# Patient Record
Sex: MALE | Race: BLACK OR AFRICAN AMERICAN | NOT HISPANIC OR LATINO | URBAN - METROPOLITAN AREA
[De-identification: names, ages, dates, MRNs, and addresses within clinical notes are randomized per-mention and may not be internally consistent; named-entity substitution may affect disease eponyms.]

---

## 2022-09-06 ENCOUNTER — INPATIENT (INPATIENT)
Facility: HOSPITAL | Age: 57
LOS: 3 days | Discharge: ANOTHER HOSPITAL | End: 2022-09-10
Attending: INTERNAL MEDICINE | Admitting: INTERNAL MEDICINE

## 2022-09-06 VITALS
HEART RATE: 87 BPM | RESPIRATION RATE: 17 BRPM | DIASTOLIC BLOOD PRESSURE: 107 MMHG | OXYGEN SATURATION: 96 % | HEIGHT: 74 IN | WEIGHT: 151.9 LBS | TEMPERATURE: 98 F | SYSTOLIC BLOOD PRESSURE: 168 MMHG

## 2022-09-06 LAB
ALBUMIN SERPL ELPH-MCNC: 3.6 G/DL — SIGNIFICANT CHANGE UP (ref 3.3–5)
ALP SERPL-CCNC: 112 U/L — SIGNIFICANT CHANGE UP (ref 40–120)
ALT FLD-CCNC: 25 U/L — SIGNIFICANT CHANGE UP (ref 12–78)
ANION GAP SERPL CALC-SCNC: 6 MMOL/L — SIGNIFICANT CHANGE UP (ref 5–17)
APTT BLD: 32 SEC — SIGNIFICANT CHANGE UP (ref 27.5–35.5)
AST SERPL-CCNC: 25 U/L — SIGNIFICANT CHANGE UP (ref 15–37)
BASOPHILS # BLD AUTO: 0.04 K/UL — SIGNIFICANT CHANGE UP (ref 0–0.2)
BASOPHILS NFR BLD AUTO: 0.8 % — SIGNIFICANT CHANGE UP (ref 0–2)
BILIRUB SERPL-MCNC: 0.5 MG/DL — SIGNIFICANT CHANGE UP (ref 0.2–1.2)
BUN SERPL-MCNC: 16 MG/DL — SIGNIFICANT CHANGE UP (ref 7–23)
CALCIUM SERPL-MCNC: 8.8 MG/DL — SIGNIFICANT CHANGE UP (ref 8.5–10.1)
CHLORIDE SERPL-SCNC: 106 MMOL/L — SIGNIFICANT CHANGE UP (ref 96–108)
CO2 SERPL-SCNC: 27 MMOL/L — SIGNIFICANT CHANGE UP (ref 22–31)
CREAT SERPL-MCNC: 1.25 MG/DL — SIGNIFICANT CHANGE UP (ref 0.5–1.3)
EGFR: 68 ML/MIN/1.73M2 — SIGNIFICANT CHANGE UP
EOSINOPHIL # BLD AUTO: 0.09 K/UL — SIGNIFICANT CHANGE UP (ref 0–0.5)
EOSINOPHIL NFR BLD AUTO: 1.8 % — SIGNIFICANT CHANGE UP (ref 0–6)
FLUAV AG NPH QL: SIGNIFICANT CHANGE UP
FLUBV AG NPH QL: SIGNIFICANT CHANGE UP
GLUCOSE SERPL-MCNC: 82 MG/DL — SIGNIFICANT CHANGE UP (ref 70–99)
HCT VFR BLD CALC: 45.1 % — SIGNIFICANT CHANGE UP (ref 39–50)
HGB BLD-MCNC: 14.7 G/DL — SIGNIFICANT CHANGE UP (ref 13–17)
IMM GRANULOCYTES NFR BLD AUTO: 0 % — SIGNIFICANT CHANGE UP (ref 0–1.5)
INR BLD: 1.06 RATIO — SIGNIFICANT CHANGE UP (ref 0.88–1.16)
LYMPHOCYTES # BLD AUTO: 1.2 K/UL — SIGNIFICANT CHANGE UP (ref 1–3.3)
LYMPHOCYTES # BLD AUTO: 24.4 % — SIGNIFICANT CHANGE UP (ref 13–44)
MCHC RBC-ENTMCNC: 29.5 PG — SIGNIFICANT CHANGE UP (ref 27–34)
MCHC RBC-ENTMCNC: 32.6 G/DL — SIGNIFICANT CHANGE UP (ref 32–36)
MCV RBC AUTO: 90.6 FL — SIGNIFICANT CHANGE UP (ref 80–100)
MONOCYTES # BLD AUTO: 0.46 K/UL — SIGNIFICANT CHANGE UP (ref 0–0.9)
MONOCYTES NFR BLD AUTO: 9.4 % — SIGNIFICANT CHANGE UP (ref 2–14)
NEUTROPHILS # BLD AUTO: 3.12 K/UL — SIGNIFICANT CHANGE UP (ref 1.8–7.4)
NEUTROPHILS NFR BLD AUTO: 63.6 % — SIGNIFICANT CHANGE UP (ref 43–77)
NRBC # BLD: 0 /100 WBCS — SIGNIFICANT CHANGE UP (ref 0–0)
NT-PROBNP SERPL-SCNC: 86 PG/ML — SIGNIFICANT CHANGE UP (ref 0–125)
PLATELET # BLD AUTO: 271 K/UL — SIGNIFICANT CHANGE UP (ref 150–400)
POTASSIUM SERPL-MCNC: 4.9 MMOL/L — SIGNIFICANT CHANGE UP (ref 3.5–5.3)
POTASSIUM SERPL-SCNC: 4.9 MMOL/L — SIGNIFICANT CHANGE UP (ref 3.5–5.3)
PROT SERPL-MCNC: 8.5 GM/DL — HIGH (ref 6–8.3)
PROTHROM AB SERPL-ACNC: 12.6 SEC — SIGNIFICANT CHANGE UP (ref 10.5–13.4)
RBC # BLD: 4.98 M/UL — SIGNIFICANT CHANGE UP (ref 4.2–5.8)
RBC # FLD: 14.3 % — SIGNIFICANT CHANGE UP (ref 10.3–14.5)
SARS-COV-2 RNA SPEC QL NAA+PROBE: SIGNIFICANT CHANGE UP
SODIUM SERPL-SCNC: 139 MMOL/L — SIGNIFICANT CHANGE UP (ref 135–145)
TROPONIN I, HIGH SENSITIVITY RESULT: 7.2 NG/L — SIGNIFICANT CHANGE UP
WBC # BLD: 4.91 K/UL — SIGNIFICANT CHANGE UP (ref 3.8–10.5)
WBC # FLD AUTO: 4.91 K/UL — SIGNIFICANT CHANGE UP (ref 3.8–10.5)

## 2022-09-06 PROCEDURE — 93010 ELECTROCARDIOGRAM REPORT: CPT

## 2022-09-06 PROCEDURE — 99285 EMERGENCY DEPT VISIT HI MDM: CPT

## 2022-09-06 PROCEDURE — 71275 CT ANGIOGRAPHY CHEST: CPT | Mod: 26,MA

## 2022-09-06 PROCEDURE — 71045 X-RAY EXAM CHEST 1 VIEW: CPT | Mod: 26

## 2022-09-06 NOTE — ED PROVIDER NOTE - PROGRESS NOTE DETAILS
CXR showing large left sided pleural effusion with compressed left lung. Patient in no respiratory distress at rest, comfortable, speaking full sentences, not hypoxic on RA. 99% on RA. Pending CTA chest and to be admitted for thoracentesis, drain pleural effusion and possible malignancy workup (+) right supraclavicular LAD, suspicious for malignancy. spoke w/ heme/onc on call fellow CTC, recommended to be admitted at [place] for pleural effusion drain and cytology and eventual IR to biopsy LN.

## 2022-09-06 NOTE — ED ADULT NURSE NOTE - OBJECTIVE STATEMENT
as per patient " 2- 3 days shortness of breath, when I lift, bend or walk it's hard to catch my breath"

## 2022-09-06 NOTE — ED ADULT NURSE NOTE - NS TRANSFER DISPOSITION PATIENT BELONGINGS
Spoke to patient and message given. Patient reminded of follow up apt on 6/6/19 at 1:00pm     not applicable

## 2022-09-06 NOTE — ED PROVIDER NOTE - CLINICAL SUMMARY MEDICAL DECISION MAKING FREE TEXT BOX
Pt with Hx of former smoker p/w dyspnea x 2 days, most likely secondary to large pleural effusion secondary to rule out lung cancer. Presentation is not consistent with acute cardiac etiologies (including but not limited to ACS (HEART SCORE  ), CHF exacerbation, pericardial effusion/cardiac tamponade), acute respiratory etiologies (including acute pulmonary embolism (Wells Score low risk), pneumothorax, asthma exacerbation, COPD exacerbation), allergic etiologies, infectious etiologies (including sepsis, pneumonia), or non-cardiopulmonary causes (including neurological causes such as demyelinating diseases, metabolic etiologies (including acidemia/electrolyte derangements), and toxicological causes (including salicylate toxicity, massive overdose)  PLAN:   - Supplemental oxygen as needed, NIPPV as needed. Close hemodynamic monitoring.  - CXR, CBC/CMP, troponin, BNP,    - CTA CHEST.  - Serial reassessments, disposition accordingly

## 2022-09-06 NOTE — ED PROVIDER NOTE - CARE PLAN
Principal Discharge DX:	Pleural effusion   1 Principal Discharge DX:	Pleural effusion  Secondary Diagnosis:	Pulmonary embolism  Secondary Diagnosis:	Malignancy  Secondary Diagnosis:	Dyspnea

## 2022-09-06 NOTE — ED PROVIDER NOTE - OBJECTIVE STATEMENT
57M PMHx of former smoker, left heart mass s/p surgical removal, presenting with shortness of breath x 2 days. Describes dyspnea on exertion, worsening with position, orthopnea. Denies any chest pain, abdominal pain, coughs, nausea/vomiting, headaches, fevers, chills, diarrhea ,constipation, weakness, syncope, hematuria, dysuria, urinary symptoms, subjective neurological deficits, sick contacts. Denies recent travel, recent surgery, immobility, extremity swelling, hemoptysis, hormone use, known personal cancer history, or personal/family history of blood clots. Denies any unexpected weight loss, extreme fatigue, night sweats.

## 2022-09-06 NOTE — ED PROVIDER NOTE - PHYSICAL EXAMINATION
VITAL SIGNS: I have reviewed nursing notes and confirm.   GEN: Well-developed; well-nourished; in no acute distress. Speaking full sentences.  SKIN: Warm, pink, no rash, no diaphoresis, no cyanosis, well perfused.   HEAD: Normocephalic; atraumatic. No scalp lacerations, no abrasions.  NECK: Supple; non tender. (+) RIGHT SUPRACLAVICULAR nontender mobile LAD.  EYES: Pupils 3mm equal, round, reactive to light and accomodation, conjunctiva and sclera clear. Extra-ocular movements intact bilaterally.  ENT: No nasal discharge; airway clear. Trachea is midline. ORAL: No oropharyngeal exudates or erythema. Normal dentition.  CV: Regular rate and rhythm. S1, S2 normal; no murmurs, gallops, or rubs. No lower extremity pitting edema bilaterally. Capillary refill < 2 seconds throughout. Distal pulses intact 2+ throughout.  RESP: (+) LEFT sided decreased breath sounds, no rales, no wheezing, no rhonchi. RIGHT lung CTA.  ABD: Normal bowel sounds, soft, non-distended, non-tender, no rebound, no guarding, no rigidity, no hepatosplenomegaly. No CVA tenderness bilaterally.  MSK: Normal range of motion and movement of all 4 extremities. No joint or muscular pain throughout. No clubbing.   BACK: No thoracolumbar midline or paravertebral tenderness. No step-offs or obvious deformities.  NEURO: Alert & oriented x 3, Grossly unremarkable. Sensory and motor intact throughout. No focal deficits. Gait: Fluid. Normal speech and coordination.   PSYCH: Cooperative, appropriate.

## 2022-09-07 DIAGNOSIS — I26.99 OTHER PULMONARY EMBOLISM WITHOUT ACUTE COR PULMONALE: ICD-10-CM

## 2022-09-07 DIAGNOSIS — R09.89 OTHER SPECIFIED SYMPTOMS AND SIGNS INVOLVING THE CIRCULATORY AND RESPIRATORY SYSTEMS: ICD-10-CM

## 2022-09-07 DIAGNOSIS — R91.8 OTHER NONSPECIFIC ABNORMAL FINDING OF LUNG FIELD: ICD-10-CM

## 2022-09-07 DIAGNOSIS — J90 PLEURAL EFFUSION, NOT ELSEWHERE CLASSIFIED: ICD-10-CM

## 2022-09-07 LAB
ALBUMIN SERPL ELPH-MCNC: 3.3 G/DL — SIGNIFICANT CHANGE UP (ref 3.3–5)
ALP SERPL-CCNC: 95 U/L — SIGNIFICANT CHANGE UP (ref 40–120)
ALT FLD-CCNC: 18 U/L — SIGNIFICANT CHANGE UP (ref 12–78)
ANION GAP SERPL CALC-SCNC: 5 MMOL/L — SIGNIFICANT CHANGE UP (ref 5–17)
APTT BLD: 156.4 SEC — CRITICAL HIGH (ref 27.5–35.5)
APTT BLD: 29.3 SEC — SIGNIFICANT CHANGE UP (ref 27.5–35.5)
AST SERPL-CCNC: 23 U/L — SIGNIFICANT CHANGE UP (ref 15–37)
BILIRUB SERPL-MCNC: 0.6 MG/DL — SIGNIFICANT CHANGE UP (ref 0.2–1.2)
BUN SERPL-MCNC: 19 MG/DL — SIGNIFICANT CHANGE UP (ref 7–23)
CALCIUM SERPL-MCNC: 9.5 MG/DL — SIGNIFICANT CHANGE UP (ref 8.5–10.1)
CHLORIDE SERPL-SCNC: 109 MMOL/L — HIGH (ref 96–108)
CO2 SERPL-SCNC: 25 MMOL/L — SIGNIFICANT CHANGE UP (ref 22–31)
CREAT SERPL-MCNC: 1.08 MG/DL — SIGNIFICANT CHANGE UP (ref 0.5–1.3)
EGFR: 81 ML/MIN/1.73M2 — SIGNIFICANT CHANGE UP
GLUCOSE SERPL-MCNC: 80 MG/DL — SIGNIFICANT CHANGE UP (ref 70–99)
HCT VFR BLD CALC: 40.6 % — SIGNIFICANT CHANGE UP (ref 39–50)
HGB BLD-MCNC: 13.2 G/DL — SIGNIFICANT CHANGE UP (ref 13–17)
MCHC RBC-ENTMCNC: 29.6 PG — SIGNIFICANT CHANGE UP (ref 27–34)
MCHC RBC-ENTMCNC: 32.5 G/DL — SIGNIFICANT CHANGE UP (ref 32–36)
MCV RBC AUTO: 91 FL — SIGNIFICANT CHANGE UP (ref 80–100)
NRBC # BLD: 0 /100 WBCS — SIGNIFICANT CHANGE UP (ref 0–0)
PLATELET # BLD AUTO: 249 K/UL — SIGNIFICANT CHANGE UP (ref 150–400)
POTASSIUM SERPL-MCNC: 4.1 MMOL/L — SIGNIFICANT CHANGE UP (ref 3.5–5.3)
POTASSIUM SERPL-SCNC: 4.1 MMOL/L — SIGNIFICANT CHANGE UP (ref 3.5–5.3)
PROT SERPL-MCNC: 7.2 GM/DL — SIGNIFICANT CHANGE UP (ref 6–8.3)
RBC # BLD: 4.46 M/UL — SIGNIFICANT CHANGE UP (ref 4.2–5.8)
RBC # FLD: 14.1 % — SIGNIFICANT CHANGE UP (ref 10.3–14.5)
SODIUM SERPL-SCNC: 139 MMOL/L — SIGNIFICANT CHANGE UP (ref 135–145)
WBC # BLD: 4.25 K/UL — SIGNIFICANT CHANGE UP (ref 3.8–10.5)
WBC # FLD AUTO: 4.25 K/UL — SIGNIFICANT CHANGE UP (ref 3.8–10.5)

## 2022-09-07 PROCEDURE — 12345: CPT | Mod: NC

## 2022-09-07 PROCEDURE — 93306 TTE W/DOPPLER COMPLETE: CPT | Mod: 26

## 2022-09-07 PROCEDURE — 88108 CYTOPATH CONCENTRATE TECH: CPT | Mod: 26

## 2022-09-07 PROCEDURE — 71045 X-RAY EXAM CHEST 1 VIEW: CPT | Mod: 26

## 2022-09-07 PROCEDURE — 93308 TTE F-UP OR LMTD: CPT | Mod: 26

## 2022-09-07 PROCEDURE — 99223 1ST HOSP IP/OBS HIGH 75: CPT | Mod: 25

## 2022-09-07 PROCEDURE — 32555 ASPIRATE PLEURA W/ IMAGING: CPT

## 2022-09-07 PROCEDURE — 99223 1ST HOSP IP/OBS HIGH 75: CPT

## 2022-09-07 PROCEDURE — 76604 US EXAM CHEST: CPT | Mod: 26

## 2022-09-07 RX ORDER — LANOLIN ALCOHOL/MO/W.PET/CERES
3 CREAM (GRAM) TOPICAL AT BEDTIME
Refills: 0 | Status: DISCONTINUED | OUTPATIENT
Start: 2022-09-07 | End: 2022-09-10

## 2022-09-07 RX ORDER — HEPARIN SODIUM 5000 [USP'U]/ML
5500 INJECTION INTRAVENOUS; SUBCUTANEOUS ONCE
Refills: 0 | Status: COMPLETED | OUTPATIENT
Start: 2022-09-07 | End: 2022-09-07

## 2022-09-07 RX ORDER — HEPARIN SODIUM 5000 [USP'U]/ML
1000 INJECTION INTRAVENOUS; SUBCUTANEOUS
Qty: 25000 | Refills: 0 | Status: DISCONTINUED | OUTPATIENT
Start: 2022-09-07 | End: 2022-09-10

## 2022-09-07 RX ORDER — METOPROLOL TARTRATE 50 MG
12.5 TABLET ORAL ONCE
Refills: 0 | Status: COMPLETED | OUTPATIENT
Start: 2022-09-07 | End: 2022-09-07

## 2022-09-07 RX ORDER — ACETAMINOPHEN 500 MG
650 TABLET ORAL EVERY 6 HOURS
Refills: 0 | Status: DISCONTINUED | OUTPATIENT
Start: 2022-09-07 | End: 2022-09-10

## 2022-09-07 RX ORDER — HEPARIN SODIUM 5000 [USP'U]/ML
2500 INJECTION INTRAVENOUS; SUBCUTANEOUS EVERY 6 HOURS
Refills: 0 | Status: DISCONTINUED | OUTPATIENT
Start: 2022-09-07 | End: 2022-09-07

## 2022-09-07 RX ORDER — HEPARIN SODIUM 5000 [USP'U]/ML
INJECTION INTRAVENOUS; SUBCUTANEOUS
Qty: 25000 | Refills: 0 | Status: DISCONTINUED | OUTPATIENT
Start: 2022-09-07 | End: 2022-09-07

## 2022-09-07 RX ORDER — AMLODIPINE BESYLATE 2.5 MG/1
5 TABLET ORAL EVERY 24 HOURS
Refills: 0 | Status: DISCONTINUED | OUTPATIENT
Start: 2022-09-07 | End: 2022-09-10

## 2022-09-07 RX ORDER — HEPARIN SODIUM 5000 [USP'U]/ML
5500 INJECTION INTRAVENOUS; SUBCUTANEOUS EVERY 6 HOURS
Refills: 0 | Status: DISCONTINUED | OUTPATIENT
Start: 2022-09-07 | End: 2022-09-07

## 2022-09-07 RX ADMIN — HEPARIN SODIUM 1000 UNIT(S)/HR: 5000 INJECTION INTRAVENOUS; SUBCUTANEOUS at 23:16

## 2022-09-07 RX ADMIN — HEPARIN SODIUM 5500 UNIT(S): 5000 INJECTION INTRAVENOUS; SUBCUTANEOUS at 00:50

## 2022-09-07 RX ADMIN — Medication 12.5 MILLIGRAM(S): at 06:05

## 2022-09-07 RX ADMIN — AMLODIPINE BESYLATE 5 MILLIGRAM(S): 2.5 TABLET ORAL at 13:07

## 2022-09-07 RX ADMIN — HEPARIN SODIUM 1200 UNIT(S)/HR: 5000 INJECTION INTRAVENOUS; SUBCUTANEOUS at 04:51

## 2022-09-07 RX ADMIN — HEPARIN SODIUM 1200 UNIT(S)/HR: 5000 INJECTION INTRAVENOUS; SUBCUTANEOUS at 00:51

## 2022-09-07 RX ADMIN — HEPARIN SODIUM 1200 UNIT(S)/HR: 5000 INJECTION INTRAVENOUS; SUBCUTANEOUS at 07:15

## 2022-09-07 RX ADMIN — HEPARIN SODIUM 0 UNIT(S)/HR: 5000 INJECTION INTRAVENOUS; SUBCUTANEOUS at 10:34

## 2022-09-07 NOTE — H&P ADULT - HISTORY OF PRESENT ILLNESS
58 yo M w/PMHx of s/p thoracic surgery 2/2 to cardiac mass/inflammation presents due to SOB, orthopnea. He reports he suddenly noticed being more dyspneic several days ago, went to urgent care today and they recommended he come to the hospital. He reports b/l peripheral edema, R worse than left. He reports 5-6 pound unintentional weight loss over the last 2 months. He smoked 2 "black and mild" cigars daily for 8-9 years and quit 6 months ago. No family history of malignancy. He denies hemoptysis, night sweats, chest pain, abdominal pain, coughs, nausea/vomiting, headaches, fevers, chills, diarrhea ,constipation, weakness, syncope, hematuria, dysuria, urinary symptoms, subjective neurological deficits, sick contacts. Denies recent travel, recent surgery.

## 2022-09-07 NOTE — CONSULT NOTE ADULT - SUBJECTIVE AND OBJECTIVE BOX
Patient is a 57y old  Male who presents with a chief complaint of Pleural Effusion, Pulmonary Embolism (07 Sep 2022 02:18)      HPI:  56 yo M w/PMHx of s/p thoracic surgery 2/2 to cardiac mass/inflammation presents due to SOB, orthopnea. He reports he suddenly noticed being more dyspneic several days ago, went to urgent care today and they recommended he come to the hospital. He reports b/l peripheral edema, R worse than left. He reports 5-6 pound unintentional weight loss over the last 2 months. He smoked 2 "black and mild" cigars daily for 8-9 years and quit 6 months ago. No family history of malignancy. He denies hemoptysis, night sweats, chest pain, abdominal pain, coughs, nausea/vomiting, headaches, fevers, chills, diarrhea ,constipation, weakness, syncope, hematuria, dysuria, urinary symptoms, subjective neurological deficits, sick contacts. Denies recent travel, recent surgery.  (07 Sep 2022 02:18)      Allergies  No Known Allergies        MEDICATIONS  (STANDING):  amLODIPine   Tablet 5 milliGRAM(s) Oral every 24 hours  heparin  Infusion.  Unit(s)/Hr (12 mL/Hr) IV Continuous <Continuous>    MEDICATIONS  (PRN):  acetaminophen     Tablet .. 650 milliGRAM(s) Oral every 6 hours PRN Temp greater or equal to 38C (100.4F), Mild Pain (1 - 3)  heparin   Injectable 5500 Unit(s) IV Push every 6 hours PRN For aPTT less than 40  heparin   Injectable 2500 Unit(s) IV Push every 6 hours PRN For aPTT between 40 - 57  melatonin 3 milliGRAM(s) Oral at bedtime PRN Insomnia      Daily Height in cm: 187.96 (07 Sep 2022 04:58)    Daily     Drug Dosing Weight  Height (cm): 188 (07 Sep 2022 04:58)  Weight (kg): 55.9 (07 Sep 2022 04:58)  BMI (kg/m2): 15.8 (07 Sep 2022 04:58)  BSA (m2): 1.77 (07 Sep 2022 04:58)    PAST MEDICAL & SURGICAL HISTORY:  No pertinent past medical history      No significant past surgical history          FAMILY HISTORY:  No pertinent family history in first degree relatives        SOCIAL HISTORY:    ADVANCE DIRECTIVES:    REVIEW OF SYSTEMS:    CONSTITUTIONAL: No fever, weight loss, or fatigue  EYES: No eye pain, visual disturbances, or discharge  ENMT:  No difficulty hearing, tinnitus, vertigo; No sinus or throat pain  NECK: No pain or stiffness  BREASTS: No pain, masses, or nipple discharge  RESPIRATORY: No cough, wheezing, chills or hemoptysis; No shortness of breath  CARDIOVASCULAR: No chest pain, palpitations, dizziness, or leg swelling  GASTROINTESTINAL: No abdominal or epigastric pain. No nausea, vomiting, or hematemesis; No diarrhea or constipation. No melena or hematochezia.  GENITOURINARY: No dysuria, frequency, hematuria, or incontinence  NEUROLOGICAL: No headaches, memory loss, loss of strength, numbness, or tremors  SKIN: No itching, burning, rashes, or lesions   LYMPH NODES: No enlarged glands  ENDOCRINE: No heat or cold intolerance; No hair loss  MUSCULOSKELETAL: No joint pain or swelling; No muscle, back, or extremity pain  PSYCHIATRIC: No depression, anxiety, mood swings, or difficulty sleeping  HEME/LYMPH: No easy bruising, or bleeding gums  ALLERGY AND IMMUNOLOGIC: No hives or eczema          ICU Vital Signs Last 24 Hrs  T(C): 36.4 (07 Sep 2022 11:06), Max: 36.6 (06 Sep 2022 19:49)  T(F): 97.5 (07 Sep 2022 11:06), Max: 97.8 (06 Sep 2022 19:49)  HR: 78 (07 Sep 2022 11:25) (75 - 88)  BP: 171/102 (07 Sep 2022 11:25) (145/94 - 171/102)  RR: 18 (07 Sep 2022 11:06) (17 - 21)  SpO2: 95% (07 Sep 2022 11:06) (94% - 97%)    O2 Parameters below as of 07 Sep 2022 11:06  Patient On (Oxygen Delivery Method): room air                I&O's Detail    07 Sep 2022 07:01  -  07 Sep 2022 16:04  --------------------------------------------------------  IN:    Oral Fluid: 540 mL  Total IN: 540 mL    OUT:  Total OUT: 0 mL    Total NET: 540 mL          PHYSICAL EXAM:    GENERAL: NAD, well-groomed, well-developed  HEAD:  Atraumatic, Normocephalic  EYES: EOMI, PERRLA, conjunctiva and sclera clear  ENMT: No tonsillar erythema, exudates, or enlargement; Moist mucous membranes, Good dentition, No lesions  NECK: Supple, No JVD, Normal thyroid  NERVOUS SYSTEM:  Alert & Oriented X3, Good concentration; Motor Strength 5/5 B/L upper and lower extremities; DTRs 2+ intact and symmetric  CHEST/LUNG: Clear to percussion bilaterally; No rales, rhonchi, wheezing, or rubs  HEART: Regular rate and rhythm; No murmurs, rubs, or gallops  ABDOMEN: Soft, Nontender, Nondistended; Bowel sounds present  EXTREMITIES:  2+ Peripheral Pulses, No clubbing, cyanosis, or edema  LYMPH: No lymphadenopathy noted  SKIN: No rashes or lesions    LABS:                        13.2   4.25  )-----------( 249      ( 07 Sep 2022 07:40 )             40.6       09-07    139  |  109<H>  |  19  ----------------------------<  80  4.1   |  25           |  1.08    Ca    9.5      07 Sep 2022 07:40    TPro  7.2  /  Alb  3.3  /  TBili  0.6  /  DBili  x   /  AST  23  /  ALT  18  /  AlkPhos  95  09-07      CAPILLARY BLOOD GLUCOSE        PT/INR - ( 06 Sep 2022 20:44 )   PT: 12.6 sec;   INR: 1.06 ratio         PTT - ( 07 Sep 2022 07:40 )  PTT:156.4 sec            EKG:    ECHO, US:    RADIOLOGY:  CXR < from: Xray Chest 1 View- PORTABLE-Urgent (Xray Chest 1 View- PORTABLE-Urgent .) (09.06.22 @ 21:22) >  Heart size cannot be assessed.  There is a massive left effusion.    CTA chest < from: CT Angio Chest PE Protocol w/ IV Cont (09.06.22 @ 22:10) >  Small eccentric filling defects identified within segmental to   subsegmental branches of the right lower lobe pulmonary artery,   compatible with pulmonary embolism. No central pulmonary embolism.    Mild cardiomegaly with enlargement the right cardiac chambers. Consider   correlation with echocardiogram to exclude potential right heart strain.    Left upper lobe masslike opacity measuring 3.3 x 2 cm (45:2). Poorly   defined right upper lobe nodule measuring 1.4 x 1.3 cm (52:2). Correlate   with prior studies and further nonemergent workup with PET/CT advised to   exclude malignancy or metastatic disease. Additional few too small to   characterize less than 4-5 mm right sided subpleural based nodules.    Large left pleural effusion extending to the apex, components appears   loculated with adjacent interstitial reticulation in the leftapex.    Complete left lower lobe atelectasis. Partial left upper lobe atelectasis.       CC: shortness of breath      HPI:  57M with no self reported PMH. Does report hx of thoracic surgery for unclear reasons ?cardiac mass/inflammation "fluid or cyst" 20 years ago in [facility]. Denies hx of cancer. States he was not told if his thoracic sx was related to cancer or not but states post surgery he had what was described as R sided chest tubes. He denies any prior chemo or radiation therapy. States he has been in good health overall and is active and has always played sports (basketball, football). Denies any recent travel. Reports he has been in [place] for some time to help his sister who received a renal transplant and had plans of returning back to [place] once she was better. Pt presents to ED for acute onset of SOB when he awoke from sleep 2 days prior. No chest pain. No cough. No hemoptysis. Now with worsening dyspnea on exertion in last 2 days prompting ED eval. Also reports b/l LE peripheral edema  R worse than left. He has had 5-6 pounds unintentional weight loss over the last 2 months. No changes in his appetite. He smoked 2 "black and mild" cigars daily for 8 years and quit 6 months ago. No family history of clotting disorders. He denies night sweats, chest pain, abdominal pain, coughs, nausea/vomiting, headaches, fevers, chills, diarrhea , weakness, syncope, hematuria, dysuria, urinary symptoms, any neurological deficits, sick contacts. No recent travel, no recent surgery. 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 He had a "normal" colonoscopy about 3-4 years ago and around same time had his prostate checked without abnormalities per patient.     O2 sat RA 97%, in no respiratory distress. Found on CXR with large L pleural effusion. CTA chest with large L pleural effusion and RLL PE. Also with noted RUL nodule and ?SAULO mass. Admitted to medical service and started on heparin drip for PE.    Pt is not on any home medications.       Allergies  No Known Allergies        MEDICATIONS  (STANDING):  amLODIPine   Tablet 5 milliGRAM(s) Oral every 24 hours  heparin  Infusion.  Unit(s)/Hr (12 mL/Hr) IV Continuous <Continuous>    MEDICATIONS  (PRN):  acetaminophen     Tablet .. 650 milliGRAM(s) Oral every 6 hours PRN Temp greater or equal to 38C (100.4F), Mild Pain (1 - 3)  heparin   Injectable 5500 Unit(s) IV Push every 6 hours PRN For aPTT less than 40  heparin   Injectable 2500 Unit(s) IV Push every 6 hours PRN For aPTT between 40 - 57  melatonin 3 milliGRAM(s) Oral at bedtime PRN Insomnia      Daily Height in cm: 187.96 (07 Sep 2022 04:58)    Daily     Drug Dosing Weight  Height (cm): 188 (07 Sep 2022 04:58)  Weight (kg): 55.9 (07 Sep 2022 04:58)  BMI (kg/m2): 15.8 (07 Sep 2022 04:58)  BSA (m2): 1.77 (07 Sep 2022 04:58)    PAST MEDICAL & SURGICAL HISTORY:  No pertinent past medical history    R thoracotomy 20 years ago  R knee surgery (high school basketball cartilage injury)            FAMILY HISTORY:  sister (same mother/different father): renal failure s/p renal transplant    "cancer"- unclear of what type on father's side      SOCIAL HISTORY:  retired  since 2016  currently works produce and meat section at [employer]  quit cigar smoking 6 months (smoked 2 black and mild cigars daily x8 years)  no alcohol use  + occasional marijuana use       REVIEW OF SYSTEMS:  CONSTITUTIONAL: No fever, chills, sweats, fatigue; + unintentional 5 pound weight loss over 2 months  EYES: No eye pain, visual disturbances  ENMT:  No difficulty hearing, tinnitus, vertigo; No sinus or throat pain  NECK: No pain or stiffness  RESPIRATORY: No cough, wheezing, hemoptysis; + shortness of breath x2 days  CARDIOVASCULAR: No chest pain, palpitations, dizziness  GASTROINTESTINAL: No abdominal or epigastric pain. No nausea, vomiting, or hematemesis; No diarrhea + constipation. No melena or hematochezia.  GENITOURINARY: No dysuria, frequency, hematuria, or incontinence  NEUROLOGICAL: No headaches, memory loss, loss of strength, numbness, or tremors  SKIN: No itching, burning, rashes, or lesions   LYMPH NODES: No enlarged glands  MUSCULOSKELETAL: No joint pain or swelling; No muscle, back, or extremity pain  PSYCHIATRIC: No depression, anxiety  HEME/LYMPH: No easy bruising, or bleeding gums or prior clots      Vital Signs Last 24 Hrs  T(C): 36.4 (07 Sep 2022 11:06), Max: 36.6 (06 Sep 2022 19:49)  T(F): 97.5 (07 Sep 2022 11:06), Max: 97.8 (06 Sep 2022 19:49)  HR: 78 (07 Sep 2022 11:25) (75 - 88)  BP: 171/102 (07 Sep 2022 11:25) (145/94 - 171/102)  RR: 18 (07 Sep 2022 11:06) (17 - 21)  SpO2: 95% (07 Sep 2022 11:06) (94% - 97%)    O2 Parameters below as of 07 Sep 2022 11:06  Patient On (Oxygen Delivery Method): room air        I&O's Detail    07 Sep 2022 07:01  -  07 Sep 2022 16:04  --------------------------------------------------------  IN:    Oral Fluid: 540 mL  Total IN: 540 mL    OUT:  Total OUT: 0 mL    Total NET: 540 mL        PHYSICAL EXAM:  GENERAL: NAD, thin tall well-groomed male  HEAD:  Atraumatic, Normocephalic  EYES: EOMI, conjunctiva and sclera clear  ENMT: No tonsillar erythema, exudates; Moist mucous membranes  NECK: Supple, No JVD  NERVOUS SYSTEM:  Alert & Oriented X3, Good concentration; Motor Strength 5/5 B/L upper and lower extremities; DTRs 2+ intact and symmetric  CHEST/LUNG: Clear to auscultation bilaterally; No rales, rhonchi, wheezing, Left breath sounds decreased compared to right  HEART: Regular rate and rhythm  ABDOMEN: Soft, Nontender, Nondistended; Bowel sounds present  EXTREMITIES:  2+ Peripheral Pulses, No clubbing, cyanosis; bilateral LE ankle and feet pitting edema 2+  SKIN: No rashes or lesions, birthmark noted to lumbar back region, large R posterior back healed surgical scar, R knee surgical scar      LABS:                        13.2   4.25  )-----------( 249      ( 07 Sep 2022 07:40 )             40.6       09-07    139  |  109<H>  |  19  ----------------------------<  80  4.1   |  25           |  1.08    Ca    9.5      07 Sep 2022 07:40    TPro  7.2  /  Alb  3.3  /  TBili  0.6  /  AST  23  /  ALT  18  /  AlkPhos  95  09-07        PT/INR - ( 06 Sep 2022 20:44 )   PT: 12.6 sec;   INR: 1.06 ratio    PTT - ( 07 Sep 2022 07:40 )  PTT:156.4 sec      RADIOLOGY:  CXR < from: Xray Chest 1 View- PORTABLE-Urgent (Xray Chest 1 View- PORTABLE-Urgent .) (09.06.22 @ 21:22) >  Heart size cannot be assessed.  There is a massive left effusion.    CTA chest < from: CT Angio Chest PE Protocol w/ IV Cont (09.06.22 @ 22:10) >  Small eccentric filling defects identified within segmental to   subsegmental branches of the right lower lobe pulmonary artery,   compatible with pulmonary embolism. No central pulmonary embolism.    Mild cardiomegaly with enlargement the right cardiac chambers. Consider   correlation with echocardiogram to exclude potential right heart strain.    Left upper lobe masslike opacity measuring 3.3 x 2 cm (45:2). Poorly   defined right upper lobe nodule measuring 1.4 x 1.3 cm (52:2). Correlate   with prior studies and further nonemergent workup with PET/CT advised to   exclude malignancy or metastatic disease. Additional few too small to   characterize less than 4-5 mm right sided subpleural based nodules.    Large left pleural effusion extending to the apex, components appears   loculated with adjacent interstitial reticulation in the leftapex.    Complete left lower lobe atelectasis. Partial left upper lobe atelectasis.

## 2022-09-07 NOTE — CONSULT NOTE ADULT - ASSESSMENT
- large L pleural effusion  - lung US with complex fluid  - in light of PE, unilateral effusion, and complex appearing fluid on US underlying malignancy is of concern  - hold heparin drip (spoke with nurse and primary team) for bedside thoracentesis later today  - repeat PTT prior to planned thoracentesis  - check 2D echo       57M with unclear remote history of R thoracotomy, ex cigar smoker presents for SOB x2 days with bilateral LE edema R > L. Found to have RLL PE and large L pleural effusion along with RUL nodule and ? SAULO mass.      - large L pleural effusion seen apically and both anteriorly / posteriorly   - lung US with complex appearing fluid  - in light of PE, unilateral effusion, and complex appearing fluid on US with echogenic swirling underlying malignancy is of concern  - hold heparin drip (spoke with nurse and primary team) for bedside thoracentesis later today  - repeat PTT prior to planned thoracentesis  - check 2D echo  - suspect pt with possible DVTs, can get LE duplex to confirm but management will not change as pt being anticoagulated  - SOB if likely related more to the large pleural effusion rather than the RLL PE noted on CTA chest  - can resume anticoagulation after thoracentesis performed  - will send pleural fluid sample for cytology to r/o malignancy, cell count, LDH, protein, cultures  - may need to repeat CT chest post thoracentesis to better assess lung parenchyma as initial scan limited due to large effusion

## 2022-09-07 NOTE — H&P ADULT - NSHPPHYSICALEXAM_GEN_ALL_CORE
T(C): 36.6 (09-07-22 @ 02:15), Max: 36.6 (09-06-22 @ 19:49)  HR: 80 (09-07-22 @ 02:15) (80 - 87)  BP: 146/95 (09-07-22 @ 02:15) (145/94 - 168/107)  RR: 21 (09-07-22 @ 02:15) (17 - 21)  SpO2: 94% (09-07-22 @ 02:15) (94% - 96%)    CONSTITUTIONAL: Well groomed, no apparent distress  EYES: PERRLA and symmetric, EOMI, No conjunctival or scleral injection, non-icteric  ENMT: Oral mucosa with moist membranes. Normal dentition; no pharyngeal injection or exudates             NECK: Supple, symmetric and without tracheal deviation   RESP: Wheezing throughout L chest. No respiratory distress, no use of accessory muscles.  CV: RRR, +S1S2, no MRG; no JVD; no peripheral edema. Brice's test neg.  GI: Soft, NT, ND, no rebound, no guarding; no palpable masses; no hepatosplenomegaly; no hernia palpated  LYMPH: No cervical LAD or tenderness; no axillary LAD or tenderness; no inguinal LAD or tenderness  MSK: Normal ROM without pain, no spinal tenderness, normal muscle strength/tone  SKIN: Thoracic scar on R. No rashes or ulcers noted; no subcutaneous nodules or induration palpable  NEURO: CN II-XII intact; normal reflexes in upper and lower extremities, sensation intact in upper and lower extremities b/l to light touch   PSYCH: Appropriate insight/judgment; A+O x 3, mood and affect appropriate, recent/remote memory intact

## 2022-09-07 NOTE — PROCEDURE NOTE - NSUS ED ADDITIONAL DETAIL1 FT
R lung a line predominant. No effusion on right. Large left pleural effusion with dense/hyperechoic appearance with echogenic swirling sign. R lung a line predominant. No effusion on right. Large left pleural effusion with dense/hyperechoic appearance with echogenic swirling sign and atelectatic lung. L effusion also extending to apex and noted anteriorly as well

## 2022-09-07 NOTE — CHART NOTE - NSCHARTNOTEFT_GEN_A_CORE
seen and examined  plan for drain of pleural effusion today, hold heparin  pulm on board    Amlodipine added for htn                          13.2   4.25  )-----------( 249      ( 07 Sep 2022 07:40 )             40.6     09-07    139  |  109<H>  |  19  ----------------------------<  80  4.1   |  25  |  1.08    Ca    9.5      07 Sep 2022 07:40    TPro  7.2  /  Alb  3.3  /  TBili  0.6  /  DBili  x   /  AST  23  /  ALT  18  /  AlkPhos  95  09-07    PT/INR - ( 06 Sep 2022 20:44 )   PT: 12.6 sec;   INR: 1.06 ratio         PTT - ( 07 Sep 2022 07:40 )  PTT:156.4 sec          Vital Signs Last 24 Hrs  T(C): 36.4 (07 Sep 2022 11:06), Max: 36.6 (06 Sep 2022 19:49)  T(F): 97.5 (07 Sep 2022 11:06), Max: 97.8 (06 Sep 2022 19:49)  HR: 78 (07 Sep 2022 11:25) (75 - 88)  BP: 171/102 (07 Sep 2022 11:25) (145/94 - 171/102)  BP(mean): --  RR: 18 (07 Sep 2022 11:06) (17 - 21)  SpO2: 95% (07 Sep 2022 11:06) (94% - 97%)    Parameters below as of 07 Sep 2022 11:06  Patient On (Oxygen Delivery Method): room air

## 2022-09-07 NOTE — H&P ADULT - NSHPLABSRESULTS_GEN_ALL_CORE
14.7   4.91  )-----------( 271      ( 06 Sep 2022 20:44 )             45.1       09-06    139  |  106  |  16  ----------------------------<  82  4.9   |  27  |  1.25    Ca    8.8      06 Sep 2022 20:44    TPro  8.5<H>  /  Alb  3.6  /  TBili  0.5  /  DBili  x   /  AST  25  /  ALT  25  /  AlkPhos  112  09-06              PT/INR - ( 06 Sep 2022 20:44 )   PT: 12.6 sec;   INR: 1.06 ratio         PTT - ( 06 Sep 2022 20:44 )  PTT:32.0 sec

## 2022-09-07 NOTE — PROCEDURE NOTE - ADDITIONAL PROCEDURE DETAILS
sample sent for cell count, culture, cytology. still with residual pleural fluid on US. Procedure terminated with 2.4L removed as pt started with increased cough and slight substernal chest pain which resolved with cessation of fluid removal.

## 2022-09-07 NOTE — PROCEDURE NOTE - NSUS ED ADDITIONAL DETAIL1 FT
difficult cardiac views secondary to large left pleural effusion which is displacing heart towards right. LV function appears to be within normal limits.

## 2022-09-07 NOTE — PATIENT PROFILE ADULT - NSPROPTRIGHTSUPPORTPHONE_GEN_A_NUR
Lov 8/17/2021  Labs no labs   Lrf 8/18/2021 Disp 30+2  Appt No appt scheduled please advise thanks [phone]

## 2022-09-07 NOTE — CHART NOTE - NSCHARTNOTEFT_GEN_A_CORE
1500  heparin drip held at approximately 11:45AM  repeat PTT ordered as AM PTT > 100  pending collection by phlebotomy  if PTT normalized will proceed with ultrasound guided thoracentesis    1600  PTT not yet resulted, sample pending collection    1800  labs reviewed  PTT 29.3    1830  pt not in room  away at test (Echo)    1930  pt back in room  consent obtained and placed in chart

## 2022-09-07 NOTE — H&P ADULT - ASSESSMENT
56 yo M w/PMHx of s/p thoracic surgery 2/2 to cardiac mass/inflammation presents due to SOB, orthopnea.    #Pulmonary Embolus  - PE seen on CTA  - Heparin drip, f/u ptt, plt  - No hx of clotting  - Possibly due to underlying malignancy  - Not in active respiratory distress    #Pleural Effusion  - Large L-sided Pleural effusion seen on CTA  - IR consulted for thoracentesis  - Send diagnostic pleural fluid studies  - Titrate O2>92%  - Pt afebrile, no leukocytosis, low suspicion for infectious process and will not empirically use antibiotics at this time  - Pulmonary consulted    #Lung Mass #Lung Nodule  - Suspicion for malignancy, no previous malignancy per patient  - No Family Hx of malignancy  - Previous smoker of 2 cigars daily for about 9 years, quit 6 months ago, denies cigarette use  - Pt candidate for transfer if diagnostic results of pleural effusion reveal Small cell carcinoma  - Outpatient follow up with oncology for PET scan    CODE: Full  Diet: Regular  VTE: Heparin drip 58 yo M w/PMHx of s/p thoracic surgery 2/2 to cardiac mass/inflammation presents due to SOB, orthopnea.    #Pulmonary Embolus  - PE seen on CTA  - Heparin drip, f/u ptt, plt  - No hx of clotting  - Possibly due to underlying malignancy  - Not in active respiratory distress    #Pleural Effusion  - Large L-sided Pleural effusion seen on CTA  - Pulmonary consulted for thoracentesis  - Send diagnostic pleural fluid studies  - Titrate O2>92%  - Pt afebrile, no leukocytosis, low suspicion for infectious process and will not empirically use antibiotics at this time    #Lung Mass #Lung Nodule  - Suspicion for malignancy, no previous malignancy per patient  - No Family Hx of malignancy  - Previous smoker of 2 cigars daily for about 9 years, quit 6 months ago, denies cigarette use  - Pt candidate for transfer if diagnostic results of pleural effusion reveal Small cell carcinoma  - Outpatient follow up with oncology for PET scan    CODE: Full  Diet: Regular  VTE: Heparin drip

## 2022-09-07 NOTE — H&P ADULT - NSHPREVIEWOFSYSTEMS_GEN_ALL_CORE
+ SOB, Orthopnea, peripheral edema    - Hemoptysis night sweats, chest pain, abdominal pain, coughs, nausea/vomiting, headaches, fevers, chills, diarrhea ,constipation, weakness, syncope, hematuria, dysuria, urinary symptoms, subjective neurological deficits, sick contacts. Denies recent travel, recent surgery.

## 2022-09-08 ENCOUNTER — RESULT REVIEW (OUTPATIENT)
Age: 57
End: 2022-09-08

## 2022-09-08 LAB
ALBUMIN FLD-MCNC: 3.2 G/DL — SIGNIFICANT CHANGE UP
APTT BLD: 58.1 SEC — HIGH (ref 27.5–35.5)
APTT BLD: 66.2 SEC — HIGH (ref 27.5–35.5)
B PERT IGG+IGM PNL SER: CLEAR — SIGNIFICANT CHANGE UP
COLOR FLD: YELLOW — SIGNIFICANT CHANGE UP
COMMENT - FLUIDS: SIGNIFICANT CHANGE UP
EOSINOPHIL # FLD: 1 % — SIGNIFICANT CHANGE UP
FLUID INTAKE SUBSTANCE CLASS: SIGNIFICANT CHANGE UP
GLUCOSE FLD-MCNC: 64 MG/DL — SIGNIFICANT CHANGE UP
GRAM STN FLD: SIGNIFICANT CHANGE UP
HCT VFR BLD CALC: 41.3 % — SIGNIFICANT CHANGE UP (ref 39–50)
HGB BLD-MCNC: 13.4 G/DL — SIGNIFICANT CHANGE UP (ref 13–17)
LDH SERPL L TO P-CCNC: 408 U/L — SIGNIFICANT CHANGE UP
LYMPHOCYTES # FLD: 49 % — SIGNIFICANT CHANGE UP
MCHC RBC-ENTMCNC: 29.6 PG — SIGNIFICANT CHANGE UP (ref 27–34)
MCHC RBC-ENTMCNC: 32.4 G/DL — SIGNIFICANT CHANGE UP (ref 32–36)
MCV RBC AUTO: 91.2 FL — SIGNIFICANT CHANGE UP (ref 80–100)
NEUTROPHILS-BODY FLUID: 2 % — SIGNIFICANT CHANGE UP
NRBC # BLD: 0 /100 WBCS — SIGNIFICANT CHANGE UP (ref 0–0)
OTHER CELLS FLD MANUAL: 48 % — SIGNIFICANT CHANGE UP
PH FLD: 7.7 — SIGNIFICANT CHANGE UP
PLATELET # BLD AUTO: 235 K/UL — SIGNIFICANT CHANGE UP (ref 150–400)
PROT FLD-MCNC: 5.1 G/DL — SIGNIFICANT CHANGE UP
RBC # BLD: 4.53 M/UL — SIGNIFICANT CHANGE UP (ref 4.2–5.8)
RBC # FLD: 13.7 % — SIGNIFICANT CHANGE UP (ref 10.3–14.5)
RCV VOL RI: 1000 /UL — HIGH (ref 0–5)
SPECIMEN SOURCE: SIGNIFICANT CHANGE UP
TOTAL NUCLEATED CELL COUNT, BODY FLUID: 622 /UL — SIGNIFICANT CHANGE UP
TUBE TYPE: SIGNIFICANT CHANGE UP
WBC # BLD: 4.62 K/UL — SIGNIFICANT CHANGE UP (ref 3.8–10.5)
WBC # FLD AUTO: 4.62 K/UL — SIGNIFICANT CHANGE UP (ref 3.8–10.5)

## 2022-09-08 PROCEDURE — 88341 IMHCHEM/IMCYTCHM EA ADD ANTB: CPT | Mod: 26

## 2022-09-08 PROCEDURE — 88305 TISSUE EXAM BY PATHOLOGIST: CPT | Mod: 26

## 2022-09-08 PROCEDURE — 99233 SBSQ HOSP IP/OBS HIGH 50: CPT

## 2022-09-08 PROCEDURE — 76604 US EXAM CHEST: CPT | Mod: 26

## 2022-09-08 PROCEDURE — 88342 IMHCHEM/IMCYTCHM 1ST ANTB: CPT | Mod: 26

## 2022-09-08 PROCEDURE — 88112 CYTOPATH CELL ENHANCE TECH: CPT | Mod: 26

## 2022-09-08 PROCEDURE — 71045 X-RAY EXAM CHEST 1 VIEW: CPT | Mod: 26

## 2022-09-08 PROCEDURE — 99233 SBSQ HOSP IP/OBS HIGH 50: CPT | Mod: 25

## 2022-09-08 RX ADMIN — HEPARIN SODIUM 1000 UNIT(S)/HR: 5000 INJECTION INTRAVENOUS; SUBCUTANEOUS at 14:36

## 2022-09-08 RX ADMIN — AMLODIPINE BESYLATE 5 MILLIGRAM(S): 2.5 TABLET ORAL at 12:50

## 2022-09-08 RX ADMIN — HEPARIN SODIUM 1000 UNIT(S)/HR: 5000 INJECTION INTRAVENOUS; SUBCUTANEOUS at 07:20

## 2022-09-08 RX ADMIN — HEPARIN SODIUM 1000 UNIT(S)/HR: 5000 INJECTION INTRAVENOUS; SUBCUTANEOUS at 19:20

## 2022-09-08 RX ADMIN — HEPARIN SODIUM 1000 UNIT(S)/HR: 5000 INJECTION INTRAVENOUS; SUBCUTANEOUS at 07:53

## 2022-09-08 NOTE — PROCEDURE NOTE - NSPROCNAME_GEN_A_CORE
Point of Care Ultrasound Lung
Point of Care Ultrasound Lung
Point of Care Ultrasound Cardiac
Thoracentesis

## 2022-09-08 NOTE — PROCEDURE NOTE - NSUS ED ADDITIONAL DETAIL1 FT
s/p 2.4L L pleural fluid removal yesterday  9/7. A line predominance on R and L. resolution of pleural effusion noted at apex yesterday. however L pleural effusion seems to have increase in size compared to post thoracentesis effusion.

## 2022-09-09 ENCOUNTER — APPOINTMENT (OUTPATIENT)
Dept: PULMONOLOGY | Facility: HOSPITAL | Age: 57
End: 2022-09-09

## 2022-09-09 PROBLEM — Z00.00 ENCOUNTER FOR PREVENTIVE HEALTH EXAMINATION: Status: ACTIVE | Noted: 2022-09-09

## 2022-09-09 LAB
APTT BLD: 57.7 SEC — HIGH (ref 27.5–35.5)
HCT VFR BLD CALC: 40.7 % — SIGNIFICANT CHANGE UP (ref 39–50)
HGB BLD-MCNC: 13.2 G/DL — SIGNIFICANT CHANGE UP (ref 13–17)
MCHC RBC-ENTMCNC: 29.2 PG — SIGNIFICANT CHANGE UP (ref 27–34)
MCHC RBC-ENTMCNC: 32.4 G/DL — SIGNIFICANT CHANGE UP (ref 32–36)
MCV RBC AUTO: 90 FL — SIGNIFICANT CHANGE UP (ref 80–100)
NRBC # BLD: 0 /100 WBCS — SIGNIFICANT CHANGE UP (ref 0–0)
PLATELET # BLD AUTO: 234 K/UL — SIGNIFICANT CHANGE UP (ref 150–400)
RBC # BLD: 4.52 M/UL — SIGNIFICANT CHANGE UP (ref 4.2–5.8)
RBC # FLD: 13.9 % — SIGNIFICANT CHANGE UP (ref 10.3–14.5)
WBC # BLD: 4.34 K/UL — SIGNIFICANT CHANGE UP (ref 3.8–10.5)
WBC # FLD AUTO: 4.34 K/UL — SIGNIFICANT CHANGE UP (ref 3.8–10.5)

## 2022-09-09 PROCEDURE — 71045 X-RAY EXAM CHEST 1 VIEW: CPT | Mod: 26

## 2022-09-09 PROCEDURE — 99233 SBSQ HOSP IP/OBS HIGH 50: CPT

## 2022-09-09 RX ADMIN — HEPARIN SODIUM 1000 UNIT(S)/HR: 5000 INJECTION INTRAVENOUS; SUBCUTANEOUS at 18:59

## 2022-09-09 RX ADMIN — HEPARIN SODIUM 1000 UNIT(S)/HR: 5000 INJECTION INTRAVENOUS; SUBCUTANEOUS at 14:37

## 2022-09-09 RX ADMIN — AMLODIPINE BESYLATE 5 MILLIGRAM(S): 2.5 TABLET ORAL at 13:29

## 2022-09-09 RX ADMIN — HEPARIN SODIUM 1000 UNIT(S)/HR: 5000 INJECTION INTRAVENOUS; SUBCUTANEOUS at 07:22

## 2022-09-09 RX ADMIN — HEPARIN SODIUM 1000 UNIT(S)/HR: 5000 INJECTION INTRAVENOUS; SUBCUTANEOUS at 08:59

## 2022-09-09 NOTE — DIETITIAN INITIAL EVALUATION ADULT - PERTINENT MEDS FT
MEDICATIONS  (STANDING):  amLODIPine   Tablet 5 milliGRAM(s) Oral every 24 hours  heparin  Infusion. 1000 Unit(s)/Hr (10 mL/Hr) IV Continuous <Continuous>    MEDICATIONS  (PRN):  acetaminophen     Tablet .. 650 milliGRAM(s) Oral every 6 hours PRN Temp greater or equal to 38C (100.4F), Mild Pain (1 - 3)  melatonin 3 milliGRAM(s) Oral at bedtime PRN Insomnia

## 2022-09-09 NOTE — DIETITIAN NUTRITION RISK NOTIFICATION - TREATMENT: THE FOLLOWING DIET HAS BEEN RECOMMENDED
Diet, Regular:   Low Sodium  Supplement Feeding Modality:  Oral  Ensure Enlive Cans or Servings Per Day:  1       Frequency:  Two Times a day (09-09-22 @ 14:52) [Pending Verification By Attending]  Diet, Regular (09-07-22 @ 00:26) [Active]

## 2022-09-09 NOTE — DIETITIAN INITIAL EVALUATION ADULT - OTHER INFO
Pt presented with SOB; found with PE, pleural effusion (s/p thoracentesis with 2.4L pleural fluid removed 9/7), found with lung mass, possible underlying malignancy.  Pt reports good appetite and po intake PTA; does not follow any dietary restrictions at home.  Pt currently with fair appetite, po intake mostly 51-75% intake during admission; no chew/swallowing difficulty. Pt amenable to Ensure nutrition supplements for increased kcal and protein intake.  Pt reports wt loss x 2-3 months; reports -152 lbs.; admission wt 123 lbs. (? accuracy of admission wt)., as pt states he has had 5-6 lb/ wt loss in last 2-3 months.

## 2022-09-10 ENCOUNTER — TRANSCRIPTION ENCOUNTER (OUTPATIENT)
Age: 57
End: 2022-09-10

## 2022-09-10 ENCOUNTER — INPATIENT (INPATIENT)
Facility: HOSPITAL | Age: 57
LOS: 5 days | Discharge: HOME | End: 2022-09-16
Attending: INTERNAL MEDICINE | Admitting: INTERNAL MEDICINE
Payer: COMMERCIAL

## 2022-09-10 VITALS
WEIGHT: 152.34 LBS | RESPIRATION RATE: 20 BRPM | TEMPERATURE: 97 F | SYSTOLIC BLOOD PRESSURE: 149 MMHG | OXYGEN SATURATION: 96 % | DIASTOLIC BLOOD PRESSURE: 97 MMHG | HEART RATE: 76 BPM

## 2022-09-10 VITALS
DIASTOLIC BLOOD PRESSURE: 92 MMHG | RESPIRATION RATE: 18 BRPM | SYSTOLIC BLOOD PRESSURE: 130 MMHG | HEART RATE: 72 BPM | TEMPERATURE: 98 F | OXYGEN SATURATION: 98 %

## 2022-09-10 DIAGNOSIS — J93.83 OTHER PNEUMOTHORAX: ICD-10-CM

## 2022-09-10 DIAGNOSIS — Z98.890 OTHER SPECIFIED POSTPROCEDURAL STATES: Chronic | ICD-10-CM

## 2022-09-10 DIAGNOSIS — I26.99 OTHER PULMONARY EMBOLISM WITHOUT ACUTE COR PULMONALE: ICD-10-CM

## 2022-09-10 LAB
APTT BLD: 41.2 SEC — HIGH (ref 27–36.3)
APTT BLD: 55.7 SEC — HIGH (ref 27–36.3)
APTT BLD: 71.8 SEC — HIGH (ref 27.5–35.5)
HCT VFR BLD CALC: 40.4 % — SIGNIFICANT CHANGE UP (ref 39–50)
HGB BLD-MCNC: 13.1 G/DL — SIGNIFICANT CHANGE UP (ref 13–17)
MCHC RBC-ENTMCNC: 29.8 PG — SIGNIFICANT CHANGE UP (ref 27–34)
MCHC RBC-ENTMCNC: 32.4 G/DL — SIGNIFICANT CHANGE UP (ref 32–36)
MCV RBC AUTO: 91.8 FL — SIGNIFICANT CHANGE UP (ref 80–100)
NRBC # BLD: 0 /100 WBCS — SIGNIFICANT CHANGE UP (ref 0–0)
PLATELET # BLD AUTO: 247 K/UL — SIGNIFICANT CHANGE UP (ref 150–400)
RBC # BLD: 4.4 M/UL — SIGNIFICANT CHANGE UP (ref 4.2–5.8)
RBC # FLD: 13.9 % — SIGNIFICANT CHANGE UP (ref 10.3–14.5)
WBC # BLD: 3.87 K/UL — SIGNIFICANT CHANGE UP (ref 3.8–10.5)
WBC # FLD AUTO: 3.87 K/UL — SIGNIFICANT CHANGE UP (ref 3.8–10.5)

## 2022-09-10 PROCEDURE — 99233 SBSQ HOSP IP/OBS HIGH 50: CPT

## 2022-09-10 PROCEDURE — 71045 X-RAY EXAM CHEST 1 VIEW: CPT | Mod: 26

## 2022-09-10 PROCEDURE — 76604 US EXAM CHEST: CPT | Mod: 26

## 2022-09-10 RX ORDER — CHLORHEXIDINE GLUCONATE 213 G/1000ML
1 SOLUTION TOPICAL DAILY
Refills: 0 | Status: DISCONTINUED | OUTPATIENT
Start: 2022-09-10 | End: 2022-09-16

## 2022-09-10 RX ORDER — ACETAMINOPHEN 500 MG
650 TABLET ORAL EVERY 6 HOURS
Refills: 0 | Status: DISCONTINUED | OUTPATIENT
Start: 2022-09-10 | End: 2022-09-16

## 2022-09-10 RX ORDER — SENNA PLUS 8.6 MG/1
2 TABLET ORAL AT BEDTIME
Refills: 0 | Status: DISCONTINUED | OUTPATIENT
Start: 2022-09-10 | End: 2022-09-16

## 2022-09-10 RX ORDER — INFLUENZA VIRUS VACCINE 15; 15; 15; 15 UG/.5ML; UG/.5ML; UG/.5ML; UG/.5ML
0.5 SUSPENSION INTRAMUSCULAR ONCE
Refills: 0 | Status: COMPLETED | OUTPATIENT
Start: 2022-09-10 | End: 2022-09-16

## 2022-09-10 RX ORDER — POLYETHYLENE GLYCOL 3350 17 G/17G
17 POWDER, FOR SOLUTION ORAL DAILY
Refills: 0 | Status: DISCONTINUED | OUTPATIENT
Start: 2022-09-10 | End: 2022-09-16

## 2022-09-10 RX ORDER — LIDOCAINE 4 G/100G
1 CREAM TOPICAL EVERY 24 HOURS
Refills: 0 | Status: DISCONTINUED | OUTPATIENT
Start: 2022-09-10 | End: 2022-09-16

## 2022-09-10 RX ORDER — HEPARIN SODIUM 5000 [USP'U]/ML
1000 INJECTION INTRAVENOUS; SUBCUTANEOUS
Qty: 25000 | Refills: 0 | Status: DISCONTINUED | OUTPATIENT
Start: 2022-09-10 | End: 2022-09-12

## 2022-09-10 RX ADMIN — LIDOCAINE 1 PATCH: 4 CREAM TOPICAL at 19:20

## 2022-09-10 RX ADMIN — HEPARIN SODIUM 11 UNIT(S)/HR: 5000 INJECTION INTRAVENOUS; SUBCUTANEOUS at 16:01

## 2022-09-10 RX ADMIN — HEPARIN SODIUM 1000 UNIT(S)/HR: 5000 INJECTION INTRAVENOUS; SUBCUTANEOUS at 08:29

## 2022-09-10 RX ADMIN — HEPARIN SODIUM 1000 UNIT(S)/HR: 5000 INJECTION INTRAVENOUS; SUBCUTANEOUS at 07:10

## 2022-09-10 RX ADMIN — LIDOCAINE 1 PATCH: 4 CREAM TOPICAL at 18:47

## 2022-09-10 RX ADMIN — HEPARIN SODIUM 10 UNIT(S)/HR: 5000 INJECTION INTRAVENOUS; SUBCUTANEOUS at 15:01

## 2022-09-10 RX ADMIN — HEPARIN SODIUM 11.5 UNIT(S)/HR: 5000 INJECTION INTRAVENOUS; SUBCUTANEOUS at 22:38

## 2022-09-10 RX ADMIN — HEPARIN SODIUM 11 UNIT(S)/HR: 5000 INJECTION INTRAVENOUS; SUBCUTANEOUS at 19:46

## 2022-09-10 RX ADMIN — SENNA PLUS 2 TABLET(S): 8.6 TABLET ORAL at 22:36

## 2022-09-10 NOTE — DISCHARGE NOTE PROVIDER - PROVIDER TOKENS
PROVIDER:[TOKEN:[[id number]],SCHEDULEDAPPT:[10/03/2022],ESTABLISHEDPATIENT:[T]] PROVIDER:[TOKEN:[[id number]],SCHEDULEDAPPT:[10/03/2022],ESTABLISHEDPATIENT:[T]],PROVIDER:[TOKEN:[[id number]],SCHEDULEDAPPT:[09/27/2022],SCHEDULEDAPPTTIME:[02:00 PM]]

## 2022-09-10 NOTE — DISCHARGE NOTE PROVIDER - CARE PROVIDER_API CALL
Eddie Ortega)  Critical Care Medicine; Internal Medicine; Pulmonary Disease  [address] [place] [address]  Phone: [phone]  Fax: [phone]  Established Patient  Scheduled Appointment: 10/03/2022   Eddie Ortega)  Critical Care Medicine; Internal Medicine; Pulmonary Disease  [address] [place] [address]  Phone: [phone]  Fax: [phone]  Established Patient  Scheduled Appointment: 10/03/2022    Tiffani Gunn)  Internal Medicine; Medical Oncology  [address] [place] [address]  Phone: [phone]  Fax: [phone]  Scheduled Appointment: 09/27/2022 02:00 PM

## 2022-09-10 NOTE — H&P ADULT - NSICDXPASTSURGICALHX_GEN_ALL_CORE_FT
no
PAST SURGICAL HISTORY:  Status post cardiac surgery s/p open right thoracotomy for posterior cardiac mass removal > 20 years ago, reported by patient to be benign.

## 2022-09-10 NOTE — H&P ADULT - HISTORY OF PRESENT ILLNESS
57 YO M with PMHx of former smoker (2 "black and mild" cigars daily for 8-9 years and quit 6 months ago) and right thoracotomy second to cardiac mass/ inflammation who presented to [facility] on 9/7 with SOB and Orthopnea x 2 days and BL LE edema and unintentional weight loss over the last 2-3 months. He sought medical attention initially from an urgent care where they recommended for him to come to the hospital.     In the ED at [facility], CTA CHEST noted with RLL segmental and subsegmental RLL PE, however no central PEs noted, SAULO mass like opacity (3.3 x 2 cm) and RUL nodule (1.4 x 1.3 cm), LARGE left pleural effusion extending from apex with associated lung atelectasis (complete LLL and partial SAULO) and cardiomegaly with concern for right heart strain.  Pulmonary called and patient admitted to medicine for further management.     On Medicine, patient s/p left thoracentesis (9/7) with 2.4L output. Pleural cultures noted to be negative and cytology pending. Post thoracentesis course complicated by left hydropneumothorax and on subsequent imaging pleural fluid rapidly reaccumulated and PTX remained stable. Heparin GTT started for PE. ECHO (9/7) with normal LVSF, moderate LVH, but poorly visualized right side. BP remain stable and no concern for right strain. Mild AHRF noted, improved on 2L NC and patient ultimately transferred to [facility] RCU (9/10) for further management.     In RCU, temperature 96.8F orally, HR 76 BPM, /97 and SpO2 96% on RA on arrival and patient accepted to RCU for further management of left hydropneumothorax.    55 YO M with PMHx of former smoker (2 "black and mild" cigars daily for 8-9 years and quit 6 months ago) and s/p right open thoracotomy for cardiac mass removal 20 years ago presented to [facility] on 9/7 with SOB and Orthopnea x 2 days and BL LE edema and unintentional weight loss over the last 2-3 months. He sought medical attention initially from an urgent care where they recommended for him to come to the hospital.     In the ED at [facility], CTA CHEST noted with RLL segmental and subsegmental RLL PE, however no central PEs noted, SAULO mass like opacity (3.3 x 2 cm) and RUL nodule (1.4 x 1.3 cm), LARGE left pleural effusion extending from apex with associated lung atelectasis (complete LLL and partial SAULO) and cardiomegaly with concern for right heart strain.  Pulmonary called and patient admitted to medicine for further management.     On Medicine, patient s/p left thoracentesis (9/7) with 2.4L output. Pleural cultures noted to be negative and cytology pending. Post thoracentesis course complicated by left hydropneumothorax and on subsequent imaging pleural fluid rapidly reaccumulated and PTX remained stable. Heparin GTT started for PE. ECHO (9/7) with normal LVSF, moderate LVH, but poorly visualized right side. BP remain stable and no concern for right strain. Mild AHRF noted, improved on 2L NC and patient ultimately transferred to [facility] RCU (9/10) for further management.     In RCU, temperature 96.8F orally, HR 76 BPM, /97 and SpO2 96% on RA on arrival. Complains of shortness of breath only with movement or bend to brush teeth. Also complains of chest/ back pain at the tip of his scapula, described as sharp, 4/10 and only with deep breath/ exertion. Denied fever, chills, N/V/D, no recent travel outside [place] and no sick contact. Patient accepted to RCU for further management of left hydropneumothorax.

## 2022-09-10 NOTE — DISCHARGE NOTE PROVIDER - NSDCMRMEDTOKEN_GEN_ALL_CORE_FT
Eliquis Starter Pack for Treatment of DVT and PE 5 mg oral tablet: instructions as per starting pack   acetaminophen 325 mg oral tablet: 2 tab(s) orally every 6 hours, As needed, Temp greater or equal to 38C (100.4F), Mild Pain (1 - 3), Moderate Pain (4 - 6)  lidocaine 4% topical film: Apply topically to affected area once a day     ***Call for pricing [facility] [phone] before filling   Meds to bed if covered Room 640   polyethylene glycol 3350 oral powder for reconstitution: 17 gram(s) orally once a day  senna leaf extract oral tablet: 2 tab(s) orally once a day (at bedtime)  Xarelto Starter Pack 15 mg-20 mg oral kit: per starter pack instructions but give 38 doses     **** please give $10 co pay card to use for refills    MEDS TO BED   Room 640-   dc time 4pm

## 2022-09-10 NOTE — H&P ADULT - ASSESSMENT
55 YO M with PMHx of former smoker (2 "black and mild" cigars daily for 8-9 years and quit 6 months ago) and right thoracotomy second to cardiac mass/ inflammation who presented to [facility] on 9/7 with SOB, Orthopnea, BL LE edema and unintentional weight loss and found with large left pleural effusion and RLL subsegmental/ segmental PE. s/p thoracentesis (9/7) and course complicated left hydropneumothorax with rapid reaccumulation Now transferred to  [facility] RCU 9/10 for further management.  57 YO M with PMHx of former smoker (2 "black and mild" cigars daily for 8-9 years and quit 6 months ago) and right thoracotomy second to cardiac mass/ inflammation who presented to [facility] on 9/7 with SOB, Orthopnea, BL LE edema and unintentional weight loss and found with large left pleural effusion and RLL subsegmental/ segmental PE. s/p thoracentesis (9/7) and course complicated left hydropneumothorax with rapid reaccumulation Now transferred to [facility] RCU 9/10 for further management.     # NEUROLOGY  - AOx3 with no current issues.     # CARDIOVASCULAR  - Concern noted for RH strain at [facility] second to cardiomegaly on CTA CHEST (9/7).   - ECHO (9/7) with normal LVSF, moderate LVH, but poorly visualized right side.   - BP remains stable with no concerns for right strain.  - Monitor HR/ BP     # RESPIRATORY  - Presented to [facility] with SOB, orthopnea and BL LE edema and found with AHRF likely second to large L pleural effusion c/b left hydroPTX and RLL subsegmental/ segmental PE  - CTA CHEST (9/7) with segmental and subsegmental RLL PE, no central PEs noted, SAULO mass like opacity (3.3 x 2 cm) and RUL nodule (1.4 x 1.3 cm) and LARGE L pleural effusion extending from apex with associated complete LLL and partial SAULO atelectasis  - Continue on Heparin GTT   - Continue RA and NC PRN   - Plan for IP consult early this week.     # GI  - Continue on PO diet and BM regimen as needed.     # RENAL  - No acute renal issues.   - Monitor renal function and UOP.     # INFECTIOUS DISEASE  - COVID PCR (9/7) NGTD   - MRSA PCR pending   - No acute infectious concerns.     # HEME  - CTA CHEST with RLL segmental and subsegmental PEs   - Continue on FULL DOSE Heparin GTT     # ENDOCRINE  - No acute issues noted.   - Check Lipid Panel, TSH and A1C.    # ETHICS/ GOC    - FULL CODE     # DISPO - Admit to RCU.    55 YO M with PMHx of former smoker (2 "black and mild" cigars daily for 8-9 years and quit 6 months ago) and s/p right open thoracotomy for cardiac mass removal 20 years ago who presented to [facility] on 9/7 with SOB, Orthopnea, BL LE edema and unintentional weight loss and found with large left pleural effusion and RLL subsegmental/ segmental PE. s/p thoracentesis (9/7) and course complicated left hydropneumothorax with rapid reaccumulation Now transferred to [facility] RCU 9/10 for further management.     # NEUROLOGY  - AOx3 with no current issues.     # CARDIOVASCULAR  - Concern noted for RH strain at [facility] second to cardiomegaly on CTA CHEST (9/7).   - ECHO (9/7) with normal LVSF, moderate LVH, but poorly visualized right side.   - BP remains stable with no concerns for right strain.  - Monitor HR/ BP     # RESPIRATORY  - Presented to [facility] with SOB, orthopnea and BL LE edema and found with AHRF likely second to large L pleural effusion c/b left hydroPTX and RLL subsegmental/ segmental PE  - CTA CHEST (9/7) with segmental and subsegmental RLL PE, no central PEs noted, SAULO mass like opacity (3.3 x 2 cm) and RUL nodule (1.4 x 1.3 cm) and LARGE L pleural effusion extending from apex with associated complete LLL and partial SAULO atelectasis  - Continue on Heparin GTT   - Continue RA and NC PRN   - Plan for IP consult early this week.     # GI  - Continue on PO diet and BM regimen as needed.     # RENAL  - No acute renal issues.   - Monitor renal function and UOP.     # INFECTIOUS DISEASE  - COVID PCR (9/7) NGTD   - MRSA PCR pending   - No acute infectious concerns.     # HEME  - CTA CHEST with RLL segmental and subsegmental PEs   - Continue on FULL DOSE Heparin GTT     # ENDOCRINE  - No acute issues noted.   - Check Lipid Panel, TSH and A1C.    # ETHICS/ GOC    - FULL CODE     # DISPO - Admit to RCU.    55 YO M with PMHx of former smoker (2 "black and mild" cigars daily for 8-9 years and quit 6 months ago) and s/p right open thoracotomy for cardiac mass removal 20 years ago who presented to [facility] on 9/7 with SOB, Orthopnea, BL LE edema and unintentional weight loss and found with large left pleural effusion and RLL subsegmental/ segmental PE. s/p thoracentesis (9/7) and course complicated left hydropneumothorax with rapid reaccumulation Now transferred to [facility] RCU 9/10 for further management.     # NEUROLOGY  - AOx3 with no current issues.     # CARDIOVASCULAR  - Concern noted for RH strain at [facility] second to cardiomegaly on CTA CHEST (9/7).   - ECHO (9/7) with normal LVSF, moderate LVH, but poorly visualized right side.   - BP remains stable with no concerns for right strain.  - Monitor HR/ BP     # RESPIRATORY  - Presented to [facility] with SOB, orthopnea and BL LE edema and found with AHRF likely second to large L pleural effusion c/b left hydroPTX and RLL subsegmental/ segmental PE  - CTA CHEST (9/7) with segmental and subsegmental RLL PE, no central PEs noted, SAULO mass like opacity (3.3 x 2 cm) and RUL nodule (1.4 x 1.3 cm) and LARGE L pleural effusion extending from apex with associated complete LLL and partial SAULO atelectasis  - Continue on Heparin GTT   - Continue RA and NC PRN   - Plan for IP consult early this week.     # GI  - Continue on PO diet and BM regimen as needed.     # RENAL  - No acute renal issues.   - Monitor renal function and UOP.     # INFECTIOUS DISEASE  - COVID PCR (9/7) NGTD   - MRSA PCR pending   - No acute infectious concerns.     # HEME  - CTA CHEST with RLL segmental and subsegmental PEs   - Continue on FULL DOSE Heparin GTT    # ONC   - Hx of open right thoracotomy for posterior cardiac mass removal greater than 20 years ago. Patient reports noted to be benign.    # ENDOCRINE  - No acute issues noted.   - Check Lipid Panel, TSH and A1C.    # ETHICS/ GOC    - FULL CODE     # DISPO - Admit to RCU.    55 YO M with PMHx of former smoker (2 "black and mild" cigars daily for 8-9 years and quit 6 months ago) and s/p right open thoracotomy for cardiac mass removal 20 years ago who presented to [facility] on 9/7 with SOB, Orthopnea, BL LE edema and unintentional weight loss and found with large left pleural effusion and RLL subsegmental/ segmental PE. s/p thoracentesis (9/7) and course complicated left hydropneumothorax with rapid reaccumulation Now transferred to [facility] RCU 9/10 for further management.     # NEUROLOGY  - AOx3 with no current issues.   - Left lateral chest wall pain and continued on lidoderm patch.     # CARDIOVASCULAR  - Concern noted for RH strain at [facility] second to cardiomegaly on CTA CHEST (9/7).   - ECHO (9/7) with normal LVSF, moderate LVH, but poorly visualized right side.   - BP remains stable with no concerns for right strain.  - Monitor HR/ BP     # RESPIRATORY  - Presented to [facility] with SOB, orthopnea and BL LE edema and found with AHRF likely second to large L pleural effusion c/b left hydroPTX and RLL subsegmental/ segmental PE  - CTA CHEST (9/7) with segmental and subsegmental RLL PE, no central PEs noted, SAULO mass like opacity (3.3 x 2 cm) and RUL nodule (1.4 x 1.3 cm) and LARGE L pleural effusion extending from apex with associated complete LLL and partial SAULO atelectasis  - Continue on Heparin GTT   - Continue RA and NC PRN   - Plan for IP consult early this week.     # GI  - Continue on PO diet   - Constipated and Miralax/ Senna started.     # RENAL  - No acute renal issues.   - Monitor renal function and UOP.     # INFECTIOUS DISEASE  - COVID PCR (9/7) NGTD   - MRSA PCR pending   - No acute infectious concerns.     # HEME  - CTA CHEST with RLL segmental and subsegmental PEs   - Continue on FULL DOSE Heparin GTT    # ONC   - Hx of open right thoracotomy for posterior cardiac mass removal greater than 20 years ago. Patient reports noted to be benign.    # ENDOCRINE  - No acute issues noted.   - Check Lipid Panel, TSH and A1C.    # ETHICS/ GOC    - FULL CODE     # DISPO - Admit to RCU.    55 YO M with PMHx of former smoker (2 "black and mild" cigars daily for 8-9 years and quit 6 months ago) and s/p right open thoracotomy for cardiac mass removal 20 years ago who presented to [facility] on 9/7 with SOB, Orthopnea, BL LE edema and unintentional weight loss and found with large left pleural effusion and RLL subsegmental/ segmental PE. s/p thoracentesis (9/7) and course complicated left hydropneumothorax with rapid reaccumulation Now transferred to [facility] RCU 9/10 for further management.     # NEUROLOGY  - AOx3 with no current issues.   - Left lateral chest wall pain and continued on lidoderm patch.     # CARDIOVASCULAR  - Concern noted for RH strain at [facility] second to cardiomegaly on CTA CHEST (9/7).   - ECHO (9/7) with normal LVSF, moderate LVH, but poorly visualized right side.   - BP remains stable with no concerns for right strain.  - Monitor HR/ BP     # RESPIRATORY  - Presented to [facility] with SOB, orthopnea and BL LE edema and found with AHRF likely second to large L pleural effusion c/b left hydroPTX and RLL subsegmental/ segmental PE  - CTA CHEST (9/7) with segmental and subsegmental RLL PE, no central PEs noted, SAULO mass like opacity (3.3 x 2 cm) and RUL nodule (1.4 x 1.3 cm) and LARGE L pleural effusion extending from apex with associated complete LLL and partial SAULO atelectasis  - s/p Thoracentesis (9/7) at [facility] and (+) lights criteria.   - Continue on Heparin GTT   - Continue RA and NC PRN   - Plan for IP consult early this week.   - Pending cytopathology     # GI  - Continue on PO diet   - Constipated and Miralax/ Senna started.     # RENAL  - No acute renal issues.   - Monitor renal function and UOP.     # INFECTIOUS DISEASE  - COVID PCR (9/7) NGTD   - MRSA PCR pending   - No acute infectious concerns.     # HEME  - CTA CHEST with RLL segmental and subsegmental PEs   - Continue on FULL DOSE Heparin GTT     # ONC   - Hx of open right thoracotomy for posterior cardiac mass removal greater than 20 years ago. Patient reports noted to be benign.    # ENDOCRINE  - No acute issues noted.   - Check Lipid Panel, TSH and A1C.    # ETHICS/ GOC    - FULL CODE     # DISPO - Admit to RCU.

## 2022-09-10 NOTE — PROGRESS NOTE ADULT - SUBJECTIVE AND OBJECTIVE BOX
24 hr events:  reported to become dyspneic and "not feeling well" after ambulating to bathroom this morning  on tele for pulse oximeter monitoring and noted to desat to the upper 70s yesterday evening and this morning  placed on 2L NC  remains on heparin drip for PE  cough resolved  mid chest discomfort and L scapular discomfort slightly improved      ## ROS:  CONSTITUTIONAL: No fever, no chills  EYES: No eye pain, visual disturbances  ENMT:  No difficulty hearing, tinnitus, No sinus or throat pain  NECK: No pain or stiffness  RESPIRATORY: resolved cough, no , no hemoptysis; No shortness of breath at rest, + [place]  CARDIOVASCULAR: No palpitations, mid sternal chest discomfort (not pain, but a strange sensation)  GASTROINTESTINAL: No abdominal or epigastric pain. No nausea, vomiting, or hematemesis; No diarrhea. No melena or hematochezia.  GENITOURINARY: No dysuria, frequency, hematuria  NEUROLOGICAL: No headaches, memory loss, loss of strength, numbness, or tremors  SKIN: No itching, burning, rashes, or lesions   ENDOCRINE: No heat or cold intolerance  MUSCULOSKELETAL: No joint pain or swelling; LE edema improved  PSYCHIATRIC: No depression, anxiety  HEME/LYMPH: No easy bruising, or bleeding gums      ## Labs:  CBC:                        13.2   4.34  )-----------( 234      ( 09 Sep 2022 06:35 )             40.7     Chem:  no new chemsitry      Coags:  PTT - ( 09 Sep 2022 06:35 )  PTT:57.7 sec    Lactate Dehydrogenase, Fluid (09.07.22 @ 19:30)    Lactate Dehydrogenase, Fluid:  U/L    Protein Total, Fluid (09.07.22 @ 19:30)    Protein Total, Fluid: 5.1 g/dL    Glucose, Fluid (09.07.22 @ 19:30)    Glucose, Fluid: 64 mg/dL    pH, Fluid (09.07.22 @ 19:30)    pH, Fluid: 7.7:     Cell Count, Body Fluid (09.07.22 @ 19:30)    Tube Type: Lavender    Fluid Type: Pleural    Body Fluid Appearance: Clear    Color - Body Fluid: Yellow    Total Nucleated Cell Count, Body Fluid: 622 uL    Total RBC Count: 1000 /uL    Neutrophils-Body Fluid: 2 %    BF Lymphocytes: 49 %    Eosinophil Count - Body Fluid: 1 %    Other Body Cells: 48 %    Comment - Fluids: Cellular smear shows frequent reactive mesothelial cells (bi and multinucleate forms), lymphocytes and histiocytes/macrophages. Correlation with clinical h/o and other laboratory data including  cytology and imaging studies is necessary. NOLAN Adames M.D. Review of the cytospin shows: Few clusters of epitheloid cells, histiocytes/ macrophages and small lymphocytes. Correlation with cytology is necessary      ## Imaging:  CXR  9/9 (my read): persistent L hydropneumothorax. effusion ?slightly larger      ## Medications:  amLODIPine   Tablet 5 milliGRAM(s) Oral every 24 hours      heparin  Infusion. 1000 Unit(s)/Hr IV Continuous <Continuous>      acetaminophen     Tablet .. 650 milliGRAM(s) Oral every 6 hours PRN  melatonin 3 milliGRAM(s) Oral at bedtime PRN      ## Vitals:  T(C): 36.7 (09-09-22 @ 16:48), Max: 36.7 (09-09-22 @ 11:58)  HR: 83 (09-09-22 @ 16:48) (78 - 86)  BP: 130/89 (09-09-22 @ 16:48) (130/89 - 154/95)  RR: 18 (09-09-22 @ 16:48) (18 - 18)  SpO2: 96% (09-09-22 @ 16:48) (93% - 97%)      09-08 @ 07:01  -  09-09 @ 07:00  --------------------------------------------------------  IN: 0 mL / OUT: 900 mL / NET: -900 mL    09-09 @ 07:01  -  09-09 @ 18:46  --------------------------------------------------------  IN: 420 mL / OUT: 0 mL / NET: 420 mL          ## P/E:  Gen: tall, thin/slender male, lying comfortably in bed in no apparent distress  HEENT: PERRL, EOMI, sclera white, oral pharynx clear  Resp: decreased breath sounds L base, no wheeze  CVS: RRR  Abd: soft NT/ND +BS  Ext: decreased bilateral LE feet edema  Neuro: A&Ox3, no focal decifits      
Patient is a 57y old  Male who presents with a chief complaint of Pleural Effusion, Pulmonary Embolism (08 Sep 2022 17:00)    INTERVAL HPI/OVERNIGHT EVENTS: no events     MEDICATIONS  (STANDING):  amLODIPine   Tablet 5 milliGRAM(s) Oral every 24 hours  heparin  Infusion. 1000 Unit(s)/Hr (10 mL/Hr) IV Continuous <Continuous>    MEDICATIONS  (PRN):  acetaminophen     Tablet .. 650 milliGRAM(s) Oral every 6 hours PRN Temp greater or equal to 38C (100.4F), Mild Pain (1 - 3)  melatonin 3 milliGRAM(s) Oral at bedtime PRN Insomnia    Allergies    No Known Allergies    Intolerances      REVIEW OF SYSTEMS:  All other systems reviewed and are negative    Vital Signs Last 24 Hrs  T(C): 36.7 (09 Sep 2022 11:58), Max: 36.9 (08 Sep 2022 16:28)  T(F): 98.1 (09 Sep 2022 11:58), Max: 98.4 (08 Sep 2022 16:28)  HR: 86 (09 Sep 2022 11:58) (75 - 86)  BP: 134/94 (09 Sep 2022 11:58) (134/91 - 154/95)  BP(mean): --  RR: 18 (09 Sep 2022 11:58) (18 - 18)  SpO2: 97% (09 Sep 2022 11:58) (93% - 97%)    Parameters below as of 09 Sep 2022 11:58  Patient On (Oxygen Delivery Method): room air      Daily     Daily   I&O's Summary    08 Sep 2022 07:01  -  09 Sep 2022 07:00  --------------------------------------------------------  IN: 0 mL / OUT: 900 mL / NET: -900 mL    09 Sep 2022 07:01  -  09 Sep 2022 13:46  --------------------------------------------------------  IN: 420 mL / OUT: 0 mL / NET: 420 mL      CAPILLARY BLOOD GLUCOSE        PHYSICAL EXAM:  GENERAL: NAD,    HEAD:  Atraumatic, Normocephalic  EYES: EOMI, PERRLA, conjunctiva and sclera clear  ENMT: No tonsillar erythema, exudates, or enlargement; Moist mucous membranes, Good dentition, No lesions  NECK: Supple, No JVD, Normal thyroid  NERVOUS SYSTEM:  Alert & Oriented X3, Good concentration; Motor Strength 5/5 B/L upper and lower extremities; DTRs 2+ intact and symmetric  CHEST/LUNG: Clear to percussion bilaterally; No rales, rhonchi, wheezing, or rubs  HEART: Regular rate and rhythm; No murmurs, rubs, or gallops  ABDOMEN: Soft, Nontender, Nondistended; Bowel sounds present  EXTREMITIES:  2+ Peripheral Pulses, No clubbing, cyanosis, or edema  LYMPH: No lymphadenopathy noted  SKIN: No rashes or lesions    Labs                          13.2   4.34  )-----------( 234      ( 09 Sep 2022 06:35 )             40.7           PTT - ( 09 Sep 2022 06:35 )  PTT:57.7 sec          Culture - Fungal, Body Fluid (collected 07 Sep 2022 19:30)  Source: Pleural Fl Pleural Fluid  Preliminary Report (08 Sep 2022 13:26):    Testing in progress    Culture - Body Fluid with Gram Stain (collected 07 Sep 2022 19:30)  Source: Pleural Fl Pleural Fluid  Gram Stain (prelim) (08 Sep 2022 19:26):    polymorphonuclear leukocytes seen    No organisms seen    by cytocentrifuge  Preliminary Report (08 Sep 2022 19:26):    No growth                DVT prophylaxis: > Lovenox 40mg SQ daily  > Heparin   > SCD's
24 hr events:  s/p diagnostic/therapeutic thoracentesis yesterday 9/7 with 2.4L [place] colored effusion removed  post thoracentesis pt still with significant residual effusion  thoracentesis was terminated at 2.4L with pt having increased cough and some slight substernal chest discomfort  post thoracentesis CXR with L hydropneumothorax  O2 sat remains stable 97% on RA  pt without complaints of SOB  reports now with cough and expectoration of clear phlegm post thoracentesis  states there has been some improvement in SOB  states he feels a little pull/discomfort that is difficult to describe and a wooshing inside and medial to L scapula  remains on heparin drip for PE    ## ROS:  CONSTITUTIONAL: No fever, no chills, no fatigue  EYES: No eye pain, visual disturbances  ENMT:  No difficulty hearing, tinnitus, No sinus or throat pain  NECK: No pain or stiffness  RESPIRATORY: + cough, no wheezing, no hemoptysis; improved shortness of breath  CARDIOVASCULAR: No chest pain, palpitations  GASTROINTESTINAL: No abdominal or epigastric pain. No nausea, vomiting, No diarrhea   GENITOURINARY: No dysuria, frequency, hematuria, or incontinence  NEUROLOGICAL: No headaches, memory loss, loss of strength, numbness, or tremors  SKIN: No itching, burning, rashes, or lesions   ENDOCRINE: No heat or cold intolerance  MUSCULOSKELETAL: No joint pain, bilateral LE edema improved, no leg pain or cramps  PSYCHIATRIC: No depression, anxiety  HEME/LYMPH: No easy bruising, or bleeding gums      ## Labs:  CBC:                        13.4   4.62  )-----------( 235      ( 08 Sep 2022 07:13 )             41.3     Chem:  09-07    139  |  109<H>  |  19  ----------------------------<  80  4.1   |  25           |  1.08    Ca    9.5      07 Sep 2022 07:40    TPro  7.2  /  Alb  3.3  /  TBili  0.6  /  DBili  x   /  AST  23  /  ALT  18  /  AlkPhos  95  09-07    Coags:  PT/INR - ( 06 Sep 2022 20:44 )   PT: 12.6 sec;   INR: 1.06 ratio    PTT - ( 08 Sep 2022 13:45 )  PTT:58.1 sec    Lactate Dehydrogenase, Fluid (09.07.22 @ 19:30)    Lactate Dehydrogenase, Fluid:  U/L    Protein Total, Fluid (09.07.22 @ 19:30)    Protein Total, Fluid: 5.1 g/dL    Glucose, Fluid (09.07.22 @ 19:30)    Glucose, Fluid: 64 mg/dL    pH, Fluid (09.07.22 @ 19:30)    pH, Fluid: 7.7:     Cell Count, Body Fluid (09.07.22 @ 19:30)    Tube Type: Lavender    Fluid Type: Pleural    Body Fluid Appearance: Clear    Color - Body Fluid: Yellow    Total Nucleated Cell Count, Body Fluid: 622 uL    Total RBC Count: 1000 /uL    Neutrophils-Body Fluid: 2 %    BF Lymphocytes: 49 %    Eosinophil Count - Body Fluid: 1 %    Other Body Cells: 48 %    Comment - Fluids: Cellular smear shows frequent reactive mesothelial cells (bi and multinucleate forms),                       lymphocytes and histiocytes/macrophages. Correlation with clinical h/o and other laboratory                      data including cytology and imaging studies is necessary. CHRISTO Plaza Review of the cytospin                      shows: Few clusters of epitheloid cells, histiocytes/ macrophages and small lymphocytes.                      Correlation with cytology is necessary.           ## Imaging:  CXR < from: Xray Chest 1 View- PORTABLE-Urgent (Xray Chest 1 View- PORTABLE-Urgent .) (09.08.22 @ 03:19) >  Large left hydropneumothorax, with increase in fluid component with   compared with the prior study.    CT chest < from: CT Angio Chest PE Protocol w/ IV Cont (09.06.22 @ 22:10) >  LUNGS, PLEURA AND AIRWAYS: Patent central airways. Left upper and lower   lobe bronchiole appears attenuated. Diffuse bilateral emphysematous   changes. Large bleb identified within midline anterior chest. Left upper   lobe masslike opacity measuring 3.3 x 2 cm (45:2). Large left pleural   effusion extending to the apex, components appears loculated with   adjacent interstitial reticulation in the left apex. Complete left lower   lobe atelectasis. Partial left upper lobe atelectasis. Poorly defined   right upper lobe nodule measuring 1.4 x 1.3 cm (52:2). Few additional too   small to characterize less than 4-5 mm right sided subpleural based   nodules. Right basilar atelectasis and/or scarring.  MEDIASTINUM AND CHLOE: Left hilar calcified mediastinal lymph nodes.   Surgical clips identified within the mediastinum.  VESSELS: There are small eccentric filling defects identified within   segmental to subsegmental branches of the right lower lobe pulmonary   artery, compatible with pulmonary embolism. No central pulmonary embolism.  HEART: Mild cardiomegaly with enlargement the right cardiac chambers.   Consider correlation with echocardiogram to exclude potential right heart   strain. No pericardial effusion.  CHEST WALL ANDLOWER NECK: Within normal limits.  VISUALIZED UPPER ABDOMEN: Within normal limits.  BONES: Multilevel degenerative changes of the spine.    ## Medications:  amLODIPine   Tablet 5 milliGRAM(s) Oral every 24 hours    heparin  Infusion. 1000 Unit(s)/Hr IV Continuous <Continuous>    acetaminophen     Tablet .. 650 milliGRAM(s) Oral every 6 hours PRN  melatonin 3 milliGRAM(s) Oral at bedtime PRN      ## Vitals:  T(C): 36.9 (09-08-22 @ 16:28), Max: 36.9 (09-08-22 @ 16:28)  HR: 75 (09-08-22 @ 16:28) (72 - 81)  BP: 134/91 (09-08-22 @ 16:28) (123/86 - 167/99)  RR: 18 (09-08-22 @ 16:28)   SpO2: 96% (09-08-22 @ 16:28) (96% - 100%)      09-07 @ 07:01  -  09-08 @ 07:00  --------------------------------------------------------  IN: 540 mL / OUT: 250 mL / NET: 290 mL          ## P/E:  Gen: this male, lying comfortably in bed in no apparent distress  HEENT:  EOMI, sclera white  Resp: CTA B/L, no wheeze, no rhonchi, decreased breath sounds L base  CVS: RRR  Abd: soft NT/ND +BS  Ext: bilateral pitting feet edema (improved)  Neuro: A&Ox3, no focal deficits    
Patient is a 57y old  Male who presents with a chief complaint of Pleural Effusion, Pulmonary Embolism (07 Sep 2022 13:00)    INTERVAL HPI/OVERNIGHT EVENTS:    MEDICATIONS  (STANDING):  amLODIPine   Tablet 5 milliGRAM(s) Oral every 24 hours  heparin  Infusion. 1000 Unit(s)/Hr (10 mL/Hr) IV Continuous <Continuous>    MEDICATIONS  (PRN):  acetaminophen     Tablet .. 650 milliGRAM(s) Oral every 6 hours PRN Temp greater or equal to 38C (100.4F), Mild Pain (1 - 3)  melatonin 3 milliGRAM(s) Oral at bedtime PRN Insomnia    Allergies    No Known Allergies    Intolerances      REVIEW OF SYSTEMS:  All other systems reviewed and are negative    Vital Signs Last 24 Hrs  T(C): 36.4 (08 Sep 2022 10:21), Max: 36.6 (08 Sep 2022 04:20)  T(F): 97.5 (08 Sep 2022 10:21), Max: 97.9 (08 Sep 2022 04:20)  HR: 80 (08 Sep 2022 11:55) (72 - 81)  BP: 123/86 (08 Sep 2022 11:55) (123/86 - 167/99)  BP(mean): 2 (08 Sep 2022 05:09) (2 - 2)  RR: 19 (08 Sep 2022 11:55) (18 - 118)  SpO2: 97% (08 Sep 2022 11:55) (97% - 100%)    Parameters below as of 08 Sep 2022 11:55  Patient On (Oxygen Delivery Method): room air      Daily     Daily   I&O's Summary    07 Sep 2022 07:01  -  08 Sep 2022 07:00  --------------------------------------------------------  IN: 540 mL / OUT: 250 mL / NET: 290 mL      CAPILLARY BLOOD GLUCOSE        PHYSICAL EXAM:  GENERAL: NAD,    HEAD:  Atraumatic, Normocephalic  EYES: EOMI, PERRLA, conjunctiva and sclera clear  ENMT: No tonsillar erythema, exudates, or enlargement; Moist mucous membranes, Good dentition, No lesions  NECK: Supple, No JVD, Normal thyroid  NERVOUS SYSTEM:  Alert & Oriented X3, Good concentration; Motor Strength 5/5 B/L upper and lower extremities; DTRs 2+ intact and symmetric  CHEST/LUNG: Clear to percussion bilaterally; No rales, rhonchi, wheezing, or rubs  HEART: Regular rate and rhythm; No murmurs, rubs, or gallops  ABDOMEN: Soft, Nontender, Nondistended; Bowel sounds present  EXTREMITIES:  2+ Peripheral Pulses, No clubbing, cyanosis, or edema  LYMPH: No lymphadenopathy noted  SKIN: No rashes or lesions    Labs                          13.4   4.62  )-----------( 235      ( 08 Sep 2022 07:13 )             41.3     09-07    139  |  109<H>  |  19  ----------------------------<  80  4.1   |  25  |  1.08    Ca    9.5      07 Sep 2022 07:40    TPro  7.2  /  Alb  3.3  /  TBili  0.6  /  DBili  x   /  AST  23  /  ALT  18  /  AlkPhos  95  09-07    PT/INR - ( 06 Sep 2022 20:44 )   PT: 12.6 sec;   INR: 1.06 ratio         PTT - ( 08 Sep 2022 07:13 )  PTT:66.2 sec          Culture - Fungal, Body Fluid (collected 07 Sep 2022 19:30)  Source: Pleural Fl Pleural Fluid  Preliminary Report (08 Sep 2022 13:26):    Testing in progress    Culture - Body Fluid with Gram Stain (collected 07 Sep 2022 19:30)  Source: Pleural Fl Pleural Fluid  Gram Stain (08 Sep 2022 02:35):    polymorphonuclear leukocytes seen    No organisms seen    by cytocentrifuge                DVT prophylaxis: > Lovenox 40mg SQ daily  > Heparin   > SCD's
Patient is a 57y old  Male who presents with a chief complaint of Pleural Effusion, Pulmonary Embolism (10 Sep 2022 08:01)    INTERVAL HPI/OVERNIGHT EVENTS: no events     MEDICATIONS  (STANDING):  amLODIPine   Tablet 5 milliGRAM(s) Oral every 24 hours  heparin  Infusion. 1000 Unit(s)/Hr (10 mL/Hr) IV Continuous <Continuous>    MEDICATIONS  (PRN):  acetaminophen     Tablet .. 650 milliGRAM(s) Oral every 6 hours PRN Temp greater or equal to 38C (100.4F), Mild Pain (1 - 3)  melatonin 3 milliGRAM(s) Oral at bedtime PRN Insomnia    Allergies    No Known Allergies    Intolerances      REVIEW OF SYSTEMS:  All other systems reviewed and are negative    Vital Signs Last 24 Hrs  T(C): 36.8 (10 Sep 2022 08:45), Max: 36.8 (10 Sep 2022 08:45)  T(F): 98.2 (10 Sep 2022 08:45), Max: 98.2 (10 Sep 2022 08:45)  HR: 74 (10 Sep 2022 08:45) (74 - 86)  BP: 130/92 (10 Sep 2022 08:45) (130/89 - 142/98)  BP(mean): --  RR: 18 (10 Sep 2022 08:45) (18 - 18)  SpO2: 98% (10 Sep 2022 08:45) (96% - 98%)    Parameters below as of 10 Sep 2022 08:45  Patient On (Oxygen Delivery Method): nasal cannula,2l      Daily     Daily   I&O's Summary    09 Sep 2022 07:01  -  10 Sep 2022 07:00  --------------------------------------------------------  IN: 774 mL / OUT: 1295 mL / NET: -521 mL      CAPILLARY BLOOD GLUCOSE        PHYSICAL EXAM:  GENERAL: NAD,    HEAD:  Atraumatic, Normocephalic  EYES: EOMI, PERRLA, conjunctiva and sclera clear  ENMT: No tonsillar erythema, exudates, or enlargement; Moist mucous membranes, Good dentition, No lesions  NECK: Supple, No JVD, Normal thyroid  NERVOUS SYSTEM:  Alert & Oriented X3, Good concentration; Motor Strength 5/5 B/L upper and lower extremities; DTRs 2+ intact and symmetric  CHEST/LUNG: Clear to percussion bilaterally; No rales, rhonchi, wheezing, or rubs  HEART: Regular rate and rhythm; No murmurs, rubs, or gallops  ABDOMEN: Soft, Nontender, Nondistended; Bowel sounds present  EXTREMITIES:  2+ Peripheral Pulses, No clubbing, cyanosis, or edema  LYMPH: No lymphadenopathy noted  SKIN: No rashes or lesions    Labs                          13.1   3.87  )-----------( 247      ( 10 Sep 2022 06:40 )             40.4           PTT - ( 10 Sep 2022 06:40 )  PTT:71.8 sec          Culture - Fungal, Body Fluid (collected 07 Sep 2022 19:30)  Source: Pleural Fl Pleural Fluid  Preliminary Report (08 Sep 2022 13:26):    Testing in progress    Culture - Body Fluid with Gram Stain (collected 07 Sep 2022 19:30)  Source: Pleural Fl Pleural Fluid  Gram Stain (prelim) (08 Sep 2022 19:26):    polymorphonuclear leukocytes seen    No organisms seen    by cytocentrifuge  Preliminary Report (08 Sep 2022 19:26):    No growth                DVT prophylaxis: > Lovenox 40mg SQ daily  > Heparin   > SCD's

## 2022-09-10 NOTE — DISCHARGE NOTE PROVIDER - NSDCFUSCHEDAPPT_GEN_ALL_CORE_FT
Eddie Ortega  [facility]  [address]  Scheduled Appointment: 10/03/2022

## 2022-09-10 NOTE — PROGRESS NOTE ADULT - TIME BILLING
Lab test review, Radiology Review, Vitals review, Consultant review and discussion, Physical examination, IDR, Assessment and plan; Plan discussion with patient and family
review of chart, review of lab work, imaging, discussion of plan of care with primary team and interventional pulmonary
review of imaging, review of labs, review of meds, multidisciplinary discussion with transfer center/IP/RCU and hospitalist service
Lab test review, Radiology Review, Vitals review, Consultant review and discussion, Physical examination, IDR, Assessment and plan; Plan discussion with patient and family
Lab test review, Radiology Review, Vitals review, Consultant review and discussion, Physical examination, IDR, Assessment and plan; Plan discussion with patient and family

## 2022-09-10 NOTE — DISCHARGE NOTE PROVIDER - NSDCFUADDINST_GEN_ALL_CORE_FT
You were brought here for removal of the large amounts of fluid around the lung for which a Pleurx catheter placed. You should drain upto 1 L 3 times a week or until any chest pain or cough occurs. Please follow up with Dr. Ortega in approximately October 3 for suture removal. Please no heavy lifting until sutures removed.  [facility]. [address] [place] [address]. Phone: [phone]. Fax: [phone]

## 2022-09-10 NOTE — PATIENT PROFILE ADULT - VISION (WITH CORRECTIVE LENSES IF THE PATIENT USUALLY WEARS THEM):
Partially impaired: cannot see medication labels or newsprint, but can see obstacles in path, and the surrounding layout; can count fingers at arm's length Patient/Caregiver provided printed discharge information.

## 2022-09-10 NOTE — PATIENT PROFILE ADULT - FALL HARM RISK - UNIVERSAL INTERVENTIONS
Bed in lowest position, wheels locked, appropriate side rails in place/Call bell, personal items and telephone in reach/Instruct patient to call for assistance before getting out of bed or chair/Non-slip footwear when patient is out of bed/[place] to call system/Physically safe environment - no spills, clutter or unnecessary equipment/Purposeful Proactive Rounding/Room/bathroom lighting operational, light cord in reach

## 2022-09-10 NOTE — DISCHARGE NOTE PROVIDER - NSDCFUADDAPPT_GEN_ALL_CORE_FT
You were brought here for removal of the large amounts of fluid around the lung for which a Pleurx catheter placed. You should drain upto 1 L 3 times a week or until any chest pain or cough occurs. Please follow up with Dr. Ortega in approximately October 3 for suture removal. Please no heavy lifting until sutures removed.  [facility]. [address] [place] [address]. Phone: [phone]. Fax: [phone] You were brought here for removal of the large amounts of fluid around the lung for which a Pleurx catheter placed. You should drain upto 1 L 3 times a week or until any chest pain or cough occurs. Please follow up with Dr. Ortega  October 3 11 am for suture removal. Please no heavy lifting until sutures removed.  [facility]. [address] [place] [address]. Phone: [phone]. Fax: [phone] You were brought here for removal of the large amounts of fluid around the lung for which a Pleurx catheter placed. You should drain upto 1 L 3 times a week or until any chest pain or cough occurs. Please follow up with Dr. Ortega  October 3 11 am for suture removal. Please no heavy lifting until sutures removed.  [facility]. [address] [place] [address]. Phone: [phone]. Fax: [phone]    You have a follow up with Dr. Gunn on 9/27/22 for fu of lung biopsy results and treatment

## 2022-09-10 NOTE — H&P ADULT - SOCIAL HISTORY: TOBACCO USE
Former smoker (2 cigars per day x 8-9 years and quit 6 months ago) Former smoker (2 cigars per day x 8-9 years and quit 6 months ago)  Denied vaping or other illicit drug use

## 2022-09-10 NOTE — H&P ADULT - NSICDXFAMILYHX_GEN_ALL_CORE_FT
FAMILY HISTORY:  No pertinent family history in first degree relatives     FAMILY HISTORY:  Family history of cancer in father, Cancer of unknown origin on Father's side.

## 2022-09-10 NOTE — PROGRESS NOTE ADULT - NUTRITIONAL ASSESSMENT
This patient has been assessed with a concern for Malnutrition and has been determined to have a diagnosis/diagnoses of Moderate protein-calorie malnutrition and Underweight (BMI < 19).    This patient is being managed with:   Diet Regular-  Low Sodium  Supplement Feeding Modality:  Oral  Ensure Enlive Cans or Servings Per Day:  1       Frequency:  Two Times a day  Entered: Sep  9 2022  2:51PM

## 2022-09-10 NOTE — PROGRESS NOTE ADULT - REASON FOR ADMISSION
Pleural Effusion, Pulmonary Embolism

## 2022-09-10 NOTE — CHART NOTE - NSCHARTNOTEFT_GEN_A_CORE
R[facility] PA POCUS NOTE     : MARTHA Dee    INDICATION: SOB    PROCEDURE:  [ ] LIMITED ECHO  [ x] LIMITED CHEST  [ ] LIMITED RETROPERITONEAL  [ ] LIMITED ABDOMINAL  [ ] LIMITED DVT  [ ] NEEDLE GUIDANCE VASCULAR  [ ] NEEDLE GUIDANCE THORACENTESIS  [ ] NEEDLE GUIDANCE PARACENTESIS  [ ] NEEDLE GUIDANCE PERICARDIOCENTESIS  [ ] OTHER    FINDINGS:  Left sided lung sliding   Right sided no lung sliding in upper lung fields, however noted with lung point around left nipple.  No pleural effusion on right   LARGE left simple pleural effusion with associated compressive atelectasis.     INTERPRETATION:  LARGE left simple pleural effusion with associated compressive atelectasis.   Right sided hydroPTX     Haylee Dee PA-C  Department of Medicine/ RCU  In house Beeper #[id number]  Reachable via teams

## 2022-09-10 NOTE — PROGRESS NOTE ADULT - ASSESSMENT
56 yo M w/PMHx of s/p thoracic surgery 2/2 to cardiac mass/inflammation presents due to SOB, orthopnea.    #Pulmonary Embolus  - PE seen on CTA  - Heparin drip, f/u ptt, plt  - No hx of clotting  - Possibly due to underlying malignancy  - Not in active respiratory distress    #Pleural Effusion s/p tap yest w 2.4L  -xray w reaccumulation of fluid  -possible hydropneumothorax vs trapped lung  -xray repeat in am  -discussed with pulmonary they will follow for plan     #Lung Mass #Lung Nodule  - Suspicion for malignancy, no previous malignancy per patient  - No Family Hx of malignancy  - Previous smoker of 2 cigars daily for about 9 years, quit 6 months ago, denies cigarette use       HTN  -c/w meds , titrate up as needed     CODE: Full  Diet: Regular  VTE: Heparin drip
57M with unclear remote history of R thoracotomy, ex cigar smoker presents for SOB x2 days with bilateral LE edema R > L, unintentional weight loss of 5 pounds over 2-3 months. Found to have RLL PE and large L pleural effusion along with RUL nodule, SAULO mass. s/p diagnostic/therapeutic L thoracentesis 9/7 with 2.4L pleural fluid removed with still a moderate residual pleural effusion. Post thoracentesis with noted L hydropneumothorax.     - pt with large left unilateral complex effusion on lung US  - s/p thoracentesis yesterday  - fluid analysis consistent with exudative fluid  - suspect underlying malignancy and likely malignant effusion  - follow up pleural fluid cytology  - repeat lung US and repeat CXR showing some increased reaccumulation of pleural fluid  - no need for chest tube at present time for hydropneumothorax as pt likely with trapped lung. he likely had a chronic effusion that was enlarging through time (doubt sudden acute of L pleural effusion in 2 days)  - effusion was large enough to shift heart towards the right  - with trapped lung pt likely will reaccumulate fluid into the space  - his oxygen saturation has not changed nor has his respiratory status  - he reports improved SOB and ability to take deeper breaths post thoracentesis  - he will need oncology eval, however at present time would need some pathology diagnosis and is pending cyto results from thoracentesis  - if effusion reaccumulates he may need a pleurx catheter. Reviewed case with interventional pulmonary at [facility]/NS. if patient is stable can follow with interventional pulmonary next week. if thoracentesis is non diagnostic possibility for pleuroscopy with pleurx vs EUBS for tissue diagnosis  - cont heparin drip today  - repeat CXR in AM (earlier if acute change in respiratory status)  - if all remains stable, pt can get close follow up and work up completed as outpatient  - d/w patient, interventional pulmonary, primary team  
57M with unclear remote history of R thoracotomy, ex cigar smoker presents for SOB x2 days with bilateral LE edema R > L, unintentional weight loss of 5 pounds over 2-3 months. Found to have RLL PE and large L pleural effusion along with RUL nodule, SAULO mass. s/p diagnostic/therapeutic L thoracentesis 9/7 with 2.4L pleural fluid removed with still a moderate residual pleural effusion. Post thoracentesis with noted L hydropneumothorax. Subsequent follow up CXR with some reaccumulation of fluid into L pleural space, no significant worsening of PTX. Yesterday evening and today with episode of oxygen desaturation with exertion requiring 2L NC oxygen supplementation.      - pt with initial large left unilateral complex effusion on lung US, effusion was large enough to shift heart towards the right  - s/p diagnostic / therapeutic thoracentesis 9/7  - fluid analysis consistent with exudative fluid  - suspect underlying malignancy and likely malignant effusion  - follow up pleural fluid cytology: call core lab to get prelim results however slides not ready. per core lab will try to have pathologist take a read on Monday  - repeat bedside lung US shows increased reaccumulation of pleural fluid post thoracentesis  - no need for chest tube at present time for hydropneumothorax as pt likely with trapped lung. He likely had a chronic effusion that was enlarging through time (doubt sudden acute accumulation of L pleural effusion in 2 days prior to admission)  - pt overall with  improved SOB and ability to take deeper breaths post thoracentesis  - he will need oncology eval, however at present time would need some pathology diagnosis and is pending cyto results from thoracentesis  - as pt now having oxygen desaturation events with exertion and noted reaccumulation of fluid post thoracentesis will opt to transfer pt to [facility] for interventional pulmonary eval for possible pleurx, possible pleuroscopy or EUBS if tissue diagnosis is needed and cytology from thoracentesis is non diagnostic  - will also transfer pt to facility where CT surgery is readily available if necessary  - discussed with Dr. Ortega (interventional pulmonary) and Dr Lisker (RCU director) at [facility]. Accepted for transfer to [facility] RCU for further work up and intervention  - cont heparin drip for now, hold on DOAC as pt may need further procedures to be done  - repeat CXR if acute change in respiratory status  - transfer center called and pt arranged for transfer tomorrow Sept 10th at 9AM  - d/w patient, interventional pulmonary, [facility],  and primary hospitalist team  
58 yo M w/PMHx of s/p thoracic surgery 2/2 to cardiac mass/inflammation presents due to SOB, orthopnea.    #Pulmonary Embolus  - PE seen on CTA  - Heparin drip, f/u ptt, plt  - No hx of clotting  - Possibly due to underlying malignancy  - Not in active respiratory distress    #Pleural Effusion s/p tap 9/7 w 2.4L  -xray w reaccumulation of fluid  -possible hydropneumothorax vs trapped lung  -discussed with pulmonary they will follow for plan     #Lung Mass #Lung Nodule  - Suspicion for malignancy, no previous malignancy per patient  - No Family Hx of malignancy  - Previous smoker of 2 cigars daily for about 9 years, quit 6 months ago, denies cigarette use       HTN  -c/w meds , titrate up as needed     CODE: Full  Diet: Regular  VTE: Heparin drip
58 yo M w/PMHx of s/p thoracic surgery 2/2 to cardiac mass/inflammation presents due to SOB, orthopnea.    #Pulmonary Embolus  - PE seen on CTA  - Heparin drip, f/u ptt, plt  - No hx of clotting  - Possibly due to underlying malignancy  - Not in active respiratory distress    #Pleural Effusion s/p tap 9/7 w 2.4L  -xray w reaccumulation of fluid  -possible hydropneumothorax vs trapped lung  -Plan for transfer to [facility] respiratory unit for further management     #Lung Mass #Lung Nodule  - Suspicion for malignancy, no previous malignancy per patient  - No Family Hx of malignancy  - Previous smoker of 2 cigars daily for about 9 years, quit 6 months ago, denies cigarette use       HTN  -c/w meds , titrate up as needed     CODE: Full  Diet: Regular  VTE: Heparin drip

## 2022-09-10 NOTE — H&P ADULT - NSHPSOCIALHISTORY_GEN_ALL_CORE
Unmarried, has two children, lives in [place] but flew to [place] to help Sister who is sick.   Unvaccinated for COVID

## 2022-09-10 NOTE — DISCHARGE NOTE PROVIDER - HOSPITAL COURSE
55 YO M with PMHx of former smoker (2 "black and mild" cigars daily for 8-9 years and quit 6 months ago) and s/p right open thoracotomy for cardiac mass removal 20 years ago presented to [facility] on 9/7 with SOB and Orthopnea x 2 days and BL LE edema and unintentional weight loss over the last 2-3 months. He sought medical attention initially from an urgent care where they recommended for him to come to the hospital.     In the ED at [facility], CTA CHEST noted with RLL segmental and subsegmental RLL PE, however no central PEs noted, SAULO mass like opacity (3.3 x 2 cm) and RUL nodule (1.4 x 1.3 cm), LARGE left pleural effusion extending from apex with associated lung atelectasis (complete LLL and partial SAULO) and cardiomegaly with concern for right heart strain.  Pulmonary called and patient admitted to medicine for further management.     On Medicine, patient s/p left thoracentesis (9/7) with 2.4L output. Pleural cultures noted to be negative and cytology pending. Post thoracentesis course complicated by left hydropneumothorax and on subsequent imaging pleural fluid rapidly reaccumulated and PTX remained stable. Heparin GTT started for PE. ECHO (9/7) with normal LVSF, moderate LVH, but poorly visualized right side. BP remain stable and no concern for right strain. Mild AHRF noted, improved on 2L NC and patient ultimately transferred to [facility] RCU (9/10) for further management.     In RCU, temperature 96.8F orally, HR 76 BPM, /97 and SpO2 96% on RA on arrival. Complains of shortness of breath only with movement or bend to brush teeth. Also complains of chest/ back pain at the tip of his scapula, described as sharp, 4/10 and only with deep breath/ exertion. Denied fever, chills, N/V/D, no recent travel outside [place] and no sick contact. Patient accepted to RCU for further management of left hydropneumothorax.    55 YO M with PMHx of former smoker (2 "black and mild" cigars daily for 8-9 years and quit 6 months ago) and s/p right open thoracotomy for cardiac mass removal 20 years ago presented to [facility] on 9/7 with SOB and Orthopnea x 2 days and BL LE edema and unintentional weight loss over the last 2-3 months. He sought medical attention initially from an urgent care where they recommended for him to come to the hospital.     In the ED at [facility], CTA CHEST noted with RLL segmental and subsegmental RLL PE, however no central PEs noted, SAULO mass like opacity (3.3 x 2 cm) and RUL nodule (1.4 x 1.3 cm), LARGE left pleural effusion extending from apex with associated lung atelectasis (complete LLL and partial SAULO) and cardiomegaly with concern for right heart strain.  Pulmonary called and patient admitted to medicine for further management.     On Medicine, patient s/p left thoracentesis (9/7) with 2.4L output. Pleural cultures noted to be negative and cytology pending. Post thoracentesis course complicated by left hydropneumothorax and on subsequent imaging pleural fluid rapidly reaccumulated and PTX remained stable. Heparin GTT started for PE. ECHO (9/7) with normal LVSF, moderate LVH, but poorly visualized right side. BP remain stable and no concern for right strain. Mild AHRF noted, improved on 2L NC and patient ultimately transferred to [facility] RCU (9/10) for further management.     In RCU, temperature 96.8F orally, HR 76 BPM, /97 and SpO2 96% on RA on arrival. Complains of shortness of breath only with movement or bend to brush teeth. Also complains of chest/ back pain at the tip of his scapula, described as sharp, 4/10 and only with deep breath/ exertion. Denied fever, chills, N/V/D, no recent travel outside [place] and no sick contact. Patient accepted to RCU for further management of left hydropneumothorax.        55 YO M with PMHx of former smoker (2 "black and mild" cigars daily for 8-9 years and quit 6 months ago) and s/p right open thoracotomy for cardiac mass removal 20 years ago presented to [facility] on 9/7 with SOB and Orthopnea x 2 days and BL LE edema and unintentional weight loss over the last 2-3 months. He sought medical attention initially from an urgent care where they recommended for him to come to the hospital.     In the ED at [facility], CTA CHEST noted with RLL segmental and subsegmental RLL PE, however no central PEs noted, SAULO mass like opacity (3.3 x 2 cm) and RUL nodule (1.4 x 1.3 cm), LARGE left pleural effusion extending from apex with associated lung atelectasis (complete LLL and partial SAULO) and cardiomegaly with concern for right heart strain.  Pulmonary called and patient admitted to medicine for further management.     On Medicine, patient s/p left thoracentesis (9/7) with 2.4L output. Pleural cultures noted to be negative and cytology pending. Post thoracentesis course complicated by left hydropneumothorax and on subsequent imaging pleural fluid rapidly reaccumulated and PTX remained stable. Heparin GTT started for PE. ECHO (9/7) with normal LVSF, moderate LVH, but poorly visualized right side. BP remain stable and no concern for right strain. Mild AHRF noted, improved on 2L NC and patient ultimately transferred to [facility] RCU (9/10) for further management.     In RCU, temperature 96.8F orally, HR 76 BPM, /97 and SpO2 96% on RA on arrival. Complains of shortness of breath only with movement or bend to brush teeth. Also complains of chest/ back pain at the tip of his scapula, described as sharp, 4/10 and only with deep breath/ exertion. Denied fever, chills, N/V/D, no recent travel outside [place] and no sick contact. Patient accepted to RCU for further management of left hydropneumothorax.     Pt will be dc home on Xaralto starter pedro - Heparin gtt dc and pt will receive $10 co pay card for future refills   Home care will fu with pleur-X drainage system     Pt seen and evaluated by Dr Henry on 9/16/22 and cleared for discharge Pt aware and in agreement Scripts sent to [facility] MED to Bed so pt will have medication on discharge   Dispo: Home with [facility]

## 2022-09-10 NOTE — DISCHARGE NOTE PROVIDER - NSDCCPCAREPLAN_GEN_ALL_CORE_FT
PRINCIPAL DISCHARGE DIAGNOSIS  Diagnosis: Pleural effusion  Assessment and Plan of Treatment: You were brought here for removal of the large amounts of fluid around the lung for which a Pleurx catheter placed. You should drain upto 1 L 3 times a week or until any chest pain or cough occurs. Please follow up with Dr. Ortega in approximately October 3 for suture removal. Please no heavy lifting until sutures removed.       PRINCIPAL DISCHARGE DIAGNOSIS  Diagnosis: Pleural effusion  Assessment and Plan of Treatment: You were brought here for removal of the large amounts of fluid around the lung for which a Pleurx catheter placed. You should drain upto 1 L 3 times a week or until any chest pain or cough occurs. Please follow up with Dr. Ortega in approximately October 3 for suture removal. Please no heavy lifting until sutures removed.      SECONDARY DISCHARGE DIAGNOSES  Diagnosis: Pulmonary embolism  Assessment and Plan of Treatment: YOur were found to have a pulmonary embolism or "clot" in your lung   - You will need to take anticoagulation medication in pill form   Take medication as directed on package and nurse instructions   Use the $10 co pay card for future refills at any pharmacy   Monitor for any signs of bleeding , new bruising, dark stools     PRINCIPAL DISCHARGE DIAGNOSIS  Diagnosis: Pleural effusion  Assessment and Plan of Treatment: You were brought here for removal of the large amounts of fluid around the lung for which a Pleurx catheter placed. You should drain upto 1 L 3 times a week or until any chest pain or cough occurs. Please follow up with Dr. Ortega in  October 3 at 11am for suture removal. Please no heavy lifting until sutures removed.      SECONDARY DISCHARGE DIAGNOSES  Diagnosis: Pulmonary embolism  Assessment and Plan of Treatment: YOur were found to have a pulmonary embolism or "clot" in your lung   - You will need to take anticoagulation medication in pill form   Take medication as directed on package and nurse instructions   Use the $10 co pay card for future refills at any pharmacy   Monitor for any signs of bleeding , new bruising, dark stools     PRINCIPAL DISCHARGE DIAGNOSIS  Diagnosis: Pleural effusion  Assessment and Plan of Treatment: You were brought here for removal of the large amounts of fluid around the lung for which a Pleurx catheter placed. You should drain upto 1 L 3 times a week or until any chest pain or cough occurs. Please follow up with Dr. Ortega in  October 3 at 11am for suture removal. Please no heavy lifting until sutures removed.  You have a follow up with Dr Gunn for lung biopsy results and treatment on September 27th at 2pm   [address]      SECONDARY DISCHARGE DIAGNOSES  Diagnosis: Pulmonary embolism  Assessment and Plan of Treatment: YOur were found to have a pulmonary embolism or "clot" in your lung   - You will need to take anticoagulation medication in pill form   Take medication as directed on package and nurse instructions   Use the $10 co pay card for future refills at any pharmacy   Monitor for any signs of bleeding , new bruising, dark stools

## 2022-09-10 NOTE — H&P ADULT - NSHPPHYSICALEXAM_GEN_ALL_CORE
General: NAD   Cards: S1/S2, no murmurs   Pulm: CTA bilaterally, however no LLL lung sounds noted. No wheezes.   Abdomen: Soft, NTND. BS (+)   Extremities: No pedal edema. ALEJANDRA of BL upper and lower extremities.  Neurology: AOx3 with no focal neurological deficits

## 2022-09-11 LAB
A1C WITH ESTIMATED AVERAGE GLUCOSE RESULT: 5.8 % — HIGH (ref 4–5.6)
ANION GAP SERPL CALC-SCNC: 10 MMOL/L — SIGNIFICANT CHANGE UP (ref 7–14)
APTT BLD: 65.7 SEC — HIGH (ref 27–36.3)
APTT BLD: 73.7 SEC — HIGH (ref 27–36.3)
BUN SERPL-MCNC: 18 MG/DL — SIGNIFICANT CHANGE UP (ref 7–23)
CALCIUM SERPL-MCNC: 9.1 MG/DL — SIGNIFICANT CHANGE UP (ref 8.4–10.5)
CHLORIDE SERPL-SCNC: 100 MMOL/L — SIGNIFICANT CHANGE UP (ref 98–107)
CHOLEST SERPL-MCNC: 244 MG/DL — HIGH
CK MB BLD-MCNC: 2.1 % — SIGNIFICANT CHANGE UP (ref 0–2.5)
CK MB CFR SERPL CALC: 1.6 NG/ML — SIGNIFICANT CHANGE UP
CK SERPL-CCNC: 75 U/L — SIGNIFICANT CHANGE UP (ref 30–200)
CO2 SERPL-SCNC: 26 MMOL/L — SIGNIFICANT CHANGE UP (ref 22–31)
CREAT SERPL-MCNC: 1.12 MG/DL — SIGNIFICANT CHANGE UP (ref 0.5–1.3)
EGFR: 77 ML/MIN/1.73M2 — SIGNIFICANT CHANGE UP
ESTIMATED AVERAGE GLUCOSE: 120 — SIGNIFICANT CHANGE UP
GLUCOSE SERPL-MCNC: 90 MG/DL — SIGNIFICANT CHANGE UP (ref 70–99)
HCT VFR BLD CALC: 40.2 % — SIGNIFICANT CHANGE UP (ref 39–50)
HDLC SERPL-MCNC: 66 MG/DL — SIGNIFICANT CHANGE UP
HGB BLD-MCNC: 12.9 G/DL — LOW (ref 13–17)
INR BLD: 1.03 RATIO — SIGNIFICANT CHANGE UP (ref 0.88–1.16)
LDH SERPL L TO P-CCNC: 176 U/L — SIGNIFICANT CHANGE UP (ref 135–225)
LIPID PNL WITH DIRECT LDL SERPL: 158 MG/DL — HIGH
MAGNESIUM SERPL-MCNC: 1.9 MG/DL — SIGNIFICANT CHANGE UP (ref 1.6–2.6)
MCHC RBC-ENTMCNC: 29.3 PG — SIGNIFICANT CHANGE UP (ref 27–34)
MCHC RBC-ENTMCNC: 32.1 GM/DL — SIGNIFICANT CHANGE UP (ref 32–36)
MCV RBC AUTO: 91.4 FL — SIGNIFICANT CHANGE UP (ref 80–100)
NON HDL CHOLESTEROL: 178 MG/DL — HIGH
NRBC # BLD: 0 /100 WBCS — SIGNIFICANT CHANGE UP (ref 0–0)
NRBC # FLD: 0 K/UL — SIGNIFICANT CHANGE UP (ref 0–0)
PHOSPHATE SERPL-MCNC: 3.8 MG/DL — SIGNIFICANT CHANGE UP (ref 2.5–4.5)
PLATELET # BLD AUTO: 270 K/UL — SIGNIFICANT CHANGE UP (ref 150–400)
POTASSIUM SERPL-MCNC: 4 MMOL/L — SIGNIFICANT CHANGE UP (ref 3.5–5.3)
POTASSIUM SERPL-SCNC: 4 MMOL/L — SIGNIFICANT CHANGE UP (ref 3.5–5.3)
PROT SERPL-MCNC: 7 G/DL — SIGNIFICANT CHANGE UP (ref 6–8.3)
PROTHROM AB SERPL-ACNC: 11.9 SEC — SIGNIFICANT CHANGE UP (ref 10.5–13.4)
RBC # BLD: 4.4 M/UL — SIGNIFICANT CHANGE UP (ref 4.2–5.8)
RBC # FLD: 13.8 % — SIGNIFICANT CHANGE UP (ref 10.3–14.5)
SODIUM SERPL-SCNC: 136 MMOL/L — SIGNIFICANT CHANGE UP (ref 135–145)
TRIGL SERPL-MCNC: 99 MG/DL — SIGNIFICANT CHANGE UP
TROPONIN T, HIGH SENSITIVITY RESULT: 6 NG/L — SIGNIFICANT CHANGE UP
TSH SERPL-MCNC: 3.97 UIU/ML — SIGNIFICANT CHANGE UP (ref 0.27–4.2)
WBC # BLD: 3.61 K/UL — LOW (ref 3.8–10.5)
WBC # FLD AUTO: 3.61 K/UL — LOW (ref 3.8–10.5)

## 2022-09-11 PROCEDURE — 99233 SBSQ HOSP IP/OBS HIGH 50: CPT

## 2022-09-11 PROCEDURE — 93010 ELECTROCARDIOGRAM REPORT: CPT

## 2022-09-11 RX ORDER — TIOTROPIUM BROMIDE 18 UG/1
1 CAPSULE ORAL; RESPIRATORY (INHALATION) DAILY
Refills: 0 | Status: DISCONTINUED | OUTPATIENT
Start: 2022-09-11 | End: 2022-09-11

## 2022-09-11 RX ORDER — ALBUTEROL 90 UG/1
2.5 AEROSOL, METERED ORAL EVERY 6 HOURS
Refills: 0 | Status: DISCONTINUED | OUTPATIENT
Start: 2022-09-11 | End: 2022-09-16

## 2022-09-11 RX ADMIN — CHLORHEXIDINE GLUCONATE 1 APPLICATION(S): 213 SOLUTION TOPICAL at 11:33

## 2022-09-11 RX ADMIN — HEPARIN SODIUM 11.5 UNIT(S)/HR: 5000 INJECTION INTRAVENOUS; SUBCUTANEOUS at 06:48

## 2022-09-11 RX ADMIN — LIDOCAINE 1 PATCH: 4 CREAM TOPICAL at 06:47

## 2022-09-11 RX ADMIN — HEPARIN SODIUM 11.5 UNIT(S)/HR: 5000 INJECTION INTRAVENOUS; SUBCUTANEOUS at 11:32

## 2022-09-11 RX ADMIN — LIDOCAINE 1 PATCH: 4 CREAM TOPICAL at 22:09

## 2022-09-11 RX ADMIN — SENNA PLUS 2 TABLET(S): 8.6 TABLET ORAL at 22:10

## 2022-09-11 NOTE — PROGRESS NOTE ADULT - ASSESSMENT
55 YO M with PMHx of former smoker (2 "black and mild" cigars daily for 8-9 years and quit 6 months ago) and s/p right open thoracotomy for cardiac mass removal 20 years ago who presented to [facility] on 9/7 with SOB, Orthopnea, BL LE edema and unintentional weight loss and found with large left pleural effusion and RLL subsegmental/ segmental PE. s/p thoracentesis (9/7) and course complicated left hydropneumothorax with rapid reaccumulation Now transferred to [facility] RCU 9/10 for further management.     INCOMPLETE NOTE  57 YO M with PMHx of former smoker (2 "black and mild" cigars daily for 8-9 years and quit 6 months ago) and s/p right open thoracotomy for cardiac mass removal 20 years ago who presented to [facility] on 9/7 with SOB, Orthopnea, BL LE edema and unintentional weight loss and found with large left pleural effusion and RLL subsegmental/ segmental PE. s/p thoracentesis (9/7) and course complicated left hydropneumothorax with rapid reaccumulation Now transferred to [facility] RCU 9/10 for further management.     # NEUROLOGY  - AOx3 with no current issues.   - Left lateral chest wall pain and continued on lidoderm patch.     # CARDIOVASCULAR  - Concern noted for RH strain at [facility] second to cardiomegaly on CTA CHEST (9/7).   - ECHO (9/7) with normal LVSF, moderate LVH, but poorly visualized right side.   - BP remains stable with no concerns for right strain.  - Monitor HR/ BP     # RESPIRATORY  - Presented to [facility] with SOB, orthopnea and BL LE edema and found with AHRF likely second to large L pleural effusion c/b left hydroPTX and RLL subsegmental/ segmental PE  - CTA CHEST (9/7) with segmental and subsegmental RLL PE, no central PEs noted, SAULO mass like opacity (3.3 x 2 cm) and RUL nodule (1.4 x 1.3 cm) and LARGE L pleural effusion extending from apex with associated complete LLL and partial SAULO atelectasis  - s/p Thoracentessis (9/7) at [facility] and (+) lights criteria.   - Continue on Heparin GTT   - Continue RA and NC PRN   - Plan for IP consult early this week.   - Pending cytopathology     # GI  - Continue on PO diet   - Constipated and Miralax/ Senna started.     # RENAL  - No acute renal issues.   - Monitor renal function and UOP.     # INFECTIOUS DISEASE  - COVID PCR (9/7) NGTD   - MRSA PCR pending   - No acute infectious concerns.     # HEME  - CTA CHEST with RLL segmental and subsegmental PEs   - Continue on FULL DOSE Heparin GTT     # ONC   - Hx of open right thoracotomy for posterior cardiac mass removal greater than 20 years ago. Patient reports noted to be benign.    # ENDOCRINE  - No acute issues noted.     # ETHICS/ GOC    - FULL CODE     # DISPO - Admit to RCU.    57 YO M with PMHx of former smoker (2 "black and mild" cigars daily for 8-9 years and quit 6 months ago) and s/p right open thoracotomy for cardiac mass removal 20 years ago who presented to [facility] on 9/7 with SOB, Orthopnea, BL LE edema and unintentional weight loss and found with large left pleural effusion and RLL subsegmental/ segmental PE. s/p thoracentesis (9/7) and course complicated left hydropneumothorax with rapid reaccumulation Now transferred to [facility] RCU 9/10 for further management.     # NEUROLOGY  - AOx3 with no current issues.   - Left lateral chest wall pain and continued on lidoderm patch.     # CARDIOVASCULAR  - Concern noted for RH strain at [facility] second to cardiomegaly on CTA CHEST (9/7).   - ECHO (9/7) with normal LVSF, moderate LVH, but poorly visualized right side.   - BP remains stable with no concerns for right strain.  - Complaining of chest pain on left, likely second to PTX, however pending CE/ECG.   - Monitor HR/ BP     # RESPIRATORY  - Presented to [facility] with SOB, orthopnea and BL LE edema and found with AHRF likely second to large L pleural effusion c/b left hydroPTX and RLL subsegmental/ segmental PE  - CTA CHEST (9/7) with segmental and subsegmental RLL PE, no central PEs noted, SAULO mass like opacity (3.3 x 2 cm) and RUL nodule (1.4 x 1.3 cm) and LARGE L pleural effusion extending from apex with associated complete LLL and partial SAULO atelectasis  - s/p Thoracentesis (9/7) at [facility] and (+) lights criteria.   - Continue on Heparin GTT   - Continue RA and NC PRN   - Continue on Albuterol PRN   - Plan for IP consult early this week.   - Pending cytopathology     # GI  - Continue on PO diet   - Constipated and Miralax/ Senna started.     # RENAL  - No acute renal issues.   - Monitor renal function and UOP.     # INFECTIOUS DISEASE  - COVID PCR (9/7) NGTD   - MRSA PCR pending   - No acute infectious concerns.     # HEME  - CTA CHEST with RLL segmental and subsegmental PEs   - Continue on FULL DOSE Heparin GTT     # ONC   - Hx of open right thoracotomy for posterior cardiac mass removal greater than 20 years ago. Patient reports noted to be benign.    # ENDOCRINE  - No acute issues noted.     # ETHICS/ GOC    - FULL CODE     # DISPO - Admit to RCU.

## 2022-09-12 LAB
ANION GAP SERPL CALC-SCNC: 11 MMOL/L — SIGNIFICANT CHANGE UP (ref 7–14)
APTT BLD: 69.1 SEC — HIGH (ref 27–36.3)
BUN SERPL-MCNC: 20 MG/DL — SIGNIFICANT CHANGE UP (ref 7–23)
CALCIUM SERPL-MCNC: 9.1 MG/DL — SIGNIFICANT CHANGE UP (ref 8.4–10.5)
CHLORIDE SERPL-SCNC: 101 MMOL/L — SIGNIFICANT CHANGE UP (ref 98–107)
CO2 SERPL-SCNC: 25 MMOL/L — SIGNIFICANT CHANGE UP (ref 22–31)
CREAT SERPL-MCNC: 1.22 MG/DL — SIGNIFICANT CHANGE UP (ref 0.5–1.3)
CULTURE RESULTS: SIGNIFICANT CHANGE UP
EGFR: 70 ML/MIN/1.73M2 — SIGNIFICANT CHANGE UP
GLUCOSE SERPL-MCNC: 101 MG/DL — HIGH (ref 70–99)
GRAM STN FLD: SIGNIFICANT CHANGE UP
HCT VFR BLD CALC: 39.7 % — SIGNIFICANT CHANGE UP (ref 39–50)
HGB BLD-MCNC: 12.6 G/DL — LOW (ref 13–17)
INR BLD: 1.03 RATIO — SIGNIFICANT CHANGE UP (ref 0.88–1.16)
MAGNESIUM SERPL-MCNC: 2 MG/DL — SIGNIFICANT CHANGE UP (ref 1.6–2.6)
MCHC RBC-ENTMCNC: 29.2 PG — SIGNIFICANT CHANGE UP (ref 27–34)
MCHC RBC-ENTMCNC: 31.7 GM/DL — LOW (ref 32–36)
MCV RBC AUTO: 91.9 FL — SIGNIFICANT CHANGE UP (ref 80–100)
NON-GYNECOLOGICAL CYTOLOGY STUDY: SIGNIFICANT CHANGE UP
NRBC # BLD: 0 /100 WBCS — SIGNIFICANT CHANGE UP (ref 0–0)
NRBC # FLD: 0 K/UL — SIGNIFICANT CHANGE UP (ref 0–0)
PHOSPHATE SERPL-MCNC: 3.8 MG/DL — SIGNIFICANT CHANGE UP (ref 2.5–4.5)
PLATELET # BLD AUTO: 276 K/UL — SIGNIFICANT CHANGE UP (ref 150–400)
POTASSIUM SERPL-MCNC: 4.1 MMOL/L — SIGNIFICANT CHANGE UP (ref 3.5–5.3)
POTASSIUM SERPL-SCNC: 4.1 MMOL/L — SIGNIFICANT CHANGE UP (ref 3.5–5.3)
PROTHROM AB SERPL-ACNC: 11.9 SEC — SIGNIFICANT CHANGE UP (ref 10.5–13.4)
RBC # BLD: 4.32 M/UL — SIGNIFICANT CHANGE UP (ref 4.2–5.8)
RBC # FLD: 14.1 % — SIGNIFICANT CHANGE UP (ref 10.3–14.5)
SARS-COV-2 RNA SPEC QL NAA+PROBE: SIGNIFICANT CHANGE UP
SODIUM SERPL-SCNC: 137 MMOL/L — SIGNIFICANT CHANGE UP (ref 135–145)
SPECIMEN SOURCE: SIGNIFICANT CHANGE UP
WBC # BLD: 3.69 K/UL — LOW (ref 3.8–10.5)
WBC # FLD AUTO: 3.69 K/UL — LOW (ref 3.8–10.5)

## 2022-09-12 PROCEDURE — 71045 X-RAY EXAM CHEST 1 VIEW: CPT | Mod: 26

## 2022-09-12 PROCEDURE — 99233 SBSQ HOSP IP/OBS HIGH 50: CPT

## 2022-09-12 RX ORDER — HEPARIN SODIUM 5000 [USP'U]/ML
1150 INJECTION INTRAVENOUS; SUBCUTANEOUS
Qty: 25000 | Refills: 0 | Status: DISCONTINUED | OUTPATIENT
Start: 2022-09-12 | End: 2022-09-13

## 2022-09-12 RX ADMIN — LIDOCAINE 1 PATCH: 4 CREAM TOPICAL at 09:21

## 2022-09-12 RX ADMIN — HEPARIN SODIUM 11.5 UNIT(S)/HR: 5000 INJECTION INTRAVENOUS; SUBCUTANEOUS at 11:19

## 2022-09-12 RX ADMIN — LIDOCAINE 1 PATCH: 4 CREAM TOPICAL at 17:52

## 2022-09-12 RX ADMIN — CHLORHEXIDINE GLUCONATE 1 APPLICATION(S): 213 SOLUTION TOPICAL at 11:22

## 2022-09-12 RX ADMIN — LIDOCAINE 1 PATCH: 4 CREAM TOPICAL at 10:50

## 2022-09-12 RX ADMIN — LIDOCAINE 1 PATCH: 4 CREAM TOPICAL at 19:46

## 2022-09-12 NOTE — PROGRESS NOTE ADULT - TIME BILLING
Review of patient records, including history, laboratory data, and imaging. Patient assessment and evaluation. Coordination of care.

## 2022-09-12 NOTE — PROGRESS NOTE ADULT - SUBJECTIVE AND OBJECTIVE BOX
CHIEF COMPLAINT: Patient is a 57y old  Male who presents with a chief complaint of L HydroPTX (11 Sep 2022 07:04)      INTERVAL EVENTS:     ROS: Seen by bedside during AM rounds     OBJECTIVE:  ICU Vital Signs Last 24 Hrs  T(C): 36.1 (12 Sep 2022 05:15), Max: 36.6 (11 Sep 2022 14:02)  T(F): 97 (12 Sep 2022 05:15), Max: 97.8 (11 Sep 2022 14:02)  HR: 86 (12 Sep 2022 05:15) (79 - 86)  BP: 126/80 (12 Sep 2022 05:15) (123/86 - 133/95)  BP(mean): --  ABP: --  ABP(mean): --  RR: 18 (12 Sep 2022 05:15) (18 - 18)  SpO2: 100% (12 Sep 2022 05:15) (97% - 100%)    O2 Parameters below as of 12 Sep 2022 05:15  Patient On (Oxygen Delivery Method): room air              09-11 @ 07:01  -  09-12 @ 06:57  --------------------------------------------------------  IN: 138 mL / OUT: 0 mL / NET: 138 mL      CAPILLARY BLOOD GLUCOSE           PHYSICAL EXAM:  General:   HEENT:   Lymph Nodes:  Neck:   Respiratory:   Cardiovascular:   Abdomen:   Extremities:   Skin:   Neurological:  Psychiatry:        HOSPITAL MEDICATIONS:  MEDICATIONS  (STANDING):  chlorhexidine 2% Cloths 1 Application(s) Topical daily  heparin  Infusion 1000 Unit(s)/Hr (11.5 mL/Hr) IV Continuous <Continuous>  influenza   Vaccine 0.5 milliLiter(s) IntraMuscular once  lidocaine   4% Patch 1 Patch Transdermal every 24 hours  polyethylene glycol 3350 17 Gram(s) Oral daily  senna 2 Tablet(s) Oral at bedtime    MEDICATIONS  (PRN):  acetaminophen     Tablet .. 650 milliGRAM(s) Oral every 6 hours PRN Temp greater or equal to 38C (100.4F), Mild Pain (1 - 3), Moderate Pain (4 - 6)  ALBUTerol    0.083% 2.5 milliGRAM(s) Nebulizer every 6 hours PRN Shortness of Breath and/or Wheezing      LABS:                        12.6   3.69  )-----------( 276      ( 12 Sep 2022 05:13 )             39.7     09-12    137  |  101  |  20  ----------------------------<  101<H>  4.1   |  25  |  1.22    Ca    9.1      12 Sep 2022 05:13  Phos  3.8     09-12  Mg     2.00     09-12    TPro  7.0  /  Alb  x   /  TBili  x   /  DBili  x   /  AST  x   /  ALT  x   /  AlkPhos  x   09-11    PT/INR - ( 12 Sep 2022 05:13 )   PT: 11.9 sec;   INR: 1.03 ratio         PTT - ( 12 Sep 2022 05:13 )  PTT:69.1 sec       CHIEF COMPLAINT: Patient is a 57y old  Male who presents with a chief complaint of L HydroPTX (11 Sep 2022 07:04)      INTERVAL EVENTS: no overnight events noted. CXR showing similar appearing PTX to prior study. Planned for Pleuroscopy tomorrow with IP. Remains stable on RA. Cytoapath results from thoracentesis at [facility]- called today and asked to expedite.     ROS: See above, remaining ROS neg. Reports syptoms (SOB) are exertional.     OBJECTIVE:  ICU Vital Signs Last 24 Hrs  T(C): 36.1 (12 Sep 2022 05:15), Max: 36.6 (11 Sep 2022 14:02)  T(F): 97 (12 Sep 2022 05:15), Max: 97.8 (11 Sep 2022 14:02)  HR: 86 (12 Sep 2022 05:15) (79 - 86)  BP: 126/80 (12 Sep 2022 05:15) (123/86 - 133/95)  BP(mean): --  ABP: --  ABP(mean): --  RR: 18 (12 Sep 2022 05:15) (18 - 18)  SpO2: 100% (12 Sep 2022 05:15) (97% - 100%)    O2 Parameters below as of 12 Sep 2022 05:15  Patient On (Oxygen Delivery Method): room air              09-11 @ 07:01  -  09-12 @ 06:57  --------------------------------------------------------  IN: 138 mL / OUT: 0 mL / NET: 138 mL      CAPILLARY BLOOD GLUCOSE           PHYSICAL EXAM:  General: NAD   Neck: Trachea midline.   Cards: S1/S2, no murmurs   Pulm: Diminished BS L-side.   Abdomen: Soft, NTND.  Extremities: No pedal edema. Extremities warm to touch. intact distal pulses.   Neurology: AOx3 with no focal neurological deficits  Skin: Refer to RN assessment.       HOSPITAL MEDICATIONS:  MEDICATIONS  (STANDING):  chlorhexidine 2% Cloths 1 Application(s) Topical daily  heparin  Infusion 1000 Unit(s)/Hr (11.5 mL/Hr) IV Continuous <Continuous>  influenza   Vaccine 0.5 milliLiter(s) IntraMuscular once  lidocaine   4% Patch 1 Patch Transdermal every 24 hours  polyethylene glycol 3350 17 Gram(s) Oral daily  senna 2 Tablet(s) Oral at bedtime    MEDICATIONS  (PRN):  acetaminophen     Tablet .. 650 milliGRAM(s) Oral every 6 hours PRN Temp greater or equal to 38C (100.4F), Mild Pain (1 - 3), Moderate Pain (4 - 6)  ALBUTerol    0.083% 2.5 milliGRAM(s) Nebulizer every 6 hours PRN Shortness of Breath and/or Wheezing      LABS:                        12.6   3.69  )-----------( 276      ( 12 Sep 2022 05:13 )             39.7     09-12    137  |  101  |  20  ----------------------------<  101<H>  4.1   |  25  |  1.22    Ca    9.1      12 Sep 2022 05:13  Phos  3.8     09-12  Mg     2.00     09-12    TPro  7.0  /  Alb  x   /  TBili  x   /  DBili  x   /  AST  x   /  ALT  x   /  AlkPhos  x   09-11    PT/INR - ( 12 Sep 2022 05:13 )   PT: 11.9 sec;   INR: 1.03 ratio         PTT - ( 12 Sep 2022 05:13 )  PTT:69.1 sec

## 2022-09-12 NOTE — PROCEDURE NOTE - ADDITIONAL PROCEDURE DETAILS
Patient requiring PIV access for medical management. Site prepped using aseptic technique. Under ultrasound guidance a vein in the left AC region was identified and cannulated using a 20G Arrow Extended Dwell Angiocath. Flashback seen on needle insertion. Blood return present upon catheter deployment and upon aspiration using saline syringe plunger. Line flushes easily. Patient tolerated procedure well. No immediate complications. Nursing staff notified. Dressing to be changed Qweekly.

## 2022-09-12 NOTE — PROGRESS NOTE ADULT - ASSESSMENT
57 YO M with PMHx of former smoker (2 "black and mild" cigars daily for 8-9 years and quit 6 months ago) and s/p right open thoracotomy for cardiac mass removal 20 years ago who presented to [facility] on 9/7 with SOB, Orthopnea, BL LE edema and unintentional weight loss and found with large left pleural effusion and RLL subsegmental/ segmental PE. s/p thoracentesis (9/7) and course complicated left hydropneumothorax with rapid reaccumulation Now transferred to [facility] 9/10 for further management.     # NEUROLOGY  - AOx3 with no current issues.   - Left lateral chest wall pain and continued on lidoderm patch.     # CARDIOVASCULAR  - Concern noted for RH strain at [facility] second to cardiomegaly on CTA CHEST (9/7).   - ECHO (9/7) with normal LVSF, moderate LVH, but poorly visualized right side.   - BP remains stable with no concerns for right strain.  - Complaining of chest pain on left, likely second to PTX, however pending CE/ECG.   - Monitor HR/ BP     # RESPIRATORY  - Presented to [facility] with SOB, orthopnea and BL LE edema and found with AHRF likely second to large L pleural effusion c/b left hydroPTX and RLL subsegmental/ segmental PE  - CTA CHEST (9/7) with segmental and subsegmental RLL PE, no central PEs noted, SAULO mass like opacity (3.3 x 2 cm) and RUL nodule (1.4 x 1.3 cm) and LARGE L pleural effusion extending from apex with associated complete LLL and partial SAULO atelectasis  - s/p Thoracentesis (9/7) at [facility] and (+) lights criteria.   - Continue on Heparin GTT   - Continue RA and NC PRN   - Continue on Albuterol PRN   - Plan for IP consult early this week.   - Pending cytopathology     # GI  - Continue on PO diet   - Constipated and Miralax/ Senna started.     # RENAL  - No acute renal issues.   - Monitor renal function and UOP.     # INFECTIOUS DISEASE  - COVID PCR (9/7) NGTD   - MRSA PCR pending   - No acute infectious concerns.     # HEME  - CTA CHEST with RLL segmental and subsegmental PEs   - Continue on FULL DOSE Heparin GTT     # ONC   - Hx of open right thoracotomy for posterior cardiac mass removal greater than 20 years ago. Patient reports noted to be benign.    # ENDOCRINE  - No acute issues noted.     # ETHICS/ GOC    - FULL CODE     # DISPO - Admit to RCU.    57 YO M with PMHx of former smoker (2 "black and mild" cigars daily for 8-9 years and quit 6 months ago) and s/p right open thoracotomy for cardiac mass removal 20 years ago who presented to [facility] on 9/7 with SOB, Orthopnea, BL LE edema and unintentional weight loss and found with large left pleural effusion and RLL subsegmental/ segmental PE. s/p thoracentesis (9/7) and course complicated left hydropneumothorax with rapid reaccumulation Now transferred to [facility] 9/10 for further management.     # NEUROLOGY  - AOx3 with no current issues.   - Left lateral chest wall pain and continued on lidoderm patch.     # CARDIOVASCULAR  - Concern noted for RH strain at [facility] second to cardiomegaly on CTA CHEST (9/7).   - ECHO (9/7) with normal LVSF, moderate LVH, but poorly visualized right side.   - BP remains stable with no concerns for right strain.  - Complaining of chest pain on left, likely second to PTX, however pending CE/ECG.   - Monitor HR/ BP     # RESPIRATORY  - Presented to [facility] with SOB, orthopnea and BL LE edema and found with AHRF likely second to large L pleural effusion c/b left hydroPTX and RLL subsegmental/ segmental PE  - CTA CHEST (9/7) with segmental and subsegmental RLL PE, no central PEs noted, SAULO mass like opacity (3.3 x 2 cm) and RUL nodule (1.4 x 1.3 cm) and LARGE L pleural effusion extending from apex with associated complete LLL and partial SAULO atelectasis  - s/p Thoracentesis (9/7) at [facility] and (+) lights criteria.   - Continue on Heparin GTT   - Continue RA and NC PRN   - Continue on Albuterol PRN   - Plan for PLeuruscopy tomorrow 9/13   - Pending cytopathology - called [facility] to expedite results 9/12    # GI  - Continue on PO diet   - Constipated and Miralax/ Senna started.     # RENAL  - No acute renal issues.   - Monitor renal function and UOP.     # INFECTIOUS DISEASE  - COVID PCR (9/7) NGTD   - MRSA PCR pending   - No acute infectious concerns.     # HEME  - CTA CHEST with RLL segmental and subsegmental PEs   - Continue on FULL DOSE Heparin GTT     # ONC   - Hx of open right thoracotomy for posterior cardiac mass removal greater than 20 years ago. Patient reports noted to be benign.  - May need CT C/A/P (+/-) repeat thora pending cytopath results    # ENDOCRINE  - No acute issues noted.     # ETHICS/ GOC    - FULL CODE     # DISPO - TBD

## 2022-09-13 LAB
ANION GAP SERPL CALC-SCNC: 9 MMOL/L — SIGNIFICANT CHANGE UP (ref 7–14)
APTT BLD: 26.8 SEC — LOW (ref 27–36.3)
BASOPHILS # BLD AUTO: 0.08 K/UL — SIGNIFICANT CHANGE UP (ref 0–0.2)
BASOPHILS NFR BLD AUTO: 1.7 % — SIGNIFICANT CHANGE UP (ref 0–2)
BLD GP AB SCN SERPL QL: NEGATIVE — SIGNIFICANT CHANGE UP
BUN SERPL-MCNC: 17 MG/DL — SIGNIFICANT CHANGE UP (ref 7–23)
CALCIUM SERPL-MCNC: 9.4 MG/DL — SIGNIFICANT CHANGE UP (ref 8.4–10.5)
CHLORIDE SERPL-SCNC: 102 MMOL/L — SIGNIFICANT CHANGE UP (ref 98–107)
CO2 SERPL-SCNC: 25 MMOL/L — SIGNIFICANT CHANGE UP (ref 22–31)
CREAT SERPL-MCNC: 1.11 MG/DL — SIGNIFICANT CHANGE UP (ref 0.5–1.3)
EGFR: 78 ML/MIN/1.73M2 — SIGNIFICANT CHANGE UP
EOSINOPHIL # BLD AUTO: 0.2 K/UL — SIGNIFICANT CHANGE UP (ref 0–0.5)
EOSINOPHIL NFR BLD AUTO: 4.4 % — SIGNIFICANT CHANGE UP (ref 0–6)
GLUCOSE SERPL-MCNC: 93 MG/DL — SIGNIFICANT CHANGE UP (ref 70–99)
HCT VFR BLD CALC: 42.1 % — SIGNIFICANT CHANGE UP (ref 39–50)
HGB BLD-MCNC: 13.9 G/DL — SIGNIFICANT CHANGE UP (ref 13–17)
IANC: 2.7 K/UL — SIGNIFICANT CHANGE UP (ref 1.8–7.4)
INR BLD: 1 RATIO — SIGNIFICANT CHANGE UP (ref 0.88–1.16)
LYMPHOCYTES # BLD AUTO: 0.59 K/UL — LOW (ref 1–3.3)
LYMPHOCYTES # BLD AUTO: 13.2 % — SIGNIFICANT CHANGE UP (ref 13–44)
MAGNESIUM SERPL-MCNC: 2 MG/DL — SIGNIFICANT CHANGE UP (ref 1.6–2.6)
MANUAL SMEAR VERIFICATION: SIGNIFICANT CHANGE UP
MCHC RBC-ENTMCNC: 29.8 PG — SIGNIFICANT CHANGE UP (ref 27–34)
MCHC RBC-ENTMCNC: 33 GM/DL — SIGNIFICANT CHANGE UP (ref 32–36)
MCV RBC AUTO: 90.3 FL — SIGNIFICANT CHANGE UP (ref 80–100)
MONOCYTES # BLD AUTO: 0.2 K/UL — SIGNIFICANT CHANGE UP (ref 0–0.9)
MONOCYTES NFR BLD AUTO: 4.4 % — SIGNIFICANT CHANGE UP (ref 2–14)
NEUTROPHILS # BLD AUTO: 3.14 K/UL — SIGNIFICANT CHANGE UP (ref 1.8–7.4)
NEUTROPHILS NFR BLD AUTO: 70.2 % — SIGNIFICANT CHANGE UP (ref 43–77)
PHOSPHATE SERPL-MCNC: 3.7 MG/DL — SIGNIFICANT CHANGE UP (ref 2.5–4.5)
PLAT MORPH BLD: NORMAL — SIGNIFICANT CHANGE UP
PLATELET # BLD AUTO: 272 K/UL — SIGNIFICANT CHANGE UP (ref 150–400)
PLATELET COUNT - ESTIMATE: NORMAL — SIGNIFICANT CHANGE UP
POLYCHROMASIA BLD QL SMEAR: SIGNIFICANT CHANGE UP
POTASSIUM SERPL-MCNC: 4.8 MMOL/L — SIGNIFICANT CHANGE UP (ref 3.5–5.3)
POTASSIUM SERPL-SCNC: 4.8 MMOL/L — SIGNIFICANT CHANGE UP (ref 3.5–5.3)
PROTHROM AB SERPL-ACNC: 11.6 SEC — SIGNIFICANT CHANGE UP (ref 10.5–13.4)
RBC # BLD: 4.66 M/UL — SIGNIFICANT CHANGE UP (ref 4.2–5.8)
RBC # FLD: 13.8 % — SIGNIFICANT CHANGE UP (ref 10.3–14.5)
RBC BLD AUTO: NORMAL — SIGNIFICANT CHANGE UP
RH IG SCN BLD-IMP: POSITIVE — SIGNIFICANT CHANGE UP
SODIUM SERPL-SCNC: 136 MMOL/L — SIGNIFICANT CHANGE UP (ref 135–145)
VARIANT LYMPHS # BLD: 6.1 % — HIGH (ref 0–6)
WBC # BLD: 4.47 K/UL — SIGNIFICANT CHANGE UP (ref 3.8–10.5)
WBC # FLD AUTO: 4.47 K/UL — SIGNIFICANT CHANGE UP (ref 3.8–10.5)

## 2022-09-13 PROCEDURE — 71045 X-RAY EXAM CHEST 1 VIEW: CPT | Mod: 26

## 2022-09-13 PROCEDURE — 99233 SBSQ HOSP IP/OBS HIGH 50: CPT | Mod: 25,57

## 2022-09-13 RX ADMIN — LIDOCAINE 1 PATCH: 4 CREAM TOPICAL at 19:07

## 2022-09-13 RX ADMIN — LIDOCAINE 1 PATCH: 4 CREAM TOPICAL at 05:15

## 2022-09-13 RX ADMIN — HEPARIN SODIUM 11.5 UNIT(S)/HR: 5000 INJECTION INTRAVENOUS; SUBCUTANEOUS at 14:40

## 2022-09-13 RX ADMIN — CHLORHEXIDINE GLUCONATE 1 APPLICATION(S): 213 SOLUTION TOPICAL at 14:37

## 2022-09-13 RX ADMIN — LIDOCAINE 1 PATCH: 4 CREAM TOPICAL at 17:02

## 2022-09-13 NOTE — PROGRESS NOTE ADULT - SUBJECTIVE AND OBJECTIVE BOX
CHIEF COMPLAINT: Patient is a 57y old  Male who presents with a chief complaint of L HydroPTX (12 Sep 2022 06:56)      INTERVAL EVENTS:     ROS: Seen by bedside during AM rounds     OBJECTIVE:  ICU Vital Signs Last 24 Hrs  T(C): 36.2 (13 Sep 2022 05:08), Max: 36.6 (12 Sep 2022 12:25)  T(F): 97.2 (13 Sep 2022 05:08), Max: 97.8 (12 Sep 2022 12:25)  HR: 76 (13 Sep 2022 05:08) (76 - 79)  BP: 118/93 (13 Sep 2022 05:08) (117/86 - 124/82)  BP(mean): --  ABP: --  ABP(mean): --  RR: 18 (13 Sep 2022 05:08) (18 - 18)  SpO2: 100% (13 Sep 2022 05:08) (95% - 100%)    O2 Parameters below as of 13 Sep 2022 05:08  Patient On (Oxygen Delivery Method): room air              09-11 @ 07:01  -  09-12 @ 07:00  --------------------------------------------------------  IN: 138 mL / OUT: 0 mL / NET: 138 mL      CAPILLARY BLOOD GLUCOSE          PHYSICAL EXAM:  General:   HEENT:   Lymph Nodes:  Neck:   Respiratory:   Cardiovascular:   Abdomen:   Extremities:   Skin:   Neurological:  Psychiatry:        HOSPITAL MEDICATIONS:  MEDICATIONS  (STANDING):  chlorhexidine 2% Cloths 1 Application(s) Topical daily  heparin  Infusion 1150 Unit(s)/Hr (11.5 mL/Hr) IV Continuous <Continuous>  influenza   Vaccine 0.5 milliLiter(s) IntraMuscular once  lidocaine   4% Patch 1 Patch Transdermal every 24 hours  polyethylene glycol 3350 17 Gram(s) Oral daily  senna 2 Tablet(s) Oral at bedtime    MEDICATIONS  (PRN):  acetaminophen     Tablet .. 650 milliGRAM(s) Oral every 6 hours PRN Temp greater or equal to 38C (100.4F), Mild Pain (1 - 3), Moderate Pain (4 - 6)  ALBUTerol    0.083% 2.5 milliGRAM(s) Nebulizer every 6 hours PRN Shortness of Breath and/or Wheezing      LABS:                        12.6   3.69  )-----------( 276      ( 12 Sep 2022 05:13 )             39.7     09-12    137  |  101  |  20  ----------------------------<  101<H>  4.1   |  25  |  1.22    Ca    9.1      12 Sep 2022 05:13  Phos  3.8     09-12  Mg     2.00     09-12      PT/INR - ( 12 Sep 2022 05:13 )   PT: 11.9 sec;   INR: 1.03 ratio         PTT - ( 12 Sep 2022 05:13 )  PTT:69.1 sec       CHIEF COMPLAINT: Patient is a 57y old  Male who presents with a chief complaint of L HydroPTX (12 Sep 2022 06:56)      INTERVAL EVENTS: no overnight events noted. Patient ate breakfast and scheduled Pleuroscopy postponed until tomorrow.     ROS: See above, remaining ROS Neg.     OBJECTIVE:  ICU Vital Signs Last 24 Hrs  T(C): 36.2 (13 Sep 2022 05:08), Max: 36.6 (12 Sep 2022 12:25)  T(F): 97.2 (13 Sep 2022 05:08), Max: 97.8 (12 Sep 2022 12:25)  HR: 76 (13 Sep 2022 05:08) (76 - 79)  BP: 118/93 (13 Sep 2022 05:08) (117/86 - 124/82)  BP(mean): --  ABP: --  ABP(mean): --  RR: 18 (13 Sep 2022 05:08) (18 - 18)  SpO2: 100% (13 Sep 2022 05:08) (95% - 100%)    O2 Parameters below as of 13 Sep 2022 05:08  Patient On (Oxygen Delivery Method): room air              09-11 @ 07:01  -  09-12 @ 07:00  --------------------------------------------------------  IN: 138 mL / OUT: 0 mL / NET: 138 mL      CAPILLARY BLOOD GLUCOSE        PHYSICAL EXAM:  General: NAD   Neck: Trachea midline.   Cards: S1/S2, no murmurs   Pulm: Diminished BS L-side.   Abdomen: Soft, NTND.  Extremities: No pedal edema. Extremities warm to touch. intact distal pulses.   Neurology: AOx3 with no focal neurological deficits  Skin: Refer to RN assessment.       HOSPITAL MEDICATIONS:  MEDICATIONS  (STANDING):  chlorhexidine 2% Cloths 1 Application(s) Topical daily  heparin  Infusion 1150 Unit(s)/Hr (11.5 mL/Hr) IV Continuous <Continuous>  influenza   Vaccine 0.5 milliLiter(s) IntraMuscular once  lidocaine   4% Patch 1 Patch Transdermal every 24 hours  polyethylene glycol 3350 17 Gram(s) Oral daily  senna 2 Tablet(s) Oral at bedtime    MEDICATIONS  (PRN):  acetaminophen     Tablet .. 650 milliGRAM(s) Oral every 6 hours PRN Temp greater or equal to 38C (100.4F), Mild Pain (1 - 3), Moderate Pain (4 - 6)  ALBUTerol    0.083% 2.5 milliGRAM(s) Nebulizer every 6 hours PRN Shortness of Breath and/or Wheezing      LABS:                        12.6   3.69  )-----------( 276      ( 12 Sep 2022 05:13 )             39.7     09-12    137  |  101  |  20  ----------------------------<  101<H>  4.1   |  25  |  1.22    Ca    9.1      12 Sep 2022 05:13  Phos  3.8     09-12  Mg     2.00     09-12      PT/INR - ( 12 Sep 2022 05:13 )   PT: 11.9 sec;   INR: 1.03 ratio         PTT - ( 12 Sep 2022 05:13 )  PTT:69.1 sec

## 2022-09-13 NOTE — PROGRESS NOTE ADULT - ASSESSMENT
55 YO M with PMHx of former smoker (2 "black and mild" cigars daily for 8-9 years and quit 6 months ago) and s/p right open thoracotomy for cardiac mass removal 20 years ago who presented to [facility] on 9/7 with SOB, Orthopnea, BL LE edema and unintentional weight loss and found with large left pleural effusion and RLL subsegmental/ segmental PE. s/p thoracentesis (9/7) and course complicated left hydropneumothorax with rapid reaccumulation Now transferred to [facility] 9/10 for further management.     # NEUROLOGY  - AOx3 with no current issues.   - Left lateral chest wall pain and continued on lidoderm patch.     # CARDIOVASCULAR  - Concern noted for RH strain at [facility] second to cardiomegaly on CTA CHEST (9/7).   - ECHO (9/7) with normal LVSF, moderate LVH, but poorly visualized right side.   - BP remains stable with no concerns for right strain.  - Complaining of chest pain on left, likely second to PTX, however pending CE/ECG.   - Monitor HR/ BP     # RESPIRATORY  - Presented to [facility] with SOB, orthopnea and BL LE edema and found with AHRF likely second to large L pleural effusion c/b left hydroPTX and RLL subsegmental/ segmental PE  - CTA CHEST (9/7) with segmental and subsegmental RLL PE, no central PEs noted, SAULO mass like opacity (3.3 x 2 cm) and RUL nodule (1.4 x 1.3 cm) and LARGE L pleural effusion extending from apex with associated complete LLL and partial SAULO atelectasis  - s/p Thoracentesis (9/7) at [facility] and (+) lights criteria.   - Continue on Heparin GTT   - Continue RA and NC PRN   - Continue on Albuterol PRN   - Plan for PLeuruscopy tomorrow 9/13   - Pending cytopathology - called [facility] to expedite results 9/12    # GI  - Continue on PO diet   - Constipated and Miralax/ Senna started.     # RENAL  - No acute renal issues.   - Monitor renal function and UOP.     # INFECTIOUS DISEASE  - COVID PCR (9/7) NGTD   - MRSA PCR pending   - No acute infectious concerns.     # HEME  - CTA CHEST with RLL segmental and subsegmental PEs   - Continue on FULL DOSE Heparin GTT     # ONC   - Hx of open right thoracotomy for posterior cardiac mass removal greater than 20 years ago. Patient reports noted to be benign.  - May need CT C/A/P (+/-) repeat thora pending cytopath results    # ENDOCRINE  - No acute issues noted.     # ETHICS/ GOC    - FULL CODE     # DISPO - TBD   57 YO M with PMHx of former smoker (2 "black and mild" cigars daily for 8-9 years and quit 6 months ago) and s/p right open thoracotomy for cardiac mass removal 20 years ago who presented to [facility] on 9/7 with SOB, Orthopnea, BL LE edema and unintentional weight loss and found with large left pleural effusion and RLL subsegmental/ segmental PE. s/p thoracentesis (9/7) and course complicated left hydropneumothorax with rapid reaccumulation Now transferred to [facility] R[facility] 9/10 for further management.     # NEUROLOGY  - AOx3 with no current issues.   - Left lateral chest wall pain and continued on lidoderm patch.     # CARDIOVASCULAR  - Concern noted for RH strain at [facility] second to cardiomegaly on CTA CHEST (9/7).   - ECHO (9/7) with normal LVSF, moderate LVH, but poorly visualized right side.   - BP remains stable with no concerns for right strain.  - Complaining of chest pain on left, likely second to PTX, EKG/Trop negative  - Monitor HR/ BP     # RESPIRATORY  - Presented to [facility] with SOB, orthopnea and BL LE edema and found with AHRF likely second to large L pleural effusion c/b left hydroPTX and RLL subsegmental/ segmental PE  - CTA CHEST (9/7) with segmental and subsegmental RLL PE, no central PEs noted, SAULO mass like opacity (3.3 x 2 cm) and RUL nodule (1.4 x 1.3 cm) and LARGE L pleural effusion extending from apex with associated complete LLL and partial SAULO atelectasis  - s/p Thoracentesis (9/7) at [facility] and (+) lights criteria.   - Continue on Heparin GTT   - Continue RA and NC PRN   - Continue on Albuterol PRN   - Cytopathology of pleural fluid showing atypical cells (9/7)  - Pleuroscopy postponed as he ate breakfast (9/13)  - Plan for Pleuroscopy tomorrow 9/14    # GI  - Continue on PO diet   - Constipated and Miralax/ Senna started.     # RENAL  - No acute renal issues.   - Monitor renal function and UOP.     # INFECTIOUS DISEASE  - COVID PCR (9/7) NGTD   - MRSA PCR pending   - No acute infectious concerns.     # HEME  - CTA CHEST with RLL segmental and subsegmental PEs   - Continue on FULL DOSE Heparin GTT     # ONC   - Hx of open right thoracotomy for posterior cardiac mass removal greater than 20 years ago. Patient reports noted to be benign.  - May need CT C/A/P (+/-) repeat thora pending cytopath results    # ENDOCRINE  - No acute issues noted.     # ETHICS/ GOC    - FULL CODE     # DISPO - TBD

## 2022-09-14 ENCOUNTER — TRANSCRIPTION ENCOUNTER (OUTPATIENT)
Age: 57
End: 2022-09-14

## 2022-09-14 ENCOUNTER — RESULT REVIEW (OUTPATIENT)
Age: 57
End: 2022-09-14

## 2022-09-14 DIAGNOSIS — I10 ESSENTIAL (PRIMARY) HYPERTENSION: ICD-10-CM

## 2022-09-14 LAB
ALBUMIN SERPL ELPH-MCNC: 4 G/DL — SIGNIFICANT CHANGE UP (ref 3.3–5)
ALP SERPL-CCNC: 101 U/L — SIGNIFICANT CHANGE UP (ref 40–120)
ALT FLD-CCNC: 18 U/L — SIGNIFICANT CHANGE UP (ref 4–41)
ANION GAP SERPL CALC-SCNC: 10 MMOL/L — SIGNIFICANT CHANGE UP (ref 7–14)
APTT BLD: 26.8 SEC — LOW (ref 27–36.3)
AST SERPL-CCNC: 23 U/L — SIGNIFICANT CHANGE UP (ref 4–40)
BASOPHILS # BLD AUTO: 0.03 K/UL — SIGNIFICANT CHANGE UP (ref 0–0.2)
BASOPHILS NFR BLD AUTO: 0.9 % — SIGNIFICANT CHANGE UP (ref 0–2)
BILIRUB SERPL-MCNC: 0.3 MG/DL — SIGNIFICANT CHANGE UP (ref 0.2–1.2)
BUN SERPL-MCNC: 17 MG/DL — SIGNIFICANT CHANGE UP (ref 7–23)
CALCIUM SERPL-MCNC: 9.6 MG/DL — SIGNIFICANT CHANGE UP (ref 8.4–10.5)
CHLORIDE SERPL-SCNC: 103 MMOL/L — SIGNIFICANT CHANGE UP (ref 98–107)
CO2 SERPL-SCNC: 26 MMOL/L — SIGNIFICANT CHANGE UP (ref 22–31)
CREAT SERPL-MCNC: 1.19 MG/DL — SIGNIFICANT CHANGE UP (ref 0.5–1.3)
EGFR: 72 ML/MIN/1.73M2 — SIGNIFICANT CHANGE UP
EOSINOPHIL # BLD AUTO: 0.15 K/UL — SIGNIFICANT CHANGE UP (ref 0–0.5)
EOSINOPHIL NFR BLD AUTO: 4.4 % — SIGNIFICANT CHANGE UP (ref 0–6)
GLUCOSE SERPL-MCNC: 84 MG/DL — SIGNIFICANT CHANGE UP (ref 70–99)
HCT VFR BLD CALC: 42.4 % — SIGNIFICANT CHANGE UP (ref 39–50)
HGB BLD-MCNC: 13.5 G/DL — SIGNIFICANT CHANGE UP (ref 13–17)
IANC: 1.84 K/UL — SIGNIFICANT CHANGE UP (ref 1.8–7.4)
IMM GRANULOCYTES NFR BLD AUTO: 0.3 % — SIGNIFICANT CHANGE UP (ref 0–1.5)
INR BLD: 1.05 RATIO — SIGNIFICANT CHANGE UP (ref 0.88–1.16)
LYMPHOCYTES # BLD AUTO: 0.91 K/UL — LOW (ref 1–3.3)
LYMPHOCYTES # BLD AUTO: 26.8 % — SIGNIFICANT CHANGE UP (ref 13–44)
MAGNESIUM SERPL-MCNC: 2 MG/DL — SIGNIFICANT CHANGE UP (ref 1.6–2.6)
MCHC RBC-ENTMCNC: 29.4 PG — SIGNIFICANT CHANGE UP (ref 27–34)
MCHC RBC-ENTMCNC: 31.8 GM/DL — LOW (ref 32–36)
MCV RBC AUTO: 92.4 FL — SIGNIFICANT CHANGE UP (ref 80–100)
MONOCYTES # BLD AUTO: 0.46 K/UL — SIGNIFICANT CHANGE UP (ref 0–0.9)
MONOCYTES NFR BLD AUTO: 13.5 % — SIGNIFICANT CHANGE UP (ref 2–14)
NEUTROPHILS # BLD AUTO: 1.84 K/UL — SIGNIFICANT CHANGE UP (ref 1.8–7.4)
NEUTROPHILS NFR BLD AUTO: 54.1 % — SIGNIFICANT CHANGE UP (ref 43–77)
NRBC # BLD: 0 /100 WBCS — SIGNIFICANT CHANGE UP (ref 0–0)
NRBC # FLD: 0 K/UL — SIGNIFICANT CHANGE UP (ref 0–0)
PHOSPHATE SERPL-MCNC: 3.4 MG/DL — SIGNIFICANT CHANGE UP (ref 2.5–4.5)
PLATELET # BLD AUTO: 268 K/UL — SIGNIFICANT CHANGE UP (ref 150–400)
POTASSIUM SERPL-MCNC: 5.2 MMOL/L — SIGNIFICANT CHANGE UP (ref 3.5–5.3)
POTASSIUM SERPL-SCNC: 5.2 MMOL/L — SIGNIFICANT CHANGE UP (ref 3.5–5.3)
PROT SERPL-MCNC: 7.7 G/DL — SIGNIFICANT CHANGE UP (ref 6–8.3)
PROTHROM AB SERPL-ACNC: 12.2 SEC — SIGNIFICANT CHANGE UP (ref 10.5–13.4)
RBC # BLD: 4.59 M/UL — SIGNIFICANT CHANGE UP (ref 4.2–5.8)
RBC # FLD: 13.9 % — SIGNIFICANT CHANGE UP (ref 10.3–14.5)
SODIUM SERPL-SCNC: 139 MMOL/L — SIGNIFICANT CHANGE UP (ref 135–145)
WBC # BLD: 3.4 K/UL — LOW (ref 3.8–10.5)
WBC # FLD AUTO: 3.4 K/UL — LOW (ref 3.8–10.5)

## 2022-09-14 PROCEDURE — 88305 TISSUE EXAM BY PATHOLOGIST: CPT | Mod: 26,59

## 2022-09-14 PROCEDURE — 76604 US EXAM CHEST: CPT | Mod: 26,GC

## 2022-09-14 PROCEDURE — 32653 THORACOSCOPY REMOV FB/FIBRIN: CPT | Mod: GC

## 2022-09-14 PROCEDURE — 88342 IMHCHEM/IMCYTCHM 1ST ANTB: CPT | Mod: 26,59

## 2022-09-14 PROCEDURE — 88112 CYTOPATH CELL ENHANCE TECH: CPT | Mod: 26

## 2022-09-14 PROCEDURE — 99233 SBSQ HOSP IP/OBS HIGH 50: CPT | Mod: 25

## 2022-09-14 PROCEDURE — 32550 INSERT PLEURAL CATH: CPT | Mod: GC

## 2022-09-14 PROCEDURE — 75989 ABSCESS DRAINAGE UNDER X-RAY: CPT | Mod: 26,GC

## 2022-09-14 PROCEDURE — 71045 X-RAY EXAM CHEST 1 VIEW: CPT | Mod: 26

## 2022-09-14 PROCEDURE — 32609 THORACOSCOPY W/BX PLEURA: CPT | Mod: 1L,GC

## 2022-09-14 PROCEDURE — 88341 IMHCHEM/IMCYTCHM EA ADD ANTB: CPT | Mod: 26,59

## 2022-09-14 PROCEDURE — 88360 TUMOR IMMUNOHISTOCHEM/MANUAL: CPT | Mod: 26

## 2022-09-14 PROCEDURE — 99223 1ST HOSP IP/OBS HIGH 75: CPT | Mod: GC,25

## 2022-09-14 DEVICE — PLEURX CATHETER KIT: Type: IMPLANTABLE DEVICE | Status: FUNCTIONAL

## 2022-09-14 RX ORDER — ONDANSETRON 8 MG/1
4 TABLET, FILM COATED ORAL ONCE
Refills: 0 | Status: DISCONTINUED | OUTPATIENT
Start: 2022-09-14 | End: 2022-09-14

## 2022-09-14 RX ORDER — OXYCODONE HYDROCHLORIDE 5 MG/1
5 TABLET ORAL ONCE
Refills: 0 | Status: DISCONTINUED | OUTPATIENT
Start: 2022-09-14 | End: 2022-09-14

## 2022-09-14 RX ORDER — FENTANYL CITRATE 50 UG/ML
50 INJECTION INTRAVENOUS
Refills: 0 | Status: DISCONTINUED | OUTPATIENT
Start: 2022-09-14 | End: 2022-09-14

## 2022-09-14 RX ORDER — SODIUM CHLORIDE 9 MG/ML
1000 INJECTION, SOLUTION INTRAVENOUS
Refills: 0 | Status: DISCONTINUED | OUTPATIENT
Start: 2022-09-14 | End: 2022-09-14

## 2022-09-14 RX ORDER — HEPARIN SODIUM 5000 [USP'U]/ML
5500 INJECTION INTRAVENOUS; SUBCUTANEOUS EVERY 6 HOURS
Refills: 0 | Status: DISCONTINUED | OUTPATIENT
Start: 2022-09-14 | End: 2022-09-16

## 2022-09-14 RX ORDER — HEPARIN SODIUM 5000 [USP'U]/ML
2500 INJECTION INTRAVENOUS; SUBCUTANEOUS EVERY 6 HOURS
Refills: 0 | Status: DISCONTINUED | OUTPATIENT
Start: 2022-09-14 | End: 2022-09-16

## 2022-09-14 RX ORDER — FENTANYL CITRATE 50 UG/ML
25 INJECTION INTRAVENOUS
Refills: 0 | Status: DISCONTINUED | OUTPATIENT
Start: 2022-09-14 | End: 2022-09-14

## 2022-09-14 RX ORDER — OXYCODONE AND ACETAMINOPHEN 5; 325 MG/1; MG/1
1 TABLET ORAL EVERY 6 HOURS
Refills: 0 | Status: DISCONTINUED | OUTPATIENT
Start: 2022-09-14 | End: 2022-09-16

## 2022-09-14 RX ORDER — APIXABAN 2.5 MG/1
1 TABLET, FILM COATED ORAL
Qty: 30 | Refills: 0
Start: 2022-09-14 | End: 2022-10-13

## 2022-09-14 RX ORDER — HEPARIN SODIUM 5000 [USP'U]/ML
1100 INJECTION INTRAVENOUS; SUBCUTANEOUS
Qty: 25000 | Refills: 0 | Status: DISCONTINUED | OUTPATIENT
Start: 2022-09-14 | End: 2022-09-16

## 2022-09-14 RX ADMIN — OXYCODONE HYDROCHLORIDE 5 MILLIGRAM(S): 5 TABLET ORAL at 18:00

## 2022-09-14 RX ADMIN — LIDOCAINE 1 PATCH: 4 CREAM TOPICAL at 05:17

## 2022-09-14 RX ADMIN — OXYCODONE AND ACETAMINOPHEN 1 TABLET(S): 5; 325 TABLET ORAL at 20:41

## 2022-09-14 RX ADMIN — OXYCODONE AND ACETAMINOPHEN 1 TABLET(S): 5; 325 TABLET ORAL at 19:41

## 2022-09-14 RX ADMIN — HEPARIN SODIUM 1100 UNIT(S)/HR: 5000 INJECTION INTRAVENOUS; SUBCUTANEOUS at 23:10

## 2022-09-14 RX ADMIN — SENNA PLUS 2 TABLET(S): 8.6 TABLET ORAL at 23:12

## 2022-09-14 NOTE — BRIEF OPERATIVE NOTE - OPERATION/FINDINGS
Pleuroscope inserted in left hemithorax. Inspection notable for diffuse small discrete pleural lesions with serosanguinous pleural effusion. Visceral pleura with severe disease and subsequent lung entrapment. Drainage of 1200cc of serosanguinous fluid performed. Insertion of left pleurx catheter performed. Pleuroscope inserted in left hemithorax. Inspection notable for diffuse small discrete pleural lesions with serosanguinous pleural effusion. Lysis of adhesions performed using flexible forceps for adequate visualization. Visceral pleura with severe disease and lung entrapment. Drainage of 1200cc of serosanguinous fluid performed. Insertion of left pleurx catheter performed.    #[id number]

## 2022-09-14 NOTE — BRIEF OPERATIVE NOTE - NSICDXBRIEFPROCEDURE_GEN_ALL_CORE_FT
PROCEDURES:  Pleuroscopy 14-Sep-2022 17:07:17  eFrmin Nelson  Insertion, PleurX catheter system, pleural 14-Sep-2022 17:07:25  Fermin Nelson

## 2022-09-14 NOTE — CHART NOTE - NSCHARTNOTEFT_GEN_A_CORE
: Fermin Nelson    INDICATION: Pleural effusion    PROCEDURE:  [ ] LIMITED ECHO  [x] LIMITED CHEST  [ ] LIMITED RETROPERITONEAL  [ ] LIMITED ABDOMINAL  [ ] LIMITED DVT  [ ] NEEDLE GUIDANCE VASCULAR  [ ] NEEDLE GUIDANCE THORACENTESIS  [ ] NEEDLE GUIDANCE PARACENTESIS  [ ] NEEDLE GUIDANCE PERICARDIOCENTESIS  [ ] OTHER    FINDINGS:  Moderate left sided pleural effusion with a-line predomiannt    INTERPRETATION:      Fermin Nelosn MD  PGY-6  PCCM Fellow  Pager [phone] : Fermin Nelson    INDICATION: Pleural effusion    PROCEDURE:  [ ] LIMITED ECHO  [x] LIMITED CHEST  [ ] LIMITED RETROPERITONEAL  [ ] LIMITED ABDOMINAL  [ ] LIMITED DVT  [ ] NEEDLE GUIDANCE VASCULAR  [ ] NEEDLE GUIDANCE THORACENTESIS  [ ] NEEDLE GUIDANCE PARACENTESIS  [ ] NEEDLE GUIDANCE PERICARDIOCENTESIS  [ ] OTHER    FINDINGS:  Moderate left sided pleural effusion with a-line predominant pattern inferiorly    INTERPRETATION:  Left sided hydropneumothorax     Safe pocket for pleuroscope trochar placement    [facility] MD  PGY-6  PCCM Fellow  Pager [phone] : Fermin Nelson Agrawal    INDICATION: Pleural effusion    PROCEDURE:  [ ] LIMITED ECHO  [x] LIMITED CHEST  [ ] LIMITED RETROPERITONEAL  [ ] LIMITED ABDOMINAL  [ ] LIMITED DVT  [ ] NEEDLE GUIDANCE VASCULAR  [ ] NEEDLE GUIDANCE THORACENTESIS  [ ] NEEDLE GUIDANCE PARACENTESIS  [ ] NEEDLE GUIDANCE PERICARDIOCENTESIS  [ ] OTHER    FINDINGS:  Moderate left sided pleural effusion with a-line predominant pattern inferiorly    INTERPRETATION:  Left sided hydropneumothorax     Safe pocket for pleuroscope trochar placement and pleurx placement    Images uploaded to [facility]    Fermin Nelson MD  PGY-6  PCCM Fellow  Pager [phone]      Attending Addendum:     I was present during the entire procedure and agree with the above findings and interpretation. TIme spent on the procedure was separate from the critical care time spent caring for the patient.     Eddie Ortega MD : Fermin Nelson Agrawal    INDICATION: Pleural effusion    PROCEDURE:  [ ] LIMITED ECHO  [x] LIMITED CHEST  [ ] LIMITED RETROPERITONEAL  [ ] LIMITED ABDOMINAL  [ ] LIMITED DVT  [ ] NEEDLE GUIDANCE VASCULAR  [ ] NEEDLE GUIDANCE THORACENTESIS  [ ] NEEDLE GUIDANCE PARACENTESIS  [ ] NEEDLE GUIDANCE PERICARDIOCENTESIS  [ ] OTHER    FINDINGS:  Moderate left sided pleural effusion with a-line predominant pattern inferiorly with no lung sliding laterally.     INTERPRETATION:  Left sided hydropneumothorax consistent with patients previously known trapped lung.    Safe pocket for pleuroscope trochar placement and pleurx placement    Images uploaded to shiela Nelson MD  PGY-6  PCCM Fellow  Pager [phone]      Attending Addendum:     I was present during the entire procedure and agree with the above findings and interpretation. TIme spent on the procedure was separate from the critical care time spent caring for the patient.     Eddie Ortega MD

## 2022-09-14 NOTE — BRIEF OPERATIVE NOTE - NSICDXBRIEFOPLAUNCH_GEN_ALL_CORE
Patient presents for 2 week well child.  Makayla Diallo LPN.........................2018  12:45 PM     <--- Click to Launch ICDx for PreOp, PostOp and Procedure

## 2022-09-14 NOTE — CONSULT NOTE ADULT - SUBJECTIVE AND OBJECTIVE BOX
CHIEF COMPLAINT: SOB    HPI:  58 yo M PMH prior tobacco use (2 cigars x8-9 years, quit 6 months ago) and cardiac mass s/p thoracotomy 20 years ago presented to [facility] for shortness of breath. Originally presented 9/7 w/ acute symptoms of orthopnea, SOB, and bilateral LE edema w/ associated weight loss over past 2-3 months. Upon arrival underwent CTA chest with evidenc eof RLL segments and subsegmental PE as well as SAULO mass-like opacity with RUL nodule and large left pleural effusion. Underwent thoracentesis by pulmonary with drainage of 2.4L. Cyto without definitive evidence of malignancy. Post drainage w/ rapid reaccumulation of fluid with hydropneumothorax. Transferred to [facility] for IP evaluation.    Per patient reports improvement in shortness of breath after drainage of fluid. Denies chest pain, fevers, chills, abdominal pain, dysuria.       PAST MEDICAL & SURGICAL HISTORY:  Former smoker      Status post cardiac surgery  s/p open right thoracotomy for posterior cardiac mass removal &gt; 20 years ago, reported by patient to be benign.          FAMILY HISTORY:  Family history of cancer in father  Cancer of unknown origin on Father&#x27;s side.        SOCIAL HISTORY:  Smoking: [ ] Never Smoked [x] Former Smoker (__ packs x ___ years) [ ] Current Smoker  (__ packs x ___ years)  Substance Use: [ ] Never Used [ ] Used ____  EtOH Use:  Marital Status: [ ] Single [ ]  [ ]  [ ]   Sexual History:   Occupation:  Recent Travel:  Country of Birth:  Advance Directives:    Allergies    No Known Allergies    Intolerances        HOME MEDICATIONS:  Home Medications:      REVIEW OF SYSTEMS:  Constitutional: [x] negative [ ] fevers [ ] chills [ ] weight loss [ ] weight gain  HEENT: [x] negative [ ] dry eyes [ ] eye irritation [ ] postnasal drip [ ] nasal congestion  CV: [ ] negative  [ ] chest pain [ ] orthopnea [ ] palpitations [ ] murmur  Resp: [ ] negative [ ] cough [ ] shortness of breath [ ] dyspnea [ ] wheezing [ ] sputum [ ] hemoptysis  GI: [ ] negative [ ] nausea [ ] vomiting [ ] diarrhea [ ] constipation [ ] abd pain [ ] dysphagia   : [ ] negative [ ] dysuria [ ] nocturia [ ] hematuria [ ] increased urinary frequency  Musculoskeletal: [ ] negative [ ] back pain [ ] myalgias [ ] arthralgias [ ] fracture  Skin: [ ] negative [ ] rash [ ] itch  Neurological: [ ] negative [ ] headache [ ] dizziness [ ] syncope [ ] weakness [ ] numbness  Psychiatric: [ ] negative [ ] anxiety [ ] depression  Endocrine: [ ] negative [ ] diabetes [ ] thyroid problem  Hematologic/Lymphatic: [ ] negative [ ] anemia [ ] bleeding problem  Allergic/Immunologic: [ ] negative [ ] itchy eyes [ ] nasal discharge [ ] hives [ ] angioedema  [ ] All other systems negative  [ ] Unable to assess ROS because ________    OBJECTIVE:  ICU Vital Signs Last 24 Hrs  T(C): 36.6 (14 Sep 2022 16:20), Max: 36.6 (13 Sep 2022 22:24)  T(F): 97.8 (14 Sep 2022 16:20), Max: 97.8 (13 Sep 2022 22:24)  HR: 84 (14 Sep 2022 17:45) (72 - 107)  BP: 145/96 (14 Sep 2022 17:45) (122/86 - 145/96)  BP(mean): 109 (14 Sep 2022 17:45) (94 - 110)  ABP: --  ABP(mean): --  RR: 19 (14 Sep 2022 18:00) (14 - 19)  SpO2: 100% (14 Sep 2022 18:00) (96% - 100%)    O2 Parameters below as of 14 Sep 2022 17:45  Patient On (Oxygen Delivery Method): room air              09-14 @ 07:01  -  09-14 @ 18:13  --------------------------------------------------------  IN: 75 mL / OUT: 420 mL / NET: -345 mL      CAPILLARY BLOOD GLUCOSE          PHYSICAL EXAM:  General:   HEENT:   Lymph Nodes:  Neck:   Respiratory:   Cardiovascular:   Abdomen:   Extremities:   Skin:   Neurological:  Psychiatry:    HOSPITAL MEDICATIONS:  Standing Meds:  chlorhexidine 2% Cloths 1 Application(s) Topical daily  influenza   Vaccine 0.5 milliLiter(s) IntraMuscular once  lactated ringers. 1000 milliLiter(s) IV Continuous <Continuous>  lidocaine   4% Patch 1 Patch Transdermal every 24 hours  polyethylene glycol 3350 17 Gram(s) Oral daily  senna 2 Tablet(s) Oral at bedtime      PRN Meds:  acetaminophen     Tablet .. 650 milliGRAM(s) Oral every 6 hours PRN  ALBUTerol    0.083% 2.5 milliGRAM(s) Nebulizer every 6 hours PRN  fentaNYL    Injectable 25 MICROGram(s) IV Push every 5 minutes PRN  fentaNYL    Injectable 50 MICROGram(s) IV Push every 5 minutes PRN  ondansetron Injectable 4 milliGRAM(s) IV Push once PRN  oxycodone    5 mG/acetaminophen 325 mG 1 Tablet(s) Oral every 6 hours PRN  oxyCODONE    IR 5 milliGRAM(s) Oral once PRN      LABS:                        13.5   3.40  )-----------( 268      ( 14 Sep 2022 05:15 )             42.4     Hgb Trend: 13.5<--, 13.9<--, 12.6<--, 12.9<--, 13.1<--  09-14    139  |  103  |  17  ----------------------------<  84  5.2   |  26  |  1.19    Ca    9.6      14 Sep 2022 05:15  Phos  3.4     09-14  Mg     2.00     09-14    TPro  7.7  /  Alb  4.0  /  TBili  0.3  /  DBili  x   /  AST  23  /  ALT  18  /  AlkPhos  101  09-14    Creatinine Trend: 1.19<--, 1.11<--, 1.22<--, 1.12<--, 1.08<--, 1.25<--  PT/INR - ( 14 Sep 2022 05:15 )   PT: 12.2 sec;   INR: 1.05 ratio         PTT - ( 14 Sep 2022 05:15 )  PTT:26.8 sec          MICROBIOLOGY:       RADIOLOGY:  [ ] Reviewed and interpreted by me    PULMONARY FUNCTION TESTS:    EKG:   CHIEF COMPLAINT: SOB    HPI:  56 yo M PMH prior tobacco use (2 cigars x8-9 years, quit 6 months ago) and cardiac mass s/p thoracotomy 20 years ago presented to [facility] for shortness of breath. Originally presented 9/7 w/ acute symptoms of orthopnea, SOB, and bilateral LE edema w/ associated weight loss over past 2-3 months. Upon arrival underwent CTA chest with evidenc eof RLL segments and subsegmental PE as well as SAULO mass-like opacity with RUL nodule and large left pleural effusion. Underwent thoracentesis by pulmonary with drainage of 2.4L. Cyto without definitive evidence of malignancy. Post drainage w/ rapid reaccumulation of fluid with hydropneumothorax. Transferred to [facility] for IP evaluation.    Per patient reports improvement in shortness of breath after drainage of fluid. Denies chest pain, fevers, chills, abdominal pain, dysuria.       PAST MEDICAL & SURGICAL HISTORY:  Former smoker      Status post cardiac surgery  s/p open right thoracotomy for posterior cardiac mass removal &gt; 20 years ago, reported by patient to be benign.          FAMILY HISTORY:  Family history of cancer in father  Cancer of unknown origin on Father&#x27;s side.        SOCIAL HISTORY:  Smoking: [ ] Never Smoked [x] Former Smoker (__ packs x ___ years) [ ] Current Smoker  (__ packs x ___ years)  Substance Use: [ ] Never Used [ ] Used ____  EtOH Use:  Marital Status: [ ] Single [ ]  [ ]  [ ]   Sexual History:   Occupation:  Recent Travel:  Country of Birth:  Advance Directives:    Allergies    No Known Allergies    Intolerances        HOME MEDICATIONS:  Home Medications:      REVIEW OF SYSTEMS:  Constitutional: [x] negative [ ] fevers [ ] chills [ ] weight loss [ ] weight gain  HEENT: [x] negative [ ] dry eyes [ ] eye irritation [ ] postnasal drip [ ] nasal congestion  CV: [ ] negative  [ ] chest pain [x] orthopnea [ ] palpitations [ ] murmur  Resp: [ ] negative [ ] cough [x] shortness of breath [ ] dyspnea [ ] wheezing [ ] sputum [ ] hemoptysis  GI: [x] negative [ ] nausea [ ] vomiting [ ] diarrhea [ ] constipation [ ] abd pain [ ] dysphagia   : [x] negative [ ] dysuria [ ] nocturia [ ] hematuria [ ] increased urinary frequency  Musculoskeletal: [x] negative [ ] back pain [ ] myalgias [ ] arthralgias [ ] fracture  Skin: [x] negative [ ] rash [ ] itch  Neurological: [x] negative [ ] headache [ ] dizziness [ ] syncope [ ] weakness [ ] numbness  Psychiatric: [ ] negative [ ] anxiety [ ] depression  Endocrine: [ ] negative [ ] diabetes [ ] thyroid problem  Hematologic/Lymphatic: [ ] negative [ ] anemia [ ] bleeding problem  Allergic/Immunologic: [ ] negative [ ] itchy eyes [ ] nasal discharge [ ] hives [ ] angioedema  [x] All other systems negative  [ ] Unable to assess ROS because ________    OBJECTIVE:  ICU Vital Signs Last 24 Hrs  T(C): 36.6 (14 Sep 2022 16:20), Max: 36.6 (13 Sep 2022 22:24)  T(F): 97.8 (14 Sep 2022 16:20), Max: 97.8 (13 Sep 2022 22:24)  HR: 84 (14 Sep 2022 17:45) (72 - 107)  BP: 145/96 (14 Sep 2022 17:45) (122/86 - 145/96)  BP(mean): 109 (14 Sep 2022 17:45) (94 - 110)  ABP: --  ABP(mean): --  RR: 19 (14 Sep 2022 18:00) (14 - 19)  SpO2: 100% (14 Sep 2022 18:00) (96% - 100%)    O2 Parameters below as of 14 Sep 2022 17:45  Patient On (Oxygen Delivery Method): room air              09-14 @ 07:01  -  09-14 @ 18:13  --------------------------------------------------------  IN: 75 mL / OUT: 420 mL / NET: -345 mL      CAPILLARY BLOOD GLUCOSE          PHYSICAL EXAM:  General:   HEENT:   Lymph Nodes:  Neck:   Respiratory:   Cardiovascular:   Abdomen:   Extremities:   Skin:   Neurological:  Psychiatry:    HOSPITAL MEDICATIONS:  Standing Meds:  chlorhexidine 2% Cloths 1 Application(s) Topical daily  influenza   Vaccine 0.5 milliLiter(s) IntraMuscular once  lactated ringers. 1000 milliLiter(s) IV Continuous <Continuous>  lidocaine   4% Patch 1 Patch Transdermal every 24 hours  polyethylene glycol 3350 17 Gram(s) Oral daily  senna 2 Tablet(s) Oral at bedtime      PRN Meds:  acetaminophen     Tablet .. 650 milliGRAM(s) Oral every 6 hours PRN  ALBUTerol    0.083% 2.5 milliGRAM(s) Nebulizer every 6 hours PRN  fentaNYL    Injectable 25 MICROGram(s) IV Push every 5 minutes PRN  fentaNYL    Injectable 50 MICROGram(s) IV Push every 5 minutes PRN  ondansetron Injectable 4 milliGRAM(s) IV Push once PRN  oxycodone    5 mG/acetaminophen 325 mG 1 Tablet(s) Oral every 6 hours PRN  oxyCODONE    IR 5 milliGRAM(s) Oral once PRN      LABS:                        13.5   3.40  )-----------( 268      ( 14 Sep 2022 05:15 )             42.4     Hgb Trend: 13.5<--, 13.9<--, 12.6<--, 12.9<--, 13.1<--  09-14    139  |  103  |  17  ----------------------------<  84  5.2   |  26  |  1.19    Ca    9.6      14 Sep 2022 05:15  Phos  3.4     09-14  Mg     2.00     09-14    TPro  7.7  /  Alb  4.0  /  TBili  0.3  /  DBili  x   /  AST  23  /  ALT  18  /  AlkPhos  101  09-14    Creatinine Trend: 1.19<--, 1.11<--, 1.22<--, 1.12<--, 1.08<--, 1.25<--  PT/INR - ( 14 Sep 2022 05:15 )   PT: 12.2 sec;   INR: 1.05 ratio         PTT - ( 14 Sep 2022 05:15 )  PTT:26.8 sec          MICROBIOLOGY:       RADIOLOGY:  [ ] Reviewed and interpreted by me    PULMONARY FUNCTION TESTS:    EKG:   CHIEF COMPLAINT: SOB    HPI:  56 yo M PMH prior tobacco use (2 cigars x8-9 years, quit 6 months ago) and cardiac mass s/p thoracotomy 20 years ago presented to [facility] for shortness of breath. Originally presented 9/7 w/ acute symptoms of orthopnea, SOB, and bilateral LE edema w/ associated weight loss over past 2-3 months. Upon arrival underwent CTA chest with evidenc eof RLL segments and subsegmental PE as well as SAULO mass-like opacity with RUL nodule and large left pleural effusion. Underwent thoracentesis by pulmonary with drainage of 2.4L. Cyto without definitive evidence of malignancy. Post drainage w/ rapid reaccumulation of fluid with hydropneumothorax. Transferred to [facility] for IP evaluation.    Per patient reports improvement in shortness of breath after drainage of fluid. Denies chest pain, fevers, chills, abdominal pain, dysuria.       PAST MEDICAL & SURGICAL HISTORY:  Former smoker      Status post cardiac surgery  s/p open right thoracotomy for posterior cardiac mass removal &gt; 20 years ago, reported by patient to be benign.          FAMILY HISTORY:  Family history of cancer in father  Cancer of unknown origin on Father&#x27;s side.        SOCIAL HISTORY:  Smoking: [ ] Never Smoked [x] Former Smoker (__ packs x ___ years) [ ] Current Smoker  (__ packs x ___ years)  Substance Use: [ ] Never Used [ ] Used ____  EtOH Use:  Marital Status: [ ] Single [ ]  [ ]  [ ]   Sexual History:   Occupation:  Recent Travel:  Country of Birth:  Advance Directives:    Allergies    No Known Allergies    Intolerances        HOME MEDICATIONS:  Home Medications:      REVIEW OF SYSTEMS:  Constitutional: [x] negative [ ] fevers [ ] chills [ ] weight loss [ ] weight gain  HEENT: [x] negative [ ] dry eyes [ ] eye irritation [ ] postnasal drip [ ] nasal congestion  CV: [ ] negative  [ ] chest pain [x] orthopnea [ ] palpitations [ ] murmur  Resp: [ ] negative [ ] cough [x] shortness of breath [ ] dyspnea [ ] wheezing [ ] sputum [ ] hemoptysis  GI: [x] negative [ ] nausea [ ] vomiting [ ] diarrhea [ ] constipation [ ] abd pain [ ] dysphagia   : [x] negative [ ] dysuria [ ] nocturia [ ] hematuria [ ] increased urinary frequency  Musculoskeletal: [x] negative [ ] back pain [ ] myalgias [ ] arthralgias [ ] fracture  Skin: [x] negative [ ] rash [ ] itch  Neurological: [x] negative [ ] headache [ ] dizziness [ ] syncope [ ] weakness [ ] numbness  Psychiatric: [ ] negative [ ] anxiety [ ] depression  Endocrine: [ ] negative [ ] diabetes [ ] thyroid problem  Hematologic/Lymphatic: [ ] negative [ ] anemia [ ] bleeding problem  Allergic/Immunologic: [ ] negative [ ] itchy eyes [ ] nasal discharge [ ] hives [ ] angioedema  [x] All other systems negative  [ ] Unable to assess ROS because ________    OBJECTIVE:  ICU Vital Signs Last 24 Hrs  T(C): 36.6 (14 Sep 2022 16:20), Max: 36.6 (13 Sep 2022 22:24)  T(F): 97.8 (14 Sep 2022 16:20), Max: 97.8 (13 Sep 2022 22:24)  HR: 84 (14 Sep 2022 17:45) (72 - 107)  BP: 145/96 (14 Sep 2022 17:45) (122/86 - 145/96)  BP(mean): 109 (14 Sep 2022 17:45) (94 - 110)  ABP: --  ABP(mean): --  RR: 19 (14 Sep 2022 18:00) (14 - 19)  SpO2: 100% (14 Sep 2022 18:00) (96% - 100%)    O2 Parameters below as of 14 Sep 2022 17:45  Patient On (Oxygen Delivery Method): room air              09-14 @ 07:01  -  09-14 @ 18:13  --------------------------------------------------------  IN: 75 mL / OUT: 420 mL / NET: -345 mL      CAPILLARY BLOOD GLUCOSE          PHYSICAL EXAM:  General: Male, NAD  HEENT: EOMI  Neck: Supple  Respiratory: No increased work of breathing, decreased left chest wall rise  Cardiovascular: RRR no mrg  Abdomen: Soft  Extremities: WWP  Skin: Intact, no rashes   Neurological: AOx3  Psychiatry: Normal affect     HOSPITAL MEDICATIONS:  Standing Meds:  chlorhexidine 2% Cloths 1 Application(s) Topical daily  influenza   Vaccine 0.5 milliLiter(s) IntraMuscular once  lactated ringers. 1000 milliLiter(s) IV Continuous <Continuous>  lidocaine   4% Patch 1 Patch Transdermal every 24 hours  polyethylene glycol 3350 17 Gram(s) Oral daily  senna 2 Tablet(s) Oral at bedtime      PRN Meds:  acetaminophen     Tablet .. 650 milliGRAM(s) Oral every 6 hours PRN  ALBUTerol    0.083% 2.5 milliGRAM(s) Nebulizer every 6 hours PRN  fentaNYL    Injectable 25 MICROGram(s) IV Push every 5 minutes PRN  fentaNYL    Injectable 50 MICROGram(s) IV Push every 5 minutes PRN  ondansetron Injectable 4 milliGRAM(s) IV Push once PRN  oxycodone    5 mG/acetaminophen 325 mG 1 Tablet(s) Oral every 6 hours PRN  oxyCODONE    IR 5 milliGRAM(s) Oral once PRN      LABS:                        13.5   3.40  )-----------( 268      ( 14 Sep 2022 05:15 )             42.4     Hgb Trend: 13.5<--, 13.9<--, 12.6<--, 12.9<--, 13.1<--  09-14    139  |  103  |  17  ----------------------------<  84  5.2   |  26  |  1.19    Ca    9.6      14 Sep 2022 05:15  Phos  3.4     09-14  Mg     2.00     09-14    TPro  7.7  /  Alb  4.0  /  TBili  0.3  /  DBili  x   /  AST  23  /  ALT  18  /  AlkPhos  101  09-14    Creatinine Trend: 1.19<--, 1.11<--, 1.22<--, 1.12<--, 1.08<--, 1.25<--  PT/INR - ( 14 Sep 2022 05:15 )   PT: 12.2 sec;   INR: 1.05 ratio         PTT - ( 14 Sep 2022 05:15 )  PTT:26.8 sec          MICROBIOLOGY:       RADIOLOGY:  [x] Reviewed and interpreted by me    PULMONARY FUNCTION TESTS:    EKG:

## 2022-09-14 NOTE — CONSULT NOTE ADULT - ASSESSMENT
56 yo M PMH prior tobacco use (2 cigars x8-9 years, quit 6 months ago) and cardiac mass s/p thoracotomy 20 years ago presented to [facility] for shortness of breath found to have RLL PE with large left pleural effusion and SAULO mass. S/p thoracentesis with non-expandable lung and hydropneumothorax. Interventional pulmonary consulted for diagnostic pleural biopsy and pleural effusion management.     - s/p pleuroscopy with pleural biopsy 9/14  - left pleural catheter inserted 9/14; daily drainage up to 1L while inpatient  - maintain chest tube to -10 suction for now, can transition back to water seal if significant pain  - subcutaneous crepitus noted on exam post-procedure, okay to monitor clinically  - follow up cyto and path  - okay to resume therapeutic anticoagulation tonight at 10pm  - prn pain control  - interventional pulmonary to follow    Fermin Nelson MD  PGY-6  PCCM Fellow  Pager [phone] 58 yo M PMH prior tobacco use (2 cigars x8-9 years, quit 6 months ago) and cardiac mass s/p thoracotomy 20 years ago presented to [facility] for shortness of breath found to have RLL PE with large left pleural effusion and SAULO mass. S/p thoracentesis with non-expandable lung and hydropneumothorax. Interventional pulmonary consulted for diagnostic pleural biopsy and pleural effusion management. Suspect 2/2 malignancy and subsequent lung entrapment.     - s/p pleuroscopy with pleural biopsy 9/14  - left pleural catheter inserted 9/14; daily drainage up to 1L while inpatient  - maintain chest tube to -10 suction for now, can transition back to water seal if significant pain  - subcutaneous crepitus noted on exam post-procedure, okay to monitor clinically  - follow up cyto and path  - okay to resume therapeutic anticoagulation tonight at 10pm  - prn pain control  - interventional pulmonary to follow    Fermin Nelson MD  PGY-6  PCCM Fellow  Pager [phone]

## 2022-09-14 NOTE — PROGRESS NOTE ADULT - ASSESSMENT
57 YO M with PMHx of former smoker (2 "black and mild" cigars daily for 8-9 years and quit 6 months ago) and s/p right open thoracotomy for cardiac mass removal 20 years ago who presented to [facility] on 9/7 with SOB, Orthopnea, BL LE edema and unintentional weight loss and found with large left pleural effusion and RLL subsegmental/ segmental PE. s/p thoracentesis (9/7) and course complicated left hydropneumothorax with rapid reaccumulation Now transferred to [facility] 9/10 for further management.     # NEUROLOGY  - AOx3 with no current issues.   - Left lateral chest wall pain and continued on lidoderm patch.     # CARDIOVASCULAR  - Concern noted for RH strain at [facility] second to cardiomegaly on CTA CHEST (9/7).   - ECHO (9/7) with normal LVSF, moderate LVH, but poorly visualized right side.   - BP remains stable with no concerns for right strain.  - Complaining of chest pain on left, likely second to PTX, EKG/Trop negative  - Monitor HR/ BP     # RESPIRATORY  - Presented to [facility] with SOB, orthopnea and BL LE edema and found with AHRF likely second to large L pleural effusion c/b left hydroPTX and RLL subsegmental/ segmental PE  - CTA CHEST (9/7) with segmental and subsegmental RLL PE, no central PEs noted, SAULO mass like opacity (3.3 x 2 cm) and RUL nodule (1.4 x 1.3 cm) and LARGE L pleural effusion extending from apex with associated complete LLL and partial SAULO atelectasis  - s/p Thoracentesis (9/7) at [facility] and (+) lights criteria.   - Continue on Heparin GTT   - Continue RA and NC PRN   - Continue on Albuterol PRN   - Cytopathology of pleural fluid showing atypical cells (9/7)  - Pleuroscopy postponed as he ate breakfast (9/13)  - Plan for Pleuroscopy tomorrow 9/14    # GI  - Continue on PO diet   - Constipated and Miralax/ Senna started.     # RENAL  - No acute renal issues.   - Monitor renal function and UOP.     # INFECTIOUS DISEASE  - COVID PCR (9/7) NGTD   - MRSA PCR pending   - No acute infectious concerns.     # HEME  - CTA CHEST with RLL segmental and subsegmental PEs   - Continue on FULL DOSE Heparin GTT     # ONC   - Hx of open right thoracotomy for posterior cardiac mass removal greater than 20 years ago. Patient reports noted to be benign.  - May need CT C/A/P (+/-) repeat thora pending cytopath results    # ENDOCRINE  - No acute issues noted.     # ETHICS/ GOC    - FULL CODE     # DISPO - TBD   55 YO M with PMHx of former smoker (2 "black and mild" cigars daily for 8-9 years and quit 6 months ago) and s/p right open thoracotomy for cardiac mass removal 20 years ago who presented to [facility] on 9/7 with SOB, Orthopnea, BL LE edema and unintentional weight loss and found with large left pleural effusion and RLL subsegmental/ segmental PE. s/p thoracentesis (9/7) and course complicated left hydropneumothorax with rapid reaccumulation Now transferred to [facility] 9/10 for further management.     # NEUROLOGY  - AOx3 with no current issues.   - Left lateral chest wall pain and continued on lidoderm patch.     # CARDIOVASCULAR  - Concern noted for RH strain at [facility] second to cardiomegaly on CTA CHEST (9/7).   - ECHO (9/7) with normal LVSF, moderate LVH, but poorly visualized right side.   - BP remains stable with no concerns for right strain.  - Complaining of chest pain on left, likely second to PTX, EKG/Trop negative  - Monitor HR/ BP     # RESPIRATORY  - Presented to [facility] with SOB, orthopnea and BL LE edema and found with AHRF likely second to large L pleural effusion c/b left hydroPTX and RLL subsegmental/ segmental PE  - CTA CHEST (9/7) with segmental and subsegmental RLL PE, no central PEs noted, SAULO mass like opacity (3.3 x 2 cm) and RUL nodule (1.4 x 1.3 cm) and LARGE L pleural effusion extending from apex with associated complete LLL and partial SAULO atelectasis  - s/p Thoracentesis (9/7) at [facility] and (+) lights criteria.   - Continue on Heparin GTT   - Continue RA and NC PRN   - Continue on Albuterol PRN   - Cytopathology of pleural fluid showing atypical cells (9/7)  - S/P Pleuroscopy tomorrow 9/14-> f.u results    # GI  - Continue on PO diet   - Constipated and Miralax/ Senna started.     # RENAL  - No acute renal issues.   - Monitor renal function and UOP.     # INFECTIOUS DISEASE  - COVID PCR (9/7) NGTD   - MRSA PCR pending   - No acute infectious concerns.     # HEME  - CTA CHEST with RLL segmental and subsegmental PEs   - Continue on FULL DOSE Heparin GTT     # ONC   - Hx of open right thoracotomy for posterior cardiac mass removal greater than 20 years ago. Patient reports noted to be benign.  - May need CT C/A/P (+/-) repeat thora pending cytopath results    # ENDOCRINE  - No acute issues noted.     # ETHICS/ GOC    - FULL CODE     # DISPO - TBD

## 2022-09-14 NOTE — CONSULT NOTE ADULT - ATTENDING COMMENTS
56 yo M PMH prior tobacco use (2 cigars x8-9 years, quit 6 months ago) and cardiac mass s/p thoracotomy 20 years ago presented to [facility] for shortness of breath found to have RLL PE with large left pleural effusion and SAULO mass. S/p thoracentesis with non-expandable lung and hydropneumothorax. Interventional pulmonary consulted for diagnostic pleural biopsy and pleural effusion management in the setting of a non-expandable lung with malignancy. Underwent pleuroscopy with left parietal pleural biopsy. TIPC placed consistent with MPE guidelines for non-expandable lung, tissue sent for path. Continue symptom based drainage. Can start with 3 times a week drainage. Continue work up for malignancy including outpatient PET CT and MRI Brain. Mild SQ emphysema, resolving. Will need close outpatient follow up with suture removal in 3 weeks.

## 2022-09-14 NOTE — PROGRESS NOTE ADULT - SUBJECTIVE AND OBJECTIVE BOX
CHIEF COMPLAINT: Patient is a 57y old  Male who presents with a chief complaint of L HydroPTX.    Interval Events:    REVIEW OF SYSTEMS:  [ ] All other systems negative  [ ] Unable to assess ROS because ________      OBJECTIVE:  ICU Vital Signs Last 24 Hrs  T(C): 36.2 (14 Sep 2022 05:19), Max: 36.6 (13 Sep 2022 22:24)  T(F): 97.2 (14 Sep 2022 05:19), Max: 97.8 (13 Sep 2022 22:24)  HR: 104 (14 Sep 2022 05:19) (81 - 104)  BP: 122/86 (14 Sep 2022 05:19) (122/86 - 122/87)  BP(mean): 98 (13 Sep 2022 22:24) (98 - 98)  RR: 18 (14 Sep 2022 05:19) (18 - 18)  SpO2: 100% (14 Sep 2022 05:19) (100% - 100%)    O2 Parameters below as of 14 Sep 2022 05:19  Patient On (Oxygen Delivery Method): room air        CAPILLARY BLOOD GLUCOSE        HOSPITAL MEDICATIONS:  MEDICATIONS  (STANDING):  chlorhexidine 2% Cloths 1 Application(s) Topical daily  influenza   Vaccine 0.5 milliLiter(s) IntraMuscular once  lidocaine   4% Patch 1 Patch Transdermal every 24 hours  polyethylene glycol 3350 17 Gram(s) Oral daily  senna 2 Tablet(s) Oral at bedtime    MEDICATIONS  (PRN):  acetaminophen     Tablet .. 650 milliGRAM(s) Oral every 6 hours PRN Temp greater or equal to 38C (100.4F), Mild Pain (1 - 3), Moderate Pain (4 - 6)  ALBUTerol    0.083% 2.5 milliGRAM(s) Nebulizer every 6 hours PRN Shortness of Breath and/or Wheezing      LABS:                        13.5   3.40  )-----------( 268      ( 14 Sep 2022 05:15 )             42.4       PT/INR - ( 14 Sep 2022 05:15 )   PT: 12.2 sec;   INR: 1.05 ratio         PTT - ( 14 Sep 2022 05:15 )  PTT:26.8 sec      PAST MEDICAL & SURGICAL HISTORY:  Former smoker    Status post cardiac surgery  s/p open right thoracotomy for posterior cardiac mass removal &gt; 20 years ago, reported by patient to be benign.      FAMILY HISTORY:  Family history of cancer in father  Cancer of unknown origin on Father&#x27;s side.      Social History:  Unmarried, has two children, lives in [place] but flew to [place] to help Sister who is sick.   Unvaccinated for COVID (10 Sep 2022 14:18)    RADIOLOGY:  [ ] Reviewed and interpreted by me    PULMONARY FUNCTION TESTS:    EKG: CHIEF COMPLAINT: Patient is a 57y old  Male who presents with a chief complaint of L HydroPTX.    Interval Events: Pt NPO after MN for pleuroscopy and heparin gtt held.    REVIEW OF SYSTEMS:  States he still has some SOB with exertion but breathing much improved. Denies CP, abd pain, N/V.  [x] All other systems negative      OBJECTIVE:  ICU Vital Signs Last 24 Hrs  T(C): 36.2 (14 Sep 2022 05:19), Max: 36.6 (13 Sep 2022 22:24)  T(F): 97.2 (14 Sep 2022 05:19), Max: 97.8 (13 Sep 2022 22:24)  HR: 104 (14 Sep 2022 05:19) (81 - 104)  BP: 122/86 (14 Sep 2022 05:19) (122/86 - 122/87)  BP(mean): 98 (13 Sep 2022 22:24) (98 - 98)  RR: 18 (14 Sep 2022 05:19) (18 - 18)  SpO2: 100% (14 Sep 2022 05:19) (100% - 100%)    O2 Parameters below as of 14 Sep 2022 05:19  Patient On (Oxygen Delivery Method): room air        CAPILLARY BLOOD GLUCOSE        HOSPITAL MEDICATIONS:  MEDICATIONS  (STANDING):  chlorhexidine 2% Cloths 1 Application(s) Topical daily  influenza   Vaccine 0.5 milliLiter(s) IntraMuscular once  lidocaine   4% Patch 1 Patch Transdermal every 24 hours  polyethylene glycol 3350 17 Gram(s) Oral daily  senna 2 Tablet(s) Oral at bedtime    MEDICATIONS  (PRN):  acetaminophen     Tablet .. 650 milliGRAM(s) Oral every 6 hours PRN Temp greater or equal to 38C (100.4F), Mild Pain (1 - 3), Moderate Pain (4 - 6)  ALBUTerol    0.083% 2.5 milliGRAM(s) Nebulizer every 6 hours PRN Shortness of Breath and/or Wheezing      LABS:                        13.5   3.40  )-----------( 268      ( 14 Sep 2022 05:15 )             42.4       PT/INR - ( 14 Sep 2022 05:15 )   PT: 12.2 sec;   INR: 1.05 ratio         PTT - ( 14 Sep 2022 05:15 )  PTT:26.8 sec      PAST MEDICAL & SURGICAL HISTORY:  Former smoker    Status post cardiac surgery  s/p open right thoracotomy for posterior cardiac mass removal &gt; 20 years ago, reported by patient to be benign.      FAMILY HISTORY:  Family history of cancer in father  Cancer of unknown origin on Father&#x27;s side.      Social History:  Unmarried, has two children, lives in [place] but flew to [place] to help Sister who is sick.   Unvaccinated for COVID (10 Sep 2022 14:18)    RADIOLOGY:  [ ] Reviewed and interpreted by me    PULMONARY FUNCTION TESTS:    EKG:

## 2022-09-15 ENCOUNTER — TRANSCRIPTION ENCOUNTER (OUTPATIENT)
Age: 57
End: 2022-09-15

## 2022-09-15 PROBLEM — Z87.891 PERSONAL HISTORY OF NICOTINE DEPENDENCE: Chronic | Status: ACTIVE | Noted: 2022-09-10

## 2022-09-15 LAB
ANION GAP SERPL CALC-SCNC: 9 MMOL/L — SIGNIFICANT CHANGE UP (ref 7–14)
APTT BLD: 54.9 SEC — HIGH (ref 27–36.3)
APTT BLD: 85.5 SEC — HIGH (ref 27–36.3)
BUN SERPL-MCNC: 24 MG/DL — HIGH (ref 7–23)
CALCIUM SERPL-MCNC: 9.4 MG/DL — SIGNIFICANT CHANGE UP (ref 8.4–10.5)
CHLORIDE SERPL-SCNC: 97 MMOL/L — LOW (ref 98–107)
CO2 SERPL-SCNC: 26 MMOL/L — SIGNIFICANT CHANGE UP (ref 22–31)
CREAT SERPL-MCNC: 1.31 MG/DL — HIGH (ref 0.5–1.3)
EGFR: 64 ML/MIN/1.73M2 — SIGNIFICANT CHANGE UP
GLUCOSE SERPL-MCNC: 116 MG/DL — HIGH (ref 70–99)
HCT VFR BLD CALC: 41.2 % — SIGNIFICANT CHANGE UP (ref 39–50)
HGB BLD-MCNC: 13.3 G/DL — SIGNIFICANT CHANGE UP (ref 13–17)
INR BLD: 1.06 RATIO — SIGNIFICANT CHANGE UP (ref 0.88–1.16)
MAGNESIUM SERPL-MCNC: 1.9 MG/DL — SIGNIFICANT CHANGE UP (ref 1.6–2.6)
MCHC RBC-ENTMCNC: 29.3 PG — SIGNIFICANT CHANGE UP (ref 27–34)
MCHC RBC-ENTMCNC: 32.3 GM/DL — SIGNIFICANT CHANGE UP (ref 32–36)
MCV RBC AUTO: 90.7 FL — SIGNIFICANT CHANGE UP (ref 80–100)
NRBC # BLD: 0 /100 WBCS — SIGNIFICANT CHANGE UP (ref 0–0)
NRBC # FLD: 0 K/UL — SIGNIFICANT CHANGE UP (ref 0–0)
PHOSPHATE SERPL-MCNC: 3.6 MG/DL — SIGNIFICANT CHANGE UP (ref 2.5–4.5)
PLATELET # BLD AUTO: 272 K/UL — SIGNIFICANT CHANGE UP (ref 150–400)
POTASSIUM SERPL-MCNC: 5.1 MMOL/L — SIGNIFICANT CHANGE UP (ref 3.5–5.3)
POTASSIUM SERPL-SCNC: 5.1 MMOL/L — SIGNIFICANT CHANGE UP (ref 3.5–5.3)
PROTHROM AB SERPL-ACNC: 12.3 SEC — SIGNIFICANT CHANGE UP (ref 10.5–13.4)
RBC # BLD: 4.54 M/UL — SIGNIFICANT CHANGE UP (ref 4.2–5.8)
RBC # FLD: 13.4 % — SIGNIFICANT CHANGE UP (ref 10.3–14.5)
SODIUM SERPL-SCNC: 132 MMOL/L — LOW (ref 135–145)
WBC # BLD: 5.16 K/UL — SIGNIFICANT CHANGE UP (ref 3.8–10.5)
WBC # FLD AUTO: 5.16 K/UL — SIGNIFICANT CHANGE UP (ref 3.8–10.5)

## 2022-09-15 PROCEDURE — 71045 X-RAY EXAM CHEST 1 VIEW: CPT | Mod: 26

## 2022-09-15 PROCEDURE — 99233 SBSQ HOSP IP/OBS HIGH 50: CPT | Mod: 25,24

## 2022-09-15 RX ORDER — SODIUM CHLORIDE 9 MG/ML
500 INJECTION, SOLUTION INTRAVENOUS ONCE
Refills: 0 | Status: COMPLETED | OUTPATIENT
Start: 2022-09-15 | End: 2022-09-15

## 2022-09-15 RX ADMIN — CHLORHEXIDINE GLUCONATE 1 APPLICATION(S): 213 SOLUTION TOPICAL at 11:11

## 2022-09-15 RX ADMIN — HEPARIN SODIUM 1200 UNIT(S)/HR: 5000 INJECTION INTRAVENOUS; SUBCUTANEOUS at 06:39

## 2022-09-15 RX ADMIN — OXYCODONE AND ACETAMINOPHEN 1 TABLET(S): 5; 325 TABLET ORAL at 15:16

## 2022-09-15 RX ADMIN — OXYCODONE AND ACETAMINOPHEN 1 TABLET(S): 5; 325 TABLET ORAL at 14:16

## 2022-09-15 RX ADMIN — SENNA PLUS 2 TABLET(S): 8.6 TABLET ORAL at 22:53

## 2022-09-15 RX ADMIN — OXYCODONE AND ACETAMINOPHEN 1 TABLET(S): 5; 325 TABLET ORAL at 06:11

## 2022-09-15 RX ADMIN — HEPARIN SODIUM 1200 UNIT(S)/HR: 5000 INJECTION INTRAVENOUS; SUBCUTANEOUS at 20:47

## 2022-09-15 RX ADMIN — OXYCODONE AND ACETAMINOPHEN 1 TABLET(S): 5; 325 TABLET ORAL at 05:11

## 2022-09-15 RX ADMIN — SODIUM CHLORIDE 1000 MILLILITER(S): 9 INJECTION, SOLUTION INTRAVENOUS at 14:16

## 2022-09-15 RX ADMIN — HEPARIN SODIUM 2500 UNIT(S): 5000 INJECTION INTRAVENOUS; SUBCUTANEOUS at 06:44

## 2022-09-15 RX ADMIN — OXYCODONE AND ACETAMINOPHEN 1 TABLET(S): 5; 325 TABLET ORAL at 22:53

## 2022-09-15 NOTE — PROGRESS NOTE ADULT - ASSESSMENT
56 yo M PMH prior tobacco use (2 cigars x8-9 years, quit 6 months ago) and cardiac mass s/p thoracotomy 20 years ago presented to [facility] for shortness of breath found to have RLL PE with large left pleural effusion and SAULO mass. S/p thoracentesis with non-expandable lung and hydropneumothorax. Interventional pulmonary consulted for diagnostic pleural biopsy and pleural effusion management. Suspect 2/2 malignancy and subsequent lung entrapment.     - s/p pleuroscopy with pleural biopsy 9/14  - left tunneled indwelling pleural catheter inserted 9/14; daily drainage up to 1L while inpatient  - CXR with trapped lung  - subcutaneous emphysema stable  - follow up cyto and path  - no contraindication to AC from interventional pulmonary perspective  - prn pain control  - will need outpatient follow up with Dr. Shannon Nelson MD  PGY-6  PCCM Fellow  Pager [phone]

## 2022-09-15 NOTE — PROGRESS NOTE ADULT - SUBJECTIVE AND OBJECTIVE BOX
CHIEF COMPLAINT: Patient is a 57y old  Male who presents with a chief complaint of L HydroPTX.    Interval Events:    REVIEW OF SYSTEMS:  [ ] All other systems negative  [ ] Unable to assess ROS because ________      OBJECTIVE:  ICU Vital Signs Last 24 Hrs  T(C): 36.2 (15 Sep 2022 06:08), Max: 36.6 (14 Sep 2022 16:20)  T(F): 97.1 (15 Sep 2022 06:08), Max: 97.8 (14 Sep 2022 16:20)  HR: 70 (15 Sep 2022 06:08) (70 - 107)  BP: 120/88 (15 Sep 2022 06:08) (120/88 - 158/90)  BP(mean): 109 (14 Sep 2022 17:45) (94 - 110)  RR: 18 (15 Sep 2022 06:08) (14 - 19)  SpO2: 96% (15 Sep 2022 06:08) (96% - 100%)    O2 Parameters below as of 15 Sep 2022 06:08  Patient On (Oxygen Delivery Method): room air        09-14 @ 07:01  -  09-15 @ 07:00  --------------------------------------------------------  IN: 75 mL / OUT: 420 mL / NET: -345 mL      CAPILLARY BLOOD GLUCOSE        HOSPITAL MEDICATIONS:  MEDICATIONS  (STANDING):  chlorhexidine 2% Cloths 1 Application(s) Topical daily  heparin  Infusion. 1100 Unit(s)/Hr (11 mL/Hr) IV Continuous <Continuous>  influenza   Vaccine 0.5 milliLiter(s) IntraMuscular once  lidocaine   4% Patch 1 Patch Transdermal every 24 hours  polyethylene glycol 3350 17 Gram(s) Oral daily  senna 2 Tablet(s) Oral at bedtime    MEDICATIONS  (PRN):  acetaminophen     Tablet .. 650 milliGRAM(s) Oral every 6 hours PRN Temp greater or equal to 38C (100.4F), Mild Pain (1 - 3), Moderate Pain (4 - 6)  ALBUTerol    0.083% 2.5 milliGRAM(s) Nebulizer every 6 hours PRN Shortness of Breath and/or Wheezing  heparin   Injectable 5500 Unit(s) IV Push every 6 hours PRN For aPTT less than 40  heparin   Injectable 2500 Unit(s) IV Push every 6 hours PRN For aPTT between 40 - 57  oxycodone    5 mG/acetaminophen 325 mG 1 Tablet(s) Oral every 6 hours PRN Severe Pain (7 - 10)      LABS:                        13.3   5.16  )-----------( 272      ( 15 Sep 2022 05:35 )             41.2     09-15    132<L>  |  97<L>  |  24<H>  ----------------------------<  116<H>  5.1   |  26  |  1.31<H>    Ca    9.4      15 Sep 2022 05:35  Phos  3.6     09-15  Mg     1.90     09-15    TPro  7.7  /  Alb  4.0  /  TBili  0.3  /  DBili  x   /  AST  23  /  ALT  18  /  AlkPhos  101  09-14    PT/INR - ( 15 Sep 2022 05:35 )   PT: 12.3 sec;   INR: 1.06 ratio         PTT - ( 15 Sep 2022 05:35 )  PTT:54.9 sec      PAST MEDICAL & SURGICAL HISTORY:  Former smoker      Status post cardiac surgery  s/p open right thoracotomy for posterior cardiac mass removal &gt; 20 years ago, reported by patient to be benign.        FAMILY HISTORY:  Family history of cancer in father  Cancer of unknown origin on Father&#x27;s side.        Social History:  Unmarried, has two children, lives in [place] but flew to [place] to help Sister who is sick.   Unvaccinated for COVID (10 Sep 2022 14:18)      RADIOLOGY:  [ ] Reviewed and interpreted by me    PULMONARY FUNCTION TESTS:    EKG: CHIEF COMPLAINT: Patient is a 57y old Male who presents with a chief complaint of L HydroPTX.    Interval Events: S/P pleurovac yesterday. Restarted on heparin gtt at 10pm.     REVIEW OF SYSTEMS:  Pt's SOB improved after pleurovac. Pain at site. Denies CP, abd pain, N/V  [x] All other systems negative      OBJECTIVE:  ICU Vital Signs Last 24 Hrs  T(C): 36.2 (15 Sep 2022 06:08), Max: 36.6 (14 Sep 2022 16:20)  T(F): 97.1 (15 Sep 2022 06:08), Max: 97.8 (14 Sep 2022 16:20)  HR: 70 (15 Sep 2022 06:08) (70 - 107)  BP: 120/88 (15 Sep 2022 06:08) (120/88 - 158/90)  BP(mean): 109 (14 Sep 2022 17:45) (94 - 110)  RR: 18 (15 Sep 2022 06:08) (14 - 19)  SpO2: 96% (15 Sep 2022 06:08) (96% - 100%)    O2 Parameters below as of 15 Sep 2022 06:08  Patient On (Oxygen Delivery Method): room air        09-14 @ 07:01  -  09-15 @ 07:00  --------------------------------------------------------  IN: 75 mL / OUT: 420 mL / NET: -345 mL      CAPILLARY BLOOD GLUCOSE        HOSPITAL MEDICATIONS:  MEDICATIONS  (STANDING):  chlorhexidine 2% Cloths 1 Application(s) Topical daily  heparin  Infusion. 1100 Unit(s)/Hr (11 mL/Hr) IV Continuous <Continuous>  influenza   Vaccine 0.5 milliLiter(s) IntraMuscular once  lidocaine   4% Patch 1 Patch Transdermal every 24 hours  polyethylene glycol 3350 17 Gram(s) Oral daily  senna 2 Tablet(s) Oral at bedtime    MEDICATIONS  (PRN):  acetaminophen     Tablet .. 650 milliGRAM(s) Oral every 6 hours PRN Temp greater or equal to 38C (100.4F), Mild Pain (1 - 3), Moderate Pain (4 - 6)  ALBUTerol    0.083% 2.5 milliGRAM(s) Nebulizer every 6 hours PRN Shortness of Breath and/or Wheezing  heparin   Injectable 5500 Unit(s) IV Push every 6 hours PRN For aPTT less than 40  heparin   Injectable 2500 Unit(s) IV Push every 6 hours PRN For aPTT between 40 - 57  oxycodone    5 mG/acetaminophen 325 mG 1 Tablet(s) Oral every 6 hours PRN Severe Pain (7 - 10)      LABS:                        13.3   5.16  )-----------( 272      ( 15 Sep 2022 05:35 )             41.2     09-15    132<L>  |  97<L>  |  24<H>  ----------------------------<  116<H>  5.1   |  26  |  1.31<H>    Ca    9.4      15 Sep 2022 05:35  Phos  3.6     09-15  Mg     1.90     09-15    TPro  7.7  /  Alb  4.0  /  TBili  0.3  /  DBili  x   /  AST  23  /  ALT  18  /  AlkPhos  101  09-14    PT/INR - ( 15 Sep 2022 05:35 )   PT: 12.3 sec;   INR: 1.06 ratio         PTT - ( 15 Sep 2022 05:35 )  PTT:54.9 sec      PAST MEDICAL & SURGICAL HISTORY:  Former smoker      Status post cardiac surgery  s/p open right thoracotomy for posterior cardiac mass removal &gt; 20 years ago, reported by patient to be benign.        FAMILY HISTORY:  Family history of cancer in father  Cancer of unknown origin on Father&#x27;s side.        Social History:  Unmarried, has two children, lives in [place] but flew to [place] to help Sister who is sick.   Unvaccinated for COVID (10 Sep 2022 14:18)      RADIOLOGY:  [ ] Reviewed and interpreted by me    PULMONARY FUNCTION TESTS:    EKG:

## 2022-09-15 NOTE — PROGRESS NOTE ADULT - SUBJECTIVE AND OBJECTIVE BOX
CHIEF COMPLAINT: SOB    Interval Events:  Feels well. Reports some mild insertional site pain from pleurx. Removed from suction this AM. Breathing improved.     REVIEW OF SYSTEMS:  Constitutional: [x] negative [ ] fevers [ ] chills [ ] weight loss [ ] weight gain  HEENT: [x] negative [ ] dry eyes [ ] eye irritation [ ] postnasal drip [ ] nasal congestion  CV: [x] negative  [ ] chest pain [ ] orthopnea [ ] palpitations [ ] murmur  Resp: [ ] negative [ ] cough [x] shortness of breath [ ] dyspnea [ ] wheezing [ ] sputum [ ] hemoptysis  GI: [x] negative [ ] nausea [ ] vomiting [ ] diarrhea [ ] constipation [ ] abd pain [ ] dysphagia   : [x] negative [ ] dysuria [ ] nocturia [ ] hematuria [ ] increased urinary frequency  Musculoskeletal: [x] negative [ ] back pain [ ] myalgias [ ] arthralgias [ ] fracture  Skin: [x] negative [ ] rash [ ] itch  Neurological: [x] negative [ ] headache [ ] dizziness [ ] syncope [ ] weakness [ ] numbness  Psychiatric: [ ] negative [ ] anxiety [ ] depression  Endocrine: [ ] negative [ ] diabetes [ ] thyroid problem  Hematologic/Lymphatic: [ ] negative [ ] anemia [ ] bleeding problem  Allergic/Immunologic: [ ] negative [ ] itchy eyes [ ] nasal discharge [ ] hives [ ] angioedema  [x] All other systems negative  [ ] Unable to assess ROS because ________    OBJECTIVE:  ICU Vital Signs Last 24 Hrs  T(C): 36.1 (15 Sep 2022 14:08), Max: 36.6 (14 Sep 2022 16:20)  T(F): 97 (15 Sep 2022 14:08), Max: 97.8 (14 Sep 2022 16:20)  HR: 76 (15 Sep 2022 14:08) (70 - 88)  BP: 132/91 (15 Sep 2022 14:08) (120/88 - 158/90)  BP(mean): 109 (14 Sep 2022 17:45) (97 - 110)  ABP: --  ABP(mean): --  RR: 20 (15 Sep 2022 14:08) (14 - 20)  SpO2: 97% (15 Sep 2022 14:08) (96% - 100%)    O2 Parameters below as of 15 Sep 2022 14:08  Patient On (Oxygen Delivery Method): room air              09-14 @ 07:01  -  09-15 @ 07:00  --------------------------------------------------------  IN: 75 mL / OUT: 460 mL / NET: -385 mL    09-15 @ 07:01  -  09-15 @ 14:25  --------------------------------------------------------  IN: 0 mL / OUT: 150 mL / NET: -150 mL      CAPILLARY BLOOD GLUCOSE          PHYSICAL EXAM:  General: Male, NAD  HEENT: EOMI=  Neck: Supple  Respiratory: No wheezes or increased WOB, chest tube off suction, CDI  Cardiovascular: RRR no mrg  Abdomen: Soft, NT, ND  Extremities: WWP  Skin: Intact, left chest wall subq crepitus  Neurological: AOx3  Psychiatry: Normal affect    HOSPITAL MEDICATIONS:  MEDICATIONS  (STANDING):  chlorhexidine 2% Cloths 1 Application(s) Topical daily  heparin  Infusion. 1100 Unit(s)/Hr (11 mL/Hr) IV Continuous <Continuous>  influenza   Vaccine 0.5 milliLiter(s) IntraMuscular once  lidocaine   4% Patch 1 Patch Transdermal every 24 hours  polyethylene glycol 3350 17 Gram(s) Oral daily  senna 2 Tablet(s) Oral at bedtime    MEDICATIONS  (PRN):  acetaminophen     Tablet .. 650 milliGRAM(s) Oral every 6 hours PRN Temp greater or equal to 38C (100.4F), Mild Pain (1 - 3), Moderate Pain (4 - 6)  ALBUTerol    0.083% 2.5 milliGRAM(s) Nebulizer every 6 hours PRN Shortness of Breath and/or Wheezing  heparin   Injectable 5500 Unit(s) IV Push every 6 hours PRN For aPTT less than 40  heparin   Injectable 2500 Unit(s) IV Push every 6 hours PRN For aPTT between 40 - 57  oxycodone    5 mG/acetaminophen 325 mG 1 Tablet(s) Oral every 6 hours PRN Severe Pain (7 - 10)      LABS:                        13.3   5.16  )-----------( 272      ( 15 Sep 2022 05:35 )             41.2     Hgb Trend: 13.3<--, 13.5<--, 13.9<--, 12.6<--, 12.9<--  09-15    132<L>  |  97<L>  |  24<H>  ----------------------------<  116<H>  5.1   |  26  |  1.31<H>    Ca    9.4      15 Sep 2022 05:35  Phos  3.6     09-15  Mg     1.90     09-15    TPro  7.7  /  Alb  4.0  /  TBili  0.3  /  DBili  x   /  AST  23  /  ALT  18  /  AlkPhos  101  09-14    Creatinine Trend: 1.31<--, 1.19<--, 1.11<--, 1.22<--, 1.12<--, 1.08<--  PT/INR - ( 15 Sep 2022 05:35 )   PT: 12.3 sec;   INR: 1.06 ratio         PTT - ( 15 Sep 2022 05:35 )  PTT:54.9 sec          MICROBIOLOGY:       RADIOLOGY:  [ ] Reviewed and interpreted by me    PULMONARY FUNCTION TESTS:    EKG:

## 2022-09-15 NOTE — DISCHARGE NOTE NURSING/CASE MANAGEMENT/SOCIAL WORK - NSSCTYPOFSERV_GEN_ALL_CORE
Visiting Nurse Services 3x/wk for PleurX Catheter Drainage: The 1st Visiting RN  Home Visit will be:  Friday 9/16/22. The Visiting Nurse will call to arrange the Visit time.   Visiting Nurse Services 3x/wk for PleurX Catheter Drainage: The 1st Visiting RN  Home Visit will be:  Saturday 9/17/22. The Visiting Nurse will call to arrange the Visit time.

## 2022-09-15 NOTE — CHART NOTE - NSCHARTNOTEFT_GEN_A_CORE
Notified by RN that patients left chest tube tubing became disconnected. Patient seen at bedside by provider. Patient in NAD, seated in chair. Patient states he moved while sleeping and saw the upping tubing disconnected from the lower tubing. Patient denies pain and SOB. Aprox 100-200cc of blood tinged out put noted on bedsheet per RN. Patient believes it was only detached for 10-15 minutes. Tubing reconnected w/ good seal. Chest tube remains clamped. Vital signs stable, no trauma to chest tube insertion site, B/L course lung sounds osculated. Will continue to monitor.

## 2022-09-15 NOTE — DISCHARGE NOTE NURSING/CASE MANAGEMENT/SOCIAL WORK - NSDCPEFALRISK_GEN_ALL_CORE
For information on Fall & Injury Prevention, visit: [email or web address] OR  [email or web address] OR  https://www.cdc.gov/steadi/patient.html

## 2022-09-15 NOTE — PROGRESS NOTE ADULT - ASSESSMENT
CHIEF COMPLAINT: Patient is a 57y old  Male who presents with a chief complaint of L HydroPTX.    Interval Events:    REVIEW OF SYSTEMS:  [ ] All other systems negative  [ ] Unable to assess ROS because ________      OBJECTIVE:  ICU Vital Signs Last 24 Hrs  T(C): 36.2 (15 Sep 2022 06:08), Max: 36.6 (14 Sep 2022 16:20)  T(F): 97.1 (15 Sep 2022 06:08), Max: 97.8 (14 Sep 2022 16:20)  HR: 70 (15 Sep 2022 06:08) (70 - 107)  BP: 120/88 (15 Sep 2022 06:08) (120/88 - 158/90)  BP(mean): 109 (14 Sep 2022 17:45) (94 - 110)  RR: 18 (15 Sep 2022 06:08) (14 - 19)  SpO2: 96% (15 Sep 2022 06:08) (96% - 100%)    O2 Parameters below as of 15 Sep 2022 06:08  Patient On (Oxygen Delivery Method): room air      09-14 @ 07:01  -  09-15 @ 07:00  --------------------------------------------------------  IN: 75 mL / OUT: 460 mL / NET: -385 mL      CAPILLARY BLOOD GLUCOSE      HOSPITAL MEDICATIONS:  MEDICATIONS  (STANDING):  chlorhexidine 2% Cloths 1 Application(s) Topical daily  heparin  Infusion. 1100 Unit(s)/Hr (11 mL/Hr) IV Continuous <Continuous>  influenza   Vaccine 0.5 milliLiter(s) IntraMuscular once  lidocaine   4% Patch 1 Patch Transdermal every 24 hours  polyethylene glycol 3350 17 Gram(s) Oral daily  senna 2 Tablet(s) Oral at bedtime    MEDICATIONS  (PRN):  acetaminophen     Tablet .. 650 milliGRAM(s) Oral every 6 hours PRN Temp greater or equal to 38C (100.4F), Mild Pain (1 - 3), Moderate Pain (4 - 6)  ALBUTerol    0.083% 2.5 milliGRAM(s) Nebulizer every 6 hours PRN Shortness of Breath and/or Wheezing  heparin   Injectable 5500 Unit(s) IV Push every 6 hours PRN For aPTT less than 40  heparin   Injectable 2500 Unit(s) IV Push every 6 hours PRN For aPTT between 40 - 57  oxycodone    5 mG/acetaminophen 325 mG 1 Tablet(s) Oral every 6 hours PRN Severe Pain (7 - 10)      LABS:                        13.3   5.16  )-----------( 272      ( 15 Sep 2022 05:35 )             41.2     09-15    132<L>  |  97<L>  |  24<H>  ----------------------------<  116<H>  5.1   |  26  |  1.31<H>    Ca    9.4      15 Sep 2022 05:35  Phos  3.6     09-15  Mg     1.90     09-15    TPro  7.7  /  Alb  4.0  /  TBili  0.3  /  DBili  x   /  AST  23  /  ALT  18  /  AlkPhos  101  09-14    PT/INR - ( 15 Sep 2022 05:35 )   PT: 12.3 sec;   INR: 1.06 ratio         PTT - ( 15 Sep 2022 05:35 )  PTT:54.9 sec          PAST MEDICAL & SURGICAL HISTORY:  Former smoker      Status post cardiac surgery  s/p open right thoracotomy for posterior cardiac mass removal &gt; 20 years ago, reported by patient to be benign.          FAMILY HISTORY:  Family history of cancer in father  Cancer of unknown origin on Father&#x27;s side.        Social History:  Unmarried, has two children, lives in [place] but flew to [place] to help Sister who is sick.   Unvaccinated for COVID (10 Sep 2022 14:18)      RADIOLOGY:  [ ] Reviewed and interpreted by me    PULMONARY FUNCTION TESTS:    EKG: 55 YO M with PMHx of former smoker (2 "black and mild" cigars daily for 8-9 years and quit 6 months ago) and s/p right open thoracotomy for cardiac mass removal 20 years ago who presented to [facility] on 9/7 with SOB, Orthopnea, BL LE edema and unintentional weight loss and found with large left pleural effusion and RLL subsegmental/ segmental PE. s/p thoracentesis (9/7) and course complicated left hydropneumothorax with rapid reaccumulation Now transferred to [facility] 9/10 for further management.     # NEUROLOGY  - AOx3 with no current issues.   - Left lateral chest wall pain and continued on lidoderm patch.     # CARDIOVASCULAR  - Concern noted for RH strain at [facility] second to cardiomegaly on CTA CHEST (9/7).   - ECHO (9/7) with normal LVSF, moderate LVH, but poorly visualized right side.   - BP remains stable with no concerns for right strain.  - Complaining of chest pain on left, likely second to PTX, EKG/Trop negative  - Monitor HR/ BP     # RESPIRATORY  - Presented to [facility] with SOB, orthopnea and BL LE edema and found with AHRF likely second to large L pleural effusion c/b left hydroPTX and RLL subsegmental/ segmental PE  - CTA CHEST (9/7) with segmental and subsegmental RLL PE, no central PEs noted, SAULO mass like opacity (3.3 x 2 cm) and RUL nodule (1.4 x 1.3 cm) and LARGE L pleural effusion extending from apex with associated complete LLL and partial SAULO atelectasis  - s/p Thoracentesis (9/7) at [facility] and (+) lights criteria.   - Continue on Heparin GTT   - Continue RA and NC PRN   - Continue on Albuterol PRN   - Cytopathology of pleural fluid showing atypical cells (9/7)  - S/P Pleuroscopy w/ pleurovac and biopsy-> f.u results    # GI  - Continue on PO diet   - Constipated and Miralax/ Senna started.     # RENAL  - No acute renal issues.   - Monitor renal function and UOP.     # INFECTIOUS DISEASE  - COVID PCR (9/7) NGTD   - MRSA PCR pending   - No acute infectious concerns.     # HEME  - CTA CHEST with RLL segmental and subsegmental PEs   - Continue on FULL DOSE Heparin GTT  - Pt still deciding on oral AC due to insurance and high deductible. Per patient him and sister are working on obtaining medicaid to help pay for medication.    # ONC   - Hx of open right thoracotomy for posterior cardiac mass removal greater than 20 years ago. Patient reports noted to be benign.  - May need CT C/A/P (+/-) repeat thora pending cytopath results  - Holding off on CTAP for now due to uptrending creat.    # ENDOCRINE  - No acute issues noted.     # ETHICS/ GOC    - FULL CODE     # DISPO - TBD

## 2022-09-15 NOTE — DISCHARGE NOTE NURSING/CASE MANAGEMENT/SOCIAL WORK - PATIENT PORTAL LINK FT
You can access the FollowMyHealth Patient Portal offered by [facility] by registering at the following website: [email or web address]. By joining [facility]’s FollowMyHealth portal, you will also be able to view your health information using other applications (apps) compatible with our system.

## 2022-09-15 NOTE — PROGRESS NOTE ADULT - ATTENDING COMMENTS
Agree with above. Doing well. Xray with trapped lung. Ok to transition to capping of pleurx. Monitor clinically. Ok to be discharged with outpatient follow up from IP perspective. Suture removal in 3 weeks in office. Outpatient PET/CT and MRI Brain. Oncology eval once results obtained.

## 2022-09-15 NOTE — PROGRESS NOTE ADULT - ADDITIONAL PE
A&Ox4  Pleurovac in place on L side  Able to move all extremities
A&Ox4  Able to move all extremities

## 2022-09-16 VITALS — WEIGHT: 154.98 LBS

## 2022-09-16 LAB
ANION GAP SERPL CALC-SCNC: 7 MMOL/L — SIGNIFICANT CHANGE UP (ref 7–14)
APTT BLD: 80.1 SEC — HIGH (ref 27–36.3)
APTT BLD: 88.7 SEC — HIGH (ref 27–36.3)
BUN SERPL-MCNC: 20 MG/DL — SIGNIFICANT CHANGE UP (ref 7–23)
CALCIUM SERPL-MCNC: 9.5 MG/DL — SIGNIFICANT CHANGE UP (ref 8.4–10.5)
CHLORIDE SERPL-SCNC: 98 MMOL/L — SIGNIFICANT CHANGE UP (ref 98–107)
CO2 SERPL-SCNC: 29 MMOL/L — SIGNIFICANT CHANGE UP (ref 22–31)
CREAT SERPL-MCNC: 1.15 MG/DL — SIGNIFICANT CHANGE UP (ref 0.5–1.3)
EGFR: 75 ML/MIN/1.73M2 — SIGNIFICANT CHANGE UP
GLUCOSE SERPL-MCNC: 90 MG/DL — SIGNIFICANT CHANGE UP (ref 70–99)
HCT VFR BLD CALC: 40.1 % — SIGNIFICANT CHANGE UP (ref 39–50)
HGB BLD-MCNC: 12.9 G/DL — LOW (ref 13–17)
MAGNESIUM SERPL-MCNC: 1.9 MG/DL — SIGNIFICANT CHANGE UP (ref 1.6–2.6)
MCHC RBC-ENTMCNC: 29.7 PG — SIGNIFICANT CHANGE UP (ref 27–34)
MCHC RBC-ENTMCNC: 32.2 GM/DL — SIGNIFICANT CHANGE UP (ref 32–36)
MCV RBC AUTO: 92.2 FL — SIGNIFICANT CHANGE UP (ref 80–100)
NRBC # BLD: 0 /100 WBCS — SIGNIFICANT CHANGE UP (ref 0–0)
NRBC # FLD: 0 K/UL — SIGNIFICANT CHANGE UP (ref 0–0)
PHOSPHATE SERPL-MCNC: 3.1 MG/DL — SIGNIFICANT CHANGE UP (ref 2.5–4.5)
PLATELET # BLD AUTO: 257 K/UL — SIGNIFICANT CHANGE UP (ref 150–400)
POTASSIUM SERPL-MCNC: 4.9 MMOL/L — SIGNIFICANT CHANGE UP (ref 3.5–5.3)
POTASSIUM SERPL-SCNC: 4.9 MMOL/L — SIGNIFICANT CHANGE UP (ref 3.5–5.3)
RBC # BLD: 4.35 M/UL — SIGNIFICANT CHANGE UP (ref 4.2–5.8)
RBC # FLD: 13.7 % — SIGNIFICANT CHANGE UP (ref 10.3–14.5)
SODIUM SERPL-SCNC: 134 MMOL/L — LOW (ref 135–145)
WBC # BLD: 5.32 K/UL — SIGNIFICANT CHANGE UP (ref 3.8–10.5)
WBC # FLD AUTO: 5.32 K/UL — SIGNIFICANT CHANGE UP (ref 3.8–10.5)

## 2022-09-16 PROCEDURE — 99233 SBSQ HOSP IP/OBS HIGH 50: CPT | Mod: 24

## 2022-09-16 PROCEDURE — 71045 X-RAY EXAM CHEST 1 VIEW: CPT | Mod: 26

## 2022-09-16 RX ORDER — LIDOCAINE 4 G/100G
1 CREAM TOPICAL
Qty: 30 | Refills: 0
Start: 2022-09-16 | End: 2022-10-15

## 2022-09-16 RX ORDER — RIVAROXABAN 15 MG-20MG
15 KIT ORAL
Refills: 0 | Status: DISCONTINUED | OUTPATIENT
Start: 2022-09-16 | End: 2022-09-16

## 2022-09-16 RX ORDER — RIVAROXABAN 15 MG-20MG
1 KIT ORAL
Qty: 1 | Refills: 0
Start: 2022-09-16

## 2022-09-16 RX ORDER — SENNA PLUS 8.6 MG/1
2 TABLET ORAL
Qty: 0 | Refills: 0 | DISCHARGE
Start: 2022-09-16

## 2022-09-16 RX ORDER — ACETAMINOPHEN 500 MG
2 TABLET ORAL
Qty: 0 | Refills: 0 | DISCHARGE
Start: 2022-09-16

## 2022-09-16 RX ORDER — POLYETHYLENE GLYCOL 3350 17 G/17G
17 POWDER, FOR SOLUTION ORAL
Qty: 0 | Refills: 0 | DISCHARGE
Start: 2022-09-16

## 2022-09-16 RX ADMIN — OXYCODONE AND ACETAMINOPHEN 1 TABLET(S): 5; 325 TABLET ORAL at 07:00

## 2022-09-16 RX ADMIN — OXYCODONE AND ACETAMINOPHEN 1 TABLET(S): 5; 325 TABLET ORAL at 06:47

## 2022-09-16 RX ADMIN — CHLORHEXIDINE GLUCONATE 1 APPLICATION(S): 213 SOLUTION TOPICAL at 12:03

## 2022-09-16 RX ADMIN — RIVAROXABAN 15 MILLIGRAM(S): KIT at 12:06

## 2022-09-16 RX ADMIN — INFLUENZA VIRUS VACCINE 0.5 MILLILITER(S): 15; 15; 15; 15 SUSPENSION INTRAMUSCULAR at 14:07

## 2022-09-16 RX ADMIN — HEPARIN SODIUM 1200 UNIT(S)/HR: 5000 INJECTION INTRAVENOUS; SUBCUTANEOUS at 00:52

## 2022-09-16 NOTE — PROGRESS NOTE ADULT - CARDIOVASCULAR
normal/regular rate and rhythm/S1 S2 present/no gallops/no rub/no murmur
regular rate and rhythm
normal/regular rate and rhythm/S1 S2 present/no gallops/no rub/no murmur

## 2022-09-16 NOTE — PROGRESS NOTE ADULT - NS ATTEND AMEND GEN_ALL_CORE FT
Pt is a 57M with PMHx former smoker (cigars quit ~7 months ago)and hx right open thoracotomy for cardiac mass (>20 years ago, [place]) initially presenting as a direct transfer from [facility] with rapidly recurrent large unilateral pleural effusion in the setting of multiple PEs and concern for SAULO mass.     Pt with TTE performed 9/7 showing limited views but with concern for possibly thickened pericardium. CTA Chest (9/7) concerning for multiple RLL PEs as well as a SAULO masslike opacity (3.3 x2 cm), possibly a RUL nodule (1.4x1.3cm), and multiple right subpleural nodules in the background of diffuse bilateral emphysematous changes with a large bleb in the anterior chest. Unknown etiology of resected cardiac mass >20 years ago. No lymphadenopathy appreciated on clinical evaluation. Repeat bedside POCUS today shows re-accumulation of pleural fluid, pt now with large heterogenous appearing pleural effusion with associated atelectasis and diaphragm flattening. Will await cytopathology from thoracentesis, path emailed to expedite. Pt may benefit from bronchoscopy with staging EBUS and/or pleuroscopy on this admission. Will discuss with IP team.    Dispo pending medical optimization. Pt full code. Full A/C with heparin drip. Discussed with pt at bedside today.
Pt is a 57M with PMHx former smoker (cigars quit ~7 months ago)and hx right open thoracotomy for cardiac mass (>20 years ago, [place]) initially presenting as a direct transfer from [facility] with rapidly recurrent large unilateral pleural effusion in the setting of multiple PEs and concern for SAULO mass now s/p pleuroscopy with pleural biopsy 9/14 followed by left pleural catheter insertion with preliminary cells (+) malignancy.     Pt initially presented to [facility] with dyspnea, found to have CTA Chest (9/7) concerning for multiple RLL PEs as well as a SAULO masslike opacity (3.3 x2 cm), possibly a RUL nodule (1.4x1.3cm), and multiple right subpleural nodules in the background of diffuse bilateral emphysematous changes with a large bleb in the anterior chest. Unknown etiology of resected cardiac mass >20 years ago. No lymphadenopathy appreciated on clinical evaluation. Pt underwent large volume thoracentesis at [facility] with repeat bedside POCUS 9/12 showing rapid re-accumulation of pleural fluid with associated atelectasis and diaphragm flattening. TTE performed 9/7 showing limited views but with concern for possibly thickened pericardium. Cytopathology from initial thoracentesis showed atypical cells but unable to determine definitive diagnosis. Pt now s/p pleuroscopy with pleural biopsy 9/14 followed by left pleural catheter insertion with preliminary cells (+) malignancy. Large amount of pleural studding visualized during procedure, preliminary pathology (+) malignancy. Pt with pain at site overnight, chest tube left to suction with improvement of post-operative SQ emphysema. Serial CXRs show non-expanding lung despite drainage of fluid which may be contributing to pain. Discussed with IP, will clamp chest tube with daily drainage <1L/24 hrs. CT A/P, will ultimately need Brain MRI but can schedule for O/P. Will set pt up for oncology visit at [facility].     Pt currently on heparin drip for provoked PEs in the setting of new malignancy diagnosis. Given pt's insurance, he has a large deductible for NOAC. Pt will discuss with insurance and family if okay to transition. Also offered cheaper alternatives (Lovenox or Coumadin) but pt deferred.    Dispo pending medical optimization. Pt full code. Full A/C with heparin drip. Will continue to update pt at bedside.
Pt is a 57M with PMHx former smoker (cigars quit ~7 months ago)and hx right open thoracotomy for cardiac mass (>20 years ago, [place]) initially presenting as a direct transfer from [facility] with rapidly recurrent large unilateral pleural effusion in the setting of multiple PEs and concern for SAULO mass now s/p pleuroscopy with pleural biopsy 9/14 followed by left pleural catheter insertion with preliminary cells (+) malignancy.     Pt initially presented to [facility] with dyspnea, found to have CTA Chest (9/7) concerning for multiple RLL PEs as well as a SAULO masslike opacity (3.3 x2 cm), possibly a RUL nodule (1.4x1.3cm), and multiple right subpleural nodules in the background of diffuse bilateral emphysematous changes with a large bleb in the anterior chest. Unknown etiology of resected cardiac mass >20 years ago. No lymphadenopathy appreciated on clinical evaluation. Pt underwent large volume thoracentesis at [facility] with repeat bedside POCUS 9/12 showing rapid re-accumulation of pleural fluid with associated atelectasis and diaphragm flattening. TTE performed 9/7 showing limited views but with concern for possibly thickened pericardium. Cytopathology from initial thoracentesis showed atypical cells but unable to determine definitive diagnosis. Pt now s/p pleuroscopy with pleural biopsy 9/14 followed by left pleural catheter insertion with preliminary cells (+) malignancy. Large amount of pleural studding visualized during procedure, preliminary pathology (+) malignancySerial CXRs show non-expanding lung despite drainage of fluid which may be contributing to pain. Post-operative SQ emphysema improving. PleurX now clamped and dressed. Will c/w 3x/day dressings. CT A/P, will ultimately need Brain MRI but can schedule for O/P. Will set pt up for oncology visit at [facility].     Pt currently on heparin drip for provoked PEs in the setting of new malignancy diagnosis. Given pt's insurance, he has a large deductible for NOAC which pt cannot afford. Placed pt on Xarelto Assistance Program ($10 co-pay) with vivo, will transition today.     Pt medically optimized for hospital discharge. Pt full code. Will continue to update pt at bedside.
Pt is a 57M with PMHx former smoker (cigars quit ~7 months ago)and hx right open thoracotomy for cardiac mass (>20 years ago, [place]) initially presenting as a direct transfer from [facility] with rapidly recurrent large unilateral pleural effusion in the setting of multiple PEs and concern for SAULO mass.     Pt with TTE performed 9/7 showing limited views but with concern for possibly thickened pericardium. CTA Chest (9/7) concerning for multiple RLL PEs as well as a SAULO masslike opacity (3.3 x2 cm), possibly a RUL nodule (1.4x1.3cm), and multiple right subpleural nodules in the background of diffuse bilateral emphysematous changes with a large bleb in the anterior chest. Unknown etiology of resected cardiac mass >20 years ago. No lymphadenopathy appreciated on clinical evaluation. Repeat bedside POCUS 9/12 shows re-accumulation of pleural fluid, pt now has a large heterogenous appearing pleural effusion with associated atelectasis and diaphragm flattening. Cytopathology from initial thoracentesis shows atypical cells but unable to determine definitive diagnosis. Plan for diagnostic pleuroscopy with pleural biopsies today 9/14 with IP team.     Dispo pending medical optimization. Pt full code. Full A/C with heparin drip, held since MN for procedure this morning. Will continue to update pt at bedside.
Pt is a 57M with PMHx former smoker (cigars quit ~7 months ago)and hx right open thoracotomy for cardiac mass (>20 years ago, [place]) initially presenting as a direct transfer from [facility] with rapidly recurrent large unilateral pleural effusion in the setting of multiple PEs and concern for SAULO mass.     Pt with TTE performed 9/7 showing limited views but with concern for possibly thickened pericardium. CTA Chest (9/7) concerning for multiple RLL PEs as well as a SAULO masslike opacity (3.3 x2 cm), possibly a RUL nodule (1.4x1.3cm), and multiple right subpleural nodules in the background of diffuse bilateral emphysematous changes with a large bleb in the anterior chest. Unknown etiology of resected cardiac mass >20 years ago. No lymphadenopathy appreciated on clinical evaluation. Repeat bedside POCUS 9/12 shows re-accumulation of pleural fluid, pt now has a large heterogenous appearing pleural effusion with associated atelectasis and diaphragm flattening. Cytopathology from initial thoracentesis shows atypical cells but unable to determine definitive diagnosis. Plan for diagnostic pleuroscopy with pleural biopsies tomorrow 9/14 with IP team.     Dispo pending medical optimization. Pt full code. Full A/C with heparin drip, hold A/C at MN and NMP after MN today. Discussed with pt at bedside today.
57 YO M with PMHx of former smoker,  s/p right open thoracotomy for cardiac mass removal 20 years ago who presented to [facility] on 9/7 with SOB, Orthopnea, BL LE edema and unintentional weight loss and found with large left pleural effusion and RLL subsegmental/ segmental PE. s/p thoracentesis (9/7) and course complicated left hydropneumothorax with rapid reaccumulation Now transferred to [facility] 9/10 for further management.   He appears comfortable today.  Decreased BS noted in left base. On IV heparin for PE.  CTimages personally reviewed and agree with findings. In addition has emphysematous change throughout lungs. Denies dyspnea or wheezing.  Await cytology. Agree with IP evaluation. Will need PFTs as outpatient. Agree with plan as outlined above.

## 2022-09-16 NOTE — DIETITIAN INITIAL EVALUATION ADULT - NS FNS DIET ORDER
Diet, Regular (09-14-22 @ 17:20) [Active]  Diet, Clear Liquid (09-14-22 @ 16:41) [Available for Activation]  Diet, NPO after Midnight:      NPO Start Date: 13-Sep-2022,   NPO Start Time: 23:59 (09-13-22 @ 11:26) [Active]

## 2022-09-16 NOTE — DIETITIAN INITIAL EVALUATION ADULT - OTHER INFO
Per chart, 57 y/o male with PMHx of former smoker (2 "black and mild" cigars daily for 8-9 years and quit 6 months ago) and s/p right open thoracotomy for cardiac mass removal 20 years ago who presented to [facility] on 9/7 with SOB, Orthopnea, BL LE edema and unintentional weight loss and found with large left pleural effusion and RLL subsegmental/ segmental PE. s/p thoracentesis (9/7) and course complicated left hydropneumothorax with rapid reaccumulation Now transferred to [facility] 9/10 for further management.       Nutrition interview: No recent episodes of nausea, vomiting, diarrhea or constipation, BM noted today per Pt. Denies any chewing/swallowing difficulties. No food allergies. Stated UBW: ~155#, in line with current wt. Food preferences explored and noted. Intake is % per RN flowsheets and per pt. Feeding skills: independent.     RD provided pt with verbal and written education regarding heart healthy/ low sodium nutrition therapy, (limiting fat, limiting sodium, foods recommended and foods to avoid). Pt confirmed understanding and compliance to this information.

## 2022-09-16 NOTE — DIETITIAN INITIAL EVALUATION ADULT - NSFNSGIIOFT_GEN_A_CORE
09-15-22 @ 07:01  -  09-16-22 @ 07:00  --------------------------------------------------------  OUT:    Chest Tube (mL): 160 mL  Total OUT: 160 mL    Total NET: -160 mL

## 2022-09-16 NOTE — DIETITIAN INITIAL EVALUATION ADULT - NSICDXPASTSURGICALHX_GEN_ALL_CORE_FT
PAST SURGICAL HISTORY:  Status post cardiac surgery s/p open right thoracotomy for posterior cardiac mass removal > 20 years ago, reported by patient to be benign.

## 2022-09-16 NOTE — PROGRESS NOTE ADULT - SUBJECTIVE AND OBJECTIVE BOX
CHIEF COMPLAINT: Patient is a 57y old  Male who presents with a chief complaint of L HydroPTX (15 Sep 2022 11:25)      Interval Events: Pt seen at bedside s/p pleurex cath insertion     REVIEW OF SYSTEMS:  [ ] All other systems negative            OBJECTIVE:  ICU Vital Signs Last 24 Hrs  T(C): 37.1 (16 Sep 2022 06:45), Max: 37.1 (16 Sep 2022 06:45)  T(F): 98.7 (16 Sep 2022 06:45), Max: 98.7 (16 Sep 2022 06:45)  HR: 82 (16 Sep 2022 06:45) (76 - 82)  BP: 134/87 (16 Sep 2022 06:45) (110/83 - 134/87)  BP(mean): --  ABP: --  ABP(mean): --  RR: 19 (16 Sep 2022 06:45) (18 - 20)  SpO2: 97% (16 Sep 2022 06:45) (94% - 97%)    O2 Parameters below as of 16 Sep 2022 06:45  Patient On (Oxygen Delivery Method): room air              09-15 @ 07:01  -  09-16 @ 07:00  --------------------------------------------------------  IN: 0 mL / OUT: 600 mL / NET: -600 mL      CAPILLARY BLOOD GLUCOSE          HOSPITAL MEDICATIONS:  MEDICATIONS  (STANDING):  chlorhexidine 2% Cloths 1 Application(s) Topical daily  heparin  Infusion. 1100 Unit(s)/Hr (11 mL/Hr) IV Continuous <Continuous>  influenza   Vaccine 0.5 milliLiter(s) IntraMuscular once  lidocaine   4% Patch 1 Patch Transdermal every 24 hours  polyethylene glycol 3350 17 Gram(s) Oral daily  senna 2 Tablet(s) Oral at bedtime    MEDICATIONS  (PRN):  acetaminophen     Tablet .. 650 milliGRAM(s) Oral every 6 hours PRN Temp greater or equal to 38C (100.4F), Mild Pain (1 - 3), Moderate Pain (4 - 6)  ALBUTerol    0.083% 2.5 milliGRAM(s) Nebulizer every 6 hours PRN Shortness of Breath and/or Wheezing  heparin   Injectable 5500 Unit(s) IV Push every 6 hours PRN For aPTT less than 40  heparin   Injectable 2500 Unit(s) IV Push every 6 hours PRN For aPTT between 40 - 57  oxycodone    5 mG/acetaminophen 325 mG 1 Tablet(s) Oral every 6 hours PRN Severe Pain (7 - 10)      LABS:                        12.9   5.32  )-----------( 257      ( 16 Sep 2022 06:54 )             40.1     09-15    132<L>  |  97<L>  |  24<H>  ----------------------------<  116<H>  5.1   |  26  |  1.31<H>    Ca    9.4      15 Sep 2022 05:35  Phos  3.6     09-15  Mg     1.90     09-15      PT/INR - ( 15 Sep 2022 05:35 )   PT: 12.3 sec;   INR: 1.06 ratio         PTT - ( 16 Sep 2022 06:54 )  PTT:88.7 sec          PAST MEDICAL & SURGICAL HISTORY:  Former smoker      Status post cardiac surgery  s/p open right thoracotomy for posterior cardiac mass removal &gt; 20 years ago, reported by patient to be benign.          FAMILY HISTORY:  Family history of cancer in father  Cancer of unknown origin on Father&#x27;s side.        Social History:  Unmarried, has two children, lives in [place] but flew to [place] to help Sister who is sick.   Unvaccinated for COVID (10 Sep 2022 14:18)      RADIOLOGY:  [ ] Reviewed and interpreted by me    PULMONARY FUNCTION TESTS:    EKG: CHIEF COMPLAINT: Patient is a 57y old  Male who presents with a chief complaint of L HydroPTX (15 Sep 2022 11:25)      Interval Events: Pt seen at bedside s/p pleurex cath insertion     REVIEW OF SYSTEMS:  Denies fever /chills   + HORTON   No GI sx   [x ] All other systems negative            OBJECTIVE:  ICU Vital Signs Last 24 Hrs  T(C): 37.1 (16 Sep 2022 06:45), Max: 37.1 (16 Sep 2022 06:45)  T(F): 98.7 (16 Sep 2022 06:45), Max: 98.7 (16 Sep 2022 06:45)  HR: 82 (16 Sep 2022 06:45) (76 - 82)  BP: 134/87 (16 Sep 2022 06:45) (110/83 - 134/87)  BP(mean): --  ABP: --  ABP(mean): --  RR: 19 (16 Sep 2022 06:45) (18 - 20)  SpO2: 97% (16 Sep 2022 06:45) (94% - 97%)    O2 Parameters below as of 16 Sep 2022 06:45  Patient On (Oxygen Delivery Method): room air              09-15 @ 07:01  -  09-16 @ 07:00  --------------------------------------------------------  IN: 0 mL / OUT: 600 mL / NET: -600 mL      CAPILLARY BLOOD GLUCOSE          HOSPITAL MEDICATIONS:  MEDICATIONS  (STANDING):  chlorhexidine 2% Cloths 1 Application(s) Topical daily  heparin  Infusion. 1100 Unit(s)/Hr (11 mL/Hr) IV Continuous <Continuous>  influenza   Vaccine 0.5 milliLiter(s) IntraMuscular once  lidocaine   4% Patch 1 Patch Transdermal every 24 hours  polyethylene glycol 3350 17 Gram(s) Oral daily  senna 2 Tablet(s) Oral at bedtime    MEDICATIONS  (PRN):  acetaminophen     Tablet .. 650 milliGRAM(s) Oral every 6 hours PRN Temp greater or equal to 38C (100.4F), Mild Pain (1 - 3), Moderate Pain (4 - 6)  ALBUTerol    0.083% 2.5 milliGRAM(s) Nebulizer every 6 hours PRN Shortness of Breath and/or Wheezing  heparin   Injectable 5500 Unit(s) IV Push every 6 hours PRN For aPTT less than 40  heparin   Injectable 2500 Unit(s) IV Push every 6 hours PRN For aPTT between 40 - 57  oxycodone    5 mG/acetaminophen 325 mG 1 Tablet(s) Oral every 6 hours PRN Severe Pain (7 - 10)      LABS:                        12.9   5.32  )-----------( 257      ( 16 Sep 2022 06:54 )             40.1     09-15    132<L>  |  97<L>  |  24<H>  ----------------------------<  116<H>  5.1   |  26  |  1.31<H>    Ca    9.4      15 Sep 2022 05:35  Phos  3.6     09-15  Mg     1.90     09-15      PT/INR - ( 15 Sep 2022 05:35 )   PT: 12.3 sec;   INR: 1.06 ratio         PTT - ( 16 Sep 2022 06:54 )  PTT:88.7 sec          PAST MEDICAL & SURGICAL HISTORY:  Former smoker      Status post cardiac surgery  s/p open right thoracotomy for posterior cardiac mass removal &gt; 20 years ago, reported by patient to be benign.          FAMILY HISTORY:  Family history of cancer in father  Cancer of unknown origin on Father&#x27;s side.        Social History:  Unmarried, has two children, lives in [place] but flew to [place] to help Sister who is sick.   Unvaccinated for COVID (10 Sep 2022 14:18)      RADIOLOGY:  [ ] Reviewed and interpreted by me    PULMONARY FUNCTION TESTS:    EKG:

## 2022-09-16 NOTE — PROGRESS NOTE ADULT - RESPIRATORY
normal/clear to auscultation bilaterally/no wheezes/no rales/no rhonchi
normal/clear to auscultation bilaterally/no wheezes/no rales/no rhonchi
L pleurX catheter/clear to auscultation bilaterally

## 2022-09-16 NOTE — DIETITIAN INITIAL EVALUATION ADULT - PERTINENT LABORATORY DATA
09-16 Na 134 mmol/L<L> Glu 90 mg/dL K+ 4.9 mmol/L Cr 1.15 mg/dL BUN 20 mg/dL Phos 3.1 mg/dL      A1C with Estimated Average Glucose Result: 5.8 % (09-11-22 @ 04:28)

## 2022-09-16 NOTE — DIETITIAN INITIAL EVALUATION ADULT - ORAL INTAKE PTA/DIET HISTORY
Pt lives at home with his sister. They cook together at home. No difficulty obtaining groceries. No specific diet followed PTA. Was taking a multivitamin once daily PTA.

## 2022-09-16 NOTE — DIETITIAN INITIAL EVALUATION ADULT - PERTINENT MEDS FT
MEDICATIONS  (STANDING):  chlorhexidine 2% Cloths 1 Application(s) Topical daily  heparin  Infusion. 1100 Unit(s)/Hr (11 mL/Hr) IV Continuous <Continuous>  lidocaine   4% Patch 1 Patch Transdermal every 24 hours  polyethylene glycol 3350 17 Gram(s) Oral daily  rivaroxaban 15 milliGRAM(s) Oral two times a day with meals  senna 2 Tablet(s) Oral at bedtime    MEDICATIONS  (PRN):  acetaminophen     Tablet .. 650 milliGRAM(s) Oral every 6 hours PRN Temp greater or equal to 38C (100.4F), Mild Pain (1 - 3), Moderate Pain (4 - 6)  ALBUTerol    0.083% 2.5 milliGRAM(s) Nebulizer every 6 hours PRN Shortness of Breath and/or Wheezing  heparin   Injectable 5500 Unit(s) IV Push every 6 hours PRN For aPTT less than 40  heparin   Injectable 2500 Unit(s) IV Push every 6 hours PRN For aPTT between 40 - 57  oxycodone    5 mG/acetaminophen 325 mG 1 Tablet(s) Oral every 6 hours PRN Severe Pain (7 - 10)

## 2022-09-20 LAB
NON-GYNECOLOGICAL CYTOLOGY STUDY: SIGNIFICANT CHANGE UP
SURGICAL PATHOLOGY STUDY: SIGNIFICANT CHANGE UP

## 2022-09-22 ENCOUNTER — OUTPATIENT (OUTPATIENT)
Dept: OUTPATIENT SERVICES | Facility: HOSPITAL | Age: 57
LOS: 1 days | Discharge: HOME | End: 2022-09-22

## 2022-09-22 DIAGNOSIS — C34.90 MALIGNANT NEOPLASM OF UNSPECIFIED PART OF UNSPECIFIED BRONCHUS OR LUNG: ICD-10-CM

## 2022-09-22 DIAGNOSIS — Z98.890 OTHER SPECIFIED POSTPROCEDURAL STATES: Chronic | ICD-10-CM

## 2022-09-27 ENCOUNTER — RESULT REVIEW (OUTPATIENT)
Age: 57
End: 2022-09-27

## 2022-09-27 ENCOUNTER — APPOINTMENT (OUTPATIENT)
Dept: HEMATOLOGY ONCOLOGY | Facility: CLINIC | Age: 57
End: 2022-09-27

## 2022-09-27 ENCOUNTER — NON-APPOINTMENT (OUTPATIENT)
Age: 57
End: 2022-09-27

## 2022-09-27 VITALS
RESPIRATION RATE: 16 BRPM | TEMPERATURE: 97 F | BODY MASS INDEX: 20.97 KG/M2 | HEART RATE: 93 BPM | WEIGHT: 156.53 LBS | DIASTOLIC BLOOD PRESSURE: 84 MMHG | SYSTOLIC BLOOD PRESSURE: 124 MMHG | HEIGHT: 72.56 IN | OXYGEN SATURATION: 99 %

## 2022-09-27 LAB
BASOPHILS # BLD AUTO: 0.03 K/UL — SIGNIFICANT CHANGE UP (ref 0–0.2)
BASOPHILS NFR BLD AUTO: 0.6 % — SIGNIFICANT CHANGE UP (ref 0–2)
EOSINOPHIL # BLD AUTO: 0.13 K/UL — SIGNIFICANT CHANGE UP (ref 0–0.5)
EOSINOPHIL NFR BLD AUTO: 2.8 % — SIGNIFICANT CHANGE UP (ref 0–6)
HCT VFR BLD CALC: 36.9 % — LOW (ref 39–50)
HGB BLD-MCNC: 11.8 G/DL — LOW (ref 13–17)
IMM GRANULOCYTES NFR BLD AUTO: 0.2 % — SIGNIFICANT CHANGE UP (ref 0–0.9)
LYMPHOCYTES # BLD AUTO: 0.92 K/UL — LOW (ref 1–3.3)
LYMPHOCYTES # BLD AUTO: 19.7 % — SIGNIFICANT CHANGE UP (ref 13–44)
MCHC RBC-ENTMCNC: 30.1 PG — SIGNIFICANT CHANGE UP (ref 27–34)
MCHC RBC-ENTMCNC: 32 G/DL — SIGNIFICANT CHANGE UP (ref 32–36)
MCV RBC AUTO: 94.1 FL — SIGNIFICANT CHANGE UP (ref 80–100)
MONOCYTES # BLD AUTO: 0.45 K/UL — SIGNIFICANT CHANGE UP (ref 0–0.9)
MONOCYTES NFR BLD AUTO: 9.6 % — SIGNIFICANT CHANGE UP (ref 2–14)
NEUTROPHILS # BLD AUTO: 3.14 K/UL — SIGNIFICANT CHANGE UP (ref 1.8–7.4)
NEUTROPHILS NFR BLD AUTO: 67.1 % — SIGNIFICANT CHANGE UP (ref 43–77)
NRBC # BLD: 0 /100 WBCS — SIGNIFICANT CHANGE UP (ref 0–0)
PLATELET # BLD AUTO: 355 K/UL — SIGNIFICANT CHANGE UP (ref 150–400)
RBC # BLD: 3.92 M/UL — LOW (ref 4.2–5.8)
RBC # FLD: 13.3 % — SIGNIFICANT CHANGE UP (ref 10.3–14.5)
WBC # BLD: 4.68 K/UL — SIGNIFICANT CHANGE UP (ref 3.8–10.5)
WBC # FLD AUTO: 4.68 K/UL — SIGNIFICANT CHANGE UP (ref 3.8–10.5)

## 2022-09-27 PROCEDURE — 99205 OFFICE O/P NEW HI 60 MIN: CPT

## 2022-10-03 ENCOUNTER — APPOINTMENT (OUTPATIENT)
Dept: PULMONOLOGY | Facility: CLINIC | Age: 57
End: 2022-10-03
Payer: COMMERCIAL

## 2022-10-03 VITALS
BODY MASS INDEX: 20.72 KG/M2 | WEIGHT: 153 LBS | TEMPERATURE: 97.2 F | SYSTOLIC BLOOD PRESSURE: 142 MMHG | HEIGHT: 72 IN | OXYGEN SATURATION: 98 % | DIASTOLIC BLOOD PRESSURE: 87 MMHG | HEART RATE: 89 BPM

## 2022-10-03 PROCEDURE — 99214 OFFICE O/P EST MOD 30 MIN: CPT | Mod: 25

## 2022-10-03 PROCEDURE — 76604 US EXAM CHEST: CPT

## 2022-10-03 NOTE — REASON FOR VISIT
[Follow-Up] : a follow-up visit [TextBox_44] : post-op follow up after pleuroscopy and pleural biopsy and tipc placement

## 2022-10-03 NOTE — ASSESSMENT
[FreeTextEntry1] : \par 57-year-old male with newly diagnosed metastatic malignancy lung cancer with malignant pleural effusion and trapped lung after tunneled pleural catheter placement presents for follow-up clinically the patient is doing well.  Has been having issues with current Pleurx bottles and therefore we will try to connect him with the Pleurx patient assistance program.  An additional bottle was provided to him in clinic today.  Sutures removed in clinic.  The site is healed well.\par \par Continue drainage twice a week. Catheter protocols reviewed. All information provided. Adequate equipment available. Advised to call if experiencing any pain with drainage. Advised to call if any change in color of fluid or erythema or swelling at catheter site or experiencing systemic signs and symptoms of infection such as fever. Advised to call if any sudden worsening of shortness of breath. Catheter care instructions provided.\par \par Follow-up in 2 to 4 weeks.\par

## 2022-10-03 NOTE — HISTORY OF PRESENT ILLNESS
[TextBox_4] : \par Interventional Pulmonology Consultation/Visit Note\par \par This 77-year-old male with newly diagnosed non-small cell lung cancer who presented to the hospital with a recurrent left-sided hydropneumothorax with trapped lung and underwent a pleuroscopy with pleural biopsy as well as tunneled pleural catheter placement.  The patient's presents today for postoperative follow-up.\par \par Has been draining about 300 to 400 cc 3 times a week.  Clinically feeling well dyspnea is well controlled.  Has been seen by medical oncology and is being worked up for starting systemic therapy.\par \par Pleurx site looks well.  Pleuroscopy site looks well.  Sutures removed in office today.

## 2022-10-03 NOTE — PHYSICAL EXAM
[Restricted in physically strenuous activity but ambulatory and able to carry out work of a light or sedentary nature] : Status 1- Restricted in physically strenuous activity but ambulatory and able to carry out work of a light or sedentary nature, e.g., light house work, office work [Normal] : affect appropriate [de-identified] : Crackles on the right base

## 2022-10-03 NOTE — PROCEDURE
[Thoracic Ultrasound] : Thoracic Ultrasound [Pleural Effusion] : Pleural Effusion: Yes [Simple] : simple [de-identified] : Dsypnea [FreeTextEntry2] : Small left effusion, with air likely hydropneumothorax.

## 2022-10-03 NOTE — HISTORY OF PRESENT ILLNESS
[de-identified] : 57m with newly diagnosed adenocarcinoma of the lung presenting for initial oncology evaluation. \par Mr Cordova presented to the hospital with SOB and was found to have a left-sided hydropneumothorax with trapped lung and underwent a pleuroscopy with pleural biopsy as well as tunneled pleural catheter placement. \par \par SOB has now improved. \par He reports some weight loss in the past 2-3 months, probably about 10lb, no pain, no cough.\par \par Path c/w adenocarcinoma of the lung, PDL1 10%, NGS sent, results are expected back on 10/5/22.\par \par Today he presents alone. he is a retired , worked in [place]. \par He lives with his sister, has children in [place]. \par Works as a  in a store.\par Used to smoke cigars until last year, occasional marijuana, no drugs, occasional EtOH\par

## 2022-10-03 NOTE — PHYSICAL EXAM
[No Acute Distress] : no acute distress [Normal Oropharynx] : normal oropharynx [Normal Appearance] : normal appearance [No Resp Distress] : no resp distress [No Acc Muscle Use] : no acc muscle use [Benign] : benign [Normal Gait] : normal gait [No Clubbing] : no clubbing [Normal Color/ Pigmentation] : normal color/ pigmentation [No Focal Deficits] : no focal deficits [Oriented x3] : oriented x3 [Normal Mood] : normal mood [Normal Affect] : normal affect [TextBox_68] : Reduced breath sounds left side [TextBox_80] : Pleuroscopy site is healed well.  Sutures removed in the office.  Sutures on Pleurx removed in office.

## 2022-10-03 NOTE — ASSESSMENT
[FreeTextEntry1] : 57m with recently diagnosed adenocarcinoma of the lung, with positive pleural effusion and involvement of the pleura, s/p pleurX placement, presenting to establish care. \par \par Staging work up to be done asap. He is claustrophobic and would like to avoid mri.\par \par Clinical course of the disease, work up to date and lab results were discussed with patient in detail. \par All questions answered.\par \par -CT head with contrast\par -PET/CT\par -Next generation sequencing\par -Follow Pulm\par -Blood work today\par -RTC after the above to discuss treatment options\par \par Tiffani Gunn MD\par Medical Oncology and Hematology\par Total time of this visit, including time spent face to face with patient and/or via video/audio, and also in preparing for today's visit for MDM and documentation (Medical Decision Making, including consideration of possible diagnoses, management options, complex medical record review, review of diagnostic tests and information, consideration and discussion of significant complications based on comorbidities, and discussion with providers involved with the care of the patient) 75 minutes.\par \par

## 2022-10-08 LAB
CULTURE RESULTS: SIGNIFICANT CHANGE UP
SPECIMEN SOURCE: SIGNIFICANT CHANGE UP

## 2022-10-10 ENCOUNTER — APPOINTMENT (OUTPATIENT)
Dept: PULMONOLOGY | Facility: CLINIC | Age: 57
End: 2022-10-10
Payer: COMMERCIAL

## 2022-10-10 VITALS
TEMPERATURE: 97.5 F | OXYGEN SATURATION: 96 % | HEART RATE: 107 BPM | SYSTOLIC BLOOD PRESSURE: 119 MMHG | DIASTOLIC BLOOD PRESSURE: 75 MMHG | RESPIRATION RATE: 17 BRPM

## 2022-10-10 PROCEDURE — 99214 OFFICE O/P EST MOD 30 MIN: CPT

## 2022-10-10 PROCEDURE — 99024 POSTOP FOLLOW-UP VISIT: CPT

## 2022-10-10 PROCEDURE — 99204 OFFICE O/P NEW MOD 45 MIN: CPT

## 2022-10-18 ENCOUNTER — APPOINTMENT (OUTPATIENT)
Dept: PULMONOLOGY | Facility: CLINIC | Age: 57
End: 2022-10-18
Payer: COMMERCIAL

## 2022-10-18 ENCOUNTER — NON-APPOINTMENT (OUTPATIENT)
Age: 57
End: 2022-10-18

## 2022-10-18 VITALS
SYSTOLIC BLOOD PRESSURE: 144 MMHG | RESPIRATION RATE: 16 BRPM | DIASTOLIC BLOOD PRESSURE: 91 MMHG | OXYGEN SATURATION: 94 % | BODY MASS INDEX: 21.97 KG/M2 | TEMPERATURE: 97.3 F | HEART RATE: 90 BPM | WEIGHT: 162 LBS

## 2022-10-18 PROCEDURE — 99214 OFFICE O/P EST MOD 30 MIN: CPT | Mod: 25

## 2022-10-18 PROCEDURE — 32554Z: CUSTOM | Mod: 58,RT

## 2022-10-18 RX ORDER — RIVAROXABAN 15 MG/1
15 TABLET, FILM COATED ORAL TWICE DAILY
Qty: 60 | Refills: 0 | Status: DISCONTINUED | COMMUNITY
Start: 2022-10-18 | End: 2022-10-18

## 2022-10-18 RX ORDER — RIVAROXABAN 15 MG/1
15 TABLET, FILM COATED ORAL TWICE DAILY
Qty: 60 | Refills: 0 | Status: COMPLETED | COMMUNITY

## 2022-10-19 ENCOUNTER — NON-APPOINTMENT (OUTPATIENT)
Age: 57
End: 2022-10-19

## 2022-10-19 VITALS — WEIGHT: 160.94 LBS

## 2022-10-25 ENCOUNTER — RESULT REVIEW (OUTPATIENT)
Age: 57
End: 2022-10-25

## 2022-10-25 ENCOUNTER — APPOINTMENT (OUTPATIENT)
Dept: PULMONOLOGY | Facility: CLINIC | Age: 57
End: 2022-10-25

## 2022-10-25 ENCOUNTER — APPOINTMENT (OUTPATIENT)
Dept: HEMATOLOGY ONCOLOGY | Facility: CLINIC | Age: 57
End: 2022-10-25

## 2022-10-25 ENCOUNTER — APPOINTMENT (OUTPATIENT)
Dept: PULMONOLOGY | Facility: HOSPITAL | Age: 57
End: 2022-10-25

## 2022-10-25 VITALS
BODY MASS INDEX: 21.4 KG/M2 | DIASTOLIC BLOOD PRESSURE: 94 MMHG | HEIGHT: 72 IN | OXYGEN SATURATION: 91 % | TEMPERATURE: 97.5 F | HEART RATE: 101 BPM | SYSTOLIC BLOOD PRESSURE: 154 MMHG | WEIGHT: 158 LBS

## 2022-10-25 VITALS
DIASTOLIC BLOOD PRESSURE: 90 MMHG | HEART RATE: 95 BPM | OXYGEN SATURATION: 94 % | SYSTOLIC BLOOD PRESSURE: 149 MMHG | HEIGHT: 71.97 IN | RESPIRATION RATE: 16 BRPM | TEMPERATURE: 97.4 F | WEIGHT: 160.94 LBS | BODY MASS INDEX: 21.8 KG/M2

## 2022-10-25 LAB
BASOPHILS # BLD AUTO: 0.03 K/UL — SIGNIFICANT CHANGE UP (ref 0–0.2)
BASOPHILS NFR BLD AUTO: 0.6 % — SIGNIFICANT CHANGE UP (ref 0–2)
EOSINOPHIL # BLD AUTO: 0.1 K/UL — SIGNIFICANT CHANGE UP (ref 0–0.5)
EOSINOPHIL NFR BLD AUTO: 1.9 % — SIGNIFICANT CHANGE UP (ref 0–6)
HCT VFR BLD CALC: 35.4 % — LOW (ref 39–50)
HGB BLD-MCNC: 11.4 G/DL — LOW (ref 13–17)
IMM GRANULOCYTES NFR BLD AUTO: 0.2 % — SIGNIFICANT CHANGE UP (ref 0–0.9)
LYMPHOCYTES # BLD AUTO: 0.82 K/UL — LOW (ref 1–3.3)
LYMPHOCYTES # BLD AUTO: 15.2 % — SIGNIFICANT CHANGE UP (ref 13–44)
MCHC RBC-ENTMCNC: 29.3 PG — SIGNIFICANT CHANGE UP (ref 27–34)
MCHC RBC-ENTMCNC: 32.2 G/DL — SIGNIFICANT CHANGE UP (ref 32–36)
MCV RBC AUTO: 91 FL — SIGNIFICANT CHANGE UP (ref 80–100)
MONOCYTES # BLD AUTO: 0.65 K/UL — SIGNIFICANT CHANGE UP (ref 0–0.9)
MONOCYTES NFR BLD AUTO: 12.1 % — SIGNIFICANT CHANGE UP (ref 2–14)
NEUTROPHILS # BLD AUTO: 3.77 K/UL — SIGNIFICANT CHANGE UP (ref 1.8–7.4)
NEUTROPHILS NFR BLD AUTO: 70 % — SIGNIFICANT CHANGE UP (ref 43–77)
NRBC # BLD: 0 /100 WBCS — SIGNIFICANT CHANGE UP (ref 0–0)
PLATELET # BLD AUTO: 319 K/UL — SIGNIFICANT CHANGE UP (ref 150–400)
RBC # BLD: 3.89 M/UL — LOW (ref 4.2–5.8)
RBC # FLD: 13 % — SIGNIFICANT CHANGE UP (ref 10.3–14.5)
WBC # BLD: 5.38 K/UL — SIGNIFICANT CHANGE UP (ref 3.8–10.5)
WBC # FLD AUTO: 5.38 K/UL — SIGNIFICANT CHANGE UP (ref 3.8–10.5)

## 2022-10-25 PROCEDURE — 99215 OFFICE O/P EST HI 40 MIN: CPT

## 2022-10-25 PROCEDURE — 99213 OFFICE O/P EST LOW 20 MIN: CPT | Mod: 24

## 2022-10-25 PROCEDURE — 93010 ELECTROCARDIOGRAM REPORT: CPT

## 2022-10-25 RX ORDER — METOCLOPRAMIDE 10 MG/1
10 TABLET ORAL
Qty: 60 | Refills: 2 | Status: ACTIVE | COMMUNITY
Start: 2022-10-25 | End: 1900-01-01

## 2022-10-25 NOTE — HISTORY OF PRESENT ILLNESS
[TextBox_4] : \par This 77-year-old male with pmhx of newly diagnosed non-small cell lung cancer and hydropneumothorax with trapped lung s/p pleurx cath placement 9/15, who presents today for acute situation. Pt not able to pay $900 deductible so not able to get pleurx cath supplies delivered to his home. Pt reports having SOB which improves after drainage. Fullness and pressure on bladder. \par \par Pt called a few non-profit organizations to help him pay for the pleurx supplies and still waiting to hear back from one.  Has an appointment with Dr. Gunn today to discuss cancer treatment.

## 2022-10-25 NOTE — ASSESSMENT
[FreeTextEntry1] : This is a 56 y/o AA male with hemothorax s/p pleurx cath placement 9/15 at [facility], who presents today for SOB and dyspnea. He was unable to get pleurx drained for 1 week. \par \par Pt unable to pay out-of-pocket for pleurx supplies. Will reach out to Dr. Gunn's office for assistance with this matter. Pt will need a  to make arrangements for at home pleurx supplies.\par \par I have spent 25 mins of time on this encounter. Greater than 50% of the face-to-face encounter time was spent discussing risks and benefits of the intervention. Time was also spent in reviewing labs and medications. Will coordinate discussion with Dr. Gunn involved in the care of the patient.

## 2022-10-25 NOTE — REASON FOR VISIT
[Acute] : an acute visit [Lung Cancer] : lung cancer [Shortness of Breath] : shortness of breath [TextBox_44] : pleurx drainage

## 2022-10-25 NOTE — PHYSICAL EXAM
[Well Nourished] : well nourished [Well Groomed] : well groomed [Normal Appearance] : normal appearance [Normal Rate/Rhythm] : normal rate/rhythm [No Acc Muscle Use] : no acc muscle use [Normal Rhythm and Effort] : normal rhythm and effort [No Edema] : no edema [Normal Color/ Pigmentation] : normal color/ pigmentation [Oriented x3] : oriented x3 [TextBox_80] : left sided pleurx cath intact

## 2022-10-25 NOTE — PROCEDURE
[FreeTextEntry1] : Performed pleurx drainage in office using sterile technique. An amount of 550cc was drained from pleurx.

## 2022-10-25 NOTE — REVIEW OF SYSTEMS
[Fatigue] : fatigue [Chest Tightness] : chest tightness [Dyspnea] : dyspnea [Pleuritic Pain] : pleuritic pain [SOB on Exertion] : sob on exertion [Fever] : no fever [Edema] : no edema

## 2022-10-26 ENCOUNTER — NON-APPOINTMENT (OUTPATIENT)
Age: 57
End: 2022-10-26

## 2022-10-27 ENCOUNTER — NON-APPOINTMENT (OUTPATIENT)
Age: 57
End: 2022-10-27

## 2022-10-27 ENCOUNTER — APPOINTMENT (OUTPATIENT)
Dept: INFUSION THERAPY | Facility: HOSPITAL | Age: 57
End: 2022-10-27

## 2022-10-27 DIAGNOSIS — R11.2 NAUSEA WITH VOMITING, UNSPECIFIED: ICD-10-CM

## 2022-10-27 DIAGNOSIS — Z51.11 ENCOUNTER FOR ANTINEOPLASTIC CHEMOTHERAPY: ICD-10-CM

## 2022-10-28 ENCOUNTER — NON-APPOINTMENT (OUTPATIENT)
Age: 57
End: 2022-10-28

## 2022-10-31 ENCOUNTER — LABORATORY RESULT (OUTPATIENT)
Age: 57
End: 2022-10-31

## 2022-10-31 ENCOUNTER — APPOINTMENT (OUTPATIENT)
Dept: PULMONOLOGY | Facility: CLINIC | Age: 57
End: 2022-10-31

## 2022-10-31 VITALS
TEMPERATURE: 98.4 F | BODY MASS INDEX: 22.12 KG/M2 | HEIGHT: 71 IN | OXYGEN SATURATION: 96 % | WEIGHT: 158 LBS | SYSTOLIC BLOOD PRESSURE: 125 MMHG | HEART RATE: 94 BPM | DIASTOLIC BLOOD PRESSURE: 55 MMHG

## 2022-10-31 PROCEDURE — 99214 OFFICE O/P EST MOD 30 MIN: CPT | Mod: 25

## 2022-10-31 PROCEDURE — 76604 US EXAM CHEST: CPT

## 2022-10-31 NOTE — ASSESSMENT
[FreeTextEntry1] : 57-year-old male with pmhx of metastatic malignancy lung cancer c/b malignant pleural effusion and trapped lung s/p pleurx placement 9/16/2022, former smoker (2 cigars x 8-9 years), s/p thoracotomy for cardiac mass 20 years ago. Presents today for pleurx drainage in office. \par \par Pt's right pleurx cath successfully drained in office. Drainage was done using clean technique. Pleurx cath was connected to vacuum bottle. An amount of 550cc of deedee fluid was drained and new pleurx dressing applied. Will reach out to pleurx cath supplies (#[phone]) so that patient can supplies sent to home.\par \par Plan is for patient to continue drainage twice a week. Advised to call if experiencing any pain with drainage. Advised to call if any change in color of fluid or erythema or swelling at catheter site or experiencing systemic signs and symptoms of infection such as fever. Advised to call if any sudden worsening of shortness of breath. Catheter care instructions provided.\par \par I have spent 30 mins of time on this encounter. Greater than 50% of the face-to-face encounter time was spent discussing differential diagnosis, diagnostic yield, alternative modalities, repeat imaging, risks and benefits of the intervention. Time was also spent in reviewing imaging and additional records. Will coordinate discussion with referring physician and other physicians involved in the care of the patient and complex decision making related to the procedure.\par \par

## 2022-10-31 NOTE — PHYSICAL EXAM
[Well Nourished] : well nourished [Well Groomed] : well groomed [Normal Appearance] : normal appearance [No Neck Mass] : no neck mass [Normal Rate/Rhythm] : normal rate/rhythm [Normal S1, S2] : normal s1, s2 [Clear to Auscultation Bilaterally] : clear to auscultation bilaterally [No Acc Muscle Use] : no acc muscle use [No Abnormalities] : no abnormalities [No Edema] : no edema [Normal Color/ Pigmentation] : normal color/ pigmentation [Oriented x3] : oriented x3 [No Acute Distress] : no acute distress [Normal Oropharynx] : normal oropharynx [No Resp Distress] : no resp distress [Normal Rhythm and Effort] : normal rhythm and effort [Benign] : benign [Normal Gait] : normal gait [No Focal Deficits] : no focal deficits [Normal Affect] : normal affect

## 2022-10-31 NOTE — ADDENDUM
[FreeTextEntry1] : Addendum- patient  is  on Xarelto for PE risk. Advised him to have Dr. Gunn to fill it long-term.

## 2022-10-31 NOTE — PROCEDURE
[Thoracic Ultrasound] : Thoracic Ultrasound [Simple] : simple [de-identified] : Dyspnea [FreeTextEntry2] : Small simple left effusion s/p drainage.

## 2022-10-31 NOTE — PROCEDURE
[Thoracic Ultrasound] : Thoracic Ultrasound [Pleural Effusion] : Pleural Effusion: Yes [Simple] : simple [de-identified] : Dsypnea [FreeTextEntry2] : Small left effusion, with air likely hydropneumothorax.

## 2022-10-31 NOTE — ASSESSMENT
[FreeTextEntry1] : This is a 58 y/o AA male with right sided pleurx cath for malignant pleural effusion, who presents to office for pleurx drainage and follow up ;\par \par Pt's right pleurx cath successfully drained in office. Drainage was done using clean technique. Pleurx cath was connected to vacuum bottle. An amount of 550cc of bloody fluid was drained and new pleurx dressing applied. Site looks well. No signs of erythema or purulent drainage. Ultrasound did not show any significant complexity of the effusion.\par \par Pt advised to come back to office as needed for pleurx cath drainage until he is able to get financial assistance for pleurx supplies. \par \par Advised contact if change in drainage of frequency, redness noted at site, change in color of drainage, intractable pain at site or systemic symptoms such as fever/chills noted. Advised to continue close follow up in Interventional Pulmonology clinic. Rate of auto-pleurodesis/disease response discussed. All questions answered. \par \par \par

## 2022-10-31 NOTE — REVIEW OF SYSTEMS
[Fatigue] : fatigue [Chest Tightness] : chest tightness [Pleuritic Pain] : pleuritic pain [Dyspnea] : dyspnea [SOB on Exertion] : sob on exertion [Negative] : Endocrine [Fever] : no fever [Chills] : no chills [Cough] : no cough [Hemoptysis] : no hemoptysis [Sputum] : no sputum [Edema] : no edema [Palpitations] : no palpitations [Rash] : no rash

## 2022-10-31 NOTE — ASSESSMENT
[FreeTextEntry1] : \par 57-year-old male with newly diagnosed metastatic malignancy lung cancer with malignant pleural effusion and trapped lung after tunneled pleural catheter placement presents for follow-up clinically the patient is doing well.  Has been having issues with current Pleurx bottles and therefore we will try to connect him with the Pleurx patient assistance program.  An additional bottle was provided to him in clinic today.  Sutures removed in clinic.  The site is healed well.\par \par Continue drainage twice a week. Catheter protocols reviewed. All information provided. Adequate equipment available. Advised to call if experiencing any pain with drainage. Advised to call if any change in color of fluid or erythema or swelling at catheter site or experiencing systemic signs and symptoms of infection such as fever. Advised to call if any sudden worsening of shortness of breath. Catheter care instructions provided.\par \par Working with [facility] services to arrange patient supplies.  Also working with a social work to help obtain financial assistance for patient supplies.\par \par Follow-up in 2 to 4 weeks or sooner if needed.\par

## 2022-10-31 NOTE — REASON FOR VISIT
[Follow-Up] : a follow-up visit [Shortness of Breath] : shortness of breath [TextBox_44] : Pleurx cath drainage

## 2022-10-31 NOTE — HISTORY OF PRESENT ILLNESS
[TextBox_4] : \par This 77-year-old male with newly diagnosed non-small cell lung cancer who presented to the hospital with a recurrent left-sided hydropneumothorax with trapped lung and underwent a pleuroscopy with pleural biopsy as well as tunneled pleural catheter placement 9/16/22 at IP team at [facility].  The patient's presents today for pleurx drainage in office per Dr. Ortega's recommendation.\par \par Pt states he is still unable to get pleurx supplies sent to him. He thinks its related to an insurance issue due to a high deductible. Last time pleurx was drained was Monday, 10/10 in the office. Prior to that he stopped receiving pleurx supplies at home. Due to the inability to get pleurx supplies, patient has been experiencing more SOB with the slighted exertion. He states he got his CT chest done this week and is getting his PET done tomorrow. Afterwards, he will be following up with oncologist, Dr. Gunn. \par \par Initial visit 10/3/22:\par Has been draining about 300 to 400 cc 3 times a week.  Clinically feeling well dyspnea is well controlled.  Has been seen by medical oncology and is being worked up for starting systemic therapy.\par \par Pleurx site looks well.  Pleuroscopy site looks well.  Sutures removed in office today.

## 2022-10-31 NOTE — HISTORY OF PRESENT ILLNESS
[TextBox_4] : Interventional Pulmonology Consultation/Visit Note\par \par This is a 58 y/o AA male with non-small cell lung cancer with left malignant pleural effusion and now s/p pleurx cath on 9/16, who presents today for follow up on pleurx drainage. Last drainage was on Wednesday, 10/26 in the office. Pt met with Dr. Gunn last week and has started chemotherapy. He was introduced to  there who is helping him get financial assistance in getting pleurx supplies so that [facility] can drain pleurx cath at his home. Pt experiencing significant SOB, since his pleurx is unable to be drained twice a week. Currently we have neen draining him in clinic once a week. \par \par \par

## 2022-11-01 LAB
BASOPHILS # BLD AUTO: 0.01 K/UL
BASOPHILS NFR BLD AUTO: 0.2 %
EOSINOPHIL # BLD AUTO: 0.11 K/UL
EOSINOPHIL NFR BLD AUTO: 2.4 %
HCT VFR BLD CALC: 34.1 %
HGB BLD-MCNC: 10.9 G/DL
IMM GRANULOCYTES NFR BLD AUTO: 0.6 %
LYMPHOCYTES # BLD AUTO: 0.66 K/UL
LYMPHOCYTES NFR BLD AUTO: 14.2 %
MAN DIFF?: NORMAL
MCHC RBC-ENTMCNC: 29.1 PG
MCHC RBC-ENTMCNC: 32 GM/DL
MCV RBC AUTO: 90.9 FL
MONOCYTES # BLD AUTO: 0.23 K/UL
MONOCYTES NFR BLD AUTO: 4.9 %
NEUTROPHILS # BLD AUTO: 3.62 K/UL
NEUTROPHILS NFR BLD AUTO: 77.7 %
PLATELET # BLD AUTO: 311 K/UL
RBC # BLD: 3.75 M/UL
RBC # FLD: 13.1 %
WBC # FLD AUTO: 4.66 K/UL

## 2022-11-02 NOTE — HISTORY OF PRESENT ILLNESS
[de-identified] : 57m with newly diagnosed adenocarcinoma of the lung presenting for initial oncology evaluation. \par Mr Cordova presented to the hospital with SOB and was found to have a left-sided hydropneumothorax with trapped lung and underwent a pleuroscopy with pleural biopsy as well as tunneled pleural catheter placement. \par \par SOB has now improved. \par He reports some weight loss in the past 2-3 months, probably about 10lb, no pain, no cough.\par \par Path c/w adenocarcinoma of the lung, PDL1 10%, \par NGS\par ARID1A Q450*\par CASP8 E311*\par TERT promoter -124C>T\par \par PET/CT 10/19/2022 reviewed, done at [facility], hypermetabolix SAULO mass, small hypermetabolic RUL nodule, Left pleural nodularity, left pleural effusion with drain inside, b/l supraclavicular inrenal mammary, mediastinal, left hilar, left axillary, upper abdominal, retrocrural, retroperitoneal, pelvic and left inguinal LN. hypermetabolic peritoneal nodularity. Skeletal involvement. \par \par CT head w/wo contrast 10/14/2022 unremarkable.\par \par He is a retired , worked in [place]. \par He lives with his sister, has children in [place]. \par Works as a  in a store.\par Used to smoke cigars until last year, occasional marijuana, no drugs, occasional EtOH\par  [de-identified] : Patient has had trouble with getting pleurx supplies 2/2 cost. \par He feels stable. Not eating much. feels SOB if pleurx is not drained QOD.\par No new pain.

## 2022-11-02 NOTE — PHYSICAL EXAM
[Restricted in physically strenuous activity but ambulatory and able to carry out work of a light or sedentary nature] : Status 1- Restricted in physically strenuous activity but ambulatory and able to carry out work of a light or sedentary nature, e.g., light house work, office work [Normal] : affect appropriate [de-identified] : Crackles on the right base

## 2022-11-04 PROBLEM — C34.90 MALIGNANT NEOPLASM OF UNSPECIFIED PART OF UNSPECIFIED BRONCHUS OR LUNG: Chronic | Status: ACTIVE | Noted: 2022-10-25

## 2022-11-07 ENCOUNTER — APPOINTMENT (OUTPATIENT)
Dept: PULMONOLOGY | Facility: CLINIC | Age: 57
End: 2022-11-07

## 2022-11-07 VITALS
HEIGHT: 71 IN | TEMPERATURE: 97.1 F | SYSTOLIC BLOOD PRESSURE: 120 MMHG | HEART RATE: 105 BPM | BODY MASS INDEX: 22.12 KG/M2 | WEIGHT: 158 LBS | OXYGEN SATURATION: 96 % | DIASTOLIC BLOOD PRESSURE: 79 MMHG

## 2022-11-07 PROCEDURE — 99214 OFFICE O/P EST MOD 30 MIN: CPT

## 2022-11-07 RX ORDER — RIVAROXABAN 15 MG/1
15 TABLET, FILM COATED ORAL
Qty: 60 | Refills: 0 | Status: DISCONTINUED | COMMUNITY
Start: 2022-10-18 | End: 2022-11-07

## 2022-11-08 NOTE — PROCEDURE
[FreeTextEntry1] : Left sided pleurx cath was drained in the office today with vacuum bottle. Drainage done was using clean technique. An amount of 250cc of deedee pleural fluid was drained. Sterile gauze and tegaderm dressing was applied over pleurx cath. Pt tolerated procedure.

## 2022-11-08 NOTE — ASSESSMENT
[FreeTextEntry1] : This is a 58 y/o AA male with right sided pleurx cath for malignant pleural effusion, who presents to office for pleurx drainage and follow up ;\par \par Pt advised to go to [facility] ED next Monday for pleurx drainage, as the office is low on pleurx supplies. The hospital should be able to have a social work help him get the financial assistance he needs.  \par \par Prescription for Xarelto now changed to Xarelto 20mg daily, since it is past 21 days patient started the Xarelto. Change made per Dr. Ortega's recommendation. \par \par Reviewed CBC result with patient. CBC drawn during 10/31 visit to assess for anemia. Pt has normocytic anemia most likely in the setting of newly diagnosed lung cancer vs iron deficiency. Dr. Gunn will continue to monitor.\par \par Advised patient to contact me if change in drainage of frequency, redness noted at site, change in color of drainage, intractable pain at site or systemic symptoms such as fever/chills noted. Rate of auto-pleurodesis/disease response discussed. All questions answered. \par \par \par

## 2022-11-08 NOTE — PHYSICAL EXAM
[Normal Appearance] : normal appearance [No Neck Mass] : no neck mass [Normal Rate/Rhythm] : normal rate/rhythm [Normal S1, S2] : normal s1, s2 [No Acc Muscle Use] : no acc muscle use [Normal Rhythm and Effort] : normal rhythm and effort [No Abnormalities] : no abnormalities [No Edema] : no edema [TextBox_125] : skin surrounding left sided pleurx cath shows no signs of redness/swelling/pus-like drainage; pleurx cath placement is intact

## 2022-11-08 NOTE — HISTORY OF PRESENT ILLNESS
[TextBox_4] : Interventional Pulmonology Consultation/Visit Note\par \par This is a 56 y/o AA male with non-small cell lung cancer with left malignant pleural effusion and now s/p pleurx cath on 9/16, who presents today for follow up on pleurx drainage. Last drainage was in the office on 10/31. Pt still unable to get financial assistance to pay for pleurx supplies. A  at Dr. Gunn's office is trying to help patient find a non-profit organization that can assist him in paying for pleurx supplies, but no success as of yet. \par \par Pt also has trouble getting the Xarelto 15mg bid as prescribed for PE prophylaxes. He just received 15 days worth of pills and the medication requires prior auth in order for him to continue to get it. Pt started on blood thinner during hospitalization for hydropneumothorax.\par During exam, he denies chest pains, fevers, and cough. Reports continuous SOB as pleurx cath is not able to be drained twice a week by [facility]. He states he is unable to work as he feels unwell. Recieved first round of chemo treatment. \par \par Interval visit 10/31:\par Last drainage was on Wednesday, 10/26 in the office. Pt met with Dr. Gunn last week and has started chemotherapy. He was introduced to  there who is helping him get financial assistance in getting pleurx supplies so that [facility] can drain pleurx cath at his home. Pt experiencing significant SOB, since his pleurx is unable to be drained twice a week. Currently we have neen draining him in clinic once a week. \par \par \par

## 2022-11-08 NOTE — REVIEW OF SYSTEMS
[Fever] : no fever [Fatigue] : fatigue [Nasal Congestion] : no nasal congestion [Postnasal Drip] : no postnasal drip [Sinus Problems] : no sinus problems [Cough] : no cough [Hemoptysis] : no hemoptysis [Sputum] : no sputum [Dyspnea] : dyspnea [Pleuritic Pain] : pleuritic pain [SOB on Exertion] : sob on exertion [Edema] : no edema [Palpitations] : no palpitations [Seasonal Allergies] : no seasonal allergies [Nasal Discharge] : no nasal discharge [Nausea] : no nausea [Diarrhea] : no diarrhea [Rash] : no rash

## 2022-11-10 ENCOUNTER — NON-APPOINTMENT (OUTPATIENT)
Age: 57
End: 2022-11-10

## 2022-11-15 ENCOUNTER — APPOINTMENT (OUTPATIENT)
Dept: HEMATOLOGY ONCOLOGY | Facility: CLINIC | Age: 57
End: 2022-11-15

## 2022-11-15 ENCOUNTER — RESULT REVIEW (OUTPATIENT)
Age: 57
End: 2022-11-15

## 2022-11-15 ENCOUNTER — EMERGENCY (EMERGENCY)
Facility: HOSPITAL | Age: 57
LOS: 1 days | Discharge: HOME | End: 2022-11-15
Attending: STUDENT IN AN ORGANIZED HEALTH CARE EDUCATION/TRAINING PROGRAM | Admitting: STUDENT IN AN ORGANIZED HEALTH CARE EDUCATION/TRAINING PROGRAM

## 2022-11-15 VITALS
HEIGHT: 70.98 IN | HEART RATE: 123 BPM | RESPIRATION RATE: 16 BRPM | TEMPERATURE: 98.2 F | DIASTOLIC BLOOD PRESSURE: 72 MMHG | WEIGHT: 162.48 LBS | BODY MASS INDEX: 22.75 KG/M2 | SYSTOLIC BLOOD PRESSURE: 137 MMHG | OXYGEN SATURATION: 99 %

## 2022-11-15 VITALS
HEART RATE: 124 BPM | RESPIRATION RATE: 20 BRPM | HEIGHT: 70.98 IN | SYSTOLIC BLOOD PRESSURE: 156 MMHG | TEMPERATURE: 98 F | DIASTOLIC BLOOD PRESSURE: 92 MMHG | OXYGEN SATURATION: 99 %

## 2022-11-15 DIAGNOSIS — Z98.890 OTHER SPECIFIED POSTPROCEDURAL STATES: Chronic | ICD-10-CM

## 2022-11-15 LAB
BASOPHILS # BLD AUTO: 0.04 K/UL — SIGNIFICANT CHANGE UP (ref 0–0.2)
BASOPHILS NFR BLD AUTO: 0.8 % — SIGNIFICANT CHANGE UP (ref 0–2)
EOSINOPHIL # BLD AUTO: 0.08 K/UL — SIGNIFICANT CHANGE UP (ref 0–0.5)
EOSINOPHIL NFR BLD AUTO: 1.6 % — SIGNIFICANT CHANGE UP (ref 0–6)
HCT VFR BLD CALC: 29.3 % — LOW (ref 39–50)
HGB BLD-MCNC: 9.3 G/DL — LOW (ref 13–17)
IMM GRANULOCYTES NFR BLD AUTO: 0.4 % — SIGNIFICANT CHANGE UP (ref 0–0.9)
LYMPHOCYTES # BLD AUTO: 0.89 K/UL — LOW (ref 1–3.3)
LYMPHOCYTES # BLD AUTO: 17.3 % — SIGNIFICANT CHANGE UP (ref 13–44)
MCHC RBC-ENTMCNC: 29.2 PG — SIGNIFICANT CHANGE UP (ref 27–34)
MCHC RBC-ENTMCNC: 31.7 G/DL — LOW (ref 32–36)
MCV RBC AUTO: 91.8 FL — SIGNIFICANT CHANGE UP (ref 80–100)
MONOCYTES # BLD AUTO: 0.67 K/UL — SIGNIFICANT CHANGE UP (ref 0–0.9)
MONOCYTES NFR BLD AUTO: 13.1 % — SIGNIFICANT CHANGE UP (ref 2–14)
NEUTROPHILS # BLD AUTO: 3.43 K/UL — SIGNIFICANT CHANGE UP (ref 1.8–7.4)
NEUTROPHILS NFR BLD AUTO: 66.8 % — SIGNIFICANT CHANGE UP (ref 43–77)
NRBC # BLD: 0 /100 WBCS — SIGNIFICANT CHANGE UP (ref 0–0)
PLATELET # BLD AUTO: 446 K/UL — HIGH (ref 150–400)
RBC # BLD: 3.19 M/UL — LOW (ref 4.2–5.8)
RBC # FLD: 13.5 % — SIGNIFICANT CHANGE UP (ref 10.3–14.5)
WBC # BLD: 5.13 K/UL — SIGNIFICANT CHANGE UP (ref 3.8–10.5)
WBC # FLD AUTO: 5.13 K/UL — SIGNIFICANT CHANGE UP (ref 3.8–10.5)

## 2022-11-15 PROCEDURE — 71045 X-RAY EXAM CHEST 1 VIEW: CPT | Mod: 26

## 2022-11-15 PROCEDURE — 99215 OFFICE O/P EST HI 40 MIN: CPT

## 2022-11-15 PROCEDURE — 99284 EMERGENCY DEPT VISIT MOD MDM: CPT

## 2022-11-15 NOTE — ED PROVIDER NOTE - PHYSICAL EXAMINATION
Gen: Alert Ox3  HEENT: Atraumatic. Mucous membranes moist.  CV: RRR. No significant LE edema.   Resp: Unlabored-respirations. Mildly diminished on left, otherwise CTAB. No signs of wound infection.  GI: Abdomen non tender to palpation, soft.  Skin/MSK: No ecchymosis  Neuro: EOMI. Pupils ERRL. Following commands.   Psych: Appropriate mood, cooperative

## 2022-11-15 NOTE — PROVIDER CONTACT NOTE (OTHER) - ASSESSMENT
Met with pt, he said he has a about $900 deductible for surgical supplies from his Insurance. He can not afford. Pt needs a drain container and other supplies. Pt is currently disabled, worked for [employer]. Applied for SS Disability. His current Insurance will be terminated on 11/22/22, pt applied for Medicaid 3 weeks ago, advised to f/u with Medicaid Office.

## 2022-11-15 NOTE — ED PROVIDER NOTE - CLINICAL SUMMARY MEDICAL DECISION MAKING FREE TEXT BOX
58 yo M with PMHx of former smoker, right open thoracotomy for cardiac mass removal 20 years ago, now w/ non-small cell lung CA, on chemo, presenting for drainage of Pleurx catheter in L chest wall. Drained biweekly, last on Monday, now unable to have drained due to cost. Sent to PCP by [facility] for SW? and Pleurx drainage. Exam as above. No new complaints beyond request for drainage. Will obtain CXR and discuss case w/ Dr. Ortega.

## 2022-11-15 NOTE — ED ADULT NURSE NOTE - OBJECTIVE STATEMENT
58y/o M presents to ED requesting drainage of Pleurx catheter to left chest wall. Pt reports that he gets it done biweekly, last time on Monday. Pt otherwise without complaints. A&Ox4, speaking in clear sentences. No labored breathing noted. No acute distress noted.

## 2022-11-15 NOTE — ED ADULT TRIAGE NOTE - CHIEF COMPLAINT QUOTE
Pt sent by PCP to drain pleural catheter. Pt states catheter (left chest wall) was placed on 9/10 Patient c/o SOB. Denies fevers/chills, cp, headache and dizziness. PMHx: lung CA. Last chemo 10/19. Pt sent by PCP to drain pleural catheter. Pt states catheter (left chest wall) was placed on 9/10 Patient c/o SOB. Denies fevers/chills, cp, headache and dizziness. Breathing even and unlabored. No accessory muscle use, or adventitious lung sounds noted on assessment. PMHx: lung CA. Last chemo 10/19.

## 2022-11-15 NOTE — ED PROVIDER NOTE - NSFOLLOWUPINSTRUCTIONS_ED_ALL_ED_FT
1. You presented to the emergency department for:  pleural drainage    2. Your evaluation in the emergency department included a physician evaluation and testing consisting of: xray. Your work-up did not reveal any findings indicating the need for admission to the hospital or any emergent interventions at this time.     3. It is recommended that you follow-up with pulmonology as discussed for a repeat evaluation, and potentially further testing and treatment.     If needed, to arrange an appointment with a primary care provider please call: [phone]    4. Please continue taking any regular medications as prescribed.     5. PLEASE RETURN TO THE EMERGENCY DEPARTMENT IMMEDIATELY IF you develop any fevers not responding to over the counter medications, uncontrollable nausea and vomiting, an inability to tolerate eating and drinking, difficulty breathing, chest pain, a severe increase in your symptoms or pain, or any other new symptoms that concern you.

## 2022-11-15 NOTE — ED PROVIDER NOTE - NS ED ATTENDING STATEMENT MOD
I have seen and examined this patient and fully participated in the care of this patient as the teaching attending.  The service was shared with the VERONICA.  I reviewed and verified the documentation and independently performed the documented: Attending with

## 2022-11-15 NOTE — ED PROVIDER NOTE - PATIENT PORTAL LINK FT
You can access the FollowMyHealth Patient Portal offered by [facility] by registering at the following website: [email or web address]. By joining [email or web address]’s FollowMyHealth portal, you will also be able to view your health information using other applications (apps) compatible with our system.

## 2022-11-15 NOTE — ED ADULT NURSE NOTE - CHIEF COMPLAINT QUOTE
Pt sent by PCP to drain pleural catheter. Pt states catheter (left chest wall) was placed on 9/10 Patient c/o SOB. Denies fevers/chills, cp, headache and dizziness. Breathing even and unlabored. No accessory muscle use, or adventitious lung sounds noted on assessment. PMHx: lung CA. Last chemo 10/19.

## 2022-11-15 NOTE — ED PROVIDER NOTE - OBJECTIVE STATEMENT
58 yo M PMHx of former smoker, right open thoracotomy for cardiac mass removal 20 years ago, now w/ non-small cell lung CA, on chemo, presenting for drainage of Pleurx catheter in L chest wall. Drained biweekly, last on Monday, now unable to have drained due to cost. Sent to Ed by [facility] for SW. Pt otherwise w/o complaints.

## 2022-11-15 NOTE — ED PROVIDER NOTE - PROGRESS NOTE DETAILS
Peter Slaughter PGY3: Spoke w/ pulm, they recommended draining Pleurx and f/u in office tomorrow.  Pleurx drained in ED. Pt feeling well. Will DC w/ strict return precautions. Pt agreeable to plan.

## 2022-11-15 NOTE — ED PROVIDER NOTE - ATTENDING CONTRIBUTION TO CARE
I have personally performed a face to face medical and diagnostic evaluation of the patient. I have discussed with and reviewed the Resident's note and agree with the History, ROS, Physical Exam and MDM unless otherwise indicated. I have personally performed a face to face medical and diagnostic evaluation of the patient. I have discussed with and reviewed the Resident's note and agree with the History, ROS, Physical Exam and MDM unless otherwise indicated.    56 yo M PMHx of former smoker, right open thoracotomy for cardiac mass removal 20 years ago, now w/ non-small cell lung CA, on chemo, presenting for drainage of Pleurx catheter in L chest wall. Drained biweekly, last on Monday, now unable to have drained due to cost. Sent to Ed by [facility] for SW. Pt otherwise w/o complaints. Mild decreased breath sounds at the left base. Plan to drain the effusion and dc.

## 2022-11-16 VITALS
SYSTOLIC BLOOD PRESSURE: 118 MMHG | RESPIRATION RATE: 17 BRPM | OXYGEN SATURATION: 99 % | HEART RATE: 99 BPM | DIASTOLIC BLOOD PRESSURE: 72 MMHG

## 2022-11-17 ENCOUNTER — RESULT REVIEW (OUTPATIENT)
Age: 57
End: 2022-11-17

## 2022-11-17 ENCOUNTER — APPOINTMENT (OUTPATIENT)
Dept: INFUSION THERAPY | Facility: HOSPITAL | Age: 57
End: 2022-11-17

## 2022-11-17 LAB
BASOPHILS # BLD AUTO: 0.02 K/UL — SIGNIFICANT CHANGE UP (ref 0–0.2)
BASOPHILS NFR BLD AUTO: 0.3 % — SIGNIFICANT CHANGE UP (ref 0–2)
EOSINOPHIL # BLD AUTO: 0 K/UL — SIGNIFICANT CHANGE UP (ref 0–0.5)
EOSINOPHIL NFR BLD AUTO: 0 % — SIGNIFICANT CHANGE UP (ref 0–6)
HCT VFR BLD CALC: 29.2 % — LOW (ref 39–50)
HGB BLD-MCNC: 9.6 G/DL — LOW (ref 13–17)
IMM GRANULOCYTES NFR BLD AUTO: 0.6 % — SIGNIFICANT CHANGE UP (ref 0–0.9)
LYMPHOCYTES # BLD AUTO: 0.81 K/UL — LOW (ref 1–3.3)
LYMPHOCYTES # BLD AUTO: 12.3 % — LOW (ref 13–44)
MCHC RBC-ENTMCNC: 29.4 PG — SIGNIFICANT CHANGE UP (ref 27–34)
MCHC RBC-ENTMCNC: 32.9 G/DL — SIGNIFICANT CHANGE UP (ref 32–36)
MCV RBC AUTO: 89.6 FL — SIGNIFICANT CHANGE UP (ref 80–100)
MONOCYTES # BLD AUTO: 0.4 K/UL — SIGNIFICANT CHANGE UP (ref 0–0.9)
MONOCYTES NFR BLD AUTO: 6.1 % — SIGNIFICANT CHANGE UP (ref 2–14)
NEUTROPHILS # BLD AUTO: 5.31 K/UL — SIGNIFICANT CHANGE UP (ref 1.8–7.4)
NEUTROPHILS NFR BLD AUTO: 80.7 % — HIGH (ref 43–77)
NRBC # BLD: 0 /100 WBCS — SIGNIFICANT CHANGE UP (ref 0–0)
PLATELET # BLD AUTO: 489 K/UL — HIGH (ref 150–400)
RBC # BLD: 3.26 M/UL — LOW (ref 4.2–5.8)
RBC # FLD: 13.5 % — SIGNIFICANT CHANGE UP (ref 10.3–14.5)
WBC # BLD: 6.58 K/UL — SIGNIFICANT CHANGE UP (ref 3.8–10.5)
WBC # FLD AUTO: 6.58 K/UL — SIGNIFICANT CHANGE UP (ref 3.8–10.5)

## 2022-11-18 NOTE — ASSESSMENT
[FreeTextEntry1] : 57m with recently diagnosed adenocarcinoma of the lung, with positive pleural effusion and involvement of the pleura, s/p pleurX placement, presenting to establish care. \par \par Patient started first line treatment with carboplatin/pemetrexed/pembrolizumab. \par S/p C1, well tolerated.\par \par -Labs today\par -Dur for C2 chemo/io with carboplatin AUC 5, pemetrexed 500mg/m2, pembrolizuman 200mg \par -dexamethasone, folic acid and reglan as ordered\par -B12 shots q9 weeks\par -Follow Pulm\par -Blood work today\par -RTC after the above to discuss treatment options\par \par Tiffani Gunn MD\par Medical Oncology and Hematology\par Total time of this visit, including time spent face to face with patient and/or via video/audio, and also in preparing for today's visit for MDM and documentation (Medical Decision Making, including consideration of possible diagnoses, management options, complex medical record review, review of diagnostic tests and information, consideration and discussion of significant complications based on comorbidities, and discussion with providers involved with the care of the patient) 42 minutes.\par \par

## 2022-11-18 NOTE — HISTORY OF PRESENT ILLNESS
[de-identified] : 57m with newly diagnosed adenocarcinoma of the lung presenting for initial oncology evaluation. \par Mr Cordova presented to the hospital with SOB and was found to have a left-sided hydropneumothorax with trapped lung and underwent a pleuroscopy with pleural biopsy as well as tunneled pleural catheter placement. \par \par SOB has now improved. \par He reports some weight loss in the past 2-3 months, probably about 10lb, no pain, no cough.\par \par Path c/w adenocarcinoma of the lung, PDL1 10%, \par NGS\par ARID1A Q450*\par CASP8 E311*\par TERT promoter -124C>T\par \par PET/CT 10/19/2022 reviewed, done at [facility], hypermetabolix SAULO mass, small hypermetabolic RUL nodule, Left pleural nodularity, left pleural effusion with drain inside, b/l supraclavicular inrenal mammary, mediastinal, left hilar, left axillary, upper abdominal, retrocrural, retroperitoneal, pelvic and left inguinal LN. hypermetabolic peritoneal nodularity. Skeletal involvement. \par \par CT head w/wo contrast 10/14/2022 unremarkable.\par \par He is a retired , worked in [place]. \par He lives with his sister, has children in [place]. \par Works as a  in a store.\par Used to smoke cigars until last year, occasional marijuana, no drugs, occasional EtOH\par  [de-identified] : Feels well and has no complaints. \par Has not been able to obtain pleurX supplies, is going to the ED after visit to have drainage in ED\par Weight stable, stable energy and appetite. \par

## 2022-11-18 NOTE — PHYSICAL EXAM
[Restricted in physically strenuous activity but ambulatory and able to carry out work of a light or sedentary nature] : Status 1- Restricted in physically strenuous activity but ambulatory and able to carry out work of a light or sedentary nature, e.g., light house work, office work [Normal] : affect appropriate [de-identified] : Crackles on the right base

## 2022-11-22 LAB
ALBUMIN SERPL ELPH-MCNC: 3.8 G/DL
ALBUMIN SERPL ELPH-MCNC: 3.9 G/DL
ALBUMIN SERPL ELPH-MCNC: 4 G/DL
ALP BLD-CCNC: 115 U/L
ALP BLD-CCNC: 118 U/L
ALP BLD-CCNC: 147 U/L
ALT SERPL-CCNC: 10 U/L
ALT SERPL-CCNC: 18 U/L
ALT SERPL-CCNC: 7 U/L
ANION GAP SERPL CALC-SCNC: 10 MMOL/L
ANION GAP SERPL CALC-SCNC: 14 MMOL/L
ANION GAP SERPL CALC-SCNC: 9 MMOL/L
AST SERPL-CCNC: 17 U/L
AST SERPL-CCNC: 20 U/L
AST SERPL-CCNC: 22 U/L
BILIRUB SERPL-MCNC: 0.2 MG/DL
BILIRUB SERPL-MCNC: <0.2 MG/DL
BILIRUB SERPL-MCNC: <0.2 MG/DL
BUN SERPL-MCNC: 15 MG/DL
BUN SERPL-MCNC: 18 MG/DL
BUN SERPL-MCNC: 20 MG/DL
CALCIUM SERPL-MCNC: 9.3 MG/DL
CALCIUM SERPL-MCNC: 9.8 MG/DL
CALCIUM SERPL-MCNC: 9.8 MG/DL
CHLORIDE SERPL-SCNC: 102 MMOL/L
CHLORIDE SERPL-SCNC: 103 MMOL/L
CHLORIDE SERPL-SCNC: 105 MMOL/L
CO2 SERPL-SCNC: 25 MMOL/L
CO2 SERPL-SCNC: 27 MMOL/L
CO2 SERPL-SCNC: 28 MMOL/L
CREAT SERPL-MCNC: 1.19 MG/DL
CREAT SERPL-MCNC: 1.38 MG/DL
CREAT SERPL-MCNC: 1.51 MG/DL
EGFR: 54 ML/MIN/1.73M2
EGFR: 60 ML/MIN/1.73M2
EGFR: 72 ML/MIN/1.73M2
GLUCOSE SERPL-MCNC: 85 MG/DL
GLUCOSE SERPL-MCNC: 93 MG/DL
GLUCOSE SERPL-MCNC: 96 MG/DL
HBV CORE IGG+IGM SER QL: NONREACTIVE
HBV SURFACE AB SER QL: NONREACTIVE
HBV SURFACE AG SER QL: NONREACTIVE
HCV AB SER QL: NONREACTIVE
HCV S/CO RATIO: 0.11 S/CO
HIV1+2 AB SPEC QL IA.RAPID: NONREACTIVE
POTASSIUM SERPL-SCNC: 4.5 MMOL/L
POTASSIUM SERPL-SCNC: 4.7 MMOL/L
POTASSIUM SERPL-SCNC: 4.8 MMOL/L
PROT SERPL-MCNC: 7.1 G/DL
PROT SERPL-MCNC: 7.4 G/DL
PROT SERPL-MCNC: 7.4 G/DL
SARS-COV-2 N GENE NPH QL NAA+PROBE: NOT DETECTED
SODIUM SERPL-SCNC: 139 MMOL/L
SODIUM SERPL-SCNC: 141 MMOL/L
SODIUM SERPL-SCNC: 142 MMOL/L
TSH SERPL-ACNC: 1.45 UIU/ML
TSH SERPL-ACNC: 1.53 UIU/ML
TSH SERPL-ACNC: 1.54 UIU/ML

## 2022-11-23 ENCOUNTER — INPATIENT (INPATIENT)
Facility: HOSPITAL | Age: 57
LOS: 2 days | Discharge: HOME HEALTH | End: 2022-11-26
Attending: HOSPITALIST | Admitting: HOSPITALIST

## 2022-11-23 VITALS
TEMPERATURE: 98 F | RESPIRATION RATE: 20 BRPM | DIASTOLIC BLOOD PRESSURE: 75 MMHG | SYSTOLIC BLOOD PRESSURE: 137 MMHG | HEIGHT: 71.26 IN | HEART RATE: 105 BPM | OXYGEN SATURATION: 100 %

## 2022-11-23 DIAGNOSIS — I26.99 OTHER PULMONARY EMBOLISM WITHOUT ACUTE COR PULMONALE: ICD-10-CM

## 2022-11-23 DIAGNOSIS — D70.9 NEUTROPENIA, UNSPECIFIED: ICD-10-CM

## 2022-11-23 DIAGNOSIS — Z29.9 ENCOUNTER FOR PROPHYLACTIC MEASURES, UNSPECIFIED: ICD-10-CM

## 2022-11-23 DIAGNOSIS — D64.9 ANEMIA, UNSPECIFIED: ICD-10-CM

## 2022-11-23 DIAGNOSIS — R06.02 SHORTNESS OF BREATH: ICD-10-CM

## 2022-11-23 DIAGNOSIS — Z98.890 OTHER SPECIFIED POSTPROCEDURAL STATES: Chronic | ICD-10-CM

## 2022-11-23 DIAGNOSIS — J90 PLEURAL EFFUSION, NOT ELSEWHERE CLASSIFIED: ICD-10-CM

## 2022-11-23 LAB
ALBUMIN SERPL ELPH-MCNC: 3.9 G/DL — SIGNIFICANT CHANGE UP (ref 3.3–5)
ALP SERPL-CCNC: 96 U/L — SIGNIFICANT CHANGE UP (ref 40–120)
ALT FLD-CCNC: 28 U/L — SIGNIFICANT CHANGE UP (ref 4–41)
ANION GAP SERPL CALC-SCNC: 9 MMOL/L — SIGNIFICANT CHANGE UP (ref 7–14)
APTT BLD: 28.5 SEC — SIGNIFICANT CHANGE UP (ref 27–36.3)
AST SERPL-CCNC: 36 U/L — SIGNIFICANT CHANGE UP (ref 4–40)
BASOPHILS # BLD AUTO: 0.05 K/UL — SIGNIFICANT CHANGE UP (ref 0–0.2)
BASOPHILS NFR BLD AUTO: 2.6 % — HIGH (ref 0–2)
BILIRUB SERPL-MCNC: <0.2 MG/DL — SIGNIFICANT CHANGE UP (ref 0.2–1.2)
BUN SERPL-MCNC: 25 MG/DL — HIGH (ref 7–23)
CALCIUM SERPL-MCNC: 9.2 MG/DL — SIGNIFICANT CHANGE UP (ref 8.4–10.5)
CHLORIDE SERPL-SCNC: 98 MMOL/L — SIGNIFICANT CHANGE UP (ref 98–107)
CO2 SERPL-SCNC: 27 MMOL/L — SIGNIFICANT CHANGE UP (ref 22–31)
CREAT SERPL-MCNC: 1.23 MG/DL — SIGNIFICANT CHANGE UP (ref 0.5–1.3)
EGFR: 69 ML/MIN/1.73M2 — SIGNIFICANT CHANGE UP
EOSINOPHIL # BLD AUTO: 0.05 K/UL — SIGNIFICANT CHANGE UP (ref 0–0.5)
EOSINOPHIL NFR BLD AUTO: 2.6 % — SIGNIFICANT CHANGE UP (ref 0–6)
GLUCOSE SERPL-MCNC: 92 MG/DL — SIGNIFICANT CHANGE UP (ref 70–99)
HCT VFR BLD CALC: 25.4 % — LOW (ref 39–50)
HGB BLD-MCNC: 8.2 G/DL — LOW (ref 13–17)
IANC: 1.14 K/UL — LOW (ref 1.8–7.4)
INR BLD: 1.14 RATIO — SIGNIFICANT CHANGE UP (ref 0.88–1.16)
LYMPHOCYTES # BLD AUTO: 0.63 K/UL — LOW (ref 1–3.3)
LYMPHOCYTES # BLD AUTO: 33.3 % — SIGNIFICANT CHANGE UP (ref 13–44)
MCHC RBC-ENTMCNC: 28.9 PG — SIGNIFICANT CHANGE UP (ref 27–34)
MCHC RBC-ENTMCNC: 32.3 GM/DL — SIGNIFICANT CHANGE UP (ref 32–36)
MCV RBC AUTO: 89.4 FL — SIGNIFICANT CHANGE UP (ref 80–100)
MONOCYTES # BLD AUTO: 0.08 K/UL — SIGNIFICANT CHANGE UP (ref 0–0.9)
MONOCYTES NFR BLD AUTO: 4.4 % — SIGNIFICANT CHANGE UP (ref 2–14)
NEUTROPHILS # BLD AUTO: 1.05 K/UL — LOW (ref 1.8–7.4)
NEUTROPHILS NFR BLD AUTO: 55.3 % — SIGNIFICANT CHANGE UP (ref 43–77)
NT-PROBNP SERPL-SCNC: 75 PG/ML — SIGNIFICANT CHANGE UP
PLATELET # BLD AUTO: 270 K/UL — SIGNIFICANT CHANGE UP (ref 150–400)
POTASSIUM SERPL-MCNC: 4.5 MMOL/L — SIGNIFICANT CHANGE UP (ref 3.5–5.3)
POTASSIUM SERPL-SCNC: 4.5 MMOL/L — SIGNIFICANT CHANGE UP (ref 3.5–5.3)
PROT SERPL-MCNC: 7.8 G/DL — SIGNIFICANT CHANGE UP (ref 6–8.3)
PROTHROM AB SERPL-ACNC: 13.3 SEC — SIGNIFICANT CHANGE UP (ref 10.5–13.4)
RBC # BLD: 2.84 M/UL — LOW (ref 4.2–5.8)
RBC # FLD: 13.2 % — SIGNIFICANT CHANGE UP (ref 10.3–14.5)
SARS-COV-2 RNA SPEC QL NAA+PROBE: SIGNIFICANT CHANGE UP
SODIUM SERPL-SCNC: 134 MMOL/L — LOW (ref 135–145)
TROPONIN T, HIGH SENSITIVITY RESULT: 19 NG/L — SIGNIFICANT CHANGE UP
TROPONIN T, HIGH SENSITIVITY RESULT: 19 NG/L — SIGNIFICANT CHANGE UP
WBC # BLD: 1.9 K/UL — LOW (ref 3.8–10.5)
WBC # FLD AUTO: 1.9 K/UL — LOW (ref 3.8–10.5)

## 2022-11-23 PROCEDURE — 71046 X-RAY EXAM CHEST 2 VIEWS: CPT | Mod: 26

## 2022-11-23 PROCEDURE — 99223 1ST HOSP IP/OBS HIGH 75: CPT

## 2022-11-23 PROCEDURE — 99222 1ST HOSP IP/OBS MODERATE 55: CPT | Mod: GC,25

## 2022-11-23 PROCEDURE — 99285 EMERGENCY DEPT VISIT HI MDM: CPT

## 2022-11-23 RX ORDER — FOLIC ACID 0.8 MG
1 TABLET ORAL DAILY
Refills: 0 | Status: DISCONTINUED | OUTPATIENT
Start: 2022-11-23 | End: 2022-11-26

## 2022-11-23 RX ORDER — ALBUTEROL 90 UG/1
2 AEROSOL, METERED ORAL EVERY 6 HOURS
Refills: 0 | Status: DISCONTINUED | OUTPATIENT
Start: 2022-11-23 | End: 2022-11-26

## 2022-11-23 RX ORDER — POLYETHYLENE GLYCOL 3350 17 G/17G
17 POWDER, FOR SOLUTION ORAL DAILY
Refills: 0 | Status: DISCONTINUED | OUTPATIENT
Start: 2022-11-23 | End: 2022-11-26

## 2022-11-23 RX ORDER — LANOLIN ALCOHOL/MO/W.PET/CERES
3 CREAM (GRAM) TOPICAL AT BEDTIME
Refills: 0 | Status: DISCONTINUED | OUTPATIENT
Start: 2022-11-23 | End: 2022-11-26

## 2022-11-23 RX ORDER — RIVAROXABAN 15 MG-20MG
20 KIT ORAL
Refills: 0 | Status: DISCONTINUED | OUTPATIENT
Start: 2022-11-23 | End: 2022-11-26

## 2022-11-23 RX ORDER — ACETAMINOPHEN 500 MG
650 TABLET ORAL EVERY 6 HOURS
Refills: 0 | Status: DISCONTINUED | OUTPATIENT
Start: 2022-11-23 | End: 2022-11-26

## 2022-11-23 RX ORDER — ONDANSETRON 8 MG/1
4 TABLET, FILM COATED ORAL EVERY 8 HOURS
Refills: 0 | Status: DISCONTINUED | OUTPATIENT
Start: 2022-11-23 | End: 2022-11-26

## 2022-11-23 RX ADMIN — Medication 3 MILLIGRAM(S): at 22:25

## 2022-11-23 RX ADMIN — Medication 1 MILLIGRAM(S): at 22:25

## 2022-11-23 RX ADMIN — RIVAROXABAN 20 MILLIGRAM(S): KIT at 22:25

## 2022-11-23 NOTE — ED PROVIDER NOTE - NS ED ATTENDING STATEMENT MOD
This was a shared visit with the VERONICA. I reviewed and verified the documentation and independently performed the documented:

## 2022-11-23 NOTE — H&P ADULT - NSHPPHYSICALEXAM_GEN_ALL_CORE
Vital Signs Last 24 Hrs  T(C): 37.1 (23 Nov 2022 15:02), Max: 37.1 (23 Nov 2022 15:02)  T(F): 98.7 (23 Nov 2022 15:02), Max: 98.7 (23 Nov 2022 15:02)  HR: 92 (23 Nov 2022 15:02) (92 - 105)  BP: 121/80 (23 Nov 2022 15:02) (121/80 - 137/75)  BP(mean): --  RR: 16 (23 Nov 2022 15:02) (16 - 20)  SpO2: 100% (23 Nov 2022 15:02) (100% - 100%)    Parameters below as of 23 Nov 2022 15:02  Patient On (Oxygen Delivery Method): room air    GENERAL: NAD, well-developed  HEENT:  Atraumatic, Normocephalic, Conjunctiva and sclera clear, oral mucosa moist, clear w/o any exudate   NECK: Supple, No JVD  CHEST/LUNG: Nonlabored breathing. Clear to auscultation bilaterally; No wheeze  HEART: Regular rate and rhythm; No murmurs, rubs, or gallops  ABDOMEN: Soft, Nontender, Nondistended; Bowel sounds present  EXTREMITIES:  2+ Peripheral Pulses, No clubbing, cyanosis, or edema  PSYCH: AAOx3, normal affect  NEUROLOGY: non-focal, moving all extremities.   SKIN: No rashes or lesions

## 2022-11-23 NOTE — H&P ADULT - ASSESSMENT
56 yo M with Pmhx of NSCLC s/p s/p cycle 1 carboplatin/pemetrexed/pembrolizumab (last chemo on 11/17/2022), subsegmental PE, and cardiac mass s/p thoracotomy presents to the ED with SOB, most likely due to chronic L pleural effusion

## 2022-11-23 NOTE — ED ADULT NURSE NOTE - OBJECTIVE STATEMENT
pt received in rm13. A&Ox4, respirations even and unlabored, completing full sentences, 100% RA. pt c/o increasing SOB. pt has hx of lung Ca. pt states "I have a tube for lung drainage on my left side." pt has the tube drained every week, pt states " when my shortness of breath gets bad I know its time to be drained early." pt endorses pain right above the tube site. pt denies headache, cp, dizziness, lightheadedness, n/v/d, vision changes. 20g in R AC placed, labs drawn and sent. Covid swab sent. bed in lowest position, siderails up, call bell in reach. pt awaiting XRay.

## 2022-11-23 NOTE — PATIENT PROFILE ADULT - FALL HARM RISK - UNIVERSAL INTERVENTIONS
Bed in lowest position, wheels locked, appropriate side rails in place/Call bell, personal items and telephone in reach/Instruct patient to call for assistance before getting out of bed or chair/Non-slip footwear when patient is out of bed/[place] to call system/Physically safe environment - no spills, clutter or unnecessary equipment/Purposeful Proactive Rounding/Room/bathroom lighting operational, light cord in reach

## 2022-11-23 NOTE — CONSULT NOTE ADULT - ATTENDING COMMENTS
Agree with above Agree with above. POCUS shows small posterior effusion. Last drained 8 days ago. Patient has baseline dyspnea although respiratory status appears stable. Can drain Pleurx every other day while inpatient. Will need social work and case management to assist in obtaining necessary supplies for eventual discharge.

## 2022-11-23 NOTE — ED PROVIDER NOTE - PROGRESS NOTE DETAILS
MARTHA Frey: Spoke w/ case management, state likely will not be able to figure out a solution for home supplies given the Thanksgiving holiday. Will admit. Pt agrees with plan. Seen by [facility] fellow, no beds in [facility].

## 2022-11-23 NOTE — ED ADULT TRIAGE NOTE - CHIEF COMPLAINT QUOTE
pt sent in by oncologist/ pulmonologist to have fluid drained and admission, pt actively being treated for lung CA with chemo. last drainage done 1.5 weeks ago. + SOB, chest pain 7/10 above drain.

## 2022-11-23 NOTE — CONSULT NOTE ADULT - SUBJECTIVE AND OBJECTIVE BOX
CHIEF COMPLAINT: SOB, inability to afford supplies    HPI:  56YO Male former smoker PMH Right open thoracotomy for cardiac mass removal 20 years ago recently admitted 9/10-9/16 with SOB, weight loss and  large left hydropneumothorax with rapid reaccumulation s/p Left pleuroscopy with pleural biopsy and pleurX tunneled intrapleural catheter placement 9/14 found to have non-small cell lung ca (s/p cycle 1 carboplatin/pemetrexed/pembrolizumab 11/17/22) who presents from home for SOB and inability to afford drainage supplies.    Pt reports that he has been having difficulty paying for his PleurX drainage supplies as he is currently disabled and is waiting for  disability approval to help him pay. It currently costs him $900 for the supplies which he can not afford. He reports that his insurance was terminated 11/22/22.   Pt was SOB last week and presented to ED 11/16 as he could not afford his drainage. In ED he was hemodynamically stable and had his pleurX drained (reportedly it was 1 bottle).        PAST MEDICAL & SURGICAL HISTORY:  Former smoker  Non-small cell lung cancer with metastasis  Status post cardiac surgery s/p open right thoracotomy for posterior cardiac mass removal &gt; 20 years ago, reported by patient to be benign.    FAMILY HISTORY:  Family history of cancer in father  Cancer of unknown origin on Father&#x27;s side.        SOCIAL HISTORY:  Smoking: [ ] Never Smoked [ ] Former Smoker (__ packs x ___ years) [ ] Current Smoker  (__ packs x ___ years)  Substance Use: [ ] Never Used [ ] Used ____  EtOH Use:  Marital Status: [ ] Single [ ]  [ ]  [ ]   Sexual History:   Occupation:  Recent Travel:  Country of Birth:  Advance Directives:    Allergies    No Known Allergies    Intolerances        HOME MEDICATIONS:  Home Medications:  acetaminophen 325 mg oral tablet: 2 tab(s) orally every 6 hours, As needed, Temp greater or equal to 38C (100.4F), Mild Pain (1 - 3), Moderate Pain (4 - 6) (25 Oct 2022 17:27)  dexamethasone 4 mg oral tablet: 1 tab(s) orally 2 times a day day before and after chemo (Alimta) only (25 Oct 2022 17:27)  folic acid 1 mg oral tablet: 1 tab(s) orally once a day (25 Oct 2022 17:27)  metoclopramide 10 mg oral tablet: 1 tab(s) orally 4 times a day (before meals and at bedtime), As Needed - for nausea (25 Oct 2022 17:27)  polyethylene glycol 3350 oral powder for reconstitution: 17 gram(s) orally once a day (25 Oct 2022 17:27)  senna leaf extract oral tablet: 2 tab(s) orally once a day (at bedtime) (25 Oct 2022 17:27)      REVIEW OF SYSTEMS:  Constitutional: [ ] negative [ ] fevers [ ] chills [ ] weight loss [ ] weight gain  HEENT: [ ] negative [ ] dry eyes [ ] eye irritation [ ] postnasal drip [ ] nasal congestion  CV: [ ] negative  [ ] chest pain [ ] orthopnea [ ] palpitations [ ] murmur  Resp: [ ] negative [ ] cough [ ] shortness of breath [ ] dyspnea [ ] wheezing [ ] sputum [ ] hemoptysis  GI: [ ] negative [ ] nausea [ ] vomiting [ ] diarrhea [ ] constipation [ ] abd pain [ ] dysphagia   : [ ] negative [ ] dysuria [ ] nocturia [ ] hematuria [ ] increased urinary frequency  Musculoskeletal: [ ] negative [ ] back pain [ ] myalgias [ ] arthralgias [ ] fracture  Skin: [ ] negative [ ] rash [ ] itch  Neurological: [ ] negative [ ] headache [ ] dizziness [ ] syncope [ ] weakness [ ] numbness  Psychiatric: [ ] negative [ ] anxiety [ ] depression  Endocrine: [ ] negative [ ] diabetes [ ] thyroid problem  Hematologic/Lymphatic: [ ] negative [ ] anemia [ ] bleeding problem  Allergic/Immunologic: [ ] negative [ ] itchy eyes [ ] nasal discharge [ ] hives [ ] angioedema  [ ] All other systems negative  [ ] Unable to assess ROS because ________    OBJECTIVE:  ICU Vital Signs Last 24 Hrs  T(C): 37.1 (23 Nov 2022 15:02), Max: 37.1 (23 Nov 2022 15:02)  T(F): 98.7 (23 Nov 2022 15:02), Max: 98.7 (23 Nov 2022 15:02)  HR: 92 (23 Nov 2022 15:02) (92 - 105)  BP: 121/80 (23 Nov 2022 15:02) (121/80 - 137/75)  BP(mean): --  ABP: --  ABP(mean): --  RR: 16 (23 Nov 2022 15:02) (16 - 20)  SpO2: 100% (23 Nov 2022 15:02) (100% - 100%)    O2 Parameters below as of 23 Nov 2022 15:02  Patient On (Oxygen Delivery Method): room air              CAPILLARY BLOOD GLUCOSE          PHYSICAL EXAM:  General:   HEENT:   Lymph Nodes:  Neck:   Respiratory:   Cardiovascular:   Abdomen:   Extremities:   Skin:   Neurological:  Psychiatry:    LINES:     HOSPITAL MEDICATIONS:  Standing Meds:      PRN Meds:      LABS:                        8.2    1.90  )-----------( 270      ( 23 Nov 2022 15:00 )             25.4     Hgb Trend: 8.2<--, 9.6<--  11-23    134<L>  |  98  |  25<H>  ----------------------------<  92  4.5   |  27  |  1.23    Ca    9.2      23 Nov 2022 15:00    TPro  7.8  /  Alb  3.9  /  TBili  <0.2  /  DBili  x   /  AST  36  /  ALT  28  /  AlkPhos  96  11-23    Creatinine Trend: 1.23<--  PT/INR - ( 23 Nov 2022 15:00 )   PT: 13.3 sec;   INR: 1.14 ratio         PTT - ( 23 Nov 2022 15:00 )  PTT:28.5 sec          MICROBIOLOGY:       RADIOLOGY:  [ ] Reviewed and interpreted by me    PULMONARY FUNCTION TESTS:    EKG: CHIEF COMPLAINT: SOB, inability to afford supplies    HPI:  56YO Male former smoker PMH Right open thoracotomy for cardiac mass removal 20 years ago recently admitted 9/10-9/16 with SOB, weight loss found to have subsegmental PE (now on Xarelto) and  large left hydropneumothorax with rapid reaccumulation s/p Left pleuroscopy with pleural biopsy and pleurX tunneled intrapleural catheter placement 9/14 found to have non-small cell lung ca (s/p cycle 1 carboplatin/pemetrexed/pembrolizumab 11/17/22) who presents from home for SOB and inability to afford drainage supplies.    Pt reports that he has been having difficulty paying for his PleurX drainage supplies as he is currently disabled and is waiting for  disability approval to help him pay. It currently costs him $900 for the supplies which he can not afford. He reports that his insurance was terminated 11/22/22.   Pt was SOB last week and presented to ED 11/16 as he could not afford his drainage. In ED he was hemodynamically stable and had his pleurX drained (reportedly it was 1 bottle). He was supposed to follow up in pulmonary clinic but was not able to.    He presented today reporting SOB that started yesterday. He denies fevers or chills but does report cough productive of clear sputum with specks of bright red blood. He also reported sneezing with specks of blood as well. As he did not have drainage supplies he returned to the ED for evaluation.     In ED he was hemodynamically stable in no distress. /75, on room air 100% RR 20. Labs notable for leukopenia 1.90 with mild neutropenia ANC 1.14, Cr 1.23. CXR with redemonstrated Left apical small pneumothorax/bleb with pleurX in place and small to trace pleural effusion.        PAST MEDICAL & SURGICAL HISTORY:  Former smoker  Non-small cell lung cancer with metastasis  Status post cardiac surgery s/p open right thoracotomy for posterior cardiac mass removal &gt; 20 years ago, reported by patient to be benign.    FAMILY HISTORY:  Family history of cancer in father  Cancer of unknown origin on Father&#x27;s side.        SOCIAL HISTORY:  Smoking: [ ] Never Smoked [ ] Former Smoker (__ packs x ___ years) [ ] Current Smoker  (__ packs x ___ years)  Substance Use: [ ] Never Used [ ] Used ____  EtOH Use:  Marital Status: [ ] Single [ ]  [ ]  [ ]   Sexual History:   Occupation:  Recent Travel:  Country of Birth:  Advance Directives:    Allergies    No Known Allergies    Intolerances        HOME MEDICATIONS:  Home Medications:  acetaminophen 325 mg oral tablet: 2 tab(s) orally every 6 hours, As needed, Temp greater or equal to 38C (100.4F), Mild Pain (1 - 3), Moderate Pain (4 - 6) (25 Oct 2022 17:27)  dexamethasone 4 mg oral tablet: 1 tab(s) orally 2 times a day day before and after chemo (Alimta) only (25 Oct 2022 17:27)  folic acid 1 mg oral tablet: 1 tab(s) orally once a day (25 Oct 2022 17:27)  metoclopramide 10 mg oral tablet: 1 tab(s) orally 4 times a day (before meals and at bedtime), As Needed - for nausea (25 Oct 2022 17:27)  polyethylene glycol 3350 oral powder for reconstitution: 17 gram(s) orally once a day (25 Oct 2022 17:27)  senna leaf extract oral tablet: 2 tab(s) orally once a day (at bedtime) (25 Oct 2022 17:27)      REVIEW OF SYSTEMS:  Constitutional: [ ] negative [ ] fevers [ ] chills [ ] weight loss [ ] weight gain  HEENT: [ ] negative [ ] dry eyes [ ] eye irritation [ ] postnasal drip [ ] nasal congestion  CV: [ ] negative  [ ] chest pain [ ] orthopnea [ ] palpitations [ ] murmur  Resp: [ ] negative [x ] cough [ x] shortness of breath [ ] dyspnea [ ] wheezing [ ] sputum [ ] hemoptysis  GI: [ ] negative [ ] nausea [ ] vomiting [ ] diarrhea [ ] constipation [ ] abd pain [ ] dysphagia   : [ ] negative [ ] dysuria [ ] nocturia [ ] hematuria [ ] increased urinary frequency  Musculoskeletal: [ ] negative [ ] back pain [ ] myalgias [ ] arthralgias [ ] fracture  Skin: [ ] negative [ ] rash [ ] itch  Neurological: [ ] negative [ ] headache [ ] dizziness [ ] syncope [ ] weakness [ ] numbness  Psychiatric: [ ] negative [ ] anxiety [ ] depression  Endocrine: [ ] negative [ ] diabetes [ ] thyroid problem  Hematologic/Lymphatic: [ ] negative [ ] anemia [ ] bleeding problem  Allergic/Immunologic: [ ] negative [ ] itchy eyes [ ] nasal discharge [ ] hives [ ] angioedema  [x ] All other systems negative  [ ] Unable to assess ROS because ________    OBJECTIVE:  ICU Vital Signs Last 24 Hrs  T(C): 37.1 (23 Nov 2022 15:02), Max: 37.1 (23 Nov 2022 15:02)  T(F): 98.7 (23 Nov 2022 15:02), Max: 98.7 (23 Nov 2022 15:02)  HR: 92 (23 Nov 2022 15:02) (92 - 105)  BP: 121/80 (23 Nov 2022 15:02) (121/80 - 137/75)  BP(mean): --  ABP: --  ABP(mean): --  RR: 16 (23 Nov 2022 15:02) (16 - 20)  SpO2: 100% (23 Nov 2022 15:02) (100% - 100%)    O2 Parameters below as of 23 Nov 2022 15:02  Patient On (Oxygen Delivery Method): room air    CAPILLARY BLOOD GLUCOSE    PHYSICAL EXAM:  General: Thin male siting in bed in no acute distress  Respiratory: Diminished breath sounds toward the left base otherwise CTA, no wheezing  Cardiovascular: Regular rate and rhythm no m/r/g  Abdomen: Nontender nondistended  Extremities: No peripheral edema  Skin: No rashes  Neurological: No appreciable neurologic deficits  Psychiatry: Appropriate mood and affect      LINES:     HOSPITAL MEDICATIONS:  Standing Meds:      PRN Meds:      LABS:                        8.2    1.90  )-----------( 270      ( 23 Nov 2022 15:00 )             25.4     Hgb Trend: 8.2<--, 9.6<--  11-23    134<L>  |  98  |  25<H>  ----------------------------<  92  4.5   |  27  |  1.23    Ca    9.2      23 Nov 2022 15:00    TPro  7.8  /  Alb  3.9  /  TBili  <0.2  /  DBili  x   /  AST  36  /  ALT  28  /  AlkPhos  96  11-23    Creatinine Trend: 1.23<--  PT/INR - ( 23 Nov 2022 15:00 )   PT: 13.3 sec;   INR: 1.14 ratio         PTT - ( 23 Nov 2022 15:00 )  PTT:28.5 sec          MICROBIOLOGY:       RADIOLOGY:  [ ] Reviewed and interpreted by me    PULMONARY FUNCTION TESTS:    EKG: CHIEF COMPLAINT: SOB, inability to afford supplies    HPI:  56YO Male former smoker PMH Right open thoracotomy for cardiac mass removal 20 years ago recently admitted 9/10-9/16 with SOB, weight loss found to have subsegmental PE (now on Xarelto) and  large left hydropneumothorax with rapid reaccumulation s/p Left pleuroscopy with pleural biopsy and pleurX tunneled intrapleural catheter placement 9/14 found to have non-small cell lung ca (s/p cycle 1 carboplatin/pemetrexed/pembrolizumab 11/17/22) who presents from home for SOB and inability to afford drainage supplies.    Pt reports that he has been having difficulty paying for his PleurX drainage supplies as he is currently disabled and is waiting for  disability approval to help him pay. It currently costs him $900 for the supplies which he can not afford. He reports that his insurance was terminated 11/22/22.   Pt was SOB last week and presented to ED 11/16 as he could not afford his drainage. In ED he was hemodynamically stable and had his pleurX drained (reportedly it was 1 bottle). He was supposed to follow up in pulmonary clinic but was not able to.    He presented today reporting SOB that started yesterday. He denies fevers or chills but does report cough productive of clear sputum with specks of bright red blood. He also reported sneezing with specks of blood as well. As he did not have drainage supplies he returned to the ED for evaluation.     In ED he was hemodynamically stable in no distress. /75, on room air 100% RR 20. Labs notable for leukopenia 1.90 with mild neutropenia ANC 1.14, Cr 1.23. CXR with redemonstrated Left apical small pneumothorax/bleb with pleurX in place and small to trace pleural effusion.        PAST MEDICAL & SURGICAL HISTORY:  Former smoker  Non-small cell lung cancer with metastasis  Status post cardiac surgery s/p open right thoracotomy for posterior cardiac mass removal &gt; 20 years ago, reported by patient to be benign.    FAMILY HISTORY:  Family history of cancer in father  Cancer of unknown origin on Father&#x27;s side.        SOCIAL HISTORY:  Smoking: [ ] Never Smoked [ ] Former Smoker (__ packs x ___ years) [ ] Current Smoker  (__ packs x ___ years)  Substance Use: [ ] Never Used [ ] Used ____  EtOH Use:  Marital Status: [ ] Single [ ]  [ ]  [ ]   Sexual History:   Occupation:  Recent Travel:  Country of Birth:  Advance Directives:    Allergies    No Known Allergies    Intolerances        HOME MEDICATIONS:  Home Medications:  acetaminophen 325 mg oral tablet: 2 tab(s) orally every 6 hours, As needed, Temp greater or equal to 38C (100.4F), Mild Pain (1 - 3), Moderate Pain (4 - 6) (25 Oct 2022 17:27)  dexamethasone 4 mg oral tablet: 1 tab(s) orally 2 times a day day before and after chemo (Alimta) only (25 Oct 2022 17:27)  folic acid 1 mg oral tablet: 1 tab(s) orally once a day (25 Oct 2022 17:27)  metoclopramide 10 mg oral tablet: 1 tab(s) orally 4 times a day (before meals and at bedtime), As Needed - for nausea (25 Oct 2022 17:27)  polyethylene glycol 3350 oral powder for reconstitution: 17 gram(s) orally once a day (25 Oct 2022 17:27)  senna leaf extract oral tablet: 2 tab(s) orally once a day (at bedtime) (25 Oct 2022 17:27)      REVIEW OF SYSTEMS:  Constitutional: [ ] negative [ ] fevers [ ] chills [ ] weight loss [ ] weight gain  HEENT: [ ] negative [ ] dry eyes [ ] eye irritation [ ] postnasal drip [ ] nasal congestion  CV: [ ] negative  [ ] chest pain [ ] orthopnea [ ] palpitations [ ] murmur  Resp: [ ] negative [x ] cough [ x] shortness of breath [ ] dyspnea [ ] wheezing [ ] sputum [ ] hemoptysis  GI: [ ] negative [ ] nausea [ ] vomiting [ ] diarrhea [ ] constipation [ ] abd pain [ ] dysphagia   : [ ] negative [ ] dysuria [ ] nocturia [ ] hematuria [ ] increased urinary frequency  Musculoskeletal: [ ] negative [ ] back pain [ ] myalgias [ ] arthralgias [ ] fracture  Skin: [ ] negative [ ] rash [ ] itch  Neurological: [ ] negative [ ] headache [ ] dizziness [ ] syncope [ ] weakness [ ] numbness  Psychiatric: [ ] negative [ ] anxiety [ ] depression  Endocrine: [ ] negative [ ] diabetes [ ] thyroid problem  Hematologic/Lymphatic: [ ] negative [ ] anemia [ ] bleeding problem  Allergic/Immunologic: [ ] negative [ ] itchy eyes [ ] nasal discharge [ ] hives [ ] angioedema  [x ] All other systems negative  [ ] Unable to assess ROS because ________    OBJECTIVE:  ICU Vital Signs Last 24 Hrs  T(C): 37.1 (23 Nov 2022 15:02), Max: 37.1 (23 Nov 2022 15:02)  T(F): 98.7 (23 Nov 2022 15:02), Max: 98.7 (23 Nov 2022 15:02)  HR: 92 (23 Nov 2022 15:02) (92 - 105)  BP: 121/80 (23 Nov 2022 15:02) (121/80 - 137/75)  BP(mean): --  ABP: --  ABP(mean): --  RR: 16 (23 Nov 2022 15:02) (16 - 20)  SpO2: 100% (23 Nov 2022 15:02) (100% - 100%)    O2 Parameters below as of 23 Nov 2022 15:02  Patient On (Oxygen Delivery Method): room air    CAPILLARY BLOOD GLUCOSE    PHYSICAL EXAM:  General: Thin male siting in bed in no acute distress  Respiratory: Diminished breath sounds toward the left base otherwise CTA, no wheezing  Cardiovascular: Regular rate and rhythm no m/r/g  Abdomen: Nontender nondistended  Extremities: No peripheral edema  Skin: No rashes  Neurological: No appreciable neurologic deficits  Psychiatry: Appropriate mood and affect      LINES:     HOSPITAL MEDICATIONS:  Standing Meds:      PRN Meds:      LABS:                        8.2    1.90  )-----------( 270      ( 23 Nov 2022 15:00 )             25.4     Hgb Trend: 8.2<--, 9.6<--  11-23    134<L>  |  98  |  25<H>  ----------------------------<  92  4.5   |  27  |  1.23    Ca    9.2      23 Nov 2022 15:00    TPro  7.8  /  Alb  3.9  /  TBili  <0.2  /  DBili  x   /  AST  36  /  ALT  28  /  AlkPhos  96  11-23    Creatinine Trend: 1.23<--  PT/INR - ( 23 Nov 2022 15:00 )   PT: 13.3 sec;   INR: 1.14 ratio         PTT - ( 23 Nov 2022 15:00 )  PTT:28.5 sec          MICROBIOLOGY:       RADIOLOGY:  [x] Reviewed and interpreted by me      PULMONARY FUNCTION TESTS:    EKG:

## 2022-11-23 NOTE — CONSULT NOTE ADULT - ASSESSMENT
58YO Male former smoker PMH Right open thoracotomy for cardiac mass removal 20 years ago recently admitted 9/10-9/16 with SOB, weight loss found to have subsegmental PE (now on Xarelto) and  large left hydropneumothorax with rapid reaccumulation s/p Left pleuroscopy with pleural biopsy and pleurX tunneled intrapleural catheter placement 9/14 found to have non-small cell lung ca (s/p cycle 1 carboplatin/pemetrexed/pembrolizumab 11/17/22) who presents from home for SOB and inability to afford drainage supplies.     #Malignant effusion  - Pt has inability to afford needed PleurX supplies for drainage  - Please consult Social Work and Case Management to help coordinate pt getting insurance and needed supplies  - Can drain PleurX today up to 1 L stop for pain or SOB  - Can drain PleurX every other day while in hospital and once discharged once weekly up to 1L stopping for pain or SOB  - Pt should have supplies at home should he develop SOB and need to drain himself in between    #SOB  - Pt is not wheezing currently but has emphasematous changes on CT   - Please start Albuterol Q6H PRN    Case discussed with Dr. Irene Miller DO   Pulmonary/Critical Care Fellow   Pager: [phone] (CARLIE), [phone] (NS)  Pulmonary Spectra #[id number] (NS) / [phone] (CARLIE)   56YO Male former smoker PMH Right open thoracotomy for cardiac mass removal 20 years ago recently admitted 9/10-9/16 with SOB, weight loss found to have subsegmental PE (now on Xarelto) and  large left hydropneumothorax with rapid reaccumulation s/p Left pleuroscopy with pleural biopsy and pleurX tunneled intrapleural catheter placement 9/14 found to have non-small cell lung ca (s/p cycle 1 carboplatin/pemetrexed/pembrolizumab 11/17/22) who presents from home for SOB and inability to afford drainage supplies.     #Malignant effusion  - Pt appears to be comfortable in no respiratory distress  - Specks of blood likely due to irritation from coughing while on anticoagulation  - POCUS of Left hemithorax performed revealing small simple appearing effusion  - Can drain PleurX today up to 1 L stop for pain or SOB  - Can drain PleurX every other day while in hospital and once discharged once weekly up to 1L stopping for pain or SOB  - Pt should have supplies at home should he develop SOB and need to drain himself in between  - Pt has inability to afford needed PleurX supplies for drainage  - Please consult Social Work and Case Management to help coordinate pt getting insurance and needed supplies    #SOB  - Pt is not wheezing currently but has emphasematous changes on CT   - Please start Albuterol Q6H PRN    #Mild neutropenia  - ANC 1.14  - Neutropenic precautions  - Would reach out to [facility] while inpatient to see if pt requires GM CSF on this admission or if ANC is expected to recover on its own    Case discussed with Dr. Irene Miller DO   Pulmonary/Critical Care Fellow   Pager: [phone] (CARLIE), [phone] (NS)  Pulmonary Spectra #[id number] (NS) / [phone] (CARLIE)   56YO Male former smoker PMH Right open thoracotomy for cardiac mass removal 20 years ago recently admitted 9/10-9/16 with SOB, weight loss found to have subsegmental PE (now on Xarelto) and  large left hydropneumothorax with rapid reaccumulation s/p Left pleuroscopy with pleural biopsy and pleurX tunneled intrapleural catheter placement 9/14 found to have non-small cell lung ca (s/p cycle 1 carboplatin/pemetrexed/pembrolizumab 11/17/22) who presents from home for SOB and inability to afford drainage supplies.     #Malignant effusion  #SOB  - Pt appears to be comfortable in no respiratory distress  - Pt is not wheezing currently but has emphasematous changes on CT   - Please start Albuterol Q6H PRN  - Specks of blood likely due to irritation from coughing while on anticoagulation  - POCUS of Left hemithorax performed revealing small simple appearing effusion  - Can drain PleurX today up to 1 L stop for pain or SOB  - Can drain PleurX every other day while in hospital and once discharged once weekly up to 1L stopping for pain or SOB  - Pt should have supplies at home should he develop SOB and need to drain himself in between  - Pt has inability to afford needed PleurX supplies for drainage  - Please consult Social Work and Case Management to help coordinate pt getting insurance and needed supplies    #Mild neutropenia  - ANC 1.14  - Neutropenic precautions  - Would reach out to [facility] while inpatient to see if pt requires GM CSF on this admission or if ANC is expected to recover on its own    Case discussed with Dr. Irene Miller DO   Pulmonary/Critical Care Fellow   Pager: [phone] (CARLIE), [phone] (NS)  Pulmonary Spectra #[id number] (NS) / [phone] (CARLIE)   58YO Male former smoker PMH Right open thoracotomy for cardiac mass removal 20 years ago recently admitted 9/10-9/16 with SOB, weight loss found to have subsegmental PE (now on Xarelto) and  large left hydropneumothorax with rapid reaccumulation s/p Left pleuroscopy with pleural biopsy and pleurX tunneled intrapleural catheter placement 9/14 found to have non-small cell lung ca (s/p cycle 1 carboplatin/pemetrexed/pembrolizumab 11/17/22) who presents from home for SOB and inability to afford drainage supplies.     #Malignant effusion  #SOB  - Pt appears to be comfortable in no respiratory distress  - Pt is not wheezing currently but has emphasematous changes on CT   - Please start Albuterol Q6H PRN  - Specks of blood likely due to irritation from coughing while on anticoagulation  - POCUS of Left hemithorax performed revealing small simple appearing effusion  - Can drain PleurX today up to 1 L stop for pain or SOB  - Can drain PleurX every other day while in hospital and once discharged once weekly up to 1L stopping for pain or SOB  - Pt should have supplies at home should he develop SOB and need to drain himself in between  - Pt has inability to afford needed PleurX supplies for drainage  - Please consult Social Work and Case Management to help coordinate pt getting insurance and needed supplies    #Mild neutropenia  - ANC 1.14  - Neutropenic precautions  - Would reach out to [facility] while inpatient to see if pt requires G-CSF on this admission or if ANC is expected to recover on its own    Case discussed with Dr. Irene Miller,    Pulmonary/Critical Care Fellow   Pager: [phone] (CARLIE), [phone] (NS)  Pulmonary Spectra #[id number] (NS) / [phone] (CARLIE)   56YO Male former smoker PMH Right open thoracotomy for cardiac mass removal 20 years ago recently admitted 9/10-9/16 with SOB, weight loss found to have subsegmental PE (now on Xarelto) and  large left hydropneumothorax with rapid reaccumulation s/p Left pleuroscopy with pleural biopsy and pleurX tunneled intrapleural catheter placement 9/14 found to have non-small cell lung ca (s/p cycle 1 carboplatin/pemetrexed/pembrolizumab 11/17/22) who presents from home for SOB and inability to afford drainage supplies.     #Non small cell lung cancer  #Left Malignant effusion  #H/O PE  #SOB  - Pt appears to be comfortable in no respiratory distress  - Pt is not wheezing currently but has emphasematous changes on CT   - Please start Albuterol Q6H PRN  - Specks of blood likely due to irritation from coughing while on anticoagulation  - POCUS of Left hemithorax performed revealing small simple appearing effusion  - Can drain PleurX today up to 1 L stop for pain or SOB  - Can drain PleurX every other day while in hospital and once discharged once weekly up to 1L stopping for pain or SOB  - Pt should have supplies at home should he develop SOB and need to drain himself in between  - Pt has inability to afford needed PleurX supplies for drainage  - Will need social Work and Case Management to help coordinate pt getting insurance and needed supplies    #Mild neutropenia  - ANC 1.14  - Neutropenic precautions  - Would reach out to heme while inpatient to see if pt requires G-CSF on this admission or if ANC is expected to recover on its own    Case discussed with Dr. Irene Miller DO   Pulmonary/Critical Care Fellow   Pager: [phone] (CARLIE), [phone] (NS)  Pulmonary Spectra #[id number] (NS) / [phone] (CARLIE)

## 2022-11-23 NOTE — H&P ADULT - NSHPLABSRESULTS_GEN_ALL_CORE
8.2    1.90  )-----------( 270      ( 23 Nov 2022 15:00 )             25.4     Hgb Trend: 8.2<--, 9.6<--  11-23    134<L>  |  98  |  25<H>  ----------------------------<  92  4.5   |  27  |  1.23    Ca    9.2      23 Nov 2022 15:00    TPro  7.8  /  Alb  3.9  /  TBili  <0.2  /  DBili  x   /  AST  36  /  ALT  28  /  AlkPhos  96  11-23    Creatinine Trend: 1.23<--  PT/INR - ( 23 Nov 2022 15:00 )   PT: 13.3 sec;   INR: 1.14 ratio         PTT - ( 23 Nov 2022 15:00 )  PTT:28.5 sec          CXR as reviewed by the radiologist: Unchanged small loculated left pneumothorax/bleb with chest tube in place.      EKG is ordered.

## 2022-11-23 NOTE — H&P ADULT - PROBLEM SELECTOR PLAN 3
ANC is 1.14   -No fever   -Monitor off abx   -Most likely due to chemo  -Daily CMP, if not improving, consult Heme/onc for GCSF

## 2022-11-23 NOTE — ED PROVIDER NOTE - OBJECTIVE STATEMENT
58 yo M PMHx of former smoker, right open thoracotomy for cardiac mass removal 20 years ago, now w/ non-small cell lung CA, on chemo, L pneumohydrothorax sp pleurex catheter, getting catheter drained weekly, here for worsening shortness of breath, sent in by pulmonology for possible admission/ pleurex catheter replacement. Pt denies fevers, chills. Admits to mild LE edema. Otherwise feels at baseline. 56 yo M PMHx of former smoker, right open thoracotomy for cardiac mass removal 20 years ago, now w/ non-small cell lung CA, on chemo, L pneumohydrothorax sp pleurex catheter, getting catheter drained weekly, here for worsening shortness of breath, sent in by pulmonology for possible admission/ pleurex catheter replacement. Pt denies fevers, chills. Admits to mild LE edema. Otherwise feels at baseline.  Attending - Agree with above.  I evaluated patient myself. 57-year-old male with history of non-small cell lung cancer currently on chemotherapy.  History of recurrent pleural effusion with Pleurx catheter placed.  Patient complains of increasing shortness of breath today, and does not have supplies to drain.  Referred to ED by his physician for admission and drainage of pleural effusion.  Patient denies any fever or recent COVID.  Previous [facility] records reviewed.

## 2022-11-23 NOTE — ED PROVIDER NOTE - CLINICAL SUMMARY MEDICAL DECISION MAKING FREE TEXT BOX
58 yo M PMHx of former smoker, right open thoracotomy for cardiac mass removal 20 years ago, now w/ non-small cell lung CA, on chemo, L pneumohydrothorax sp pleurex catheter, getting catheter drained weekly, here for worsening shortness of breath, sent in by pulmonology for possible admission/ pleurex catheter replacement. Will call pulm attg to find out what plan of care, labs, cxr

## 2022-11-23 NOTE — H&P ADULT - HISTORY OF PRESENT ILLNESS
56 yo M with Pmhx of NSCLC s/p s/p cycle 1 carboplatin/pemetrexed/pembrolizumab (last chemo on 11/17/2022), subsegmental PE, and cardiac mass s/p thoracotomy presents to the ED with SOB. Patient reports that he has been feeling SOB for the last couple of days. He has hx of R pleural effusion and he gets drained through a plerx catheter. However, he ran out of supplies and does not have the financial resource to buy them. As a result, he has not been draining his catheter and he thinks the fluid is building up around his lungs. Otherwise, he denies any fever, chills, nausea, vomiting, abdominal pain, diarrhea or LE edema.   In the ED, his vitals were notable for tachycardia. Labs were notable for anemia, leukopenia, and mild hyponatremia. CXR showed unchanged small loculated left pneumothorax/bleb with chest tube in place.

## 2022-11-23 NOTE — H&P ADULT - PROBLEM SELECTOR PLAN 4
Hgb downtrended to 8.2   -Denies any melena, hematochezia or hematuria   -Endorses some blood tinges sputum but no gross hemoptysis   -Monitor CBC daily   -F/u with iron studies

## 2022-11-23 NOTE — H&P ADULT - NSHPREVIEWOFSYSTEMS_GEN_ALL_CORE
REVIEW OF SYSTEMS:    CONSTITUTIONAL: No weakness, fevers or chills  EYES/ENT: No visual changes;  No vertigo or throat pain   NECK: No pain or stiffness  RESPIRATORY: No cough, wheezing, hemoptysis; +shortness of breath  CARDIOVASCULAR: No chest pain or palpitations  GASTROINTESTINAL: No abdominal or epigastric pain. No nausea, vomiting, or hematemesis; No diarrhea or constipation. No melena or hematochezia.  GENITOURINARY: No dysuria, frequency or hematuria  NEUROLOGICAL: No numbness or weakness  MUSCULOSKELETAL: No joint pain, no muscle ache   SKIN: No itching, burning, rashes, or lesions   All other review of systems is negative unless indicated above.

## 2022-11-23 NOTE — ED PROVIDER NOTE - PHYSICAL EXAMINATION
ATTENDING PHYSICAL EXAM  GEN - NAD; A+O x3, Speaking full sentences.  RR 14.  O2 99%RA.  HEAD - NC/AT, bitemporal wasting; EYES/NOSE - PERRL, EOMI, mucous membranes moist, no discharge; THROAT: Oral cavity and pharynx normal. No inflammation, swelling, exudate, or lesions  NECK: Neck supple, non-tender without lymphadenopathy, no masses, no JVD  PULMONARY - PleurX catheter in place, CTA b/l, symmetric breath sounds, no w/r/r  CARDIAC -s1s2, RRR, no M,R,G  ABDOMEN - +NABS, ND, NT, soft, no guarding, no rebound, no masses   BACK - no CVA tenderness, No vertebral or paravertebral tenderness  EXTREMITIES - symmetric pulses, 2+ dp, capillary refill < 2 seconds, no clubbing, no cyanosis, no edema  NEUROLOGIC - alert, CN 2-12 intact

## 2022-11-23 NOTE — H&P ADULT - PROBLEM SELECTOR PLAN 1
Patient with chronic L pleural effusion, unchanged from prior CXR   -Pulm recs appreciated   -Can drain PleurX today up to 1 L stop for pain or SOB  -Can drain PleurX every other day while in hospital and once discharged once weekly up to 1L stopping for pain or SOB  -C/w albuterol q6hrs   -No concern for infection

## 2022-11-24 DIAGNOSIS — C34.90 MALIGNANT NEOPLASM OF UNSPECIFIED PART OF UNSPECIFIED BRONCHUS OR LUNG: ICD-10-CM

## 2022-11-24 LAB
A1C WITH ESTIMATED AVERAGE GLUCOSE RESULT: 5.6 % — SIGNIFICANT CHANGE UP (ref 4–5.6)
ALBUMIN SERPL ELPH-MCNC: 3.3 G/DL — SIGNIFICANT CHANGE UP (ref 3.3–5)
ALP SERPL-CCNC: 83 U/L — SIGNIFICANT CHANGE UP (ref 40–120)
ALT FLD-CCNC: 25 U/L — SIGNIFICANT CHANGE UP (ref 4–41)
ANION GAP SERPL CALC-SCNC: 11 MMOL/L — SIGNIFICANT CHANGE UP (ref 7–14)
AST SERPL-CCNC: 32 U/L — SIGNIFICANT CHANGE UP (ref 4–40)
BASOPHILS # BLD AUTO: 0 K/UL — SIGNIFICANT CHANGE UP (ref 0–0.2)
BASOPHILS NFR BLD AUTO: 0 % — SIGNIFICANT CHANGE UP (ref 0–2)
BILIRUB SERPL-MCNC: <0.2 MG/DL — SIGNIFICANT CHANGE UP (ref 0.2–1.2)
BLD GP AB SCN SERPL QL: NEGATIVE — SIGNIFICANT CHANGE UP
BUN SERPL-MCNC: 22 MG/DL — SIGNIFICANT CHANGE UP (ref 7–23)
CALCIUM SERPL-MCNC: 8.9 MG/DL — SIGNIFICANT CHANGE UP (ref 8.4–10.5)
CHLORIDE SERPL-SCNC: 98 MMOL/L — SIGNIFICANT CHANGE UP (ref 98–107)
CHOLEST SERPL-MCNC: 219 MG/DL — HIGH
CO2 SERPL-SCNC: 25 MMOL/L — SIGNIFICANT CHANGE UP (ref 22–31)
CREAT SERPL-MCNC: 1.24 MG/DL — SIGNIFICANT CHANGE UP (ref 0.5–1.3)
EGFR: 68 ML/MIN/1.73M2 — SIGNIFICANT CHANGE UP
EOSINOPHIL # BLD AUTO: 0.03 K/UL — SIGNIFICANT CHANGE UP (ref 0–0.5)
EOSINOPHIL NFR BLD AUTO: 2 % — SIGNIFICANT CHANGE UP (ref 0–6)
ESTIMATED AVERAGE GLUCOSE: 114 — SIGNIFICANT CHANGE UP
FERRITIN SERPL-MCNC: 128 NG/ML — SIGNIFICANT CHANGE UP (ref 30–400)
GLUCOSE SERPL-MCNC: 105 MG/DL — HIGH (ref 70–99)
HCT VFR BLD CALC: 22.7 % — LOW (ref 39–50)
HDLC SERPL-MCNC: 58 MG/DL — SIGNIFICANT CHANGE UP
HGB BLD-MCNC: 7.3 G/DL — LOW (ref 13–17)
IANC: 0.67 K/UL — LOW (ref 1.8–7.4)
IMM GRANULOCYTES NFR BLD AUTO: 1.3 % — HIGH (ref 0–0.9)
IRON SATN MFR SERPL: 25 % — SIGNIFICANT CHANGE UP (ref 14–50)
IRON SATN MFR SERPL: 67 UG/DL — SIGNIFICANT CHANGE UP (ref 45–165)
LIPID PNL WITH DIRECT LDL SERPL: 149 MG/DL — HIGH
LYMPHOCYTES # BLD AUTO: 0.61 K/UL — LOW (ref 1–3.3)
LYMPHOCYTES # BLD AUTO: 40.7 % — SIGNIFICANT CHANGE UP (ref 13–44)
MCHC RBC-ENTMCNC: 29 PG — SIGNIFICANT CHANGE UP (ref 27–34)
MCHC RBC-ENTMCNC: 32.2 GM/DL — SIGNIFICANT CHANGE UP (ref 32–36)
MCV RBC AUTO: 90.1 FL — SIGNIFICANT CHANGE UP (ref 80–100)
MONOCYTES # BLD AUTO: 0.17 K/UL — SIGNIFICANT CHANGE UP (ref 0–0.9)
MONOCYTES NFR BLD AUTO: 11.3 % — SIGNIFICANT CHANGE UP (ref 2–14)
NEUTROPHILS # BLD AUTO: 0.67 K/UL — LOW (ref 1.8–7.4)
NEUTROPHILS NFR BLD AUTO: 44.7 % — SIGNIFICANT CHANGE UP (ref 43–77)
NON HDL CHOLESTEROL: 161 MG/DL — HIGH
NRBC # BLD: 0 /100 WBCS — SIGNIFICANT CHANGE UP (ref 0–0)
NRBC # FLD: 0 K/UL — SIGNIFICANT CHANGE UP (ref 0–0)
PLATELET # BLD AUTO: 268 K/UL — SIGNIFICANT CHANGE UP (ref 150–400)
POTASSIUM SERPL-MCNC: 4.5 MMOL/L — SIGNIFICANT CHANGE UP (ref 3.5–5.3)
POTASSIUM SERPL-SCNC: 4.5 MMOL/L — SIGNIFICANT CHANGE UP (ref 3.5–5.3)
PROT SERPL-MCNC: 7 G/DL — SIGNIFICANT CHANGE UP (ref 6–8.3)
RBC # BLD: 2.52 M/UL — LOW (ref 4.2–5.8)
RBC # FLD: 13.1 % — SIGNIFICANT CHANGE UP (ref 10.3–14.5)
RH IG SCN BLD-IMP: POSITIVE — SIGNIFICANT CHANGE UP
SODIUM SERPL-SCNC: 134 MMOL/L — LOW (ref 135–145)
TIBC SERPL-MCNC: 263 UG/DL — SIGNIFICANT CHANGE UP (ref 220–430)
TRIGL SERPL-MCNC: 61 MG/DL — SIGNIFICANT CHANGE UP
UIBC SERPL-MCNC: 196 UG/DL — SIGNIFICANT CHANGE UP (ref 110–370)
WBC # BLD: 1.5 K/UL — LOW (ref 3.8–10.5)
WBC # FLD AUTO: 1.5 K/UL — LOW (ref 3.8–10.5)

## 2022-11-24 PROCEDURE — 99233 SBSQ HOSP IP/OBS HIGH 50: CPT

## 2022-11-24 PROCEDURE — 99223 1ST HOSP IP/OBS HIGH 75: CPT

## 2022-11-24 RX ADMIN — POLYETHYLENE GLYCOL 3350 17 GRAM(S): 17 POWDER, FOR SOLUTION ORAL at 12:08

## 2022-11-24 RX ADMIN — Medication 1 MILLIGRAM(S): at 12:08

## 2022-11-24 RX ADMIN — RIVAROXABAN 20 MILLIGRAM(S): KIT at 18:30

## 2022-11-24 NOTE — PROGRESS NOTE ADULT - PROBLEM SELECTOR PLAN 4
Hgb downtrended to 8.2   -Denies any melena, hematochezia or hematuria   -Endorses some blood tinges sputum but no gross hemoptysis   -Monitor CBC daily   -Iron studies wnl C/w xarelto 20 mg daily  WADE

## 2022-11-24 NOTE — PROGRESS NOTE ADULT - PROBLEM SELECTOR PLAN 2
C/w xarelto 20 mg daily  WADE ANC is 1.14 --> 0.67, likely 2/2 recent chemo.   -No fever   -Monitor off abx   -onc consulted, appreciate recs. discuss role of GCSF

## 2022-11-24 NOTE — PROGRESS NOTE ADULT - PROBLEM SELECTOR PLAN 1
Patient with chronic L pleural effusion i. s.o malignancy, unchanged from prior CXR. pulmonology performed bedside [facility] with findings of small simple left pleural effusion.  -Pulm consulted, appreciate recs  -per pulmonology, Can drain PleurX today up to 1 L stop for pain or SOB  -Can drain PleurX every other day while in hospital and once discharged once weekly up to 1L stopping for pain or SOB  -C/w albuterol q6hrs   -No concern for infection  -touch base with SW regarding plurx supplies Patient with chronic L pleural effusion in s.o malignancy, unchanged from prior CXR. pulmonology performed bedside [facility] with findings of small simple left pleural effusion.  -Pulm consulted, appreciate recs  -per pulmonology, Can drain PleurX today up to 1 L stop for pain or SOB  -Can drain PleurX every other day while in hospital and once discharged once weekly up to 1L stopping for pain or SOB  -C/w albuterol q6hrs   -No concern for infection  -touch base with SW/CM regarding pluerx supplies

## 2022-11-24 NOTE — PROGRESS NOTE ADULT - PROBLEM SELECTOR PLAN 3
ANC is 1.14 --> 0.67, likely 2/2 recent chemo.   -No fever   -Monitor off abx   -onc consulted, appreciate recs. discuss role of GCSF s/p cycle 1 carboplatin/pemetrexed/pembrolizumab (last chemo on 11/17/2022).  - ONC consult, appreciate recs  - Follows with Dr.Attarian mccollum

## 2022-11-24 NOTE — CONSULT NOTE ADULT - ASSESSMENT
57m with newly diagnosed adenocarcinoma of the lung presenting for initial oncology evaluation.      Mr Tasha presented to the hospital with SOB and was found to have a left-sided hydropneumothorax with trapped lung and underwent a pleuroscopy with pleural biopsy as well as tunneled pleural catheter placement.            SOB has now improved.      He reports some weight loss in the past 2-3 months, probably about 10lb, no pain, no cough.           Path c/w adenocarcinoma of the lung, PDL1 10%,      NGS     ARID1A Q450*     CASP8 E311*     TERT promoter -124C>T           PET/CT 10/19/2022 reviewed, done at [facility], hypermetabolix SAULO mass, small hypermetabolic RUL nodule, Left pleural nodularity, left pleural effusion with drain inside, b/l supraclavicular inrenal mammary, mediastinal, left hilar, left axillary, upper abdominal, retrocrural, retroperitoneal, pelvic and left inguinal LN. hypermetabolic peritoneal nodularity. Skeletal involvement.            CT head w/wo contrast 10/14/2022 unremarkable.          On antineoplastic chemotherapy (V58.69) (Z79.899)     Adenocarcinoma of lung (162.9) (C34.90)           57m with recently diagnosed adenocarcinoma of the lung, with positive pleural effusion and involvement of the pleura, s/p pleurX placement, presenting to establish care.            Patient started first line treatment with carboplatin/pemetrexed/pembrolizumab.      S/p C1, well tolerated  56 yo M with Pmhx of NSCLC (follows with Dr. Gunn, dx in 09/2022 with malignant pleural effusion. S/p cycle 1 carboplatin/pemetrexed/pembrolizumab last given on 11/17/2022), subsegmental PE, and cardiac mass s/p thoracotomy presents to the ED with SOB in the setting of not draining his pleural effusion d/t shortage in supplies.   Labs with leukopenia 1.5 (ANC 0.67), Hb 7.3,   CMP WNL  CXR :Unchanged small loculated left pneumothorax/bleb with chest tube in place.    #NSCLC  -Follows with Dr. Gunn   -Diagnosed in sep 2022 when he presented with SOB and noted  to have a left-sided hydropneumothorax with trapped lung and underwent a pleuroscopy with pleural biopsy as well as tunneled pleural catheter placement.     -Pathology: Adenocarcinoma of the lung, PDL1 10%, NGS: ARID1A Q450*  CASP8 E311*  TERT promoter -124C>T    -10/19/2022  PET/CT reviewed, done at [facility], hypermetabolix SAULO mass, small hypermetabolic RUL nodule, Left pleural nodularity, left pleural effusion with drain inside, b/l supraclavicular inrenal mammary, mediastinal, left hilar, left axillary, upper abdominal, retrocrural, retroperitoneal, pelvic and left inguinal LN. hypermetabolic peritoneal nodularity. Skeletal involvement.     -10/14/2022 CT head w/wo contrast  unremarkable.    -On carboplatin/pemetrexed/pembrolizumab. s/p cycle 1 11/17/22     -Agree with pulm consult   -Please ensure SW is on board to help with the supply       #Bicytopenia in the context of recent CTX  -Please monitor for fevers. If fever and leukopenic with ANC<500 can start on broad spectrum ABx and give zarxio  -Please give 1 U PRBC  -Trend CBC to ensure Hb>7   -Can get iron studies and treat appropriately     #Hx of subsegmental PE-Provoked   -C/w AC     Marleny Crawford MD.   PGY5 HEMONC fellow

## 2022-11-24 NOTE — CONSULT NOTE ADULT - SUBJECTIVE AND OBJECTIVE BOX
Hematology Oncology Consult Note    HPI:  58 yo M with Pmhx of NSCLC s/p s/p cycle 1 carboplatin/pemetrexed/pembrolizumab (last chemo on 11/17/2022), subsegmental PE, and cardiac mass s/p thoracotomy presents to the ED with SOB. Patient reports that he has been feeling SOB for the last couple of days. He has hx of R pleural effusion and he gets drained through a plerx catheter. However, he ran out of supplies and does not have the financial resource to buy them. As a result, he has not been draining his catheter and he thinks the fluid is building up around his lungs. Otherwise, he denies any fever, chills, nausea, vomiting, abdominal pain, diarrhea or LE edema.   In the ED, his vitals were notable for tachycardia. Labs were notable for anemia, leukopenia, and mild hyponatremia. CXR showed unchanged small loculated left pneumothorax/bleb with chest tube in place.    (23 Nov 2022 19:19)      PAST MEDICAL & SURGICAL HISTORY:  Former smoker      Non-small cell lung cancer with metastasis      Status post cardiac surgery  s/p open right thoracotomy for posterior cardiac mass removal &gt; 20 years ago, reported by patient to be benign.          FAMILY HISTORY:  Family history of cancer in father  Cancer of unknown origin on Father&#x27;s side.        MEDICATIONS  (STANDING):  folic acid 1 milliGRAM(s) Oral daily  polyethylene glycol 3350 17 Gram(s) Oral daily  rivaroxaban 20 milliGRAM(s) Oral with dinner    MEDICATIONS  (PRN):  acetaminophen     Tablet .. 650 milliGRAM(s) Oral every 6 hours PRN Temp greater or equal to 38C (100.4F), Mild Pain (1 - 3)  albuterol    90 MICROgram(s) HFA Inhaler 2 Puff(s) Inhalation every 6 hours PRN Shortness of Breath and/or Wheezing  aluminum hydroxide/magnesium hydroxide/simethicone Suspension 30 milliLiter(s) Oral every 4 hours PRN Dyspepsia  melatonin 3 milliGRAM(s) Oral at bedtime PRN Insomnia  ondansetron Injectable 4 milliGRAM(s) IV Push every 8 hours PRN Nausea and/or Vomiting      Allergies    No Known Allergies    Intolerances        SOCIAL HISTORY: No EtOH, no tobacco    Review of Systems:  General: denies fevers/chills  Respiratory: denies cough, shortness of breath  Cardiovascular: denies chest pain, palpitations  Gastrointestinal: denies nausea, vomiting, abdominal pain, constipation, diarrhea, melena, hematochezia  MSK: denies joint pain or muscle pain  Neuro: denies headache, weakness, or parasthesias  Skin: denies rash, petichiae, echymoses  Psych: denies anxiety or sleep disturbances    Height (cm): 188 (11-23 @ 21:10)  Weight (kg): 72.6 (11-23 @ 21:10)  BMI (kg/m2): 20.5 (11-23 @ 21:10)  BSA (m2): 1.98 (11-23 @ 21:10)    T(F): 98.2 (11-24-22 @ 04:55), Max: 98.7 (11-23-22 @ 15:02)  HR: 87 (11-24-22 @ 04:55)  BP: 110/70 (11-24-22 @ 04:55)  RR: 18 (11-24-22 @ 04:55)  SpO2: 100% (11-24-22 @ 04:55)  Wt(kg): --    PHYSICAL EXAM:    Constitutional: NAD  Respiratory: CTA b/l, symmetric chest rise, with normal respiratory effort  Cardiovascular: RRR  Gastrointestinal: soft, NTND  Extremities:  no edema  MSK: no obvious abnormalities  Neurological: Grossly intact  Skin: no rash, no echymoses, no petichiae  Psych: normal affect                          7.3    1.50  )-----------( 268      ( 24 Nov 2022 05:20 )             22.7       11-24    134<L>  |  98  |  22  ----------------------------<  105<H>  4.5   |  25  |  1.24    Ca    8.9      24 Nov 2022 05:20    TPro  7.0  /  Alb  3.3  /  TBili  <0.2  /  DBili  x   /  AST  32  /  ALT  25  /  AlkPhos  83  11-24           Hematology Oncology Consult Note    HPI:  58 yo M with Pmhx of NSCLC (follows with Dr. Gunn, jessica in 09/2022 with malignant pleural effusion. S/p cycle 1 carboplatin/pemetrexed/pembrolizumab given on 11/17/2022), subsegmental PE, and cardiac mass s/p thoracotomy presents to the ED with SOB. Patient reports that he has been feeling SOB for the last couple of days. He has hx of R pleural effusion and he gets drained through a plerx catheter. However, he ran out of supplies and does not have the financial resource to buy them. As a result, he has not been draining his catheter and he thinks the fluid is building up around his lungs. Otherwise, he denies any fever, chills, nausea, vomiting, abdominal pain, diarrhea or LE edema.   In the ED, his vitals were notable for tachycardia. Labs were notable for anemia, leukopenia, and mild hyponatremia. CXR showed unchanged small loculated left pneumothorax/bleb with chest tube in place.    (23 Nov 2022 19:19)      PAST MEDICAL & SURGICAL HISTORY:  Former smoker      Non-small cell lung cancer with metastasis      Status post cardiac surgery  s/p open right thoracotomy for posterior cardiac mass removal &gt; 20 years ago, reported by patient to be benign.          FAMILY HISTORY:  Family history of cancer in father  Cancer of unknown origin on Father&#x27;s side.        MEDICATIONS  (STANDING):  folic acid 1 milliGRAM(s) Oral daily  polyethylene glycol 3350 17 Gram(s) Oral daily  rivaroxaban 20 milliGRAM(s) Oral with dinner    MEDICATIONS  (PRN):  acetaminophen     Tablet .. 650 milliGRAM(s) Oral every 6 hours PRN Temp greater or equal to 38C (100.4F), Mild Pain (1 - 3)  albuterol    90 MICROgram(s) HFA Inhaler 2 Puff(s) Inhalation every 6 hours PRN Shortness of Breath and/or Wheezing  aluminum hydroxide/magnesium hydroxide/simethicone Suspension 30 milliLiter(s) Oral every 4 hours PRN Dyspepsia  melatonin 3 milliGRAM(s) Oral at bedtime PRN Insomnia  ondansetron Injectable 4 milliGRAM(s) IV Push every 8 hours PRN Nausea and/or Vomiting      Allergies    No Known Allergies    Intolerances        SOCIAL HISTORY: No EtOH, no tobacco    Review of Systems:  General: denies fevers/chills  Respiratory: denies cough, shortness of breath  Cardiovascular: denies chest pain, palpitations  Gastrointestinal: denies nausea, vomiting, abdominal pain, constipation, diarrhea, melena, hematochezia  MSK: denies joint pain or muscle pain  Neuro: denies headache, weakness, or parasthesias  Skin: denies rash, petichiae, echymoses  Psych: denies anxiety or sleep disturbances    Height (cm): 188 (11-23 @ 21:10)  Weight (kg): 72.6 (11-23 @ 21:10)  BMI (kg/m2): 20.5 (11-23 @ 21:10)  BSA (m2): 1.98 (11-23 @ 21:10)    T(F): 98.2 (11-24-22 @ 04:55), Max: 98.7 (11-23-22 @ 15:02)  HR: 87 (11-24-22 @ 04:55)  BP: 110/70 (11-24-22 @ 04:55)  RR: 18 (11-24-22 @ 04:55)  SpO2: 100% (11-24-22 @ 04:55)  Wt(kg): --    PHYSICAL EXAM:    Constitutional: NAD  Respiratory: CTA b/l, symmetric chest rise, with normal respiratory effort  Cardiovascular: RRR  Gastrointestinal: soft, NTND  Extremities:  no edema  MSK: no obvious abnormalities  Neurological: Grossly intact  Skin: no rash, no echymoses, no petichiae  Psych: normal affect                          7.3    1.50  )-----------( 268      ( 24 Nov 2022 05:20 )             22.7       11-24    134<L>  |  98  |  22  ----------------------------<  105<H>  4.5   |  25  |  1.24    Ca    8.9      24 Nov 2022 05:20    TPro  7.0  /  Alb  3.3  /  TBili  <0.2  /  DBili  x   /  AST  32  /  ALT  25  /  AlkPhos  83  11-24           Hematology Oncology Consult Note    HPI:  56 yo M with Pmhx of NSCLC (follows with Dr. Gunn, dx in 09/2022 with malignant pleural effusion. S/p cycle 1 carboplatin/pemetrexed/pembrolizumab last given on 11/17/2022), subsegmental PE, and cardiac mass s/p thoracotomy presents to the ED with SOB. Patient reports that he has been feeling SOB for the last couple of days. He has hx of R pleural effusion and he gets drained through a plerx catheter. However, he ran out of supplies and does not have the financial resource to buy them. As a result, he has not been draining his catheter and he thinks the fluid is building up around his lungs. Otherwise, he denies any fever, chills, nausea, vomiting, abdominal pain, diarrhea or LE edema.      (23 Nov 2022 19:19)    Oncology is consulted for NSCLC.   Patient was seen at bedside reports feeling well. Reports he ran out the supplies, hence couldn't drain the effusion. Reports he came to the ED last week as well for the same reason. He has visiting nurse coming to help him, but needs the supply and he can't afford it.   He denies CP/palpitation. No fever/chills. No abdominal pain N/V.   In the ED, his vitals were notable for tachycardia. Labs were notable for anemia, leukopenia, and mild hyponatremia. CXR showed unchanged small loculated left pneumothorax/bleb with chest tube in place.     NSCLC:  -Follows with Dr. Gunn   -Diagnosed in sep 2022 when he presented with SOB and noted  to have a left-sided hydropneumothorax with trapped lung and underwent a pleuroscopy with pleural biopsy as well as tunneled pleural catheter placement.     -Pathology: Adenocarcinoma of the lung, PDL1 10%,   NGS: ARID1A Q450*  CASP8 E311*  TERT promoter -124C>T    -10/19/2022  PET/CT reviewed, done at [facility], hypermetabolix SAULO mass, small hypermetabolic RUL nodule, Left pleural nodularity, left pleural effusion with drain inside, b/l supraclavicular inrenal mammary, mediastinal, left hilar, left axillary, upper abdominal, retrocrural, retroperitoneal, pelvic and left inguinal LN. hypermetabolic peritoneal nodularity. Skeletal involvement.     -10/14/2022 CT head w/wo contrast  unremarkable.    -On carboplatin/pemetrexed/pembrolizumab. s/p cycle 1 11/17/22               PAST MEDICAL & SURGICAL HISTORY:  Former smoker      Non-small cell lung cancer with metastasis      Status post cardiac surgery  s/p open right thoracotomy for posterior cardiac mass removal &gt; 20 years ago, reported by patient to be benign.          FAMILY HISTORY:  Family history of cancer in father  Cancer of unknown origin on Father&#x27;s side.        MEDICATIONS  (STANDING):  folic acid 1 milliGRAM(s) Oral daily  polyethylene glycol 3350 17 Gram(s) Oral daily  rivaroxaban 20 milliGRAM(s) Oral with dinner    MEDICATIONS  (PRN):  acetaminophen     Tablet .. 650 milliGRAM(s) Oral every 6 hours PRN Temp greater or equal to 38C (100.4F), Mild Pain (1 - 3)  albuterol    90 MICROgram(s) HFA Inhaler 2 Puff(s) Inhalation every 6 hours PRN Shortness of Breath and/or Wheezing  aluminum hydroxide/magnesium hydroxide/simethicone Suspension 30 milliLiter(s) Oral every 4 hours PRN Dyspepsia  melatonin 3 milliGRAM(s) Oral at bedtime PRN Insomnia  ondansetron Injectable 4 milliGRAM(s) IV Push every 8 hours PRN Nausea and/or Vomiting      Allergies    No Known Allergies    Intolerances        SOCIAL HISTORY: No EtOH, no tobacco    Review of Systems:  General: denies fevers/chills  Respiratory: denies cough, mild shortness of breath  Cardiovascular: denies chest pain, palpitations  Gastrointestinal: denies nausea, vomiting, abdominal pain, constipation, diarrhea, melena, hematochezia  MSK: denies joint pain or muscle pain  Neuro: denies headache, weakness, or parasthesias  Skin: denies rash, petichiae, echymoses  Psych: denies anxiety or sleep disturbances    Height (cm): 188 (11-23 @ 21:10)  Weight (kg): 72.6 (11-23 @ 21:10)  BMI (kg/m2): 20.5 (11-23 @ 21:10)  BSA (m2): 1.98 (11-23 @ 21:10)    T(F): 98.2 (11-24-22 @ 04:55), Max: 98.7 (11-23-22 @ 15:02)  HR: 87 (11-24-22 @ 04:55)  BP: 110/70 (11-24-22 @ 04:55)  RR: 18 (11-24-22 @ 04:55)  SpO2: 100% (11-24-22 @ 04:55)  Wt(kg): --    PHYSICAL EXAM:    Constitutional: NAD  Respiratory: CTA b/l, decreased air entry to the left    Cardiovascular: RRR  Gastrointestinal: soft, NTND  Extremities:  no edema  MSK: no obvious abnormalities  Neurological: Grossly intact  Skin: +clubbing   Psych: normal affect                          7.3    1.50  )-----------( 268      ( 24 Nov 2022 05:20 )             22.7       11-24    134<L>  |  98  |  22  ----------------------------<  105<H>  4.5   |  25  |  1.24    Ca    8.9      24 Nov 2022 05:20    TPro  7.0  /  Alb  3.3  /  TBili  <0.2  /  DBili  x   /  AST  32  /  ALT  25  /  AlkPhos  83  11-24

## 2022-11-24 NOTE — PROGRESS NOTE ADULT - PROBLEM SELECTOR PLAN 5
DVT-xarelto 20 mg daily Hgb downtrended to 8.2   -Denies any melena, hematochezia or hematuria   -Endorses some blood tinges sputum but no gross hemoptysis   -Monitor CBC daily   -Iron studies wnl

## 2022-11-24 NOTE — CONSULT NOTE ADULT - ATTENDING COMMENTS
This is a 57 year old woman wit ha history of NSCLC seen by Dr. Gunn. Recent diagnosis in 9/2022 with malignant pleural effusion, received cycle 1 carbo/pem/pembrolizumab on 11/17/22, subsegmental PE noted.  Patient with left sided hydropneumothorax with trapped lung, tunneled pleural catheter placed.  Hg 7.3g/dl recommend PRBC transfusion given recent chemotherapy 1 week ago, this will continue to fall.  Patient can be discahrged after supply for the pleural catheter drainage can be secured.

## 2022-11-25 ENCOUNTER — TRANSCRIPTION ENCOUNTER (OUTPATIENT)
Age: 57
End: 2022-11-25

## 2022-11-25 DIAGNOSIS — R79.89 OTHER SPECIFIED ABNORMAL FINDINGS OF BLOOD CHEMISTRY: ICD-10-CM

## 2022-11-25 LAB
ANION GAP SERPL CALC-SCNC: 12 MMOL/L — SIGNIFICANT CHANGE UP (ref 7–14)
ANION GAP SERPL CALC-SCNC: 9 MMOL/L — SIGNIFICANT CHANGE UP (ref 7–14)
BASOPHILS # BLD AUTO: 0.01 K/UL — SIGNIFICANT CHANGE UP (ref 0–0.2)
BASOPHILS NFR BLD AUTO: 0.5 % — SIGNIFICANT CHANGE UP (ref 0–2)
BUN SERPL-MCNC: 26 MG/DL — HIGH (ref 7–23)
BUN SERPL-MCNC: 28 MG/DL — HIGH (ref 7–23)
CALCIUM SERPL-MCNC: 9.2 MG/DL — SIGNIFICANT CHANGE UP (ref 8.4–10.5)
CALCIUM SERPL-MCNC: 9.4 MG/DL — SIGNIFICANT CHANGE UP (ref 8.4–10.5)
CHLORIDE SERPL-SCNC: 101 MMOL/L — SIGNIFICANT CHANGE UP (ref 98–107)
CHLORIDE SERPL-SCNC: 98 MMOL/L — SIGNIFICANT CHANGE UP (ref 98–107)
CO2 SERPL-SCNC: 24 MMOL/L — SIGNIFICANT CHANGE UP (ref 22–31)
CO2 SERPL-SCNC: 27 MMOL/L — SIGNIFICANT CHANGE UP (ref 22–31)
CREAT ?TM UR-MCNC: 129 MG/DL — SIGNIFICANT CHANGE UP
CREAT SERPL-MCNC: 1.42 MG/DL — HIGH (ref 0.5–1.3)
CREAT SERPL-MCNC: 1.55 MG/DL — HIGH (ref 0.5–1.3)
EGFR: 52 ML/MIN/1.73M2 — LOW
EGFR: 58 ML/MIN/1.73M2 — LOW
EOSINOPHIL # BLD AUTO: 0.03 K/UL — SIGNIFICANT CHANGE UP (ref 0–0.5)
EOSINOPHIL NFR BLD AUTO: 1.4 % — SIGNIFICANT CHANGE UP (ref 0–6)
FERRITIN SERPL-MCNC: 130 NG/ML — SIGNIFICANT CHANGE UP (ref 30–400)
GLUCOSE SERPL-MCNC: 104 MG/DL — HIGH (ref 70–99)
GLUCOSE SERPL-MCNC: 96 MG/DL — SIGNIFICANT CHANGE UP (ref 70–99)
HCT VFR BLD CALC: 28.2 % — LOW (ref 39–50)
HGB BLD-MCNC: 9.2 G/DL — LOW (ref 13–17)
IANC: 1.05 K/UL — LOW (ref 1.8–7.4)
IMM GRANULOCYTES NFR BLD AUTO: 0.5 % — SIGNIFICANT CHANGE UP (ref 0–0.9)
IRON SATN MFR SERPL: 113 UG/DL — SIGNIFICANT CHANGE UP (ref 45–165)
IRON SATN MFR SERPL: 43 % — SIGNIFICANT CHANGE UP (ref 14–50)
LYMPHOCYTES # BLD AUTO: 0.82 K/UL — LOW (ref 1–3.3)
LYMPHOCYTES # BLD AUTO: 37.8 % — SIGNIFICANT CHANGE UP (ref 13–44)
MAGNESIUM SERPL-MCNC: 1.8 MG/DL — SIGNIFICANT CHANGE UP (ref 1.6–2.6)
MAGNESIUM SERPL-MCNC: 1.9 MG/DL — SIGNIFICANT CHANGE UP (ref 1.6–2.6)
MCHC RBC-ENTMCNC: 29 PG — SIGNIFICANT CHANGE UP (ref 27–34)
MCHC RBC-ENTMCNC: 32.6 GM/DL — SIGNIFICANT CHANGE UP (ref 32–36)
MCV RBC AUTO: 89 FL — SIGNIFICANT CHANGE UP (ref 80–100)
MONOCYTES # BLD AUTO: 0.25 K/UL — SIGNIFICANT CHANGE UP (ref 0–0.9)
MONOCYTES NFR BLD AUTO: 11.5 % — SIGNIFICANT CHANGE UP (ref 2–14)
NEUTROPHILS # BLD AUTO: 1.05 K/UL — LOW (ref 1.8–7.4)
NEUTROPHILS NFR BLD AUTO: 48.3 % — SIGNIFICANT CHANGE UP (ref 43–77)
NRBC # BLD: 0 /100 WBCS — SIGNIFICANT CHANGE UP (ref 0–0)
NRBC # FLD: 0 K/UL — SIGNIFICANT CHANGE UP (ref 0–0)
PHOSPHATE SERPL-MCNC: 3.4 MG/DL — SIGNIFICANT CHANGE UP (ref 2.5–4.5)
PHOSPHATE SERPL-MCNC: 3.8 MG/DL — SIGNIFICANT CHANGE UP (ref 2.5–4.5)
PLATELET # BLD AUTO: 207 K/UL — SIGNIFICANT CHANGE UP (ref 150–400)
POTASSIUM SERPL-MCNC: 4.5 MMOL/L — SIGNIFICANT CHANGE UP (ref 3.5–5.3)
POTASSIUM SERPL-MCNC: 4.6 MMOL/L — SIGNIFICANT CHANGE UP (ref 3.5–5.3)
POTASSIUM SERPL-SCNC: 4.5 MMOL/L — SIGNIFICANT CHANGE UP (ref 3.5–5.3)
POTASSIUM SERPL-SCNC: 4.6 MMOL/L — SIGNIFICANT CHANGE UP (ref 3.5–5.3)
RBC # BLD: 3.17 M/UL — LOW (ref 4.2–5.8)
RBC # FLD: 13.2 % — SIGNIFICANT CHANGE UP (ref 10.3–14.5)
SODIUM SERPL-SCNC: 134 MMOL/L — LOW (ref 135–145)
SODIUM SERPL-SCNC: 137 MMOL/L — SIGNIFICANT CHANGE UP (ref 135–145)
SODIUM UR-SCNC: 160 MMOL/L — SIGNIFICANT CHANGE UP
TIBC SERPL-MCNC: 262 UG/DL — SIGNIFICANT CHANGE UP (ref 220–430)
UIBC SERPL-MCNC: 149 UG/DL — SIGNIFICANT CHANGE UP (ref 110–370)
WBC # BLD: 2.17 K/UL — LOW (ref 3.8–10.5)
WBC # FLD AUTO: 2.17 K/UL — LOW (ref 3.8–10.5)

## 2022-11-25 PROCEDURE — 99232 SBSQ HOSP IP/OBS MODERATE 35: CPT | Mod: GC,24

## 2022-11-25 PROCEDURE — 99232 SBSQ HOSP IP/OBS MODERATE 35: CPT

## 2022-11-25 PROCEDURE — 99233 SBSQ HOSP IP/OBS HIGH 50: CPT

## 2022-11-25 RX ORDER — SODIUM CHLORIDE 9 MG/ML
1000 INJECTION INTRAMUSCULAR; INTRAVENOUS; SUBCUTANEOUS
Refills: 0 | Status: DISCONTINUED | OUTPATIENT
Start: 2022-11-25 | End: 2022-11-26

## 2022-11-25 RX ORDER — LANOLIN ALCOHOL/MO/W.PET/CERES
1 CREAM (GRAM) TOPICAL
Qty: 0 | Refills: 0 | DISCHARGE
Start: 2022-11-25

## 2022-11-25 RX ADMIN — SODIUM CHLORIDE 60 MILLILITER(S): 9 INJECTION INTRAMUSCULAR; INTRAVENOUS; SUBCUTANEOUS at 16:23

## 2022-11-25 RX ADMIN — RIVAROXABAN 20 MILLIGRAM(S): KIT at 17:02

## 2022-11-25 RX ADMIN — POLYETHYLENE GLYCOL 3350 17 GRAM(S): 17 POWDER, FOR SOLUTION ORAL at 12:54

## 2022-11-25 RX ADMIN — Medication 1 MILLIGRAM(S): at 12:54

## 2022-11-25 NOTE — PROGRESS NOTE ADULT - PROBLEM SELECTOR PLAN 3
s/p cycle 1 carboplatin/pemetrexed/pembrolizumab (last chemo on 11/17/2022).  - ONC consult, appreciate recs  - Follows with Dr.Attarian mccollum

## 2022-11-25 NOTE — DISCHARGE NOTE PROVIDER - NSDCFUADDAPPT_GEN_ALL_CORE_FT
Follow up with your primary care physician for further monitoring in 1-2 weeks. Please call to arrange appointment.  Follow up with pulmonology within 1-2 weeks of discharge.  Follow up with oncology within 1-2 weeks of discharge.

## 2022-11-25 NOTE — DISCHARGE NOTE PROVIDER - HOSPITAL COURSE
58 yo M with Pmhx of NSCLC s/p s/p cycle 1 carboplatin/pemetrexed/pembrolizumab (last chemo on 11/17/2022), subsegmental PE, and cardiac mass s/p thoracotomy presents to the ED with SOB, most likely due to chronic L pleural effusion    : Pleural effusion.    Patient with chronic L pleural effusion in s.o malignancy, unchanged from prior CXR. pulmonology performed bedside [facility] with findings of small simple left pleural effusion.  -Pulm consulted,  -per pulmonology, Can drain PleurX up to 1 L , stop for pain or SOB  -Can drain PleurX every other day while in hospital and once discharged once weekly up to 1L stopping for pain or SOB  -C/w albuterol q6hrs   -No concern for infection  -tSW/CM following  regarding pluerx supplies.     Neutropenia.    ANC is 1.14 --> 0.67-->1.05 , likely 2/2 recent chemo.   -improved   -No fever ,Monitored  off abx   -onc consulted,     Non-small cell lung cancer.   s/p cycle 1 carboplatin/pemetrexed/pembrolizumab (last chemo on 11/17/2022).  - ONC consult,  - Follows with  outpatient.     Pulmonary embolism.   : C/w xarelto 20 mg daily  WADE.    : Elevated serum creatinine.   cr increased from 1.23-->1.42  pt says he had not been drinking enough fluids, will avoid IVF as may worsen Pleural effusion  pt encouraged to drink about 8 glasses of fluids   pt advised to f/u with PMD on Monday for repeat blood work to monitor serum creatinine.     Anemia.   ·  Plan: Hgb downtrended to 7.3, s/p one unit PRBC on 11/24, h/h appropriately improved to 9.2    -Denies any melena, hematochezia or hematuria   -Endorses some blood tinges sputum but no gross hemoptysis    -Iron studies wnl.     58 yo M with Pmhx of NSCLC s/p s/p cycle 1 carboplatin/pemetrexed/pembrolizumab (last chemo on 11/17/2022), subsegmental PE, and cardiac mass s/p thoracotomy presents to the ED with SOB, most likely due to chronic L pleural effusion    : Pleural effusion.    Patient with chronic L pleural effusion in s.o malignancy, unchanged from prior CXR. pulmonology performed bedside [facility] with findings of small simple left pleural effusion.  -Pulm consulted,  -per pulmonology, Can drain PleurX up to 1 L , stop for pain or SOB  -Can drain PleurX every other day while in hospital and once discharged twice weekly up to 1L stopping for pain or SOB  -C/w albuterol q6hrs   -No concern for infection  -tSW/CM following  regarding pluerx supplies.     Neutropenia.    ANC is 1.14 --> 0.67-->1.05 , likely 2/2 recent chemo.   -improved   -No fever ,Monitored  off abx   -onc consulted,     Non-small cell lung cancer.   s/p cycle 1 carboplatin/pemetrexed/pembrolizumab (last chemo on 11/17/2022).  - ONC consult,  - Follows with  outpatient.     Pulmonary embolism.   : C/w xarelto 20 mg daily  WADE.    : Elevated serum creatinine.   cr increased from 1.23-->1.42  pt says he had not been drinking enough fluids, will avoid IVF as may worsen Pleural effusion  pt encouraged to drink about 8 glasses of fluids   pt advised to f/u with PMD on Monday for repeat blood work to monitor serum creatinine.     Anemia.   ·  Plan: Hgb downtrended to 7.3, s/p one unit PRBC on 11/24, h/h appropriately improved to 9.2    -Denies any melena, hematochezia or hematuria   -Endorses some blood tinges sputum but no gross hemoptysis    -Iron studies wnl.     56 yo M with Pmhx of NSCLC s/p s/p cycle 1 carboplatin/pemetrexed/pembrolizumab (last chemo on 11/17/2022), subsegmental PE, and cardiac mass s/p thoracotomy presents to the ED with SOB, most likely due to chronic L pleural effusion    : Pleural effusion.    Patient with chronic L pleural effusion in s.o malignancy, unchanged from prior CXR. pulmonology performed bedside [facility] with findings of small simple left pleural effusion.  -Pulm consulted,  -per pulmonology, Can drain PleurX up to 1 L , stop for pain or SOB  -Can drain PleurX every other day while in hospital and once discharged twice weekly up to 1L stopping for pain or SOB  -C/w albuterol q6hrs   -No concern for infection  -tSW/CM following  regarding pluerx supplies.     Neutropenia.    ANC is 1.14 --> 0.67-->1.05 , likely 2/2 recent chemo.   -improved   -No fever ,Monitored  off abx   -onc consulted, follow up as outpatient     Non-small cell lung cancer.   s/p cycle 1 carboplatin/pemetrexed/pembrolizumab (last chemo on 11/17/2022).  - ONC consult,  - Follows with  outpatient.     Pulmonary embolism.   : C/w xarelto 20 mg daily  WADE.    : Elevated serum creatinine.   cr increased from 1.23-->1.42  pt says he had not been drinking enough fluids, will avoid IVF as may worsen Pleural effusion  pt encouraged to drink about 8 glasses of fluids   pt advised to f/u with PMD on Monday for repeat blood work to monitor serum creatinine.     Anemia.   ·  Plan: Hgb downtrended to 7.3, s/p one unit PRBC on 11/24, h/h appropriately improved to 9.2    -Denies any melena, hematochezia or hematuria   -Endorses some blood tinges sputum but no gross hemoptysis    -Iron studies wnl.

## 2022-11-25 NOTE — DISCHARGE NOTE PROVIDER - NSDCCPCAREPLAN_GEN_ALL_CORE_FT
PRINCIPAL DISCHARGE DIAGNOSIS  Diagnosis: Pleural effusion  Assessment and Plan of Treatment:       SECONDARY DISCHARGE DIAGNOSES  Diagnosis: Non-small cell lung cancer  Assessment and Plan of Treatment:     Diagnosis: Pulmonary embolism  Assessment and Plan of Treatment:     Diagnosis: Elevated serum creatinine  Assessment and Plan of Treatment:     Diagnosis: Anemia  Assessment and Plan of Treatment:     Diagnosis: Neutropenia  Assessment and Plan of Treatment:      PRINCIPAL DISCHARGE DIAGNOSIS  Diagnosis: Pleural effusion  Assessment and Plan of Treatment: You were admitted for shortness of breath, since you did not have pleurX drained. Please continue to drain pleurX twice a week and follow up with pulmonary as outpatient.      SECONDARY DISCHARGE DIAGNOSES  Diagnosis: Neutropenia  Assessment and Plan of Treatment: Your neutrophil count was found to be low. PLease follow up with hematology/ oncology for further monitoring of these levels. If you develop a fever, please seek medical attention, because you may need to be started on antibiotics.    Diagnosis: Non-small cell lung cancer  Assessment and Plan of Treatment: Follow up with oncology and pulmonary for further treatment and management.    Diagnosis: Pulmonary embolism  Assessment and Plan of Treatment: You are to continue with medications as outpatient and follow up with pulmonary and PCP.    Diagnosis: Elevated serum creatinine  Assessment and Plan of Treatment: Your kidney function was found to be elevated. You are to follow up with PCP in 1 week for further monitoring of blood counts as outpatient.    Diagnosis: Anemia  Assessment and Plan of Treatment: Your blood counts were found to be low. You had a blood transfusion on 11/24, with improvement in your blood counts. You are to follow up with hematology/ oncology for further monitoring of blood counts as outpatient.

## 2022-11-25 NOTE — DISCHARGE NOTE NURSING/CASE MANAGEMENT/SOCIAL WORK - NSDCPEFALRISK_GEN_ALL_CORE
For information on Fall & Injury Prevention, visit: [email or web address] OR  [email or web address] OR  https://www.cdc.gov/steadi/patient.html

## 2022-11-25 NOTE — CHART NOTE - NSCHARTNOTEFT_GEN_A_CORE
Creatinine uptrending 1.55 <1.42< 1.24.   Will give gentle hydration as per Dr. Saji WILSON at 60cc/hr x 12 hrs.     Martha Steward PA-C  Department of Medicine  Pager [phone]

## 2022-11-25 NOTE — DISCHARGE NOTE PROVIDER - CARE PROVIDER_API CALL
Eddie Ortega)  Critical Care Medicine; Internal Medicine; Pulmonary Disease  [address] [place] [address]  Phone: [phone]  Fax: [phone]  Follow Up Time:

## 2022-11-25 NOTE — PROGRESS NOTE ADULT - PROBLEM SELECTOR PLAN 1
Patient with chronic L pleural effusion in s.o malignancy, unchanged from prior CXR. pulmonology performed bedside [facility] with findings of small simple left pleural effusion.  -Pulm consulted, appreciate recs  -per pulmonology, Can drain PleurX up to 1 L , stop for pain or SOB  -Can drain PleurX every other day while in hospital and once discharged once weekly up to 1L stopping for pain or SOB  -C/w albuterol q6hrs   -No concern for infection  -tSW/CM following  regarding pluerx supplies

## 2022-11-25 NOTE — PROGRESS NOTE ADULT - PROBLEM SELECTOR PLAN 2
ANC is 1.14 --> 0.67--?1.05 , likely 2/2 recent chemo.   -No fever   -Monitor off abx   -onc consulted, appreciate recs. discuss role of GCSF

## 2022-11-25 NOTE — DISCHARGE NOTE NURSING/CASE MANAGEMENT/SOCIAL WORK - NSDCVIVACCINE_GEN_ALL_CORE_FT
influenza, injectable, quadrivalent, preservative free; 16-Sep-2022 14:07; Leigha Gonzales (RN); [employer]; [id number] (Exp. Date: 30-Jun-2023); IntraMuscular; Deltoid Left.; 0.5 milliLiter(s); VIS (VIS Published: 06-Aug-2021, VIS Presented: 16-Sep-2022);

## 2022-11-25 NOTE — DISCHARGE NOTE PROVIDER - NSDCFUSCHEDAPPT_GEN_ALL_CORE_FT
Tiffani Gunn Physician [facility]  [facility]  Scheduled Appointment: 12/06/2022    [facility]  [facility]  Scheduled Appointment: 12/08/2022    Tiffani Gunn Physician [facility]  [facility]  Scheduled Appointment: 12/27/2022    [facility]  [facility]  Scheduled Appointment: 12/29/2022

## 2022-11-25 NOTE — DISCHARGE NOTE PROVIDER - NSDCMRMEDTOKEN_GEN_ALL_CORE_FT
acetaminophen 325 mg oral tablet: 2 tab(s) orally every 6 hours, As needed, Temp greater or equal to 38C (100.4F), Mild Pain (1 - 3), Moderate Pain (4 - 6)  dexamethasone 4 mg oral tablet: 1 tab(s) orally 2 times a day day before and after chemo ([place]) only  folic acid 1 mg oral tablet: 1 tab(s) orally once a day  lidocaine 4% topical film: Apply topically to affected area once a day     ***Call for pricing [facility] [phone] before filling   Meds to bed if covered Room 640   melatonin 3 mg oral tablet: 1 tab(s) orally once a day (at bedtime), As needed, Insomnia  metoclopramide 10 mg oral tablet: 1 tab(s) orally 4 times a day (before meals and at bedtime), As Needed - for nausea  polyethylene glycol 3350 oral powder for reconstitution: 17 gram(s) orally once a day  senna leaf extract oral tablet: 2 tab(s) orally once a day (at bedtime)  Xarelto Starter Pack 15 mg-20 mg oral kit: per starter pack instructions but give 38 doses     **** please give $10 co pay card to use for refills    MEDS TO BED   Room 640-   dc time 4pm     acetaminophen 325 mg oral tablet: 2 tab(s) orally every 6 hours, As needed, Temp greater or equal to 38C (100.4F), Mild Pain (1 - 3), Moderate Pain (4 - 6)  dexamethasone 4 mg oral tablet: 1 tab(s) orally 2 times a day day before and after chemo ([place]) only  folic acid 1 mg oral tablet: 1 tab(s) orally once a day  lidocaine 4% topical film: Apply topically to affected area once a day     ***Call for pricing [facility] [phone] before filling   Meds to bed if covered Room 640   melatonin 3 mg oral tablet: 1 tab(s) orally once a day (at bedtime), As needed, Insomnia  metoclopramide 10 mg oral tablet: 1 tab(s) orally 4 times a day (before meals and at bedtime), As Needed - for nausea  please drain pleurX catheter twice a week : 1 unit(s) intrapleural every 3 to 4 days   polyethylene glycol 3350 oral powder for reconstitution: 17 gram(s) orally once a day  senna leaf extract oral tablet: 2 tab(s) orally once a day (at bedtime)  Xarelto Starter Pack 15 mg-20 mg oral kit: per starter pack instructions but give 38 doses     **** please give $10 co pay card to use for refills    MEDS TO BED   Room 640-   dc time 4pm     acetaminophen 325 mg oral tablet: 2 tab(s) orally every 6 hours, As needed, Temp greater or equal to 38C (100.4F), Mild Pain (1 - 3), Moderate Pain (4 - 6)  folic acid 1 mg oral tablet: 1 tab(s) orally once a day  lidocaine 4% topical film: Apply topically to affected area once a day     ***Call for pricing [facility] [phone] before filling   Meds to bed if covered Room 640   melatonin 3 mg oral tablet: 1 tab(s) orally once a day (at bedtime), As needed, Insomnia  metoclopramide 10 mg oral tablet: 1 tab(s) orally 4 times a day (before meals and at bedtime), As Needed - for nausea  please drain pleurX catheter twice a week : 1 unit(s) intrapleural every 3 to 4 days   polyethylene glycol 3350 oral powder for reconstitution: 17 gram(s) orally once a day  senna leaf extract oral tablet: 2 tab(s) orally once a day (at bedtime)  Xarelto Starter Pack 15 mg-20 mg oral kit: per starter pack instructions but give 38 doses     **** please give $10 co pay card to use for refills    MEDS TO BED   Room 640-   dc time 4pm

## 2022-11-25 NOTE — DISCHARGE NOTE NURSING/CASE MANAGEMENT/SOCIAL WORK - PATIENT PORTAL LINK FT
You can access the FollowMyHealth Patient Portal offered by [facility] by registering at the following website: [email or web address]. By joining [facility]’s FollowMyHealth portal, you will also be able to view your health information using other applications (apps) compatible with our system.

## 2022-11-26 VITALS
TEMPERATURE: 98 F | SYSTOLIC BLOOD PRESSURE: 106 MMHG | RESPIRATION RATE: 18 BRPM | OXYGEN SATURATION: 100 % | DIASTOLIC BLOOD PRESSURE: 71 MMHG | HEART RATE: 76 BPM

## 2022-11-26 LAB
ANION GAP SERPL CALC-SCNC: 8 MMOL/L — SIGNIFICANT CHANGE UP (ref 7–14)
BASOPHILS # BLD AUTO: 0 K/UL — SIGNIFICANT CHANGE UP (ref 0–0.2)
BASOPHILS NFR BLD AUTO: 0 % — SIGNIFICANT CHANGE UP (ref 0–2)
BUN SERPL-MCNC: 25 MG/DL — HIGH (ref 7–23)
CALCIUM SERPL-MCNC: 9.4 MG/DL — SIGNIFICANT CHANGE UP (ref 8.4–10.5)
CHLORIDE SERPL-SCNC: 101 MMOL/L — SIGNIFICANT CHANGE UP (ref 98–107)
CO2 SERPL-SCNC: 28 MMOL/L — SIGNIFICANT CHANGE UP (ref 22–31)
CREAT SERPL-MCNC: 1.33 MG/DL — HIGH (ref 0.5–1.3)
EGFR: 63 ML/MIN/1.73M2 — SIGNIFICANT CHANGE UP
EOSINOPHIL # BLD AUTO: 0.03 K/UL — SIGNIFICANT CHANGE UP (ref 0–0.5)
EOSINOPHIL NFR BLD AUTO: 1.7 % — SIGNIFICANT CHANGE UP (ref 0–6)
GLUCOSE SERPL-MCNC: 141 MG/DL — HIGH (ref 70–99)
HCT VFR BLD CALC: 29.3 % — LOW (ref 39–50)
HGB BLD-MCNC: 9.5 G/DL — LOW (ref 13–17)
IANC: 0.9 K/UL — LOW (ref 1.8–7.4)
IMM GRANULOCYTES NFR BLD AUTO: 1.1 % — HIGH (ref 0–0.9)
LYMPHOCYTES # BLD AUTO: 0.61 K/UL — LOW (ref 1–3.3)
LYMPHOCYTES # BLD AUTO: 35.1 % — SIGNIFICANT CHANGE UP (ref 13–44)
MAGNESIUM SERPL-MCNC: 1.9 MG/DL — SIGNIFICANT CHANGE UP (ref 1.6–2.6)
MCHC RBC-ENTMCNC: 28.6 PG — SIGNIFICANT CHANGE UP (ref 27–34)
MCHC RBC-ENTMCNC: 32.4 GM/DL — SIGNIFICANT CHANGE UP (ref 32–36)
MCV RBC AUTO: 88.3 FL — SIGNIFICANT CHANGE UP (ref 80–100)
MONOCYTES # BLD AUTO: 0.18 K/UL — SIGNIFICANT CHANGE UP (ref 0–0.9)
MONOCYTES NFR BLD AUTO: 10.3 % — SIGNIFICANT CHANGE UP (ref 2–14)
NEUTROPHILS # BLD AUTO: 0.9 K/UL — LOW (ref 1.8–7.4)
NEUTROPHILS NFR BLD AUTO: 51.8 % — SIGNIFICANT CHANGE UP (ref 43–77)
NRBC # BLD: 0 /100 WBCS — SIGNIFICANT CHANGE UP (ref 0–0)
NRBC # FLD: 0 K/UL — SIGNIFICANT CHANGE UP (ref 0–0)
PHOSPHATE SERPL-MCNC: 3.5 MG/DL — SIGNIFICANT CHANGE UP (ref 2.5–4.5)
PLATELET # BLD AUTO: 191 K/UL — SIGNIFICANT CHANGE UP (ref 150–400)
POTASSIUM SERPL-MCNC: 4.6 MMOL/L — SIGNIFICANT CHANGE UP (ref 3.5–5.3)
POTASSIUM SERPL-SCNC: 4.6 MMOL/L — SIGNIFICANT CHANGE UP (ref 3.5–5.3)
RBC # BLD: 3.32 M/UL — LOW (ref 4.2–5.8)
RBC # FLD: 13.1 % — SIGNIFICANT CHANGE UP (ref 10.3–14.5)
SODIUM SERPL-SCNC: 137 MMOL/L — SIGNIFICANT CHANGE UP (ref 135–145)
WBC # BLD: 1.79 K/UL — LOW (ref 3.8–10.5)
WBC # FLD AUTO: 1.79 K/UL — LOW (ref 3.8–10.5)

## 2022-11-26 PROCEDURE — 99239 HOSP IP/OBS DSCHRG MGMT >30: CPT

## 2022-11-26 RX ADMIN — Medication 1 MILLIGRAM(S): at 11:38

## 2022-11-26 NOTE — PROGRESS NOTE ADULT - SUBJECTIVE AND OBJECTIVE BOX
Hematology Oncology Consult Note    HPI:  56 yo M with a Pmhx of NSCLC (follows with jessica Roberts in 09/2022 with malignant pleural effusion. S/p cycle 1 carboplatin/pemetrexed/pembrolizumab last given on 11/17/2022), subsegmental PE, and cardiac mass s/p thoracotomy presents to the ED with SOB. Patient reports that he has been feeling SOB for the last couple of days. He has hx of R pleural effusion and he gets drained through a plerx catheter. However, he ran out of supplies and does not have the financial resource to buy them. As a result, he has not been draining his catheter and he thinks the fluid is building up around his lungs. Otherwise, he denies any fever, chills, nausea, vomiting, abdominal pain, diarrhea or LE edema.      (23 Nov 2022 19:19)    11/25/2022: Patient was seen at bedside reports feeling well. Reports he ran out the supplies, couldn't drain the effusion. He notes that he came to the ED last week for the same reason, has visiting nurse coming to help him, but cannot afford the supplies that he needs.     In the ED, his vitals were notable for tachycardia. Labs were notable for anemia, leukopenia, and mild hyponatremia. CXR showed unchanged small loculated left pneumothorax/bleb with chest tube in place.           PAST MEDICAL & SURGICAL HISTORY:    Former smoker    Non-small cell lung cancer with metastasis    Status post cardiac surgery  s/p open right thoracotomy for posterior cardiac mass removal &gt; 20 years ago, reported by patient to be benign.        FAMILY HISTORY:    Family history of cancer in father  Cancer of unknown origin on Father&#x27;s side.        MEDICATIONS  (STANDING):    folic acid 1 milliGRAM(s) Oral daily  polyethylene glycol 3350 17 Gram(s) Oral daily  rivaroxaban 20 milliGRAM(s) Oral with dinner    MEDICATIONS  (PRN):    acetaminophen     Tablet .. 650 milliGRAM(s) Oral every 6 hours PRN Temp greater or equal to 38C (100.4F), Mild Pain (1 - 3)  albuterol    90 MICROgram(s) HFA Inhaler 2 Puff(s) Inhalation every 6 hours PRN Shortness of Breath and/or Wheezing  aluminum hydroxide/magnesium hydroxide/simethicone Suspension 30 milliLiter(s) Oral every 4 hours PRN Dyspepsia  melatonin 3 milliGRAM(s) Oral at bedtime PRN Insomnia  ondansetron Injectable 4 milliGRAM(s) IV Push every 8 hours PRN Nausea and/or Vomiting      Allergies:    No Known Allergies    Intolerances:        SOCIAL HISTORY: No EtOH, no tobacco      Review of Systems:    General: denies fevers/chills  Respiratory: denies cough, mild shortness of breath  Cardiovascular: denies chest pain, palpitations  Gastrointestinal: denies nausea, vomiting, abdominal pain, constipation, diarrhea, melena, hematochezia  MSK: denies joint pain or muscle pain  Neuro: denies headache, weakness, or paresthesias  Skin: denies rash, petechiae, ecchymoses  Psych: denies anxiety or sleep disturbances      Vitals:    ICU Vital Signs Last 24 Hrs  T(C): 36.9 (25 Nov 2022 05:28), Max: 37.1 (24 Nov 2022 22:04)  T(F): 98.4 (25 Nov 2022 05:28), Max: 98.8 (24 Nov 2022 22:04)  HR: 81 (25 Nov 2022 05:28) (81 - 88)  BP: 121/72 (25 Nov 2022 05:28) (113/77 - 122/75)  BP(mean): --  ABP: --  ABP(mean): --  RR: 17 (25 Nov 2022 05:28) (17 - 17)  SpO2: 99% (25 Nov 2022 05:28) (98% - 100%)    O2 Parameters below as of 25 Nov 2022 05:28  Patient On (Oxygen Delivery Method): room air        PHYSICAL EXAM:    Constitutional: NAD  Respiratory: no respiratory distress, decreased L > R    Cardiovascular: RRR  Gastrointestinal: soft, NTND, no evidence of hepatosplenomegaly  MSK/Extremities:  no edema, clubbing noted bilaterally, no obvious abnormalities  Neurological: grossly intact, no focal deficits  Skin: no rashes, ulcers, lesions   Psych: affect/mood appropriate, A&O x3 (person, place, time), insight/judgment appropriate               LABS:    CBC Full  -  ( 25 Nov 2022 04:28 )  WBC Count : 2.17 K/uL  RBC Count : 3.17 M/uL  Hemoglobin : 9.2 g/dL  Hematocrit : 28.2 %  Platelet Count - Automated : 207 K/uL  Mean Cell Volume : 89.0 fL  Mean Cell Hemoglobin : 29.0 pg  Mean Cell Hemoglobin Concentration : 32.6 gm/dL  Auto Neutrophil # : 1.05 K/uL  Auto Lymphocyte # : 0.82 K/uL  Auto Monocyte # : 0.25 K/uL  Auto Eosinophil # : 0.03 K/uL  Auto Basophil # : 0.01 K/uL  Auto Neutrophil % : 48.3 %  Auto Lymphocyte % : 37.8 %  Auto Monocyte % : 11.5 %  Auto Eosinophil % : 1.4 %  Auto Basophil % : 0.5 %                          9.2    2.17  )-----------( 207      ( 25 Nov 2022 04:28 )             28.2     11-25    137  |  101  |  26<H>  ----------------------------<  104<H>  4.5   |  24  |  1.42<H>    Ca    9.2      25 Nov 2022 04:28  Phos  3.8     11-25  Mg     1.90     11-25    TPro  7.0  /  Alb  3.3  /  TBili  <0.2  /  DBili  x   /  AST  32  /  ALT  25  /  AlkPhos  83  11-24    PT/INR - ( 23 Nov 2022 15:00 )   PT: 13.3 sec;   INR: 1.14 ratio         PTT - ( 23 Nov 2022 15:00 )  PTT:28.5 sec        RADIOLOGY & ADDITIONAL STUDIES:      Xray Chest 2 Views PA/Lat (11.23.22 @ 15:19)    ACC: [id number] EXAM:  XR CHEST PA LAT 2V                          PROCEDURE DATE:  11/23/2022          INTERPRETATION:  EXAMINATION: XR CHEST PA AND LATERAL    CLINICAL INDICATION: shortness of breath, hx pneumothorax    TECHNIQUE: 2 views; Frontal and lateral views of the chest were obtained.    COMPARISON: Chest radiograph 11/15/2022.    FINDINGS:  Left-sided chest tube in place.  The heart is normal in size.  The lungs are clear. The trachea is midline.  Unchanged loculated chronic left pneumothorax or pulmonary bleb, seen on   prior radiograph 11/15/2022.  Similar small left effusion. There is no right pleural effusion.  No acute bony abnormality.    IMPRESSION:    Unchanged small loculated left pneumothorax/bleb with chest tube in place.            
Pulmonary Consult Follow up     Interval Events:    Doing well.    Got thoracentesis kits from case management.       REVIEW OF SYSTEMS:  [x] All other systems negative except per HPI   [ ] Unable to assess ROS because ________    OBJECTIVE:  ICU Vital Signs Last 24 Hrs  T(C): 37 (25 Nov 2022 14:22), Max: 37.1 (24 Nov 2022 22:04)  T(F): 98.6 (25 Nov 2022 14:22), Max: 98.8 (24 Nov 2022 22:04)  HR: 85 (25 Nov 2022 14:22) (81 - 87)  BP: 125/70 (25 Nov 2022 14:22) (113/77 - 125/70)  BP(mean): --  ABP: --  ABP(mean): --  RR: 18 (25 Nov 2022 14:22) (17 - 18)  SpO2: 100% (25 Nov 2022 14:22) (98% - 100%)    O2 Parameters below as of 25 Nov 2022 14:22  Patient On (Oxygen Delivery Method): room air              11-24 @ 07:01  -  11-25 @ 07:00  --------------------------------------------------------  IN: 0 mL / OUT: 250 mL / NET: -250 mL        PHYSICAL EXAM:  GENERAL: NAD, well-groomed, well-developed  HEAD:  Atraumatic, Normocephalic  EYES: EOMI, conjunctiva and sclera clear  ENMT: Moist mucous membranes  CHEST/LUNG: Clear to auscultation bilaterally, mildly reduced breath sounds right base.  HEART: Regular rate and rhythm; No murmurs, rubs, or gallops  ABDOMEN: Nondistended  VASCULAR: No  cyanosis, or edema  SKIN: No rashes or lesions. Pleurx site clean.  NERVOUS SYSTEM:  Alert & Oriented X3, Good concentration    HOSPITAL MEDICATIONS:  MEDICATIONS  (STANDING):  folic acid 1 milliGRAM(s) Oral daily  polyethylene glycol 3350 17 Gram(s) Oral daily  rivaroxaban 20 milliGRAM(s) Oral with dinner  sodium chloride 0.9%. 1000 milliLiter(s) (60 mL/Hr) IV Continuous <Continuous>    MEDICATIONS  (PRN):  acetaminophen     Tablet .. 650 milliGRAM(s) Oral every 6 hours PRN Temp greater or equal to 38C (100.4F), Mild Pain (1 - 3)  albuterol    90 MICROgram(s) HFA Inhaler 2 Puff(s) Inhalation every 6 hours PRN Shortness of Breath and/or Wheezing  aluminum hydroxide/magnesium hydroxide/simethicone Suspension 30 milliLiter(s) Oral every 4 hours PRN Dyspepsia  melatonin 3 milliGRAM(s) Oral at bedtime PRN Insomnia  ondansetron Injectable 4 milliGRAM(s) IV Push every 8 hours PRN Nausea and/or Vomiting      LABS:  11-25    134<L>  |  98  |  28<H>  ----------------------------<  96  4.6   |  27  |  1.55<H>  11-25    137  |  101  |  26<H>  ----------------------------<  104<H>  4.5   |  24  |  1.42<H>  11-24    134<L>  |  98  |  22  ----------------------------<  105<H>  4.5   |  25  |  1.24    Ca    9.4      25 Nov 2022 14:11  Ca    9.2      25 Nov 2022 04:28  Ca    8.9      24 Nov 2022 05:20  Phos  3.4     11-25  Mg     1.80     11-25    TPro  7.0  /  Alb  3.3  /  TBili  <0.2  /  DBili  x   /  AST  32  /  ALT  25  /  AlkPhos  83  11-24  TPro  7.8  /  Alb  3.9  /  TBili  <0.2  /  DBili  x   /  AST  36  /  ALT  28  /  AlkPhos  96  11-23    Magnesium, Serum: 1.80 mg/dL (11-25-22 @ 14:11)  Magnesium, Serum: 1.90 mg/dL (11-25-22 @ 04:28)    Phosphorus Level, Serum: 3.4 mg/dL (11-25-22 @ 14:11)  Phosphorus Level, Serum: 3.8 mg/dL (11-25-22 @ 04:28)                                              9.2    2.17  )-----------( 207      ( 25 Nov 2022 04:28 )             28.2                         7.3    1.50  )-----------( 268      ( 24 Nov 2022 05:20 )             22.7                         8.2    1.90  )-----------( 270      ( 23 Nov 2022 15:00 )             25.4     CAPILLARY BLOOD GLUCOSE        
56 yo M with presents with chief complaint of SOB and also running out of supplies for pleurx.     Subjective:  CLAUDY. VSS  feels well. no complaints. SOB is about the same, denies worsening and feels comfortable. Denies any fever/chills. no abd pain. no nausea/vomiting.   Didn't have pleurx drained yesterday.     MEDICATIONS  (STANDING):  folic acid 1 milliGRAM(s) Oral daily  polyethylene glycol 3350 17 Gram(s) Oral daily  rivaroxaban 20 milliGRAM(s) Oral with dinner    MEDICATIONS  (PRN):  acetaminophen     Tablet .. 650 milliGRAM(s) Oral every 6 hours PRN Temp greater or equal to 38C (100.4F), Mild Pain (1 - 3)  albuterol    90 MICROgram(s) HFA Inhaler 2 Puff(s) Inhalation every 6 hours PRN Shortness of Breath and/or Wheezing  aluminum hydroxide/magnesium hydroxide/simethicone Suspension 30 milliLiter(s) Oral every 4 hours PRN Dyspepsia  melatonin 3 milliGRAM(s) Oral at bedtime PRN Insomnia  ondansetron Injectable 4 milliGRAM(s) IV Push every 8 hours PRN Nausea and/or Vomiting    VITAL SIGNS:  T(C): 36.8 (11-24-22 @ 04:55), Max: 37.1 (11-23-22 @ 15:02)  T(F): 98.2 (11-24-22 @ 04:55), Max: 98.7 (11-23-22 @ 15:02)  HR: 87 (11-24-22 @ 04:55) (79 - 105)  BP: 110/70 (11-24-22 @ 04:55) (110/70 - 137/75)  BP(mean): --  RR: 18 (11-24-22 @ 04:55) (16 - 20)  SpO2: 100% (11-24-22 @ 04:55) (96% - 100%)  Wt(kg): --    PHYSICAL EXAM:  Constitutional: NAD, comfortable  Head: NC/AT  Eyes: PERRL, EOMI, anicteric sclera  Neck: supple; no JVD  Respiratory: reduced breath sounds on the left lower lung, no crackles, no use of accessory muscles  Cardiac: +S1/S2; RRR; no M/R/G  Gastrointestinal: soft, NT/ND; no rebound or guarding; +BSx4  Extremities: WWP, no clubbing or cyanosis; no peripheral edema  Vascular: 2+ radial, DP/PT pulses B/L  Neurologic: AAOx3; CNII-XII grossly intact; no focal deficits    LABS:                        7.3    1.50  )-----------( 268      ( 24 Nov 2022 05:20 )             22.7     11-24    134<L>  |  98  |  22  ----------------------------<  105<H>  4.5   |  25  |  1.24    Ca    8.9      24 Nov 2022 05:20    TPro  7.0  /  Alb  3.3  /  TBili  <0.2  /  DBili  x   /  AST  32  /  ALT  25  /  AlkPhos  83  11-24    PT/INR - ( 23 Nov 2022 15:00 )   PT: 13.3 sec;   INR: 1.14 ratio         PTT - ( 23 Nov 2022 15:00 )  PTT:28.5 sec    CAPILLARY BLOOD GLUCOSE          RADIOLOGY & ADDITIONAL TESTS: Reviewed.      
Patient is a 57y old  Male who presents with a chief complaint of SOB (25 Nov 2022 15:23)      SUBJECTIVE / OVERNIGHT EVENTS: patient seen and examined by bedside, pt afebrile, denies headache, dizziness, SOB, CP, Palpitations , N/V/D, abdominal pain      MEDICATIONS  (STANDING):  folic acid 1 milliGRAM(s) Oral daily  polyethylene glycol 3350 17 Gram(s) Oral daily  rivaroxaban 20 milliGRAM(s) Oral with dinner  sodium chloride 0.9%. 1000 milliLiter(s) (60 mL/Hr) IV Continuous <Continuous>    MEDICATIONS  (PRN):  acetaminophen     Tablet .. 650 milliGRAM(s) Oral every 6 hours PRN Temp greater or equal to 38C (100.4F), Mild Pain (1 - 3)  albuterol    90 MICROgram(s) HFA Inhaler 2 Puff(s) Inhalation every 6 hours PRN Shortness of Breath and/or Wheezing  aluminum hydroxide/magnesium hydroxide/simethicone Suspension 30 milliLiter(s) Oral every 4 hours PRN Dyspepsia  melatonin 3 milliGRAM(s) Oral at bedtime PRN Insomnia  ondansetron Injectable 4 milliGRAM(s) IV Push every 8 hours PRN Nausea and/or Vomiting      Vital Signs Last 24 Hrs  T(C): 36.5 (26 Nov 2022 06:44), Max: 36.7 (25 Nov 2022 21:25)  T(F): 97.7 (26 Nov 2022 06:44), Max: 98.1 (25 Nov 2022 21:25)  HR: 76 (26 Nov 2022 06:44) (76 - 86)  BP: 106/71 (26 Nov 2022 06:44) (106/71 - 131/73)  BP(mean): --  RR: 18 (26 Nov 2022 06:44) (18 - 20)  SpO2: 100% (26 Nov 2022 06:44) (100% - 100%)    Parameters below as of 26 Nov 2022 06:44  Patient On (Oxygen Delivery Method): room air        PHYSICAL EXAM:  Constitutional: NAD, comfortable  Head: NC/AT  Eyes: PERRL, EOMI, anicteric sclera  Neck: supple; no JVD  Respiratory: reduced breath sounds on the left lower lung, no crackles, no use of accessory muscles  Cardiac: +S1/S2; RRR; no M/R/G  Gastrointestinal: soft, NT/ND; no rebound or guarding; +BSx4  Extremities:  no clubbing or cyanosis; no peripheral edema  Vascular: 2+ radial, DP/PT pulses B/L  Neurologic: AAOx3; CNII-XII grossly intact; no focal deficits  SKIN: left PleurX cath with dressing C/D/I       LABS:                        9.5    1.79  )-----------( 191      ( 26 Nov 2022 09:20 )             29.3     11-26    137  |  101  |  25<H>  ----------------------------<  141<H>  4.6   |  28  |  1.33<H>    Ca    9.4      26 Nov 2022 09:20  Phos  3.5     11-26  Mg     1.90     11-26                RADIOLOGY & ADDITIONAL TESTS:    Imaging Personally Reviewed:    Consultant(s) Notes Reviewed:  Pulmonary, heme/onc     Care Discussed with Consultants/Other Providers:    
Patient is a 57y old  Male who presents with a chief complaint of SOB (25 Nov 2022 12:01)      SUBJECTIVE / OVERNIGHT EVENTS: patient seen and examined by bedside, pt feeling better, denies headache, dizziness, SOB, CP, Palpitations , N/V/D, abdominal pain      MEDICATIONS  (STANDING):  folic acid 1 milliGRAM(s) Oral daily  polyethylene glycol 3350 17 Gram(s) Oral daily  rivaroxaban 20 milliGRAM(s) Oral with dinner    MEDICATIONS  (PRN):  acetaminophen     Tablet .. 650 milliGRAM(s) Oral every 6 hours PRN Temp greater or equal to 38C (100.4F), Mild Pain (1 - 3)  albuterol    90 MICROgram(s) HFA Inhaler 2 Puff(s) Inhalation every 6 hours PRN Shortness of Breath and/or Wheezing  aluminum hydroxide/magnesium hydroxide/simethicone Suspension 30 milliLiter(s) Oral every 4 hours PRN Dyspepsia  melatonin 3 milliGRAM(s) Oral at bedtime PRN Insomnia  ondansetron Injectable 4 milliGRAM(s) IV Push every 8 hours PRN Nausea and/or Vomiting      Vital Signs Last 24 Hrs  T(C): 36.9 (25 Nov 2022 05:28), Max: 37.1 (24 Nov 2022 22:04)  T(F): 98.4 (25 Nov 2022 05:28), Max: 98.8 (24 Nov 2022 22:04)  HR: 81 (25 Nov 2022 05:28) (81 - 88)  BP: 121/72 (25 Nov 2022 05:28) (113/77 - 122/75)  BP(mean): --  RR: 17 (25 Nov 2022 05:28) (17 - 17)  SpO2: 99% (25 Nov 2022 05:28) (98% - 100%)    Parameters below as of 25 Nov 2022 05:28  Patient On (Oxygen Delivery Method): room air      CAPILLARY BLOOD GLUCOSE        I&O's Summary    24 Nov 2022 07:01  -  25 Nov 2022 07:00  --------------------------------------------------------  IN: 0 mL / OUT: 250 mL / NET: -250 mL        PHYSICAL EXAM:  Constitutional: NAD, comfortable  Head: NC/AT  Eyes: PERRL, EOMI, anicteric sclera  Neck: supple; no JVD  Respiratory: reduced breath sounds on the left lower lung, no crackles, no use of accessory muscles  Cardiac: +S1/S2; RRR; no M/R/G  Gastrointestinal: soft, NT/ND; no rebound or guarding; +BSx4  Extremities:  no clubbing or cyanosis; no peripheral edema  Vascular: 2+ radial, DP/PT pulses B/L  Neurologic: AAOx3; CNII-XII grossly intact; no focal deficits  SKIN: left PleurX cath with dressing C/D/I       LABS:                        9.2    2.17  )-----------( 207      ( 25 Nov 2022 04:28 )             28.2     11-25    137  |  101  |  26<H>  ----------------------------<  104<H>  4.5   |  24  |  1.42<H>    Ca    9.2      25 Nov 2022 04:28  Phos  3.8     11-25  Mg     1.90     11-25    TPro  7.0  /  Alb  3.3  /  TBili  <0.2  /  DBili  x   /  AST  32  /  ALT  25  /  AlkPhos  83  11-24    PT/INR - ( 23 Nov 2022 15:00 )   PT: 13.3 sec;   INR: 1.14 ratio         PTT - ( 23 Nov 2022 15:00 )  PTT:28.5 sec          RADIOLOGY & ADDITIONAL TESTS:    Imaging Personally Reviewed:    Consultant(s) Notes Reviewed:      Care Discussed with Consultants/Other Providers:

## 2022-11-26 NOTE — PROGRESS NOTE ADULT - PROBLEM SELECTOR PLAN 2
ANC is 1.14 --> 0.67-->1.05-->0.90  , likely 2/2 recent chemo.   -No fever   -Monitor off abx   -onc consulted, appreciate recs. discuss role of GCSF, do not recommend GCSF at this time, out pt f/u with Oncology

## 2022-11-26 NOTE — PROGRESS NOTE ADULT - PROBLEM SELECTOR PLAN 5
cr increased from 1.23-->1.42  pt says he had not been drinking enough fluids, will avoid IVF as may worsen Pleural effusion  pt encouraged to drink about 8 glasses of fluids    Cr downtrending with gentle hydration   pt advised to f/u with PMD on Monday for repeat blood work to monitor serum creatinine

## 2022-11-26 NOTE — PROGRESS NOTE ADULT - PROBLEM SELECTOR PLAN 1
Patient with chronic L pleural effusion in s.o malignancy, unchanged from prior CXR. pulmonology performed bedside [facility] with findings of small simple left pleural effusion.  -Pulm consulted, appreciate recs  -per pulmonology, Can drain PleurX up to 1 L , stop for pain or SOB  -Can drain PleurX every other day while in hospital and once discharged once weekly up to 1L stopping for pain or SOB, pt advised to drain in between one week if SOB   -C/w albuterol q6hrs   -No concern for infection  -pt given  pluerx supplies

## 2022-11-26 NOTE — PROGRESS NOTE ADULT - ASSESSMENT
56 y/o M with a PMHx of NSCLC (follows with Dr. Gunn, dx in 09/2022 with malignant pleural effusion; s/p cycle 1 carboplatin/pemetrexed/pembrolizumab last given on 11/17/2022), subsegmental PE, and cardiac mass s/p thoracotomy presented to the ED with SOB in the setting of not draining his pleural effusion because of shortage in supplies.       #NSCLC    - Follows with Dr Tiffani Gunn   - Diagnosed 09/2022 after he presented with SOB, noted to have a L sided hydropneumothorax with trapped lung and underwent a pleuroscopy with pleural biopsy as well as tunneled pleural catheter placement.     - Pathology: Adenocarcinoma of the lung, PDL1 10%, NGS: ARID1A Q450*  CASP8 E311*  TERT promoter -124C>T    - 10/19/2022; PET/CT reviewed, done at [facility], hypermetabolix SAULO mass, small hypermetabolic RUL nodule, L pleural nodularity, L pleural effusion with drain inside, bilateral supraclavicular inrenal mammary, mediastinal, L hilar, L axillary, upper abdominal, retrocrural, retroperitoneal, pelvic and L inguinal LN. Hypermetabolic peritoneal nodularity. Skeletal involvement.     - 10/14/2022; CT head w/wo contrast unremarkable.    - On carboplatin/pemetrexed/pembrolizumab, s/p cycle 1 (11/17/2022)   - Pulmonary consulted  - Social Work/Care Management to help coordinate supply shortage resolution       # Bicytopenia    - S/p 1U PRBC  - H/H, WBC, IANC improved as of 11/25/2022  - Continue to monitor serial CBCs and IANC  - Continue to monitor for fevers, recommend broad spectrum antibiotics such as Zarxio if leukopenic, febrile, ANC <500       # PMHx of subsegmental PE     - Continue with anticoagulation therapy    
56 yo M with Pmhx of NSCLC s/p s/p cycle 1 carboplatin/pemetrexed/pembrolizumab (last chemo on 11/17/2022), subsegmental PE, and cardiac mass s/p thoracotomy presents to the ED with SOB, most likely due to chronic L pleural effusion    
58YO Male former smoker PMH Right open thoracotomy for cardiac mass removal 20 years ago recently admitted 9/10- with SOB, weight loss found to have subsegmental PE (now on Xarelto) and  large left hydropneumothorax with rapid reaccumulation s/p Left pleuroscopy with pleural biopsy and pleurX tunneled intrapleural catheter placement  found to have non-small cell lung ca (s/p cycle 1 carboplatin/pemetrexed/pembrolizumab 22) who presents from home for SOB and inability to afford drainage supplies.     #Non small cell lung cancer  #Left Malignant effusion  #H/O PE  #SOB  - Can go home and drain PleurX once weekly    - Pt should have supplies at home should he develop SOB and need to drain himself in between        Patient should follow with pulmonology (Dr. Ortega) within 2 weeks of discharge. Please email [email or web address] and include patient's name, , MRN, telephone number, and discharge diagnosis, and allow 24 hours for scheduling. The office is located at [address] [place] [address], [address] [facility]. Number [phone].
58 yo M with Pmhx of NSCLC s/p s/p cycle 1 carboplatin/pemetrexed/pembrolizumab (last chemo on 11/17/2022), subsegmental PE, and cardiac mass s/p thoracotomy presents to the ED with SOB, most likely due to chronic L pleural effusion    
56 yo M with Pmhx of NSCLC s/p s/p cycle 1 carboplatin/pemetrexed/pembrolizumab (last chemo on 11/17/2022), subsegmental PE, and cardiac mass s/p thoracotomy presents to the ED with SOB, most likely due to chronic L pleural effusion

## 2022-11-26 NOTE — PROGRESS NOTE ADULT - PROBLEM SELECTOR PLAN 6
Hgb downtrended to 7.3, s/p one unit PRBC on 11/24, h/h appropriately improved,   -Denies any melena, hematochezia or hematuria   -Endorses some blood tinges sputum but no gross hemoptysis   -Monitor CBC daily   -Iron studies wnl
Hgb downtrended to 7.3, s/p one unit PRBC on 11/24, h/h appropriately improved,   -Denies any melena, hematochezia or hematuria   -Endorses some blood tinges sputum but no gross hemoptysis   -Monitor CBC daily   -Iron studies wnl
DVT-xarelto 20 mg daily

## 2022-11-26 NOTE — PROGRESS NOTE ADULT - NSPROGADDITIONALINFOA_GEN_ALL_CORE
Note/plan pending attending review/approval
plan of care d/w pt and ACP
will DC home with home care today  Patient hemodynamically stable for discharge home  Time spent in discharge process is 45 min  plan of care d/w pt and ACP

## 2022-12-01 ENCOUNTER — OUTPATIENT (OUTPATIENT)
Dept: OUTPATIENT SERVICES | Facility: HOSPITAL | Age: 57
LOS: 1 days | Discharge: HOME | End: 2022-12-01

## 2022-12-01 DIAGNOSIS — C34.90 MALIGNANT NEOPLASM OF UNSPECIFIED PART OF UNSPECIFIED BRONCHUS OR LUNG: ICD-10-CM

## 2022-12-01 DIAGNOSIS — Z98.890 OTHER SPECIFIED POSTPROCEDURAL STATES: Chronic | ICD-10-CM

## 2022-12-06 ENCOUNTER — RESULT REVIEW (OUTPATIENT)
Age: 57
End: 2022-12-06

## 2022-12-06 ENCOUNTER — APPOINTMENT (OUTPATIENT)
Dept: HEMATOLOGY ONCOLOGY | Facility: CLINIC | Age: 57
End: 2022-12-06

## 2022-12-06 VITALS
WEIGHT: 166.89 LBS | HEIGHT: 70.98 IN | OXYGEN SATURATION: 98 % | BODY MASS INDEX: 23.36 KG/M2 | RESPIRATION RATE: 16 BRPM | HEART RATE: 80 BPM | TEMPERATURE: 97.9 F | DIASTOLIC BLOOD PRESSURE: 84 MMHG | SYSTOLIC BLOOD PRESSURE: 145 MMHG

## 2022-12-06 LAB
BASOPHILS # BLD AUTO: 0.04 K/UL — SIGNIFICANT CHANGE UP (ref 0–0.2)
BASOPHILS NFR BLD AUTO: 0.8 % — SIGNIFICANT CHANGE UP (ref 0–2)
EOSINOPHIL # BLD AUTO: 0.16 K/UL — SIGNIFICANT CHANGE UP (ref 0–0.5)
EOSINOPHIL NFR BLD AUTO: 3.2 % — SIGNIFICANT CHANGE UP (ref 0–6)
HCT VFR BLD CALC: 28.4 % — LOW (ref 39–50)
HGB BLD-MCNC: 9.2 G/DL — LOW (ref 13–17)
IMM GRANULOCYTES NFR BLD AUTO: 0.8 % — SIGNIFICANT CHANGE UP (ref 0–0.9)
LYMPHOCYTES # BLD AUTO: 1.19 K/UL — SIGNIFICANT CHANGE UP (ref 1–3.3)
LYMPHOCYTES # BLD AUTO: 23.7 % — SIGNIFICANT CHANGE UP (ref 13–44)
MCHC RBC-ENTMCNC: 29.5 PG — SIGNIFICANT CHANGE UP (ref 27–34)
MCHC RBC-ENTMCNC: 32.4 G/DL — SIGNIFICANT CHANGE UP (ref 32–36)
MCV RBC AUTO: 91 FL — SIGNIFICANT CHANGE UP (ref 80–100)
MONOCYTES # BLD AUTO: 0.82 K/UL — SIGNIFICANT CHANGE UP (ref 0–0.9)
MONOCYTES NFR BLD AUTO: 16.3 % — HIGH (ref 2–14)
NEUTROPHILS # BLD AUTO: 2.78 K/UL — SIGNIFICANT CHANGE UP (ref 1.8–7.4)
NEUTROPHILS NFR BLD AUTO: 55.2 % — SIGNIFICANT CHANGE UP (ref 43–77)
NRBC # BLD: 0 /100 WBCS — SIGNIFICANT CHANGE UP (ref 0–0)
PLATELET # BLD AUTO: 592 K/UL — HIGH (ref 150–400)
RBC # BLD: 3.12 M/UL — LOW (ref 4.2–5.8)
RBC # FLD: 13.8 % — SIGNIFICANT CHANGE UP (ref 10.3–14.5)
WBC # BLD: 5.03 K/UL — SIGNIFICANT CHANGE UP (ref 3.8–10.5)
WBC # FLD AUTO: 5.03 K/UL — SIGNIFICANT CHANGE UP (ref 3.8–10.5)

## 2022-12-06 PROCEDURE — 99215 OFFICE O/P EST HI 40 MIN: CPT

## 2022-12-08 ENCOUNTER — APPOINTMENT (OUTPATIENT)
Dept: INFUSION THERAPY | Facility: HOSPITAL | Age: 57
End: 2022-12-08

## 2022-12-08 ENCOUNTER — RESULT REVIEW (OUTPATIENT)
Age: 57
End: 2022-12-08

## 2022-12-08 DIAGNOSIS — R11.2 NAUSEA WITH VOMITING, UNSPECIFIED: ICD-10-CM

## 2022-12-08 DIAGNOSIS — Z51.11 ENCOUNTER FOR ANTINEOPLASTIC CHEMOTHERAPY: ICD-10-CM

## 2022-12-08 LAB
ALBUMIN SERPL ELPH-MCNC: 3.7 G/DL — SIGNIFICANT CHANGE UP (ref 3.3–5)
ALP SERPL-CCNC: 102 U/L — SIGNIFICANT CHANGE UP (ref 40–120)
ALT FLD-CCNC: 8 U/L — LOW (ref 10–45)
ANION GAP SERPL CALC-SCNC: 13 MMOL/L — SIGNIFICANT CHANGE UP (ref 5–17)
AST SERPL-CCNC: 19 U/L — SIGNIFICANT CHANGE UP (ref 10–40)
BILIRUB SERPL-MCNC: <0.2 MG/DL — SIGNIFICANT CHANGE UP (ref 0.2–1.2)
BUN SERPL-MCNC: 20 MG/DL — SIGNIFICANT CHANGE UP (ref 7–23)
CALCIUM SERPL-MCNC: 9.9 MG/DL — SIGNIFICANT CHANGE UP (ref 8.4–10.5)
CHLORIDE SERPL-SCNC: 105 MMOL/L — SIGNIFICANT CHANGE UP (ref 96–108)
CO2 SERPL-SCNC: 22 MMOL/L — SIGNIFICANT CHANGE UP (ref 22–31)
CREAT SERPL-MCNC: 1.44 MG/DL — HIGH (ref 0.5–1.3)
EGFR: 57 ML/MIN/1.73M2 — LOW
GLUCOSE SERPL-MCNC: 159 MG/DL — HIGH (ref 70–99)
POTASSIUM SERPL-MCNC: 4.5 MMOL/L — SIGNIFICANT CHANGE UP (ref 3.5–5.3)
POTASSIUM SERPL-SCNC: 4.5 MMOL/L — SIGNIFICANT CHANGE UP (ref 3.5–5.3)
PROT SERPL-MCNC: 7.4 G/DL — SIGNIFICANT CHANGE UP (ref 6–8.3)
SODIUM SERPL-SCNC: 140 MMOL/L — SIGNIFICANT CHANGE UP (ref 135–145)
T4 FREE+ TSH PNL SERPL: 0.5 UIU/ML — SIGNIFICANT CHANGE UP (ref 0.27–4.2)

## 2022-12-12 ENCOUNTER — APPOINTMENT (OUTPATIENT)
Dept: PULMONOLOGY | Facility: CLINIC | Age: 57
End: 2022-12-12

## 2022-12-12 VITALS
OXYGEN SATURATION: 96 % | WEIGHT: 167 LBS | TEMPERATURE: 98.2 F | HEIGHT: 70 IN | BODY MASS INDEX: 23.91 KG/M2 | DIASTOLIC BLOOD PRESSURE: 85 MMHG | SYSTOLIC BLOOD PRESSURE: 131 MMHG | HEART RATE: 81 BPM

## 2022-12-12 PROCEDURE — 99213 OFFICE O/P EST LOW 20 MIN: CPT | Mod: 25

## 2022-12-12 PROCEDURE — 76604 US EXAM CHEST: CPT

## 2022-12-14 NOTE — HISTORY OF PRESENT ILLNESS
[Former] : former [TextBox_4] : Interventional Pulmonology Consultation/Visit Note\par \par 57 M with PMHx lung adenoCA right sided pleurx cath for malignant pleural effusion s/p 3/4 cycles of chemotherapy with carboplatin/pemetrexed/pembrolizumab who presents as follow up for management of pleurx. Pt reports that he has had less drainage from his catheter; progressively less has been draining over the past 2 months, currently draining 200-250cc twice a week. Pt reports he was due to drain his pleurx today. The pleural fluid is bloody in appearance per his report. He has no acute concerns today, specifically denies CP, SOB, f/c, bleeding from pleurx site, pain from pleurx site, hemoptysis.

## 2022-12-14 NOTE — ASSESSMENT
[FreeTextEntry1] : 57m with recently diagnosed adenocarcinoma of the lung, with positive pleural effusion and involvement of the pleura, s/p pleurX placement, presenting to [facility] care. \par \par Patient started first line treatment with carboplatin/pemetrexed/pembrolizumab. \par S/p C2, well tolerated, however has anemia and received 1 u prbc at [facility]. \par He has not been taking folic acid consistently. \par \par -Labs today\par -Due for C3 chemo/io with carboplatin AUC 5, pemetrexed 500mg/m2, pembrolizuman 200mg \par -Scans after C4\par -dexamethasone, folic acid and reglan as ordered\par -B12 shots q9 weeks\par -Follow Pulm\par -Blood work today\par -RTC after the above to discuss treatment options\par \par Tiffani Gunn MD\par Medical Oncology and Hematology\par Total time of this visit, including time spent face to face with patient and/or via video/audio, and also in preparing for today's visit for MDM and documentation (Medical Decision Making, including consideration of possible diagnoses, management options, complex medical record review, review of diagnostic tests and information, consideration and discussion of significant complications based on comorbidities, and discussion with providers involved with the care of the patient) 41 minutes.\par \par

## 2022-12-14 NOTE — REVIEW OF SYSTEMS
[Fever] : no fever [Chills] : no chills [Hemoptysis] : no hemoptysis [Dyspnea] : no dyspnea [Chest Discomfort] : no chest discomfort [Syncope] : no syncope [Abdominal Pain] : no abdominal pain [Rash] : no rash [Headache] : no headache [Dizziness] : no dizziness

## 2022-12-14 NOTE — PHYSICAL EXAM
[No Acute Distress] : no acute distress [Normal Oropharynx] : normal oropharynx [Normal Appearance] : normal appearance [No Neck Mass] : no neck mass [Normal Rate/Rhythm] : normal rate/rhythm [Normal S1, S2] : normal s1, s2 [No Resp Distress] : no resp distress [Clear to Auscultation Bilaterally] : clear to auscultation bilaterally [Benign] : benign [Normal Gait] : normal gait [No Clubbing] : no clubbing [No Edema] : no edema [No Focal Deficits] : no focal deficits [Oriented x3] : oriented x3 [Normal Affect] : normal affect [TextBox_80] : Lt pleurx catheter in place

## 2022-12-14 NOTE — ASSESSMENT
[FreeTextEntry1] : 57 M with PMHx lung adenoCA right sided pleurx cath for malignant pleural effusion s/p 3/4 cycles of chemotherapy with carboplatin/pemetrexed/pembrolizumab who presents as follow up for management of pleurx with noted decrease in pleural fluid drainage to 200-250cc twice weekly over the past few weeks.\par \par #Lung adenocarcinoma\par #Malignant pleural effusion s/p pleurx placement\par Decreased drainage noted with 3/4 cycles of chemotherapy completed as of 4 days ago. POCUS in office today reveals small, mostly simple appearing fluid, was due to be drained today however with decreasing output can decrease frequency to weekly. Most recent Hb 12/6 of 9.2 from 9.6 and 9.3 before that, both in november 2022.\par - Drain pleurx once weekly\par - RTC in 4-6 weeks for ongoing management of pleurx catheter\par - f/u onc\par - c/w Xarelto for h/o PE\par \par Catheter protocols reviewed. All information provided. Adequate equipment available. Advised to call if experiencing any pain with drainage. Advised to call if any change in color of fluid or erythema or swelling at catheter site or experiencing systemic signs and symptoms of infection such as fever. Advised to call if any sudden worsening of shortness of breath. Catheter care instructions provided.\par

## 2022-12-14 NOTE — PHYSICAL EXAM
[Restricted in physically strenuous activity but ambulatory and able to carry out work of a light or sedentary nature] : Status 1- Restricted in physically strenuous activity but ambulatory and able to carry out work of a light or sedentary nature, e.g., light house work, office work [Normal] : affect appropriate [de-identified] : Crackles on the right base

## 2022-12-14 NOTE — PROCEDURE
[Thoracic Ultrasound] : Thoracic Ultrasound [A line] : A line: Yes [Simple] : simple [Pleural Effusion] : Pleural Effusion: No [de-identified] : ANISA loco [FreeTextEntry2] : Small pleural effusion seen laterally and posteriorly on Lt, indwelling catheter in pleural space, predominantly simple-appearing with small amount of sediment

## 2022-12-14 NOTE — HISTORY OF PRESENT ILLNESS
[de-identified] : 57m with newly diagnosed adenocarcinoma of the lung presenting for initial oncology evaluation. \par Mr Cordova presented to the hospital with SOB and was found to have a left-sided hydropneumothorax with trapped lung and underwent a pleuroscopy with pleural biopsy as well as tunneled pleural catheter placement. \par \par SOB has now improved. \par He reports some weight loss in the past 2-3 months, probably about 10lb, no pain, no cough.\par \par Path c/w adenocarcinoma of the lung, PDL1 10%, \par NGS\par ARID1A Q450*\par CASP8 E311*\par TERT promoter -124C>T\par \par PET/CT 10/19/2022 reviewed, done at [facility], hypermetabolix SAULO mass, small hypermetabolic RUL nodule, Left pleural nodularity, left pleural effusion with drain inside, b/l supraclavicular inrenal mammary, mediastinal, left hilar, left axillary, upper abdominal, retrocrural, retroperitoneal, pelvic and left inguinal LN. hypermetabolic peritoneal nodularity. Skeletal involvement. \par \par CT head w/wo contrast 10/14/2022 unremarkable.\par \par He is a retired , worked in [place]. \par He lives with his sister, has children in [place]. \par Works as a  in a store.\par Used to smoke cigars until last year, occasional marijuana, no drugs, occasional EtOH\par  [de-identified] : Feels better\par Had to go to [facility] for pleurX drainage and was given a unit of blood\par Has not been taking folic acid consistently\par Now has his supplies for pleurex drainage at home\par Seems that the fluid is decreasing

## 2022-12-27 ENCOUNTER — RESULT REVIEW (OUTPATIENT)
Age: 57
End: 2022-12-27

## 2022-12-27 ENCOUNTER — APPOINTMENT (OUTPATIENT)
Dept: HEMATOLOGY ONCOLOGY | Facility: CLINIC | Age: 57
End: 2022-12-27

## 2022-12-27 VITALS
BODY MASS INDEX: 24.27 KG/M2 | HEART RATE: 96 BPM | TEMPERATURE: 97.4 F | RESPIRATION RATE: 16 BRPM | HEIGHT: 70 IN | OXYGEN SATURATION: 98 % | SYSTOLIC BLOOD PRESSURE: 136 MMHG | WEIGHT: 169.51 LBS | DIASTOLIC BLOOD PRESSURE: 85 MMHG

## 2022-12-27 LAB
BASOPHILS # BLD AUTO: 0.04 K/UL — SIGNIFICANT CHANGE UP (ref 0–0.2)
BASOPHILS NFR BLD AUTO: 1 % — SIGNIFICANT CHANGE UP (ref 0–2)
EOSINOPHIL # BLD AUTO: 0.15 K/UL — SIGNIFICANT CHANGE UP (ref 0–0.5)
EOSINOPHIL NFR BLD AUTO: 4 % — SIGNIFICANT CHANGE UP (ref 0–6)
HCT VFR BLD CALC: 29.2 % — LOW (ref 39–50)
HGB BLD-MCNC: 9.2 G/DL — LOW (ref 13–17)
LYMPHOCYTES # BLD AUTO: 0.8 K/UL — LOW (ref 1–3.3)
LYMPHOCYTES # BLD AUTO: 21 % — SIGNIFICANT CHANGE UP (ref 13–44)
MCHC RBC-ENTMCNC: 28.8 PG — SIGNIFICANT CHANGE UP (ref 27–34)
MCHC RBC-ENTMCNC: 31.5 G/DL — LOW (ref 32–36)
MCV RBC AUTO: 91.3 FL — SIGNIFICANT CHANGE UP (ref 80–100)
MONOCYTES # BLD AUTO: 0.49 K/UL — SIGNIFICANT CHANGE UP (ref 0–0.9)
MONOCYTES NFR BLD AUTO: 13 % — SIGNIFICANT CHANGE UP (ref 2–14)
NEUTROPHILS # BLD AUTO: 2.31 K/UL — SIGNIFICANT CHANGE UP (ref 1.8–7.4)
NEUTROPHILS NFR BLD AUTO: 61 % — SIGNIFICANT CHANGE UP (ref 43–77)
NRBC # BLD: 0 /100 — SIGNIFICANT CHANGE UP (ref 0–0)
NRBC # BLD: SIGNIFICANT CHANGE UP /100 WBCS (ref 0–0)
PLAT MORPH BLD: NORMAL — SIGNIFICANT CHANGE UP
PLATELET # BLD AUTO: 305 K/UL — SIGNIFICANT CHANGE UP (ref 150–400)
RBC # BLD: 3.2 M/UL — LOW (ref 4.2–5.8)
RBC # FLD: 15.3 % — HIGH (ref 10.3–14.5)
RBC BLD AUTO: SIGNIFICANT CHANGE UP
WBC # BLD: 3.79 K/UL — LOW (ref 3.8–10.5)
WBC # FLD AUTO: 3.79 K/UL — LOW (ref 3.8–10.5)

## 2022-12-27 PROCEDURE — 99215 OFFICE O/P EST HI 40 MIN: CPT

## 2022-12-28 NOTE — HISTORY OF PRESENT ILLNESS
[de-identified] : 57m with newly diagnosed adenocarcinoma of the lung presenting for initial oncology evaluation. \par Mr Cordova presented to the hospital with SOB and was found to have a left-sided hydropneumothorax with trapped lung and underwent a pleuroscopy with pleural biopsy as well as tunneled pleural catheter placement. \par \par SOB has now improved. \par He reports some weight loss in the past 2-3 months, probably about 10lb, no pain, no cough.\par \par Path c/w adenocarcinoma of the lung, PDL1 10%, \par NGS\par ARID1A Q450*\par CASP8 E311*\par TERT promoter -124C>T\par \par PET/CT 10/19/2022 reviewed, done at [facility], hypermetabolix SAULO mass, small hypermetabolic RUL nodule, Left pleural nodularity, left pleural effusion with drain inside, b/l supraclavicular inrenal mammary, mediastinal, left hilar, left axillary, upper abdominal, retrocrural, retroperitoneal, pelvic and left inguinal LN. hypermetabolic peritoneal nodularity. Skeletal involvement. \par \par CT head w/wo contrast 10/14/2022 unremarkable.\par \par He is a retired , worked in [place]. \par He lives with his sister, has children in [place]. \par Works as a  in a store.\par Used to smoke cigars until last year, occasional marijuana, no drugs, occasional EtOH\par  [de-identified] : Feels better,\par Seems that the fluid is decreasing considerably\par Has adequate PO intake \par no N/V\par No pain

## 2022-12-28 NOTE — PHYSICAL EXAM
[Restricted in physically strenuous activity but ambulatory and able to carry out work of a light or sedentary nature] : Status 1- Restricted in physically strenuous activity but ambulatory and able to carry out work of a light or sedentary nature, e.g., light house work, office work [Normal] : affect appropriate [de-identified] : Crackles on the right base

## 2022-12-28 NOTE — ASSESSMENT
[FreeTextEntry1] : 57m with recently diagnosed adenocarcinoma of the lung, with positive pleural effusion and involvement of the pleura, s/p pleurX placement, presenting to establish care. \par \par Patient started first line treatment with carboplatin/pemetrexed/pembrolizumab. \par S/p C3, well tolerated, however has anemia and received 1 u prbc at [facility]. \par Has taken Folic acid consistently\par \par -Labs today\par -Due for C4 chemo/io with carboplatin AUC 5, pemetrexed 500mg/m2, pembrolizuman 200mg \par -Scans after C4, orders placed for CT c/a/p with contrast\par -dexamethasone, folic acid and reglan as ordered\par -B12 shots q9 weeks,\par -Follow Pulm\par -Blood work today\par -RTC after the above to discuss treatment options\par \par Tiffani Gunn MD\par Medical Oncology and Hematology\par Total time of this visit, including time spent face to face with patient and/or via video/audio, and also in preparing for today's visit for MDM and documentation (Medical Decision Making, including consideration of possible diagnoses, management options, complex medical record review, review of diagnostic tests and information, consideration and discussion of significant complications based on comorbidities, and discussion with providers involved with the care of the patient) 41 minutes.\par \par

## 2022-12-29 ENCOUNTER — RESULT REVIEW (OUTPATIENT)
Age: 57
End: 2022-12-29

## 2022-12-29 ENCOUNTER — APPOINTMENT (OUTPATIENT)
Dept: INFUSION THERAPY | Facility: HOSPITAL | Age: 57
End: 2022-12-29

## 2022-12-29 ENCOUNTER — NON-APPOINTMENT (OUTPATIENT)
Age: 57
End: 2022-12-29

## 2022-12-29 LAB
ALBUMIN SERPL ELPH-MCNC: 3.7 G/DL — SIGNIFICANT CHANGE UP (ref 3.3–5)
ALP SERPL-CCNC: 87 U/L — SIGNIFICANT CHANGE UP (ref 40–120)
ALT FLD-CCNC: 13 U/L — SIGNIFICANT CHANGE UP (ref 10–45)
ANION GAP SERPL CALC-SCNC: 13 MMOL/L — SIGNIFICANT CHANGE UP (ref 5–17)
AST SERPL-CCNC: 22 U/L — SIGNIFICANT CHANGE UP (ref 10–40)
BASOPHILS # BLD AUTO: 0.01 K/UL — SIGNIFICANT CHANGE UP (ref 0–0.2)
BASOPHILS NFR BLD AUTO: 0.2 % — SIGNIFICANT CHANGE UP (ref 0–2)
BILIRUB SERPL-MCNC: <0.2 MG/DL — SIGNIFICANT CHANGE UP (ref 0.2–1.2)
BUN SERPL-MCNC: 20 MG/DL — SIGNIFICANT CHANGE UP (ref 7–23)
CALCIUM SERPL-MCNC: 9.3 MG/DL — SIGNIFICANT CHANGE UP (ref 8.4–10.5)
CHLORIDE SERPL-SCNC: 105 MMOL/L — SIGNIFICANT CHANGE UP (ref 96–108)
CO2 SERPL-SCNC: 22 MMOL/L — SIGNIFICANT CHANGE UP (ref 22–31)
CREAT SERPL-MCNC: 1.44 MG/DL — HIGH (ref 0.5–1.3)
EGFR: 57 ML/MIN/1.73M2 — LOW
EOSINOPHIL # BLD AUTO: 0 K/UL — SIGNIFICANT CHANGE UP (ref 0–0.5)
EOSINOPHIL NFR BLD AUTO: 0 % — SIGNIFICANT CHANGE UP (ref 0–6)
GLUCOSE SERPL-MCNC: 131 MG/DL — HIGH (ref 70–99)
HCT VFR BLD CALC: 28.5 % — LOW (ref 39–50)
HGB BLD-MCNC: 9.4 G/DL — LOW (ref 13–17)
IMM GRANULOCYTES NFR BLD AUTO: 1.4 % — HIGH (ref 0–0.9)
LYMPHOCYTES # BLD AUTO: 0.63 K/UL — LOW (ref 1–3.3)
LYMPHOCYTES # BLD AUTO: 14.5 % — SIGNIFICANT CHANGE UP (ref 13–44)
MCHC RBC-ENTMCNC: 28.8 PG — SIGNIFICANT CHANGE UP (ref 27–34)
MCHC RBC-ENTMCNC: 33 G/DL — SIGNIFICANT CHANGE UP (ref 32–36)
MCV RBC AUTO: 87.4 FL — SIGNIFICANT CHANGE UP (ref 80–100)
MONOCYTES # BLD AUTO: 0.22 K/UL — SIGNIFICANT CHANGE UP (ref 0–0.9)
MONOCYTES NFR BLD AUTO: 5.1 % — SIGNIFICANT CHANGE UP (ref 2–14)
NEUTROPHILS # BLD AUTO: 3.43 K/UL — SIGNIFICANT CHANGE UP (ref 1.8–7.4)
NEUTROPHILS NFR BLD AUTO: 78.8 % — HIGH (ref 43–77)
NRBC # BLD: 0 /100 WBCS — SIGNIFICANT CHANGE UP (ref 0–0)
PLATELET # BLD AUTO: 417 K/UL — HIGH (ref 150–400)
POTASSIUM SERPL-MCNC: 4.7 MMOL/L — SIGNIFICANT CHANGE UP (ref 3.5–5.3)
POTASSIUM SERPL-SCNC: 4.7 MMOL/L — SIGNIFICANT CHANGE UP (ref 3.5–5.3)
PROT SERPL-MCNC: 7.3 G/DL — SIGNIFICANT CHANGE UP (ref 6–8.3)
RBC # BLD: 3.26 M/UL — LOW (ref 4.2–5.8)
RBC # FLD: 15.4 % — HIGH (ref 10.3–14.5)
SODIUM SERPL-SCNC: 139 MMOL/L — SIGNIFICANT CHANGE UP (ref 135–145)
T4 FREE+ TSH PNL SERPL: 0.48 UIU/ML — SIGNIFICANT CHANGE UP (ref 0.27–4.2)
WBC # BLD: 4.35 K/UL — SIGNIFICANT CHANGE UP (ref 3.8–10.5)
WBC # FLD AUTO: 4.35 K/UL — SIGNIFICANT CHANGE UP (ref 3.8–10.5)

## 2022-12-30 DIAGNOSIS — E86.0 DEHYDRATION: ICD-10-CM

## 2023-01-03 ENCOUNTER — RESULT REVIEW (OUTPATIENT)
Age: 58
End: 2023-01-03

## 2023-01-03 LAB
ALBUMIN SERPL ELPH-MCNC: 3.6 G/DL
ALBUMIN SERPL ELPH-MCNC: 3.8 G/DL
ALP BLD-CCNC: 91 U/L
ALP BLD-CCNC: 95 U/L
ALT SERPL-CCNC: 12 U/L
ALT SERPL-CCNC: 13 U/L
ANION GAP SERPL CALC-SCNC: 9 MMOL/L
ANION GAP SERPL CALC-SCNC: 9 MMOL/L
AST SERPL-CCNC: 18 U/L
AST SERPL-CCNC: 19 U/L
BILIRUB SERPL-MCNC: <0.2 MG/DL
BILIRUB SERPL-MCNC: <0.2 MG/DL
BUN SERPL-MCNC: 18 MG/DL
BUN SERPL-MCNC: 18 MG/DL
CALCIUM SERPL-MCNC: 9.4 MG/DL
CALCIUM SERPL-MCNC: 9.6 MG/DL
CHLORIDE SERPL-SCNC: 104 MMOL/L
CHLORIDE SERPL-SCNC: 104 MMOL/L
CO2 SERPL-SCNC: 26 MMOL/L
CO2 SERPL-SCNC: 27 MMOL/L
CREAT SERPL-MCNC: 1.59 MG/DL
CREAT SERPL-MCNC: 1.83 MG/DL
EGFR: 43 ML/MIN/1.73M2
EGFR: 51 ML/MIN/1.73M2
GLUCOSE SERPL-MCNC: 85 MG/DL
GLUCOSE SERPL-MCNC: 88 MG/DL
POTASSIUM SERPL-SCNC: 4.1 MMOL/L
POTASSIUM SERPL-SCNC: 4.5 MMOL/L
PROT SERPL-MCNC: 7.1 G/DL
PROT SERPL-MCNC: 7.1 G/DL
SODIUM SERPL-SCNC: 140 MMOL/L
SODIUM SERPL-SCNC: 140 MMOL/L
TSH SERPL-ACNC: 1.93 UIU/ML
TSH SERPL-ACNC: 2.16 UIU/ML

## 2023-01-04 NOTE — ED ADULT NURSE NOTE - NSFALLRSKASSESSDT_ED_ALL_ED
Is This A New Presentation, Or A Follow-Up?: Skin Lesion What Type Of Note Output Would You Prefer (Optional)?: Standard Output How Severe Is Your Skin Lesion?: mild Has Your Skin Lesion Been Treated?: not been treated 06-Sep-2022 21:04

## 2023-01-06 ENCOUNTER — APPOINTMENT (OUTPATIENT)
Dept: CT IMAGING | Facility: IMAGING CENTER | Age: 58
End: 2023-01-06
Payer: COMMERCIAL

## 2023-01-06 ENCOUNTER — OUTPATIENT (OUTPATIENT)
Dept: OUTPATIENT SERVICES | Facility: HOSPITAL | Age: 58
LOS: 1 days | End: 2023-01-06
Payer: COMMERCIAL

## 2023-01-06 DIAGNOSIS — J91.0 MALIGNANT PLEURAL EFFUSION: ICD-10-CM

## 2023-01-06 DIAGNOSIS — Z79.899 OTHER LONG TERM (CURRENT) DRUG THERAPY: ICD-10-CM

## 2023-01-06 DIAGNOSIS — Z98.890 OTHER SPECIFIED POSTPROCEDURAL STATES: Chronic | ICD-10-CM

## 2023-01-06 DIAGNOSIS — C34.90 MALIGNANT NEOPLASM OF UNSPECIFIED PART OF UNSPECIFIED BRONCHUS OR LUNG: ICD-10-CM

## 2023-01-06 PROCEDURE — 71260 CT THORAX DX C+: CPT | Mod: 26

## 2023-01-06 PROCEDURE — 74177 CT ABD & PELVIS W/CONTRAST: CPT

## 2023-01-06 PROCEDURE — 74177 CT ABD & PELVIS W/CONTRAST: CPT | Mod: 26

## 2023-01-06 PROCEDURE — 71260 CT THORAX DX C+: CPT

## 2023-01-19 ENCOUNTER — APPOINTMENT (OUTPATIENT)
Dept: HEMATOLOGY ONCOLOGY | Facility: CLINIC | Age: 58
End: 2023-01-19
Payer: COMMERCIAL

## 2023-01-19 ENCOUNTER — APPOINTMENT (OUTPATIENT)
Dept: HEMATOLOGY ONCOLOGY | Facility: CLINIC | Age: 58
End: 2023-01-19

## 2023-01-19 VITALS
BODY MASS INDEX: 24.82 KG/M2 | SYSTOLIC BLOOD PRESSURE: 138 MMHG | TEMPERATURE: 98.1 F | HEART RATE: 102 BPM | RESPIRATION RATE: 16 BRPM | OXYGEN SATURATION: 93 % | WEIGHT: 177.25 LBS | DIASTOLIC BLOOD PRESSURE: 86 MMHG

## 2023-01-19 PROCEDURE — 99215 OFFICE O/P EST HI 40 MIN: CPT

## 2023-01-23 ENCOUNTER — APPOINTMENT (OUTPATIENT)
Dept: PULMONOLOGY | Facility: CLINIC | Age: 58
End: 2023-01-23
Payer: COMMERCIAL

## 2023-01-23 ENCOUNTER — RESULT REVIEW (OUTPATIENT)
Age: 58
End: 2023-01-23

## 2023-01-23 ENCOUNTER — APPOINTMENT (OUTPATIENT)
Dept: HEMATOLOGY ONCOLOGY | Facility: CLINIC | Age: 58
End: 2023-01-23

## 2023-01-23 VITALS
SYSTOLIC BLOOD PRESSURE: 174 MMHG | HEART RATE: 73 BPM | BODY MASS INDEX: 24.91 KG/M2 | RESPIRATION RATE: 17 BRPM | HEIGHT: 70 IN | DIASTOLIC BLOOD PRESSURE: 82 MMHG | WEIGHT: 174 LBS | TEMPERATURE: 98.3 F | OXYGEN SATURATION: 96 %

## 2023-01-23 LAB
BASOPHILS # BLD AUTO: 0.03 K/UL — SIGNIFICANT CHANGE UP (ref 0–0.2)
BASOPHILS NFR BLD AUTO: 0.6 % — SIGNIFICANT CHANGE UP (ref 0–2)
EOSINOPHIL # BLD AUTO: 0.1 K/UL — SIGNIFICANT CHANGE UP (ref 0–0.5)
EOSINOPHIL NFR BLD AUTO: 2 % — SIGNIFICANT CHANGE UP (ref 0–6)
HCT VFR BLD CALC: 30 % — LOW (ref 39–50)
HGB BLD-MCNC: 9.4 G/DL — LOW (ref 13–17)
IMM GRANULOCYTES NFR BLD AUTO: 0.4 % — SIGNIFICANT CHANGE UP (ref 0–0.9)
LYMPHOCYTES # BLD AUTO: 1 K/UL — SIGNIFICANT CHANGE UP (ref 1–3.3)
LYMPHOCYTES # BLD AUTO: 20.2 % — SIGNIFICANT CHANGE UP (ref 13–44)
MCHC RBC-ENTMCNC: 27.9 PG — SIGNIFICANT CHANGE UP (ref 27–34)
MCHC RBC-ENTMCNC: 31.3 G/DL — LOW (ref 32–36)
MCV RBC AUTO: 89 FL — SIGNIFICANT CHANGE UP (ref 80–100)
MONOCYTES # BLD AUTO: 0.82 K/UL — SIGNIFICANT CHANGE UP (ref 0–0.9)
MONOCYTES NFR BLD AUTO: 16.5 % — HIGH (ref 2–14)
NEUTROPHILS # BLD AUTO: 2.99 K/UL — SIGNIFICANT CHANGE UP (ref 1.8–7.4)
NEUTROPHILS NFR BLD AUTO: 60.3 % — SIGNIFICANT CHANGE UP (ref 43–77)
NRBC # BLD: 0 /100 WBCS — SIGNIFICANT CHANGE UP (ref 0–0)
PLATELET # BLD AUTO: 391 K/UL — SIGNIFICANT CHANGE UP (ref 150–400)
RBC # BLD: 3.37 M/UL — LOW (ref 4.2–5.8)
RBC # FLD: 16.4 % — HIGH (ref 10.3–14.5)
WBC # BLD: 4.96 K/UL — SIGNIFICANT CHANGE UP (ref 3.8–10.5)
WBC # FLD AUTO: 4.96 K/UL — SIGNIFICANT CHANGE UP (ref 3.8–10.5)

## 2023-01-23 PROCEDURE — 99214 OFFICE O/P EST MOD 30 MIN: CPT | Mod: 25

## 2023-01-23 PROCEDURE — 76604 US EXAM CHEST: CPT

## 2023-01-24 ENCOUNTER — RESULT REVIEW (OUTPATIENT)
Age: 58
End: 2023-01-24

## 2023-01-24 ENCOUNTER — APPOINTMENT (OUTPATIENT)
Dept: INFUSION THERAPY | Facility: HOSPITAL | Age: 58
End: 2023-01-24

## 2023-01-24 LAB
ALBUMIN SERPL ELPH-MCNC: 3.9 G/DL — SIGNIFICANT CHANGE UP (ref 3.3–5)
ALP SERPL-CCNC: 82 U/L — SIGNIFICANT CHANGE UP (ref 40–120)
ALT FLD-CCNC: 16 U/L — SIGNIFICANT CHANGE UP (ref 10–45)
ANION GAP SERPL CALC-SCNC: 11 MMOL/L — SIGNIFICANT CHANGE UP (ref 5–17)
AST SERPL-CCNC: 28 U/L — SIGNIFICANT CHANGE UP (ref 10–40)
BASOPHILS # BLD AUTO: 0.02 K/UL — SIGNIFICANT CHANGE UP (ref 0–0.2)
BASOPHILS NFR BLD AUTO: 0.4 % — SIGNIFICANT CHANGE UP (ref 0–2)
BILIRUB SERPL-MCNC: <0.2 MG/DL — SIGNIFICANT CHANGE UP (ref 0.2–1.2)
BUN SERPL-MCNC: 18 MG/DL — SIGNIFICANT CHANGE UP (ref 7–23)
CALCIUM SERPL-MCNC: 9.5 MG/DL — SIGNIFICANT CHANGE UP (ref 8.4–10.5)
CHLORIDE SERPL-SCNC: 102 MMOL/L — SIGNIFICANT CHANGE UP (ref 96–108)
CO2 SERPL-SCNC: 25 MMOL/L — SIGNIFICANT CHANGE UP (ref 22–31)
CREAT SERPL-MCNC: 1.61 MG/DL — HIGH (ref 0.5–1.3)
EGFR: 50 ML/MIN/1.73M2 — LOW
EOSINOPHIL # BLD AUTO: 0.1 K/UL — SIGNIFICANT CHANGE UP (ref 0–0.5)
EOSINOPHIL NFR BLD AUTO: 2.2 % — SIGNIFICANT CHANGE UP (ref 0–6)
GLUCOSE SERPL-MCNC: 88 MG/DL — SIGNIFICANT CHANGE UP (ref 70–99)
HCT VFR BLD CALC: 28.6 % — LOW (ref 39–50)
HGB BLD-MCNC: 9 G/DL — LOW (ref 13–17)
IMM GRANULOCYTES NFR BLD AUTO: 0.2 % — SIGNIFICANT CHANGE UP (ref 0–0.9)
LYMPHOCYTES # BLD AUTO: 1.11 K/UL — SIGNIFICANT CHANGE UP (ref 1–3.3)
LYMPHOCYTES # BLD AUTO: 24.1 % — SIGNIFICANT CHANGE UP (ref 13–44)
MCHC RBC-ENTMCNC: 28.1 PG — SIGNIFICANT CHANGE UP (ref 27–34)
MCHC RBC-ENTMCNC: 31.5 G/DL — LOW (ref 32–36)
MCV RBC AUTO: 89.4 FL — SIGNIFICANT CHANGE UP (ref 80–100)
MONOCYTES # BLD AUTO: 0.61 K/UL — SIGNIFICANT CHANGE UP (ref 0–0.9)
MONOCYTES NFR BLD AUTO: 13.3 % — SIGNIFICANT CHANGE UP (ref 2–14)
NEUTROPHILS # BLD AUTO: 2.75 K/UL — SIGNIFICANT CHANGE UP (ref 1.8–7.4)
NEUTROPHILS NFR BLD AUTO: 59.8 % — SIGNIFICANT CHANGE UP (ref 43–77)
NRBC # BLD: 0 /100 WBCS — SIGNIFICANT CHANGE UP (ref 0–0)
PLATELET # BLD AUTO: 396 K/UL — SIGNIFICANT CHANGE UP (ref 150–400)
POTASSIUM SERPL-MCNC: 4.8 MMOL/L — SIGNIFICANT CHANGE UP (ref 3.5–5.3)
POTASSIUM SERPL-SCNC: 4.8 MMOL/L — SIGNIFICANT CHANGE UP (ref 3.5–5.3)
PROT SERPL-MCNC: 7.4 G/DL — SIGNIFICANT CHANGE UP (ref 6–8.3)
RBC # BLD: 3.2 M/UL — LOW (ref 4.2–5.8)
RBC # FLD: 16.2 % — HIGH (ref 10.3–14.5)
SODIUM SERPL-SCNC: 138 MMOL/L — SIGNIFICANT CHANGE UP (ref 135–145)
WBC # BLD: 4.6 K/UL — SIGNIFICANT CHANGE UP (ref 3.8–10.5)
WBC # FLD AUTO: 4.6 K/UL — SIGNIFICANT CHANGE UP (ref 3.8–10.5)

## 2023-01-25 LAB — T4 FREE+ TSH PNL SERPL: 1.48 UIU/ML — SIGNIFICANT CHANGE UP (ref 0.27–4.2)

## 2023-01-27 ENCOUNTER — NON-APPOINTMENT (OUTPATIENT)
Age: 58
End: 2023-01-27

## 2023-01-27 NOTE — PROCEDURE
[A line] : A line: Yes [Pleural Effusion] : Pleural Effusion: Yes [Simple] : simple [Lung sliding] : Lung sliding: Yes [Thoracic Ultrasound] : Thoracic Ultrasound [Consolidation] : Consolidation: No [de-identified] : Dyspnea, malignant effusion [FreeTextEntry2] : Small left effusion

## 2023-01-27 NOTE — PHYSICAL EXAM
[No Acute Distress] : no acute distress [Normal Oropharynx] : normal oropharynx [Normal Appearance] : normal appearance [No Neck Mass] : no neck mass [Normal Rate/Rhythm] : normal rate/rhythm [Normal S1, S2] : normal s1, s2 [No Murmurs] : no murmurs [No Resp Distress] : no resp distress [Clear to Auscultation Bilaterally] : clear to auscultation bilaterally [Benign] : benign [Normal Gait] : normal gait [No Clubbing] : no clubbing [No Cyanosis] : no cyanosis [No Edema] : no edema [FROM] : FROM [Normal Color/ Pigmentation] : normal color/ pigmentation [No Focal Deficits] : no focal deficits [Oriented x3] : oriented x3 [Normal Affect] : normal affect [TextBox_80] : left side pleurx catheter

## 2023-01-27 NOTE — HISTORY OF PRESENT ILLNESS
[TextBox_4] : Interventional Pulmonology Consultation/Visit Note\par \par \par 57 M with lung adenoCA left sided pleurx cath for malignant pleural effusion who presents as follow up for management of pleurx. Pt reports  he is only draining once a week as per instructions and has drained about 50ml of fluid. He has no acute concerns today, specifically denies CP, SOB, f/c, bleeding from pleurx site, pain from pleurx site, hemoptysis\par \par Path c/w adenocarcinoma of the lung, PDL1 10%, \par NGS\par ARID1A Q450*\par CASP8 E311*\par TERT promoter -124C>T\par \par PET/CT 10/19/2022 reviewed, done at [facility], hypermetabolix SAULO mass, small hypermetabolic RUL nodule, Left pleural nodularity, left pleural effusion with drain inside, b/l supraclavicular inrenal mammary, mediastinal, left hilar, left axillary, upper abdominal, retrocrural, retroperitoneal, pelvic and left inguinal LN. hypermetabolic peritoneal nodularity. Skeletal involvement. \par \par

## 2023-01-29 NOTE — ASSESSMENT
[FreeTextEntry1] : 57m with recently diagnosed adenocarcinoma of the lung, with positive pleural effusion and involvement of the pleura, s/p pleurX placement, presenting to [facility] care. \par \par Patient started first line treatment with carboplatin/pemetrexed/pembrolizumab. \par S/p C4, well tolerated, however has anemia and received 1 u prbc at [facility]\par Scans after C4 with response to treatment\par Will start on Keytruda Alimta and hold carbo. \par Pt to continue folic acid and will continue b12 infusions\par Will need close monitoring of CBC because he required a transfusion while he was receiving chemo.\par \par -Continue with alimta keytruda\par -Scans in 12 weeks, sooner if needed\par -C/w folic acid \par -B12 shots q9 weeks,\par -Follow Pulm\par -Blood work prior to starting alimta keytruda\par -Is claustrophobic and does not want to have MRI, will obtain CT head with contrast for follow up \par -RTC for C6\par \par Tiffani Gunn MD\par Medical Oncology and Hematology\par Total time of this visit, including time spent face to face with patient and/or via video/audio, and also in preparing for today's visit for MDM and documentation (Medical Decision Making, including consideration of possible diagnoses, management options, complex medical record review, review of diagnostic tests and information, consideration and discussion of significant complications based on comorbidities, and discussion with providers involved with the care of the patient) 41 minutes.\par \par

## 2023-01-29 NOTE — HISTORY OF PRESENT ILLNESS
[de-identified] : 57m with newly diagnosed adenocarcinoma of the lung presenting for initial oncology evaluation. \par Mr Cordova presented to the hospital with SOB and was found to have a left-sided hydropneumothorax with trapped lung and underwent a pleuroscopy with pleural biopsy as well as tunneled pleural catheter placement. \par \par SOB has now improved. \par He reports some weight loss in the past 2-3 months, probably about 10lb, no pain, no cough.\par \par Path c/w adenocarcinoma of the lung, PDL1 10%, \par NGS\par ARID1A Q450*\par CASP8 E311*\par TERT promoter -124C>T\par \par PET/CT 10/19/2022 reviewed, done at [facility], hypermetabolix SAULO mass, small hypermetabolic RUL nodule, Left pleural nodularity, left pleural effusion with drain inside, b/l supraclavicular inrenal mammary, mediastinal, left hilar, left axillary, upper abdominal, retrocrural, retroperitoneal, pelvic and left inguinal LN. hypermetabolic peritoneal nodularity. Skeletal involvement. \par \par CT head w/wo contrast 10/14/2022 unremarkable.\par \par Pt completed 4 cycles of chemo/IO with good response to treatment\par Follow up scans 1/6/2023\par CHEST:\par 1.  Since 9/6/2022, the 2.2 x 2.0 cm area of consolidation in the left upper lobe has decreased in size.\par 2.  Since 9/6/2022, the chest lymph nodes have decreased in size.\par 3.  Since 9/6/2022, left pleural effusion has decreased in size with tiny foci of air in the effusion.\par 4.  Left pleural catheter in place.\par 5.  Sclerotic lesions of the thoracic spine are concerning for metastasis.\par ABDOMEN AND PELVIS:\par 1.  Sclerotic lesions the left iliac bone and left inferior pubic ramus are concerning for metastasis.\par \par He is a retired , worked in [place]. \par He lives with his sister, has children in [place]. \par Works as a  in a store.\par Used to smoke cigars until last year, occasional marijuana, no drugs, occasional EtOH\par  [de-identified] : Feels well and has no complaints. \par Denies back pain\par Effusion decreased, may be able to have pleurex removed, has follow up appointment.

## 2023-01-29 NOTE — PHYSICAL EXAM
[Restricted in physically strenuous activity but ambulatory and able to carry out work of a light or sedentary nature] : Status 1- Restricted in physically strenuous activity but ambulatory and able to carry out work of a light or sedentary nature, e.g., light house work, office work [Normal] : affect appropriate [de-identified] : Crackles on the right base

## 2023-02-03 LAB
ALBUMIN SERPL ELPH-MCNC: 4.1 G/DL
ALP BLD-CCNC: 81 U/L
ALT SERPL-CCNC: 15 U/L
ANION GAP SERPL CALC-SCNC: 11 MMOL/L
AST SERPL-CCNC: 27 U/L
BILIRUB SERPL-MCNC: 0.2 MG/DL
BUN SERPL-MCNC: 17 MG/DL
CALCIUM SERPL-MCNC: 9.9 MG/DL
CHLORIDE SERPL-SCNC: 104 MMOL/L
CO2 SERPL-SCNC: 26 MMOL/L
CREAT SERPL-MCNC: 1.48 MG/DL
EGFR: 55 ML/MIN/1.73M2
GLUCOSE SERPL-MCNC: 93 MG/DL
POTASSIUM SERPL-SCNC: 4.8 MMOL/L
PROT SERPL-MCNC: 7.6 G/DL
SODIUM SERPL-SCNC: 140 MMOL/L
TSH SERPL-ACNC: 2.11 UIU/ML

## 2023-02-06 ENCOUNTER — OUTPATIENT (OUTPATIENT)
Dept: OUTPATIENT SERVICES | Facility: HOSPITAL | Age: 58
LOS: 1 days | Discharge: HOME | End: 2023-02-06

## 2023-02-06 DIAGNOSIS — C34.90 MALIGNANT NEOPLASM OF UNSPECIFIED PART OF UNSPECIFIED BRONCHUS OR LUNG: ICD-10-CM

## 2023-02-06 DIAGNOSIS — Z98.890 OTHER SPECIFIED POSTPROCEDURAL STATES: Chronic | ICD-10-CM

## 2023-02-09 ENCOUNTER — APPOINTMENT (OUTPATIENT)
Dept: NEPHROLOGY | Facility: CLINIC | Age: 58
End: 2023-02-09
Payer: COMMERCIAL

## 2023-02-09 VITALS
TEMPERATURE: 97.6 F | HEART RATE: 100 BPM | SYSTOLIC BLOOD PRESSURE: 151 MMHG | WEIGHT: 180.78 LBS | DIASTOLIC BLOOD PRESSURE: 87 MMHG | OXYGEN SATURATION: 97 % | HEIGHT: 70 IN | BODY MASS INDEX: 25.88 KG/M2

## 2023-02-09 DIAGNOSIS — R03.0 ELEVATED BLOOD-PRESSURE READING, W/OUT DIAGNOSIS OF HYPERTENSION: ICD-10-CM

## 2023-02-09 DIAGNOSIS — R79.89 OTHER SPECIFIED ABNORMAL FINDINGS OF BLOOD CHEMISTRY: ICD-10-CM

## 2023-02-09 DIAGNOSIS — Z87.891 PERSONAL HISTORY OF NICOTINE DEPENDENCE: ICD-10-CM

## 2023-02-09 PROCEDURE — 99205 OFFICE O/P NEW HI 60 MIN: CPT

## 2023-02-09 NOTE — PHYSICAL EXAM
[General Appearance - Alert] : alert [General Appearance - In No Acute Distress] : in no acute distress [General Appearance - Well Nourished] : well nourished [Sclera] : the sclera and conjunctiva were normal [Outer Ear] : the ears and nose were normal in appearance [Hearing Threshold Finger Rub Not [place]] : hearing was normal [Jugular Venous Distention Increased] : there was no jugular-venous distention [Exaggerated Use Of Accessory Muscles For Inspiration] : no accessory muscle use [Auscultation Breath Sounds / Voice Sounds] : lungs were clear to auscultation bilaterally [Heart Sounds] : normal S1 and S2 [Heart Sounds Pericardial Friction Rub] : no pericardial rub [Abdomen Soft] : soft [No CVA Tenderness] : no ~M costovertebral angle tenderness [Abnormal Walk] : normal gait [] : no rash [No Focal Deficits] : no focal deficits [Oriented To Time, Place, And Person] : oriented to person, place, and time [Affect] : the affect was normal [Mood] : the mood was normal [FreeTextEntry1] : trace pitting edema over RLE

## 2023-02-09 NOTE — ASSESSMENT
[FreeTextEntry1] : Elevated Scr: On review of available medical records, noted to have elevated Scr of 1.38 even in Sep 2022. Scr has remained between 1.4-1.8 since. Exact duration of elevated Scr however unknown. \par Pt. with multifactorial etiology of BETO In the setting of current use of pemetrexed and pembrolizumab. Pt. also reports lower urinary tract sxs and did get CT scans with IV contrast done in the recent past.\par Repeat renal panel. \par Check UA and spot urine TP/Cr. \par Check LDH, haptoglobin levels.\par Check coags in anticipation of kidney biopsy.\par Check renal and bladder US. \par Possibility of kidney biopsy discussed with the patient, pt. is agreeable to undergo kidney biopsy if needed.\par Avoid NSAIDs. \par \par Elevated BP: Noted to have persistent elevation in BP, and pt. also states that his SBP is usually in 140-150 range. \par Will benefit with the use of antihypertensives if BP remains elevated. \par Advised to monitor BP.

## 2023-02-09 NOTE — HISTORY OF PRESENT ILLNESS
[FreeTextEntry1] : Referral from Oncology for elevated Scr. \par \par Mr. Cordova is a 57 year old male, retired , former cigar smoker, with non small cell lung CA/ adenocarcinoma (s/p completion of 4 cycles od carboplatin/ pemetrexed/ pembrolizumab, currently on pemetrexed and pembrolizumab every 3 weeks), complicated by malignant pleural effusion, PE on xarelto, here for evaluation of elevated Scr. \par \par Pt. was first made aware of kidney impairment ~a month by Dr. Gunn. He states that he did have annual check ups with his PCP previously however was never told about kidney impairment in the past.\par \par Overall, has been tolerating chemotherapy well. Denies any nausea/ vomiting or diarrhea and states that his appetite has been okay. Denies any hematuria but does report to notice frothy urine and RLE swelling occasionally. He reports that he also notices weak urinary stream, with nocturia and intermittent urgency to urinate. Denies dysuria. Denies any rash over the body. Denies use of NSAIDs, PPIs or OTC herbal supplements. Denies recent use of abx. His sister is a kidney transplant recipient however pt. is not aware of the cause of her kidney failure. \par \par

## 2023-02-09 NOTE — CONSULT LETTER
[Dear  ___] : Dear  [unfilled], [Consult Letter:] : I had the pleasure of evaluating your patient, [unfilled]. [( Thank you for referring [unfilled] for consultation for _____ )] : Thank you for referring [unfilled] for consultation for [unfilled] [Please see my note below.] : Please see my note below. [Consult Closing:] : Thank you very much for allowing me to participate in the care of this patient.  If you have any questions, please do not hesitate to contact me. [Sincerely,] : Sincerely, [FreeTextEntry3] : Mica Tomlinson MD

## 2023-02-13 LAB
ALBUMIN SERPL ELPH-MCNC: 3.9 G/DL
ANION GAP SERPL CALC-SCNC: 14 MMOL/L
APPEARANCE: CLEAR
APTT 2H P 1:4 NP PPP: NORMAL
APTT 2H P INC PPP: NORMAL
APTT BLD: 32.5 SEC
APTT IMM NP/PRE NP PPP: NORMAL
APTT INV RATIO PPP: 32.5 SEC
BACTERIA: NEGATIVE
BASOPHILS # BLD AUTO: 0.01 K/UL
BASOPHILS NFR BLD AUTO: 0.2 %
BILIRUBIN URINE: NEGATIVE
BLOOD URINE: NEGATIVE
BUN SERPL-MCNC: 17 MG/DL
CALCIUM SERPL-MCNC: 9.5 MG/DL
CHLORIDE SERPL-SCNC: 104 MMOL/L
CO2 SERPL-SCNC: 22 MMOL/L
COLOR: NORMAL
CREAT SERPL-MCNC: 1.67 MG/DL
CREAT SPEC-SCNC: 156 MG/DL
CREAT/PROT UR: 0.1 RATIO
DEPRECATED KAPPA LC FREE/LAMBDA SER: 1.76 RATIO
EGFR: 48 ML/MIN/1.73M2
EOSINOPHIL # BLD AUTO: 0.08 K/UL
EOSINOPHIL NFR BLD AUTO: 1.4 %
GLUCOSE QUALITATIVE U: NEGATIVE
GLUCOSE SERPL-MCNC: 99 MG/DL
HAPTOGLOB SERPL-MCNC: 177 MG/DL
HBV SURFACE AG SER QL: NONREACTIVE
HCT VFR BLD CALC: 28.1 %
HCV AB SER QL: NONREACTIVE
HCV S/CO RATIO: 0.11 S/CO
HGB BLD-MCNC: 8.9 G/DL
HYALINE CASTS: 0 /LPF
IMM GRANULOCYTES NFR BLD AUTO: 0.2 %
INR PPP: 1.12 RATIO
KAPPA LC CSF-MCNC: 2.91 MG/DL
KAPPA LC SERPL-MCNC: 5.12 MG/DL
KETONES URINE: NEGATIVE
LDH SERPL-CCNC: 259 U/L
LEUKOCYTE ESTERASE URINE: NEGATIVE
LYMPHOCYTES # BLD AUTO: 0.91 K/UL
LYMPHOCYTES NFR BLD AUTO: 15.6 %
M PROTEIN SPEC IFE-MCNC: NORMAL
MAN DIFF?: NORMAL
MCHC RBC-ENTMCNC: 27.8 PG
MCHC RBC-ENTMCNC: 31.7 GM/DL
MCV RBC AUTO: 87.8 FL
MICROSCOPIC-UA: NORMAL
MONOCYTES # BLD AUTO: 0.76 K/UL
MONOCYTES NFR BLD AUTO: 13 %
NEUTROPHILS # BLD AUTO: 4.06 K/UL
NEUTROPHILS NFR BLD AUTO: 69.6 %
NITRITE URINE: NEGATIVE
NPP NORMAL POOLED PLASMA: NORMAL SECS
PH URINE: 6
PHOSPHATE SERPL-MCNC: 3.4 MG/DL
PLATELET # BLD AUTO: 320 K/UL
POTASSIUM SERPL-SCNC: 4.6 MMOL/L
PROT UR-MCNC: 12 MG/DL
PROTEIN URINE: NORMAL
PT BLD: 13.2 SEC
RBC # BLD: 3.2 M/UL
RBC # FLD: 16.7 %
RED BLOOD CELLS URINE: 1 /HPF
SODIUM SERPL-SCNC: 139 MMOL/L
SPECIFIC GRAVITY URINE: 1.02
SQUAMOUS EPITHELIAL CELLS: 1 /HPF
UROBILINOGEN URINE: NORMAL
WBC # FLD AUTO: 5.83 K/UL
WHITE BLOOD CELLS URINE: 2 /HPF

## 2023-02-14 ENCOUNTER — RESULT REVIEW (OUTPATIENT)
Age: 58
End: 2023-02-14

## 2023-02-14 ENCOUNTER — APPOINTMENT (OUTPATIENT)
Dept: HEMATOLOGY ONCOLOGY | Facility: CLINIC | Age: 58
End: 2023-02-14
Payer: MEDICAID

## 2023-02-14 ENCOUNTER — APPOINTMENT (OUTPATIENT)
Dept: INFUSION THERAPY | Facility: HOSPITAL | Age: 58
End: 2023-02-14

## 2023-02-14 DIAGNOSIS — Z51.11 ENCOUNTER FOR ANTINEOPLASTIC CHEMOTHERAPY: ICD-10-CM

## 2023-02-14 DIAGNOSIS — R11.2 NAUSEA WITH VOMITING, UNSPECIFIED: ICD-10-CM

## 2023-02-14 LAB
BASOPHILS # BLD AUTO: 0.03 K/UL — SIGNIFICANT CHANGE UP (ref 0–0.2)
BASOPHILS NFR BLD AUTO: 0.6 % — SIGNIFICANT CHANGE UP (ref 0–2)
EOSINOPHIL # BLD AUTO: 0.13 K/UL — SIGNIFICANT CHANGE UP (ref 0–0.5)
EOSINOPHIL NFR BLD AUTO: 2.7 % — SIGNIFICANT CHANGE UP (ref 0–6)
HCT VFR BLD CALC: 27.3 % — LOW (ref 39–50)
HGB BLD-MCNC: 8.8 G/DL — LOW (ref 13–17)
IMM GRANULOCYTES NFR BLD AUTO: 0.2 % — SIGNIFICANT CHANGE UP (ref 0–0.9)
LYMPHOCYTES # BLD AUTO: 1.08 K/UL — SIGNIFICANT CHANGE UP (ref 1–3.3)
LYMPHOCYTES # BLD AUTO: 22.3 % — SIGNIFICANT CHANGE UP (ref 13–44)
MCHC RBC-ENTMCNC: 27.9 PG — SIGNIFICANT CHANGE UP (ref 27–34)
MCHC RBC-ENTMCNC: 32.2 G/DL — SIGNIFICANT CHANGE UP (ref 32–36)
MCV RBC AUTO: 86.7 FL — SIGNIFICANT CHANGE UP (ref 80–100)
MONOCYTES # BLD AUTO: 0.63 K/UL — SIGNIFICANT CHANGE UP (ref 0–0.9)
MONOCYTES NFR BLD AUTO: 13 % — SIGNIFICANT CHANGE UP (ref 2–14)
NEUTROPHILS # BLD AUTO: 2.96 K/UL — SIGNIFICANT CHANGE UP (ref 1.8–7.4)
NEUTROPHILS NFR BLD AUTO: 61.2 % — SIGNIFICANT CHANGE UP (ref 43–77)
NRBC # BLD: 0 /100 WBCS — SIGNIFICANT CHANGE UP (ref 0–0)
PLATELET # BLD AUTO: 432 K/UL — HIGH (ref 150–400)
RBC # BLD: 3.15 M/UL — LOW (ref 4.2–5.8)
RBC # FLD: 16.3 % — HIGH (ref 10.3–14.5)
T4 FREE+ TSH PNL SERPL: 1.46 UIU/ML — SIGNIFICANT CHANGE UP (ref 0.27–4.2)
WBC # BLD: 4.84 K/UL — SIGNIFICANT CHANGE UP (ref 3.8–10.5)
WBC # FLD AUTO: 4.84 K/UL — SIGNIFICANT CHANGE UP (ref 3.8–10.5)

## 2023-02-14 PROCEDURE — 99215 OFFICE O/P EST HI 40 MIN: CPT

## 2023-02-14 RX ORDER — FOLIC ACID 1 MG/1
1 TABLET ORAL DAILY
Qty: 90 | Refills: 3 | Status: ACTIVE | COMMUNITY
Start: 2022-10-25 | End: 1900-01-01

## 2023-02-15 LAB
ALBUMIN SERPL ELPH-MCNC: 3.7 G/DL — SIGNIFICANT CHANGE UP (ref 3.3–5)
ALP SERPL-CCNC: 76 U/L — SIGNIFICANT CHANGE UP (ref 40–120)
ALT FLD-CCNC: 10 U/L — SIGNIFICANT CHANGE UP (ref 10–45)
ANION GAP SERPL CALC-SCNC: 11 MMOL/L — SIGNIFICANT CHANGE UP (ref 5–17)
AST SERPL-CCNC: 27 U/L — SIGNIFICANT CHANGE UP (ref 10–40)
BILIRUB SERPL-MCNC: <0.2 MG/DL — SIGNIFICANT CHANGE UP (ref 0.2–1.2)
BUN SERPL-MCNC: 18 MG/DL — SIGNIFICANT CHANGE UP (ref 7–23)
CALCIUM SERPL-MCNC: 9.6 MG/DL — SIGNIFICANT CHANGE UP (ref 8.4–10.5)
CHLORIDE SERPL-SCNC: 104 MMOL/L — SIGNIFICANT CHANGE UP (ref 96–108)
CO2 SERPL-SCNC: 24 MMOL/L — SIGNIFICANT CHANGE UP (ref 22–31)
CREAT SERPL-MCNC: 1.72 MG/DL — HIGH (ref 0.5–1.3)
EGFR: 46 ML/MIN/1.73M2 — LOW
GLUCOSE SERPL-MCNC: 80 MG/DL — SIGNIFICANT CHANGE UP (ref 70–99)
POTASSIUM SERPL-MCNC: 4.6 MMOL/L — SIGNIFICANT CHANGE UP (ref 3.5–5.3)
POTASSIUM SERPL-SCNC: 4.6 MMOL/L — SIGNIFICANT CHANGE UP (ref 3.5–5.3)
PROT SERPL-MCNC: 7.2 G/DL — SIGNIFICANT CHANGE UP (ref 6–8.3)
SODIUM SERPL-SCNC: 140 MMOL/L — SIGNIFICANT CHANGE UP (ref 135–145)

## 2023-02-22 ENCOUNTER — APPOINTMENT (OUTPATIENT)
Dept: RADIOLOGY | Facility: IMAGING CENTER | Age: 58
End: 2023-02-22
Payer: COMMERCIAL

## 2023-02-22 ENCOUNTER — OUTPATIENT (OUTPATIENT)
Dept: OUTPATIENT SERVICES | Facility: HOSPITAL | Age: 58
LOS: 1 days | End: 2023-02-22
Payer: COMMERCIAL

## 2023-02-22 DIAGNOSIS — Z98.890 OTHER SPECIFIED POSTPROCEDURAL STATES: Chronic | ICD-10-CM

## 2023-02-22 DIAGNOSIS — J91.0 MALIGNANT PLEURAL EFFUSION: ICD-10-CM

## 2023-02-22 PROCEDURE — 71046 X-RAY EXAM CHEST 2 VIEWS: CPT

## 2023-02-22 PROCEDURE — 71046 X-RAY EXAM CHEST 2 VIEWS: CPT | Mod: 26

## 2023-02-27 ENCOUNTER — APPOINTMENT (OUTPATIENT)
Dept: PULMONOLOGY | Facility: CLINIC | Age: 58
End: 2023-02-27
Payer: COMMERCIAL

## 2023-02-27 DIAGNOSIS — R06.00 DYSPNEA, UNSPECIFIED: ICD-10-CM

## 2023-02-27 DIAGNOSIS — R06.09 OTHER FORMS OF DYSPNEA: ICD-10-CM

## 2023-02-27 PROCEDURE — 76604 US EXAM CHEST: CPT

## 2023-02-27 PROCEDURE — 99214 OFFICE O/P EST MOD 30 MIN: CPT | Mod: 25

## 2023-02-27 NOTE — ASSESSMENT
[FreeTextEntry1] : 57 M with lung adenoCA left sided pleurx cath for malignant pleural effusion here for IPC management \par \par Plan \par - f/u with oncology for continued care and systemic disease directed therapy. \par - Plan for removal in 2-3 weeks\par \par Continue drainage once every 2 weeks prior to removal. Catheter protocols reviewed. All information provided. Adequate equipment available. Advised to call if experiencing any pain with drainage. Advised to call if any change in color of fluid or erythema or swelling at catheter site or experiencing systemic signs and symptoms of infection such as fever. Advised to call if any sudden worsening of shortness of breath. Catheter care instructions provided.\par \par

## 2023-02-27 NOTE — HISTORY OF PRESENT ILLNESS
[TextBox_4] : Interventional Pulmonology Consultation/Visit Note\par \par \par 57 M with lung adenoCA left sided pleurx cath for malignant pleural effusion who presents as follow up for management of pleurx. Pt reports  he is only draining once a week as per instructions and has drained about 30ml of fluid. He has no acute concerns today, specifically denies CP, SOB, f/c, bleeding from pleurx site, pain from pleurx site, hemoptysis\par \par Path c/w adenocarcinoma of the lung, PDL1 10%, \par NGS\par ARID1A Q450*\par CASP8 E311*\par TERT promoter -124C>T\par \par PET/CT 10/19/2022 reviewed, done at [facility], hypermetabolix SAULO mass, small hypermetabolic RUL nodule, Left pleural nodularity, left pleural effusion with drain inside, b/l supraclavicular inrenal mammary, mediastinal, left hilar, left axillary, upper abdominal, retrocrural, retroperitoneal, pelvic and left inguinal LN. hypermetabolic peritoneal nodularity. Skeletal involvement. \par \par

## 2023-02-27 NOTE — PROCEDURE
[Thoracic Ultrasound] : Thoracic Ultrasound [A line] : A line: Yes [Pleural Effusion] : Pleural Effusion: Yes [Simple] : simple [Lung sliding] : Lung sliding: Yes [Consolidation] : Consolidation: No [de-identified] : Dyspnea, malignant effusion [FreeTextEntry2] : trace left effusion

## 2023-03-07 ENCOUNTER — RESULT REVIEW (OUTPATIENT)
Age: 58
End: 2023-03-07

## 2023-03-07 ENCOUNTER — APPOINTMENT (OUTPATIENT)
Dept: HEMATOLOGY ONCOLOGY | Facility: CLINIC | Age: 58
End: 2023-03-07
Payer: MEDICAID

## 2023-03-07 ENCOUNTER — APPOINTMENT (OUTPATIENT)
Dept: HEMATOLOGY ONCOLOGY | Facility: CLINIC | Age: 58
End: 2023-03-07

## 2023-03-07 ENCOUNTER — APPOINTMENT (OUTPATIENT)
Dept: INFUSION THERAPY | Facility: HOSPITAL | Age: 58
End: 2023-03-07

## 2023-03-07 LAB
ALBUMIN SERPL ELPH-MCNC: 3.6 G/DL — SIGNIFICANT CHANGE UP (ref 3.3–5)
ALP SERPL-CCNC: 71 U/L — SIGNIFICANT CHANGE UP (ref 40–120)
ALT FLD-CCNC: 10 U/L — SIGNIFICANT CHANGE UP (ref 10–45)
ANION GAP SERPL CALC-SCNC: 12 MMOL/L — SIGNIFICANT CHANGE UP (ref 5–17)
AST SERPL-CCNC: 25 U/L — SIGNIFICANT CHANGE UP (ref 10–40)
BASOPHILS # BLD AUTO: 0.04 K/UL — SIGNIFICANT CHANGE UP (ref 0–0.2)
BASOPHILS NFR BLD AUTO: 0.8 % — SIGNIFICANT CHANGE UP (ref 0–2)
BILIRUB SERPL-MCNC: <0.2 MG/DL — SIGNIFICANT CHANGE UP (ref 0.2–1.2)
BUN SERPL-MCNC: 15 MG/DL — SIGNIFICANT CHANGE UP (ref 7–23)
CALCIUM SERPL-MCNC: 9.4 MG/DL — SIGNIFICANT CHANGE UP (ref 8.4–10.5)
CHLORIDE SERPL-SCNC: 103 MMOL/L — SIGNIFICANT CHANGE UP (ref 96–108)
CO2 SERPL-SCNC: 24 MMOL/L — SIGNIFICANT CHANGE UP (ref 22–31)
CREAT SERPL-MCNC: 1.61 MG/DL — HIGH (ref 0.5–1.3)
EGFR: 50 ML/MIN/1.73M2 — LOW
EOSINOPHIL # BLD AUTO: 0.08 K/UL — SIGNIFICANT CHANGE UP (ref 0–0.5)
EOSINOPHIL NFR BLD AUTO: 1.7 % — SIGNIFICANT CHANGE UP (ref 0–6)
GLUCOSE SERPL-MCNC: 72 MG/DL — SIGNIFICANT CHANGE UP (ref 70–99)
HCT VFR BLD CALC: 26.7 % — LOW (ref 39–50)
HGB BLD-MCNC: 8.4 G/DL — LOW (ref 13–17)
IMM GRANULOCYTES NFR BLD AUTO: 0.2 % — SIGNIFICANT CHANGE UP (ref 0–0.9)
LYMPHOCYTES # BLD AUTO: 0.99 K/UL — LOW (ref 1–3.3)
LYMPHOCYTES # BLD AUTO: 20.6 % — SIGNIFICANT CHANGE UP (ref 13–44)
MCHC RBC-ENTMCNC: 27.5 PG — SIGNIFICANT CHANGE UP (ref 27–34)
MCHC RBC-ENTMCNC: 31.5 G/DL — LOW (ref 32–36)
MCV RBC AUTO: 87.5 FL — SIGNIFICANT CHANGE UP (ref 80–100)
MONOCYTES # BLD AUTO: 0.74 K/UL — SIGNIFICANT CHANGE UP (ref 0–0.9)
MONOCYTES NFR BLD AUTO: 15.4 % — HIGH (ref 2–14)
NEUTROPHILS # BLD AUTO: 2.94 K/UL — SIGNIFICANT CHANGE UP (ref 1.8–7.4)
NEUTROPHILS NFR BLD AUTO: 61.3 % — SIGNIFICANT CHANGE UP (ref 43–77)
NRBC # BLD: 0 /100 WBCS — SIGNIFICANT CHANGE UP (ref 0–0)
PLATELET # BLD AUTO: 476 K/UL — HIGH (ref 150–400)
POTASSIUM SERPL-MCNC: 4.4 MMOL/L — SIGNIFICANT CHANGE UP (ref 3.5–5.3)
POTASSIUM SERPL-SCNC: 4.4 MMOL/L — SIGNIFICANT CHANGE UP (ref 3.5–5.3)
PROT SERPL-MCNC: 7.4 G/DL — SIGNIFICANT CHANGE UP (ref 6–8.3)
RBC # BLD: 3.05 M/UL — LOW (ref 4.2–5.8)
RBC # FLD: 17 % — HIGH (ref 10.3–14.5)
SODIUM SERPL-SCNC: 139 MMOL/L — SIGNIFICANT CHANGE UP (ref 135–145)
T4 FREE+ TSH PNL SERPL: 1.51 UIU/ML — SIGNIFICANT CHANGE UP (ref 0.27–4.2)
WBC # BLD: 4.8 K/UL — SIGNIFICANT CHANGE UP (ref 3.8–10.5)
WBC # FLD AUTO: 4.8 K/UL — SIGNIFICANT CHANGE UP (ref 3.8–10.5)

## 2023-03-07 PROCEDURE — 99215 OFFICE O/P EST HI 40 MIN: CPT

## 2023-03-08 ENCOUNTER — TRANSCRIPTION ENCOUNTER (OUTPATIENT)
Age: 58
End: 2023-03-08

## 2023-03-08 NOTE — ASU PATIENT PROFILE, ADULT - MUTUALITY COMMENT, PROFILE
Discussed withhis their ability to have questions answered by dr mir & anesthesia before going into the OR

## 2023-03-09 ENCOUNTER — TRANSCRIPTION ENCOUNTER (OUTPATIENT)
Age: 58
End: 2023-03-09

## 2023-03-09 ENCOUNTER — APPOINTMENT (OUTPATIENT)
Dept: PULMONOLOGY | Facility: HOSPITAL | Age: 58
End: 2023-03-09

## 2023-03-09 ENCOUNTER — OUTPATIENT (OUTPATIENT)
Dept: OUTPATIENT SERVICES | Facility: HOSPITAL | Age: 58
LOS: 1 days | Discharge: HOME | End: 2023-03-09
Payer: MEDICAID

## 2023-03-09 VITALS
OXYGEN SATURATION: 99 % | TEMPERATURE: 98 F | SYSTOLIC BLOOD PRESSURE: 121 MMHG | DIASTOLIC BLOOD PRESSURE: 92 MMHG | HEART RATE: 74 BPM | RESPIRATION RATE: 14 BRPM

## 2023-03-09 VITALS
SYSTOLIC BLOOD PRESSURE: 127 MMHG | HEART RATE: 67 BPM | RESPIRATION RATE: 14 BRPM | WEIGHT: 169.98 LBS | HEIGHT: 74.5 IN | OXYGEN SATURATION: 100 % | TEMPERATURE: 98 F | DIASTOLIC BLOOD PRESSURE: 88 MMHG

## 2023-03-09 DIAGNOSIS — Z98.890 OTHER SPECIFIED POSTPROCEDURAL STATES: Chronic | ICD-10-CM

## 2023-03-09 DIAGNOSIS — J90 PLEURAL EFFUSION, NOT ELSEWHERE CLASSIFIED: ICD-10-CM

## 2023-03-09 DIAGNOSIS — C34.90 MALIGNANT NEOPLASM OF UNSPECIFIED PART OF UNSPECIFIED BRONCHUS OR LUNG: ICD-10-CM

## 2023-03-09 PROCEDURE — 32552 REMOVE LUNG CATHETER: CPT | Mod: GC

## 2023-03-09 NOTE — H&P PST ADULT - ASSESSMENT
57 year-old M with PMH lung adenocarcinoma complicated by malignant effusion s/p PleurX catheter placement who presents for removal of the catheter with Dr. Ortega on 3/9/23.

## 2023-03-09 NOTE — H&P PST ADULT - PROBLEM SELECTOR PLAN 1
- Patient with L PleurX catheter, now draining 30mL weekly  - Plan for removal today, 3/9/23, with Dr. Ortega  - Consent to be obtained and risks/benefits to be reviewed  - Follow up with Dr. Ortega after the procedure

## 2023-03-09 NOTE — ASU DISCHARGE PLAN (ADULT/PEDIATRIC) - CARE PROVIDER_API CALL
Eddie Ortega)  Critical Care Medicine; Internal Medicine; Pulmonary Disease  [address] [place] [address]  Phone: [phone]  Fax: [phone]  Follow Up Time:

## 2023-03-09 NOTE — ASU PREOP CHECKLIST - BOWEL PREP
Radha Ruiz was admitted to [facility] from ED via cart accompanied by significant other and Other transporter.   Reason for hospitalization is abdominal pain.   Upon arrival, patient is stable. Patient has history significant for History reviewed. No pertinent past medical history.  .  Patient oriented to bed, call light, , room and unit.  Patient provided with the following educational materials upon admission:safety, advanced directives, infection control and pain.   Level of understanding patient verbalized understanding.   Admission orders received at this time.   MD notified of patient arrival.   See Epic documentation for patient individualized nursing care plan.   n/a

## 2023-03-09 NOTE — ASU DISCHARGE PLAN (ADULT/PEDIATRIC) - ASU DC SPECIAL INSTRUCTIONSFT
Please call Dr. Ortega's office for bleeding that does not stop. You can continue your Xarelto for now.

## 2023-03-09 NOTE — ASU DISCHARGE PLAN (ADULT/PEDIATRIC) - NS MD DC FALL RISK RISK
For information on Fall & Injury Prevention, visit: [email or web address] OR  [email or web address] OR  https://www.cdc.gov/steadi/patient.html

## 2023-03-09 NOTE — ASU DISCHARGE PLAN (ADULT/PEDIATRIC) - FOLLOW UP APPOINTMENTS
562 may also call Recovery Room (PACU) 24/7 @ [phone]/[facility]

## 2023-03-09 NOTE — H&P PST ADULT - HISTORY OF PRESENT ILLNESS
57 year-old M with PMH lung adenocarcinoma s/p PleurX placement for malignant pleural effusion who presents for removal of the catheter. Patient has only been draining once per week and drains about 30mL of fluid. He presents for removal of the catheter with Dr. Ortega on 3/9/23.

## 2023-03-13 NOTE — ED ADULT NURSE NOTE - NSIMPLEMENTINTERV_GEN_ALL_ED
Detail Level: Detailed Benzoyl Peroxide Pregnancy And Lactation Text: This medication is Pregnancy Category C. It is unknown if benzoyl peroxide is excreted in breast milk. Topical Sulfur Applications Counseling: Topical Sulfur Counseling: Patient counseled that this medication may cause skin irritation or allergic reactions.  In the event of skin irritation, the patient was advised to reduce the amount of the drug applied or use it less frequently.   The patient verbalized understanding of the proper use and possible adverse effects of topical sulfur application.  All of the patient's questions and concerns were addressed. Tetracycline Counseling: Patient counseled regarding possible photosensitivity and increased risk for sunburn.  Patient instructed to avoid sunlight, if possible.  When exposed to sunlight, patients should wear protective clothing, sunglasses, and sunscreen.  The patient was instructed to call the office immediately if the following severe adverse effects occur:  hearing changes, easy bruising/bleeding, severe headache, or vision changes.  The patient verbalized understanding of the proper use and possible adverse effects of tetracycline.  All of the patient's questions and concerns were addressed. Patient understands to avoid pregnancy while on therapy due to potential birth defects. Dapsone Counseling: I discussed with the patient the risks of dapsone including but not limited to hemolytic anemia, agranulocytosis, rashes, methemoglobinemia, kidney failure, peripheral neuropathy, headaches, GI upset, and liver toxicity.  Patients who start dapsone require monitoring including baseline LFTs and weekly CBCs for the first month, then every month thereafter.  The patient verbalized understanding of the proper use and possible adverse effects of dapsone.  All of the patient's questions and concerns were addressed. Doxycycline Counseling:  Patient counseled regarding possible photosensitivity and increased risk for sunburn.  Patient instructed to avoid sunlight, if possible.  When exposed to sunlight, patients should wear protective clothing, sunglasses, and sunscreen.  The patient was instructed to call the office immediately if the following severe adverse effects occur:  hearing changes, easy bruising/bleeding, severe headache, or vision changes.  The patient verbalized understanding of the proper use and possible adverse effects of doxycycline.  All of the patient's questions and concerns were addressed. High Dose Vitamin A Pregnancy And Lactation Text: High dose vitamin A therapy is contraindicated during pregnancy and breast feeding. Birth Control Pills Counseling: Birth Control Pill Counseling: I discussed with the patient the potential side effects of OCPs including but not limited to increased risk of stroke, heart attack, thrombophlebitis, deep venous thrombosis, hepatic adenomas, breast changes, GI upset, headaches, and depression.  The patient verbalized understanding of the proper use and possible adverse effects of OCPs. All of the patient's questions and concerns were addressed. Winlevi Counseling:  I discussed with the patient the risks of topical clascoterone including but not limited to erythema, scaling, itching, and stinging. Patient voiced their understanding. Minocycline Pregnancy And Lactation Text: This medication is Pregnancy Category D and not consider safe during pregnancy. It is also excreted in breast milk. Aklief counseling:  Patient advised to apply a pea-sized amount only at bedtime and wait 30 minutes after washing their face before applying.  If too drying, patient may add a non-comedogenic moisturizer.  The most commonly reported side effects including irritation, redness, scaling, dryness, stinging, burning, itching, and increased risk of sunburn.  The patient verbalized understanding of the proper use and possible adverse effects of retinoids.  All of the patient's questions and concerns were addressed. Detail Level: Zone Topical Retinoid Pregnancy And Lactation Text: This medication is Pregnancy Category C. It is unknown if this medication is excreted in breast milk. Aklief Pregnancy And Lactation Text: It is unknown if this medication is safe to use during pregnancy.  It is unknown if this medication is excreted in breast milk.  Breastfeeding women should use the topical cream on the smallest area of the skin for the shortest time needed while breastfeeding.  Do not apply to nipple and areola. Sarecycline Counseling: Patient advised regarding possible photosensitivity and discoloration of the teeth, skin, lips, tongue and gums.  Patient instructed to avoid sunlight, if possible.  When exposed to sunlight, patients should wear protective clothing, sunglasses, and sunscreen.  The patient was instructed to call the office immediately if the following severe adverse effects occur:  hearing changes, easy bruising/bleeding, severe headache, or vision changes.  The patient verbalized understanding of the proper use and possible adverse effects of sarecycline.  All of the patient's questions and concerns were addressed. Erythromycin Pregnancy And Lactation Text: This medication is Pregnancy Category B and is considered safe during pregnancy. It is also excreted in breast milk. Azithromycin Counseling:  I discussed with the patient the risks of azithromycin including but not limited to GI upset, allergic reaction, drug rash, diarrhea, and yeast infections. Topical Clindamycin Counseling: Patient counseled that this medication may cause skin irritation or allergic reactions.  In the event of skin irritation, the patient was advised to reduce the amount of the drug applied or use it less frequently.   The patient verbalized understanding of the proper use and possible adverse effects of clindamycin.  All of the patient's questions and concerns were addressed. Azelaic Acid Pregnancy And Lactation Text: This medication is considered safe during pregnancy and breast feeding. Isotretinoin Pregnancy And Lactation Text: This medication is Pregnancy Category X and is considered extremely dangerous during pregnancy. It is unknown if it is excreted in breast milk. Bactrim Counseling:  I discussed with the patient the risks of sulfa antibiotics including but not limited to GI upset, allergic reaction, drug rash, diarrhea, dizziness, photosensitivity, and yeast infections.  Rarely, more serious reactions can occur including but not limited to aplastic anemia, agranulocytosis, methemoglobinemia, blood dyscrasias, liver or kidney failure, lung infiltrates or desquamative/blistering drug rashes. Spironolactone Counseling: Patient advised regarding risks of diarrhea, abdominal pain, hyperkalemia, birth defects (for female patients), liver toxicity and renal toxicity. The patient may need blood work to monitor liver and kidney function and potassium levels while on therapy. The patient verbalized understanding of the proper use and possible adverse effects of spironolactone.  All of the patient's questions and concerns were addressed. Dapsone Pregnancy And Lactation Text: This medication is Pregnancy Category C and is not considered safe during pregnancy or breast feeding. High Dose Vitamin A Counseling: Side effects reviewed, pt to contact office should one occur. Use Enhanced Medication Counseling?: No Spironolactone Pregnancy And Lactation Text: This medication can cause feminization of the male fetus and should be avoided during pregnancy. The active metabolite is also found in breast milk. Topical Retinoid counseling:  Patient advised to apply a pea-sized amount only at bedtime and wait 30 minutes after washing their face before applying.  If too drying, patient may add a non-comedogenic moisturizer. The patient verbalized understanding of the proper use and possible adverse effects of retinoids.  All of the patient's questions and concerns were addressed. Topical Sulfur Applications Pregnancy And Lactation Text: This medication is Pregnancy Category C and has an unknown safety profile during pregnancy. It is unknown if this topical medication is excreted in breast milk. Winlevi Pregnancy And Lactation Text: This medication is considered safe during pregnancy and breastfeeding. Tazorac Counseling:  Patient advised that medication is irritating and drying.  Patient may need to apply sparingly and wash off after an hour before eventually leaving it on overnight.  The patient verbalized understanding of the proper use and possible adverse effects of tazorac.  All of the patient's questions and concerns were addressed. Doxycycline Pregnancy And Lactation Text: This medication is Pregnancy Category D and not consider safe during pregnancy. It is also excreted in breast milk but is considered safe for shorter treatment courses. Implemented All Universal Safety Interventions:  [place] to call system. Call bell, personal items and telephone within reach. Instruct patient to call for assistance. Room bathroom lighting operational. Non-slip footwear when patient is off stretcher. Physically safe environment: no spills, clutter or unnecessary equipment. Stretcher in lowest position, wheels locked, appropriate side rails in place. Minocycline Counseling: Patient advised regarding possible photosensitivity and discoloration of the teeth, skin, lips, tongue and gums.  Patient instructed to avoid sunlight, if possible.  When exposed to sunlight, patients should wear protective clothing, sunglasses, and sunscreen.  The patient was instructed to call the office immediately if the following severe adverse effects occur:  hearing changes, easy bruising/bleeding, severe headache, or vision changes.  The patient verbalized understanding of the proper use and possible adverse effects of minocycline.  All of the patient's questions and concerns were addressed. Azithromycin Pregnancy And Lactation Text: This medication is considered safe during pregnancy and is also secreted in breast milk. Tazorac Pregnancy And Lactation Text: This medication is not safe during pregnancy. It is unknown if this medication is excreted in breast milk. Erythromycin Counseling:  I discussed with the patient the risks of erythromycin including but not limited to GI upset, allergic reaction, drug rash, diarrhea, increase in liver enzymes, and yeast infections. Azelaic Acid Counseling: Patient counseled that medicine may cause skin irritation and to avoid applying near the eyes.  In the event of skin irritation, the patient was advised to reduce the amount of the drug applied or use it less frequently.   The patient verbalized understanding of the proper use and possible adverse effects of azelaic acid.  All of the patient's questions and concerns were addressed. Benzoyl Peroxide Counseling: Patient counseled that medicine may cause skin irritation and bleach clothing.  In the event of skin irritation, the patient was advised to reduce the amount of the drug applied or use it less frequently.   The patient verbalized understanding of the proper use and possible adverse effects of benzoyl peroxide.  All of the patient's questions and concerns were addressed. Topical Clindamycin Pregnancy And Lactation Text: This medication is Pregnancy Category B and is considered safe during pregnancy. It is unknown if it is excreted in breast milk. Bactrim Pregnancy And Lactation Text: This medication is Pregnancy Category D and is known to cause fetal risk.  It is also excreted in breast milk. Birth Control Pills Pregnancy And Lactation Text: This medication should be avoided if pregnant and for the first 30 days post-partum. Isotretinoin Counseling: Patient should get monthly blood tests, not donate blood, not drive at night if vision affected, not share medication, and not undergo elective surgery for 6 months after tx completed. Side effects reviewed, pt to contact office should one occur.

## 2023-03-14 ENCOUNTER — NON-APPOINTMENT (OUTPATIENT)
Age: 58
End: 2023-03-14

## 2023-03-15 NOTE — PHYSICAL EXAM
[Restricted in physically strenuous activity but ambulatory and able to carry out work of a light or sedentary nature] : Status 1- Restricted in physically strenuous activity but ambulatory and able to carry out work of a light or sedentary nature, e.g., light house work, office work [Normal] : affect appropriate [de-identified] : Crackles on the right base

## 2023-03-15 NOTE — HISTORY OF PRESENT ILLNESS
[de-identified] : 57m with newly diagnosed adenocarcinoma of the lung presenting for initial oncology evaluation. \par Mr Cordova presented to the hospital with SOB and was found to have a left-sided hydropneumothorax with trapped lung and underwent a pleuroscopy with pleural biopsy as well as tunneled pleural catheter placement. \par \par SOB has now improved. \par He reports some weight loss in the past 2-3 months, probably about 10lb, no pain, no cough.\par \par Path c/w adenocarcinoma of the lung, PDL1 10%, \par NGS\par ARID1A Q450*\par CASP8 E311*\par TERT promoter -124C>T\par \par PET/CT 10/19/2022 reviewed, done at [facility], hypermetabolix SAULO mass, small hypermetabolic RUL nodule, Left pleural nodularity, left pleural effusion with drain inside, b/l supraclavicular inrenal mammary, mediastinal, left hilar, left axillary, upper abdominal, retrocrural, retroperitoneal, pelvic and left inguinal LN. hypermetabolic peritoneal nodularity. Skeletal involvement. \par \par CT head w/wo contrast 10/14/2022 unremarkable.\par \par Pt completed 4 cycles of chemo/IO with good response to treatment\par Follow up scans 1/6/2023\par CHEST:\par 1.  Since 9/6/2022, the 2.2 x 2.0 cm area of consolidation in the left upper lobe has decreased in size.\par 2.  Since 9/6/2022, the chest lymph nodes have decreased in size.\par 3.  Since 9/6/2022, left pleural effusion has decreased in size with tiny foci of air in the effusion.\par 4.  Left pleural catheter in place.\par 5.  Sclerotic lesions of the thoracic spine are concerning for metastasis.\par ABDOMEN AND PELVIS:\par 1.  Sclerotic lesions the left iliac bone and left inferior pubic ramus are concerning for metastasis.\par \par He is a retired , worked in [place]. \par He lives with his sister, has children in [place]. \par Works as a  in a store.\par Used to smoke cigars until last year, occasional marijuana, no drugs, occasional EtOH\par  [de-identified] : Feels well and has no complaints. \par Planned for pleurex removal.\par Anemic, slightly worse, will need to hold off on pemetrexed.

## 2023-03-15 NOTE — ASSESSMENT
[FreeTextEntry1] : 57m with recently diagnosed adenocarcinoma of the lung, with positive pleural effusion and involvement of the pleura, s/p pleurX placement, presenting to [facility] care. \par \par Patient started first line treatment with carboplatin/pemetrexed/pembrolizumab. \par S/p C4, well tolerated, however has anemia and received 1 u prbc at [facility]\par Scans after C4 with response to treatment\par Started on maintenance Keytruda Alimta \par \par continue folic acid and will continue b12 shots\par \par -Continue with alimta keytruda\par -Scans after C8\par -C/w folic acid \par -B12 shots q9 weeks\par -Follow Pulm\par -Is claustrophobic and declines MRIs\par -RTC for next cycle\par \par Tiffani Gunn MD\par Medical Oncology and Hematology\par Total time of this visit, including time spent face to face with patient and/or via video/audio, and also in preparing for today's visit for MDM and documentation (Medical Decision Making, including consideration of possible diagnoses, management options, complex medical record review, review of diagnostic tests and information, consideration and discussion of significant complications based on comorbidities, and discussion with providers involved with the care of the patient) 41 minutes.\par \par

## 2023-03-15 NOTE — ASSESSMENT
[FreeTextEntry1] : 57m with recently diagnosed adenocarcinoma of the lung, with positive pleural effusion and involvement of the pleura, s/p pleurX placement, presenting to [facility] care. \par \par Patient started first line treatment with carboplatin/pemetrexed/pembrolizumab. \par S/p C4, well tolerated, however has anemia and received 1 u prbc at [facility]\par Scans after C4 with response to treatment\par Started on maintenance Keytruda Alimta \par However with worsening anemia, and Alimta was stopped given hematologic toxicity. \par \par Pt to continue folic acid and will continue b12 infusions for 2 months\par \par \par -Continue with single agent keytruda\par -Scans after C8\par -C/w folic acid \par -B12 shots q9 weeks, will continue for 2-3 months after dc alimta\par -Follow Pulm\par -Is claustrophobic and declines MRIs\par -RTC for C7\par \par Tiffani Gunn MD\par Medical Oncology and Hematology\par Total time of this visit, including time spent face to face with patient and/or via video/audio, and also in preparing for today's visit for MDM and documentation (Medical Decision Making, including consideration of possible diagnoses, management options, complex medical record review, review of diagnostic tests and information, consideration and discussion of significant complications based on comorbidities, and discussion with providers involved with the care of the patient) 41 minutes.\par \par

## 2023-03-15 NOTE — HISTORY OF PRESENT ILLNESS
[de-identified] : 57m with newly diagnosed adenocarcinoma of the lung presenting for initial oncology evaluation. \par Mr Cordova presented to the hospital with SOB and was found to have a left-sided hydropneumothorax with trapped lung and underwent a pleuroscopy with pleural biopsy as well as tunneled pleural catheter placement. \par \par SOB has now improved. \par He reports some weight loss in the past 2-3 months, probably about 10lb, no pain, no cough.\par \par Path c/w adenocarcinoma of the lung, PDL1 10%, \par NGS\par ARID1A Q450*\par CASP8 E311*\par TERT promoter -124C>T\par \par PET/CT 10/19/2022 reviewed, done at [facility], hypermetabolix SAULO mass, small hypermetabolic RUL nodule, Left pleural nodularity, left pleural effusion with drain inside, b/l supraclavicular inrenal mammary, mediastinal, left hilar, left axillary, upper abdominal, retrocrural, retroperitoneal, pelvic and left inguinal LN. hypermetabolic peritoneal nodularity. Skeletal involvement. \par \par CT head w/wo contrast 10/14/2022 unremarkable.\par \par Pt completed 4 cycles of chemo/IO with good response to treatment\par Follow up scans 1/6/2023\par CHEST:\par 1.  Since 9/6/2022, the 2.2 x 2.0 cm area of consolidation in the left upper lobe has decreased in size.\par 2.  Since 9/6/2022, the chest lymph nodes have decreased in size.\par 3.  Since 9/6/2022, left pleural effusion has decreased in size with tiny foci of air in the effusion.\par 4.  Left pleural catheter in place.\par 5.  Sclerotic lesions of the thoracic spine are concerning for metastasis.\par ABDOMEN AND PELVIS:\par 1.  Sclerotic lesions the left iliac bone and left inferior pubic ramus are concerning for metastasis.\par \par He is a retired , worked in [place]. \par He lives with his sister, has children in [place]. \par Works as a  in a store.\par Used to smoke cigars until last year, occasional marijuana, no drugs, occasional EtOH\par  [de-identified] : Feels well and has no complaints. \par Planned for pleurex removal.\par

## 2023-03-15 NOTE — PHYSICAL EXAM
[Restricted in physically strenuous activity but ambulatory and able to carry out work of a light or sedentary nature] : Status 1- Restricted in physically strenuous activity but ambulatory and able to carry out work of a light or sedentary nature, e.g., light house work, office work [Normal] : affect appropriate [de-identified] : Crackles on the right base

## 2023-03-27 ENCOUNTER — APPOINTMENT (OUTPATIENT)
Dept: PULMONOLOGY | Facility: CLINIC | Age: 58
End: 2023-03-27
Payer: MEDICAID

## 2023-03-27 VITALS
BODY MASS INDEX: 25.05 KG/M2 | RESPIRATION RATE: 18 BRPM | HEART RATE: 66 BPM | HEIGHT: 70 IN | WEIGHT: 175 LBS | SYSTOLIC BLOOD PRESSURE: 128 MMHG | DIASTOLIC BLOOD PRESSURE: 81 MMHG | TEMPERATURE: 98.1 F | OXYGEN SATURATION: 99 %

## 2023-03-27 DIAGNOSIS — Z79.899 OTHER LONG TERM (CURRENT) DRUG THERAPY: ICD-10-CM

## 2023-03-27 DIAGNOSIS — J94.8 OTHER SPECIFIED PLEURAL CONDITIONS: ICD-10-CM

## 2023-03-27 PROCEDURE — 76604 US EXAM CHEST: CPT

## 2023-03-27 PROCEDURE — 99214 OFFICE O/P EST MOD 30 MIN: CPT | Mod: 25

## 2023-03-28 ENCOUNTER — APPOINTMENT (OUTPATIENT)
Dept: HEMATOLOGY ONCOLOGY | Facility: CLINIC | Age: 58
End: 2023-03-28

## 2023-03-28 ENCOUNTER — RESULT REVIEW (OUTPATIENT)
Age: 58
End: 2023-03-28

## 2023-03-28 ENCOUNTER — NON-APPOINTMENT (OUTPATIENT)
Age: 58
End: 2023-03-28

## 2023-03-28 ENCOUNTER — APPOINTMENT (OUTPATIENT)
Dept: HEMATOLOGY ONCOLOGY | Facility: CLINIC | Age: 58
End: 2023-03-28
Payer: MEDICAID

## 2023-03-28 ENCOUNTER — APPOINTMENT (OUTPATIENT)
Dept: INFUSION THERAPY | Facility: HOSPITAL | Age: 58
End: 2023-03-28

## 2023-03-28 LAB
ALBUMIN SERPL ELPH-MCNC: 4 G/DL — SIGNIFICANT CHANGE UP (ref 3.3–5)
ALP SERPL-CCNC: 73 U/L — SIGNIFICANT CHANGE UP (ref 40–120)
ALT FLD-CCNC: 8 U/L — LOW (ref 10–45)
ANION GAP SERPL CALC-SCNC: 10 MMOL/L — SIGNIFICANT CHANGE UP (ref 5–17)
AST SERPL-CCNC: 26 U/L — SIGNIFICANT CHANGE UP (ref 10–40)
BASOPHILS # BLD AUTO: 0.03 K/UL — SIGNIFICANT CHANGE UP (ref 0–0.2)
BASOPHILS # BLD AUTO: 0.03 K/UL — SIGNIFICANT CHANGE UP (ref 0–0.2)
BASOPHILS NFR BLD AUTO: 0.8 % — SIGNIFICANT CHANGE UP (ref 0–2)
BASOPHILS NFR BLD AUTO: 0.8 % — SIGNIFICANT CHANGE UP (ref 0–2)
BILIRUB SERPL-MCNC: 0.2 MG/DL — SIGNIFICANT CHANGE UP (ref 0.2–1.2)
BUN SERPL-MCNC: 14 MG/DL — SIGNIFICANT CHANGE UP (ref 7–23)
CALCIUM SERPL-MCNC: 9.7 MG/DL — SIGNIFICANT CHANGE UP (ref 8.4–10.5)
CHLORIDE SERPL-SCNC: 104 MMOL/L — SIGNIFICANT CHANGE UP (ref 96–108)
CO2 SERPL-SCNC: 25 MMOL/L — SIGNIFICANT CHANGE UP (ref 22–31)
CREAT SERPL-MCNC: 1.59 MG/DL — HIGH (ref 0.5–1.3)
EGFR: 51 ML/MIN/1.73M2 — LOW
EOSINOPHIL # BLD AUTO: 0.09 K/UL — SIGNIFICANT CHANGE UP (ref 0–0.5)
EOSINOPHIL # BLD AUTO: 0.12 K/UL — SIGNIFICANT CHANGE UP (ref 0–0.5)
EOSINOPHIL NFR BLD AUTO: 2.3 % — SIGNIFICANT CHANGE UP (ref 0–6)
EOSINOPHIL NFR BLD AUTO: 3.1 % — SIGNIFICANT CHANGE UP (ref 0–6)
GLUCOSE SERPL-MCNC: 83 MG/DL — SIGNIFICANT CHANGE UP (ref 70–99)
HCT VFR BLD CALC: 28.1 % — LOW (ref 39–50)
HCT VFR BLD CALC: 28.8 % — LOW (ref 39–50)
HGB BLD-MCNC: 8.7 G/DL — LOW (ref 13–17)
HGB BLD-MCNC: 9.1 G/DL — LOW (ref 13–17)
IMM GRANULOCYTES NFR BLD AUTO: 0 % — SIGNIFICANT CHANGE UP (ref 0–0.9)
IMM GRANULOCYTES NFR BLD AUTO: 0.3 % — SIGNIFICANT CHANGE UP (ref 0–0.9)
LYMPHOCYTES # BLD AUTO: 0.82 K/UL — LOW (ref 1–3.3)
LYMPHOCYTES # BLD AUTO: 0.84 K/UL — LOW (ref 1–3.3)
LYMPHOCYTES # BLD AUTO: 21.2 % — SIGNIFICANT CHANGE UP (ref 13–44)
LYMPHOCYTES # BLD AUTO: 21.4 % — SIGNIFICANT CHANGE UP (ref 13–44)
MCHC RBC-ENTMCNC: 26.6 PG — LOW (ref 27–34)
MCHC RBC-ENTMCNC: 26.7 PG — LOW (ref 27–34)
MCHC RBC-ENTMCNC: 31 G/DL — LOW (ref 32–36)
MCHC RBC-ENTMCNC: 31.6 G/DL — LOW (ref 32–36)
MCV RBC AUTO: 84.2 FL — SIGNIFICANT CHANGE UP (ref 80–100)
MCV RBC AUTO: 86.2 FL — SIGNIFICANT CHANGE UP (ref 80–100)
MONOCYTES # BLD AUTO: 0.6 K/UL — SIGNIFICANT CHANGE UP (ref 0–0.9)
MONOCYTES # BLD AUTO: 0.68 K/UL — SIGNIFICANT CHANGE UP (ref 0–0.9)
MONOCYTES NFR BLD AUTO: 15.3 % — HIGH (ref 2–14)
MONOCYTES NFR BLD AUTO: 17.6 % — HIGH (ref 2–14)
NEUTROPHILS # BLD AUTO: 2.21 K/UL — SIGNIFICANT CHANGE UP (ref 1.8–7.4)
NEUTROPHILS # BLD AUTO: 2.36 K/UL — SIGNIFICANT CHANGE UP (ref 1.8–7.4)
NEUTROPHILS NFR BLD AUTO: 57 % — SIGNIFICANT CHANGE UP (ref 43–77)
NEUTROPHILS NFR BLD AUTO: 60.2 % — SIGNIFICANT CHANGE UP (ref 43–77)
NRBC # BLD: 0 /100 WBCS — SIGNIFICANT CHANGE UP (ref 0–0)
NRBC # BLD: 0 /100 WBCS — SIGNIFICANT CHANGE UP (ref 0–0)
PLATELET # BLD AUTO: 225 K/UL — SIGNIFICANT CHANGE UP (ref 150–400)
PLATELET # BLD AUTO: 235 K/UL — SIGNIFICANT CHANGE UP (ref 150–400)
POTASSIUM SERPL-MCNC: 4.3 MMOL/L — SIGNIFICANT CHANGE UP (ref 3.5–5.3)
POTASSIUM SERPL-SCNC: 4.3 MMOL/L — SIGNIFICANT CHANGE UP (ref 3.5–5.3)
PROT SERPL-MCNC: 7.5 G/DL — SIGNIFICANT CHANGE UP (ref 6–8.3)
RBC # BLD: 3.26 M/UL — LOW (ref 4.2–5.8)
RBC # BLD: 3.42 M/UL — LOW (ref 4.2–5.8)
RBC # FLD: 16.3 % — HIGH (ref 10.3–14.5)
RBC # FLD: 16.4 % — HIGH (ref 10.3–14.5)
SODIUM SERPL-SCNC: 139 MMOL/L — SIGNIFICANT CHANGE UP (ref 135–145)
WBC # BLD: 3.87 K/UL — SIGNIFICANT CHANGE UP (ref 3.8–10.5)
WBC # BLD: 3.92 K/UL — SIGNIFICANT CHANGE UP (ref 3.8–10.5)
WBC # FLD AUTO: 3.87 K/UL — SIGNIFICANT CHANGE UP (ref 3.8–10.5)
WBC # FLD AUTO: 3.92 K/UL — SIGNIFICANT CHANGE UP (ref 3.8–10.5)

## 2023-03-28 PROCEDURE — 99215 OFFICE O/P EST HI 40 MIN: CPT

## 2023-03-29 LAB — T4 FREE+ TSH PNL SERPL: 0.9 UIU/ML — SIGNIFICANT CHANGE UP (ref 0.27–4.2)

## 2023-03-29 NOTE — HISTORY OF PRESENT ILLNESS
[de-identified] : 57m with newly diagnosed adenocarcinoma of the lung presenting for initial oncology evaluation. \par Mr Cordova presented to the hospital with SOB and was found to have a left-sided hydropneumothorax with trapped lung and underwent a pleuroscopy with pleural biopsy as well as tunneled pleural catheter placement. \par \par SOB has now improved. \par He reports some weight loss in the past 2-3 months, probably about 10lb, no pain, no cough.\par \par Path c/w adenocarcinoma of the lung, PDL1 10%, \par NGS\par ARID1A Q450*\par CASP8 E311*\par TERT promoter -124C>T\par \par PET/CT 10/19/2022 reviewed, done at [facility], hypermetabolix SAULO mass, small hypermetabolic RUL nodule, Left pleural nodularity, left pleural effusion with drain inside, b/l supraclavicular inrenal mammary, mediastinal, left hilar, left axillary, upper abdominal, retrocrural, retroperitoneal, pelvic and left inguinal LN. hypermetabolic peritoneal nodularity. Skeletal involvement. \par \par CT head w/wo contrast 10/14/2022 unremarkable.\par \par Pt completed 4 cycles of chemo/IO with good response to treatment\par Follow up scans 1/6/2023\par CHEST:\par 1.  Since 9/6/2022, the 2.2 x 2.0 cm area of consolidation in the left upper lobe has decreased in size.\par 2.  Since 9/6/2022, the chest lymph nodes have decreased in size.\par 3.  Since 9/6/2022, left pleural effusion has decreased in size with tiny foci of air in the effusion.\par 4.  Left pleural catheter in place.\par 5.  Sclerotic lesions of the thoracic spine are concerning for metastasis.\par ABDOMEN AND PELVIS:\par 1.  Sclerotic lesions the left iliac bone and left inferior pubic ramus are concerning for metastasis.\par \par He is a retired , worked in [place]. \par He lives with his sister, has children in [place]. \par Works as a  in a store.\par Used to smoke cigars until last year, occasional marijuana, no drugs, occasional EtOH\par  [de-identified] : Feels well and has no complaints. \par Pleurex and stitches were removed and he has no complaints or SOB. \par

## 2023-03-29 NOTE — ASSESSMENT
[FreeTextEntry1] : 57m with stage IV adenocarcinoma of the lung, with positive pleural effusion and involvement of the pleura, s/p pleurX placement, presenting to [facility] care. \par \par Patient started first line treatment with carboplatin/pemetrexed/pembrolizumab. \par S/p C4, well tolerated, however has anemia and received 1 u prbc at [facility]\par Scans after C4 with response to treatment\par Started on maintenance Keytruda Alimta \par Given hematologic toxicity with worsening anemia, alimta stopped after 6 cycles and patient continued on single agent Keytruda. \par \par -Continue with keytruda\par -Scans ordered, CT c/a/p\par -C/w folic acid \par -B12 today\par -Follow Pulm\par -Is claustrophobic and declines MRIs\par -Patient was made aware that I am leaving [facility] and another oncologist will follow with him.\par -RTC for next cycle\par \par Tiffani Gunn MD\par Medical Oncology and Hematology\par Total time of this visit, including time spent face to face with patient and/or via video/audio, and also in preparing for today's visit for MDM and documentation (Medical Decision Making, including consideration of possible diagnoses, management options, complex medical record review, review of diagnostic tests and information, consideration and discussion of significant complications based on comorbidities, and discussion with providers involved with the care of the patient) 41 minutes.\par \par

## 2023-03-29 NOTE — PHYSICAL EXAM
[Restricted in physically strenuous activity but ambulatory and able to carry out work of a light or sedentary nature] : Status 1- Restricted in physically strenuous activity but ambulatory and able to carry out work of a light or sedentary nature, e.g., light house work, office work [Normal] : affect appropriate [de-identified] : Crackles on the right base

## 2023-04-07 ENCOUNTER — RESULT REVIEW (OUTPATIENT)
Age: 58
End: 2023-04-07

## 2023-04-12 ENCOUNTER — OUTPATIENT (OUTPATIENT)
Dept: OUTPATIENT SERVICES | Facility: HOSPITAL | Age: 58
LOS: 1 days | Discharge: HOME | End: 2023-04-12

## 2023-04-12 ENCOUNTER — OUTPATIENT (OUTPATIENT)
Dept: OUTPATIENT SERVICES | Facility: HOSPITAL | Age: 58
LOS: 1 days | End: 2023-04-12
Payer: COMMERCIAL

## 2023-04-12 ENCOUNTER — APPOINTMENT (OUTPATIENT)
Dept: CT IMAGING | Facility: IMAGING CENTER | Age: 58
End: 2023-04-12
Payer: MEDICAID

## 2023-04-12 DIAGNOSIS — Z98.890 OTHER SPECIFIED POSTPROCEDURAL STATES: Chronic | ICD-10-CM

## 2023-04-12 DIAGNOSIS — C34.90 MALIGNANT NEOPLASM OF UNSPECIFIED PART OF UNSPECIFIED BRONCHUS OR LUNG: ICD-10-CM

## 2023-04-12 PROCEDURE — 74177 CT ABD & PELVIS W/CONTRAST: CPT

## 2023-04-12 PROCEDURE — 71260 CT THORAX DX C+: CPT

## 2023-04-12 PROCEDURE — 74177 CT ABD & PELVIS W/CONTRAST: CPT | Mod: 26

## 2023-04-12 PROCEDURE — 71260 CT THORAX DX C+: CPT | Mod: 26

## 2023-04-18 ENCOUNTER — RESULT REVIEW (OUTPATIENT)
Age: 58
End: 2023-04-18

## 2023-04-18 ENCOUNTER — APPOINTMENT (OUTPATIENT)
Dept: HEMATOLOGY ONCOLOGY | Facility: CLINIC | Age: 58
End: 2023-04-18
Payer: MEDICAID

## 2023-04-18 ENCOUNTER — APPOINTMENT (OUTPATIENT)
Dept: INFUSION THERAPY | Facility: HOSPITAL | Age: 58
End: 2023-04-18

## 2023-04-18 ENCOUNTER — APPOINTMENT (OUTPATIENT)
Dept: HEMATOLOGY ONCOLOGY | Facility: CLINIC | Age: 58
End: 2023-04-18

## 2023-04-18 DIAGNOSIS — Z51.11 ENCOUNTER FOR ANTINEOPLASTIC CHEMOTHERAPY: ICD-10-CM

## 2023-04-18 LAB
ALBUMIN SERPL ELPH-MCNC: 3.9 G/DL — SIGNIFICANT CHANGE UP (ref 3.3–5)
ALP SERPL-CCNC: 70 U/L — SIGNIFICANT CHANGE UP (ref 40–120)
ALT FLD-CCNC: 14 U/L — SIGNIFICANT CHANGE UP (ref 10–45)
ANION GAP SERPL CALC-SCNC: 12 MMOL/L — SIGNIFICANT CHANGE UP (ref 5–17)
AST SERPL-CCNC: 38 U/L — SIGNIFICANT CHANGE UP (ref 10–40)
BASOPHILS # BLD AUTO: 0.03 K/UL — SIGNIFICANT CHANGE UP (ref 0–0.2)
BASOPHILS NFR BLD AUTO: 0.9 % — SIGNIFICANT CHANGE UP (ref 0–2)
BILIRUB SERPL-MCNC: 0.2 MG/DL — SIGNIFICANT CHANGE UP (ref 0.2–1.2)
BUN SERPL-MCNC: 14 MG/DL — SIGNIFICANT CHANGE UP (ref 7–23)
CALCIUM SERPL-MCNC: 9.2 MG/DL — SIGNIFICANT CHANGE UP (ref 8.4–10.5)
CHLORIDE SERPL-SCNC: 103 MMOL/L — SIGNIFICANT CHANGE UP (ref 96–108)
CO2 SERPL-SCNC: 23 MMOL/L — SIGNIFICANT CHANGE UP (ref 22–31)
CREAT SERPL-MCNC: 1.6 MG/DL — HIGH (ref 0.5–1.3)
EGFR: 50 ML/MIN/1.73M2 — LOW
EOSINOPHIL # BLD AUTO: 0.1 K/UL — SIGNIFICANT CHANGE UP (ref 0–0.5)
EOSINOPHIL NFR BLD AUTO: 2.9 % — SIGNIFICANT CHANGE UP (ref 0–6)
GLUCOSE SERPL-MCNC: 82 MG/DL — SIGNIFICANT CHANGE UP (ref 70–99)
HCT VFR BLD CALC: 27.7 % — LOW (ref 39–50)
HGB BLD-MCNC: 8.4 G/DL — LOW (ref 13–17)
IMM GRANULOCYTES NFR BLD AUTO: 0.6 % — SIGNIFICANT CHANGE UP (ref 0–0.9)
LYMPHOCYTES # BLD AUTO: 0.73 K/UL — LOW (ref 1–3.3)
LYMPHOCYTES # BLD AUTO: 21.2 % — SIGNIFICANT CHANGE UP (ref 13–44)
MCHC RBC-ENTMCNC: 25.8 PG — LOW (ref 27–34)
MCHC RBC-ENTMCNC: 30.3 G/DL — LOW (ref 32–36)
MCV RBC AUTO: 85 FL — SIGNIFICANT CHANGE UP (ref 80–100)
MONOCYTES # BLD AUTO: 0.41 K/UL — SIGNIFICANT CHANGE UP (ref 0–0.9)
MONOCYTES NFR BLD AUTO: 11.9 % — SIGNIFICANT CHANGE UP (ref 2–14)
NEUTROPHILS # BLD AUTO: 2.16 K/UL — SIGNIFICANT CHANGE UP (ref 1.8–7.4)
NEUTROPHILS NFR BLD AUTO: 62.5 % — SIGNIFICANT CHANGE UP (ref 43–77)
NRBC # BLD: 0 /100 WBCS — SIGNIFICANT CHANGE UP (ref 0–0)
PLATELET # BLD AUTO: 298 K/UL — SIGNIFICANT CHANGE UP (ref 150–400)
POTASSIUM SERPL-MCNC: 5.5 MMOL/L — HIGH (ref 3.5–5.3)
POTASSIUM SERPL-SCNC: 5.5 MMOL/L — HIGH (ref 3.5–5.3)
PROT SERPL-MCNC: 7.3 G/DL — SIGNIFICANT CHANGE UP (ref 6–8.3)
RBC # BLD: 3.26 M/UL — LOW (ref 4.2–5.8)
RBC # FLD: 16 % — HIGH (ref 10.3–14.5)
SODIUM SERPL-SCNC: 138 MMOL/L — SIGNIFICANT CHANGE UP (ref 135–145)
T4 FREE+ TSH PNL SERPL: 1.99 UIU/ML — SIGNIFICANT CHANGE UP (ref 0.27–4.2)
WBC # BLD: 3.45 K/UL — LOW (ref 3.8–10.5)
WBC # FLD AUTO: 3.45 K/UL — LOW (ref 3.8–10.5)

## 2023-04-18 PROCEDURE — 99215 OFFICE O/P EST HI 40 MIN: CPT

## 2023-04-20 PROBLEM — Z79.899 ON ANTINEOPLASTIC CHEMOTHERAPY: Status: ACTIVE | Noted: 2022-11-02

## 2023-04-20 PROBLEM — J94.8 HYDROPNEUMOTHORAX: Status: ACTIVE | Noted: 2022-10-03

## 2023-04-20 NOTE — PROCEDURE
[Thoracic Ultrasound] : Thoracic Ultrasound [Pleural Effusion] : Pleural Effusion: No [A line] : A line: Yes [Simple] : simple [de-identified] : h/o malignant effusion and pleurx [FreeTextEntry2] : Trace left pleural effusion

## 2023-04-20 NOTE — ASSESSMENT
[FreeTextEntry1] : 57 M with lung adenoCA left sided pleurx cath for malignant pleural effusion who presents as follow up after pleurx removal. He underwent Pleurx catheter removal (left side) on 3/9/23. \par \par \par Plan:\par - sutures removed in office today from pleurx removal site\par - site clean and dry, no signs of infection\par - trace residual pleural effusion \par - f/u in 3 months\par

## 2023-04-20 NOTE — HISTORY OF PRESENT ILLNESS
[TextBox_4] : Interventional Pulmonology Consultation/Visit Note\par \par 57 M with lung adenoCA left sided pleurx cath for malignant pleural effusion who presents as follow up after pleurx removal. He underwent Pleurx catheter removal (left side) on 3/9/23. He denies pain at prior pleurx site. No shortness of breath, cough, chest pain, fever, chills. Follows with Dr. Gunn.\par \par \par Path c/w adenocarcinoma of the lung, PDL1 10%, \par NGS\par ARID1A Q450*\par CASP8 E311*\par TERT promoter -124C>T\par \par PET/CT 10/19/2022 reviewed, done at [facility], hypermetabolix SAULO mass, small hypermetabolic RUL nodule, Left pleural nodularity, left pleural effusion with drain inside, b/l supraclavicular inrenal mammary, mediastinal, left hilar, left axillary, upper abdominal, retrocrural, retroperitoneal, pelvic and left inguinal LN. hypermetabolic peritoneal nodularity. Skeletal involvement.

## 2023-04-20 NOTE — PHYSICAL EXAM
[No Acute Distress] : no acute distress [Normal Oropharynx] : normal oropharynx [Normal Appearance] : normal appearance [No Neck Mass] : no neck mass [Normal Rate/Rhythm] : normal rate/rhythm [Normal S1, S2] : normal s1, s2 [No Murmurs] : no murmurs [No Resp Distress] : no resp distress [Clear to Auscultation Bilaterally] : clear to auscultation bilaterally [No Abnormalities] : no abnormalities [Benign] : benign [Normal Gait] : normal gait [No Clubbing] : no clubbing [No Cyanosis] : no cyanosis [No Edema] : no edema [FROM] : FROM [Normal Color/ Pigmentation] : normal color/ pigmentation [No Focal Deficits] : no focal deficits [Oriented x3] : oriented x3 [Normal Affect] : normal affect [TextBox_54] : prior pleurx site clean and dry, no erythema or purulence

## 2023-04-21 NOTE — HISTORY OF PRESENT ILLNESS
[de-identified] : 57m with newly diagnosed adenocarcinoma of the lung presenting for initial oncology evaluation. \par Mr Cordova presented to the hospital with SOB and was found to have a left-sided hydropneumothorax with trapped lung and underwent a pleuroscopy with pleural biopsy as well as tunneled pleural catheter placement. \par \par SOB has now improved. \par He reports some weight loss in the past 2-3 months, probably about 10lb, no pain, no cough.\par \par Path c/w adenocarcinoma of the lung, PDL1 10%, \par NGS\par ARID1A Q450*\par CASP8 E311*\par TERT promoter -124C>T\par \par PET/CT 10/19/2022 reviewed, done at [facility], hypermetabolix SAULO mass, small hypermetabolic RUL nodule, Left pleural nodularity, left pleural effusion with drain inside, b/l supraclavicular inrenal mammary, mediastinal, left hilar, left axillary, upper abdominal, retrocrural, retroperitoneal, pelvic and left inguinal LN. hypermetabolic peritoneal nodularity. Skeletal involvement. \par \par CT head w/wo contrast 10/14/2022 unremarkable.\par \par Pt completed 4 cycles of chemo/IO with good response to treatment\par Follow up scans 1/6/2023\par CHEST:\par 1.  Since 9/6/2022, the 2.2 x 2.0 cm area of consolidation in the left upper lobe has decreased in size.\par 2.  Since 9/6/2022, the chest lymph nodes have decreased in size.\par 3.  Since 9/6/2022, left pleural effusion has decreased in size with tiny foci of air in the effusion.\par 4.  Left pleural catheter in place.\par 5.  Sclerotic lesions of the thoracic spine are concerning for metastasis.\par ABDOMEN AND PELVIS:\par 1.  Sclerotic lesions the left iliac bone and left inferior pubic ramus are concerning for metastasis.\par \par CT c/a/p 4/12/2023\par CHEST:\par 1.  Since 1/6/2023, the millimeter right upper lobe nodules are unchanged.\par 2.  The left pleural catheter has been removed.\par 3.  The small left pleural effusion has decreased in size.\par 4.  No change in the sclerotic lesions of the thoracic spine.\par ABDOMEN AND PELVIS:\par 1.  Perihepatic ascites is new.\par 2.  No change in the left adrenal gland thickening.\par 3.  No change in the sclerotic lesions of the pelvis.\par \par \par He is a retired , worked in [place]. \par He lives with his sister, has children in [place]. \par Works as a  in a store.\par Used to smoke cigars until last year, occasional marijuana, no drugs, occasional EtOH\par  [de-identified] : Reports feeling abdominal fullness and early satiety. \par No Cough, SOB, CP.\par No diarrhea, constipation.\par No fever.\par CT reviewed, with new perihepatic ascites. \par Pt has had his pleurx removed 2 weeks go.

## 2023-04-21 NOTE — PHYSICAL EXAM
[Restricted in physically strenuous activity but ambulatory and able to carry out work of a light or sedentary nature] : Status 1- Restricted in physically strenuous activity but ambulatory and able to carry out work of a light or sedentary nature, e.g., light house work, office work [Normal] : affect appropriate [de-identified] : Crackles on the right base [de-identified] : There is a slight abdominal distension, May have small ascites but not impressive, no tenderness on percussion.

## 2023-04-21 NOTE — ASSESSMENT
[FreeTextEntry1] : 57m with stage IV adenocarcinoma of the lung, with positive pleural effusion and involvement of the pleura, s/p pleurX placement, presenting for follow up.\par \par Patient started first line treatment with carboplatin/pemetrexed/pembrolizumab. \par S/p C4, well tolerated, however has anemia and received 1 u prbc at [facility]\par Scans after C4 with response to treatment\par Started on maintenance Keytruda/Alimta \par Given hematologic toxicity with worsening anemia, alimta stopped after 6 cycles and patient continued on single agent Keytruda. \par Follow up scans 4/12/2023 with stable RUL nodules and decrease in pleural effusion. PleurX has been removed as of 2 weeks ago. NEW perihepatic ascites. no changes in bone lesions of adrenal thickening. \par \par Given new perihepatic ascites and feeling of abdominal fullness and slight abdominal distension on physical exam, will order US abdomen to assess ascites and ?paracentesis if possible. \par \par -Continue with keytruda\par -Labs today\par -US abdomen\par -C/w folic acid \par -Follow Pulm\par -Is claustrophobic and declines MRIs\par -Patient was made aware that I am leaving [facility] and another oncologist will follow with him.\par -RTC for next cycle\par \par Tiffani Gunn MD\par Medical Oncology and Hematology\par Total time of this visit, including time spent face to face with patient and/or via video/audio, and also in preparing for today's visit for MDM and documentation (Medical Decision Making, including consideration of possible diagnoses, management options, complex medical record review, review of diagnostic tests and information, consideration and discussion of significant complications based on comorbidities, and discussion with providers involved with the care of the patient) 41 minutes.\par \par

## 2023-04-24 ENCOUNTER — NON-APPOINTMENT (OUTPATIENT)
Age: 58
End: 2023-04-24

## 2023-05-04 ENCOUNTER — RESULT REVIEW (OUTPATIENT)
Age: 58
End: 2023-05-04

## 2023-05-04 ENCOUNTER — OUTPATIENT (OUTPATIENT)
Dept: OUTPATIENT SERVICES | Facility: HOSPITAL | Age: 58
LOS: 1 days | End: 2023-05-04
Payer: COMMERCIAL

## 2023-05-04 ENCOUNTER — APPOINTMENT (OUTPATIENT)
Dept: ULTRASOUND IMAGING | Facility: IMAGING CENTER | Age: 58
End: 2023-05-04
Payer: MEDICAID

## 2023-05-04 ENCOUNTER — APPOINTMENT (OUTPATIENT)
Dept: HEMATOLOGY ONCOLOGY | Facility: CLINIC | Age: 58
End: 2023-05-04

## 2023-05-04 DIAGNOSIS — C34.90 MALIGNANT NEOPLASM OF UNSPECIFIED PART OF UNSPECIFIED BRONCHUS OR LUNG: ICD-10-CM

## 2023-05-04 LAB
BASOPHILS # BLD AUTO: 0.03 K/UL — SIGNIFICANT CHANGE UP (ref 0–0.2)
BASOPHILS NFR BLD AUTO: 0.6 % — SIGNIFICANT CHANGE UP (ref 0–2)
EOSINOPHIL # BLD AUTO: 0.2 K/UL — SIGNIFICANT CHANGE UP (ref 0–0.5)
EOSINOPHIL NFR BLD AUTO: 4 % — SIGNIFICANT CHANGE UP (ref 0–6)
HCT VFR BLD CALC: 28.5 % — LOW (ref 39–50)
HGB BLD-MCNC: 8.7 G/DL — LOW (ref 13–17)
IMM GRANULOCYTES NFR BLD AUTO: 0.2 % — SIGNIFICANT CHANGE UP (ref 0–0.9)
INR PPP: 1.2 RATIO
LYMPHOCYTES # BLD AUTO: 0.95 K/UL — LOW (ref 1–3.3)
LYMPHOCYTES # BLD AUTO: 19 % — SIGNIFICANT CHANGE UP (ref 13–44)
MCHC RBC-ENTMCNC: 25.1 PG — LOW (ref 27–34)
MCHC RBC-ENTMCNC: 30.5 G/DL — LOW (ref 32–36)
MCV RBC AUTO: 82.1 FL — SIGNIFICANT CHANGE UP (ref 80–100)
MONOCYTES # BLD AUTO: 0.83 K/UL — SIGNIFICANT CHANGE UP (ref 0–0.9)
MONOCYTES NFR BLD AUTO: 16.6 % — HIGH (ref 2–14)
NEUTROPHILS # BLD AUTO: 2.98 K/UL — SIGNIFICANT CHANGE UP (ref 1.8–7.4)
NEUTROPHILS NFR BLD AUTO: 59.6 % — SIGNIFICANT CHANGE UP (ref 43–77)
NRBC # BLD: 0 /100 WBCS — SIGNIFICANT CHANGE UP (ref 0–0)
PLATELET # BLD AUTO: 254 K/UL — SIGNIFICANT CHANGE UP (ref 150–400)
PT BLD: 14.1 SEC
RBC # BLD: 3.47 M/UL — LOW (ref 4.2–5.8)
RBC # FLD: 16.1 % — HIGH (ref 10.3–14.5)
WBC # BLD: 5 K/UL — SIGNIFICANT CHANGE UP (ref 3.8–10.5)
WBC # FLD AUTO: 5 K/UL — SIGNIFICANT CHANGE UP (ref 3.8–10.5)

## 2023-05-04 PROCEDURE — 76700 US EXAM ABDOM COMPLETE: CPT

## 2023-05-04 PROCEDURE — 76700 US EXAM ABDOM COMPLETE: CPT | Mod: 26

## 2023-05-05 ENCOUNTER — APPOINTMENT (OUTPATIENT)
Dept: ULTRASOUND IMAGING | Facility: IMAGING CENTER | Age: 58
End: 2023-05-05
Payer: MEDICAID

## 2023-05-05 ENCOUNTER — RESULT REVIEW (OUTPATIENT)
Age: 58
End: 2023-05-05

## 2023-05-05 ENCOUNTER — OUTPATIENT (OUTPATIENT)
Dept: OUTPATIENT SERVICES | Facility: HOSPITAL | Age: 58
LOS: 1 days | End: 2023-05-05
Payer: COMMERCIAL

## 2023-05-05 DIAGNOSIS — C34.90 MALIGNANT NEOPLASM OF UNSPECIFIED PART OF UNSPECIFIED BRONCHUS OR LUNG: ICD-10-CM

## 2023-05-05 DIAGNOSIS — Z98.890 OTHER SPECIFIED POSTPROCEDURAL STATES: Chronic | ICD-10-CM

## 2023-05-05 PROCEDURE — 88112 CYTOPATH CELL ENHANCE TECH: CPT

## 2023-05-05 PROCEDURE — 88305 TISSUE EXAM BY PATHOLOGIST: CPT | Mod: 26

## 2023-05-05 PROCEDURE — 88112 CYTOPATH CELL ENHANCE TECH: CPT | Mod: 26

## 2023-05-05 PROCEDURE — 88341 IMHCHEM/IMCYTCHM EA ADD ANTB: CPT | Mod: 26

## 2023-05-05 PROCEDURE — 88341 IMHCHEM/IMCYTCHM EA ADD ANTB: CPT

## 2023-05-05 PROCEDURE — 49083 ABD PARACENTESIS W/IMAGING: CPT

## 2023-05-05 PROCEDURE — 88342 IMHCHEM/IMCYTCHM 1ST ANTB: CPT | Mod: 26

## 2023-05-05 PROCEDURE — 88342 IMHCHEM/IMCYTCHM 1ST ANTB: CPT

## 2023-05-05 PROCEDURE — 88305 TISSUE EXAM BY PATHOLOGIST: CPT

## 2023-05-09 ENCOUNTER — RESULT REVIEW (OUTPATIENT)
Age: 58
End: 2023-05-09

## 2023-05-09 ENCOUNTER — APPOINTMENT (OUTPATIENT)
Dept: HEMATOLOGY ONCOLOGY | Facility: CLINIC | Age: 58
End: 2023-05-09
Payer: MEDICAID

## 2023-05-09 ENCOUNTER — APPOINTMENT (OUTPATIENT)
Dept: INFUSION THERAPY | Facility: HOSPITAL | Age: 58
End: 2023-05-09

## 2023-05-09 VITALS
SYSTOLIC BLOOD PRESSURE: 127 MMHG | WEIGHT: 173.06 LBS | RESPIRATION RATE: 16 BRPM | HEART RATE: 90 BPM | OXYGEN SATURATION: 97 % | DIASTOLIC BLOOD PRESSURE: 87 MMHG | TEMPERATURE: 97.2 F | BODY MASS INDEX: 24.83 KG/M2

## 2023-05-09 DIAGNOSIS — Z78.9 OTHER SPECIFIED HEALTH STATUS: ICD-10-CM

## 2023-05-09 LAB
ALBUMIN SERPL ELPH-MCNC: 3.7 G/DL — SIGNIFICANT CHANGE UP (ref 3.3–5)
ALP SERPL-CCNC: 79 U/L — SIGNIFICANT CHANGE UP (ref 40–120)
ALT FLD-CCNC: 9 U/L — LOW (ref 10–45)
ANION GAP SERPL CALC-SCNC: 12 MMOL/L — SIGNIFICANT CHANGE UP (ref 5–17)
AST SERPL-CCNC: 28 U/L — SIGNIFICANT CHANGE UP (ref 10–40)
BASOPHILS # BLD AUTO: 0.03 K/UL — SIGNIFICANT CHANGE UP (ref 0–0.2)
BASOPHILS NFR BLD AUTO: 0.6 % — SIGNIFICANT CHANGE UP (ref 0–2)
BILIRUB SERPL-MCNC: 0.2 MG/DL — SIGNIFICANT CHANGE UP (ref 0.2–1.2)
BUN SERPL-MCNC: 19 MG/DL — SIGNIFICANT CHANGE UP (ref 7–23)
CALCIUM SERPL-MCNC: 9.5 MG/DL — SIGNIFICANT CHANGE UP (ref 8.4–10.5)
CHLORIDE SERPL-SCNC: 102 MMOL/L — SIGNIFICANT CHANGE UP (ref 96–108)
CO2 SERPL-SCNC: 22 MMOL/L — SIGNIFICANT CHANGE UP (ref 22–31)
CREAT SERPL-MCNC: 1.62 MG/DL — HIGH (ref 0.5–1.3)
EGFR: 50 ML/MIN/1.73M2 — LOW
EOSINOPHIL # BLD AUTO: 0.21 K/UL — SIGNIFICANT CHANGE UP (ref 0–0.5)
EOSINOPHIL NFR BLD AUTO: 4.5 % — SIGNIFICANT CHANGE UP (ref 0–6)
FERRITIN SERPL-MCNC: 21 NG/ML — LOW (ref 30–400)
FOLATE SERPL-MCNC: >20 NG/ML — SIGNIFICANT CHANGE UP
GLUCOSE SERPL-MCNC: 88 MG/DL — SIGNIFICANT CHANGE UP (ref 70–99)
HCT VFR BLD CALC: 27.7 % — LOW (ref 39–50)
HGB BLD-MCNC: 8.4 G/DL — LOW (ref 13–17)
IMM GRANULOCYTES NFR BLD AUTO: 0.2 % — SIGNIFICANT CHANGE UP (ref 0–0.9)
IRON SATN MFR SERPL: 21 UG/DL — LOW (ref 45–165)
IRON SATN MFR SERPL: 6 % — LOW (ref 16–55)
LYMPHOCYTES # BLD AUTO: 0.99 K/UL — LOW (ref 1–3.3)
LYMPHOCYTES # BLD AUTO: 21.1 % — SIGNIFICANT CHANGE UP (ref 13–44)
MCHC RBC-ENTMCNC: 24.9 PG — LOW (ref 27–34)
MCHC RBC-ENTMCNC: 30.3 G/DL — LOW (ref 32–36)
MCV RBC AUTO: 82 FL — SIGNIFICANT CHANGE UP (ref 80–100)
MONOCYTES # BLD AUTO: 0.55 K/UL — SIGNIFICANT CHANGE UP (ref 0–0.9)
MONOCYTES NFR BLD AUTO: 11.7 % — SIGNIFICANT CHANGE UP (ref 2–14)
NEUTROPHILS # BLD AUTO: 2.9 K/UL — SIGNIFICANT CHANGE UP (ref 1.8–7.4)
NEUTROPHILS NFR BLD AUTO: 61.9 % — SIGNIFICANT CHANGE UP (ref 43–77)
NRBC # BLD: 0 /100 WBCS — SIGNIFICANT CHANGE UP (ref 0–0)
PLATELET # BLD AUTO: 302 K/UL — SIGNIFICANT CHANGE UP (ref 150–400)
POTASSIUM SERPL-MCNC: 5.5 MMOL/L — HIGH (ref 3.5–5.3)
POTASSIUM SERPL-SCNC: 5.5 MMOL/L — HIGH (ref 3.5–5.3)
PROT SERPL-MCNC: 7.7 G/DL — SIGNIFICANT CHANGE UP (ref 6–8.3)
RBC # BLD: 3.38 M/UL — LOW (ref 4.2–5.8)
RBC # FLD: 15.9 % — HIGH (ref 10.3–14.5)
SODIUM SERPL-SCNC: 137 MMOL/L — SIGNIFICANT CHANGE UP (ref 135–145)
TIBC SERPL-MCNC: 379 UG/DL — SIGNIFICANT CHANGE UP (ref 220–430)
UIBC SERPL-MCNC: 358 UG/DL — SIGNIFICANT CHANGE UP (ref 110–370)
VIT B12 SERPL-MCNC: 938 PG/ML — SIGNIFICANT CHANGE UP (ref 232–1245)
WBC # BLD: 4.69 K/UL — SIGNIFICANT CHANGE UP (ref 3.8–10.5)
WBC # FLD AUTO: 4.69 K/UL — SIGNIFICANT CHANGE UP (ref 3.8–10.5)

## 2023-05-09 PROCEDURE — 99215 OFFICE O/P EST HI 40 MIN: CPT

## 2023-05-09 NOTE — ASSESSMENT
[FreeTextEntry1] : Metastatic NSCLC with adenocarcinoma histology; tumor tested PD-L1 low–positive and molecular testing was negative for actionable mutations (ARID1A, CASP8, among others).  Patient started first-line systemic therapy with combination chemotherapy and immunotherapy with carboplatin/pemetrexed and pembrolizumab in December 2022 and achieved a nice response.  He was treated with 4 cycles of triplet therapy followed by pemetrexed/pembrolizumab maintenance.  Pleurx catheter was subsequently removed in March 2023.  Pemetrexed was subsequently held due to hematologic toxicity with anemia and he has been on single agent pembrolizumab since April 2023.  Restaging CT April 2023 with new perihepatic ascites with a sustained response otherwise.  He underwent US–paracentesis with 1200 mL ascites removed.\par Recommend:\par – Continue single agent pembrolizumab\par – Follow-up cytology from ascites.  If positive for malignant cells, may need to change systemic therapy.  If negative for malignancy, could consider attempt at management with diuresis and possible hepatobiliary consultation.  Of note, given the findings from his initial PET CT scan, could consider repeating the PET CT scan if the status of his disease was unclear, given that the initial peritoneal findings may possibly be progressing\par – Subsequent line systemic therapy might include an attempt at retreatment with pemetrexed as he never failed this agent but it was only stopped due to hematologic toxicity/anemia\par – Repeat labs today along with anemia evaluation\par All questions answered to his apparent satisfaction

## 2023-05-09 NOTE — HISTORY OF PRESENT ILLNESS
[Disease: _____________________] : Disease: [unfilled] [de-identified] : Metastatic NSCLC with adenocarcinoma histology diagnosed September 2022.  Patient presented to the hospital with SOB and was found to have a left-sided hydropneumothorax with trapped lung.  He underwent a pleuroscopy with pleural biopsy as well as Pleurx catheter placement.  Pleural biopsy revealed adenocarcinoma consistent with lung primary.  Tumor tested PD-L1 low–positive and molecular testing was negative for actionable mutations (ARID1A, CASP8, among others).  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  He was treated with 4 cycles of triplet therapy followed by pemetrexed/pembrolizumab maintenance.  Pleurx catheter was subsequently removed in March 2023.  Pemetrexed was subsequently held due to hematologic toxicity with anemia and he has been on single agent pembrolizumab since April 2023. [de-identified] : – Left pleural biopsy 9/14/2022: Adenocarcinoma consistent with lung primary.\par PD-L1 low–positive at 10%.\par [facility]:  Negative for actionable mutations (ARID1A, CASP8, among others).   [de-identified] : He presents today to establish care as Dr. Gunn has left the practice.\par His most recent restaging scan in April 2023 revealed new perihepatic ascites with an otherwise sustained response.  He was sent for an ultrasound earlier this month revealing mild ascites.  He underwent ultrasound paracentesis on 5/5/2023 with 1200 cc fluid removed; cytology is pending.  He reports feeling improved following the paracentesis but reports the abdominal bloating sensation quickly returned.  He reports that his breathing is satisfactory.  He has had some mild weight loss.

## 2023-05-09 NOTE — PHYSICAL EXAM
[Restricted in physically strenuous activity but ambulatory and able to carry out work of a light or sedentary nature] : Status 1- Restricted in physically strenuous activity but ambulatory and able to carry out work of a light or sedentary nature, e.g., light house work, office work [Thin] : thin [Normal] : affect appropriate [de-identified] : No icterus  [de-identified] : MMM O/P Clear [de-identified] : Supple No LAD  [de-identified] : Clear [de-identified] : S1 S2 [de-identified] : No edema [de-identified] : Soft, Mildly distended, NT, No masses  [de-identified] : No spine/CVA tenderness [de-identified] : Ambulatory

## 2023-05-09 NOTE — RESULTS/DATA
[FreeTextEntry1] : Images Reviewed/Interpreted:\par \par -CT CAP 4/12/23:  \par 1. Since 1/6/2023, the millimeter right upper lobe nodules are unchanged.\par 2. The left pleural catheter has been removed.\par 3. The small left pleural effusion has decreased in size.\par 4. No change in the sclerotic lesions of the thoracic spine.\par 5. Perihepatic ascites is new.\par 6. No change in the left adrenal gland thickening.\par 7. No change in the sclerotic lesions of the pelvis.\par \par -Abdominal U/S 5/4/23:  No cholelithiasis or biliary ductal dilatation.\par Mild ascites.\par \par -U/S Paracentesis 5/5/23:  Therapeutic paracentesis with 1200mL removed.  Specimen sent for cytology.\par \par

## 2023-05-11 ENCOUNTER — NON-APPOINTMENT (OUTPATIENT)
Age: 58
End: 2023-05-11

## 2023-05-12 ENCOUNTER — NON-APPOINTMENT (OUTPATIENT)
Age: 58
End: 2023-05-12

## 2023-05-14 LAB — NON-GYNECOLOGICAL CYTOLOGY STUDY: SIGNIFICANT CHANGE UP

## 2023-05-16 ENCOUNTER — APPOINTMENT (OUTPATIENT)
Dept: INFUSION THERAPY | Facility: HOSPITAL | Age: 58
End: 2023-05-16

## 2023-05-17 ENCOUNTER — NON-APPOINTMENT (OUTPATIENT)
Age: 58
End: 2023-05-17

## 2023-05-18 ENCOUNTER — APPOINTMENT (OUTPATIENT)
Dept: INFUSION THERAPY | Facility: HOSPITAL | Age: 58
End: 2023-05-18

## 2023-05-18 DIAGNOSIS — D50.9 IRON DEFICIENCY ANEMIA, UNSPECIFIED: ICD-10-CM

## 2023-05-20 ENCOUNTER — APPOINTMENT (OUTPATIENT)
Dept: INFUSION THERAPY | Facility: HOSPITAL | Age: 58
End: 2023-05-20

## 2023-05-21 DIAGNOSIS — R11.2 NAUSEA WITH VOMITING, UNSPECIFIED: ICD-10-CM

## 2023-05-23 ENCOUNTER — APPOINTMENT (OUTPATIENT)
Dept: ULTRASOUND IMAGING | Facility: IMAGING CENTER | Age: 58
End: 2023-05-23
Payer: MEDICAID

## 2023-05-23 ENCOUNTER — APPOINTMENT (OUTPATIENT)
Dept: INFUSION THERAPY | Facility: HOSPITAL | Age: 58
End: 2023-05-23

## 2023-05-23 ENCOUNTER — OUTPATIENT (OUTPATIENT)
Dept: OUTPATIENT SERVICES | Facility: HOSPITAL | Age: 58
LOS: 1 days | End: 2023-05-23
Payer: COMMERCIAL

## 2023-05-23 DIAGNOSIS — C34.12 MALIGNANT NEOPLASM OF UPPER LOBE, LEFT BRONCHUS OR LUNG: ICD-10-CM

## 2023-05-23 DIAGNOSIS — Z98.890 OTHER SPECIFIED POSTPROCEDURAL STATES: Chronic | ICD-10-CM

## 2023-05-23 PROCEDURE — 49083 ABD PARACENTESIS W/IMAGING: CPT

## 2023-05-25 ENCOUNTER — APPOINTMENT (OUTPATIENT)
Dept: INFUSION THERAPY | Facility: HOSPITAL | Age: 58
End: 2023-05-25

## 2023-05-26 ENCOUNTER — NON-APPOINTMENT (OUTPATIENT)
Age: 58
End: 2023-05-26

## 2023-05-26 RX ORDER — FOLIC ACID 1 MG/1
1 TABLET ORAL
Qty: 30 | Refills: 3 | Status: ACTIVE | COMMUNITY
Start: 2023-05-26 | End: 1900-01-01

## 2023-05-30 ENCOUNTER — RESULT REVIEW (OUTPATIENT)
Age: 58
End: 2023-05-30

## 2023-05-30 ENCOUNTER — APPOINTMENT (OUTPATIENT)
Dept: HEMATOLOGY ONCOLOGY | Facility: CLINIC | Age: 58
End: 2023-05-30
Payer: MEDICAID

## 2023-05-30 ENCOUNTER — APPOINTMENT (OUTPATIENT)
Dept: INFUSION THERAPY | Facility: HOSPITAL | Age: 58
End: 2023-05-30

## 2023-05-30 VITALS
RESPIRATION RATE: 16 BRPM | DIASTOLIC BLOOD PRESSURE: 79 MMHG | OXYGEN SATURATION: 95 % | BODY MASS INDEX: 23.99 KG/M2 | WEIGHT: 167.55 LBS | HEIGHT: 70 IN | TEMPERATURE: 97.7 F | HEART RATE: 87 BPM | SYSTOLIC BLOOD PRESSURE: 148 MMHG

## 2023-05-30 LAB
BASOPHILS # BLD AUTO: 0.01 K/UL — SIGNIFICANT CHANGE UP (ref 0–0.2)
BASOPHILS NFR BLD AUTO: 0.3 % — SIGNIFICANT CHANGE UP (ref 0–2)
EOSINOPHIL # BLD AUTO: 0 K/UL — SIGNIFICANT CHANGE UP (ref 0–0.5)
EOSINOPHIL NFR BLD AUTO: 0 % — SIGNIFICANT CHANGE UP (ref 0–6)
HCT VFR BLD CALC: 33 % — LOW (ref 39–50)
HGB BLD-MCNC: 10.3 G/DL — LOW (ref 13–17)
IMM GRANULOCYTES NFR BLD AUTO: 0.3 % — SIGNIFICANT CHANGE UP (ref 0–0.9)
LYMPHOCYTES # BLD AUTO: 0.4 K/UL — LOW (ref 1–3.3)
LYMPHOCYTES # BLD AUTO: 10 % — LOW (ref 13–44)
MCHC RBC-ENTMCNC: 25.8 PG — LOW (ref 27–34)
MCHC RBC-ENTMCNC: 31.2 G/DL — LOW (ref 32–36)
MCV RBC AUTO: 82.5 FL — SIGNIFICANT CHANGE UP (ref 80–100)
MONOCYTES # BLD AUTO: 0.1 K/UL — SIGNIFICANT CHANGE UP (ref 0–0.9)
MONOCYTES NFR BLD AUTO: 2.5 % — SIGNIFICANT CHANGE UP (ref 2–14)
NEUTROPHILS # BLD AUTO: 3.47 K/UL — SIGNIFICANT CHANGE UP (ref 1.8–7.4)
NEUTROPHILS NFR BLD AUTO: 86.9 % — HIGH (ref 43–77)
NRBC # BLD: 0 /100 WBCS — SIGNIFICANT CHANGE UP (ref 0–0)
PLATELET # BLD AUTO: 221 K/UL — SIGNIFICANT CHANGE UP (ref 150–400)
RBC # BLD: 4 M/UL — LOW (ref 4.2–5.8)
RBC # FLD: 20.3 % — HIGH (ref 10.3–14.5)
WBC # BLD: 3.99 K/UL — SIGNIFICANT CHANGE UP (ref 3.8–10.5)
WBC # FLD AUTO: 3.99 K/UL — SIGNIFICANT CHANGE UP (ref 3.8–10.5)

## 2023-05-30 PROCEDURE — 99214 OFFICE O/P EST MOD 30 MIN: CPT

## 2023-05-31 LAB
ALBUMIN SERPL ELPH-MCNC: 4.2 G/DL — SIGNIFICANT CHANGE UP (ref 3.3–5)
ALP SERPL-CCNC: 113 U/L — SIGNIFICANT CHANGE UP (ref 40–120)
ALT FLD-CCNC: 16 U/L — SIGNIFICANT CHANGE UP (ref 10–45)
ANION GAP SERPL CALC-SCNC: 13 MMOL/L — SIGNIFICANT CHANGE UP (ref 5–17)
AST SERPL-CCNC: 27 U/L — SIGNIFICANT CHANGE UP (ref 10–40)
BILIRUB SERPL-MCNC: <0.2 MG/DL — SIGNIFICANT CHANGE UP (ref 0.2–1.2)
BUN SERPL-MCNC: 22 MG/DL — SIGNIFICANT CHANGE UP (ref 7–23)
CALCIUM SERPL-MCNC: 9.6 MG/DL — SIGNIFICANT CHANGE UP (ref 8.4–10.5)
CHLORIDE SERPL-SCNC: 100 MMOL/L — SIGNIFICANT CHANGE UP (ref 96–108)
CO2 SERPL-SCNC: 23 MMOL/L — SIGNIFICANT CHANGE UP (ref 22–31)
CREAT SERPL-MCNC: 1.48 MG/DL — HIGH (ref 0.5–1.3)
EGFR: 55 ML/MIN/1.73M2 — LOW
GLUCOSE SERPL-MCNC: 110 MG/DL — HIGH (ref 70–99)
POTASSIUM SERPL-MCNC: 4.8 MMOL/L — SIGNIFICANT CHANGE UP (ref 3.5–5.3)
POTASSIUM SERPL-SCNC: 4.8 MMOL/L — SIGNIFICANT CHANGE UP (ref 3.5–5.3)
PROT SERPL-MCNC: 8 G/DL — SIGNIFICANT CHANGE UP (ref 6–8.3)
SODIUM SERPL-SCNC: 136 MMOL/L — SIGNIFICANT CHANGE UP (ref 135–145)

## 2023-06-02 NOTE — ASSESSMENT
[FreeTextEntry1] : Metastatic NSCLC with adenocarcinoma histology; tumor tested PD-L1 low–positive and molecular testing was negative for actionable mutations (ARID1A, CASP8, among others).  Patient started first-line systemic therapy with combination chemotherapy and immunotherapy with carboplatin/pemetrexed and pembrolizumab in December 2022 and achieved a nice response.  He was treated with 4 cycles of triplet therapy followed by pemetrexed/pembrolizumab maintenance.  Pleurx catheter was subsequently removed in March 2023.  Pemetrexed was subsequently held due to hematologic toxicity with anemia and he has been on single agent pembrolizumab since April 2023.  Restaging CT April 2023 with new perihepatic ascites with a sustained response otherwise.  He underwent US–paracentesis x 2 in May 2023 with positive ascitic fluid cytology for his known lung adenocarcinoma.  \par Recommend:\par – It appears his disease is progressing and a known metastatic site with reaccumulation of malignant ascites.  Therefore recommend change in systemic therapy.  Discontinue pembrolizumab.\par – Recommend change in systemic therapy to single agent pemetrexed 500 mg administered IV every 3 weeks.  Arranged for patient to begin therapy today.  He has experienced hematologic toxicity to this agent in the past and therefore we will monitor for side effects and symptoms.\par – Continue daily folic acid and vitamin B12 every 3 cycles while on pemetrexed.  Administer dexamethasone in the perichemo setting.\par – Iron deficiency anemia: Completed IV iron repletion with Venofer in May 2023; monitor hemoglobin and iron stores\par – Continue US paracentesis as needed to alleviate symptoms while awaiting response to systemic therapy\par – Follow-up prior to C2 of pemetrexed or sooner should problems arise.  Plan to obtain a restaging scan following 3 full cycles to assess response.\par All questions answered to his apparent satisfaction

## 2023-06-02 NOTE — PHYSICAL EXAM
[Restricted in physically strenuous activity but ambulatory and able to carry out work of a light or sedentary nature] : Status 1- Restricted in physically strenuous activity but ambulatory and able to carry out work of a light or sedentary nature, e.g., light house work, office work [Thin] : thin [Normal] : affect appropriate [de-identified] : No icterus  [de-identified] : MMM O/P Clear [de-identified] : Supple No LAD  [de-identified] : Clear [de-identified] : S1 S2 [de-identified] : No edema [de-identified] : Mildly distended, NT, firm [de-identified] : No spine/CVA tenderness [de-identified] : Ambulatory

## 2023-06-02 NOTE — HISTORY OF PRESENT ILLNESS
[Disease: _____________________] : Disease: [unfilled] [de-identified] : Metastatic NSCLC with adenocarcinoma histology diagnosed September 2022.  Patient presented to the hospital with SOB and was found to have a left-sided hydropneumothorax with trapped lung.  He underwent a pleuroscopy with pleural biopsy as well as Pleurx catheter placement.  Pleural biopsy revealed adenocarcinoma consistent with lung primary.  Tumor tested PD-L1 low–positive and molecular testing was negative for actionable mutations (ARID1A, CASP8, among others).  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  He was treated with 4 cycles of triplet therapy followed by pemetrexed/pembrolizumab maintenance.  Pleurx catheter was subsequently removed in March 2023.  Pemetrexed was subsequently held due to hematologic toxicity with anemia and he has been on single agent pembrolizumab since April 2023.  \par Restaging scan in April 2023 revealed new perihepatic ascites with an otherwise sustained response.  He was sent for an ultrasound earlier this month revealing mild ascites.  He underwent ultrasound paracentesis on 5/5/2023 with 1200 cc fluid removed with subsequent US-paracentesis in late May with 900cc fluid removed; cytology was positive for his known lung adenocarcinoma.  Received IV iron repletion with Venofer completed late May 2023.   [de-identified] : – Left pleural biopsy 9/14/2022: Adenocarcinoma consistent with lung primary.\par PD-L1 low–positive at 10%.\par [facility]:  Negative for actionable mutations (ARID1A, CASP8, among others).   [de-identified] : Restaging scan in April 2023 revealed new perihepatic ascites with an otherwise sustained response.  He was sent for an ultrasound earlier this month revealing mild ascites.  He underwent ultrasound paracentesis on 5/5/2023 with 1200 cc fluid removed with subsequent US-paracentesis in late May with 900cc fluid removed; cytology was positive for his known lung adenocarcinoma.  \par Patient has undergone 2 abdominal paracenteses during the month of May in which 1200 and 900 cc of fluid have been removed.  Ascitic fluid cytology from 5/5/2023 was positive for malignant cells revealing metastatic adenocarcinoma compatible with lung origin.\par \par Patient presents today prior to continued systemic therapy.  Arrangements were made for change of systemic therapy to single agent pemetrexed; he was previously on this agent and never failed it as it was stopped secondary to hematologic toxicity/anemia.\par \par He reports that his abdominal distention is slowly increasing.

## 2023-06-08 ENCOUNTER — OUTPATIENT (OUTPATIENT)
Dept: OUTPATIENT SERVICES | Facility: HOSPITAL | Age: 58
LOS: 1 days | Discharge: HOME | End: 2023-06-08

## 2023-06-08 DIAGNOSIS — C34.90 MALIGNANT NEOPLASM OF UNSPECIFIED PART OF UNSPECIFIED BRONCHUS OR LUNG: ICD-10-CM

## 2023-06-08 DIAGNOSIS — Z98.890 OTHER SPECIFIED POSTPROCEDURAL STATES: Chronic | ICD-10-CM

## 2023-06-20 ENCOUNTER — APPOINTMENT (OUTPATIENT)
Dept: INFUSION THERAPY | Facility: HOSPITAL | Age: 58
End: 2023-06-20

## 2023-06-20 ENCOUNTER — RESULT REVIEW (OUTPATIENT)
Age: 58
End: 2023-06-20

## 2023-06-20 ENCOUNTER — APPOINTMENT (OUTPATIENT)
Dept: HEMATOLOGY ONCOLOGY | Facility: CLINIC | Age: 58
End: 2023-06-20
Payer: MEDICAID

## 2023-06-20 VITALS
WEIGHT: 165.55 LBS | RESPIRATION RATE: 16 BRPM | OXYGEN SATURATION: 94 % | HEART RATE: 79 BPM | DIASTOLIC BLOOD PRESSURE: 87 MMHG | BODY MASS INDEX: 23.76 KG/M2 | TEMPERATURE: 97.2 F | SYSTOLIC BLOOD PRESSURE: 141 MMHG

## 2023-06-20 DIAGNOSIS — Z51.11 ENCOUNTER FOR ANTINEOPLASTIC CHEMOTHERAPY: ICD-10-CM

## 2023-06-20 DIAGNOSIS — R11.2 NAUSEA WITH VOMITING, UNSPECIFIED: ICD-10-CM

## 2023-06-20 LAB
BASOPHILS # BLD AUTO: 0.02 K/UL — SIGNIFICANT CHANGE UP (ref 0–0.2)
BASOPHILS NFR BLD AUTO: 0.4 % — SIGNIFICANT CHANGE UP (ref 0–2)
EOSINOPHIL # BLD AUTO: 0.01 K/UL — SIGNIFICANT CHANGE UP (ref 0–0.5)
EOSINOPHIL NFR BLD AUTO: 0.2 % — SIGNIFICANT CHANGE UP (ref 0–6)
HCT VFR BLD CALC: 31.8 % — LOW (ref 39–50)
HGB BLD-MCNC: 10.2 G/DL — LOW (ref 13–17)
IMM GRANULOCYTES NFR BLD AUTO: 1.9 % — HIGH (ref 0–0.9)
LYMPHOCYTES # BLD AUTO: 0.77 K/UL — LOW (ref 1–3.3)
LYMPHOCYTES # BLD AUTO: 14.6 % — SIGNIFICANT CHANGE UP (ref 13–44)
MCHC RBC-ENTMCNC: 26.4 PG — LOW (ref 27–34)
MCHC RBC-ENTMCNC: 32.1 G/DL — SIGNIFICANT CHANGE UP (ref 32–36)
MCV RBC AUTO: 82.2 FL — SIGNIFICANT CHANGE UP (ref 80–100)
MONOCYTES # BLD AUTO: 0.61 K/UL — SIGNIFICANT CHANGE UP (ref 0–0.9)
MONOCYTES NFR BLD AUTO: 11.5 % — SIGNIFICANT CHANGE UP (ref 2–14)
NEUTROPHILS # BLD AUTO: 3.78 K/UL — SIGNIFICANT CHANGE UP (ref 1.8–7.4)
NEUTROPHILS NFR BLD AUTO: 71.4 % — SIGNIFICANT CHANGE UP (ref 43–77)
NRBC # BLD: 0 /100 WBCS — SIGNIFICANT CHANGE UP (ref 0–0)
PLATELET # BLD AUTO: 400 K/UL — SIGNIFICANT CHANGE UP (ref 150–400)
RBC # BLD: 3.87 M/UL — LOW (ref 4.2–5.8)
RBC # FLD: 22.8 % — HIGH (ref 10.3–14.5)
WBC # BLD: 5.29 K/UL — SIGNIFICANT CHANGE UP (ref 3.8–10.5)
WBC # FLD AUTO: 5.29 K/UL — SIGNIFICANT CHANGE UP (ref 3.8–10.5)

## 2023-06-20 PROCEDURE — 99214 OFFICE O/P EST MOD 30 MIN: CPT

## 2023-06-21 LAB
ALBUMIN SERPL ELPH-MCNC: 3.5 G/DL — SIGNIFICANT CHANGE UP (ref 3.3–5)
ALP SERPL-CCNC: 94 U/L — SIGNIFICANT CHANGE UP (ref 40–120)
ALT FLD-CCNC: 8 U/L — LOW (ref 10–45)
ANION GAP SERPL CALC-SCNC: 11 MMOL/L — SIGNIFICANT CHANGE UP (ref 5–17)
AST SERPL-CCNC: 32 U/L — SIGNIFICANT CHANGE UP (ref 10–40)
BILIRUB SERPL-MCNC: <0.2 MG/DL — SIGNIFICANT CHANGE UP (ref 0.2–1.2)
BUN SERPL-MCNC: 21 MG/DL — SIGNIFICANT CHANGE UP (ref 7–23)
CALCIUM SERPL-MCNC: 9.3 MG/DL — SIGNIFICANT CHANGE UP (ref 8.4–10.5)
CHLORIDE SERPL-SCNC: 104 MMOL/L — SIGNIFICANT CHANGE UP (ref 96–108)
CO2 SERPL-SCNC: 21 MMOL/L — LOW (ref 22–31)
CREAT SERPL-MCNC: 1.37 MG/DL — HIGH (ref 0.5–1.3)
EGFR: 60 ML/MIN/1.73M2 — SIGNIFICANT CHANGE UP
GLUCOSE SERPL-MCNC: 114 MG/DL — HIGH (ref 70–99)
POTASSIUM SERPL-MCNC: 5.5 MMOL/L — HIGH (ref 3.5–5.3)
POTASSIUM SERPL-SCNC: 5.5 MMOL/L — HIGH (ref 3.5–5.3)
PROT SERPL-MCNC: 7.3 G/DL — SIGNIFICANT CHANGE UP (ref 6–8.3)
SODIUM SERPL-SCNC: 136 MMOL/L — SIGNIFICANT CHANGE UP (ref 135–145)

## 2023-06-21 NOTE — HISTORY OF PRESENT ILLNESS
[Disease: _____________________] : Disease: [unfilled] [de-identified] : Metastatic NSCLC with adenocarcinoma histology diagnosed September 2022.  Patient presented to the hospital with SOB and was found to have a left-sided hydropneumothorax with trapped lung.  He underwent a pleuroscopy with pleural biopsy as well as Pleurx catheter placement.  Pleural biopsy revealed adenocarcinoma consistent with lung primary.  Tumor tested PD-L1 low–positive and molecular testing was negative for actionable mutations (ARID1A, CASP8, among others).  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  He was treated with 4 cycles of triplet therapy followed by pemetrexed/pembrolizumab maintenance.  Pleurx catheter was subsequently removed in March 2023.  Pemetrexed was subsequently held due to hematologic toxicity with anemia and he has been on single agent pembrolizumab since April 2023.  \par Restaging scan in April 2023 revealed new perihepatic ascites with an otherwise sustained response.  He was sent for an ultrasound earlier this month revealing mild ascites.  He underwent ultrasound paracentesis on 5/5/2023 with 1200 cc fluid removed with subsequent US-paracentesis in late May with 900cc fluid removed; cytology was positive for his known lung adenocarcinoma.  Received IV iron repletion with Venofer completed late May 2023.   [de-identified] : – Left pleural biopsy 9/14/2022: Adenocarcinoma consistent with lung primary.\par PD-L1 low–positive at 10%.\par [facility] One:  Negative for actionable mutations (ARID1A, CASP8, among others).   [de-identified] : Patient presents today prior to C2 with single agent Pemetrexed.\par He reports two episodes of tingling of the entire right side of his body mild sensation of gagging during one of the episodes that resolved spontaneously; denies falls or residual symptoms. \par He is otherwise tolerating treatment well overall with mild constipation that is managed with a bowel regimen of colace/senna. He reports lower appetite and has lost ~2 lbs; his abdomen feels tighter but not as bloated as he was prior to his last paracentesis. He has stable HORTON. \par \par

## 2023-06-21 NOTE — ASSESSMENT
[FreeTextEntry1] : Metastatic NSCLC with adenocarcinoma histology; tumor tested PD-L1 low–positive and molecular testing was negative for actionable mutations (ARID1A, CASP8, among others).  Patient started first-line systemic therapy with combination chemotherapy and immunotherapy with carboplatin/pemetrexed and pembrolizumab in December 2022 and achieved a nice response.  He was treated with 4 cycles of triplet therapy followed by pemetrexed/pembrolizumab maintenance.  Pleurx catheter was subsequently removed in March 2023.  Pemetrexed was subsequently held due to hematologic toxicity with anemia and he has been on single agent pembrolizumab since April 2023.  Restaging CT April 2023 with new perihepatic ascites with a sustained response otherwise.  He underwent US–paracentesis x 2 in May 2023 with positive ascitic fluid cytology for his known lung adenocarcinoma. Began single agent Pemetrexed on 5/30/23. \par Recommend:\par –Continue single agent pemetrexed 500 mg administered IV every 3 weeks. \par – Continue daily folic acid and vitamin B12 every 3 cycles while on pemetrexed.  Administer dexamethasone in the perichemo setting.\par -Episode of right-sided tingling: consider carotid study to r/o stenosis. \par – Iron deficiency anemia: Completed IV iron repletion with Venofer in May 2023; monitor hemoglobin and iron stores\par – Continue US paracentesis as needed to alleviate symptoms while awaiting response to systemic therapy; arranged for repeat paracentesis to be scheduled. \par – Follow-up prior to each cycle going forward or sooner if needed.  Plan to obtain a restaging scan following 3 full cycles (rec given) to assess response.\par -Information sheet given with directions for making appointments.

## 2023-06-21 NOTE — PHYSICAL EXAM
[Restricted in physically strenuous activity but ambulatory and able to carry out work of a light or sedentary nature] : Status 1- Restricted in physically strenuous activity but ambulatory and able to carry out work of a light or sedentary nature, e.g., light house work, office work [Thin] : thin [Normal] : affect appropriate [de-identified] : No icterus  [de-identified] : MMM O/P Clear [de-identified] : Supple No LAD  [de-identified] : Clear [de-identified] : S1 S2. No carotid bruit appreciated [de-identified] : No edema [de-identified] : Mildly distended, mild tenderness, firm [de-identified] : No spine/CVA tenderness [de-identified] : Ambulatory

## 2023-06-21 NOTE — RESULTS/DATA
[FreeTextEntry1] : -CT CAP 4/12/23:  \par 1. Since 1/6/2023, the millimeter right upper lobe nodules are unchanged.\par 2. The left pleural catheter has been removed.\par 3. The small left pleural effusion has decreased in size.\par 4. No change in the sclerotic lesions of the thoracic spine.\par 5. Perihepatic ascites is new.\par 6. No change in the left adrenal gland thickening.\par 7. No change in the sclerotic lesions of the pelvis.\par \par -Abdominal U/S 5/4/23:  No cholelithiasis or biliary ductal dilatation.  Mild ascites.\par \par -U/S Paracentesis 5/5/23:  Therapeutic paracentesis with 1200mL removed.  Specimen sent for cytology.\par \par – US paracentesis 5/23/2023: Therapeutic paracentesis with 900 cc of fluid removed.\par \par

## 2023-06-22 ENCOUNTER — LABORATORY RESULT (OUTPATIENT)
Age: 58
End: 2023-06-22

## 2023-07-03 ENCOUNTER — OUTPATIENT (OUTPATIENT)
Dept: OUTPATIENT SERVICES | Facility: HOSPITAL | Age: 58
LOS: 1 days | End: 2023-07-03
Payer: COMMERCIAL

## 2023-07-03 ENCOUNTER — APPOINTMENT (OUTPATIENT)
Dept: ULTRASOUND IMAGING | Facility: IMAGING CENTER | Age: 58
End: 2023-07-03
Payer: MEDICAID

## 2023-07-03 DIAGNOSIS — C34.90 MALIGNANT NEOPLASM OF UNSPECIFIED PART OF UNSPECIFIED BRONCHUS OR LUNG: ICD-10-CM

## 2023-07-03 DIAGNOSIS — Z98.890 OTHER SPECIFIED POSTPROCEDURAL STATES: Chronic | ICD-10-CM

## 2023-07-03 DIAGNOSIS — R18.0 MALIGNANT ASCITES: ICD-10-CM

## 2023-07-03 PROCEDURE — 49083 ABD PARACENTESIS W/IMAGING: CPT

## 2023-07-11 ENCOUNTER — RESULT REVIEW (OUTPATIENT)
Age: 58
End: 2023-07-11

## 2023-07-11 ENCOUNTER — APPOINTMENT (OUTPATIENT)
Dept: HEMATOLOGY ONCOLOGY | Facility: CLINIC | Age: 58
End: 2023-07-11
Payer: MEDICAID

## 2023-07-11 ENCOUNTER — APPOINTMENT (OUTPATIENT)
Dept: INFUSION THERAPY | Facility: HOSPITAL | Age: 58
End: 2023-07-11

## 2023-07-11 VITALS
OXYGEN SATURATION: 98 % | TEMPERATURE: 97.5 F | BODY MASS INDEX: 23.57 KG/M2 | RESPIRATION RATE: 16 BRPM | SYSTOLIC BLOOD PRESSURE: 119 MMHG | DIASTOLIC BLOOD PRESSURE: 80 MMHG | HEART RATE: 93 BPM | WEIGHT: 164.24 LBS

## 2023-07-11 LAB
BASOPHILS # BLD AUTO: 0.04 K/UL — SIGNIFICANT CHANGE UP (ref 0–0.2)
BASOPHILS NFR BLD AUTO: 0.6 % — SIGNIFICANT CHANGE UP (ref 0–2)
EOSINOPHIL # BLD AUTO: 0.13 K/UL — SIGNIFICANT CHANGE UP (ref 0–0.5)
EOSINOPHIL NFR BLD AUTO: 1.9 % — SIGNIFICANT CHANGE UP (ref 0–6)
HCT VFR BLD CALC: 27.4 % — LOW (ref 39–50)
HGB BLD-MCNC: 8.8 G/DL — LOW (ref 13–17)
IMM GRANULOCYTES NFR BLD AUTO: 0.3 % — SIGNIFICANT CHANGE UP (ref 0–0.9)
LYMPHOCYTES # BLD AUTO: 1.13 K/UL — SIGNIFICANT CHANGE UP (ref 1–3.3)
LYMPHOCYTES # BLD AUTO: 16.7 % — SIGNIFICANT CHANGE UP (ref 13–44)
MCHC RBC-ENTMCNC: 27.1 PG — SIGNIFICANT CHANGE UP (ref 27–34)
MCHC RBC-ENTMCNC: 32.1 G/DL — SIGNIFICANT CHANGE UP (ref 32–36)
MCV RBC AUTO: 84.3 FL — SIGNIFICANT CHANGE UP (ref 80–100)
MONOCYTES # BLD AUTO: 0.97 K/UL — HIGH (ref 0–0.9)
MONOCYTES NFR BLD AUTO: 14.3 % — HIGH (ref 2–14)
NEUTROPHILS # BLD AUTO: 4.47 K/UL — SIGNIFICANT CHANGE UP (ref 1.8–7.4)
NEUTROPHILS NFR BLD AUTO: 66.2 % — SIGNIFICANT CHANGE UP (ref 43–77)
NRBC # BLD: 0 /100 WBCS — SIGNIFICANT CHANGE UP (ref 0–0)
PLATELET # BLD AUTO: 314 K/UL — SIGNIFICANT CHANGE UP (ref 150–400)
RBC # BLD: 3.25 M/UL — LOW (ref 4.2–5.8)
RBC # FLD: 24.8 % — HIGH (ref 10.3–14.5)
WBC # BLD: 6.76 K/UL — SIGNIFICANT CHANGE UP (ref 3.8–10.5)
WBC # FLD AUTO: 6.76 K/UL — SIGNIFICANT CHANGE UP (ref 3.8–10.5)

## 2023-07-11 PROCEDURE — 99214 OFFICE O/P EST MOD 30 MIN: CPT

## 2023-07-11 NOTE — PHYSICAL EXAM
[Restricted in physically strenuous activity but ambulatory and able to carry out work of a light or sedentary nature] : Status 1- Restricted in physically strenuous activity but ambulatory and able to carry out work of a light or sedentary nature, e.g., light house work, office work [Thin] : thin [Normal] : affect appropriate [de-identified] : No icterus  [de-identified] : MMM O/P Clear [de-identified] : Supple No LAD  [de-identified] : Clear [de-identified] : S1 S2. No carotid bruit appreciated [de-identified] : No edema [de-identified] : Mildly distended, Firm  [de-identified] : No spine/CVA tenderness [de-identified] : Ambulatory

## 2023-07-11 NOTE — HISTORY OF PRESENT ILLNESS
[Disease: _____________________] : Disease: [unfilled] [de-identified] : Metastatic NSCLC with adenocarcinoma histology diagnosed September 2022.  Patient presented to the hospital with SOB and was found to have a left-sided hydropneumothorax with trapped lung.  He underwent a pleuroscopy with pleural biopsy as well as Pleurx catheter placement.  Pleural biopsy revealed adenocarcinoma consistent with lung primary.  Tumor tested PD-L1 low–positive and molecular testing was negative for actionable mutations (ARID1A, CASP8, among others).  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  He was treated with 4 cycles of triplet therapy followed by pemetrexed/pembrolizumab maintenance.  Pleurx catheter was subsequently removed in March 2023.  Pemetrexed was subsequently held due to hematologic toxicity with anemia and he has been on single agent pembrolizumab since April 2023.  \par Restaging scan in April 2023 revealed new perihepatic ascites with an otherwise sustained response.  He was sent for an ultrasound earlier this month revealing mild ascites.  He underwent ultrasound paracentesis on 5/5/2023 with 1200 cc fluid removed with subsequent US-paracentesis in late May with 900cc fluid removed; cytology was positive for his known lung adenocarcinoma.  Received IV iron repletion with Venofer completed late May 2023.   [de-identified] : – Left pleural biopsy 9/14/2022: Adenocarcinoma consistent with lung primary.\par PD-L1 low–positive at 10%.\par [facility] One:  Negative for actionable mutations (ARID1A, CASP8, among others).   [de-identified] : Patient presents today prior to C3 with single agent Pemetrexed.\par No further tingling or gagging episodes.  \par Tolerating Pemetrexed.  Constipation managed with bowel regimen.  Had paracentesis on 7/3 with about 750mL fluid removed.  He notes the abdominal symptoms but does not feel that things have reaccumulated much thus far.  Has HORTON.

## 2023-07-11 NOTE — RESULTS/DATA
[FreeTextEntry1] : -CT CAP 4/12/23:  \par 1. Since 1/6/2023, the millimeter right upper lobe nodules are unchanged.\par 2. The left pleural catheter has been removed.\par 3. The small left pleural effusion has decreased in size.\par 4. No change in the sclerotic lesions of the thoracic spine.\par 5. Perihepatic ascites is new.\par 6. No change in the left adrenal gland thickening.\par 7. No change in the sclerotic lesions of the pelvis.\par \par -Abdominal U/S 5/4/23:  No cholelithiasis or biliary ductal dilatation.  Mild ascites.\par \par -U/S Paracentesis 5/5/23:  Therapeutic paracentesis with 1200mL removed.  Specimen sent for cytology.\par \par – US paracentesis 5/23/2023: Therapeutic paracentesis with 900 cc of fluid removed.\par \par -US Paracentesis 7/3/23:  750mL fluid removed. \par \par

## 2023-07-11 NOTE — ASSESSMENT
[FreeTextEntry1] : Metastatic NSCLC with adenocarcinoma histology; tumor tested PD-L1 low–positive and molecular testing was negative for actionable mutations (ARID1A, CASP8, among others).  Patient started first-line systemic therapy with combination chemotherapy and immunotherapy with carboplatin/pemetrexed and pembrolizumab in December 2022 and achieved a nice response.  He was treated with 4 cycles of triplet therapy followed by pemetrexed/pembrolizumab maintenance.  Pleurx catheter was subsequently removed in March 2023.  Pemetrexed was subsequently held due to hematologic toxicity with anemia and he has been on single agent pembrolizumab since April 2023.  Restaging CT April 2023 with new perihepatic ascites with a sustained response otherwise.  He underwent US–paracentesis x 2 in May 2023 with positive ascitic fluid cytology for his known lung adenocarcinoma. Began single agent Pemetrexed on 5/30/23. \par Recommend:\par –Continue single agent pemetrexed 500 mg administered IV every 3 weeks. \par – Continue daily folic acid and vitamin B12 every 3 cycles and Dex in the willy-chemo while on pemetrexed.  \par – Iron deficiency anemia: Completed IV iron repletion with Venofer in May 2023; monitor hemoglobin and iron stores\par – Continue US paracentesis as needed to alleviate symptoms while awaiting response to systemic therapy; most recent procedure 7/3/23.  \par – Follow-up prior to next cycle with restaging scan obtained beforehand or sooner should problems arise

## 2023-07-12 LAB
ALBUMIN SERPL ELPH-MCNC: 3.7 G/DL — SIGNIFICANT CHANGE UP (ref 3.3–5)
ALP SERPL-CCNC: 96 U/L — SIGNIFICANT CHANGE UP (ref 40–120)
ALT FLD-CCNC: 9 U/L — LOW (ref 10–45)
ANION GAP SERPL CALC-SCNC: 11 MMOL/L — SIGNIFICANT CHANGE UP (ref 5–17)
AST SERPL-CCNC: 28 U/L — SIGNIFICANT CHANGE UP (ref 10–40)
BILIRUB SERPL-MCNC: <0.2 MG/DL — SIGNIFICANT CHANGE UP (ref 0.2–1.2)
BUN SERPL-MCNC: 18 MG/DL — SIGNIFICANT CHANGE UP (ref 7–23)
CALCIUM SERPL-MCNC: 9.4 MG/DL — SIGNIFICANT CHANGE UP (ref 8.4–10.5)
CHLORIDE SERPL-SCNC: 102 MMOL/L — SIGNIFICANT CHANGE UP (ref 96–108)
CO2 SERPL-SCNC: 24 MMOL/L — SIGNIFICANT CHANGE UP (ref 22–31)
CREAT SERPL-MCNC: 1.75 MG/DL — HIGH (ref 0.5–1.3)
EGFR: 45 ML/MIN/1.73M2 — LOW
GLUCOSE SERPL-MCNC: 90 MG/DL — SIGNIFICANT CHANGE UP (ref 70–99)
POTASSIUM SERPL-MCNC: 4.7 MMOL/L — SIGNIFICANT CHANGE UP (ref 3.5–5.3)
POTASSIUM SERPL-SCNC: 4.7 MMOL/L — SIGNIFICANT CHANGE UP (ref 3.5–5.3)
PROT SERPL-MCNC: 7.6 G/DL — SIGNIFICANT CHANGE UP (ref 6–8.3)
SODIUM SERPL-SCNC: 136 MMOL/L — SIGNIFICANT CHANGE UP (ref 135–145)

## 2023-07-21 ENCOUNTER — APPOINTMENT (OUTPATIENT)
Dept: CT IMAGING | Facility: IMAGING CENTER | Age: 58
End: 2023-07-21
Payer: MEDICAID

## 2023-07-21 ENCOUNTER — OUTPATIENT (OUTPATIENT)
Dept: OUTPATIENT SERVICES | Facility: HOSPITAL | Age: 58
LOS: 1 days | End: 2023-07-21
Payer: COMMERCIAL

## 2023-07-21 ENCOUNTER — RESULT REVIEW (OUTPATIENT)
Age: 58
End: 2023-07-21

## 2023-07-21 DIAGNOSIS — C34.12 MALIGNANT NEOPLASM OF UPPER LOBE, LEFT BRONCHUS OR LUNG: ICD-10-CM

## 2023-07-21 DIAGNOSIS — Z98.890 OTHER SPECIFIED POSTPROCEDURAL STATES: Chronic | ICD-10-CM

## 2023-07-21 PROCEDURE — 71260 CT THORAX DX C+: CPT

## 2023-07-21 PROCEDURE — 74177 CT ABD & PELVIS W/CONTRAST: CPT

## 2023-07-21 PROCEDURE — 71260 CT THORAX DX C+: CPT | Mod: 26

## 2023-07-21 PROCEDURE — 74177 CT ABD & PELVIS W/CONTRAST: CPT | Mod: 26

## 2023-07-27 ENCOUNTER — INPATIENT (INPATIENT)
Facility: HOSPITAL | Age: 58
LOS: 8 days | Discharge: HOME | End: 2023-08-05
Attending: STUDENT IN AN ORGANIZED HEALTH CARE EDUCATION/TRAINING PROGRAM | Admitting: STUDENT IN AN ORGANIZED HEALTH CARE EDUCATION/TRAINING PROGRAM
Payer: MEDICAID

## 2023-07-27 VITALS
RESPIRATION RATE: 22 BRPM | HEIGHT: 70 IN | TEMPERATURE: 98 F | SYSTOLIC BLOOD PRESSURE: 140 MMHG | DIASTOLIC BLOOD PRESSURE: 92 MMHG | OXYGEN SATURATION: 95 % | HEART RATE: 120 BPM

## 2023-07-27 DIAGNOSIS — D64.9 ANEMIA, UNSPECIFIED: ICD-10-CM

## 2023-07-27 DIAGNOSIS — R18.8 OTHER ASCITES: ICD-10-CM

## 2023-07-27 DIAGNOSIS — Z98.890 OTHER SPECIFIED POSTPROCEDURAL STATES: Chronic | ICD-10-CM

## 2023-07-27 DIAGNOSIS — I26.99 OTHER PULMONARY EMBOLISM WITHOUT ACUTE COR PULMONALE: ICD-10-CM

## 2023-07-27 DIAGNOSIS — N17.9 ACUTE KIDNEY FAILURE, UNSPECIFIED: ICD-10-CM

## 2023-07-27 DIAGNOSIS — Z29.9 ENCOUNTER FOR PROPHYLACTIC MEASURES, UNSPECIFIED: ICD-10-CM

## 2023-07-27 DIAGNOSIS — C34.90 MALIGNANT NEOPLASM OF UNSPECIFIED PART OF UNSPECIFIED BRONCHUS OR LUNG: ICD-10-CM

## 2023-07-27 DIAGNOSIS — K56.609 UNSPECIFIED INTESTINAL OBSTRUCTION, UNSPECIFIED AS TO PARTIAL VERSUS COMPLETE OBSTRUCTION: ICD-10-CM

## 2023-07-27 LAB
A1C WITH ESTIMATED AVERAGE GLUCOSE RESULT: 5.9 % — HIGH (ref 4–5.6)
ALBUMIN FLD-MCNC: 3.1 G/DL — SIGNIFICANT CHANGE UP
ALBUMIN SERPL ELPH-MCNC: 3.7 G/DL — SIGNIFICANT CHANGE UP (ref 3.3–5)
ALP SERPL-CCNC: 95 U/L — SIGNIFICANT CHANGE UP (ref 40–120)
ALT FLD-CCNC: 10 U/L — SIGNIFICANT CHANGE UP (ref 4–41)
ANION GAP SERPL CALC-SCNC: 16 MMOL/L — HIGH (ref 7–14)
ANION GAP SERPL CALC-SCNC: 16 MMOL/L — HIGH (ref 7–14)
ANISOCYTOSIS BLD QL: SIGNIFICANT CHANGE UP
APTT BLD: 100.3 SEC — HIGH (ref 24.5–35.6)
APTT BLD: 40.4 SEC — HIGH (ref 24.5–35.6)
APTT BLD: 48.1 SEC — HIGH (ref 24.5–35.6)
AST SERPL-CCNC: 24 U/L — SIGNIFICANT CHANGE UP (ref 4–40)
B PERT IGG+IGM PNL SER: ABNORMAL
BASE EXCESS BLDV CALC-SCNC: 5 MMOL/L — HIGH (ref -2–3)
BASOPHILS # BLD AUTO: 0 K/UL — SIGNIFICANT CHANGE UP (ref 0–0.2)
BASOPHILS NFR BLD AUTO: 0 % — SIGNIFICANT CHANGE UP (ref 0–2)
BILIRUB SERPL-MCNC: 0.2 MG/DL — SIGNIFICANT CHANGE UP (ref 0.2–1.2)
BLD GP AB SCN SERPL QL: NEGATIVE — SIGNIFICANT CHANGE UP
BLOOD GAS VENOUS COMPREHENSIVE RESULT: SIGNIFICANT CHANGE UP
BUN SERPL-MCNC: 32 MG/DL — HIGH (ref 7–23)
BUN SERPL-MCNC: 33 MG/DL — HIGH (ref 7–23)
CALCIUM SERPL-MCNC: 9 MG/DL — SIGNIFICANT CHANGE UP (ref 8.4–10.5)
CALCIUM SERPL-MCNC: 9.1 MG/DL — SIGNIFICANT CHANGE UP (ref 8.4–10.5)
CHLORIDE BLDV-SCNC: 100 MMOL/L — SIGNIFICANT CHANGE UP (ref 96–108)
CHLORIDE SERPL-SCNC: 96 MMOL/L — LOW (ref 98–107)
CHLORIDE SERPL-SCNC: 97 MMOL/L — LOW (ref 98–107)
CO2 BLDV-SCNC: 31.9 MMOL/L — HIGH (ref 22–26)
CO2 SERPL-SCNC: 24 MMOL/L — SIGNIFICANT CHANGE UP (ref 22–31)
CO2 SERPL-SCNC: 25 MMOL/L — SIGNIFICANT CHANGE UP (ref 22–31)
COLOR FLD: ABNORMAL
CREAT ?TM UR-MCNC: 268 MG/DL — SIGNIFICANT CHANGE UP
CREAT SERPL-MCNC: 2.1 MG/DL — HIGH (ref 0.5–1.3)
CREAT SERPL-MCNC: 2.22 MG/DL — HIGH (ref 0.5–1.3)
EGFR: 34 ML/MIN/1.73M2 — LOW
EGFR: 36 ML/MIN/1.73M2 — LOW
EOSINOPHIL # BLD AUTO: 0.08 K/UL — SIGNIFICANT CHANGE UP (ref 0–0.5)
EOSINOPHIL # FLD: 1 % — SIGNIFICANT CHANGE UP
EOSINOPHIL NFR BLD AUTO: 1.7 % — SIGNIFICANT CHANGE UP (ref 0–6)
ESTIMATED AVERAGE GLUCOSE: 123 — SIGNIFICANT CHANGE UP
FERRITIN SERPL-MCNC: 732 NG/ML — HIGH (ref 30–400)
FOLATE SERPL-MCNC: 12.6 NG/ML — SIGNIFICANT CHANGE UP (ref 3.1–17.5)
FOLATE+VIT B12 SERBLD-IMP: 0 % — SIGNIFICANT CHANGE UP
GAS PNL BLDV: 136 MMOL/L — SIGNIFICANT CHANGE UP (ref 136–145)
GIANT PLATELETS BLD QL SMEAR: PRESENT — SIGNIFICANT CHANGE UP
GLUCOSE BLDV-MCNC: 101 MG/DL — HIGH (ref 70–99)
GLUCOSE FLD-MCNC: 87 MG/DL — SIGNIFICANT CHANGE UP
GLUCOSE SERPL-MCNC: 104 MG/DL — HIGH (ref 70–99)
GLUCOSE SERPL-MCNC: 89 MG/DL — SIGNIFICANT CHANGE UP (ref 70–99)
GRAM STN FLD: SIGNIFICANT CHANGE UP
HCO3 BLDV-SCNC: 30 MMOL/L — HIGH (ref 22–29)
HCT VFR BLD CALC: 27.9 % — LOW (ref 39–50)
HCT VFR BLD CALC: 29.6 % — LOW (ref 39–50)
HCT VFR BLD CALC: 30.3 % — LOW (ref 39–50)
HCT VFR BLDA CALC: 30 % — LOW (ref 39–51)
HGB BLD CALC-MCNC: 10.1 G/DL — LOW (ref 12.6–17.4)
HGB BLD-MCNC: 8.8 G/DL — LOW (ref 13–17)
HGB BLD-MCNC: 9.1 G/DL — LOW (ref 13–17)
HGB BLD-MCNC: 9.7 G/DL — LOW (ref 13–17)
IANC: 3.25 K/UL — SIGNIFICANT CHANGE UP (ref 1.8–7.4)
INR BLD: 1.35 RATIO — HIGH (ref 0.85–1.18)
IRON SATN MFR SERPL: 18 % — SIGNIFICANT CHANGE UP (ref 14–50)
IRON SATN MFR SERPL: 36 UG/DL — LOW (ref 45–165)
LACTATE BLDV-MCNC: 1.4 MMOL/L — SIGNIFICANT CHANGE UP (ref 0.5–2)
LDH SERPL L TO P-CCNC: 449 U/L — SIGNIFICANT CHANGE UP
LIDOCAIN IGE QN: 33 U/L — SIGNIFICANT CHANGE UP (ref 7–60)
LYMPHOCYTES # BLD AUTO: 0.74 K/UL — LOW (ref 1–3.3)
LYMPHOCYTES # BLD AUTO: 14.8 % — SIGNIFICANT CHANGE UP (ref 13–44)
LYMPHOCYTES # FLD: 68 % — SIGNIFICANT CHANGE UP
MAGNESIUM SERPL-MCNC: 3.1 MG/DL — HIGH (ref 1.6–2.6)
MANUAL SMEAR VERIFICATION: SIGNIFICANT CHANGE UP
MCHC RBC-ENTMCNC: 27.4 PG — SIGNIFICANT CHANGE UP (ref 27–34)
MCHC RBC-ENTMCNC: 27.8 PG — SIGNIFICANT CHANGE UP (ref 27–34)
MCHC RBC-ENTMCNC: 28.1 PG — SIGNIFICANT CHANGE UP (ref 27–34)
MCHC RBC-ENTMCNC: 30.7 GM/DL — LOW (ref 32–36)
MCHC RBC-ENTMCNC: 31.5 GM/DL — LOW (ref 32–36)
MCHC RBC-ENTMCNC: 32 GM/DL — SIGNIFICANT CHANGE UP (ref 32–36)
MCV RBC AUTO: 87.8 FL — SIGNIFICANT CHANGE UP (ref 80–100)
MCV RBC AUTO: 88 FL — SIGNIFICANT CHANGE UP (ref 80–100)
MCV RBC AUTO: 89.2 FL — SIGNIFICANT CHANGE UP (ref 80–100)
MESOTHL CELL # FLD: 5 % — SIGNIFICANT CHANGE UP
MONOCYTES # BLD AUTO: 0.69 K/UL — SIGNIFICANT CHANGE UP (ref 0–0.9)
MONOCYTES NFR BLD AUTO: 13.9 % — SIGNIFICANT CHANGE UP (ref 2–14)
MONOS+MACROS # FLD: 12 % — SIGNIFICANT CHANGE UP
NEUTROPHILS # BLD AUTO: 3.47 K/UL — SIGNIFICANT CHANGE UP (ref 1.8–7.4)
NEUTROPHILS NFR BLD AUTO: 69.6 % — SIGNIFICANT CHANGE UP (ref 43–77)
NEUTROPHILS-BODY FLUID: 1 % — SIGNIFICANT CHANGE UP
NRBC # BLD: 0 /100 WBCS — SIGNIFICANT CHANGE UP (ref 0–0)
NRBC # BLD: 0 /100 WBCS — SIGNIFICANT CHANGE UP (ref 0–0)
NRBC # FLD: 0 K/UL — SIGNIFICANT CHANGE UP (ref 0–0)
NRBC # FLD: 0 K/UL — SIGNIFICANT CHANGE UP (ref 0–0)
OTHER CELLS FLD MANUAL: 13 % — SIGNIFICANT CHANGE UP
PCO2 BLDV: 48 MMHG — SIGNIFICANT CHANGE UP (ref 42–55)
PH BLDV: 7.41 — SIGNIFICANT CHANGE UP (ref 7.32–7.43)
PHOSPHATE SERPL-MCNC: 3.1 MG/DL — SIGNIFICANT CHANGE UP (ref 2.5–4.5)
PLAT MORPH BLD: NORMAL — SIGNIFICANT CHANGE UP
PLATELET # BLD AUTO: 374 K/UL — SIGNIFICANT CHANGE UP (ref 150–400)
PLATELET # BLD AUTO: 378 K/UL — SIGNIFICANT CHANGE UP (ref 150–400)
PLATELET # BLD AUTO: 398 K/UL — SIGNIFICANT CHANGE UP (ref 150–400)
PLATELET COUNT - ESTIMATE: NORMAL — SIGNIFICANT CHANGE UP
PO2 BLDV: 27 MMHG — SIGNIFICANT CHANGE UP (ref 25–45)
POIKILOCYTOSIS BLD QL AUTO: SLIGHT — SIGNIFICANT CHANGE UP
POLYCHROMASIA BLD QL SMEAR: SLIGHT — SIGNIFICANT CHANGE UP
POTASSIUM BLDV-SCNC: 5 MMOL/L — SIGNIFICANT CHANGE UP (ref 3.5–5.1)
POTASSIUM SERPL-MCNC: 4.5 MMOL/L — SIGNIFICANT CHANGE UP (ref 3.5–5.3)
POTASSIUM SERPL-MCNC: 5 MMOL/L — SIGNIFICANT CHANGE UP (ref 3.5–5.3)
POTASSIUM SERPL-SCNC: 4.5 MMOL/L — SIGNIFICANT CHANGE UP (ref 3.5–5.3)
POTASSIUM SERPL-SCNC: 5 MMOL/L — SIGNIFICANT CHANGE UP (ref 3.5–5.3)
PROT FLD-MCNC: 6.9 G/DL — SIGNIFICANT CHANGE UP
PROT SERPL-MCNC: 8.8 G/DL — HIGH (ref 6–8.3)
PROTHROM AB SERPL-ACNC: 15 SEC — HIGH (ref 9.5–13)
RBC # BLD: 3.17 M/UL — LOW (ref 4.2–5.8)
RBC # BLD: 3.32 M/UL — LOW (ref 4.2–5.8)
RBC # BLD: 3.45 M/UL — LOW (ref 4.2–5.8)
RBC # FLD: 25.2 % — HIGH (ref 10.3–14.5)
RBC # FLD: 25.4 % — HIGH (ref 10.3–14.5)
RBC # FLD: 25.6 % — HIGH (ref 10.3–14.5)
RBC BLD AUTO: ABNORMAL
RCV VOL RI: HIGH CELLS/UL (ref 0–5)
RH IG SCN BLD-IMP: POSITIVE — SIGNIFICANT CHANGE UP
SAO2 % BLDV: 31.2 % — LOW (ref 67–88)
SODIUM SERPL-SCNC: 137 MMOL/L — SIGNIFICANT CHANGE UP (ref 135–145)
SODIUM SERPL-SCNC: 137 MMOL/L — SIGNIFICANT CHANGE UP (ref 135–145)
SODIUM UR-SCNC: 36 MMOL/L — SIGNIFICANT CHANGE UP
SPECIMEN SOURCE: SIGNIFICANT CHANGE UP
TIBC SERPL-MCNC: 196 UG/DL — LOW (ref 220–430)
TOTAL CELLS COUNTED, BODY FLUID: 100 CELLS — SIGNIFICANT CHANGE UP
TOTAL NUCLEATED CELL COUNT, BODY FLUID: 1119 CELLS/UL — HIGH (ref 0–5)
TROPONIN T, HIGH SENSITIVITY RESULT: 14 NG/L — SIGNIFICANT CHANGE UP
TUBE TYPE: SIGNIFICANT CHANGE UP
UIBC SERPL-MCNC: 160 UG/DL — SIGNIFICANT CHANGE UP (ref 110–370)
UUN UR-MCNC: 1207 MG/DL — SIGNIFICANT CHANGE UP
VIT B12 SERPL-MCNC: 1223 PG/ML — HIGH (ref 200–900)
WBC # BLD: 4.63 K/UL — SIGNIFICANT CHANGE UP (ref 3.8–10.5)
WBC # BLD: 4.66 K/UL — SIGNIFICANT CHANGE UP (ref 3.8–10.5)
WBC # BLD: 4.98 K/UL — SIGNIFICANT CHANGE UP (ref 3.8–10.5)
WBC # FLD AUTO: 4.63 K/UL — SIGNIFICANT CHANGE UP (ref 3.8–10.5)
WBC # FLD AUTO: 4.66 K/UL — SIGNIFICANT CHANGE UP (ref 3.8–10.5)
WBC # FLD AUTO: 4.98 K/UL — SIGNIFICANT CHANGE UP (ref 3.8–10.5)

## 2023-07-27 PROCEDURE — 71046 X-RAY EXAM CHEST 2 VIEWS: CPT | Mod: 26

## 2023-07-27 PROCEDURE — 49082 ABD PARACENTESIS: CPT

## 2023-07-27 PROCEDURE — 74177 CT ABD & PELVIS W/CONTRAST: CPT | Mod: 26,MA

## 2023-07-27 PROCEDURE — 99223 1ST HOSP IP/OBS HIGH 75: CPT

## 2023-07-27 PROCEDURE — 99291 CRITICAL CARE FIRST HOUR: CPT | Mod: 25

## 2023-07-27 RX ORDER — SODIUM CHLORIDE 9 MG/ML
1000 INJECTION INTRAMUSCULAR; INTRAVENOUS; SUBCUTANEOUS
Refills: 0 | Status: DISCONTINUED | OUTPATIENT
Start: 2023-07-27 | End: 2023-08-05

## 2023-07-27 RX ORDER — HEPARIN SODIUM 5000 [USP'U]/ML
5500 INJECTION INTRAVENOUS; SUBCUTANEOUS EVERY 6 HOURS
Refills: 0 | Status: DISCONTINUED | OUTPATIENT
Start: 2023-07-27 | End: 2023-08-03

## 2023-07-27 RX ORDER — DEXTROSE 50 % IN WATER 50 %
12.5 SYRINGE (ML) INTRAVENOUS ONCE
Refills: 0 | Status: DISCONTINUED | OUTPATIENT
Start: 2023-07-27 | End: 2023-08-02

## 2023-07-27 RX ORDER — PANTOPRAZOLE SODIUM 20 MG/1
40 TABLET, DELAYED RELEASE ORAL DAILY
Refills: 0 | Status: DISCONTINUED | OUTPATIENT
Start: 2023-07-27 | End: 2023-08-04

## 2023-07-27 RX ORDER — HEPARIN SODIUM 5000 [USP'U]/ML
5500 INJECTION INTRAVENOUS; SUBCUTANEOUS ONCE
Refills: 0 | Status: COMPLETED | OUTPATIENT
Start: 2023-07-27 | End: 2023-07-27

## 2023-07-27 RX ORDER — HEPARIN SODIUM 5000 [USP'U]/ML
INJECTION INTRAVENOUS; SUBCUTANEOUS
Qty: 25000 | Refills: 0 | Status: DISCONTINUED | OUTPATIENT
Start: 2023-07-27 | End: 2023-08-01

## 2023-07-27 RX ORDER — INSULIN LISPRO 100/ML
VIAL (ML) SUBCUTANEOUS EVERY 6 HOURS
Refills: 0 | Status: DISCONTINUED | OUTPATIENT
Start: 2023-07-27 | End: 2023-07-30

## 2023-07-27 RX ORDER — SODIUM CHLORIDE 9 MG/ML
1000 INJECTION, SOLUTION INTRAVENOUS
Refills: 0 | Status: DISCONTINUED | OUTPATIENT
Start: 2023-07-27 | End: 2023-08-02

## 2023-07-27 RX ORDER — OCTREOTIDE ACETATE 200 UG/ML
100 INJECTION, SOLUTION INTRAVENOUS; SUBCUTANEOUS EVERY 8 HOURS
Refills: 0 | Status: DISCONTINUED | OUTPATIENT
Start: 2023-07-27 | End: 2023-08-01

## 2023-07-27 RX ORDER — DEXTROSE 50 % IN WATER 50 %
25 SYRINGE (ML) INTRAVENOUS ONCE
Refills: 0 | Status: DISCONTINUED | OUTPATIENT
Start: 2023-07-27 | End: 2023-08-02

## 2023-07-27 RX ORDER — METOCLOPRAMIDE HCL 10 MG
10 TABLET ORAL EVERY 8 HOURS
Refills: 0 | Status: DISCONTINUED | OUTPATIENT
Start: 2023-07-27 | End: 2023-08-01

## 2023-07-27 RX ORDER — FOLIC ACID 0.8 MG
1 TABLET ORAL DAILY
Refills: 0 | Status: DISCONTINUED | OUTPATIENT
Start: 2023-07-27 | End: 2023-08-04

## 2023-07-27 RX ORDER — DEXTROSE 50 % IN WATER 50 %
15 SYRINGE (ML) INTRAVENOUS ONCE
Refills: 0 | Status: DISCONTINUED | OUTPATIENT
Start: 2023-07-27 | End: 2023-08-02

## 2023-07-27 RX ORDER — HEPARIN SODIUM 5000 [USP'U]/ML
2500 INJECTION INTRAVENOUS; SUBCUTANEOUS EVERY 6 HOURS
Refills: 0 | Status: DISCONTINUED | OUTPATIENT
Start: 2023-07-27 | End: 2023-08-03

## 2023-07-27 RX ORDER — DEXAMETHASONE 0.5 MG/5ML
4 ELIXIR ORAL EVERY 12 HOURS
Refills: 0 | Status: DISCONTINUED | OUTPATIENT
Start: 2023-07-27 | End: 2023-08-01

## 2023-07-27 RX ORDER — GLUCAGON INJECTION, SOLUTION 0.5 MG/.1ML
1 INJECTION, SOLUTION SUBCUTANEOUS ONCE
Refills: 0 | Status: DISCONTINUED | OUTPATIENT
Start: 2023-07-27 | End: 2023-08-05

## 2023-07-27 RX ORDER — MORPHINE SULFATE 50 MG/1
4 CAPSULE, EXTENDED RELEASE ORAL ONCE
Refills: 0 | Status: DISCONTINUED | OUTPATIENT
Start: 2023-07-27 | End: 2023-07-27

## 2023-07-27 RX ADMIN — SODIUM CHLORIDE 75 MILLILITER(S): 9 INJECTION INTRAMUSCULAR; INTRAVENOUS; SUBCUTANEOUS at 19:50

## 2023-07-27 RX ADMIN — Medication 4 MILLIGRAM(S): at 18:34

## 2023-07-27 RX ADMIN — OCTREOTIDE ACETATE 100 MICROGRAM(S): 200 INJECTION, SOLUTION INTRAVENOUS; SUBCUTANEOUS at 22:59

## 2023-07-27 RX ADMIN — HEPARIN SODIUM 1200 UNIT(S)/HR: 5000 INJECTION INTRAVENOUS; SUBCUTANEOUS at 09:15

## 2023-07-27 RX ADMIN — Medication 10 MILLIGRAM(S): at 11:36

## 2023-07-27 RX ADMIN — HEPARIN SODIUM 1200 UNIT(S)/HR: 5000 INJECTION INTRAVENOUS; SUBCUTANEOUS at 06:49

## 2023-07-27 RX ADMIN — HEPARIN SODIUM 1300 UNIT(S)/HR: 5000 INJECTION INTRAVENOUS; SUBCUTANEOUS at 13:43

## 2023-07-27 RX ADMIN — HEPARIN SODIUM 5500 UNIT(S): 5000 INJECTION INTRAVENOUS; SUBCUTANEOUS at 06:50

## 2023-07-27 RX ADMIN — HEPARIN SODIUM 1300 UNIT(S)/HR: 5000 INJECTION INTRAVENOUS; SUBCUTANEOUS at 19:51

## 2023-07-27 RX ADMIN — PANTOPRAZOLE SODIUM 40 MILLIGRAM(S): 20 TABLET, DELAYED RELEASE ORAL at 11:36

## 2023-07-27 RX ADMIN — Medication 10 MILLIGRAM(S): at 23:11

## 2023-07-27 RX ADMIN — Medication 4 MILLIGRAM(S): at 11:36

## 2023-07-27 RX ADMIN — HEPARIN SODIUM 2500 UNIT(S): 5000 INJECTION INTRAVENOUS; SUBCUTANEOUS at 13:50

## 2023-07-27 RX ADMIN — SODIUM CHLORIDE 75 MILLILITER(S): 9 INJECTION INTRAMUSCULAR; INTRAVENOUS; SUBCUTANEOUS at 11:36

## 2023-07-27 RX ADMIN — HEPARIN SODIUM 1400 UNIT(S)/HR: 5000 INJECTION INTRAVENOUS; SUBCUTANEOUS at 21:37

## 2023-07-27 RX ADMIN — Medication 1 MILLIGRAM(S): at 18:34

## 2023-07-27 RX ADMIN — HEPARIN SODIUM 2500 UNIT(S): 5000 INJECTION INTRAVENOUS; SUBCUTANEOUS at 21:42

## 2023-07-27 RX ADMIN — Medication 10 MILLIGRAM(S): at 18:34

## 2023-07-27 RX ADMIN — OCTREOTIDE ACETATE 100 MICROGRAM(S): 200 INJECTION, SOLUTION INTRAVENOUS; SUBCUTANEOUS at 13:12

## 2023-07-27 NOTE — ED ADULT NURSE NOTE - OBJECTIVE STATEMENT
58 year old male, received to room 16. Pt A&Ox4, ambulatory. Respirations equal and unlabored. Past medical history of lung cancer, last chemo 2 weeks ago. Pt c/o SOB x a few months, worsening over the last few days. Pt denies chest pain, palpitations, dizziness, blurry vision, headache, numbness, tingling, any other complaints. Abdomen soft, nontender, nondistended. Skin dry and intact. 22G IV placed to L antecubital space. Labs drawn and sent. Pending XR. No acute distress noted. Safety maintained, bed in lowest position, side rails raised, call bell in reach.

## 2023-07-27 NOTE — ED PROVIDER NOTE - PROGRESS NOTE DETAILS
Robyn Adkins MD (PGY3): Diagnostic paracentesis done, labs sent. awaiting CT. TBA. Robyn Adkins MD (PGY3): Call from radiology- new PE and possible newly developing sbo vs enteritis. Robyn Adkins MD (PGY3): Patient endorsed to medicine team.  However given CT read of possible early SBO will consult surgery.   Heparin ordered.   Patient remains stable.

## 2023-07-27 NOTE — CONSULT NOTE ADULT - SUBJECTIVE AND OBJECTIVE BOX
General Surgery Consult Note  Attending: Jeferson Pollard  Service: D team  [id number]      HPI: Patient is a 58-year male with history of lung cancer currently receiving chemotherapy (alimta) last session 3 weeks ago, PE on xeralto presenting to the emergency department with shortness of breath.  Patient states for the last week he has been having shortness of breath, nonexertional.  He states  he had similar symptoms when he has increased ascites, he gets   Recurrent paracenteses as an outpatient.  no fever, chills, nausea, vomiting, chest pain.  He has mild diffuse abdominal pain.   Last had a paracentesis done earlier this month.   No urinary or bowel changes.    In the ED vitals stable, patient received CT CAP which revealed new PE and possible early onset SBO with dilated and thickened SB loops and TP in RLQ concerning for early onset SBO and possible enteritis. Patient admitted to medicine service for concern for SBP and PE, general surgery consulted for SBO. Patient has not passed gas in 5 days and last had a BM yesterday. Has never had surgery on his abdomen. Only previous surgery was left side pleuroscopy for trapped lung and pleurx cath placement.        PAST MEDICAL HISTORY:  PAST MEDICAL & SURGICAL HISTORY:  Former smoker      Non-small cell lung cancer with metastasis      Status post cardiac surgery  s/p open right thoracotomy for posterior cardiac mass removal > 20 years ago, reported by patient to be benign.          ALLERGIES:  Allergies    No Known Allergies    Intolerances        SOCIAL HISTORY:    FAMILY HISTORY:  FAMILY HISTORY:  Family history of cancer in father  Cancer of unknown origin on Father's side.        PHYSICAL EXAM:  General: NAD, resting comfortably  HEENT: NC/AT, EOMI, normal hearing, no oral lesions, no LAD, neck supple  Pulmonary: normal resp effort, CTA-B  Cardiovascular: NSR, no murmurs  Abdominal: soft, moderately distended and mildly tender diffusely, no organomegaly, no guarding or rebound tenderness  Extremities: WWP, normal strength, no clubbing/cyanosis/edema  Neuro: A/O x 3, CNs II-XII grossly intact, normal sensation, no focal deficits      VITAL SIGNS:  Vital Signs Last 24 Hrs  T(C): 36.8 (27 Jul 2023 09:18), Max: 37 (27 Jul 2023 06:38)  T(F): 98.2 (27 Jul 2023 09:18), Max: 98.6 (27 Jul 2023 06:38)  HR: 87 (27 Jul 2023 09:18) (87 - 120)  BP: 134/84 (27 Jul 2023 09:18) (125/87 - 145/89)  BP(mean): 95 (27 Jul 2023 06:38) (95 - 95)  RR: 18 (27 Jul 2023 09:18) (14 - 22)  SpO2: 100% (27 Jul 2023 09:18) (95% - 100%)    Parameters below as of 27 Jul 2023 08:44  Patient On (Oxygen Delivery Method): room air        I&O's Summary      LABS:                        9.7    4.98  )-----------( 374      ( 27 Jul 2023 02:46 )             30.3     07-27    137  |  96<L>  |  33<H>  ----------------------------<  104<H>  5.0   |  25  |  2.22<H>    Ca    9.1      27 Jul 2023 02:46    TPro  8.8<H>  /  Alb  3.7  /  TBili  0.2  /  DBili  x   /  AST  24  /  ALT  10  /  AlkPhos  95  07-27    PT/INR - ( 27 Jul 2023 06:47 )   PT: 15.0 sec;   INR: 1.35 ratio         PTT - ( 27 Jul 2023 06:47 )  PTT:100.3 sec  Urinalysis Basic - ( 27 Jul 2023 02:46 )    Color: x / Appearance: x / SG: x / pH: x  Gluc: 104 mg/dL / Ketone: x  / Bili: x / Urobili: x   Blood: x / Protein: x / Nitrite: x   Leuk Esterase: x / RBC: x / WBC x   Sq Epi: x / Non Sq Epi: x / Bacteria: x      CAPILLARY BLOOD GLUCOSE        LIVER FUNCTIONS - ( 27 Jul 2023 02:46 )  Alb: 3.7 g/dL / Pro: 8.8 g/dL / ALK PHOS: 95 U/L / ALT: 10 U/L / AST: 24 U/L / GGT: x             CULTURES:      RADIOLOGY & ADDITIONAL STUDIES:    < from: CT Abdomen and Pelvis w/ IV Cont (07.27.23 @ 05:39) >    ACC: [id number] EXAM:  CT ABDOMEN AND PELVIS IC   ORDERED BY: BECKY TYM     PROCEDURE DATE:  07/27/2023          INTERPRETATION:  CLINICAL INFORMATION: Abdominal bloating.    COMPARISON: 7/21/2023.    CONTRAST/COMPLICATIONS:  IV Contrast: Omnipaque 350  90 cc administered   10 cc discarded  Oral Contrast: NONE  Complications: None reported at time of study completion    PROCEDURE:  CT of the Abdomen and Pelvis was performed.  Sagittal and coronal reformats were performed.    FINDINGS:  LOWER CHEST: Emphysema. Small left pleural effusion and mild left basilar   atelectasis. Calcified granulomas in the left lower lobe. Calcified left   hilar lymph nodes.  New filling defect in the left lower lobar segmental   pulmonary arteries.    LIVER: Unchanged peripheral cystic lesions in the right hepatic lobe   which may be along the serosal surface, larger which measures up to 1.8   cm.  BILE DUCTS: Normal caliber.  GALLBLADDER: Within normal limits.  SPLEEN: Peripheral wedgelike lucency in the spleen, unchanged.  PANCREAS: Within normal limits.  ADRENALS: Within normal limits.  KIDNEYS/URETERS: Kidneys enhance symmetrically without hydronephrosis.   Small left renal cyst.    BLADDER: Within normal limits.  REPRODUCTIVE ORGANS: Prostate gland is mildly enlarged.    BOWEL: New dilated fluid-filled thickened small bowel loops throughout   the central abdomen with transition in the right lower quadrant where the   loops of small bowel appear matted and thickened.  PERITONEUM: Worsening abdominal and pelvic ascites with mild thickening   of the peritoneal reflections.. Presacral edema. No pneumoperitoneum.  VESSELS: Mild atherosclerotic calcifications. Normal caliber abdominal   aorta.  RETROPERITONEUM/LYMPH NODES: No lymphadenopathy.  ABDOMINAL WALL: Within normal limits.  BONES: Sclerotic focus in the S1 segment, T12 left-sided pedicle, left   iliac bone, left sacrum. Degenerative changes of the spine, sacroiliac   joints, and hips.    IMPRESSION:  New dilated fluid-filled thickened small bowel loops throughout the   central abdomen with transition in the right lower quadrant where the   loops of small bowel appear matted and thickened compared with prior exam   from 7/21/2023 concerning for small bowel enteritis with developing small   bowel obstruction.    Worsening abdominal and pelvic ascites with mild thickening of the   peritoneal reflections likely reflective of malignant ascites.    New filling defect in the left lower lobar segmental pulmonary arteries   concerningfor pulmonary embolism. CTA of the chest should be considered.    Osseous metastatic disease, unchanged.    Unchanged peripheral cystic lesions in the right hepatic lobe which may   be along the serosal surface (serosal implants).    Small left pleural effusion.    Findings were discussed with Dr. Wilkerson at 5:56 AM on 7/27/2023.          --- End of Report ---    < end of copied text >

## 2023-07-27 NOTE — H&P ADULT - TIME BILLING
Face to face encounter. Reviewed labs, vitals, imaging. Spoke with pharmacy. Reviewed outpt record. Emailing onc consult. Discussed plan of care with patient.

## 2023-07-27 NOTE — PATIENT PROFILE ADULT - NSPROSPHOSPCHAPLAINYN_GEN_A_NUR
BG goal 140 -180     BG is within goal on current SQ insulin regimen. However, occasional prandial excursions noted.     - Continue levemir 8 units daily.   - Increase novolog to 4 units TIDWM.   - BG Monitoring AC/HS     ** Please call Endocrine for any BG related issues **  ** Please notify Endocrine for any change and/or advance in diet**    Discharge Planning:   TBD. Please notify endocrinology prior to discharge.      no

## 2023-07-27 NOTE — ED PROVIDER NOTE - NS ED ROS FT
CONSTITUTIONAL: No fevers, no chills, no lightheadedness, no dizziness  EYES: no visual changes, no eye pain  EARS: no ear drainage, no ear pain, no change in hearing  NOSE: no nasal congestion  MOUTH/THROAT: no sore throat  CV: No chest pain, no palpitations  RESP: + SOB, no cough  GI: see HPI   : no dysuria, no hematuria, no flank pain  MSK: no back pain, no extremity pain  SKIN: no rashes  NEURO: no headache, no focal weakness, no decreased sensation/parasthesias   PSYCHIATRIC: no known mental health issues

## 2023-07-27 NOTE — ED PROVIDER NOTE - ATTENDING CONTRIBUTION TO CARE
58-year-old male with a history of lung CA on chemotherapy every few weeks history of p.o. on Xarelto presenting with shortness of breath.  The patient has had a number of paracenteses for recurrent ascites over the last few months as a outpatient.  He states that the shortness of breath he is having now is similar to when he has had problems with ascites in the past.  There is no chest pain.  The shortness of breath is nonexertional in nature.    Vitals: I have reviewed the patients vital signs  General: nontoxic appearing  HEENT: Atraumatic, normocephalic, airway patent  Eyes: EOMI, tracking appropriately  Neck: no tracheal deviation  Chest/Lungs: no trauma, symmetric chest rise, speaking in complete sentences,  no resp distress  Heart: skin and extremities well perfused, regular rate and rhythm  Neuro: A+Ox3, appears non focal  MSK: no deformities  Skin: no cyanosis, no jaundice   Psych:  Normal mood and affect and mood  Abdomen: Soft tender to palpation throughout no rebound no guarding    58-year-old male past medical history of lung cancer PE on chemo presenting for shortness of breath and abdominal pain.  With a history of known ascites and abdominal pain need to consider SBP in the differential.  There is also the shortness of breath need to be concerned with a history of cancer for complication from his lung cancer whether that be a pulmonary embolism or pneumonia or pneumothorax.  Patient will need CAT scan to further assess abdomen and chest.  We will do a diagnostic paracentesis as well to evaluate for SBP.  No indication for antibiotics at this time.    Received call from radiology with regards to CT scan of the abdomen pelvis demonstrating a new left-sided PE.  The patient is on anticoagulation however was hold for period of time when he had his last paracentesis.  Because he is currently on anticoagulation will start heparin in the emergency department.    Upon my evaluation, this patient had a high probability of imminent or life-threatening deterioration due to PE, which required my direct attention, intervention, and personal management.  The patient has a  medical condition that impairs one or more vital organ systems.  Frequent personal assessment and adjustment of medical interventions was performed.      I have personally provided 35 minutes of critical care time exclusive of time spent on separately billable procedures. Time includes review of laboratory data, radiology results, discussion with consultants, patient and family; monitoring for potential decompensation, as well as time spent retrieving data and reviewing the chart and documenting the visit. Interventions were performed as documented above.

## 2023-07-27 NOTE — ED PROVIDER NOTE - OBJECTIVE STATEMENT
Patient is a 58-year male with history of lung cancer currently receiving chemotherapy last session 2 weeks ago presenting to the emergency department with shortness of breath.  Patient states for the last week he has been having shortness of breath, nonexertional.  He states  he had similar symptoms when he has increased ascites, he gets   Recurrent paracenteses as an outpatient.  no fever, chills, nausea, vomiting, chest pain.  He has mild diffuse abdominal pain.   Last had a paracentesis done earlier this month.   No urinary or bowel changes. Patient is a 58-year male with history of lung cancer currently receiving chemotherapy last session 3 weeks ago, PE on xeralto presenting to the emergency department with shortness of breath.  Patient states for the last week he has been having shortness of breath, nonexertional.  He states  he had similar symptoms when he has increased ascites, he gets   Recurrent paracenteses as an outpatient.  no fever, chills, nausea, vomiting, chest pain.  He has mild diffuse abdominal pain.   Last had a paracentesis done earlier this month.   No urinary or bowel changes.

## 2023-07-27 NOTE — H&P ADULT - HISTORY OF PRESENT ILLNESS
58-year male with history of lung cancer currently receiving chemotherapy last session 3 weeks ago, PE on xeralto presenting to the emergency department with shortness of breath.  Patient states for the last week he has been having shortness of breath, nonexertional.  He states  he had similar symptoms when he has increased ascites, he gets   Recurrent paracenteses as an outpatient.  no fever, chills, nausea, vomiting, chest pain.  He has mild diffuse abdominal pain.   Last had a paracentesis done earlier this month.   No urinary or bowel changes 58M PMH lung cancer currently receiving chemotherapy last session (7/11), PE on xarelto presenting to the emergency department with shortness of breath.  Patient states for the past 1 week he has been having shortness of breath, nonexertional. He states he had similar symptoms when he has increased ascites. He gets multiple  paracenteses as an outpatient, last one was from 7/3, drained 750cc fluid. Reports that for the past 2-3 days, gets nauseous and vomiting after eating. Denies fever, chills. Also noticed constant, nonradiating chest pain for the past 2-3 days as well. Has mild diffuse abdominal pain. Has not passed gas or BM for a few days.    Of note, patient reports that he stopped xarelto for a few days prior to and after the paracentesis as instructed earlier this month.

## 2023-07-27 NOTE — H&P ADULT - CONVERSATION DETAILS
Discussed findings of PE, SBO, BETO, ascites. Discussed code status. Patient would like intubation and resuscitation if needed. Discussed if in the event of him not being able to make medical decision for himself, patient would like his sister Amanda Cordova ([phone]) to make decision on his behalf. Discussed findings of PE, SBO, BETO, ascites. Discussed code status. Patient would like intubation and resuscitation if needed. Discussed if in the event of him not being able to make medical decision for himself, patient would like his sister Amanda Cordova ([phone]) to make decision on his behalf.    FULL CODE

## 2023-07-27 NOTE — H&P ADULT - NSHPPHYSICALEXAM_GEN_ALL_CORE
VITALS: T(C): 36.8 (07-27-23 @ 09:18), Max: 37 (07-27-23 @ 06:38)  HR: 85 (07-27-23 @ 15:39) (85 - 120)  BP: 107/66 (07-27-23 @ 15:39) (107/66 - 145/89)  RR: 18 (07-27-23 @ 15:39) (14 - 22)  SpO2: 96% (07-27-23 @ 15:39) (95% - 100%)  GENERAL: NAD, alert, resting comfortably  HEAD:  Atraumatic, Normocephalic  EYES: EOMI, PERRLA, conjunctiva and sclera clear  NECK: Supple, No JVD  CHEST/LUNG: Clear to auscultation bilaterally; No wheeze, ronchi or rales  HEART: Regular rate and rhythm; No murmurs, rubs, or gallops  ABDOMEN: Soft, tender, distended; hypoactive BS  EXTREMITIES:  2+ Peripheral Pulses, No clubbing, cyanosis, or edema  PSYCH: AAOx3, normal behavior, normal affect  NEUROLOGY: moving all extremities  SKIN: No rashes or lesions
Transferred

## 2023-07-27 NOTE — H&P ADULT - PROBLEM SELECTOR PLAN 1
Hx of PE on xarelto, Off for a few days for para at the beginning of this month  -CT A&P: New filling defect in the left lower lobar segmental pulmonary arteries concerning for pulmonary embolism  -c/w heparin gtt for now  -trop flat  -EKG: sinus tachy, no acute ischemic changes  -check echo for right heart strain

## 2023-07-27 NOTE — H&P ADULT - PROBLEM SELECTOR PLAN 2
CT A&P: New dilated fluid-filled thickened small bowel loops throughout the central abdomen with transition in the right lower quadrant where the loops of small bowel appear matted and thickened compared with prior exam from 7/21/2023 concerning for small bowel enteritis with developing small bowel obstruction.  -surgery consulted: malignant SBO protocol with reglan, decadron and octreotide  -serial abd exam, if patient becomes nauseous and begins vomiting should place NGT  -NPO  -IVF

## 2023-07-27 NOTE — CONSULT NOTE ADULT - ASSESSMENT
58-year male with history of lung cancer currently receiving chemotherapy (alimta) last session 3 weeks ago, PE on xeralto presenting to the emergency department with shortness of breath and mild abdominal pain. No BM since yesterday and no gas for multiple days. Labs significant for BETO Cr 2.2 previously around 1.5. CTCAP found new left filling defect in lung and dilated/thickened SB loops with TP in RLQ concerning for SBO and possible enteritis. Patient with no previous abd surgery history concerning for primary/malignant SBO.    Plan:  - no acute surgical intervention  - NPO/IVF  - Malignant SBO Protocol: 10mg Reglan IV q8, octreotide 100mg SC q8 hours, Decadron 4mg q12 hours   - serial abd exams  - Surgical oncology will follow    Discussed with Dr. Pollard attending on call    D Team Surgery  [id number] 58-year male with history of lung cancer currently receiving chemotherapy (alimta) last session 3 weeks ago, PE on xeralto presenting to the emergency department with shortness of breath and mild abdominal pain. No BM since yesterday and no gas for multiple days. Labs significant for BETO Cr 2.2 previously around 1.5. CTCAP found new left filling defect in lung and dilated/thickened SB loops with TP in RLQ concerning for SBO and possible enteritis. Patient with no previous abd surgery history concerning for primary/malignant SBO.    Plan:  - no acute surgical intervention  - NPO/IVF  - Malignant SBO Protocol: 10mg Reglan IV q8, octreotide 100mg SC q8 hours, Decadron 4mg q12 hours   - serial abd exams  - if patient becomes nauseous and begins vomiting should place NGT  - Surgical oncology will follow    Discussed with Dr. Pollard attending on call    D Team Surgery  [id number] 58-year male with history of lung cancer currently receiving chemotherapy (alimta) last session 3 weeks ago, PE on xarelto presenting to the emergency department with shortness of breath and mild abdominal pain. No BM since yesterday and no gas for multiple days. Labs significant for BETO Cr 2.2 previously around 1.5. CTCAP found new left filling defect in lung and dilated/thickened SB loops with TP in RLQ concerning for SBO and possible enteritis. Patient with no previous abd surgery history concerning for primary/malignant SBO.    Plan:  - no acute surgical intervention  - NPO/IVF  - Malignant SBO Protocol: 10mg Reglan IV q8, octreotide 100mg SC q8 hours, Decadron 4mg q12 hours   - serial abd exams  - if patient becomes nauseous and begins vomiting should place NGT  - Surgical oncology will follow    Discussed with Dr. Pollard attending on call    D Team Surgery  [id number]

## 2023-07-27 NOTE — ED PROVIDER NOTE - PHYSICAL EXAMINATION
General: Alert and Orientated x 3. No apparent distress.  Head: Normocephalic and atraumatic.  Eyes: PERRLA with EOMI.  Neck: Supple. Trachea midline.   Cardiac: Normal S1 and S2 w/ RRR. No murmurs appreciated. No JVD appreciated.  Pulmonary: CTA bilaterally. No increased WOB. No wheezes or crackles.  Abdominal: Soft, mildly diffuse abd ttp, moderate, distention. (+) bowel sounds appreciated in all 4 quadrants. No hepatosplenomegaly.   Neurologic: No focal sensory or motor deficits.  Musculoskeletal: Strength appropriate in all 4 extremities for age with no limited ROM.  Skin: Color appropriate for race. Intact, warm, and well-perfused.  Psychiatric: Appropriate mood and affect. No apparent risk to self or others.

## 2023-07-27 NOTE — H&P ADULT - ASSESSMENT
58M PMH lung cancer currently receiving chemotherapy last session (7/11), PE on xarelto presenting to the emergency department with shortness of breath is found to have new PE, worsening ascites, developing SBO and BETO.      New dilated fluid-filled thickened small bowel loops throughout the   central abdomen with transition in the right lower quadrant where the   loops of small bowel appear matted and thickened compared with prior exam   from 7/21/2023 concerning for small bowel enteritis with developing small   bowel obstruction.    Worsening abdominal and pelvic ascites with mild thickening of the   peritoneal reflections likely reflective of malignant ascites.  CTA of the chest should be considered.    Osseous metastatic disease, unchanged.    Unchanged peripheral cystic lesions in the right hepatic lobe which may   be along the serosal surface (serosal implants).    Small left pleural effusion.   58M PMH lung cancer currently receiving chemotherapy last session (7/11), PE on xarelto presenting to the emergency department with shortness of breath is found to have new PE, worsening ascites, developing SBO and BETO.

## 2023-07-27 NOTE — H&P ADULT - PROBLEM SELECTOR PLAN 3
CT A&P: Worsening abdominal and pelvic ascites with mild thickening of the peritoneal reflections likely reflective of malignant ascites.  -diagnostic para done in ED. Likely 2/2 malignancy instead of infection  -procedure team consult AM for therapeutic tap if there is pocket

## 2023-07-27 NOTE — H&P ADULT - PROBLEM SELECTOR PLAN 6
last chemo on 7/11 and next dose supposely due next week (q3wk schedule per chart review)  -will email oncology  -on home folic acid po, given npo status, spoke with pharmacy, can do 1mg IV daily

## 2023-07-27 NOTE — ED PROVIDER NOTE - WR ORDER ID 1
HPI:    Patient ID: Nelda Estes is a 40year old male. Patient presents to the       Review of Systems         No current outpatient medications on file.      Allergies:No Known Allergies   PHYSICAL EXAM:   Physical Exam           ASSESSMENT/PLAN [id number]

## 2023-07-27 NOTE — H&P ADULT - NSHPREVIEWOFSYSTEMS_GEN_ALL_CORE
REVIEW OF SYSTEMS:    CONSTITUTIONAL: No weakness, fevers or chills  EYES: No visual change  ENT: No vertigo or throat pain   NECK: No pain or stiffness  RESPIRATORY: No cough, wheezing, hemoptysis; + shortness of breath  CARDIOVASCULAR: + chest pain, no palpitations  GASTROINTESTINAL: + abdominal pain. +nausea, vomiting with eating; Last BM was a few days ago.  GENITOURINARY: No dysuria, frequency or hematuria  NEUROLOGICAL: No numbness or weakness  SKIN: No itching, rashes  MSK: no joint pain, full ROM  PSYCH: no anxiety no depression

## 2023-07-27 NOTE — PATIENT PROFILE ADULT - FUNCTIONAL ASSESSMENT - BASIC MOBILITY 6.
4-calculated by average/Not able to assess (calculate score using [facility] averaging method)

## 2023-07-27 NOTE — ED PROVIDER NOTE - CLINICAL SUMMARY MEDICAL DECISION MAKING FREE TEXT BOX
58-year-old male undergoing treatment for lung carcinoma, last chemo 3 weeks ago   Presenting to emergency department with abdominal distention and shortness of breath.  Patient has recurrent paracenteses done as an outpatient.  States when he has states he is usually is preceded by shortness of breath.  No fever, chills, nausea, vomiting, urinary or bowel changes.  Vital stable.  Exam with no acute distress, clear lungs auscultation, moderate abdominal distention with diffuse mild abdominal tenderness to palpation, no pedal edema.  Given abdominal pain  obtain diagnostic paracentesis to rule out SBP.  Will get CT abdomen pelvis.  We will get labs and admit.

## 2023-07-27 NOTE — ED ADULT NURSE NOTE - NSFALLUNIVINTERV_ED_ALL_ED
Bed/Stretcher in lowest position, wheels locked, appropriate side rails in place/Call bell, personal items and telephone in reach/Instruct patient to call for assistance before getting out of bed/chair/stretcher/Non-slip footwear applied when patient is off stretcher/[place] to call system/Physically safe environment - no spills, clutter or unnecessary equipment/Purposeful proactive rounding/Room/bathroom lighting operational, light cord in reach

## 2023-07-27 NOTE — H&P ADULT - PROBLEM SELECTOR PLAN 5
baseline around 9s, per chart review, completed IV iron in May, total iron improved from 21 to 36  -continue to monitor Hb

## 2023-07-28 LAB
ANION GAP SERPL CALC-SCNC: 14 MMOL/L — SIGNIFICANT CHANGE UP (ref 7–14)
ANION GAP SERPL CALC-SCNC: 16 MMOL/L — HIGH (ref 7–14)
APTT BLD: 47 SEC — HIGH (ref 24.5–35.6)
APTT BLD: 53.9 SEC — HIGH (ref 24.5–35.6)
APTT BLD: 67.3 SEC — HIGH (ref 24.5–35.6)
BUN SERPL-MCNC: 30 MG/DL — HIGH (ref 7–23)
BUN SERPL-MCNC: 32 MG/DL — HIGH (ref 7–23)
CALCIUM SERPL-MCNC: 8.2 MG/DL — LOW (ref 8.4–10.5)
CALCIUM SERPL-MCNC: 8.5 MG/DL — SIGNIFICANT CHANGE UP (ref 8.4–10.5)
CHLORIDE SERPL-SCNC: 100 MMOL/L — SIGNIFICANT CHANGE UP (ref 98–107)
CHLORIDE SERPL-SCNC: 99 MMOL/L — SIGNIFICANT CHANGE UP (ref 98–107)
CO2 SERPL-SCNC: 23 MMOL/L — SIGNIFICANT CHANGE UP (ref 22–31)
CO2 SERPL-SCNC: 24 MMOL/L — SIGNIFICANT CHANGE UP (ref 22–31)
CREAT SERPL-MCNC: 1.75 MG/DL — HIGH (ref 0.5–1.3)
CREAT SERPL-MCNC: 1.93 MG/DL — HIGH (ref 0.5–1.3)
EGFR: 40 ML/MIN/1.73M2 — LOW
EGFR: 45 ML/MIN/1.73M2 — LOW
GLUCOSE BLDC GLUCOMTR-MCNC: 114 MG/DL — HIGH (ref 70–99)
GLUCOSE BLDC GLUCOMTR-MCNC: 116 MG/DL — HIGH (ref 70–99)
GLUCOSE BLDC GLUCOMTR-MCNC: 118 MG/DL — HIGH (ref 70–99)
GLUCOSE SERPL-MCNC: 114 MG/DL — HIGH (ref 70–99)
GLUCOSE SERPL-MCNC: 115 MG/DL — HIGH (ref 70–99)
HCT VFR BLD CALC: 25.8 % — LOW (ref 39–50)
HGB BLD-MCNC: 8.1 G/DL — LOW (ref 13–17)
INR BLD: 1.14 RATIO — SIGNIFICANT CHANGE UP (ref 0.85–1.18)
MAGNESIUM SERPL-MCNC: 2.8 MG/DL — HIGH (ref 1.6–2.6)
MAGNESIUM SERPL-MCNC: 2.9 MG/DL — HIGH (ref 1.6–2.6)
MCHC RBC-ENTMCNC: 27.7 PG — SIGNIFICANT CHANGE UP (ref 27–34)
MCHC RBC-ENTMCNC: 31.4 GM/DL — LOW (ref 32–36)
MCV RBC AUTO: 88.4 FL — SIGNIFICANT CHANGE UP (ref 80–100)
NRBC # BLD: 0 /100 WBCS — SIGNIFICANT CHANGE UP (ref 0–0)
NRBC # FLD: 0 K/UL — SIGNIFICANT CHANGE UP (ref 0–0)
PHOSPHATE SERPL-MCNC: 3.5 MG/DL — SIGNIFICANT CHANGE UP (ref 2.5–4.5)
PHOSPHATE SERPL-MCNC: 4.7 MG/DL — HIGH (ref 2.5–4.5)
PLATELET # BLD AUTO: 430 K/UL — HIGH (ref 150–400)
POTASSIUM SERPL-MCNC: 4.6 MMOL/L — SIGNIFICANT CHANGE UP (ref 3.5–5.3)
POTASSIUM SERPL-MCNC: 5.5 MMOL/L — HIGH (ref 3.5–5.3)
POTASSIUM SERPL-SCNC: 4.6 MMOL/L — SIGNIFICANT CHANGE UP (ref 3.5–5.3)
POTASSIUM SERPL-SCNC: 5.5 MMOL/L — HIGH (ref 3.5–5.3)
PROTHROM AB SERPL-ACNC: 12.8 SEC — SIGNIFICANT CHANGE UP (ref 9.5–13)
RBC # BLD: 2.92 M/UL — LOW (ref 4.2–5.8)
RBC # FLD: 25.5 % — HIGH (ref 10.3–14.5)
SODIUM SERPL-SCNC: 137 MMOL/L — SIGNIFICANT CHANGE UP (ref 135–145)
SODIUM SERPL-SCNC: 139 MMOL/L — SIGNIFICANT CHANGE UP (ref 135–145)
WBC # BLD: 4.63 K/UL — SIGNIFICANT CHANGE UP (ref 3.8–10.5)
WBC # FLD AUTO: 4.63 K/UL — SIGNIFICANT CHANGE UP (ref 3.8–10.5)

## 2023-07-28 PROCEDURE — 99223 1ST HOSP IP/OBS HIGH 75: CPT

## 2023-07-28 PROCEDURE — 99222 1ST HOSP IP/OBS MODERATE 55: CPT

## 2023-07-28 PROCEDURE — 76770 US EXAM ABDO BACK WALL COMP: CPT | Mod: 26

## 2023-07-28 PROCEDURE — 93306 TTE W/DOPPLER COMPLETE: CPT | Mod: 26

## 2023-07-28 PROCEDURE — 99232 SBSQ HOSP IP/OBS MODERATE 35: CPT

## 2023-07-28 RX ORDER — SODIUM ZIRCONIUM CYCLOSILICATE 10 G/10G
10 POWDER, FOR SUSPENSION ORAL ONCE
Refills: 0 | Status: DISCONTINUED | OUTPATIENT
Start: 2023-07-28 | End: 2023-07-28

## 2023-07-28 RX ORDER — INSULIN HUMAN 100 [IU]/ML
5 INJECTION, SOLUTION SUBCUTANEOUS ONCE
Refills: 0 | Status: COMPLETED | OUTPATIENT
Start: 2023-07-28 | End: 2023-07-28

## 2023-07-28 RX ORDER — DEXTROSE 50 % IN WATER 50 %
25 SYRINGE (ML) INTRAVENOUS ONCE
Refills: 0 | Status: COMPLETED | OUTPATIENT
Start: 2023-07-28 | End: 2023-07-28

## 2023-07-28 RX ADMIN — OCTREOTIDE ACETATE 100 MICROGRAM(S): 200 INJECTION, SOLUTION INTRAVENOUS; SUBCUTANEOUS at 05:44

## 2023-07-28 RX ADMIN — OCTREOTIDE ACETATE 100 MICROGRAM(S): 200 INJECTION, SOLUTION INTRAVENOUS; SUBCUTANEOUS at 21:43

## 2023-07-28 RX ADMIN — HEPARIN SODIUM 1600 UNIT(S)/HR: 5000 INJECTION INTRAVENOUS; SUBCUTANEOUS at 22:07

## 2023-07-28 RX ADMIN — HEPARIN SODIUM 1500 UNIT(S)/HR: 5000 INJECTION INTRAVENOUS; SUBCUTANEOUS at 19:36

## 2023-07-28 RX ADMIN — Medication 10 MILLIGRAM(S): at 05:44

## 2023-07-28 RX ADMIN — Medication 1 MILLIGRAM(S): at 17:16

## 2023-07-28 RX ADMIN — Medication 25 MILLILITER(S): at 06:04

## 2023-07-28 RX ADMIN — HEPARIN SODIUM 1500 UNIT(S)/HR: 5000 INJECTION INTRAVENOUS; SUBCUTANEOUS at 13:32

## 2023-07-28 RX ADMIN — Medication 4 MILLIGRAM(S): at 05:44

## 2023-07-28 RX ADMIN — PANTOPRAZOLE SODIUM 40 MILLIGRAM(S): 20 TABLET, DELAYED RELEASE ORAL at 13:16

## 2023-07-28 RX ADMIN — HEPARIN SODIUM 2500 UNIT(S): 5000 INJECTION INTRAVENOUS; SUBCUTANEOUS at 13:34

## 2023-07-28 RX ADMIN — HEPARIN SODIUM 1400 UNIT(S)/HR: 5000 INJECTION INTRAVENOUS; SUBCUTANEOUS at 04:07

## 2023-07-28 RX ADMIN — Medication 10 MILLIGRAM(S): at 13:16

## 2023-07-28 RX ADMIN — HEPARIN SODIUM 1400 UNIT(S)/HR: 5000 INJECTION INTRAVENOUS; SUBCUTANEOUS at 04:09

## 2023-07-28 RX ADMIN — HEPARIN SODIUM 1400 UNIT(S)/HR: 5000 INJECTION INTRAVENOUS; SUBCUTANEOUS at 07:25

## 2023-07-28 RX ADMIN — SODIUM CHLORIDE 75 MILLILITER(S): 9 INJECTION INTRAMUSCULAR; INTRAVENOUS; SUBCUTANEOUS at 07:24

## 2023-07-28 RX ADMIN — Medication 4 MILLIGRAM(S): at 17:16

## 2023-07-28 RX ADMIN — HEPARIN SODIUM 1500 UNIT(S)/HR: 5000 INJECTION INTRAVENOUS; SUBCUTANEOUS at 19:41

## 2023-07-28 RX ADMIN — INSULIN HUMAN 5 UNIT(S): 100 INJECTION, SOLUTION SUBCUTANEOUS at 06:09

## 2023-07-28 RX ADMIN — OCTREOTIDE ACETATE 100 MICROGRAM(S): 200 INJECTION, SOLUTION INTRAVENOUS; SUBCUTANEOUS at 13:16

## 2023-07-28 RX ADMIN — Medication 10 MILLIGRAM(S): at 21:44

## 2023-07-28 NOTE — CONSULT NOTE ADULT - ASSESSMENT
58M PMH lung cancer met to  LAD, peritoneum, bone and liver, currently receiving chemotherapy (Alimta) last session (7/11), PE on xarelto presenting to the emergency department with shortness of breath is found to have new PE, worsening ascites, developing SBO and BETO. Oncology consulted for further recommendations      CTAP 7/27  New dilated fluid-filled thickened small bowel loops throughout the   central abdomen with transition in the right lower quadrant where the   loops of small bowel appear matted and thickened compared with prior exam   from 7/21/2023 concerning for small bowel enteritis with developing small   bowel obstruction.  Worsening abdominal and pelvic ascites with mild thickening of the   peritoneal reflections likely reflective of malignant ascites.  New filling defect in the left lower lobar segmental pulmonary arteries   concerning for pulmonary embolism. CTA of the chest should be considered.  Osseous metastatic disease, unchanged.  Unchanged peripheral cystic lesions in the right hepatic lobe which may   be along the serosal surface (serosal implants).  Small left pleural effusion.      -Surgery consult for SBO management  -Renal consult for BETO  -CTA chest when kidneys optimized  -May need therapeutic para if symptomatic   -Palliative Care for symptom management  -No plans for inpatient chemotherapy  -C/w Supportive care, pain control, Nutrition, PT, DVT ppx  -Rest of care as per primary team  -Patient to followup with  ([facility]) upon discharge  -Oncology will continue to follow with you    Case d/w oncology attending      Frederick THOMAS  Oncology Physician Assistant  [facility]    Pager [phone] also available on TEAMS as Frederick THOMAS    If before 8am/after 5pm or on weekends please page On-call Oncology Fellow     58M PMH lung cancer met to  LAD, peritoneum, bone and liver, currently receiving chemotherapy (Alimta) last session (7/11), PE on xarelto presenting to the emergency department with shortness of breath is found to have new PE, worsening ascites, developing SBO and BETO. Oncology consulted for further recommendations      CTAP 7/27  New dilated fluid-filled thickened small bowel loops throughout the   central abdomen with transition in the right lower quadrant where the   loops of small bowel appear matted and thickened compared with prior exam   from 7/21/2023 concerning for small bowel enteritis with developing small   bowel obstruction.  Worsening abdominal and pelvic ascites with mild thickening of the   peritoneal reflections likely reflective of malignant ascites.  New filling defect in the left lower lobar segmental pulmonary arteries   concerning for pulmonary embolism. CTA of the chest should be considered.  Osseous metastatic disease, unchanged.  Unchanged peripheral cystic lesions in the right hepatic lobe which may   be along the serosal surface (serosal implants).  Small left pleural effusion.      -Surgery consult for SBO management  -Renal consult for BETO  -CTA chest when kidneys optimized  -May need therapeutic para if symptomatic   -In re to PE, agree with heparin gtt, patient with h/o PE in Sept 2022, he had been compliant however  patient reports that he stopped xarelto for a few days prior to and after his most paracentesis earlier this month.And did not resume it until a few days after, may have been off AC for about 1 week. Was also unable  to take pille this week and last to oral AC 2 days ago. Compliance>Failure would be reason for new PE> Would encourage re-challenging patient on DOAC when closer to discharge.   -Palliative Care for symptom management  -No plans for inpatient chemotherapy  -C/w Supportive care, pain control, Nutrition, PT, DVT ppx  -Rest of care as per primary team  -Patient to followup with Dr. Son ([facility]) upon discharge  -Oncology will continue to follow with you    Case d/w oncology attending      Frederick THOMAS  Oncology Physician Assistant  [facility]    Pager [phone] also available on TEAMS as Frederick THOMAS    If before 8am/after 5pm or on weekends please page On-call Oncology Fellow     57 yo M PMH lung cancer met to  LAD, peritoneum, bone and liver, currently receiving chemotherapy (Alimta) last session (7/11), PE on xarelto presenting to the emergency department with shortness of breath is found to have new PE, worsening ascites, developing SBO and BETO. Oncology consulted for further recommendations      CTAP 7/27  New dilated fluid-filled thickened small bowel loops throughout the   central abdomen with transition in the right lower quadrant where the   loops of small bowel appear matted and thickened compared with prior exam   from 7/21/2023 concerning for small bowel enteritis with developing small   bowel obstruction.  Worsening abdominal and pelvic ascites with mild thickening of the   peritoneal reflections likely reflective of malignant ascites.  New filling defect in the left lower lobar segmental pulmonary arteries   concerning for pulmonary embolism. CTA of the chest should be considered.  Osseous metastatic disease, unchanged.  Unchanged peripheral cystic lesions in the right hepatic lobe which may   be along the serosal surface (serosal implants).  Small left pleural effusion.      -Surgery consult for SBO management  -Renal consult for BETO  -CTA chest when kidneys optimized  -May need therapeutic para if symptomatic   -In re to PE, agree with heparin gtt, patient with h/o PE in Sept 2022, he had been compliant however  patient reports that he stopped xarelto for a few days prior to and after his most paracentesis earlier this month.And did not resume it until a few days after, may have been off AC for about 1 week. Was also unable  to take pille this week and last to oral AC 2 days ago. Compliance>Failure would be reason for new PE> Would encourage re-challenging patient on DOAC when closer to discharge.   -Palliative Care for symptom management  -No plans for inpatient chemotherapy  -C/w Supportive care, pain control, Nutrition, PT, DVT ppx  -Rest of care as per primary team  -Patient to followup with Dr. Son ([facility]) upon discharge  -Oncology will continue to follow with you    Case d/w oncology attending      Frederick THOMAS  Oncology Physician Assistant  [facility]    Pager [phone] also available on TEAMS as Frederick THOMAS    If before 8am/after 5pm or on weekends please page On-call Oncology Fellow

## 2023-07-28 NOTE — PROGRESS NOTE ADULT - ASSESSMENT
58M PMH lung cancer currently receiving chemotherapy last session (7/11), PE on xarelto presenting to the emergency department with shortness of breath is found to have new PE, worsening ascites, developing SBO and BETO.

## 2023-07-28 NOTE — CONSULT NOTE ADULT - ASSESSMENT
58-year-old man.    5/27/23:  Presented to the emergency room with dyspnea at rest.  Similar episodes in the past as his ascites reaccumulate after being drained.    + Mild diffuse abdominal pain.  + Associated obstipation and constipation.  No nausea or vomiting.  No fever, sweats, or chills.    No previous abdominal operations, he does have frequent paracentesis (below)    + History of stage IV lung cancer (symptomatic malignant ascites requiring periodic paracenteses).  Receiving systemic chemotherapy, last treatment was July 11, 2023    + History of pulmonary embolism.  Takes Xarelto          Labs:  WBC count = 4.7.  Stable and any: H&H = 8.8/28.  Platelet count = 398K    Not acidemic.  Mild azotemia    Fluid analysis from paracentesis yesterday:  No bacteria.  No white cells.    Admission CT abdomen/pelvis:  Comparison study: 7/21/23.  New dilated fluid filled small bowel loops throughout the central abdomen with transition in the right lower quadrant with a loops of small bowel appear to be matted and thickened; differential includes enteritis with developing SBO.  Worsening ascites.  Unchanged osseous metastases.  Perihepatic findings suggestive of serosal implants.    A/P:  Malignant small bowel obstruction.  Medical protocol initiated.  Nasogastric tube with ptosis by the nauseated or vomiting.    Will continue to follow with you.    Given the extent of his metastatic disease, and medical comorbidities, I doubt there is a role/value to offering operative intervention for the current circumstance. 58-year-old man.    5/27/23:  Presented to the emergency room with dyspnea at rest.  Similar episodes in the past as his ascites reaccumulate after being drained.    + Mild diffuse abdominal pain.  + Associated obstipation and constipation.  No nausea or vomiting.  No fever, sweats, or chills.    No previous abdominal operations, he does have frequent paracentesis (below)    + History of stage IV lung cancer (symptomatic malignant ascites requiring periodic paracenteses).  Receiving systemic chemotherapy, last treatment was July 11, 2023    + History of pulmonary embolism.  Takes Xarelto    This morning he is awake and alert.  Less abd. pain than on admit.  Still no flatus or BM  No N/V/reflux    No prior abd surg.  No hx of GI dis.  No colonoscopy.  No FH of GI dis.    Abd is distended, c/w known ascites  Non-tender, w/o peritoneal sx    Labs:  WBC count = 4.7.  Stable and any: H&H = 8.8/28.  Platelet count = 398K    Not acidemic.  Mild azotemia    Fluid analysis from paracentesis yesterday:  No bacteria.  No white cells.    Admission CT abdomen/pelvis:  Comparison study: 7/21/23.  New dilated fluid filled small bowel loops throughout the central abdomen with transition in the right lower quadrant with a loops of small bowel appear to be matted and thickened; differential includes enteritis with developing SBO.  Worsening ascites.  Unchanged osseous metastases.  Perihepatic findings suggestive of serosal implants.    A/P:  Malignant small bowel obstruction.  Medical protocol initiated.  Nasogastric tube with ptosis by the nauseated or vomiting.    Will continue to follow with you.    Given the extent of his metastatic disease, and medical comorbidities, there is little role/value to offering operative intervention for the current circumstance.

## 2023-07-28 NOTE — CONSULT NOTE ADULT - NS ATTEND AMEND GEN_ALL_CORE FT
57 yo M with lung cancer met to  LAD, peritoneum, bone and liver, currently receiving chemotherapy (Alimta) last session (7/11), PE on Xarelto presenting to the emergency department with shortness of breath is found to have new PE, worsening ascites, developing SBO and BETO. He has missed doses of Xarelto so this may not be a failure of the medication. Currently on heparin and can resume Xarelto on discharge. Surgery consult for SBO management and Renal consult for BETO. Palliative Care consult may be helpful for symptom management. No plans for inpatient chemotherapy. Patient to followup with Dr. Son ([facility]) upon discharge.

## 2023-07-28 NOTE — CONSULT NOTE ADULT - SUBJECTIVE AND OBJECTIVE BOX
Patient is a 58y old  Male who presents with a chief complaint of SOB (28 Jul 2023 02:43)      HPI:  58M PMH lung cancer currently receiving chemotherapy last session (7/11), PE on xarelto presenting to the emergency department with shortness of breath.  Patient states for the past 1 week he has been having shortness of breath, nonexertional. He states he had similar symptoms when he has increased ascites. He gets multiple  paracenteses as an outpatient, last one was from 7/3, drained 750cc fluid. Reports that for the past 2-3 days, gets nauseous and vomiting after eating. Denies fever, chills. Also noticed constant, nonradiating chest pain for the past 2-3 days as well. Has mild diffuse abdominal pain. Has not passed gas or BM for a few days.    Of note, patient reports that he stopped xarelto for a few days prior to and after the paracentesis as instructed earlier this month. (27 Jul 2023 16:07)       Oncologic History:  58m with Metastatic NSCLC with adenocarcinoma histology diagnosed September 2022. Patient presented to the hospital with SOB and was found to have a left-sided hydropneumothorax with trapped lung. He underwent a pleuroscopy with pleural biopsy as well as Pleurx catheter placement. Pleural biopsy revealed adenocarcinoma consistent with lung primary. Tumor tested PD-L1 low–positive and molecular testing was negative for actionable mutations (ARID1A, CASP8, among others). 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  Patient started first-line systemic therapy with combination chemotherapy and immunotherapy with carboplatin/pemetrexed and pembrolizumab in December 2022 and achieved a nice response. He was treated with 4 cycles of triplet therapy followed by pemetrexed/pembrolizumab maintenance. Pleurx catheter was subsequently removed in March 2023. Pemetrexed was subsequently held due to hematologic toxicity with anemia and he has been on single agent pembrolizumab since April 2023.   Restaging scan in April 2023 revealed new perihepatic ascites with an otherwise sustained response. He was sent for an ultrasound earlier this month revealing mild ascites. He underwent ultrasound paracentesis on 5/5/2023 with 1200 cc fluid removed with subsequent US-paracentesis in late May with 900cc fluid removed; cytology was positive for his known lung adenocarcinoma. Received IV iron repletion with Venofer completed late May 2023. Restaging CT April 2023 with new perihepatic ascites with a sustained response otherwise.  Began single agent Pemetrexed on 5/30/23.       Disease: NSCLC   Pathology: – Left pleural biopsy 9/14/2022: Adenocarcinoma consistent with lung primary.  PD-L1 low–positive at 10%.  Foundation One: Negative for actionable mutations (ARID1A, CASP8, among others).     ROS: as above     PAST MEDICAL & SURGICAL HISTORY:  Former smoker      Non-small cell lung cancer with metastasis      Status post cardiac surgery  s/p open right thoracotomy for posterior cardiac mass removal > 20 years ago, reported by patient to be benign.          SOCIAL HISTORY:    FAMILY HISTORY:  Family history of cancer in father  Cancer of unknown origin on Father's side.        MEDICATIONS  (STANDING):  dexAMETHasone  Injectable 4 milliGRAM(s) IV Push every 12 hours  dextrose 5%. 1000 milliLiter(s) (100 mL/Hr) IV Continuous <Continuous>  dextrose 5%. 1000 milliLiter(s) (50 mL/Hr) IV Continuous <Continuous>  dextrose 50% Injectable 25 Gram(s) IV Push once  dextrose 50% Injectable 25 Gram(s) IV Push once  dextrose 50% Injectable 12.5 Gram(s) IV Push once  folic acid Injectable 1 milliGRAM(s) IV Push daily  glucagon  Injectable 1 milliGRAM(s) IntraMuscular once  heparin  Infusion.  Unit(s)/Hr (12 mL/Hr) IV Continuous <Continuous>  insulin lispro (ADMELOG) corrective regimen sliding scale   SubCutaneous every 6 hours  metoclopramide Injectable 10 milliGRAM(s) IV Push every 8 hours  octreotide  Injectable 100 MICROGram(s) SubCutaneous every 8 hours  pantoprazole  Injectable 40 milliGRAM(s) IV Push daily  sodium chloride 0.9%. 1000 milliLiter(s) (75 mL/Hr) IV Continuous <Continuous>    MEDICATIONS  (PRN):  dextrose Oral Gel 15 Gram(s) Oral once PRN Blood Glucose LESS THAN 70 milliGRAM(s)/deciliter  heparin   Injectable 5500 Unit(s) IV Push every 6 hours PRN For aPTT less than 40  heparin   Injectable 2500 Unit(s) IV Push every 6 hours PRN For aPTT between 40 - 57      Allergies    No Known Allergies    Intolerances        Vital Signs Last 24 Hrs  T(C): 36.9 (28 Jul 2023 05:35), Max: 36.9 (28 Jul 2023 05:35)  T(F): 98.4 (28 Jul 2023 05:35), Max: 98.4 (28 Jul 2023 05:35)  HR: 88 (28 Jul 2023 05:35) (79 - 92)  BP: 124/69 (28 Jul 2023 05:35) (107/66 - 134/84)  BP(mean): --  RR: 17 (28 Jul 2023 05:35) (16 - 19)  SpO2: 96% (28 Jul 2023 05:35) (96% - 100%)    Parameters below as of 28 Jul 2023 05:35  Patient On (Oxygen Delivery Method): room air        PHYSICAL EXAM  General: adult in NAD  HEENT: clear oropharynx, anicteric sclera, pink conjunctiva  Neck: supple  CV: normal S1/S2 with no murmur rubs or gallops  Lungs: positive air movement b/l ant lungs, clear to auscultation, no wheezes, no rales  Abdomen: soft non-tender non-distended, no hepatosplenomegaly  Ext: no clubbing cyanosis or edema  Skin: no rashes and no petechiae  Neuro: alert and oriented X 3, none focal    LABS:                          8.1    4.63  )-----------( 430      ( 28 Jul 2023 03:13 )             25.8         Mean Cell Volume : 88.4 fL  Mean Cell Hemoglobin : 27.7 pg  Mean Cell Hemoglobin Concentration : 31.4 gm/dL  Auto Neutrophil # : x  Auto Lymphocyte # : x  Auto Monocyte # : x  Auto Eosinophil # : x  Auto Basophil # : x  Auto Neutrophil % : x  Auto Lymphocyte % : x  Auto Monocyte % : x  Auto Eosinophil % : x  Auto Basophil % : x      Serial CBC's  07-28 @ 03:13  Hct-25.8 / Hgb-8.1 / Plat-430 / RBC-2.92 / WBC-4.63  Serial CBC's  07-27 @ 12:55  Hct-27.9 / Hgb-8.8 / Plat-398 / RBC-3.17 / WBC-4.66  Serial CBC's  07-27 @ 11:30  Hct-29.6 / Hgb-9.1 / Plat-378 / RBC-3.32 / WBC-4.63  Serial CBC's  07-27 @ 02:46  Hct-30.3 / Hgb-9.7 / Plat-374 / RBC-3.45 / WBC-4.98      07-28    137  |  100  |  32<H>  ----------------------------<  115<H>  5.5<H>   |  23  |  1.93<H>    Ca    8.5      28 Jul 2023 03:13  Phos  4.7     07-28  Mg     2.90     07-28    TPro  8.8<H>  /  Alb  3.7  /  TBili  0.2  /  DBili  x   /  AST  24  /  ALT  10  /  AlkPhos  95  07-27      PT/INR - ( 27 Jul 2023 06:47 )   PT: 15.0 sec;   INR: 1.35 ratio         PTT - ( 28 Jul 2023 03:13 )  PTT:67.3 sec    Iron - Total Binding Capacity.: 196 ug/dL (07-27 @ 11:30)  Folate, Serum: 12.6 ng/mL (07-27 @ 11:30)  Vitamin B12, Serum: 1223 pg/mL (07-27 @ 11:30)  Ferritin: 732 ng/mL (07-27 @ 11:30)              RADIOLOGY & ADDITIONAL STUDIES:     Patient is a 58y old  Male who presents with a chief complaint of SOB (28 Jul 2023 02:43)      HPI:  58M PMH lung cancer currently receiving chemotherapy last session (7/11), PE on xarelto presenting to the emergency department with shortness of breath.  Patient states for the past 1 week he has been having shortness of breath, nonexertional. He states he had similar symptoms when he has increased ascites. He gets multiple  paracenteses as an outpatient, last one was from 7/3, drained 750cc fluid. Reports that for the past 2-3 days, gets nauseous and vomiting after eating. Denies fever, chills. Also noticed constant, nonradiating chest pain for the past 2-3 days as well. Has mild diffuse abdominal pain. Has not passed gas or BM for a few days.    Of note, patient reports that he stopped xarelto for a few days prior to and after the paracentesis as instructed earlier this month. (27 Jul 2023 16:07)       Oncologic History:  58m with Metastatic NSCLC with adenocarcinoma histology diagnosed September 2022. Patient presented to the hospital with SOB and was found to have a left-sided hydropneumothorax with trapped lung. He underwent a pleuroscopy with pleural biopsy as well as Pleurx catheter placement. Pleural biopsy revealed adenocarcinoma consistent with lung primary. Tumor tested PD-L1 low–positive and molecular testing was negative for actionable mutations (ARID1A, CASP8, among others). 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  Patient started first-line systemic therapy with combination chemotherapy and immunotherapy with carboplatin/pemetrexed and pembrolizumab in December 2022 and achieved a nice response. He was treated with 4 cycles of triplet therapy followed by pemetrexed/pembrolizumab maintenance. Pleurx catheter was subsequently removed in March 2023. Pemetrexed was subsequently held due to hematologic toxicity with anemia and he has been on single agent pembrolizumab since April 2023.   Restaging scan in April 2023 revealed new perihepatic ascites with an otherwise sustained response. He was sent for an ultrasound earlier this month revealing mild ascites. He underwent ultrasound paracentesis on 5/5/2023 with 1200 cc fluid removed with subsequent US-paracentesis in late May with 900cc fluid removed; cytology was positive for his known lung adenocarcinoma. Received IV iron repletion with Venofer completed late May 2023. Restaging CT April 2023 with new perihepatic ascites with a sustained response otherwise.  Began single agent Pemetrexed on 5/30/23.       Disease: NSCLC   Pathology: – Left pleural biopsy 9/14/2022: Adenocarcinoma consistent with lung primary.  PD-L1 low–positive at 10%.  Foundation One: Negative for actionable mutations (ARID1A, CASP8, among others).     ROS: as above     PAST MEDICAL & SURGICAL HISTORY:  Former smoker      Non-small cell lung cancer with metastasis      Status post cardiac surgery  s/p open right thoracotomy for posterior cardiac mass removal > 20 years ago, reported by patient to be benign.          SOCIAL HISTORY:    FAMILY HISTORY:  Family history of cancer in father  Cancer of unknown origin on Father's side.        MEDICATIONS  (STANDING):  dexAMETHasone  Injectable 4 milliGRAM(s) IV Push every 12 hours  dextrose 5%. 1000 milliLiter(s) (100 mL/Hr) IV Continuous <Continuous>  dextrose 5%. 1000 milliLiter(s) (50 mL/Hr) IV Continuous <Continuous>  dextrose 50% Injectable 25 Gram(s) IV Push once  dextrose 50% Injectable 25 Gram(s) IV Push once  dextrose 50% Injectable 12.5 Gram(s) IV Push once  folic acid Injectable 1 milliGRAM(s) IV Push daily  glucagon  Injectable 1 milliGRAM(s) IntraMuscular once  heparin  Infusion.  Unit(s)/Hr (12 mL/Hr) IV Continuous <Continuous>  insulin lispro (ADMELOG) corrective regimen sliding scale   SubCutaneous every 6 hours  metoclopramide Injectable 10 milliGRAM(s) IV Push every 8 hours  octreotide  Injectable 100 MICROGram(s) SubCutaneous every 8 hours  pantoprazole  Injectable 40 milliGRAM(s) IV Push daily  sodium chloride 0.9%. 1000 milliLiter(s) (75 mL/Hr) IV Continuous <Continuous>    MEDICATIONS  (PRN):  dextrose Oral Gel 15 Gram(s) Oral once PRN Blood Glucose LESS THAN 70 milliGRAM(s)/deciliter  heparin   Injectable 5500 Unit(s) IV Push every 6 hours PRN For aPTT less than 40  heparin   Injectable 2500 Unit(s) IV Push every 6 hours PRN For aPTT between 40 - 57      Allergies    No Known Allergies    Intolerances        Vital Signs Last 24 Hrs  T(C): 36.9 (28 Jul 2023 05:35), Max: 36.9 (28 Jul 2023 05:35)  T(F): 98.4 (28 Jul 2023 05:35), Max: 98.4 (28 Jul 2023 05:35)  HR: 88 (28 Jul 2023 05:35) (79 - 92)  BP: 124/69 (28 Jul 2023 05:35) (107/66 - 134/84)  BP(mean): --  RR: 17 (28 Jul 2023 05:35) (16 - 19)  SpO2: 96% (28 Jul 2023 05:35) (96% - 100%)    Parameters below as of 28 Jul 2023 05:35  Patient On (Oxygen Delivery Method): room air        PHYSICAL EXAM    General: Comfortable appearing male resting in bed   HEENT: NC/AT; PERRL, anicteric sclera; MMM  Neck: supple  Cardiovascular: +S1/S2; RRR  Respiratory: CTA B/L; no W/R/R  Gastrointestinal: distended/tense abdomen - non-tender- ; +BSx4  Extremities: WWP; no edema, clubbing or cyanosis  Vascular: 2+ radial, DP/PT pulses B/L  Neurological: AAOx3; no focal deficits    LABS:                          8.1    4.63  )-----------( 430      ( 28 Jul 2023 03:13 )             25.8         Mean Cell Volume : 88.4 fL  Mean Cell Hemoglobin : 27.7 pg  Mean Cell Hemoglobin Concentration : 31.4 gm/dL  Auto Neutrophil # : x  Auto Lymphocyte # : x  Auto Monocyte # : x  Auto Eosinophil # : x  Auto Basophil # : x  Auto Neutrophil % : x  Auto Lymphocyte % : x  Auto Monocyte % : x  Auto Eosinophil % : x  Auto Basophil % : x      Serial CBC's  07-28 @ 03:13  Hct-25.8 / Hgb-8.1 / Plat-430 / RBC-2.92 / WBC-4.63  Serial CBC's  07-27 @ 12:55  Hct-27.9 / Hgb-8.8 / Plat-398 / RBC-3.17 / WBC-4.66  Serial CBC's  07-27 @ 11:30  Hct-29.6 / Hgb-9.1 / Plat-378 / RBC-3.32 / WBC-4.63  Serial CBC's  07-27 @ 02:46  Hct-30.3 / Hgb-9.7 / Plat-374 / RBC-3.45 / WBC-4.98      07-28    137  |  100  |  32<H>  ----------------------------<  115<H>  5.5<H>   |  23  |  1.93<H>    Ca    8.5      28 Jul 2023 03:13  Phos  4.7     07-28  Mg     2.90     07-28    TPro  8.8<H>  /  Alb  3.7  /  TBili  0.2  /  DBili  x   /  AST  24  /  ALT  10  /  AlkPhos  95  07-27      PT/INR - ( 27 Jul 2023 06:47 )   PT: 15.0 sec;   INR: 1.35 ratio         PTT - ( 28 Jul 2023 03:13 )  PTT:67.3 sec    Iron - Total Binding Capacity.: 196 ug/dL (07-27 @ 11:30)  Folate, Serum: 12.6 ng/mL (07-27 @ 11:30)  Vitamin B12, Serum: 1223 pg/mL (07-27 @ 11:30)  Ferritin: 732 ng/mL (07-27 @ 11:30)              RADIOLOGY & ADDITIONAL STUDIES:     Patient is a 58 y old  Male who presents with a chief complaint of SOB (28 Jul 2023 02:43)      HPI:  58M PMH lung cancer currently receiving chemotherapy last session (7/11), PE on xarelto presenting to the emergency department with shortness of breath.  Patient states for the past 1 week he has been having shortness of breath, nonexertional. He states he had similar symptoms when he has increased ascites. He gets multiple  paracenteses as an outpatient, last one was from 7/3, drained 750cc fluid. Reports that for the past 2-3 days, gets nauseous and vomiting after eating. Denies fever, chills. Also noticed constant, nonradiating chest pain for the past 2-3 days as well. Has mild diffuse abdominal pain. Has not passed gas or BM for a few days.    Of note, patient reports that he stopped xarelto for a few days prior to and after the paracentesis as instructed earlier this month. (27 Jul 2023 16:07)       Oncologic History:  58m with Metastatic NSCLC with adenocarcinoma histology diagnosed September 2022. Patient presented to the hospital with SOB and was found to have a left-sided hydropneumothorax with trapped lung. He underwent a pleuroscopy with pleural biopsy as well as Pleurx catheter placement. Pleural biopsy revealed adenocarcinoma consistent with lung primary. Tumor tested PD-L1 low–positive and molecular testing was negative for actionable mutations (ARID1A, CASP8, among others). 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  Patient started first-line systemic therapy with combination chemotherapy and immunotherapy with carboplatin/pemetrexed and pembrolizumab in December 2022 and achieved a nice response. He was treated with 4 cycles of triplet therapy followed by pemetrexed/pembrolizumab maintenance. Pleurx catheter was subsequently removed in March 2023. Pemetrexed was subsequently held due to hematologic toxicity with anemia and he has been on single agent pembrolizumab since April 2023.   Restaging scan in April 2023 revealed new perihepatic ascites with an otherwise sustained response. He was sent for an ultrasound earlier this month revealing mild ascites. He underwent ultrasound paracentesis on 5/5/2023 with 1200 cc fluid removed with subsequent US-paracentesis in late May with 900cc fluid removed; cytology was positive for his known lung adenocarcinoma. Received IV iron repletion with Venofer completed late May 2023. Restaging CT April 2023 with new perihepatic ascites with a sustained response otherwise.  Began single agent Pemetrexed on 5/30/23.       Disease: NSCLC   Pathology: – Left pleural biopsy 9/14/2022: Adenocarcinoma consistent with lung primary.  PD-L1 low–positive at 10%.  Foundation One: Negative for actionable mutations (ARID1A, CASP8, among others).     ROS: as above     PAST MEDICAL & SURGICAL HISTORY:  Former smoker      Non-small cell lung cancer with metastasis      Status post cardiac surgery  s/p open right thoracotomy for posterior cardiac mass removal > 20 years ago, reported by patient to be benign.          SOCIAL HISTORY:    FAMILY HISTORY:  Family history of cancer in father  Cancer of unknown origin on Father's side.        MEDICATIONS  (STANDING):  dexAMETHasone  Injectable 4 milliGRAM(s) IV Push every 12 hours  dextrose 5%. 1000 milliLiter(s) (100 mL/Hr) IV Continuous <Continuous>  dextrose 5%. 1000 milliLiter(s) (50 mL/Hr) IV Continuous <Continuous>  dextrose 50% Injectable 25 Gram(s) IV Push once  dextrose 50% Injectable 25 Gram(s) IV Push once  dextrose 50% Injectable 12.5 Gram(s) IV Push once  folic acid Injectable 1 milliGRAM(s) IV Push daily  glucagon  Injectable 1 milliGRAM(s) IntraMuscular once  heparin  Infusion.  Unit(s)/Hr (12 mL/Hr) IV Continuous <Continuous>  insulin lispro (ADMELOG) corrective regimen sliding scale   SubCutaneous every 6 hours  metoclopramide Injectable 10 milliGRAM(s) IV Push every 8 hours  octreotide  Injectable 100 MICROGram(s) SubCutaneous every 8 hours  pantoprazole  Injectable 40 milliGRAM(s) IV Push daily  sodium chloride 0.9%. 1000 milliLiter(s) (75 mL/Hr) IV Continuous <Continuous>    MEDICATIONS  (PRN):  dextrose Oral Gel 15 Gram(s) Oral once PRN Blood Glucose LESS THAN 70 milliGRAM(s)/deciliter  heparin   Injectable 5500 Unit(s) IV Push every 6 hours PRN For aPTT less than 40  heparin   Injectable 2500 Unit(s) IV Push every 6 hours PRN For aPTT between 40 - 57      Allergies    No Known Allergies    Intolerances        Vital Signs Last 24 Hrs  T(C): 36.9 (28 Jul 2023 05:35), Max: 36.9 (28 Jul 2023 05:35)  T(F): 98.4 (28 Jul 2023 05:35), Max: 98.4 (28 Jul 2023 05:35)  HR: 88 (28 Jul 2023 05:35) (79 - 92)  BP: 124/69 (28 Jul 2023 05:35) (107/66 - 134/84)  BP(mean): --  RR: 17 (28 Jul 2023 05:35) (16 - 19)  SpO2: 96% (28 Jul 2023 05:35) (96% - 100%)    Parameters below as of 28 Jul 2023 05:35  Patient On (Oxygen Delivery Method): room air        PHYSICAL EXAM    General: Comfortable appearing male resting in bed   HEENT: NC/AT; PERRL, anicteric sclera; MMM  Neck: supple  Cardiovascular: +S1/S2; RRR  Respiratory: CTA B/L; no W/R/R  Gastrointestinal: distended/tense abdomen - non-tender- ; +BSx4  Extremities: WWP; no edema, clubbing or cyanosis  Vascular: 2+ radial, DP/PT pulses B/L  Neurological: AAOx3; no focal deficits    LABS:                          8.1    4.63  )-----------( 430      ( 28 Jul 2023 03:13 )             25.8         Mean Cell Volume : 88.4 fL  Mean Cell Hemoglobin : 27.7 pg  Mean Cell Hemoglobin Concentration : 31.4 gm/dL  Auto Neutrophil # : x  Auto Lymphocyte # : x  Auto Monocyte # : x  Auto Eosinophil # : x  Auto Basophil # : x  Auto Neutrophil % : x  Auto Lymphocyte % : x  Auto Monocyte % : x  Auto Eosinophil % : x  Auto Basophil % : x      Serial CBC's  07-28 @ 03:13  Hct-25.8 / Hgb-8.1 / Plat-430 / RBC-2.92 / WBC-4.63  Serial CBC's  07-27 @ 12:55  Hct-27.9 / Hgb-8.8 / Plat-398 / RBC-3.17 / WBC-4.66  Serial CBC's  07-27 @ 11:30  Hct-29.6 / Hgb-9.1 / Plat-378 / RBC-3.32 / WBC-4.63  Serial CBC's  07-27 @ 02:46  Hct-30.3 / Hgb-9.7 / Plat-374 / RBC-3.45 / WBC-4.98      07-28    137  |  100  |  32<H>  ----------------------------<  115<H>  5.5<H>   |  23  |  1.93<H>    Ca    8.5      28 Jul 2023 03:13  Phos  4.7     07-28  Mg     2.90     07-28    TPro  8.8<H>  /  Alb  3.7  /  TBili  0.2  /  DBili  x   /  AST  24  /  ALT  10  /  AlkPhos  95  07-27      PT/INR - ( 27 Jul 2023 06:47 )   PT: 15.0 sec;   INR: 1.35 ratio         PTT - ( 28 Jul 2023 03:13 )  PTT:67.3 sec    Iron - Total Binding Capacity.: 196 ug/dL (07-27 @ 11:30)  Folate, Serum: 12.6 ng/mL (07-27 @ 11:30)  Vitamin B12, Serum: 1223 pg/mL (07-27 @ 11:30)  Ferritin: 732 ng/mL (07-27 @ 11:30)              RADIOLOGY & ADDITIONAL STUDIES:

## 2023-07-28 NOTE — PROGRESS NOTE ADULT - PROBLEM SELECTOR PLAN 2
CT A&P: New dilated fluid-filled thickened small bowel loops throughout the central abdomen with transition in the right lower quadrant where the loops of small bowel appear matted and thickened compared with prior exam from 7/21/2023 concerning for small bowel enteritis with developing small bowel obstruction. Pt. passed gas prior to this AM's exam.   -surgery consulted: malignant SBO protocol with reglan, decadron and octreotide  -serial abd exam, if patient becomes nauseous and begins vomiting should place NGT  -NPO - will advance diet as tolerated   -IVF

## 2023-07-28 NOTE — PROGRESS NOTE ADULT - PROBLEM SELECTOR PLAN 1
Hx of PE on xarelto, Off for a few days for para at the beginning of this month  -CT A&P: New filling defect in the left lower lobar segmental pulmonary arteries concerning for pulmonary embolism  -c/w heparin gtt for now  -trop flat  -EKG: sinus tachy, no acute ischemic changes  -check echo for right heart strain Yes

## 2023-07-28 NOTE — PROGRESS NOTE ADULT - SUBJECTIVE AND OBJECTIVE BOX
Hospitalist Progress Note  Crystal Merino MD Pager # [phone]    OVERNIGHT EVENTS: CLAUDY    SUBJECTIVE / INTERVAL HPI: Patient seen and examined at bedside. Patient reports that he just passed gas and is starting to feel better. He is less nauseous and he is now hungry. He denies SOB or CP. He feels like his abdomen is distended and tight. All other ROS negative.     VITAL SIGNS:  Vital Signs Last 24 Hrs  T(C): 36.8 (28 Jul 2023 11:53), Max: 36.9 (28 Jul 2023 05:35)  T(F): 98.3 (28 Jul 2023 11:53), Max: 98.4 (28 Jul 2023 05:35)  HR: 72 (28 Jul 2023 11:53) (72 - 88)  BP: 118/78 (28 Jul 2023 11:53) (107/66 - 124/69)  BP(mean): --  RR: 18 (28 Jul 2023 11:53) (17 - 19)  SpO2: 99% (28 Jul 2023 11:53) (96% - 99%)    Parameters below as of 28 Jul 2023 05:35  Patient On (Oxygen Delivery Method): room air        PHYSICAL EXAM:    General: Comfortable appearing male resting in bed   HEENT: NC/AT; PERRL, anicteric sclera; MMM  Neck: supple  Cardiovascular: +S1/S2; RRR  Respiratory: CTA B/L; no W/R/R  Gastrointestinal: distended/tense abdomen - non-tender- ; +BSx4  Extremities: WWP; no edema, clubbing or cyanosis  Vascular: 2+ radial, DP/PT pulses B/L  Neurological: AAOx3; no focal deficits    MEDICATIONS:  MEDICATIONS  (STANDING):  dexAMETHasone  Injectable 4 milliGRAM(s) IV Push every 12 hours  dextrose 5%. 1000 milliLiter(s) (100 mL/Hr) IV Continuous <Continuous>  dextrose 5%. 1000 milliLiter(s) (50 mL/Hr) IV Continuous <Continuous>  dextrose 50% Injectable 25 Gram(s) IV Push once  dextrose 50% Injectable 12.5 Gram(s) IV Push once  dextrose 50% Injectable 25 Gram(s) IV Push once  folic acid Injectable 1 milliGRAM(s) IV Push daily  glucagon  Injectable 1 milliGRAM(s) IntraMuscular once  heparin  Infusion.  Unit(s)/Hr (12 mL/Hr) IV Continuous <Continuous>  insulin lispro (ADMELOG) corrective regimen sliding scale   SubCutaneous every 6 hours  metoclopramide Injectable 10 milliGRAM(s) IV Push every 8 hours  octreotide  Injectable 100 MICROGram(s) SubCutaneous every 8 hours  pantoprazole  Injectable 40 milliGRAM(s) IV Push daily  sodium chloride 0.9%. 1000 milliLiter(s) (75 mL/Hr) IV Continuous <Continuous>    MEDICATIONS  (PRN):  dextrose Oral Gel 15 Gram(s) Oral once PRN Blood Glucose LESS THAN 70 milliGRAM(s)/deciliter  heparin   Injectable 5500 Unit(s) IV Push every 6 hours PRN For aPTT less than 40  heparin   Injectable 2500 Unit(s) IV Push every 6 hours PRN For aPTT between 40 - 57      ALLERGIES:  Allergies    No Known Allergies    Intolerances        LABS:                        8.1    4.63  )-----------( 430      ( 28 Jul 2023 03:13 )             25.8     07-28    137  |  100  |  32<H>  ----------------------------<  115<H>  5.5<H>   |  23  |  1.93<H>    Ca    8.5      28 Jul 2023 03:13  Phos  4.7     07-28  Mg     2.90     07-28    TPro  8.8<H>  /  Alb  3.7  /  TBili  0.2  /  DBili  x   /  AST  24  /  ALT  10  /  AlkPhos  95  07-27    PT/INR - ( 28 Jul 2023 12:48 )   PT: 12.8 sec;   INR: 1.14 ratio         PTT - ( 28 Jul 2023 12:48 )  PTT:47.0 sec  Urinalysis Basic - ( 28 Jul 2023 03:13 )    Color: x / Appearance: x / SG: x / pH: x  Gluc: 115 mg/dL / Ketone: x  / Bili: x / Urobili: x   Blood: x / Protein: x / Nitrite: x   Leuk Esterase: x / RBC: x / WBC x   Sq Epi: x / Non Sq Epi: x / Bacteria: x      CAPILLARY BLOOD GLUCOSE      POCT Blood Glucose.: 114 mg/dL (28 Jul 2023 12:39)      RADIOLOGY & ADDITIONAL TESTS: Reviewed.    ASSESSMENT:    PLAN:

## 2023-07-28 NOTE — PROGRESS NOTE ADULT - SUBJECTIVE AND OBJECTIVE BOX
SURGERY DAILY PROGRESS NOTE    --------------- OVERNIGHT/INTERVAL---------------  - No acute events overnight    --------------- SUBJECTIVE ---------------  - Patient describes no acute issues or concerns at this time  - -/- BM +void. Denies CP, SOB, n/v, abdominal pain.   - Patient seen and examined at bedside with surgical team    --------------- OBJECTIVE ---------------  -----VITALS-----  T(C): 36.7, Max: 37 (07-27)  T(F): 98, Max: 98.6 (07-27)  HR: 79 (79 - 93)  BP: 114/71 (107/66 - 134/84)  BP(mean): 95 (95 - 95)  ABP: --  ABP(mean): --  RR: 18 (14 - 19)  SpO2: 96% (96% - 100%)  CVP(mm Hg): --  CVP(cm H2O): --  room air            -----PHYSICAL EXAM-----  General: NAD, resting comfortably  HEENT: NC/AT, EOMI, normal hearing, no oral lesions, no LAD, neck supple  Pulmonary: normal resp effort,   Cardiovascular: NSR, no murmurs  Abdominal: soft, moderately distended and mildly tender diffusely, no organomegaly, no guarding or rebound tenderness  Extremities: WWP, normal strength, no clubbing/cyanosis/edema  Neuro: A/O x 3, CNs II-XII grossly intact, normal sensation, no focal deficits       -----INs & OUTs-----      -----MEDICATIONS-----  STANDING  dexAMETHasone  Injectable 4 milliGRAM(s) IV Push every 12 hours  dextrose 5%. 1000 milliLiter(s) (100 mL/Hr) IV Continuous <Continuous>  dextrose 5%. 1000 milliLiter(s) (50 mL/Hr) IV Continuous <Continuous>  dextrose 50% Injectable 25 Gram(s) IV Push once  dextrose 50% Injectable 25 milliLiter(s) IV Push once  dextrose 50% Injectable 25 Gram(s) IV Push once  dextrose 50% Injectable 12.5 Gram(s) IV Push once  folic acid Injectable 1 milliGRAM(s) IV Push daily  glucagon  Injectable 1 milliGRAM(s) IntraMuscular once  heparin  Infusion.  Unit(s)/Hr (12 mL/Hr) IV Continuous <Continuous>  insulin lispro (ADMELOG) corrective regimen sliding scale   SubCutaneous every 6 hours  insulin regular  human recombinant 5 Unit(s) IV Push once  metoclopramide Injectable 10 milliGRAM(s) IV Push every 8 hours  octreotide  Injectable 100 MICROGram(s) SubCutaneous every 8 hours  pantoprazole  Injectable 40 milliGRAM(s) IV Push daily  sodium chloride 0.9%. 1000 milliLiter(s) (75 mL/Hr) IV Continuous <Continuous>    PRN  dextrose Oral Gel 15 Gram(s) Oral once PRN Blood Glucose LESS THAN 70 milliGRAM(s)/deciliter  heparin   Injectable 5500 Unit(s) IV Push every 6 hours PRN For aPTT less than 40  heparin   Injectable 2500 Unit(s) IV Push every 6 hours PRN For aPTT between 40 - 57    -----LABS-----  CBC (07-28 @ 03:13)                              8.1<L>                         4.63    )----------------(  430<H>     --    % Neutrophils, --    % Lymphocytes, ANC: --                                  25.8<L>  CBC (07-27 @ 12:55)                              8.8<L>                         4.66    )----------------(  398        --    % Neutrophils, --    % Lymphocytes, ANC: --                                  27.9<L>    BMP (07-28 @ 03:13)             137     |  100     |  32<H> 		Ca++ --      Ca 8.5                ---------------------------------( 115<H>		Mg 2.90<H>             5.5<H>  |  23      |  1.93<H>			Ph 4.7<H>  BMP (07-27 @ 11:30)             137     |  97<L>   |  32<H> 		Ca++ --      Ca 9.0                ---------------------------------( 89    		Mg 3.10<H>             4.5     |  24      |  2.10<H>			Ph 3.1       LFTs (07-27 @ 02:46)      TPro 8.8<H> / Alb 3.7 / TBili 0.2 / DBili -- / AST 24 / ALT 10 / AlkPhos 95    Coags (07-28 @ 03:13)  aPTT 67.3<H> / INR -- / PT --  Coags (07-27 @ 20:30)  aPTT 40.4<H> / INR -- / PT --        VBG (07-27 @ 02:40)     7.41 / 48 / 27 / 30<H> / 5.0<H> / 31.2<L>%     Lactate: 1.4    Urinalysis (07-28 @ 03:13):     Color:  / Appearance:  / SG:  / pH:  / Gluc: 115<H> / Ketones:  / Bili:  / Urobili:  / Protein : / Nitrites:  / Leuk.Est:  / RBC:  / WBC:  / Sq Epi:  / Non Sq Epi:  / Bacteria        -> Ascites Fl Ascites Fluid Culture (07-27 @ 08:54)       No polymorphonuclear cells seen per low power field  No organisms seen per oil power field    NG  NG    RADIOLOGY & ADDITIONAL STUDIES:    < from: CT Abdomen and Pelvis w/ IV Cont (07.27.23 @ 05:39) >    ACC: [id number] EXAM:  CT ABDOMEN AND PELVIS IC   ORDERED BY: BECKY BAGLEY     PROCEDURE DATE:  07/27/2023          INTERPRETATION:  CLINICAL INFORMATION: Abdominal bloating.    COMPARISON: 7/21/2023.    CONTRAST/COMPLICATIONS:  IV Contrast: Omnipaque 350  90 cc administered   10 cc discarded  Oral Contrast: NONE  Complications: None reported at time of study completion    PROCEDURE:  CT of the Abdomen and Pelvis was performed.  Sagittal and coronal reformats were performed.    FINDINGS:  LOWER CHEST: Emphysema. Small left pleural effusion and mild left basilar   atelectasis. Calcified granulomas in the left lower lobe. Calcified left   hilar lymph nodes.  New filling defect in the left lower lobar segmental   pulmonary arteries.    LIVER: Unchanged peripheral cystic lesions in the right hepatic lobe   which may be along the serosal surface, larger which measures up to 1.8   cm.  BILE DUCTS: Normal caliber.  GALLBLADDER: Within normal limits.  SPLEEN: Peripheral wedgelike lucency in the spleen, unchanged.  PANCREAS: Within normal limits.  ADRENALS: Within normal limits.  KIDNEYS/URETERS: Kidneys enhance symmetrically without hydronephrosis.   Small left renal cyst.    BLADDER: Within normal limits.  REPRODUCTIVE ORGANS: Prostate gland is mildly enlarged.    BOWEL: New dilated fluid-filled thickened small bowel loops throughout   the central abdomen with transition in the right lower quadrant where the   loops of small bowel appear matted and thickened.  PERITONEUM: Worsening abdominal and pelvic ascites with mild thickening   of the peritoneal reflections.. Presacral edema. No pneumoperitoneum.  VESSELS: Mild atherosclerotic calcifications. Normal caliber abdominal   aorta.  RETROPERITONEUM/LYMPH NODES: No lymphadenopathy.  ABDOMINAL WALL: Within normal limits.  BONES: Sclerotic focus in the S1 segment, T12 left-sided pedicle, left   iliac bone, left sacrum. Degenerative changes of the spine, sacroiliac   joints, and hips.    IMPRESSION:  New dilated fluid-filled thickened small bowel loops throughout the   central abdomen with transition in the right lower quadrant where the   loops of small bowel appear matted and thickened compared with prior exam   from 7/21/2023 concerning for small bowel enteritis with developing small   bowel obstruction.    Worsening abdominal and pelvic ascites with mild thickening of the   peritoneal reflections likely reflective of malignant ascites.    New filling defect in the left lower lobar segmental pulmonary arteries   concerningfor pulmonary embolism. CTA of the chest should be considered.    Osseous metastatic disease, unchanged.    Unchanged peripheral cystic lesions in the right hepatic lobe which may   be along the serosal surface (serosal implants).    Small left pleural effusion.    Findings were discussed with Dr. Wilkerson at 5:56 AM on 7/27/2023.          --- End of Report ---    < end of copied text >       SURGERY DAILY PROGRESS NOTE    --------------- OVERNIGHT/INTERVAL---------------  - No acute events overnight    --------------- SUBJECTIVE ---------------  - Patient describes no acute issues or concerns at this time  - -/- BM +void. Denies CP, SOB, n/v, abdominal pain.   - Patient seen and examined at bedside with surgical team    --------------- OBJECTIVE ---------------  -----VITALS-----  T(C): 36.7, Max: 37 (07-27)  T(F): 98, Max: 98.6 (07-27)  HR: 79 (79 - 93)  BP: 114/71 (107/66 - 134/84)  BP(mean): 95 (95 - 95)  ABP: --  ABP(mean): --  RR: 18 (14 - 19)  SpO2: 96% (96% - 100%)  CVP(mm Hg): --  CVP(cm H2O): --  room air            -----PHYSICAL EXAM-----  General: NAD, resting comfortably  HEENT: NC/AT, EOMI, normal hearing, no oral lesions, no LAD, neck supple  Pulmonary: normal resp effort,   Cardiovascular: NSR, no murmurs  Abdominal: soft, distended, non-tender no organomegaly, no guarding or rebound tenderness  Extremities: WWP, normal strength, no clubbing/cyanosis/edema  Neuro: A/O x 3, CNs II-XII grossly intact, normal sensation, no focal deficits       -----INs & OUTs-----      -----MEDICATIONS-----  STANDING  dexAMETHasone  Injectable 4 milliGRAM(s) IV Push every 12 hours  dextrose 5%. 1000 milliLiter(s) (100 mL/Hr) IV Continuous <Continuous>  dextrose 5%. 1000 milliLiter(s) (50 mL/Hr) IV Continuous <Continuous>  dextrose 50% Injectable 25 Gram(s) IV Push once  dextrose 50% Injectable 25 milliLiter(s) IV Push once  dextrose 50% Injectable 25 Gram(s) IV Push once  dextrose 50% Injectable 12.5 Gram(s) IV Push once  folic acid Injectable 1 milliGRAM(s) IV Push daily  glucagon  Injectable 1 milliGRAM(s) IntraMuscular once  heparin  Infusion.  Unit(s)/Hr (12 mL/Hr) IV Continuous <Continuous>  insulin lispro (ADMELOG) corrective regimen sliding scale   SubCutaneous every 6 hours  insulin regular  human recombinant 5 Unit(s) IV Push once  metoclopramide Injectable 10 milliGRAM(s) IV Push every 8 hours  octreotide  Injectable 100 MICROGram(s) SubCutaneous every 8 hours  pantoprazole  Injectable 40 milliGRAM(s) IV Push daily  sodium chloride 0.9%. 1000 milliLiter(s) (75 mL/Hr) IV Continuous <Continuous>    PRN  dextrose Oral Gel 15 Gram(s) Oral once PRN Blood Glucose LESS THAN 70 milliGRAM(s)/deciliter  heparin   Injectable 5500 Unit(s) IV Push every 6 hours PRN For aPTT less than 40  heparin   Injectable 2500 Unit(s) IV Push every 6 hours PRN For aPTT between 40 - 57    -----LABS-----  CBC (07-28 @ 03:13)                              8.1<L>                         4.63    )----------------(  430<H>     --    % Neutrophils, --    % Lymphocytes, ANC: --                                  25.8<L>  CBC (07-27 @ 12:55)                              8.8<L>                         4.66    )----------------(  398        --    % Neutrophils, --    % Lymphocytes, ANC: --                                  27.9<L>    BMP (07-28 @ 03:13)             137     |  100     |  32<H> 		Ca++ --      Ca 8.5                ---------------------------------( 115<H>		Mg 2.90<H>             5.5<H>  |  23      |  1.93<H>			Ph 4.7<H>  BMP (07-27 @ 11:30)             137     |  97<L>   |  32<H> 		Ca++ --      Ca 9.0                ---------------------------------( 89    		Mg 3.10<H>             4.5     |  24      |  2.10<H>			Ph 3.1       LFTs (07-27 @ 02:46)      TPro 8.8<H> / Alb 3.7 / TBili 0.2 / DBili -- / AST 24 / ALT 10 / AlkPhos 95    Coags (07-28 @ 03:13)  aPTT 67.3<H> / INR -- / PT --  Coags (07-27 @ 20:30)  aPTT 40.4<H> / INR -- / PT --        VBG (07-27 @ 02:40)     7.41 / 48 / 27 / 30<H> / 5.0<H> / 31.2<L>%     Lactate: 1.4    Urinalysis (07-28 @ 03:13):     Color:  / Appearance:  / SG:  / pH:  / Gluc: 115<H> / Ketones:  / Bili:  / Urobili:  / Protein : / Nitrites:  / Leuk.Est:  / RBC:  / WBC:  / Sq Epi:  / Non Sq Epi:  / Bacteria        -> Ascites Fl Ascites Fluid Culture (07-27 @ 08:54)       No polymorphonuclear cells seen per low power field  No organisms seen per oil power field    NG  NG    RADIOLOGY & ADDITIONAL STUDIES:    < from: CT Abdomen and Pelvis w/ IV Cont (07.27.23 @ 05:39) >    ACC: [id number] EXAM:  CT ABDOMEN AND PELVIS IC   ORDERED BY: BECKY BAGLEY     PROCEDURE DATE:  07/27/2023          INTERPRETATION:  CLINICAL INFORMATION: Abdominal bloating.    COMPARISON: 7/21/2023.    CONTRAST/COMPLICATIONS:  IV Contrast: Omnipaque 350  90 cc administered   10 cc discarded  Oral Contrast: NONE  Complications: None reported at time of study completion    PROCEDURE:  CT of the Abdomen and Pelvis was performed.  Sagittal and coronal reformats were performed.    FINDINGS:  LOWER CHEST: Emphysema. Small left pleural effusion and mild left basilar   atelectasis. Calcified granulomas in the left lower lobe. Calcified left   hilar lymph nodes.  New filling defect in the left lower lobar segmental   pulmonary arteries.    LIVER: Unchanged peripheral cystic lesions in the right hepatic lobe   which may be along the serosal surface, larger which measures up to 1.8   cm.  BILE DUCTS: Normal caliber.  GALLBLADDER: Within normal limits.  SPLEEN: Peripheral wedgelike lucency in the spleen, unchanged.  PANCREAS: Within normal limits.  ADRENALS: Within normal limits.  KIDNEYS/URETERS: Kidneys enhance symmetrically without hydronephrosis.   Small left renal cyst.    BLADDER: Within normal limits.  REPRODUCTIVE ORGANS: Prostate gland is mildly enlarged.    BOWEL: New dilated fluid-filled thickened small bowel loops throughout   the central abdomen with transition in the right lower quadrant where the   loops of small bowel appear matted and thickened.  PERITONEUM: Worsening abdominal and pelvic ascites with mild thickening   of the peritoneal reflections.. Presacral edema. No pneumoperitoneum.  VESSELS: Mild atherosclerotic calcifications. Normal caliber abdominal   aorta.  RETROPERITONEUM/LYMPH NODES: No lymphadenopathy.  ABDOMINAL WALL: Within normal limits.  BONES: Sclerotic focus in the S1 segment, T12 left-sided pedicle, left   iliac bone, left sacrum. Degenerative changes of the spine, sacroiliac   joints, and hips.    IMPRESSION:  New dilated fluid-filled thickened small bowel loops throughout the   central abdomen with transition in the right lower quadrant where the   loops of small bowel appear matted and thickened compared with prior exam   from 7/21/2023 concerning for small bowel enteritis with developing small   bowel obstruction.    Worsening abdominal and pelvic ascites with mild thickening of the   peritoneal reflections likely reflective of malignant ascites.    New filling defect in the left lower lobar segmental pulmonary arteries   concerningfor pulmonary embolism. CTA of the chest should be considered.    Osseous metastatic disease, unchanged.    Unchanged peripheral cystic lesions in the right hepatic lobe which may   be along the serosal surface (serosal implants).    Small left pleural effusion.    Findings were discussed with Dr. Wilkerson at 5:56 AM on 7/27/2023.          --- End of Report ---    < end of copied text >

## 2023-07-28 NOTE — PROGRESS NOTE ADULT - ASSESSMENT
58-year male with history of lung cancer currently receiving chemotherapy (alimta) last session 3 weeks ago, PE on xarelto presenting to the emergency department with shortness of breath and mild abdominal pain. No BM since yesterday and no gas for multiple days. Labs significant for BETO Cr 2.2 previously around 1.5. CTCAP found new left filling defect in lung and dilated/thickened SB loops with TP in RLQ concerning for SBO and possible enteritis. Patient with no previous abd surgery history concerning for primary/malignant SBO.    Plan:  - no acute surgical intervention  - NPO/IVF  - Malignant SBO Protocol: 10mg Reglan IV q8, octreotide 100mg SC q8 hours, Decadron 4mg q12 hours   - serial abd exams  - if patient becomes nauseous and begins vomiting should place NGT  - Surgical oncology will follow     D Team Surgery  [id number]

## 2023-07-29 LAB
ANION GAP SERPL CALC-SCNC: 15 MMOL/L — HIGH (ref 7–14)
APTT BLD: 66.5 SEC — HIGH (ref 24.5–35.6)
APTT BLD: 69.6 SEC — HIGH (ref 24.5–35.6)
BUN SERPL-MCNC: 31 MG/DL — HIGH (ref 7–23)
CALCIUM SERPL-MCNC: 8.7 MG/DL — SIGNIFICANT CHANGE UP (ref 8.4–10.5)
CHLORIDE SERPL-SCNC: 97 MMOL/L — LOW (ref 98–107)
CO2 SERPL-SCNC: 23 MMOL/L — SIGNIFICANT CHANGE UP (ref 22–31)
CREAT SERPL-MCNC: 1.76 MG/DL — HIGH (ref 0.5–1.3)
EGFR: 45 ML/MIN/1.73M2 — LOW
GLUCOSE BLDC GLUCOMTR-MCNC: 122 MG/DL — HIGH (ref 70–99)
GLUCOSE BLDC GLUCOMTR-MCNC: 122 MG/DL — HIGH (ref 70–99)
GLUCOSE BLDC GLUCOMTR-MCNC: 127 MG/DL — HIGH (ref 70–99)
GLUCOSE BLDC GLUCOMTR-MCNC: 133 MG/DL — HIGH (ref 70–99)
GLUCOSE BLDC GLUCOMTR-MCNC: 140 MG/DL — HIGH (ref 70–99)
GLUCOSE SERPL-MCNC: 121 MG/DL — HIGH (ref 70–99)
HCT VFR BLD CALC: 29.4 % — LOW (ref 39–50)
HGB BLD-MCNC: 9.2 G/DL — LOW (ref 13–17)
MAGNESIUM SERPL-MCNC: 2.6 MG/DL — SIGNIFICANT CHANGE UP (ref 1.6–2.6)
MCHC RBC-ENTMCNC: 28 PG — SIGNIFICANT CHANGE UP (ref 27–34)
MCHC RBC-ENTMCNC: 31.3 GM/DL — LOW (ref 32–36)
MCV RBC AUTO: 89.6 FL — SIGNIFICANT CHANGE UP (ref 80–100)
NRBC # BLD: 0 /100 WBCS — SIGNIFICANT CHANGE UP (ref 0–0)
NRBC # FLD: 0.02 K/UL — HIGH (ref 0–0)
PHOSPHATE SERPL-MCNC: 3.2 MG/DL — SIGNIFICANT CHANGE UP (ref 2.5–4.5)
PLATELET # BLD AUTO: 527 K/UL — HIGH (ref 150–400)
POTASSIUM SERPL-MCNC: 4.8 MMOL/L — SIGNIFICANT CHANGE UP (ref 3.5–5.3)
POTASSIUM SERPL-SCNC: 4.8 MMOL/L — SIGNIFICANT CHANGE UP (ref 3.5–5.3)
RBC # BLD: 3.28 M/UL — LOW (ref 4.2–5.8)
RBC # FLD: 25.4 % — HIGH (ref 10.3–14.5)
SODIUM SERPL-SCNC: 135 MMOL/L — SIGNIFICANT CHANGE UP (ref 135–145)
WBC # BLD: 5.84 K/UL — SIGNIFICANT CHANGE UP (ref 3.8–10.5)
WBC # FLD AUTO: 5.84 K/UL — SIGNIFICANT CHANGE UP (ref 3.8–10.5)

## 2023-07-29 PROCEDURE — 99232 SBSQ HOSP IP/OBS MODERATE 35: CPT

## 2023-07-29 PROCEDURE — 99233 SBSQ HOSP IP/OBS HIGH 50: CPT

## 2023-07-29 RX ADMIN — PANTOPRAZOLE SODIUM 40 MILLIGRAM(S): 20 TABLET, DELAYED RELEASE ORAL at 12:26

## 2023-07-29 RX ADMIN — Medication 4 MILLIGRAM(S): at 17:19

## 2023-07-29 RX ADMIN — Medication 10 MILLIGRAM(S): at 21:43

## 2023-07-29 RX ADMIN — SODIUM CHLORIDE 75 MILLILITER(S): 9 INJECTION INTRAMUSCULAR; INTRAVENOUS; SUBCUTANEOUS at 00:09

## 2023-07-29 RX ADMIN — HEPARIN SODIUM 1600 UNIT(S)/HR: 5000 INJECTION INTRAVENOUS; SUBCUTANEOUS at 07:53

## 2023-07-29 RX ADMIN — SODIUM CHLORIDE 75 MILLILITER(S): 9 INJECTION INTRAMUSCULAR; INTRAVENOUS; SUBCUTANEOUS at 19:27

## 2023-07-29 RX ADMIN — Medication 10 MILLIGRAM(S): at 12:26

## 2023-07-29 RX ADMIN — OCTREOTIDE ACETATE 100 MICROGRAM(S): 200 INJECTION, SOLUTION INTRAVENOUS; SUBCUTANEOUS at 12:28

## 2023-07-29 RX ADMIN — Medication 1 MILLIGRAM(S): at 12:23

## 2023-07-29 RX ADMIN — Medication 10 MILLIGRAM(S): at 05:20

## 2023-07-29 RX ADMIN — HEPARIN SODIUM 1600 UNIT(S)/HR: 5000 INJECTION INTRAVENOUS; SUBCUTANEOUS at 04:58

## 2023-07-29 RX ADMIN — Medication 4 MILLIGRAM(S): at 05:20

## 2023-07-29 RX ADMIN — HEPARIN SODIUM 1600 UNIT(S)/HR: 5000 INJECTION INTRAVENOUS; SUBCUTANEOUS at 19:26

## 2023-07-29 RX ADMIN — OCTREOTIDE ACETATE 100 MICROGRAM(S): 200 INJECTION, SOLUTION INTRAVENOUS; SUBCUTANEOUS at 21:42

## 2023-07-29 RX ADMIN — SODIUM CHLORIDE 75 MILLILITER(S): 9 INJECTION INTRAMUSCULAR; INTRAVENOUS; SUBCUTANEOUS at 07:53

## 2023-07-29 RX ADMIN — OCTREOTIDE ACETATE 100 MICROGRAM(S): 200 INJECTION, SOLUTION INTRAVENOUS; SUBCUTANEOUS at 05:18

## 2023-07-29 RX ADMIN — HEPARIN SODIUM 1600 UNIT(S)/HR: 5000 INJECTION INTRAVENOUS; SUBCUTANEOUS at 12:22

## 2023-07-29 NOTE — PROGRESS NOTE ADULT - ASSESSMENT
58-year male with history of lung cancer currently receiving chemotherapy (alimta) last session 3 weeks ago, PE on xarelto presenting to the emergency department with shortness of breath and mild abdominal pain. No BM since yesterday and no gas for multiple days. Labs significant for BETO Cr 2.2 previously around 1.5. CTCAP found new left filling defect in lung and dilated/thickened SB loops with TP in RLQ concerning for SBO and possible enteritis. Patient with no previous abd surgery history concerning for primary/malignant SBO. Continue to follow up.    Plan:  - no acute surgical intervention  - NPO continue on IVF  - Malignant SBO Protocol: 10mg Reglan IV q8, octreotide 100mg SC q8 hours, Decadron 4mg q12 hours   - serial abd exams  - if patient becomes nauseous and begins vomiting should place NGT  - Surgical oncology will follow     D Team Surgery [id number]

## 2023-07-29 NOTE — PROGRESS NOTE ADULT - PROBLEM SELECTOR PLAN 2
CT A&P: New dilated fluid-filled thickened small bowel loops throughout the central abdomen with transition in the right lower quadrant where the loops of small bowel appear matted and thickened compared with prior exam from 7/21/2023 concerning for small bowel enteritis with developing small bowel obstruction. Pt. passed gas prior to this AM's exam.   -Improved, pt reported to have 3 BM overnight as per nurse   -surgery consulted: malignant SBO protocol with reglan, decadron and octreotide  -serial abd exam, if patient becomes nauseous and begins vomiting should place NGT  -CLD  -IVF

## 2023-07-29 NOTE — PROGRESS NOTE ADULT - SUBJECTIVE AND OBJECTIVE BOX
D TEAM Surgery Daily Resident Progress Note    Overnight events: No acute events    SUBJECTIVE: Pt seen and examined at bedside. Denies chest pain, SOB, N/V, +flatus, -BM, burping yesterday, no overall pain. Resting comfortable in bed.     OBJECTIVE:  Vital Signs Last 24 Hrs  T(C): 36.5 (29 Jul 2023 12:29), Max: 36.6 (28 Jul 2023 20:00)  T(F): 97.7 (29 Jul 2023 12:29), Max: 97.9 (28 Jul 2023 20:00)  HR: 82 (29 Jul 2023 12:29) (69 - 82)  BP: 134/95 (29 Jul 2023 12:29) (120/86 - 134/95)  BP(mean): --  RR: 17 (29 Jul 2023 12:29) (16 - 17)  SpO2: 98% (29 Jul 2023 12:29) (96% - 98%)    Parameters below as of 29 Jul 2023 12:29  Patient On (Oxygen Delivery Method): room air        I&O's Summary    28 Jul 2023 07:01  -  29 Jul 2023 07:00  --------------------------------------------------------  IN: 0 mL / OUT: 300 mL / NET: -300 mL        Allergies:  No Known Allergies      Medications:  MEDICATIONS  (STANDING):  dexAMETHasone  Injectable 4 milliGRAM(s) IV Push every 12 hours  dextrose 5%. 1000 milliLiter(s) (50 mL/Hr) IV Continuous <Continuous>  dextrose 5%. 1000 milliLiter(s) (100 mL/Hr) IV Continuous <Continuous>  dextrose 50% Injectable 25 Gram(s) IV Push once  dextrose 50% Injectable 25 Gram(s) IV Push once  dextrose 50% Injectable 12.5 Gram(s) IV Push once  folic acid Injectable 1 milliGRAM(s) IV Push daily  glucagon  Injectable 1 milliGRAM(s) IntraMuscular once  heparin  Infusion.  Unit(s)/Hr (12 mL/Hr) IV Continuous <Continuous>  insulin lispro (ADMELOG) corrective regimen sliding scale   SubCutaneous every 6 hours  metoclopramide Injectable 10 milliGRAM(s) IV Push every 8 hours  octreotide  Injectable 100 MICROGram(s) SubCutaneous every 8 hours  pantoprazole  Injectable 40 milliGRAM(s) IV Push daily  sodium chloride 0.9%. 1000 milliLiter(s) (75 mL/Hr) IV Continuous <Continuous>    MEDICATIONS  (PRN):  dextrose Oral Gel 15 Gram(s) Oral once PRN Blood Glucose LESS THAN 70 milliGRAM(s)/deciliter  heparin   Injectable 5500 Unit(s) IV Push every 6 hours PRN For aPTT less than 40  heparin   Injectable 2500 Unit(s) IV Push every 6 hours PRN For aPTT between 40 - 57      Physical Exam:  General Appearance: Appears well, NAD, A&Ox3  Chest: Equal expansion bilaterally, no increased WOB.   CV: RRR, WWP.   Abdomen: soft, nontender, distended.   Extremities: No swelling, asymmetry, or gross deformity appreciated.     Labs:                        9.2    5.84  )-----------( 527      ( 29 Jul 2023 04:03 )             29.4     07-29    135  |  97<L>  |  31<H>  ----------------------------<  121<H>  4.8   |  23  |  1.76<H>    Ca    8.7      29 Jul 2023 04:03  Phos  3.2     07-29  Mg     2.60     07-29      PT/INR - ( 28 Jul 2023 12:48 )   PT: 12.8 sec;   INR: 1.14 ratio         PTT - ( 29 Jul 2023 11:07 )  PTT:66.5 sec  Urinalysis Basic - ( 29 Jul 2023 04:03 )    Color: x / Appearance: x / SG: x / pH: x  Gluc: 121 mg/dL / Ketone: x  / Bili: x / Urobili: x   Blood: x / Protein: x / Nitrite: x   Leuk Esterase: x / RBC: x / WBC x   Sq Epi: x / Non Sq Epi: x / Bacteria: x      CAPILLARY BLOOD GLUCOSE      POCT Blood Glucose.: 133 mg/dL (29 Jul 2023 11:41)  POCT Blood Glucose.: 122 mg/dL (29 Jul 2023 07:35)  POCT Blood Glucose.: 140 mg/dL (29 Jul 2023 00:04)  POCT Blood Glucose.: 118 mg/dL (28 Jul 2023 16:55)      Microbiology:      Radiology & additional studies:           D TEAM Surgery Daily Resident Progress Note    Overnight events: No acute events    Patient seen with Dr. Alatorre    SUBJECTIVE: Pt seen and examined at bedside. Denies chest pain, SOB, N/V, +flatus, -BM, burping yesterday, no overall pain. Resting comfortable in bed.     OBJECTIVE:  Vital Signs Last 24 Hrs  T(C): 36.5 (29 Jul 2023 12:29), Max: 36.6 (28 Jul 2023 20:00)  T(F): 97.7 (29 Jul 2023 12:29), Max: 97.9 (28 Jul 2023 20:00)  HR: 82 (29 Jul 2023 12:29) (69 - 82)  BP: 134/95 (29 Jul 2023 12:29) (120/86 - 134/95)  BP(mean): --  RR: 17 (29 Jul 2023 12:29) (16 - 17)  SpO2: 98% (29 Jul 2023 12:29) (96% - 98%)    Parameters below as of 29 Jul 2023 12:29  Patient On (Oxygen Delivery Method): room air        I&O's Summary    28 Jul 2023 07:01  -  29 Jul 2023 07:00  --------------------------------------------------------  IN: 0 mL / OUT: 300 mL / NET: -300 mL        Allergies:  No Known Allergies      Medications:  MEDICATIONS  (STANDING):  dexAMETHasone  Injectable 4 milliGRAM(s) IV Push every 12 hours  dextrose 5%. 1000 milliLiter(s) (50 mL/Hr) IV Continuous <Continuous>  dextrose 5%. 1000 milliLiter(s) (100 mL/Hr) IV Continuous <Continuous>  dextrose 50% Injectable 25 Gram(s) IV Push once  dextrose 50% Injectable 25 Gram(s) IV Push once  dextrose 50% Injectable 12.5 Gram(s) IV Push once  folic acid Injectable 1 milliGRAM(s) IV Push daily  glucagon  Injectable 1 milliGRAM(s) IntraMuscular once  heparin  Infusion.  Unit(s)/Hr (12 mL/Hr) IV Continuous <Continuous>  insulin lispro (ADMELOG) corrective regimen sliding scale   SubCutaneous every 6 hours  metoclopramide Injectable 10 milliGRAM(s) IV Push every 8 hours  octreotide  Injectable 100 MICROGram(s) SubCutaneous every 8 hours  pantoprazole  Injectable 40 milliGRAM(s) IV Push daily  sodium chloride 0.9%. 1000 milliLiter(s) (75 mL/Hr) IV Continuous <Continuous>    MEDICATIONS  (PRN):  dextrose Oral Gel 15 Gram(s) Oral once PRN Blood Glucose LESS THAN 70 milliGRAM(s)/deciliter  heparin   Injectable 5500 Unit(s) IV Push every 6 hours PRN For aPTT less than 40  heparin   Injectable 2500 Unit(s) IV Push every 6 hours PRN For aPTT between 40 - 57      Physical Exam:  General Appearance: Appears well, NAD, A&Ox3  Chest: Equal expansion bilaterally, no increased WOB.   CV: RRR, WWP.   Abdomen: soft, nontender, distended.   Extremities: No swelling, asymmetry, or gross deformity appreciated.     Labs:                        9.2    5.84  )-----------( 527      ( 29 Jul 2023 04:03 )             29.4     07-29    135  |  97<L>  |  31<H>  ----------------------------<  121<H>  4.8   |  23  |  1.76<H>    Ca    8.7      29 Jul 2023 04:03  Phos  3.2     07-29  Mg     2.60     07-29      PT/INR - ( 28 Jul 2023 12:48 )   PT: 12.8 sec;   INR: 1.14 ratio         PTT - ( 29 Jul 2023 11:07 )  PTT:66.5 sec  Urinalysis Basic - ( 29 Jul 2023 04:03 )    Color: x / Appearance: x / SG: x / pH: x  Gluc: 121 mg/dL / Ketone: x  / Bili: x / Urobili: x   Blood: x / Protein: x / Nitrite: x   Leuk Esterase: x / RBC: x / WBC x   Sq Epi: x / Non Sq Epi: x / Bacteria: x      CAPILLARY BLOOD GLUCOSE      POCT Blood Glucose.: 133 mg/dL (29 Jul 2023 11:41)  POCT Blood Glucose.: 122 mg/dL (29 Jul 2023 07:35)  POCT Blood Glucose.: 140 mg/dL (29 Jul 2023 00:04)  POCT Blood Glucose.: 118 mg/dL (28 Jul 2023 16:55)      Microbiology:      Radiology & additional studies:

## 2023-07-29 NOTE — PROGRESS NOTE ADULT - SUBJECTIVE AND OBJECTIVE BOX
Vital Signs Last 24 Hrs  T(C): 36.4 (29 Jul 2023 05:20), Max: 36.8 (28 Jul 2023 11:53)  T(F): 97.6 (29 Jul 2023 05:20), Max: 98.3 (28 Jul 2023 11:53)  HR: 76 (29 Jul 2023 05:30) (69 - 76)  BP: 128/90 (29 Jul 2023 05:30) (118/78 - 131/93)  BP(mean): --  RR: 16 (29 Jul 2023 05:20) (16 - 18)  SpO2: 96% (29 Jul 2023 05:20) (96% - 99%)    Parameters below as of 29 Jul 2023 05:20  Patient On (Oxygen Delivery Method): room air        I&O's Detail    28 Jul 2023 07:01  -  29 Jul 2023 07:00  --------------------------------------------------------  IN:  Total IN: 0 mL    OUT:    Voided (mL): 300 mL  Total OUT: 300 mL    Total NET: -300 mL                                9.2    5.84  )-----------( 527      ( 29 Jul 2023 04:03 )             29.4       07-29    135  |  97<L>  |  31<H>  ----------------------------<  121<H>  4.8   |  23  |  1.76<H>    Ca    8.7      29 Jul 2023 04:03  Phos  3.2     07-29  Mg     2.60     07-29        PT/INR - ( 28 Jul 2023 12:48 )   PT: 12.8 sec;   INR: 1.14 ratio         PTT - ( 29 Jul 2023 04:03 )  PTT:69.6 sec    Abdomendistended but not tender    PLAN:  Continue NPO with SBO protocol

## 2023-07-29 NOTE — PROGRESS NOTE ADULT - SUBJECTIVE AND OBJECTIVE BOX
Patient is a 58y old  Male who presents with a chief complaint of SOB (29 Jul 2023 10:01)      SUBJECTIVE / OVERNIGHT EVENTS:    Overnight pt had 3 episodes of BM and has been passing gas. Denies any new concern this morning.       MEDICATIONS  (STANDING):  dexAMETHasone  Injectable 4 milliGRAM(s) IV Push every 12 hours  dextrose 5%. 1000 milliLiter(s) (100 mL/Hr) IV Continuous <Continuous>  dextrose 5%. 1000 milliLiter(s) (50 mL/Hr) IV Continuous <Continuous>  dextrose 50% Injectable 25 Gram(s) IV Push once  dextrose 50% Injectable 25 Gram(s) IV Push once  dextrose 50% Injectable 12.5 Gram(s) IV Push once  folic acid Injectable 1 milliGRAM(s) IV Push daily  glucagon  Injectable 1 milliGRAM(s) IntraMuscular once  heparin  Infusion.  Unit(s)/Hr (12 mL/Hr) IV Continuous <Continuous>  insulin lispro (ADMELOG) corrective regimen sliding scale   SubCutaneous every 6 hours  metoclopramide Injectable 10 milliGRAM(s) IV Push every 8 hours  octreotide  Injectable 100 MICROGram(s) SubCutaneous every 8 hours  pantoprazole  Injectable 40 milliGRAM(s) IV Push daily  sodium chloride 0.9%. 1000 milliLiter(s) (75 mL/Hr) IV Continuous <Continuous>    MEDICATIONS  (PRN):  dextrose Oral Gel 15 Gram(s) Oral once PRN Blood Glucose LESS THAN 70 milliGRAM(s)/deciliter  heparin   Injectable 5500 Unit(s) IV Push every 6 hours PRN For aPTT less than 40  heparin   Injectable 2500 Unit(s) IV Push every 6 hours PRN For aPTT between 40 - 57      Vital Signs Last 24 Hrs  T(C): 36.4 (29 Jul 2023 05:20), Max: 36.6 (28 Jul 2023 20:00)  T(F): 97.6 (29 Jul 2023 05:20), Max: 97.9 (28 Jul 2023 20:00)  HR: 76 (29 Jul 2023 05:30) (69 - 76)  BP: 128/90 (29 Jul 2023 05:30) (120/86 - 131/93)  BP(mean): --  RR: 16 (29 Jul 2023 05:20) (16 - 17)  SpO2: 96% (29 Jul 2023 05:20) (96% - 97%)    Parameters below as of 29 Jul 2023 05:20  Patient On (Oxygen Delivery Method): room air          PHYSICAL EXAM  General: Comfortable appearing male resting in bed   HEENT: NC/AT; PERRL, anicteric sclera; MMM  Neck: supple  Cardiovascular: +S1/S2; RRR  Respiratory: CTA B/L; no W/R/R  Gastrointestinal: distended/tense abdomen - non-tender- ; +BSx4  Extremities: WWP; no edema, clubbing or cyanosis  Vascular: 2+ radial, DP/PT pulses B/L  Neurological: AAOx3; no focal deficits  CAPILLARY BLOOD GLUCOSE      POCT Blood Glucose.: 133 mg/dL (29 Jul 2023 11:41)  POCT Blood Glucose.: 122 mg/dL (29 Jul 2023 07:35)  POCT Blood Glucose.: 140 mg/dL (29 Jul 2023 00:04)  POCT Blood Glucose.: 118 mg/dL (28 Jul 2023 16:55)  POCT Blood Glucose.: 114 mg/dL (28 Jul 2023 12:39)    I&O's Summary    28 Jul 2023 07:01  -  29 Jul 2023 07:00  --------------------------------------------------------  IN: 0 mL / OUT: 300 mL / NET: -300 mL        LABS:                        9.2    5.84  )-----------( 527      ( 29 Jul 2023 04:03 )             29.4     07-29    135  |  97<L>  |  31<H>  ----------------------------<  121<H>  4.8   |  23  |  1.76<H>    Ca    8.7      29 Jul 2023 04:03  Phos  3.2     07-29  Mg     2.60     07-29      PT/INR - ( 28 Jul 2023 12:48 )   PT: 12.8 sec;   INR: 1.14 ratio         PTT - ( 29 Jul 2023 11:07 )  PTT:66.5 sec      Urinalysis Basic - ( 29 Jul 2023 04:03 )    Color: x / Appearance: x / SG: x / pH: x  Gluc: 121 mg/dL / Ketone: x  / Bili: x / Urobili: x   Blood: x / Protein: x / Nitrite: x   Leuk Esterase: x / RBC: x / WBC x   Sq Epi: x / Non Sq Epi: x / Bacteria: x        RADIOLOGY & ADDITIONAL TESTS:     MICROBIOLOGY:    ANTIMICROBIALS:    CONSULTS:

## 2023-07-30 LAB
ANION GAP SERPL CALC-SCNC: 15 MMOL/L — HIGH (ref 7–14)
APTT BLD: 82.9 SEC — HIGH (ref 24.5–35.6)
BUN SERPL-MCNC: 26 MG/DL — HIGH (ref 7–23)
CALCIUM SERPL-MCNC: 8.6 MG/DL — SIGNIFICANT CHANGE UP (ref 8.4–10.5)
CHLORIDE SERPL-SCNC: 100 MMOL/L — SIGNIFICANT CHANGE UP (ref 98–107)
CO2 SERPL-SCNC: 19 MMOL/L — LOW (ref 22–31)
CREAT SERPL-MCNC: 1.75 MG/DL — HIGH (ref 0.5–1.3)
EGFR: 45 ML/MIN/1.73M2 — LOW
GLUCOSE BLDC GLUCOMTR-MCNC: 117 MG/DL — HIGH (ref 70–99)
GLUCOSE BLDC GLUCOMTR-MCNC: 118 MG/DL — HIGH (ref 70–99)
GLUCOSE BLDC GLUCOMTR-MCNC: 122 MG/DL — HIGH (ref 70–99)
GLUCOSE BLDC GLUCOMTR-MCNC: 122 MG/DL — HIGH (ref 70–99)
GLUCOSE SERPL-MCNC: 111 MG/DL — HIGH (ref 70–99)
HCT VFR BLD CALC: 29.3 % — LOW (ref 39–50)
HGB BLD-MCNC: 9 G/DL — LOW (ref 13–17)
MAGNESIUM SERPL-MCNC: 2.4 MG/DL — SIGNIFICANT CHANGE UP (ref 1.6–2.6)
MCHC RBC-ENTMCNC: 27.6 PG — SIGNIFICANT CHANGE UP (ref 27–34)
MCHC RBC-ENTMCNC: 30.7 GM/DL — LOW (ref 32–36)
MCV RBC AUTO: 89.9 FL — SIGNIFICANT CHANGE UP (ref 80–100)
NRBC # BLD: 0 /100 WBCS — SIGNIFICANT CHANGE UP (ref 0–0)
NRBC # FLD: 0.04 K/UL — HIGH (ref 0–0)
PHOSPHATE SERPL-MCNC: 3 MG/DL — SIGNIFICANT CHANGE UP (ref 2.5–4.5)
PLATELET # BLD AUTO: 613 K/UL — HIGH (ref 150–400)
POTASSIUM SERPL-MCNC: 4.8 MMOL/L — SIGNIFICANT CHANGE UP (ref 3.5–5.3)
POTASSIUM SERPL-SCNC: 4.8 MMOL/L — SIGNIFICANT CHANGE UP (ref 3.5–5.3)
RBC # BLD: 3.26 M/UL — LOW (ref 4.2–5.8)
RBC # FLD: 25.7 % — HIGH (ref 10.3–14.5)
SODIUM SERPL-SCNC: 134 MMOL/L — LOW (ref 135–145)
WBC # BLD: 8.4 K/UL — SIGNIFICANT CHANGE UP (ref 3.8–10.5)
WBC # FLD AUTO: 8.4 K/UL — SIGNIFICANT CHANGE UP (ref 3.8–10.5)

## 2023-07-30 PROCEDURE — 99233 SBSQ HOSP IP/OBS HIGH 50: CPT

## 2023-07-30 PROCEDURE — 99232 SBSQ HOSP IP/OBS MODERATE 35: CPT

## 2023-07-30 RX ORDER — INSULIN LISPRO 100/ML
VIAL (ML) SUBCUTANEOUS
Refills: 0 | Status: DISCONTINUED | OUTPATIENT
Start: 2023-07-30 | End: 2023-08-02

## 2023-07-30 RX ADMIN — OCTREOTIDE ACETATE 100 MICROGRAM(S): 200 INJECTION, SOLUTION INTRAVENOUS; SUBCUTANEOUS at 14:58

## 2023-07-30 RX ADMIN — Medication 1 MILLIGRAM(S): at 13:17

## 2023-07-30 RX ADMIN — SODIUM CHLORIDE 75 MILLILITER(S): 9 INJECTION INTRAMUSCULAR; INTRAVENOUS; SUBCUTANEOUS at 22:24

## 2023-07-30 RX ADMIN — PANTOPRAZOLE SODIUM 40 MILLIGRAM(S): 20 TABLET, DELAYED RELEASE ORAL at 13:17

## 2023-07-30 RX ADMIN — Medication 10 MILLIGRAM(S): at 22:25

## 2023-07-30 RX ADMIN — HEPARIN SODIUM 1600 UNIT(S)/HR: 5000 INJECTION INTRAVENOUS; SUBCUTANEOUS at 03:02

## 2023-07-30 RX ADMIN — OCTREOTIDE ACETATE 100 MICROGRAM(S): 200 INJECTION, SOLUTION INTRAVENOUS; SUBCUTANEOUS at 22:24

## 2023-07-30 RX ADMIN — Medication 10 MILLIGRAM(S): at 05:28

## 2023-07-30 RX ADMIN — OCTREOTIDE ACETATE 100 MICROGRAM(S): 200 INJECTION, SOLUTION INTRAVENOUS; SUBCUTANEOUS at 05:28

## 2023-07-30 RX ADMIN — Medication 4 MILLIGRAM(S): at 05:28

## 2023-07-30 RX ADMIN — Medication 10 MILLIGRAM(S): at 14:58

## 2023-07-30 RX ADMIN — SODIUM CHLORIDE 75 MILLILITER(S): 9 INJECTION INTRAMUSCULAR; INTRAVENOUS; SUBCUTANEOUS at 19:19

## 2023-07-30 RX ADMIN — HEPARIN SODIUM 1600 UNIT(S)/HR: 5000 INJECTION INTRAVENOUS; SUBCUTANEOUS at 08:13

## 2023-07-30 RX ADMIN — Medication 4 MILLIGRAM(S): at 17:31

## 2023-07-30 RX ADMIN — HEPARIN SODIUM 1600 UNIT(S)/HR: 5000 INJECTION INTRAVENOUS; SUBCUTANEOUS at 19:19

## 2023-07-30 NOTE — PROGRESS NOTE ADULT - SUBJECTIVE AND OBJECTIVE BOX
Patient is a 58y old  Male who presents with a chief complaint of SOB (30 Jul 2023 09:12)      SUBJECTIVE / OVERNIGHT EVENTS:    No acute events overnight. Reports that he had a BM this morning and tolerating clear diet very well. Denies any fever, chills, abdominal pain or diarrhea.       MEDICATIONS  (STANDING):  dexAMETHasone  Injectable 4 milliGRAM(s) IV Push every 12 hours  dextrose 5%. 1000 milliLiter(s) (100 mL/Hr) IV Continuous <Continuous>  dextrose 5%. 1000 milliLiter(s) (50 mL/Hr) IV Continuous <Continuous>  dextrose 50% Injectable 25 Gram(s) IV Push once  dextrose 50% Injectable 25 Gram(s) IV Push once  dextrose 50% Injectable 12.5 Gram(s) IV Push once  folic acid Injectable 1 milliGRAM(s) IV Push daily  glucagon  Injectable 1 milliGRAM(s) IntraMuscular once  heparin  Infusion.  Unit(s)/Hr (12 mL/Hr) IV Continuous <Continuous>  insulin lispro (ADMELOG) corrective regimen sliding scale   SubCutaneous every 6 hours  metoclopramide Injectable 10 milliGRAM(s) IV Push every 8 hours  octreotide  Injectable 100 MICROGram(s) SubCutaneous every 8 hours  pantoprazole  Injectable 40 milliGRAM(s) IV Push daily  sodium chloride 0.9%. 1000 milliLiter(s) (75 mL/Hr) IV Continuous <Continuous>    MEDICATIONS  (PRN):  dextrose Oral Gel 15 Gram(s) Oral once PRN Blood Glucose LESS THAN 70 milliGRAM(s)/deciliter  heparin   Injectable 2500 Unit(s) IV Push every 6 hours PRN For aPTT between 40 - 57  heparin   Injectable 5500 Unit(s) IV Push every 6 hours PRN For aPTT less than 40      Vital Signs Last 24 Hrs  T(C): 36.3 (30 Jul 2023 05:26), Max: 36.7 (29 Jul 2023 17:19)  T(F): 97.4 (30 Jul 2023 05:26), Max: 98 (29 Jul 2023 17:19)  HR: 65 (30 Jul 2023 05:37) (64 - 97)  BP: 121/85 (30 Jul 2023 05:37) (121/85 - 134/95)  BP(mean): --  RR: 17 (30 Jul 2023 05:37) (17 - 18)  SpO2: 100% (30 Jul 2023 05:37) (98% - 100%)    Parameters below as of 30 Jul 2023 05:37  Patient On (Oxygen Delivery Method): room air          PHYSICAL EXAM  GENERAL: NAD, well-developed  HEAD:  Atraumatic, Normocephalic  EYES: EOMI, PERRLA, conjunctiva and sclera clear  NECK: Supple, No JVD  CHEST/LUNG: Clear to auscultation bilaterally; No wheeze  HEART: Regular rate and rhythm; No murmurs, rubs, or gallops  ABDOMEN: Soft, Nontender, Nondistended; Bowel sounds present  EXTREMITIES:  2+ Peripheral Pulses, No clubbing, cyanosis, or edema  PSYCH: AAOx3  SKIN: No rashes or lesions    CAPILLARY BLOOD GLUCOSE      POCT Blood Glucose.: 122 mg/dL (30 Jul 2023 11:47)  POCT Blood Glucose.: 118 mg/dL (30 Jul 2023 07:30)  POCT Blood Glucose.: 122 mg/dL (29 Jul 2023 23:14)  POCT Blood Glucose.: 127 mg/dL (29 Jul 2023 16:44)    I&O's Summary      LABS:                        9.0    8.40  )-----------( 613      ( 30 Jul 2023 07:00 )             29.3     07-30    134<L>  |  100  |  26<H>  ----------------------------<  111<H>  4.8   |  19<L>  |  1.75<H>    Ca    8.6      30 Jul 2023 07:00  Phos  3.0     07-30  Mg     2.40     07-30      PT/INR - ( 28 Jul 2023 12:48 )   PT: 12.8 sec;   INR: 1.14 ratio         PTT - ( 30 Jul 2023 07:00 )  PTT:82.9 sec      Urinalysis Basic - ( 30 Jul 2023 07:00 )    Color: x / Appearance: x / SG: x / pH: x  Gluc: 111 mg/dL / Ketone: x  / Bili: x / Urobili: x   Blood: x / Protein: x / Nitrite: x   Leuk Esterase: x / RBC: x / WBC x   Sq Epi: x / Non Sq Epi: x / Bacteria: x        RADIOLOGY & ADDITIONAL TESTS:     MICROBIOLOGY:    ANTIMICROBIALS:    CONSULTS:

## 2023-07-30 NOTE — PROGRESS NOTE ADULT - ASSESSMENT
58-year male with history of lung cancer currently receiving chemotherapy (alimta) last session 3 weeks ago, PE on xarelto presenting to the emergency department with shortness of breath and mild abdominal pain. No BM since yesterday and no gas for multiple days. Labs significant for BETO Cr 2.2 previously around 1.5. CTCAP found new left filling defect in lung and dilated/thickened SB loops with TP in RLQ concerning for SBO and possible enteritis. Patient with no previous abd surgery history concerning for primary/malignant SBO.      Plan:  - no acute surgical intervention  - NPO continue on IVF  - Malignant SBO Protocol: 10mg Reglan IV q8, octreotide 100mg SC q8 hours, Decadron 4mg q12 hours   - serial abd exams  - if patient becomes nauseous and begins vomiting should place NGT  - Surgical oncology will follow     D Team Surgery [id number]   58-year male with history of lung cancer currently receiving chemotherapy (alimta) last session 3 weeks ago, PE on xarelto presenting to the emergency department with shortness of breath and mild abdominal pain. No BM since yesterday and no gas for multiple days. Labs significant for BETO Cr 2.2 previously around 1.5. CTCAP found new left filling defect in lung and dilated/thickened SB loops with TP in RLQ concerning for SBO and possible enteritis. Patient with no previous abd surgery history concerning for primary/malignant SBO.      Plan:  - no acute surgical intervention  - NPO continue on IVF  - Malignant SBO Protocol: 10mg Reglan IV q8, octreotide 100mg SC q8 hours, Decadron 4mg q12 hours   - serial abd exams  - if patient becomes nauseous and begins vomiting should place NGT  - Surgical oncology will follow    Seen with and plans discussed with Dr Celi [facility] Team Surgery [id number]

## 2023-07-30 NOTE — PROGRESS NOTE ADULT - SUBJECTIVE AND OBJECTIVE BOX
Vital Signs Last 24 Hrs  T(C): 36.3 (30 Jul 2023 05:26), Max: 36.7 (29 Jul 2023 17:19)  T(F): 97.4 (30 Jul 2023 05:26), Max: 98 (29 Jul 2023 17:19)  HR: 65 (30 Jul 2023 05:37) (64 - 97)  BP: 121/85 (30 Jul 2023 05:37) (121/85 - 134/95)  BP(mean): --  RR: 17 (30 Jul 2023 05:37) (17 - 18)  SpO2: 100% (30 Jul 2023 05:37) (98% - 100%)    Parameters below as of 30 Jul 2023 05:37  Patient On (Oxygen Delivery Method): room air        I&O's Detail                            9.0    8.40  )-----------( 613      ( 30 Jul 2023 07:00 )             29.3       07-30    134<L>  |  100  |  26<H>  ----------------------------<  111<H>  4.8   |  19<L>  |  1.75<H>    Ca    8.6      30 Jul 2023 07:00  Phos  3.0     07-30  Mg     2.40     07-30        PT/INR - ( 28 Jul 2023 12:48 )   PT: 12.8 sec;   INR: 1.14 ratio         PTT - ( 30 Jul 2023 07:00 )  PTT:82.9 sec    Tolerating liquids but abdomen is still distended    PLAN:  Continue only clear liquids for today

## 2023-07-30 NOTE — PROGRESS NOTE ADULT - PROBLEM SELECTOR PLAN 2
CT A&P: New dilated fluid-filled thickened small bowel loops throughout the central abdomen with transition in the right lower quadrant where the loops of small bowel appear matted and thickened compared with prior exam from 7/21/2023 concerning for small bowel enteritis with developing small bowel obstruction. Pt. passed gas prior to this AM's exam.   -Improved, pt reported to have BM overnight  -surgery consulted: malignant SBO protocol with reglan, decadron and octreotide  -serial abd exam, if patient becomes nauseous and begins vomiting should place NGT  -CLD  -IVF

## 2023-07-30 NOTE — PROGRESS NOTE ADULT - PROBLEM SELECTOR PLAN 1
Hx of PE on xarelto, Off for a few days for para at the beginning of this month  -CT A&P: New filling defect in the left lower lobar segmental pulmonary arteries concerning for pulmonary embolism  -c/w heparin gtt for now  -trop flat  -EKG: sinus tachy, no acute ischemic changes  -check echo for right heart strain Hx of PE on xarelto, Off for a few days for para at the beginning of this month  -CT A&P: New filling defect in the left lower lobar segmental pulmonary arteries concerning for pulmonary embolism  -c/w heparin gtt for now  -trop flat  -EKG: sinus tachy, no acute ischemic changes  -TTE is WNL

## 2023-07-30 NOTE — PROGRESS NOTE ADULT - SUBJECTIVE AND OBJECTIVE BOX
SURGERY DAILY PROGRESS NOTE    --------------- OVERNIGHT/INTERVAL---------------  - No acute events overnight    --------------- SUBJECTIVE ---------------  - Patient describes no acute issues or concerns at this time  - Patient seen and examined at bedside  --------------- OBJECTIVE ---------------  -----VITALS-----  T(C): 36.3, Max: 36.7 (07-29)  T(F): 97.4, Max: 98 (07-29)  HR: 65 (64 - 97)  BP: 121/85 (121/85 - 134/95)  BP(mean): --  ABP: --  ABP(mean): --  RR: 17 (17 - 18)  SpO2: 100% (98% - 100%)  CVP(mm Hg): --  CVP(cm H2O): --  room air          -----PHYSICAL EXAM-----  GENERAL: NAD, lying in bed   NEURO: AOx3, awake alert appropriate  HEENT: NCAT, trachea midline  PULM: Respirations non-labored  ABD: Soft, non-tender, non-distended, no peritonitis/rebound tenderness  EXT: Warm, well perfused     -----INs & OUTs-----    -----MEDICATIONS-----  STANDING  dexAMETHasone  Injectable 4 milliGRAM(s) IV Push every 12 hours  dextrose 5%. 1000 milliLiter(s) (100 mL/Hr) IV Continuous <Continuous>  dextrose 5%. 1000 milliLiter(s) (50 mL/Hr) IV Continuous <Continuous>  dextrose 50% Injectable 25 Gram(s) IV Push once  dextrose 50% Injectable 25 Gram(s) IV Push once  dextrose 50% Injectable 12.5 Gram(s) IV Push once  folic acid Injectable 1 milliGRAM(s) IV Push daily  glucagon  Injectable 1 milliGRAM(s) IntraMuscular once  heparin  Infusion.  Unit(s)/Hr (12 mL/Hr) IV Continuous <Continuous>  insulin lispro (ADMELOG) corrective regimen sliding scale   SubCutaneous every 6 hours  metoclopramide Injectable 10 milliGRAM(s) IV Push every 8 hours  octreotide  Injectable 100 MICROGram(s) SubCutaneous every 8 hours  pantoprazole  Injectable 40 milliGRAM(s) IV Push daily  sodium chloride 0.9%. 1000 milliLiter(s) (75 mL/Hr) IV Continuous <Continuous>    PRN  dextrose Oral Gel 15 Gram(s) Oral once PRN Blood Glucose LESS THAN 70 milliGRAM(s)/deciliter  heparin   Injectable 2500 Unit(s) IV Push every 6 hours PRN For aPTT between 40 - 57  heparin   Injectable 5500 Unit(s) IV Push every 6 hours PRN For aPTT less than 40    -----LABS-----  CBC (07-29 @ 04:03)                              9.2<L>                         5.84    )----------------(  527<H>     --    % Neutrophils, --    % Lymphocytes, ANC: --                                  29.4<L>    BMP (07-29 @ 04:03)             135     |  97<L>   |  31<H> 		Ca++ --      Ca 8.7                ---------------------------------( 121<H>		Mg 2.60               4.8     |  23      |  1.76<H>			Ph 3.2         Coags (07-29 @ 11:07)  aPTT 66.5<H> / INR -- / PT --  Coags (07-29 @ 04:03)  aPTT 69.6<H> / INR -- / PT --          Urinalysis (07-29 @ 04:03):     Color:  / Appearance:  / SG:  / pH:  / Gluc: 121<H> / Ketones:  / Bili:  / Urobili:  / Protein : / Nitrites:  / Leuk.Est:  / RBC:  / WBC:  / Sq Epi:  / Non Sq Epi:  / Bacteria        -> Ascites Fl Ascites Fluid Culture (07-27 @ 08:54)       No polymorphonuclear cells seen per low power field  No organisms seen per oil power field    NG    No growth to date.

## 2023-07-31 LAB
ANION GAP SERPL CALC-SCNC: 15 MMOL/L — HIGH (ref 7–14)
APTT BLD: 195.6 SEC — CRITICAL HIGH (ref 24.5–35.6)
APTT BLD: 53 SEC — HIGH (ref 24.5–35.6)
APTT BLD: 85.9 SEC — HIGH (ref 24.5–35.6)
BUN SERPL-MCNC: 21 MG/DL — SIGNIFICANT CHANGE UP (ref 7–23)
CALCIUM SERPL-MCNC: 8.6 MG/DL — SIGNIFICANT CHANGE UP (ref 8.4–10.5)
CHLORIDE SERPL-SCNC: 97 MMOL/L — LOW (ref 98–107)
CO2 SERPL-SCNC: 16 MMOL/L — LOW (ref 22–31)
CREAT SERPL-MCNC: 1.63 MG/DL — HIGH (ref 0.5–1.3)
EGFR: 49 ML/MIN/1.73M2 — LOW
GLUCOSE BLDC GLUCOMTR-MCNC: 103 MG/DL — HIGH (ref 70–99)
GLUCOSE BLDC GLUCOMTR-MCNC: 109 MG/DL — HIGH (ref 70–99)
GLUCOSE BLDC GLUCOMTR-MCNC: 121 MG/DL — HIGH (ref 70–99)
GLUCOSE BLDC GLUCOMTR-MCNC: 135 MG/DL — HIGH (ref 70–99)
GLUCOSE SERPL-MCNC: 107 MG/DL — HIGH (ref 70–99)
HCT VFR BLD CALC: 31 % — LOW (ref 39–50)
HGB BLD-MCNC: 9.7 G/DL — LOW (ref 13–17)
MAGNESIUM SERPL-MCNC: 2.3 MG/DL — SIGNIFICANT CHANGE UP (ref 1.6–2.6)
MCHC RBC-ENTMCNC: 28.2 PG — SIGNIFICANT CHANGE UP (ref 27–34)
MCHC RBC-ENTMCNC: 31.3 GM/DL — LOW (ref 32–36)
MCV RBC AUTO: 90.1 FL — SIGNIFICANT CHANGE UP (ref 80–100)
NRBC # BLD: 0 /100 WBCS — SIGNIFICANT CHANGE UP (ref 0–0)
NRBC # FLD: 0.03 K/UL — HIGH (ref 0–0)
PHOSPHATE SERPL-MCNC: 2.8 MG/DL — SIGNIFICANT CHANGE UP (ref 2.5–4.5)
PLATELET # BLD AUTO: 614 K/UL — HIGH (ref 150–400)
POTASSIUM SERPL-MCNC: 4.9 MMOL/L — SIGNIFICANT CHANGE UP (ref 3.5–5.3)
POTASSIUM SERPL-SCNC: 4.9 MMOL/L — SIGNIFICANT CHANGE UP (ref 3.5–5.3)
RBC # BLD: 3.44 M/UL — LOW (ref 4.2–5.8)
RBC # FLD: 25.9 % — HIGH (ref 10.3–14.5)
SODIUM SERPL-SCNC: 128 MMOL/L — LOW (ref 135–145)
WBC # BLD: 8.71 K/UL — SIGNIFICANT CHANGE UP (ref 3.8–10.5)
WBC # FLD AUTO: 8.71 K/UL — SIGNIFICANT CHANGE UP (ref 3.8–10.5)

## 2023-07-31 PROCEDURE — 99232 SBSQ HOSP IP/OBS MODERATE 35: CPT

## 2023-07-31 RX ADMIN — HEPARIN SODIUM 0 UNIT(S)/HR: 5000 INJECTION INTRAVENOUS; SUBCUTANEOUS at 07:43

## 2023-07-31 RX ADMIN — Medication 10 MILLIGRAM(S): at 12:33

## 2023-07-31 RX ADMIN — Medication 4 MILLIGRAM(S): at 04:57

## 2023-07-31 RX ADMIN — HEPARIN SODIUM 0 UNIT(S)/HR: 5000 INJECTION INTRAVENOUS; SUBCUTANEOUS at 07:13

## 2023-07-31 RX ADMIN — HEPARIN SODIUM 1500 UNIT(S)/HR: 5000 INJECTION INTRAVENOUS; SUBCUTANEOUS at 17:23

## 2023-07-31 RX ADMIN — PANTOPRAZOLE SODIUM 40 MILLIGRAM(S): 20 TABLET, DELAYED RELEASE ORAL at 12:33

## 2023-07-31 RX ADMIN — Medication 1 MILLIGRAM(S): at 17:19

## 2023-07-31 RX ADMIN — SODIUM CHLORIDE 75 MILLILITER(S): 9 INJECTION INTRAMUSCULAR; INTRAVENOUS; SUBCUTANEOUS at 19:20

## 2023-07-31 RX ADMIN — OCTREOTIDE ACETATE 100 MICROGRAM(S): 200 INJECTION, SOLUTION INTRAVENOUS; SUBCUTANEOUS at 04:57

## 2023-07-31 RX ADMIN — HEPARIN SODIUM 1500 UNIT(S)/HR: 5000 INJECTION INTRAVENOUS; SUBCUTANEOUS at 19:20

## 2023-07-31 RX ADMIN — Medication 10 MILLIGRAM(S): at 21:58

## 2023-07-31 RX ADMIN — Medication 10 MILLIGRAM(S): at 04:57

## 2023-07-31 RX ADMIN — SODIUM CHLORIDE 75 MILLILITER(S): 9 INJECTION INTRAMUSCULAR; INTRAVENOUS; SUBCUTANEOUS at 07:43

## 2023-07-31 RX ADMIN — OCTREOTIDE ACETATE 100 MICROGRAM(S): 200 INJECTION, SOLUTION INTRAVENOUS; SUBCUTANEOUS at 21:59

## 2023-07-31 RX ADMIN — Medication 4 MILLIGRAM(S): at 17:19

## 2023-07-31 RX ADMIN — OCTREOTIDE ACETATE 100 MICROGRAM(S): 200 INJECTION, SOLUTION INTRAVENOUS; SUBCUTANEOUS at 12:33

## 2023-07-31 RX ADMIN — HEPARIN SODIUM 1400 UNIT(S)/HR: 5000 INJECTION INTRAVENOUS; SUBCUTANEOUS at 08:17

## 2023-07-31 NOTE — DIETITIAN INITIAL EVALUATION ADULT - NSPROEDALEARNPREF_GEN_A_NUR
Thank you for choosing Berrien Springs individual instruction/verbal instruction

## 2023-07-31 NOTE — CHART NOTE - NSCHARTNOTEFT_GEN_A_CORE
Invasive Procedure Team (IPT) consulted for diagnostic/therapeutic paracentesis.    Indications for Diagnostic Paracentesis:  - Consistent with specialty society guidelines, ruling out Bacterial Peritonitis (in particular, SBP) in any admitted patient with ascites (even if asymptomatic), particularly in those with c/f sepsis  - Identifying etiology for new-onset ascites    Indications for Therapeutic Paracentesis:  - Tense ascites associated with abdominal discomfort, urinary retention, respiratory distress, or other evidence of end-organ compromise that is likely 2/2 the ascites itself (and not another etiology)  - Large ascites refractory to medical management (i.e. diuretics)    Absolute Contraindications:  - DIC  - Acute Abdomen  - Cellulitis    Relative Contraindications:  - Coagulopathy (INR > 2.0 per [facility] IPT policy, but specific to performing proceduralist; refer to final recommendations below)  - Severe Thrombocytopenia (Platelet < 20-50, discuss with team/refer to final recommendations below)  - Pregnancy (discuss with team/refer to final recommendations below)  - Cellulitis/Surgical Scarring/Vasculature or Hematoma overlying only safe bedside access site  - BETO (specifically for therapeutic paracteneses; related to already poor intravascular perfusion. Risk of BETO increases by 1.5x per 1L of ascites fluid removed).    ASSESSMENT/RECOMMENDATIONS  *** INCOMPLETE ***  - Please obtain CBC/PTT/PT/INR/BMP for Cr on morning of procedure  - Please order any requested diagnostic studies on morning of procedure & notify procedure team if cytology requested.  - Primary team will have to physically deliver samples to lab; cannot be sent through pneumatic tube  - Can generally continue DVT/VTE PPx; if on THERAPEUTIC A/C, please pause heparin drip at 700 AM 8/1  - Pt does NOT need to be NPO  - Plan for procedure TENTATIVELY for 8/1 pending bedside screening/amenable pocket  - If repeat therapeutic paracentesis requested for severe / refractory ascites in < 7 day intervals, should consider IR consultation for indwelling catheter placement.

## 2023-07-31 NOTE — DIETITIAN INITIAL EVALUATION ADULT - OTHER INFO
Pt 59 yo male with PMHx of lung cancer currently receiving chemotherapy last session (7/11) presented with shortness of breath -> Pt found to have new PE, worsening ascites, developing SBO and BETO - per chart review. Of note, S/P paracentesis on 7/27 in ED.    At time of visit, Pt awake, alert. Per Pt his appetite usually good; but since admission Pt either NPO or on clear liquids. Of note, PO diet advanced to Regular. No report of chewing/swallowing difficulty; no report of vomiting or diarrhea @ this time. +Last BM (7/30) per flow sheet.     Per Pt his height: 6'2.5" & his actual body weight: 165#; Pt with unintended weight loss of ~7# since he started on chemo (October, 2022). Food preferences discussed. Rec to add PO supplement: Ensure Plus HP - 2x daily to diet rx. Kitchen to provide Magic cup - 2x daily. RD answered concerns related to diet. RD remains available, Pt made aware.    Pt 57 yo male with PMHx of lung cancer receiving chemotherapy last session (7/11) presented with shortness of breath -> Pt found to have new PE, worsening ascites, developing SBO and BETO - per chart review. Of note, S/P paracentesis on 7/27 in ED.    At time of visit, Pt awake, alert. Per Pt, his appetite usually good; but since admission Pt either NPO or on clear liquids. Of note, PO diet advanced to Regular. No report of chewing/swallowing difficulty; no report of vomiting or diarrhea @ this time. +Last BM (7/30) per flow sheet.     Per Pt, his height: 6' 2.5" & his actual body weight: ~165#; Pt with unintended weight loss of ~7# since he started on chemo in October, 2022. Food preferences discussed. Rec to add PO supplement: Glucerna shake - 2x daily to diet rx. RD answered concerns related to diet. RD remains available, Pt made aware.

## 2023-07-31 NOTE — PROGRESS NOTE ADULT - SUBJECTIVE AND OBJECTIVE BOX
Hospitalist Progress Note  Crystal Merino MD Pager # [phone]    OVERNIGHT EVENTS: CLAUDY    SUBJECTIVE / INTERVAL HPI: Patient seen and examined at bedside. Patient reports no increased pain in his abdomen. No nausea/vomiting. He is passing gas and had a BM this morning. He says he is starving and is hoping to eat something more than liquids today.     VITAL SIGNS:  Vital Signs Last 24 Hrs  T(C): 36.7 (31 Jul 2023 12:48), Max: 36.8 (31 Jul 2023 04:55)  T(F): 98 (31 Jul 2023 12:48), Max: 98.3 (31 Jul 2023 04:55)  HR: 60 (31 Jul 2023 12:48) (60 - 74)  BP: 131/92 (31 Jul 2023 12:48) (124/95 - 147/97)  BP(mean): --  RR: 18 (31 Jul 2023 12:48) (18 - 19)  SpO2: 100% (31 Jul 2023 12:48) (99% - 100%)    Parameters below as of 31 Jul 2023 04:55  Patient On (Oxygen Delivery Method): room air        PHYSICAL EXAM:    General: Comfortable appearing male resting in bed   HEENT: NC/AT; PERRL, anicteric sclera; MMM  Neck: supple  Cardiovascular: +S1/S2; RRR  Respiratory: CTA B/L; no W/R/R  Gastrointestinal: distended/tense abdomen, non-tender +BSx4  Extremities: WWP; no edema, clubbing or cyanosis  Vascular: 2+ radial, DP/PT pulses B/L  Neurological: AAOx3; no focal deficits    MEDICATIONS:  MEDICATIONS  (STANDING):  dexAMETHasone  Injectable 4 milliGRAM(s) IV Push every 12 hours  dextrose 5%. 1000 milliLiter(s) (100 mL/Hr) IV Continuous <Continuous>  dextrose 5%. 1000 milliLiter(s) (50 mL/Hr) IV Continuous <Continuous>  dextrose 50% Injectable 25 Gram(s) IV Push once  dextrose 50% Injectable 25 Gram(s) IV Push once  dextrose 50% Injectable 12.5 Gram(s) IV Push once  folic acid Injectable 1 milliGRAM(s) IV Push daily  glucagon  Injectable 1 milliGRAM(s) IntraMuscular once  heparin  Infusion.  Unit(s)/Hr (12 mL/Hr) IV Continuous <Continuous>  insulin lispro (ADMELOG) corrective regimen sliding scale   SubCutaneous Before meals and at bedtime  metoclopramide Injectable 10 milliGRAM(s) IV Push every 8 hours  octreotide  Injectable 100 MICROGram(s) SubCutaneous every 8 hours  pantoprazole  Injectable 40 milliGRAM(s) IV Push daily  sodium chloride 0.9%. 1000 milliLiter(s) (75 mL/Hr) IV Continuous <Continuous>    MEDICATIONS  (PRN):  dextrose Oral Gel 15 Gram(s) Oral once PRN Blood Glucose LESS THAN 70 milliGRAM(s)/deciliter  heparin   Injectable 5500 Unit(s) IV Push every 6 hours PRN For aPTT less than 40  heparin   Injectable 2500 Unit(s) IV Push every 6 hours PRN For aPTT between 40 - 57      ALLERGIES:  Allergies    No Known Allergies    Intolerances        LABS:                        9.7    8.71  )-----------( 614      ( 31 Jul 2023 06:21 )             31.0     07-31    128<L>  |  97<L>  |  21  ----------------------------<  107<H>  4.9   |  16<L>  |  1.63<H>    Ca    8.6      31 Jul 2023 06:21  Phos  2.8     07-31  Mg     2.30     07-31      PTT - ( 31 Jul 2023 06:21 )  PTT:195.6 sec  Urinalysis Basic - ( 31 Jul 2023 06:21 )    Color: x / Appearance: x / SG: x / pH: x  Gluc: 107 mg/dL / Ketone: x  / Bili: x / Urobili: x   Blood: x / Protein: x / Nitrite: x   Leuk Esterase: x / RBC: x / WBC x   Sq Epi: x / Non Sq Epi: x / Bacteria: x      CAPILLARY BLOOD GLUCOSE      POCT Blood Glucose.: 121 mg/dL (31 Jul 2023 11:57)      RADIOLOGY & ADDITIONAL TESTS: Reviewed.    ASSESSMENT:    PLAN:

## 2023-07-31 NOTE — PROGRESS NOTE ADULT - PROBLEM SELECTOR PLAN 2
CT A&P: New dilated fluid-filled thickened small bowel loops throughout the central abdomen with transition in the right lower quadrant where the loops of small bowel appear matted and thickened compared with prior exam from 7/21/2023 concerning for small bowel enteritis with developing small bowel obstruction. Pt. passed gas prior to this AM's exam.   -Improved, pt reported to have BM overnight  -surgery consulted: malignant SBO protocol with reglan, decadron and octreotide  -serial abd exam, if patient becomes nauseous and begins vomiting should place NGT  - CLD - discussing with surgery on advancing diet today. Follow up.

## 2023-07-31 NOTE — PROGRESS NOTE ADULT - ASSESSMENT
58-year male with history of lung cancer currently receiving chemotherapy (alimta) last session 3 weeks ago, PE on xarelto presenting to the emergency department with shortness of breath and mild abdominal pain. No BM since yesterday and no gas for multiple days. Labs significant for BETO Cr 2.2 previously around 1.5. CTCAP found new left filling defect in lung and dilated/thickened SB loops with TP in RLQ concerning for SBO and possible enteritis. Patient with no previous abd surgery history concerning for primary/malignant SBO.      Plan:  - No acute surgical intervention  - Currently tolerating clears well; advance diet as tolerated   - Malignant SBO Protocol: 10mg Reglan IV q8, octreotide 100mg SC q8 hours, Decadron 4mg q12 hours   - Serial abd exams  - If patient becomes nauseous and begins vomiting should place NGT  - Pending paracentesis   - Surgical oncology will follow    Seen with and plans discussed with Dr Celi BLANC Team Surgery [id number]

## 2023-07-31 NOTE — DIETITIAN INITIAL EVALUATION ADULT - PERTINENT LABORATORY DATA
07-31    128<L>  |  97<L>  |  21  ----------------------------<  107<H>  4.9   |  16<L>  |  1.63<H>    Ca    8.6      31 Jul 2023 06:21  Phos  2.8     07-31  Mg     2.30     07-31    POCT Blood Glucose.: 135 mg/dL (07-31-23 @ 21:48)  A1C with Estimated Average Glucose Result: 5.9 % (07-27-23 @ 11:30)  A1C with Estimated Average Glucose Result: 5.6 % (11-24-22 @ 05:20)  A1C with Estimated Average Glucose Result: 5.8 % (09-11-22 @ 04:28)

## 2023-07-31 NOTE — PROGRESS NOTE ADULT - PROBLEM SELECTOR PLAN 1
Hx of PE on xarelto, Off for a few days for para at the beginning of this month. Not treatment failure given treatment interruption.    -CT A&P: New filling defect in the left lower lobar segmental pulmonary arteries concerning for pulmonary embolism  -c/w heparin gtt for now until therapeutic paracentesis is complete and then will restart xarelto.   -trop flat  -EKG: sinus tachy, no acute ischemic changes  -TTE is WNL

## 2023-07-31 NOTE — PROGRESS NOTE ADULT - SUBJECTIVE AND OBJECTIVE BOX
D Team (Surgical Oncology) Daily Progress Note    SUBJECTIVE:  Pt seen and examined, and is resting comfortably in bed. No acute events overnight. Pain is adequately controlled on current regimen. Pt reports no episodes of nausea/vomiting or acute complaints at this time. (+) for flatus in the AM and (+) BM overnight.     OBJECTIVE:  Vital Signs Last 24 Hrs  T(C): 36.8 (31 Jul 2023 04:55), Max: 36.8 (31 Jul 2023 04:55)  T(F): 98.3 (31 Jul 2023 04:55), Max: 98.3 (31 Jul 2023 04:55)  HR: 62 (31 Jul 2023 04:55) (62 - 74)  BP: 134/95 (31 Jul 2023 04:55) (124/95 - 147/97)  BP(mean): --  RR: 19 (31 Jul 2023 04:55) (18 - 19)  SpO2: 100% (31 Jul 2023 04:55) (99% - 100%)    Parameters below as of 31 Jul 2023 04:55  Patient On (Oxygen Delivery Method): room air        I&O's Detail    30 Jul 2023 07:01  -  31 Jul 2023 07:00  --------------------------------------------------------  IN:  Total IN: 0 mL    OUT:    Voided (mL): 500 mL  Total OUT: 500 mL    Total NET: -500 mL        Exam:  GEN: NAD  HEENT: atraumatic, normocephalic  RESP: no increased work of breathing, no use of accessory muscles  GI/ABD: soft, non-tender, distended, no rebound or guarding  EXTREMITIES: warm, pink, well-perfused                        9.7    8.71  )-----------( 614      ( 31 Jul 2023 06:21 )             31.0       07-31    128<L>  |  97<L>  |  21  ----------------------------<  107<H>  4.9   |  16<L>  |  1.63<H>    Ca    8.6      31 Jul 2023 06:21  Phos  2.8     07-31  Mg     2.30     07-31        PTT - ( 31 Jul 2023 06:21 )  PTT:195.6 sec

## 2023-07-31 NOTE — DIETITIAN INITIAL EVALUATION ADULT - CALCULATED FROM (CAL/KG)
HPI    SUBJECTIVE:                                                    Cynthia Santiago is a 60 TIP Bhagat CNP  [facility]      Physical Exam       [id number]

## 2023-07-31 NOTE — DIETITIAN INITIAL EVALUATION ADULT - ADD RECOMMEND
1. Encourage & assist Pt with meals; Monitor PO diet tolerance;   2. Honor food preferences;   3. Add Multivitamins 1 tab daily for micronutrient coverage;   4. Monitor labs, hydration status;

## 2023-08-01 ENCOUNTER — APPOINTMENT (OUTPATIENT)
Dept: HEMATOLOGY ONCOLOGY | Facility: CLINIC | Age: 58
End: 2023-08-01

## 2023-08-01 ENCOUNTER — APPOINTMENT (OUTPATIENT)
Dept: INFUSION THERAPY | Facility: HOSPITAL | Age: 58
End: 2023-08-01

## 2023-08-01 LAB
ANION GAP SERPL CALC-SCNC: 11 MMOL/L — SIGNIFICANT CHANGE UP (ref 7–14)
APTT BLD: 69.2 SEC — HIGH (ref 24.5–35.6)
APTT BLD: 97.4 SEC — HIGH (ref 24.5–35.6)
BUN SERPL-MCNC: 21 MG/DL — SIGNIFICANT CHANGE UP (ref 7–23)
CALCIUM SERPL-MCNC: 8.6 MG/DL — SIGNIFICANT CHANGE UP (ref 8.4–10.5)
CHLORIDE SERPL-SCNC: 100 MMOL/L — SIGNIFICANT CHANGE UP (ref 98–107)
CO2 SERPL-SCNC: 20 MMOL/L — LOW (ref 22–31)
CREAT SERPL-MCNC: 1.72 MG/DL — HIGH (ref 0.5–1.3)
CULTURE RESULTS: SIGNIFICANT CHANGE UP
EGFR: 46 ML/MIN/1.73M2 — LOW
GLUCOSE BLDC GLUCOMTR-MCNC: 101 MG/DL — HIGH (ref 70–99)
GLUCOSE BLDC GLUCOMTR-MCNC: 109 MG/DL — HIGH (ref 70–99)
GLUCOSE BLDC GLUCOMTR-MCNC: 121 MG/DL — HIGH (ref 70–99)
GLUCOSE BLDC GLUCOMTR-MCNC: 126 MG/DL — HIGH (ref 70–99)
GLUCOSE SERPL-MCNC: 109 MG/DL — HIGH (ref 70–99)
HCT VFR BLD CALC: 28.1 % — LOW (ref 39–50)
HCT VFR BLD CALC: 32 % — LOW (ref 39–50)
HGB BLD-MCNC: 10.1 G/DL — LOW (ref 13–17)
HGB BLD-MCNC: 9.1 G/DL — LOW (ref 13–17)
INR BLD: 1.28 RATIO — HIGH (ref 0.85–1.18)
MAGNESIUM SERPL-MCNC: 2.4 MG/DL — SIGNIFICANT CHANGE UP (ref 1.6–2.6)
MCHC RBC-ENTMCNC: 28 PG — SIGNIFICANT CHANGE UP (ref 27–34)
MCHC RBC-ENTMCNC: 28.5 PG — SIGNIFICANT CHANGE UP (ref 27–34)
MCHC RBC-ENTMCNC: 31.6 GM/DL — LOW (ref 32–36)
MCHC RBC-ENTMCNC: 32.4 GM/DL — SIGNIFICANT CHANGE UP (ref 32–36)
MCV RBC AUTO: 88.1 FL — SIGNIFICANT CHANGE UP (ref 80–100)
MCV RBC AUTO: 88.6 FL — SIGNIFICANT CHANGE UP (ref 80–100)
NRBC # BLD: 0 /100 WBCS — SIGNIFICANT CHANGE UP (ref 0–0)
NRBC # BLD: 0 /100 WBCS — SIGNIFICANT CHANGE UP (ref 0–0)
NRBC # FLD: 0.04 K/UL — HIGH (ref 0–0)
NRBC # FLD: 0.04 K/UL — HIGH (ref 0–0)
PHOSPHATE SERPL-MCNC: 2.8 MG/DL — SIGNIFICANT CHANGE UP (ref 2.5–4.5)
PLATELET # BLD AUTO: 608 K/UL — HIGH (ref 150–400)
PLATELET # BLD AUTO: 699 K/UL — HIGH (ref 150–400)
POTASSIUM SERPL-MCNC: 4.7 MMOL/L — SIGNIFICANT CHANGE UP (ref 3.5–5.3)
POTASSIUM SERPL-SCNC: 4.7 MMOL/L — SIGNIFICANT CHANGE UP (ref 3.5–5.3)
PROTHROM AB SERPL-ACNC: 14.4 SEC — HIGH (ref 9.5–13)
RBC # BLD: 3.19 M/UL — LOW (ref 4.2–5.8)
RBC # BLD: 3.61 M/UL — LOW (ref 4.2–5.8)
RBC # FLD: 26 % — HIGH (ref 10.3–14.5)
RBC # FLD: 26.5 % — HIGH (ref 10.3–14.5)
SODIUM SERPL-SCNC: 131 MMOL/L — LOW (ref 135–145)
SPECIMEN SOURCE: SIGNIFICANT CHANGE UP
WBC # BLD: 12.26 K/UL — HIGH (ref 3.8–10.5)
WBC # BLD: 9.57 K/UL — SIGNIFICANT CHANGE UP (ref 3.8–10.5)
WBC # FLD AUTO: 12.26 K/UL — HIGH (ref 3.8–10.5)
WBC # FLD AUTO: 9.57 K/UL — SIGNIFICANT CHANGE UP (ref 3.8–10.5)

## 2023-08-01 PROCEDURE — 99232 SBSQ HOSP IP/OBS MODERATE 35: CPT

## 2023-08-01 RX ORDER — HEPARIN SODIUM 5000 [USP'U]/ML
1500 INJECTION INTRAVENOUS; SUBCUTANEOUS
Qty: 25000 | Refills: 0 | Status: DISCONTINUED | OUTPATIENT
Start: 2023-08-01 | End: 2023-08-02

## 2023-08-01 RX ADMIN — Medication 4 MILLIGRAM(S): at 05:25

## 2023-08-01 RX ADMIN — HEPARIN SODIUM 1500 UNIT(S)/HR: 5000 INJECTION INTRAVENOUS; SUBCUTANEOUS at 20:16

## 2023-08-01 RX ADMIN — PANTOPRAZOLE SODIUM 40 MILLIGRAM(S): 20 TABLET, DELAYED RELEASE ORAL at 12:47

## 2023-08-01 RX ADMIN — OCTREOTIDE ACETATE 100 MICROGRAM(S): 200 INJECTION, SOLUTION INTRAVENOUS; SUBCUTANEOUS at 05:26

## 2023-08-01 RX ADMIN — Medication 1 MILLIGRAM(S): at 12:48

## 2023-08-01 RX ADMIN — Medication 10 MILLIGRAM(S): at 05:25

## 2023-08-01 RX ADMIN — HEPARIN SODIUM 1500 UNIT(S)/HR: 5000 INJECTION INTRAVENOUS; SUBCUTANEOUS at 20:12

## 2023-08-01 RX ADMIN — SODIUM CHLORIDE 75 MILLILITER(S): 9 INJECTION INTRAMUSCULAR; INTRAVENOUS; SUBCUTANEOUS at 05:22

## 2023-08-01 RX ADMIN — SODIUM CHLORIDE 75 MILLILITER(S): 9 INJECTION INTRAMUSCULAR; INTRAVENOUS; SUBCUTANEOUS at 20:12

## 2023-08-01 RX ADMIN — HEPARIN SODIUM 1500 UNIT(S)/HR: 5000 INJECTION INTRAVENOUS; SUBCUTANEOUS at 12:45

## 2023-08-01 RX ADMIN — HEPARIN SODIUM 1500 UNIT(S)/HR: 5000 INJECTION INTRAVENOUS; SUBCUTANEOUS at 00:05

## 2023-08-01 NOTE — PROGRESS NOTE ADULT - SUBJECTIVE AND OBJECTIVE BOX
Hospitalist Progress Note  Crystal Merino MD Pager # [phone]    OVERNIGHT EVENTS: CLAUDY     SUBJECTIVE / INTERVAL HPI: Patient seen and examined at bedside. Pt. reports his stomach feels tight. He has been having bowel movements and passing gas. He is tolerating a regular diet.     VITAL SIGNS:  Vital Signs Last 24 Hrs  T(C): 36.5 (01 Aug 2023 12:31), Max: 36.7 (31 Jul 2023 22:30)  T(F): 97.7 (01 Aug 2023 12:31), Max: 98 (31 Jul 2023 22:30)  HR: 64 (01 Aug 2023 12:31) (64 - 87)  BP: 137/96 (01 Aug 2023 12:31) (118/74 - 138/88)  BP(mean): --  RR: 18 (01 Aug 2023 12:31) (17 - 18)  SpO2: 99% (01 Aug 2023 12:31) (98% - 100%)    Parameters below as of 01 Aug 2023 05:20  Patient On (Oxygen Delivery Method): room air        PHYSICAL EXAM:    General: Comfortable appearing male resting in bed   HEENT: NC/AT; PERRL, anicteric sclera; MMM  Neck: supple  Cardiovascular: +S1/S2; RRR  Respiratory: CTA B/L; no W/R/R  Gastrointestinal: distended abdomen and tense - non-tender ; +BSx4  Extremities: WWP; no edema, clubbing or cyanosis  Vascular: 2+ radial, DP/PT pulses B/L  Neurological: AAOx3; no focal deficits    MEDICATIONS:  MEDICATIONS  (STANDING):  dexAMETHasone  Injectable 4 milliGRAM(s) IV Push every 12 hours  dextrose 5%. 1000 milliLiter(s) (100 mL/Hr) IV Continuous <Continuous>  dextrose 5%. 1000 milliLiter(s) (50 mL/Hr) IV Continuous <Continuous>  dextrose 50% Injectable 25 Gram(s) IV Push once  dextrose 50% Injectable 12.5 Gram(s) IV Push once  dextrose 50% Injectable 25 Gram(s) IV Push once  folic acid Injectable 1 milliGRAM(s) IV Push daily  glucagon  Injectable 1 milliGRAM(s) IntraMuscular once  heparin  Infusion. 1500 Unit(s)/Hr (15 mL/Hr) IV Continuous <Continuous>  insulin lispro (ADMELOG) corrective regimen sliding scale   SubCutaneous Before meals and at bedtime  metoclopramide Injectable 10 milliGRAM(s) IV Push every 8 hours  octreotide  Injectable 100 MICROGram(s) SubCutaneous every 8 hours  pantoprazole  Injectable 40 milliGRAM(s) IV Push daily  sodium chloride 0.9%. 1000 milliLiter(s) (75 mL/Hr) IV Continuous <Continuous>    MEDICATIONS  (PRN):  dextrose Oral Gel 15 Gram(s) Oral once PRN Blood Glucose LESS THAN 70 milliGRAM(s)/deciliter  heparin   Injectable 2500 Unit(s) IV Push every 6 hours PRN For aPTT between 40 - 57  heparin   Injectable 5500 Unit(s) IV Push every 6 hours PRN For aPTT less than 40      ALLERGIES:  Allergies    No Known Allergies    Intolerances        LABS:                        9.1    9.57  )-----------( 608      ( 01 Aug 2023 06:44 )             28.1     08-01    131<L>  |  100  |  21  ----------------------------<  109<H>  4.7   |  20<L>  |  1.72<H>    Ca    8.6      01 Aug 2023 06:44  Phos  2.8     08-01  Mg     2.40     08-01      PT/INR - ( 01 Aug 2023 06:44 )   PT: 14.4 sec;   INR: 1.28 ratio         PTT - ( 01 Aug 2023 06:44 )  PTT:97.4 sec  Urinalysis Basic - ( 01 Aug 2023 06:44 )    Color: x / Appearance: x / SG: x / pH: x  Gluc: 109 mg/dL / Ketone: x  / Bili: x / Urobili: x   Blood: x / Protein: x / Nitrite: x   Leuk Esterase: x / RBC: x / WBC x   Sq Epi: x / Non Sq Epi: x / Bacteria: x      CAPILLARY BLOOD GLUCOSE      POCT Blood Glucose.: 126 mg/dL (01 Aug 2023 11:44)      RADIOLOGY & ADDITIONAL TESTS: Reviewed.    ASSESSMENT:    PLAN:

## 2023-08-01 NOTE — PROGRESS NOTE ADULT - PROBLEM SELECTOR PLAN 2
CT A&P: New dilated fluid-filled thickened small bowel loops throughout the central abdomen with transition in the right lower quadrant where the loops of small bowel appear matted and thickened compared with prior exam from 7/21/2023 concerning for small bowel enteritis with developing small bowel obstruction. Pt. passed gas prior to this AM's exam.   -Improved, pt reported to have BM overnight  -surgery consulted: malignant SBO protocol with reglan, decadron and octreotide - will d/c today   -serial abd exam, if patient becomes nauseous and begins vomiting should place NGT  - CLD - discussing with surgery on advancing diet today. Follow up.

## 2023-08-01 NOTE — PROGRESS NOTE ADULT - SUBJECTIVE AND OBJECTIVE BOX
GENERAL SURGERY PROGRESS NOTE    slightly distended, but having BMs and tolerated 2 solid meals no nausea or vomiting     10-point review of systems completed and negative except as noted above.      OBJECTIVE    MEDICATIONS  folic acid Injectable 1 milliGRAM(s) IV Push daily  glucagon  Injectable 1 milliGRAM(s) IntraMuscular once  heparin   Injectable 5500 Unit(s) IV Push every 6 hours PRN  heparin   Injectable 2500 Unit(s) IV Push every 6 hours PRN  heparin  Infusion. 1500 Unit(s)/Hr IV Continuous <Continuous>  pantoprazole  Injectable 40 milliGRAM(s) IV Push daily  sodium chloride 0.9%. 1000 milliLiter(s) IV Continuous <Continuous>      PHYSICAL EXAM  T(C): 36.9 (08-03-23 @ 05:15), Max: 36.9 (08-02-23 @ 12:49)  HR: 95 (08-03-23 @ 05:15) (91 - 101)  BP: 122/81 (08-03-23 @ 05:15) (120/83 - 135/80)  RR: 18 (08-03-23 @ 05:15) (17 - 18)  SpO2: 100% (08-03-23 @ 05:15) (100% - 100%)    08-02-23 @ 07:01  -  08-03-23 @ 07:00  --------------------------------------------------------  IN: 400 mL / OUT: 800 mL / NET: -400 mL        General: Appears well, NAD  Neuro: AAOx3  CHEST: Clear to auscultation bilaterally  CV: Regular rate and rhythm  Abdomen: soft, nontender, mild distension no rebound or guarding  Extremities: Grossly symmetric    LABS                        10.2   13.94 )-----------( 485      ( 03 Aug 2023 07:10 )             32.0     08-03    134<L>  |  102  |  19  ----------------------------<  99  4.2   |  21<L>  |  1.64<H>    Ca    8.4      03 Aug 2023 07:10  Phos  3.0     08-03  Mg     1.90     08-03      PT/INR - ( 02 Aug 2023 06:40 )   PT: 13.4 sec;   INR: 1.20 ratio         PTT - ( 03 Aug 2023 07:10 )  PTT:97.3 sec  Urinalysis Basic - ( 03 Aug 2023 07:10 )    Color: x / Appearance: x / SG: x / pH: x  Gluc: 99 mg/dL / Ketone: x  / Bili: x / Urobili: x   Blood: x / Protein: x / Nitrite: x   Leuk Esterase: x / RBC: x / WBC x   Sq Epi: x / Non Sq Epi: x / Bacteria: x        RADIOLOGY & ADDITIONAL STUDIES

## 2023-08-01 NOTE — PROGRESS NOTE ADULT - PROBLEM SELECTOR PLAN 1
Hx of PE on xarelto, Off for a few days for para at the beginning of this month. Not treatment failure given treatment interruption.    -CT A&P: New filling defect in the left lower lobar segmental pulmonary arteries concerning for pulmonary embolism  -c/w heparin gtt for now until therapeutic paracentesis is complete. Planning for tomorrow. Will consider lovenox on d/c.   -trop flat  -EKG: sinus tachy, no acute ischemic changes  -TTE is WNL

## 2023-08-01 NOTE — PROGRESS NOTE ADULT - SUBJECTIVE AND OBJECTIVE BOX
INTERVAL HPI/OVERNIGHT EVENTS:  Patient seen at bedside.  Patient with some abdominal tightness  Pending para-- as per patient procedure delayed until tomorrow  Diet advanced today. pt looks forward to it  Denies any signs of acute bleed  Denies n/v/d  + bowel movements and passing gas        VITAL SIGNS:  T(F): 97.7 (08-01-23 @ 12:31)  HR: 64 (08-01-23 @ 12:31)  BP: 137/96 (08-01-23 @ 12:31)  RR: 18 (08-01-23 @ 12:31)  SpO2: 99% (08-01-23 @ 12:31)  Wt(kg): --    PHYSICAL EXAM:  General: Comfortable appearing male resting in bed   HEENT: NC/AT; PERRL, anicteric sclera; MMM  Neck: supple  Cardiovascular: +S1/S2; RRR  Respiratory: CTA B/L; no W/R/R  Gastrointestinal: distended abdomen and tense - non-tender ; +BSx4  Extremities: WWP; no edema, clubbing or cyanosis  Vascular: 2+ radial, DP/PT pulses B/L  Neurological: AAOx3; no focal deficits    MEDICATIONS  (STANDING):  dextrose 5%. 1000 milliLiter(s) (100 mL/Hr) IV Continuous <Continuous>  dextrose 5%. 1000 milliLiter(s) (50 mL/Hr) IV Continuous <Continuous>  dextrose 50% Injectable 25 Gram(s) IV Push once  dextrose 50% Injectable 12.5 Gram(s) IV Push once  dextrose 50% Injectable 25 Gram(s) IV Push once  folic acid Injectable 1 milliGRAM(s) IV Push daily  glucagon  Injectable 1 milliGRAM(s) IntraMuscular once  heparin  Infusion. 1500 Unit(s)/Hr (15 mL/Hr) IV Continuous <Continuous>  insulin lispro (ADMELOG) corrective regimen sliding scale   SubCutaneous Before meals and at bedtime  pantoprazole  Injectable 40 milliGRAM(s) IV Push daily  sodium chloride 0.9%. 1000 milliLiter(s) (75 mL/Hr) IV Continuous <Continuous>    MEDICATIONS  (PRN):  dextrose Oral Gel 15 Gram(s) Oral once PRN Blood Glucose LESS THAN 70 milliGRAM(s)/deciliter  heparin   Injectable 2500 Unit(s) IV Push every 6 hours PRN For aPTT between 40 - 57  heparin   Injectable 5500 Unit(s) IV Push every 6 hours PRN For aPTT less than 40      Allergies    No Known Allergies    Intolerances        LABS:                        9.1    9.57  )-----------( 608      ( 01 Aug 2023 06:44 )             28.1     08-01    131<L>  |  100  |  21  ----------------------------<  109<H>  4.7   |  20<L>  |  1.72<H>    Ca    8.6      01 Aug 2023 06:44  Phos  2.8     08-01  Mg     2.40     08-01      PT/INR - ( 01 Aug 2023 06:44 )   PT: 14.4 sec;   INR: 1.28 ratio         PTT - ( 01 Aug 2023 06:44 )  PTT:97.4 sec  Urinalysis Basic - ( 01 Aug 2023 06:44 )    Color: x / Appearance: x / SG: x / pH: x  Gluc: 109 mg/dL / Ketone: x  / Bili: x / Urobili: x   Blood: x / Protein: x / Nitrite: x   Leuk Esterase: x / RBC: x / WBC x   Sq Epi: x / Non Sq Epi: x / Bacteria: x        RADIOLOGY & ADDITIONAL TESTS:  Studies reviewed.

## 2023-08-01 NOTE — PROGRESS NOTE ADULT - ASSESSMENT
58-year male with history of lung cancer currently receiving chemotherapy (alimta) last session 3 weeks ago, PE on xarelto presenting to the emergency department with shortness of breath and mild abdominal pain. No BM since yesterday and no gas for multiple days. Labs significant for BETO Cr 2.2 previously around 1.5. CTCAP found new left filling defect in lung and dilated/thickened SB loops with TP in RLQ concerning for SBO and possible enteritis. Patient with no previous abd surgery history concerning for primary/malignant SBO.      Plan:  - No acute surgical intervention  - tolerating solids, having bowel function  - off mSBO  - will sign off please reconsult as needed  - paracentesis per primary team    D Team Surgery [id number]

## 2023-08-01 NOTE — PROGRESS NOTE ADULT - ASSESSMENT
57 yo M PMH lung cancer met to  LAD, peritoneum, bone and liver, currently receiving chemotherapy (Alimta) last session (7/11), PE on xarelto presenting to the emergency department with shortness of breath is found to have new PE, worsening ascites, developing SBO and BETO. Oncology consulted for further recommendations      CTAP 7/27  New dilated fluid-filled thickened small bowel loops throughout the   central abdomen with transition in the right lower quadrant where the   loops of small bowel appear matted and thickened compared with prior exam   from 7/21/2023 concerning for small bowel enteritis with developing small   bowel obstruction.  Worsening abdominal and pelvic ascites with mild thickening of the   peritoneal reflections likely reflective of malignant ascites.  New filling defect in the left lower lobar segmental pulmonary arteries   concerning for pulmonary embolism. CTA of the chest should be considered.  Osseous metastatic disease, unchanged.  Unchanged peripheral cystic lesions in the right hepatic lobe which may   be along the serosal surface (serosal implants).  Small left pleural effusion.      -Surgery consult recs appreciated  for SBO management  -BETO improving  -CTA chest when kidneys optimized  -Pending  therapeutic para, tentatively planned for 8/2, please send ctyology  -In re to PE, agree with heparin gtt, patient with h/o PE in Sept 2022, he had been compliant however  patient reports that he stopped xarelto for a few days prior to and after his most paracentesis earlier this month. And did not resume it until a few days after, may have been off AC for about 1 week. Was also unable  to take pills 2 days prior to his admission and again missed some doses. Compliance>Failure would be reason for new PE. Would encourage re-challenging patient on DOAC when closer to discharge.   -Palliative Care for symptom management  -No plans for inpatient chemotherapy  -C/w Supportive care, pain control, Nutrition, PT, DVT ppx  -Rest of care as per primary team  -Patient to [facility] upon discharge  -Oncology will continue to follow with you    Case d/w oncology attending      Frederick THOMAS  Oncology Physician Assistant  [facility]    Pager [phone] also available on TEAMS as Frederick THOMAS    If before 8am/after 5pm or on weekends please page On-call Oncology Fellow

## 2023-08-01 NOTE — PROVIDER CONTACT NOTE (CRITICAL VALUE NOTIFICATION) - ASSESSMENT
AO4 RA, NSR on tele. pt asymptomatic. no signs of bleeding noted.
AO4 RA, NSR on tele. pt asymptomatic. no signs of bleeding noted.

## 2023-08-02 LAB
ANION GAP SERPL CALC-SCNC: 10 MMOL/L — SIGNIFICANT CHANGE UP (ref 7–14)
APTT BLD: 50 SEC — HIGH (ref 24.5–35.6)
APTT BLD: 81 SEC — HIGH (ref 24.5–35.6)
BUN SERPL-MCNC: 22 MG/DL — SIGNIFICANT CHANGE UP (ref 7–23)
CALCIUM SERPL-MCNC: 8.5 MG/DL — SIGNIFICANT CHANGE UP (ref 8.4–10.5)
CHLORIDE SERPL-SCNC: 100 MMOL/L — SIGNIFICANT CHANGE UP (ref 98–107)
CO2 SERPL-SCNC: 23 MMOL/L — SIGNIFICANT CHANGE UP (ref 22–31)
CREAT SERPL-MCNC: 1.84 MG/DL — HIGH (ref 0.5–1.3)
EGFR: 42 ML/MIN/1.73M2 — LOW
GLUCOSE BLDC GLUCOMTR-MCNC: 71 MG/DL — SIGNIFICANT CHANGE UP (ref 70–99)
GLUCOSE BLDC GLUCOMTR-MCNC: 76 MG/DL — SIGNIFICANT CHANGE UP (ref 70–99)
GLUCOSE BLDC GLUCOMTR-MCNC: 81 MG/DL — SIGNIFICANT CHANGE UP (ref 70–99)
GLUCOSE BLDC GLUCOMTR-MCNC: 85 MG/DL — SIGNIFICANT CHANGE UP (ref 70–99)
GLUCOSE BLDC GLUCOMTR-MCNC: 87 MG/DL — SIGNIFICANT CHANGE UP (ref 70–99)
GLUCOSE BLDC GLUCOMTR-MCNC: 91 MG/DL — SIGNIFICANT CHANGE UP (ref 70–99)
GLUCOSE SERPL-MCNC: 84 MG/DL — SIGNIFICANT CHANGE UP (ref 70–99)
HCT VFR BLD CALC: 26.9 % — LOW (ref 39–50)
HCT VFR BLD CALC: 36.2 % — LOW (ref 39–50)
HGB BLD-MCNC: 11.5 G/DL — LOW (ref 13–17)
HGB BLD-MCNC: 8.8 G/DL — LOW (ref 13–17)
INR BLD: 1.2 RATIO — HIGH (ref 0.85–1.18)
MAGNESIUM SERPL-MCNC: 2 MG/DL — SIGNIFICANT CHANGE UP (ref 1.6–2.6)
MCHC RBC-ENTMCNC: 28.3 PG — SIGNIFICANT CHANGE UP (ref 27–34)
MCHC RBC-ENTMCNC: 28.4 PG — SIGNIFICANT CHANGE UP (ref 27–34)
MCHC RBC-ENTMCNC: 31.8 GM/DL — LOW (ref 32–36)
MCHC RBC-ENTMCNC: 32.7 GM/DL — SIGNIFICANT CHANGE UP (ref 32–36)
MCV RBC AUTO: 86.5 FL — SIGNIFICANT CHANGE UP (ref 80–100)
MCV RBC AUTO: 89.4 FL — SIGNIFICANT CHANGE UP (ref 80–100)
NRBC # BLD: 0 /100 WBCS — SIGNIFICANT CHANGE UP (ref 0–0)
NRBC # BLD: 0 /100 WBCS — SIGNIFICANT CHANGE UP (ref 0–0)
NRBC # FLD: 0.03 K/UL — HIGH (ref 0–0)
NRBC # FLD: 0.03 K/UL — HIGH (ref 0–0)
PHOSPHATE SERPL-MCNC: 2.6 MG/DL — SIGNIFICANT CHANGE UP (ref 2.5–4.5)
PLATELET # BLD AUTO: 562 K/UL — HIGH (ref 150–400)
PLATELET # BLD AUTO: 595 K/UL — HIGH (ref 150–400)
POTASSIUM SERPL-MCNC: 4 MMOL/L — SIGNIFICANT CHANGE UP (ref 3.5–5.3)
POTASSIUM SERPL-SCNC: 4 MMOL/L — SIGNIFICANT CHANGE UP (ref 3.5–5.3)
PROTHROM AB SERPL-ACNC: 13.4 SEC — HIGH (ref 9.5–13)
RBC # BLD: 3.11 M/UL — LOW (ref 4.2–5.8)
RBC # BLD: 4.05 M/UL — LOW (ref 4.2–5.8)
RBC # FLD: 27.1 % — HIGH (ref 10.3–14.5)
RBC # FLD: 27.1 % — HIGH (ref 10.3–14.5)
SODIUM SERPL-SCNC: 133 MMOL/L — LOW (ref 135–145)
WBC # BLD: 16.92 K/UL — HIGH (ref 3.8–10.5)
WBC # BLD: 9.98 K/UL — SIGNIFICANT CHANGE UP (ref 3.8–10.5)
WBC # FLD AUTO: 16.92 K/UL — HIGH (ref 3.8–10.5)
WBC # FLD AUTO: 9.98 K/UL — SIGNIFICANT CHANGE UP (ref 3.8–10.5)

## 2023-08-02 PROCEDURE — 88341 IMHCHEM/IMCYTCHM EA ADD ANTB: CPT | Mod: 26

## 2023-08-02 PROCEDURE — 99232 SBSQ HOSP IP/OBS MODERATE 35: CPT | Mod: 25

## 2023-08-02 PROCEDURE — 88305 TISSUE EXAM BY PATHOLOGIST: CPT | Mod: 26

## 2023-08-02 PROCEDURE — 88342 IMHCHEM/IMCYTCHM 1ST ANTB: CPT | Mod: 26

## 2023-08-02 PROCEDURE — 88112 CYTOPATH CELL ENHANCE TECH: CPT | Mod: 26

## 2023-08-02 PROCEDURE — 49083 ABD PARACENTESIS W/IMAGING: CPT | Mod: GC

## 2023-08-02 RX ORDER — OXYCODONE HYDROCHLORIDE 5 MG/1
5 TABLET ORAL ONCE
Refills: 0 | Status: DISCONTINUED | OUTPATIENT
Start: 2023-08-02 | End: 2023-08-03

## 2023-08-02 RX ORDER — ENOXAPARIN SODIUM 100 MG/ML
70 INJECTION SUBCUTANEOUS
Qty: 48 | Refills: 0
Start: 2023-08-02 | End: 2023-08-31

## 2023-08-02 RX ORDER — HEPARIN SODIUM 5000 [USP'U]/ML
1500 INJECTION INTRAVENOUS; SUBCUTANEOUS
Qty: 25000 | Refills: 0 | Status: DISCONTINUED | OUTPATIENT
Start: 2023-08-02 | End: 2023-08-03

## 2023-08-02 RX ORDER — ENOXAPARIN SODIUM 100 MG/ML
70 INJECTION SUBCUTANEOUS
Qty: 53 | Refills: 0
Start: 2023-08-02 | End: 2023-08-31

## 2023-08-02 RX ORDER — ACETAMINOPHEN 500 MG
975 TABLET ORAL ONCE
Refills: 0 | Status: COMPLETED | OUTPATIENT
Start: 2023-08-02 | End: 2023-08-02

## 2023-08-02 RX ADMIN — Medication 975 MILLIGRAM(S): at 20:03

## 2023-08-02 RX ADMIN — HEPARIN SODIUM 1500 UNIT(S)/HR: 5000 INJECTION INTRAVENOUS; SUBCUTANEOUS at 19:18

## 2023-08-02 RX ADMIN — HEPARIN SODIUM 1500 UNIT(S)/HR: 5000 INJECTION INTRAVENOUS; SUBCUTANEOUS at 02:41

## 2023-08-02 RX ADMIN — HEPARIN SODIUM 1500 UNIT(S)/HR: 5000 INJECTION INTRAVENOUS; SUBCUTANEOUS at 15:49

## 2023-08-02 RX ADMIN — HEPARIN SODIUM 1600 UNIT(S)/HR: 5000 INJECTION INTRAVENOUS; SUBCUTANEOUS at 23:35

## 2023-08-02 RX ADMIN — HEPARIN SODIUM 2500 UNIT(S): 5000 INJECTION INTRAVENOUS; SUBCUTANEOUS at 23:40

## 2023-08-02 RX ADMIN — Medication 975 MILLIGRAM(S): at 21:00

## 2023-08-02 RX ADMIN — HEPARIN SODIUM 1500 UNIT(S)/HR: 5000 INJECTION INTRAVENOUS; SUBCUTANEOUS at 07:30

## 2023-08-02 RX ADMIN — PANTOPRAZOLE SODIUM 40 MILLIGRAM(S): 20 TABLET, DELAYED RELEASE ORAL at 17:05

## 2023-08-02 RX ADMIN — Medication 1 MILLIGRAM(S): at 17:05

## 2023-08-02 RX ADMIN — SODIUM CHLORIDE 75 MILLILITER(S): 9 INJECTION INTRAMUSCULAR; INTRAVENOUS; SUBCUTANEOUS at 19:19

## 2023-08-02 NOTE — CHART NOTE - NSCHARTNOTEFT_GEN_A_CORE
Vital Signs Last 24 Hrs  T(C): 36.9 (02 Aug 2023 12:49), Max: 36.9 (02 Aug 2023 12:49)  T(F): 98.5 (02 Aug 2023 12:49), Max: 98.5 (02 Aug 2023 12:49)  HR: 98 (02 Aug 2023 12:49) (72 - 98)  BP: 135/80 (02 Aug 2023 12:49) (125/84 - 135/80)  BP(mean): --  RR: 17 (02 Aug 2023 12:49) (16 - 18)  SpO2: 100% (02 Aug 2023 12:49) (98% - 100%)    Parameters below as of 02 Aug 2023 06:28  Patient On (Oxygen Delivery Method): room air                          8.8    9.98  )-----------( 595      ( 02 Aug 2023 06:40 )             26.9   08-02    133<L>  |  100  |  22  ----------------------------<  84  4.0   |  23  |  1.84<H>    Ca    8.5      02 Aug 2023 06:40  Phos  2.6     08-02  Mg     2.00     08-02    Patient is s/p paracentesis this afternoon, remains on room air. Spoke with Oncology, requested to send Cytology, writer delivered specimen/form to labs. Spoke with procedure team Dr. Curtis this afternoon> okay to resume heparin gtt. Discussed plan of care with patient, RN and Dr. Merino.

## 2023-08-02 NOTE — PROGRESS NOTE ADULT - PROBLEM SELECTOR PLAN 2
CT A&P: New dilated fluid-filled thickened small bowel loops throughout the central abdomen with transition in the right lower quadrant where the loops of small bowel appear matted and thickened compared with prior exam from 7/21/2023 concerning for small bowel enteritis with developing small bowel obstruction. Pt. passed gas prior to this AM's exam.   -Improved, pt reported to have BM overnight  -surgery consulted: malignant SBO protocol with reglan, decadron and octreotide - will d/c today   -serial abd exam, if patient becomes nauseous and begins vomiting should place NGT  - CLD - discussing with surgery on advancing diet today. Follow up. Presented with symptoms of SBO - confirmed SBO. Now resolved. Tolerating regular diet and having BMs.   - Resolved.     #Dysphagia.   - Patient reporting dysphagia - difficulty swallowing solids and thick/mashed up food. He thinks it correlates to when he is off of blood thinners.   - Barium esophogram ordered - f/u.

## 2023-08-02 NOTE — PROGRESS NOTE ADULT - PROBLEM SELECTOR PLAN 1
Hx of PE on xarelto, Off for a few days for para at the beginning of this month. Not treatment failure given treatment interruption.    -CT A&P: New filling defect in the left lower lobar segmental pulmonary arteries concerning for pulmonary embolism  -c/w heparin gtt for now until therapeutic paracentesis is complete. Planning for tomorrow. Will consider lovenox on d/c. Insurance only covers 15 vials - checking to see if we can get prior auth to cover the rest of the month - f/u.   -trop flat  -EKG: sinus tachy, no acute ischemic changes  -TTE is WNL

## 2023-08-02 NOTE — PROGRESS NOTE ADULT - SUBJECTIVE AND OBJECTIVE BOX
Hospitalist Progress Note  Crystal Merino MD Pager # [phone]    OVERNIGHT EVENTS: CLAUDY    SUBJECTIVE / INTERVAL HPI: Patient seen and examined at bedside. Pt. reports that he has noticed difficulty swallowing solids. He has no difficulty swallowing his pills. He said yesterday he had trouble getting the solid part of soup down and had to cough it back up. He says that this usually happens when he is off his blood thinner. He says that when he restarts the blood thinner he has no issues with swallowing. He said this has been going on for awhile.     VITAL SIGNS:  Vital Signs Last 24 Hrs  T(C): 36.9 (02 Aug 2023 12:49), Max: 36.9 (02 Aug 2023 12:49)  T(F): 98.5 (02 Aug 2023 12:49), Max: 98.5 (02 Aug 2023 12:49)  HR: 98 (02 Aug 2023 12:49) (72 - 98)  BP: 135/80 (02 Aug 2023 12:49) (125/84 - 135/80)  BP(mean): --  RR: 17 (02 Aug 2023 12:49) (16 - 18)  SpO2: 100% (02 Aug 2023 12:49) (98% - 100%)    Parameters below as of 02 Aug 2023 06:28  Patient On (Oxygen Delivery Method): room air        PHYSICAL EXAM:    General: Comfortable appearing male resting in bed   HEENT: NC/AT; PERRL, anicteric sclera; MMM  Neck: supple  Cardiovascular: +S1/S2; RRR  Respiratory: CTA B/L; no W/R/R  Gastrointestinal: soft, NT/ND; +BSx4  Extremities: WWP; no edema, clubbing or cyanosis  Vascular: 2+ radial, DP/PT pulses B/L  Neurological: AAOx3; no focal deficits    MEDICATIONS:  MEDICATIONS  (STANDING):  dextrose 5%. 1000 milliLiter(s) (100 mL/Hr) IV Continuous <Continuous>  dextrose 5%. 1000 milliLiter(s) (50 mL/Hr) IV Continuous <Continuous>  dextrose 50% Injectable 25 Gram(s) IV Push once  dextrose 50% Injectable 12.5 Gram(s) IV Push once  dextrose 50% Injectable 25 Gram(s) IV Push once  folic acid Injectable 1 milliGRAM(s) IV Push daily  glucagon  Injectable 1 milliGRAM(s) IntraMuscular once  insulin lispro (ADMELOG) corrective regimen sliding scale   SubCutaneous Before meals and at bedtime  pantoprazole  Injectable 40 milliGRAM(s) IV Push daily  sodium chloride 0.9%. 1000 milliLiter(s) (75 mL/Hr) IV Continuous <Continuous>    MEDICATIONS  (PRN):  dextrose Oral Gel 15 Gram(s) Oral once PRN Blood Glucose LESS THAN 70 milliGRAM(s)/deciliter  heparin   Injectable 5500 Unit(s) IV Push every 6 hours PRN For aPTT less than 40  heparin   Injectable 2500 Unit(s) IV Push every 6 hours PRN For aPTT between 40 - 57      ALLERGIES:  Allergies    No Known Allergies    Intolerances        LABS:                        8.8    9.98  )-----------( 595      ( 02 Aug 2023 06:40 )             26.9     08-02    133<L>  |  100  |  22  ----------------------------<  84  4.0   |  23  |  1.84<H>    Ca    8.5      02 Aug 2023 06:40  Phos  2.6     08-02  Mg     2.00     08-02      PT/INR - ( 02 Aug 2023 06:40 )   PT: 13.4 sec;   INR: 1.20 ratio         PTT - ( 02 Aug 2023 01:50 )  PTT:81.0 sec  Urinalysis Basic - ( 02 Aug 2023 06:40 )    Color: x / Appearance: x / SG: x / pH: x  Gluc: 84 mg/dL / Ketone: x  / Bili: x / Urobili: x   Blood: x / Protein: x / Nitrite: x   Leuk Esterase: x / RBC: x / WBC x   Sq Epi: x / Non Sq Epi: x / Bacteria: x      CAPILLARY BLOOD GLUCOSE      POCT Blood Glucose.: 87 mg/dL (02 Aug 2023 12:46)      RADIOLOGY & ADDITIONAL TESTS: Reviewed.    ASSESSMENT:    PLAN:

## 2023-08-02 NOTE — PROCEDURE NOTE - ADDITIONAL PROCEDURE DETAILS
Subjective:     Deysi Goodrich is a 30 y.o. female who presents for Cough (sore [id number] )       Cough   This is a new problem. Episode onset: 6 days ago. The problem has been gradually worsening. The cough is productive of sputum and productive of blood-tinged sputum. Associated symptoms include a fever, headaches and a sore throat. Pertinent negatives include no shortness of breath. Treatments tried: NyQuil. The treatment provided no relief. There is no history of asthma.   Denies recent foreign travel.    PMH:  has a past medical history of Anxiety; Bleeding nose (chronic); Depression; Migraine ([id number]); Mild dysplasia of cervix (KAREL I) (3/16/2010); Palpitations (6/5/2012); PTSD (post-traumatic stress disorder); and Syncope (6/5/2012). She also has no past medical history of ASTHMA or Diabetes.    MEDS:   Current Outpatient Prescriptions:   •  azithromycin (ZITHROMAX) 250 MG Tab, Take 2 tabs by mouth once today, then one tab by mouth once daily days 2-5., Disp: 6 Tab, Rfl: 0  •  benzonatate (TESSALON) 200 MG capsule, Take 1 Cap by mouth every 8 hours as needed for Cough., Disp: 21 Cap, Rfl: 0  •  tizanidine (ZANAFLEX) 4 MG Tab, TAKE 1 TABLET BY MOUTH EVERY 12 HOURS AS NEEDED FOR MUSCLE STIFFNESS, Disp: 180 Tab, Rfl: 3  •  busPIRone (BUSPAR) 10 MG Tab, Take 1 Tab by mouth 2 times a day., Disp: 180 Tab, Rfl: 3  •  FLUoxetine (PROZAC) 20 MG Cap, Take 1 Cap by mouth every day., Disp: 90 Cap, Rfl: 3  •  Cyanocobalamin (VITAMIN B-12) 3000 MCG SL Tab, Place  under tongue., Disp: , Rfl:   •  Cholecalciferol (VITAMIN D-3) 5000 units Tab, Take  by mouth., Disp: , Rfl:     ALLERGIES:   Allergies   Allergen Reactions   • Nkda [No Known Drug Allergy]      SURGHX: No past surgical history on file.    SOCHX:  reports that she quit smoking about 9 years ago. Her smoking use included Cigarettes. She quit after 3.00 years of use. She has never used smokeless tobacco. She reports that she drinks about 1.2 oz of alcohol per  "week . She reports that she does not use drugs.     FH: Reviewed with patient, not pertinent to this visit.     Review of Systems   Constitutional: Positive for fever and malaise/fatigue.   HENT: Positive for sore throat.    Respiratory: Positive for cough and sputum production. Negative for shortness of breath.    Cardiovascular: Negative.    Neurological: Positive for headaches.   All other systems reviewed and are negative.    Objective:     /78   Pulse 71   Temp 37.6 °C (99.7 °F) (Temporal)   Resp 18   Ht 1.575 m (5' 2\")   Wt 73 kg (161 lb)   SpO2 97%   BMI 29.45 kg/m²     Physical Exam   Constitutional: She is oriented to person, place, and time. She appears well-developed and well-nourished. She is cooperative.  Non-toxic appearance. No distress.   HENT:   Head: Normocephalic and atraumatic.   Right Ear: Tympanic membrane and external ear normal.   Left Ear: Tympanic membrane and external ear normal.   Nose: Nose normal.   Mouth/Throat: Uvula is midline and mucous membranes are normal. Posterior oropharyngeal erythema present. No oropharyngeal exudate or posterior oropharyngeal edema.   Eyes: Pupils are equal, round, and reactive to light. Conjunctivae and EOM are normal.   Neck: Normal range of motion.   Cardiovascular: Normal rate, regular rhythm, normal heart sounds and normal pulses.    Pulmonary/Chest: Effort normal. No respiratory distress. She has no decreased breath sounds. She has rhonchi.   Abdominal: Soft. Bowel sounds are normal.   Musculoskeletal: Normal range of motion. She exhibits no deformity.   Lymphadenopathy:     She has no cervical adenopathy.   Neurological: She is alert and oriented to person, place, and time. She has normal strength. No sensory deficit.   Skin: Skin is warm, dry and intact. Capillary refill takes less than 2 seconds.   Psychiatric: She has a normal mood and affect. Her behavior is normal.   Vitals reviewed.       Assessment/Plan:     1. RTI (respiratory " tract infection)  - azithromycin (ZITHROMAX) 250 MG Tab; Take 2 tabs by mouth once today, then one tab by mouth once daily days 2-5.  Dispense: 6 Tab; Refill: 0    2. Cough  - benzonatate (TESSALON) 200 MG capsule; Take 1 Cap by mouth every 8 hours as needed for Cough.  Dispense: 21 Cap; Refill: 0    3. Fever, unspecified fever cause    Rx sent electronically. Discussed supportive measures including increasing fluids and rest as well as OTC symptom management including acetaminophen and/or ibuprofen PRN pain and/or fever. Work note provided.    Patient advised to: Return for 1) Symptoms don't improve or worsen, or go to ER, 2) Follow up with primary care in 7-10 days.    Differential diagnosis, natural history, supportive care, and indications for immediate follow-up discussed. All questions answered. Patient agrees with the plan of care.   Invasive procedure team was consulted for diagnostic/therapeutic paracentesis due to abdominal discomfort and shortness of breath. Explained risks (including bleeding, infection, and bowel perforation) and benefits to patient and patient expressed understanding and consented. Ultrasound was utilized and visualized 6cm Ascitic fluid pocket identified w/ no epigastric vessels visualized. No rash or vessels overlying skin site. Pts plts  595,, INR 1.20. Heparin gtt held since 7am.  Sterile technique was used and 10 mL of 1% lidocaine was used for local anesthesia. Small incision with scalpel done and 18 Fr Arrow Paracentesis catheter used. 3.9 L of serous ascitic fluid drained. Catheter removed and dressed. Pt tolerated procedure well and no complications. Lab Analysis sent. Communicated to primary team.

## 2023-08-03 LAB
ANION GAP SERPL CALC-SCNC: 11 MMOL/L — SIGNIFICANT CHANGE UP (ref 7–14)
APTT BLD: 97.3 SEC — HIGH (ref 24.5–35.6)
BUN SERPL-MCNC: 19 MG/DL — SIGNIFICANT CHANGE UP (ref 7–23)
CALCIUM SERPL-MCNC: 8.4 MG/DL — SIGNIFICANT CHANGE UP (ref 8.4–10.5)
CHLORIDE SERPL-SCNC: 102 MMOL/L — SIGNIFICANT CHANGE UP (ref 98–107)
CO2 SERPL-SCNC: 21 MMOL/L — LOW (ref 22–31)
CREAT SERPL-MCNC: 1.64 MG/DL — HIGH (ref 0.5–1.3)
EGFR: 48 ML/MIN/1.73M2 — LOW
GLUCOSE BLDC GLUCOMTR-MCNC: 72 MG/DL — SIGNIFICANT CHANGE UP (ref 70–99)
GLUCOSE BLDC GLUCOMTR-MCNC: 73 MG/DL — SIGNIFICANT CHANGE UP (ref 70–99)
GLUCOSE BLDC GLUCOMTR-MCNC: 83 MG/DL — SIGNIFICANT CHANGE UP (ref 70–99)
GLUCOSE SERPL-MCNC: 99 MG/DL — SIGNIFICANT CHANGE UP (ref 70–99)
HCT VFR BLD CALC: 32 % — LOW (ref 39–50)
HGB BLD-MCNC: 10.2 G/DL — LOW (ref 13–17)
MAGNESIUM SERPL-MCNC: 1.9 MG/DL — SIGNIFICANT CHANGE UP (ref 1.6–2.6)
MCHC RBC-ENTMCNC: 27.9 PG — SIGNIFICANT CHANGE UP (ref 27–34)
MCHC RBC-ENTMCNC: 31.9 GM/DL — LOW (ref 32–36)
MCV RBC AUTO: 87.4 FL — SIGNIFICANT CHANGE UP (ref 80–100)
NRBC # BLD: 0 /100 WBCS — SIGNIFICANT CHANGE UP (ref 0–0)
NRBC # FLD: 0 K/UL — SIGNIFICANT CHANGE UP (ref 0–0)
PHOSPHATE SERPL-MCNC: 3 MG/DL — SIGNIFICANT CHANGE UP (ref 2.5–4.5)
PLATELET # BLD AUTO: 485 K/UL — HIGH (ref 150–400)
POTASSIUM SERPL-MCNC: 4.2 MMOL/L — SIGNIFICANT CHANGE UP (ref 3.5–5.3)
POTASSIUM SERPL-SCNC: 4.2 MMOL/L — SIGNIFICANT CHANGE UP (ref 3.5–5.3)
RBC # BLD: 3.66 M/UL — LOW (ref 4.2–5.8)
RBC # FLD: 27.3 % — HIGH (ref 10.3–14.5)
SODIUM SERPL-SCNC: 134 MMOL/L — LOW (ref 135–145)
WBC # BLD: 13.94 K/UL — HIGH (ref 3.8–10.5)
WBC # FLD AUTO: 13.94 K/UL — HIGH (ref 3.8–10.5)

## 2023-08-03 PROCEDURE — 74220 X-RAY XM ESOPHAGUS 1CNTRST: CPT | Mod: 26

## 2023-08-03 PROCEDURE — 99232 SBSQ HOSP IP/OBS MODERATE 35: CPT

## 2023-08-03 RX ORDER — ACETAMINOPHEN 500 MG
750 TABLET ORAL ONCE
Refills: 0 | Status: COMPLETED | OUTPATIENT
Start: 2023-08-03 | End: 2023-08-04

## 2023-08-03 RX ORDER — ACETAMINOPHEN 500 MG
750 TABLET ORAL ONCE
Refills: 0 | Status: COMPLETED | OUTPATIENT
Start: 2023-08-03 | End: 2023-08-03

## 2023-08-03 RX ORDER — ENOXAPARIN SODIUM 100 MG/ML
70 INJECTION SUBCUTANEOUS EVERY 12 HOURS
Refills: 0 | Status: DISCONTINUED | OUTPATIENT
Start: 2023-08-03 | End: 2023-08-05

## 2023-08-03 RX ADMIN — SODIUM CHLORIDE 75 MILLILITER(S): 9 INJECTION INTRAMUSCULAR; INTRAVENOUS; SUBCUTANEOUS at 07:30

## 2023-08-03 RX ADMIN — PANTOPRAZOLE SODIUM 40 MILLIGRAM(S): 20 TABLET, DELAYED RELEASE ORAL at 12:27

## 2023-08-03 RX ADMIN — Medication 750 MILLIGRAM(S): at 00:30

## 2023-08-03 RX ADMIN — Medication 300 MILLIGRAM(S): at 05:17

## 2023-08-03 RX ADMIN — HEPARIN SODIUM 1600 UNIT(S)/HR: 5000 INJECTION INTRAVENOUS; SUBCUTANEOUS at 07:30

## 2023-08-03 RX ADMIN — HEPARIN SODIUM 1600 UNIT(S)/HR: 5000 INJECTION INTRAVENOUS; SUBCUTANEOUS at 08:30

## 2023-08-03 RX ADMIN — Medication 1 MILLIGRAM(S): at 19:00

## 2023-08-03 RX ADMIN — ENOXAPARIN SODIUM 70 MILLIGRAM(S): 100 INJECTION SUBCUTANEOUS at 18:59

## 2023-08-03 RX ADMIN — SODIUM CHLORIDE 75 MILLILITER(S): 9 INJECTION INTRAMUSCULAR; INTRAVENOUS; SUBCUTANEOUS at 12:27

## 2023-08-03 RX ADMIN — Medication 300 MILLIGRAM(S): at 00:14

## 2023-08-03 RX ADMIN — Medication 750 MILLIGRAM(S): at 05:35

## 2023-08-03 NOTE — PROGRESS NOTE ADULT - SUBJECTIVE AND OBJECTIVE BOX
Hospitalist Progress Note  Crystal Merino MD Pager # [phone]    OVERNIGHT EVENTS: CLAUDY    SUBJECTIVE / INTERVAL HPI: Patient seen and examined at bedside. Pt. reports that he has not been able to get anything down. He continues to feel like his food is getting stuck. Last night he vomited his food. He thinks it is worsening. He is not having trouble breathing. He thinks his neck is swollen.     VITAL SIGNS:  Vital Signs Last 24 Hrs  T(C): 36.3 (03 Aug 2023 12:08), Max: 36.9 (03 Aug 2023 05:15)  T(F): 97.4 (03 Aug 2023 12:08), Max: 98.4 (03 Aug 2023 05:15)  HR: 111 (03 Aug 2023 12:08) (91 - 111)  BP: 113/81 (03 Aug 2023 12:08) (113/81 - 124/87)  BP(mean): --  RR: 18 (03 Aug 2023 12:08) (17 - 18)  SpO2: 100% (03 Aug 2023 12:08) (100% - 100%)    Parameters below as of 03 Aug 2023 05:15  Patient On (Oxygen Delivery Method): room air        PHYSICAL EXAM:    General: Comfortable appearing male resting in bed   HEENT: NC/AT; PERRL, anicteric sclera; MMM  Neck: supple  Cardiovascular: +S1/S2; RRR  Respiratory: CTA B/L; no W/R/R  Gastrointestinal: soft, distended abdomen - non-tender ; +BSx4  Extremities: WWP; no edema, clubbing or cyanosis  Vascular: 2+ radial, DP/PT pulses B/L  Neurological: AAOx3; no focal deficits    MEDICATIONS:  MEDICATIONS  (STANDING):  enoxaparin Injectable 70 milliGRAM(s) SubCutaneous every 12 hours  folic acid Injectable 1 milliGRAM(s) IV Push daily  glucagon  Injectable 1 milliGRAM(s) IntraMuscular once  pantoprazole  Injectable 40 milliGRAM(s) IV Push daily  sodium chloride 0.9%. 1000 milliLiter(s) (75 mL/Hr) IV Continuous <Continuous>    MEDICATIONS  (PRN):      ALLERGIES:  Allergies    No Known Allergies    Intolerances        LABS:                        10.2   13.94 )-----------( 485      ( 03 Aug 2023 07:10 )             32.0     08-03    134<L>  |  102  |  19  ----------------------------<  99  4.2   |  21<L>  |  1.64<H>    Ca    8.4      03 Aug 2023 07:10  Phos  3.0     08-03  Mg     1.90     08-03      PT/INR - ( 02 Aug 2023 06:40 )   PT: 13.4 sec;   INR: 1.20 ratio         PTT - ( 03 Aug 2023 07:10 )  PTT:97.3 sec  Urinalysis Basic - ( 03 Aug 2023 07:10 )    Color: x / Appearance: x / SG: x / pH: x  Gluc: 99 mg/dL / Ketone: x  / Bili: x / Urobili: x   Blood: x / Protein: x / Nitrite: x   Leuk Esterase: x / RBC: x / WBC x   Sq Epi: x / Non Sq Epi: x / Bacteria: x      CAPILLARY BLOOD GLUCOSE      POCT Blood Glucose.: 83 mg/dL (03 Aug 2023 11:25)      RADIOLOGY & ADDITIONAL TESTS: Reviewed.    ASSESSMENT:    PLAN:

## 2023-08-03 NOTE — PROGRESS NOTE ADULT - PROBLEM SELECTOR PLAN 1
Hx of PE on xarelto, Off for a few days for para at the beginning of this month. Not treatment failure given treatment interruption. CT A&P: New filling defect in the left lower lobar segmental pulmonary arteries concerning for pulmonary embolism.   - Hep gtt --> lovenox 70mg BID - renally dosed.   - Plan to d/c xarelto and continue lovenox on discharge given shorter hold window prior to therapeutic paracentesis (q3 weeks).

## 2023-08-03 NOTE — PROGRESS NOTE ADULT - PROBLEM SELECTOR PLAN 2
Presented with symptoms of SBO - confirmed SBO. Now resolved. Tolerating regular diet and having BMs.   - Resolved.     #Dysphagia.   - Patient reporting dysphagia - difficulty swallowing solids and thick/mashed up food. He thinks it correlates to when he is off of blood thinners.   - Barium esophogram ordered - f/u.

## 2023-08-03 NOTE — PHARMACOTHERAPY INTERVENTION NOTE - COMMENTS
Enoxaparin reviewed with the patient. Enoxaparin schedule was discussed in detail including: medication name, indication, dose, administration times, treatment duration, side effects, drug interactions, and special instructions. Informed patient about the 2 month prior authorization approval and to inform outpatient provider ahead of time to work on the approval before running out of medication. Patient questions and concerns were answered and addressed. Patient demonstrated understanding. Patient provided with educational handout.    Faisal Alonzo PharmD  Pharmacy Resident

## 2023-08-04 ENCOUNTER — TRANSCRIPTION ENCOUNTER (OUTPATIENT)
Age: 58
End: 2023-08-04

## 2023-08-04 LAB
ANION GAP SERPL CALC-SCNC: 10 MMOL/L — SIGNIFICANT CHANGE UP (ref 7–14)
BUN SERPL-MCNC: 19 MG/DL — SIGNIFICANT CHANGE UP (ref 7–23)
CALCIUM SERPL-MCNC: 8.2 MG/DL — LOW (ref 8.4–10.5)
CHLORIDE SERPL-SCNC: 101 MMOL/L — SIGNIFICANT CHANGE UP (ref 98–107)
CO2 SERPL-SCNC: 21 MMOL/L — LOW (ref 22–31)
CREAT SERPL-MCNC: 1.74 MG/DL — HIGH (ref 0.5–1.3)
EGFR: 45 ML/MIN/1.73M2 — LOW
GLUCOSE SERPL-MCNC: 71 MG/DL — SIGNIFICANT CHANGE UP (ref 70–99)
HCT VFR BLD CALC: 30.2 % — LOW (ref 39–50)
HGB BLD-MCNC: 9.6 G/DL — LOW (ref 13–17)
MAGNESIUM SERPL-MCNC: 1.8 MG/DL — SIGNIFICANT CHANGE UP (ref 1.6–2.6)
MCHC RBC-ENTMCNC: 28.4 PG — SIGNIFICANT CHANGE UP (ref 27–34)
MCHC RBC-ENTMCNC: 31.8 GM/DL — LOW (ref 32–36)
MCV RBC AUTO: 89.3 FL — SIGNIFICANT CHANGE UP (ref 80–100)
NRBC # BLD: 0 /100 WBCS — SIGNIFICANT CHANGE UP (ref 0–0)
NRBC # FLD: 0 K/UL — SIGNIFICANT CHANGE UP (ref 0–0)
PHOSPHATE SERPL-MCNC: 3.1 MG/DL — SIGNIFICANT CHANGE UP (ref 2.5–4.5)
PLATELET # BLD AUTO: 398 K/UL — SIGNIFICANT CHANGE UP (ref 150–400)
POTASSIUM SERPL-MCNC: 4.2 MMOL/L — SIGNIFICANT CHANGE UP (ref 3.5–5.3)
POTASSIUM SERPL-SCNC: 4.2 MMOL/L — SIGNIFICANT CHANGE UP (ref 3.5–5.3)
RBC # BLD: 3.38 M/UL — LOW (ref 4.2–5.8)
RBC # FLD: SIGNIFICANT CHANGE UP (ref 10.3–14.5)
SODIUM SERPL-SCNC: 132 MMOL/L — LOW (ref 135–145)
WBC # BLD: 10.26 K/UL — SIGNIFICANT CHANGE UP (ref 3.8–10.5)
WBC # FLD AUTO: 10.26 K/UL — SIGNIFICANT CHANGE UP (ref 3.8–10.5)

## 2023-08-04 PROCEDURE — 99232 SBSQ HOSP IP/OBS MODERATE 35: CPT

## 2023-08-04 RX ORDER — PANTOPRAZOLE SODIUM 20 MG/1
40 TABLET, DELAYED RELEASE ORAL
Refills: 0 | Status: DISCONTINUED | OUTPATIENT
Start: 2023-08-04 | End: 2023-08-05

## 2023-08-04 RX ORDER — FOLIC ACID 0.8 MG
1 TABLET ORAL DAILY
Refills: 0 | Status: DISCONTINUED | OUTPATIENT
Start: 2023-08-04 | End: 2023-08-05

## 2023-08-04 RX ADMIN — ENOXAPARIN SODIUM 70 MILLIGRAM(S): 100 INJECTION SUBCUTANEOUS at 05:03

## 2023-08-04 RX ADMIN — Medication 300 MILLIGRAM(S): at 00:45

## 2023-08-04 RX ADMIN — SODIUM CHLORIDE 75 MILLILITER(S): 9 INJECTION INTRAMUSCULAR; INTRAVENOUS; SUBCUTANEOUS at 05:02

## 2023-08-04 RX ADMIN — Medication 1 MILLIGRAM(S): at 17:22

## 2023-08-04 RX ADMIN — Medication 750 MILLIGRAM(S): at 01:00

## 2023-08-04 RX ADMIN — SODIUM CHLORIDE 75 MILLILITER(S): 9 INJECTION INTRAMUSCULAR; INTRAVENOUS; SUBCUTANEOUS at 12:02

## 2023-08-04 RX ADMIN — PANTOPRAZOLE SODIUM 40 MILLIGRAM(S): 20 TABLET, DELAYED RELEASE ORAL at 12:02

## 2023-08-04 RX ADMIN — ENOXAPARIN SODIUM 70 MILLIGRAM(S): 100 INJECTION SUBCUTANEOUS at 17:22

## 2023-08-04 NOTE — PROGRESS NOTE ADULT - TIME BILLING
Review of laboratory data, radiology results, consultants' recommendations, documentation in [place], discussion with patient/advanced care providers and interdisciplinary staff (such as , social workers, etc). Interventions were performed as documented above.
Review of laboratory data, radiology results, consultants' recommendations, documentation in [place], discussion with patient/advanced care providers and interdisciplinary staff (such as , social workers, etc). Interventions were performed as documented above.
Review of laboratory data, radiology results, consultants' recommendations, documentation in [place], discussion with patient/advanced care providers and interdisciplinary staff (such as , social workers, etc). Interventions were performed as documented above.
Review of laboratory data, radiology results, consultants' recommendations, documentation in [place], discussion with patient/advanced care providers and interdisciplinary staff (such as , social workers, etc). Interventions were performed as documented above.
Review of laboratory data, radiology results, consultants' recommendations, documentation in [place], discussion with patient/advanced care providers and interdisciplinary staff (such as , social workers, etc). Interventions were performed as documented above.
Review of laboratory data, radiology results, consultants' recommendations, documentation in [place], discussion with patient/advanced care providers and interdisciplinary staff (such as , social workers, etc). Interventions were performed as documented above.

## 2023-08-04 NOTE — DISCHARGE NOTE PROVIDER - NS AS DC PROVIDER CONTACT Y/N MULTI
"Patient reports feels breathing is \"a lot better\" than when arrived.  Remains wheezy; however has improved from arrival. Resting and talking with ease. Not tachypneic.  " Yes

## 2023-08-04 NOTE — DISCHARGE NOTE PROVIDER - NSDCMRMEDTOKEN_GEN_ALL_CORE_FT
dexAMETHasone 4 mg oral tablet: 1 tab(s) orally 3 times a day take 1 tab 3x/day for 3 days starting the day before chemo  folic acid 1 mg oral tablet: 1 tab(s) orally once a day  Lovenox 80 mg/0.8 mL injectable solution: 70 milligram(s) subcutaneously every 12 hours PRICE CHECK*  metoclopramide 10 mg oral tablet: 1 tab(s) orally 4 times a day (before meals and at bedtime), As Needed - for nausea  Xarelto Starter Pack 15 mg-20 mg oral kit: per starter pack instructions but give 38 doses     **** please give $10 co pay card to use for refills    MEDS TO BED   Room 640-   dc time 4pm     dexAMETHasone 4 mg oral tablet: 1 tab(s) orally 3 times a day take 1 tab 3x/day for 3 days starting the day before chemo, FOLLOWUP WITH YOUR ONCOLOGIST  enoxaparin: 70 milligram(s) subcutaneously every 12 hours please confirm with interventional radiologist/oncology for further management  folic acid 1 mg oral tablet: 1 tab(s) orally once a day  metoclopramide 10 mg oral tablet: 1 tab(s) orally 4 times a day (before meals and at bedtime), As Needed - for nausea  Multiple Vitamins oral tablet: 1 tab(s) orally once a day  pantoprazole 40 mg oral delayed release tablet: 1 tab(s) orally once a day (before a meal)

## 2023-08-04 NOTE — DISCHARGE NOTE NURSING/CASE MANAGEMENT/SOCIAL WORK - PATIENT PORTAL LINK FT
You can access the FollowMyHealth Patient Portal offered by [facility] by registering at the following website: [email or web address]. By joining [employer]’s FollowMyHealth portal, you will also be able to view your health information using other applications (apps) compatible with our system.

## 2023-08-04 NOTE — DISCHARGE NOTE NURSING/CASE MANAGEMENT/SOCIAL WORK - NSDCVIVACCINE_GEN_ALL_CORE_FT
influenza, injectable, quadrivalent, preservative free; 16-Sep-2022 14:07; Leigha Gonzales (RN); [employer]; [id number] (Exp. Date: 30-Jun-2023); IntraMuscular; Deltoid Left.; 0.5 milliLiter(s); VIS (VIS Published: 06-Aug-2021, VIS Presented: 16-Sep-2022);

## 2023-08-04 NOTE — PROGRESS NOTE ADULT - ASSESSMENT
59 yo M PMH lung cancer met to  LAD, peritoneum, bone and liver, currently receiving chemotherapy ([facility]) last session (7/11), PE on xarelto presenting to the emergency department with shortness of breath is found to have new PE, worsening ascites, developing SBO and BETO. Oncology consulted for further recommendations      CTAP 7/27  New dilated fluid-filled thickened small bowel loops throughout the   central abdomen with transition in the right lower quadrant where the   loops of small bowel appear matted and thickened compared with prior exam   from 7/21/2023 concerning for small bowel enteritis with developing small   bowel obstruction.  Worsening abdominal and pelvic ascites with mild thickening of the   peritoneal reflections likely reflective of malignant ascites.  New filling defect in the left lower lobar segmental pulmonary arteries   concerning for pulmonary embolism. CTA of the chest should be considered.  Osseous metastatic disease, unchanged.  Unchanged peripheral cystic lesions in the right hepatic lobe which may   be along the serosal surface (serosal implants).  Small left pleural effusion.      -Surgery consult recs appreciated  for SBO management  -S/p therapeutic para, on 8/2, 3.9L removed, will followup cytology  -In re to PE, s/p heparin gtt, patient with h/o PE in Sept 2022, he had been compliant however  patient reports that he stopped xarelto for a few days prior to and after his most paracentesis earlier this month. And did not resume it until a few days after, may have been off AC for about 1 week. Was also unable  to take pills 2 days prior to his admission and again missed some doses. Compliance>Failure would be reason for new PE. Now on Lovenox, patient amendable to self administration   -Palliative Care for symptom management  -C/w PPI and antiemetics   -Continue to monitor Cr,   -No plans for inpatient chemotherapy  -C/w Supportive care, pain control, Nutrition, PT, DVT ppx  -Rest of care as per primary team  -Patient to followup with Dr. Son ([facility]) upon discharge  -Oncology will continue to follow with you    Case d/w oncology attending      Frederick THOMAS  Oncology Physician Assistant  Darl[facility] SEXTON/MARISELA [facility]    Pager [phone] also available on TEAMS as Frederick THOMAS    If before 8am/after 5pm or on weekends please page On-call Oncology Fellow

## 2023-08-04 NOTE — DISCHARGE NOTE PROVIDER - NSDCFUADDAPPT_GEN_ALL_CORE_FT
***Repeat bloodwork within 1 week with your doctor (CBC, BMP, LFTs) to monitor your blood counts, kidney function, electrolytes, sodium levels.     Followup with [facility]. Son ([facility]) within 3days     ***Return for therapeutic paracentesis in the outpatient setting with interventional radiologist (IR), every 3 weeks, [phone]    Recheck HbA1c in 3 months with your doctor  ***Repeat bloodwork within 1 week with your doctor (CBC, BMP, LFTs) to monitor your blood counts, kidney function, electrolytes, sodium levels.     Followup with Dr. Son ([facility]) within 3days     ***Return for therapeutic paracentesis in the outpatient setting with interventional radiologist (IR), every 3 weeks, [phone]    ***Your outpatient provider/PCP will need to obtain a new prior authorization for LOVENOX (2months approved right now)- so please INFORM your provider ahead of time to work on the approval before running out of medication.    Recheck HbA1c in 3 months with your doctor

## 2023-08-04 NOTE — DISCHARGE NOTE PROVIDER - NSDCACTIVITY_GEN_ALL_CORE
No restrictions Bathing allowed/Do not drive or operate machinery/Walking - Indoors allowed/No heavy lifting/straining/Walking - Outdoors allowed

## 2023-08-04 NOTE — DISCHARGE NOTE PROVIDER - HOSPITAL COURSE
57 y/o M with PMH lung cancer (active chemo 7/11), PE on xarelto who presented with shortness of breath. VSS and not requiring O2. Patient found to have pulmonary embolism in the setting of treatment interruption of xarelto for therapeutic paracentesis. Patient held xarelto for 3 days prior to the procedure. Echo without right heart strain. He was started on hep gtt. Pt. also found to have nausea/vomiting and imaging studies showing SBO. Surgery was consulted. No surgical intervention. SBO resolved with bowel rest. Patient had therapeutic paracentesis on 8/2 and 3.8L of fluid was removed. Planned for d/c but pt. reporting dysphagia to solids and difficulty getting food down. Pt. vomited food as a result. Pt. reports food gets down but then it feels like something is stuck. Esophogram ordered which showed GERD but no abnormalities in the esophagus. Speech and swallow consulted and they said _____. Pt. transitioned to lovenox for anticoagulation given shorter time period the medication needs to be held for therapeutic paracentesis. Discussed this change with oncology.     New meds:   Lovenox - prior auth obtained   59 y/o M with PMH lung cancer (active chemo 7/11), PE on xarelto who presented with shortness of breath. VSS and not requiring O2. Patient found to have pulmonary embolism in the setting of treatment interruption of xarelto for therapeutic paracentesis. Patient held xarelto for 3 days prior to the procedure. Echo without right heart strain. He was started on hep gtt. Pt. also found to have nausea/vomiting and imaging studies showing SBO. Surgery was consulted. No surgical intervention. SBO resolved with bowel rest. Patient had therapeutic paracentesis on 8/2 and 3.8L of fluid was removed. Planned for d/c but pt. reporting dysphagia to solids and difficulty getting food down. Pt. vomited food as a result. Pt. reports food gets down but then it feels like something is stuck. Esophogram ordered which showed GERD but no abnormalities in the esophagus. Speech and swallow consulted and they said to follow up with GI as outpatient. Pt. transitioned to lovenox for anticoagulation given shorter time period the medication needs to be held for therapeutic paracentesis. Discussed this change with oncology.     New meds:   Lovenox - prior auth obtained

## 2023-08-04 NOTE — DISCHARGE NOTE PROVIDER - NSDCCPCAREPLAN_GEN_ALL_CORE_FT
PRINCIPAL DISCHARGE DIAGNOSIS  Diagnosis: Pulmonary embolism  Assessment and Plan of Treatment: You were found to have a blood clot in your lungs. You take a blood thinner at home but likely the clot developed when you were not taking it in anticipation of your therapeutic paracentesis. We have switched your blood thinner to lovenox which needs to be held for a shorter amount of time prior to the paracentesis. Please follow up with the IR doctors on how long lovenox needs to be held prior to the paracentesis - usually it needs to be held 24 hours before.      SECONDARY DISCHARGE DIAGNOSES  Diagnosis: SBO (small bowel obstruction)  Assessment and Plan of Treatment: You were found to have a bowel obstruction which resolved. Continue to monitor for signs of nausea/vomiting and inability to have a bowel movement or pass gas.    Diagnosis: Non-small cell lung cancer with metastasis  Assessment and Plan of Treatment: You have lung cancer and follow up with oncology for treatment. Please continue to visit your oncologist for continued care.    Diagnosis: GERD (gastroesophageal reflux disease)  Assessment and Plan of Treatment: You were having difficulty swallowing in the hospital that we evaluated with an esophogram. The esophogram showed no blockages to hinder your ability to swallow however it did show that you have GERD. You are on pantoprazole which is a medication that should help with GERD. Please continue to take pantoprazole and follow up with your PCP outside of the hospital.     PRINCIPAL DISCHARGE DIAGNOSIS  Diagnosis: Pulmonary embolism  Assessment and Plan of Treatment: You were found to have a blood clot in your lungs. You take a blood thinner at home but likely the clot developed when you were not taking it in anticipation of your therapeutic paracentesis. We have switched your blood thinner to Lovenox which needs to be held for a shorter amount of time prior to the paracentesis. Please follow up with the Interventional Radiologist doctors on how long lovenox needs to be held PRIOR to the paracentesis - usually it needs to be held 24 hours before.      SECONDARY DISCHARGE DIAGNOSES  Diagnosis: SBO (small bowel obstruction)  Assessment and Plan of Treatment: You were found to have a bowel obstruction which has resolved.   You are tolerating regular food.  Continue to monitor for signs of nausea/vomiting and inability to have a bowel movement or pass gas.    Diagnosis: Ascites  Assessment and Plan of Treatment: Your CAT scan showed worsening abdominal and pelvic ascites.  You had a Diagnostic paracentesis in the emergency room on 7/27/23, culture with no growth till date.  You also had a therapeutic paracentesis performed on 8/2/23 with Procedure team with 3.9liters removed, Cytology was sent and you can followup with your Oncologist to discus results ***  ********Followup outpatient Interventional Radiologist (IR) for therapeutic paracentesis every 3weeks, [phone] ***    Diagnosis: GERD (gastroesophageal reflux disease)  Assessment and Plan of Treatment: You were having difficulty swallowing in the hospital that we evaluated with an Barium esophogram. The esophogram showed no blockages to hinder your ability to swallow however it did show that you have GERD. You are on pantoprazole which is a medication that should help with GERD. You were also evaluated by the speech therapist and can continue with regular diet. Please continue to take pantoprazole and follow up with your PCP for further management.   Recommend routine outpatient followup with Gastroentrology for further evaluation    Diagnosis: BETO (acute kidney injury)  Assessment and Plan of Treatment: Noted with elevated kidney function and Low sodium levels, now stable.  Your kidney ultrasound was unremarkable. Avoid Dehdyration.   ***Repeat BMP with your primcary care doctor in 1week    Diagnosis: Non-small cell lung cancer with metastasis  Assessment and Plan of Treatment: You were seen by the oncologist in the hospital.   Please follow up with oncology for treatment, Dr. Son at [facility]    Diagnosis: Pre-diabetes  Assessment and Plan of Treatment: Your Hgb A1C was noted to be 5.9%, your blood sugars are well controlled. You were seen by the Dietician, and recomend to continue with a consistent carbohydrates diet (with evening snack), and supplement Glucerna 2x/day  ***Recommend to recheck HbA1c in 3 months with your PCP****

## 2023-08-04 NOTE — PROGRESS NOTE ADULT - SUBJECTIVE AND OBJECTIVE BOX
Hospitalist Progress Note  Crystal Merino MD Pager # [phone]    OVERNIGHT EVENTS: CLAUDY    SUBJECTIVE / INTERVAL HPI: Patient seen and examined at bedside. Patient reports he continues to feel like something is stuck in his esophagus but now he feels like this is happening after he eats. He was able to eat eggs today. He describes the feeling of fullness in his epigastric area as well as nausea and sometimes he feels like he wants to vomit.    VITAL SIGNS:  Vital Signs Last 24 Hrs  T(C): 36.7 (04 Aug 2023 11:05), Max: 37.3 (03 Aug 2023 20:59)  T(F): 98 (04 Aug 2023 11:05), Max: 99.1 (03 Aug 2023 20:59)  HR: 82 (04 Aug 2023 11:05) (82 - 103)  BP: 123/80 (04 Aug 2023 11:05) (123/77 - 133/87)  BP(mean): --  RR: 16 (04 Aug 2023 11:05) (16 - 18)  SpO2: 98% (04 Aug 2023 11:05) (98% - 99%)    Parameters below as of 04 Aug 2023 11:05  Patient On (Oxygen Delivery Method): room air        PHYSICAL EXAM:    General: Comfortable appearing resting in bed   HEENT: NC/AT; PERRL, anicteric sclera; MMM  Neck: supple  Cardiovascular: +S1/S2; RRR  Respiratory: CTA B/L; no W/R/R  Gastrointestinal: soft, distended abdomen - non-tender; +BSx4  Extremities: WWP; no edema, clubbing or cyanosis  Vascular: 2+ radial, DP/PT pulses B/L  Neurological: AAOx3; no focal deficits    MEDICATIONS:  MEDICATIONS  (STANDING):  enoxaparin Injectable 70 milliGRAM(s) SubCutaneous every 12 hours  folic acid Injectable 1 milliGRAM(s) IV Push daily  glucagon  Injectable 1 milliGRAM(s) IntraMuscular once  pantoprazole  Injectable 40 milliGRAM(s) IV Push daily  sodium chloride 0.9%. 1000 milliLiter(s) (75 mL/Hr) IV Continuous <Continuous>    MEDICATIONS  (PRN):      ALLERGIES:  Allergies    No Known Allergies    Intolerances        LABS:                        9.6    10.26 )-----------( 398      ( 04 Aug 2023 07:25 )             30.2     08-04    132<L>  |  101  |  19  ----------------------------<  71  4.2   |  21<L>  |  1.74<H>    Ca    8.2<L>      04 Aug 2023 07:25  Phos  3.1     08-04  Mg     1.80     08-04      PTT - ( 03 Aug 2023 07:10 )  PTT:97.3 sec  Urinalysis Basic - ( 04 Aug 2023 07:25 )    Color: x / Appearance: x / SG: x / pH: x  Gluc: 71 mg/dL / Ketone: x  / Bili: x / Urobili: x   Blood: x / Protein: x / Nitrite: x   Leuk Esterase: x / RBC: x / WBC x   Sq Epi: x / Non Sq Epi: x / Bacteria: x      CAPILLARY BLOOD GLUCOSE      POCT Blood Glucose.: 72 mg/dL (03 Aug 2023 16:56)      RADIOLOGY & ADDITIONAL TESTS: Reviewed.    ASSESSMENT:    PLAN:

## 2023-08-04 NOTE — DISCHARGE NOTE PROVIDER - NSFOLLOWUPCLINICS_GEN_ALL_ED_FT
[facility]  Gastroenterology  [address] [place] [address] [address]  [place] [address]  Phone: [phone]  Fax:      [facility]  Gastroenterology  [address] [place] [address] [address]  [place] [address]  Phone: [phone]  Fax:     [facility]  Gastroenterology  [address] [place] [address]  Phone: [phone]  Fax:   Follow Up Time: Routine

## 2023-08-04 NOTE — DISCHARGE NOTE PROVIDER - NSRESEARCHGRANT_HIDDEN_GEN_A_CORE
Hold home metformin and glipizide. She takes insulin detemir 16 units nightly. Diabetic diet. Give insulin aspart sliding scale. Accuchecks.   Yes

## 2023-08-04 NOTE — PROGRESS NOTE ADULT - SUBJECTIVE AND OBJECTIVE BOX
INTERVAL HPI/OVERNIGHT EVENTS:  Patient seen at bedside.  Patient with nausea when eating  Feels like he is about to vomit when he eats  Symptoms were present prior to admission but worse now  Denies any throat pain  Denies any acute abdominal pain    VITAL SIGNS:  T(F): 98 (08-04-23 @ 11:05)  HR: 82 (08-04-23 @ 11:05)  BP: 123/80 (08-04-23 @ 11:05)  RR: 16 (08-04-23 @ 11:05)  SpO2: 98% (08-04-23 @ 11:05)  Wt(kg): --    PHYSICAL EXAM:  General: Comfortable appearing resting in bed   HEENT: NC/AT; PERRL, anicteric sclera; MMM  Neck: supple  Cardiovascular: +S1/S2; RRR  Respiratory: CTA B/L; no W/R/R  Gastrointestinal: soft, distended abdomen - non-tender; +BSx4  Extremities: WWP; no edema, clubbing or cyanosis  Vascular: 2+ radial, DP/PT pulses B/L  Neurological: AAOx3; no focal deficits      MEDICATIONS  (STANDING):  enoxaparin Injectable 70 milliGRAM(s) SubCutaneous every 12 hours  folic acid 1 milliGRAM(s) Oral daily  glucagon  Injectable 1 milliGRAM(s) IntraMuscular once  pantoprazole    Tablet 40 milliGRAM(s) Oral before breakfast  sodium chloride 0.9%. 1000 milliLiter(s) (75 mL/Hr) IV Continuous <Continuous>    MEDICATIONS  (PRN):      Allergies    No Known Allergies    Intolerances        LABS:                        9.6    10.26 )-----------( 398      ( 04 Aug 2023 07:25 )             30.2     08-04    132<L>  |  101  |  19  ----------------------------<  71  4.2   |  21<L>  |  1.74<H>    Ca    8.2<L>      04 Aug 2023 07:25  Phos  3.1     08-04  Mg     1.80     08-04      PTT - ( 03 Aug 2023 07:10 )  PTT:97.3 sec  Urinalysis Basic - ( 04 Aug 2023 07:25 )    Color: x / Appearance: x / SG: x / pH: x  Gluc: 71 mg/dL / Ketone: x  / Bili: x / Urobili: x   Blood: x / Protein: x / Nitrite: x   Leuk Esterase: x / RBC: x / WBC x   Sq Epi: x / Non Sq Epi: x / Bacteria: x        RADIOLOGY & ADDITIONAL TESTS:  Studies reviewed.

## 2023-08-04 NOTE — DISCHARGE NOTE PROVIDER - NSDCFUSCHEDAPPT_GEN_ALL_CORE_FT
[facility]  Scheduled Appointment: 08/22/2023    [facility]  Scheduled Appointment: 08/22/2023    [facility]  [facility]  Scheduled Appointment: 08/22/2023

## 2023-08-04 NOTE — DISCHARGE NOTE NURSING/CASE MANAGEMENT/SOCIAL WORK - NSDCPEFALRISK_GEN_ALL_CORE
For information on Fall & Injury Prevention, visit: [email or web address] OR  [email or web address] OR  https://www.cdc.gov/steadi/patient.html

## 2023-08-04 NOTE — PROGRESS NOTE ADULT - PROBLEM SELECTOR PLAN 2
Presented with symptoms of SBO - confirmed SBO. Now resolved. Tolerating regular diet and having BMs.   - Resolved.     #Dysphagia.   - Patient reporting dysphagia - difficulty swallowing solids and thick/mashed up food. He thinks it correlates to when he is off of blood thinners.   - Barium esophogram showing no obstruction or abnormalities with the esophagus. There were signs of GERD.   - Pt. already on PPI - IV - will transition to PO.

## 2023-08-05 VITALS
TEMPERATURE: 99 F | DIASTOLIC BLOOD PRESSURE: 88 MMHG | HEART RATE: 87 BPM | OXYGEN SATURATION: 100 % | SYSTOLIC BLOOD PRESSURE: 130 MMHG | RESPIRATION RATE: 18 BRPM

## 2023-08-05 LAB
ANION GAP SERPL CALC-SCNC: 11 MMOL/L — SIGNIFICANT CHANGE UP (ref 7–14)
BUN SERPL-MCNC: 22 MG/DL — SIGNIFICANT CHANGE UP (ref 7–23)
CALCIUM SERPL-MCNC: 8.4 MG/DL — SIGNIFICANT CHANGE UP (ref 8.4–10.5)
CHLORIDE SERPL-SCNC: 101 MMOL/L — SIGNIFICANT CHANGE UP (ref 98–107)
CO2 SERPL-SCNC: 19 MMOL/L — LOW (ref 22–31)
CREAT SERPL-MCNC: 1.8 MG/DL — HIGH (ref 0.5–1.3)
EGFR: 43 ML/MIN/1.73M2 — LOW
GLUCOSE SERPL-MCNC: 62 MG/DL — LOW (ref 70–99)
HCT VFR BLD CALC: 27.5 % — LOW (ref 39–50)
HGB BLD-MCNC: 8.8 G/DL — LOW (ref 13–17)
MAGNESIUM SERPL-MCNC: 1.9 MG/DL — SIGNIFICANT CHANGE UP (ref 1.6–2.6)
MCHC RBC-ENTMCNC: 28.7 PG — SIGNIFICANT CHANGE UP (ref 27–34)
MCHC RBC-ENTMCNC: 32 GM/DL — SIGNIFICANT CHANGE UP (ref 32–36)
MCV RBC AUTO: 89.6 FL — SIGNIFICANT CHANGE UP (ref 80–100)
NRBC # BLD: 0 /100 WBCS — SIGNIFICANT CHANGE UP (ref 0–0)
NRBC # FLD: 0 K/UL — SIGNIFICANT CHANGE UP (ref 0–0)
PHOSPHATE SERPL-MCNC: 2.8 MG/DL — SIGNIFICANT CHANGE UP (ref 2.5–4.5)
PLATELET # BLD AUTO: 364 K/UL — SIGNIFICANT CHANGE UP (ref 150–400)
POTASSIUM SERPL-MCNC: 4.2 MMOL/L — SIGNIFICANT CHANGE UP (ref 3.5–5.3)
POTASSIUM SERPL-SCNC: 4.2 MMOL/L — SIGNIFICANT CHANGE UP (ref 3.5–5.3)
RBC # BLD: 3.07 M/UL — LOW (ref 4.2–5.8)
RBC # FLD: 28.2 % — HIGH (ref 10.3–14.5)
SODIUM SERPL-SCNC: 131 MMOL/L — LOW (ref 135–145)
WBC # BLD: 7.18 K/UL — SIGNIFICANT CHANGE UP (ref 3.8–10.5)
WBC # FLD AUTO: 7.18 K/UL — SIGNIFICANT CHANGE UP (ref 3.8–10.5)

## 2023-08-05 PROCEDURE — 99239 HOSP IP/OBS DSCHRG MGMT >30: CPT

## 2023-08-05 RX ORDER — PANTOPRAZOLE SODIUM 20 MG/1
1 TABLET, DELAYED RELEASE ORAL
Qty: 30 | Refills: 0
Start: 2023-08-05 | End: 2023-09-03

## 2023-08-05 RX ORDER — ENOXAPARIN SODIUM 100 MG/ML
70 INJECTION SUBCUTANEOUS
Qty: 0 | Refills: 0 | DISCHARGE
Start: 2023-08-05

## 2023-08-05 RX ADMIN — ENOXAPARIN SODIUM 70 MILLIGRAM(S): 100 INJECTION SUBCUTANEOUS at 05:54

## 2023-08-05 RX ADMIN — PANTOPRAZOLE SODIUM 40 MILLIGRAM(S): 20 TABLET, DELAYED RELEASE ORAL at 05:53

## 2023-08-05 NOTE — PROGRESS NOTE ADULT - PROBLEM SELECTOR PLAN 2
Presented with symptoms of SBO - confirmed SBO. Now resolved. Tolerating regular diet and having BMs.   - Resolved.     #Dysphagia.   - Patient reporting dysphagia - difficulty swallowing solids and thick/mashed up food. He thinks it correlates to when he is off of blood thinners.   - Barium esophogram showing no obstruction or abnormalities with the esophagus. There were signs of GERD.   - Pt. already on PPI - IV - will transition to PO.  -outpt GI follow up

## 2023-08-05 NOTE — SWALLOW BEDSIDE ASSESSMENT ADULT - SWALLOW EVAL: DIAGNOSIS
1. Functional oral stage for easy to chew solids, regular solids and thin liquids characterized by adequate acceptance and containment, adequate mastication of solids, suspect piecemeal transfer/ deglutition of solids with adequate oral clearance. 2. Functional pharyngeal stage suspected for the aforementioned consistencies characterized by initiation of the pharyngeal swallow and hyolaryngeal excursion upon digital palpation with no overt s/s penetration/ aspiration noted.

## 2023-08-05 NOTE — PROGRESS NOTE ADULT - SUBJECTIVE AND OBJECTIVE BOX
Hospitalist Progress Note  Dr. Mary Ann Desai Pager # [phone]    OVERNIGHT EVENTS: CLAUDY    SUBJECTIVE / INTERVAL HPI: Patient seen and examined at bedside. Patient reports he ate dinner without issues. Wants to go home later    VITAL SIGNS:  Vital Signs Last 24 Hrs  T(C): 37 (05 Aug 2023 05:52), Max: 37.1 (04 Aug 2023 21:15)  T(F): 98.6 (05 Aug 2023 05:52), Max: 98.8 (04 Aug 2023 21:15)  HR: 83 (05 Aug 2023 05:52) (83 - 87)  BP: 128/89 (05 Aug 2023 05:52) (126/85 - 128/89)  BP(mean): --  RR: 18 (05 Aug 2023 05:52) (18 - 18)  SpO2: 100% (05 Aug 2023 05:52) (100% - 100%)    Parameters below as of 05 Aug 2023 05:52  Patient On (Oxygen Delivery Method): room air        PHYSICAL EXAM:    General: Comfortable appearing resting in bed   HEENT: NC/AT; PERRL, anicteric sclera; MMM  Neck: supple  Cardiovascular: +S1/S2; RRR  Respiratory: CTA B/L; no W/R/R  Gastrointestinal: soft, distended abdomen - non-tender; +BSx4  Extremities: WWP; no edema, clubbing or cyanosis  Vascular: 2+ radial, DP/PT pulses B/L  Neurological: AAOx3; no focal deficits    MEDICATIONS:  MEDICATIONS  (STANDING):  enoxaparin Injectable 70 milliGRAM(s) SubCutaneous every 12 hours  folic acid Injectable 1 milliGRAM(s) IV Push daily  glucagon  Injectable 1 milliGRAM(s) IntraMuscular once  pantoprazole  Injectable 40 milliGRAM(s) IV Push daily  sodium chloride 0.9%. 1000 milliLiter(s) (75 mL/Hr) IV Continuous <Continuous>    MEDICATIONS  (PRN):      ALLERGIES:  Allergies    No Known Allergies    Intolerances        LABS:                                   8.8    7.18  )-----------( 364      ( 05 Aug 2023 07:00 )             27.5   08-05    131<L>  |  101  |  22  ----------------------------<  62<L>  4.2   |  19<L>  |  1.80<H>    Ca    8.4      05 Aug 2023 07:00  Phos  2.8     08-05  Mg     1.90     08-05        PTT - ( 03 Aug 2023 07:10 )  PTT:97.3 sec  Urinalysis Basic - ( 04 Aug 2023 07:25 )    Color: x / Appearance: x / SG: x / pH: x  Gluc: 71 mg/dL / Ketone: x  / Bili: x / Urobili: x   Blood: x / Protein: x / Nitrite: x   Leuk Esterase: x / RBC: x / WBC x   Sq Epi: x / Non Sq Epi: x / Bacteria: x      CAPILLARY BLOOD GLUCOSE      POCT Blood Glucose.: 72 mg/dL (03 Aug 2023 16:56)      RADIOLOGY & ADDITIONAL TESTS: Reviewed.    ASSESSMENT:    PLAN:

## 2023-08-05 NOTE — PROGRESS NOTE ADULT - PROBLEM SELECTOR PLAN 4
Per chart review, Cr around 1.6-1.7. Cr 2.22 in ED-->2.10 --> 1.93. Urine lytes - FeNa 0.2% - pre-renal. Pt. is urinating and less likely has obstructive process. Cr. now 1.6 and back to baseline.   - Continue to trend Cr.
Per chart review, Cr around 1.6-1.7. Cr 2.22 in ED-->2.10 --> 1.93. Urine lytes - FeNa 0.2% - pre-renal. Pt. is urinating and less likely has obstructive process.   - Pending renal u/s  - Continue to trend Cr.
Per chart review, Cr around 1.6-1.7. Cr 2.22 in ED-->2.10 --> 1.93. Urine lytes - FeNa 0.2% - pre-renal. Pt. is urinating and less likely has obstructive process.   - Pending renal u/s  - Continue to trend Cr.
Per chart review, Cr around 1.6-1.7. Cr 2.22 in ED-->2.10 --> 1.93. Urine lytes - FeNa 0.2% - pre-renal. Pt. is urinating and less likely has obstructive process. Cr. now 1.6 and back to baseline.   - Continue to trend Cr.
Per chart review, Cr around 1.6-1.7. Cr 2.22 in ED-->2.10 --> 1.93. Urine lytes - FeNa 0.2% - pre-renal. Pt. is urinating and less likely has obstructive process.   - Pending renal u/s  - Continue to trend Cr.

## 2023-08-05 NOTE — PROGRESS NOTE ADULT - PROBLEM SELECTOR PROBLEM 6
Non-small cell lung cancer with metastasis

## 2023-08-05 NOTE — PROGRESS NOTE ADULT - PROBLEM SELECTOR PLAN 6
last chemo on 7/11 and next dose supposedly due next week (q3wk schedule per chart review)  -will email oncology  -on home folic acid po, given npo status, spoke with pharmacy, can do 1mg IV daily
last chemo on 7/11 and next dose supposedly due next week (q3wk schedule per chart review)  - Oncology following, no inpatient chemotherapy.   - F/u outpatient
last chemo on 7/11 and next dose supposedly due next week (q3wk schedule per chart review)  - Oncology following, no inpatient chemotherapy.   - F/u outpatient
last chemo on 7/11 and next dose supposedly due next week (q3wk schedule per chart review)  -will email oncology  -on home folic acid po, given npo status, spoke with pharmacy, can do 1mg IV daily
last chemo on 7/11 and next dose supposedly due next week (q3wk schedule per chart review)  -will email oncology  -on home folic acid po, given npo status, spoke with pharmacy, can do 1mg IV daily
last chemo on 7/11 and next dose supposedly due next week (q3wk schedule per chart review)  - Oncology following, no inpatient chemotherapy.   - F/u outpatient

## 2023-08-05 NOTE — SWALLOW BEDSIDE ASSESSMENT ADULT - CONSISTENCIES ADMINISTERED
Addended by: DENNIS ASTUDILLO on: 7/24/2017 06:05 PM     Modules accepted: Orders     ~3 oz/thin liquid half mian cracker/regular solid eggs/ leo from breakfast tray/easy to chew

## 2023-08-05 NOTE — SWALLOW BEDSIDE ASSESSMENT ADULT - ASR SWALLOW RECOMMEND DIAG
Objective testing Not warranted at this time given no overt s/s of aspiration and no pneumonia indicated on chest imaging

## 2023-08-05 NOTE — PROGRESS NOTE ADULT - PROBLEM SELECTOR PROBLEM 4
BETO (acute kidney injury)

## 2023-08-05 NOTE — CHART NOTE - NSCHARTNOTEFT_GEN_A_CORE
Vital Signs Last 24 Hrs  T(C): 37 (05 Aug 2023 11:57), Max: 37.1 (04 Aug 2023 21:15)  T(F): 98.6 (05 Aug 2023 11:57), Max: 98.8 (04 Aug 2023 21:15)  HR: 87 (05 Aug 2023 11:57) (83 - 87)  BP: 130/88 (05 Aug 2023 11:57) (126/85 - 130/88)  BP(mean): --  RR: 18 (05 Aug 2023 11:57) (18 - 18)  SpO2: 100% (05 Aug 2023 11:57) (100% - 100%)    Parameters below as of 05 Aug 2023 11:57  Patient On (Oxygen Delivery Method): room air                          8.8    7.18  )-----------( 364      ( 05 Aug 2023 07:00 )             27.5   08-05    131<L>  |  101  |  22  ----------------------------<  62<L>  4.2   |  19<L>  |  1.80<H>    Ca    8.4      05 Aug 2023 07:00  Phos  2.8     08-05  Mg     1.90     08-05    Above lab results and case was discussed with Dr. Desai this afternoon, patient is medically cleared and optimized for discharge home today.   All medications were reviewed with attending, and sent to mutually agreed upon pharmacy > continue current lovenox 70mg BID, vitamins, PPI, resume home chemo meds/antiemetics.   Spoke with KATERINA Rae >confirmed Lovenox covered, provided 1box in stock (states good for 5days, picked up by ALDA Hathaway), and [facility] will call Patient on monday 8/7 to make arrangement for delivering the rest of lovenox for 30day supply (prior auth per pharmacist Sai/Faisal approved by insurance), Dr. Desai made aware.     Writer discussed case with Oncologist Dr. Burks >patient can followup paracentesis cytology results as outpatient with Dr. Son, cleared for discharge.   [facility] confirmed lovenox return demonstration with patient, spoke with HOMER Sylvester> accepted to home care, discharged home via private transport Vital Signs Last 24 Hrs  T(C): 37 (05 Aug 2023 11:57), Max: 37.1 (04 Aug 2023 21:15)  T(F): 98.6 (05 Aug 2023 11:57), Max: 98.8 (04 Aug 2023 21:15)  HR: 87 (05 Aug 2023 11:57) (83 - 87)  BP: 130/88 (05 Aug 2023 11:57) (126/85 - 130/88)  BP(mean): --  RR: 18 (05 Aug 2023 11:57) (18 - 18)  SpO2: 100% (05 Aug 2023 11:57) (100% - 100%)    Parameters below as of 05 Aug 2023 11:57  Patient On (Oxygen Delivery Method): room air                          8.8    7.18  )-----------( 364      ( 05 Aug 2023 07:00 )             27.5   08-05    131<L>  |  101  |  22  ----------------------------<  62<L>  4.2   |  19<L>  |  1.80<H>    Ca    8.4      05 Aug 2023 07:00  Phos  2.8     08-05  Mg     1.90     08-05    Above lab results and case was discussed with Dr. Desai this afternoon, patient is medically cleared and optimized for discharge home today.   All medications were reviewed with attending, and sent to mutually agreed upon pharmacy > continue current lovenox 70mg BID, vitamins, PPI, resume home chemo meds/antiemetics.   Spoke with KATERINA Rae >confirmed Lovenox covered, provided 1box in stock (states good for 5days, picked up by ALDA Hathaway), and [facility] will call Patient on monday 8/7 to make arrangement for delivering the rest of lovenox for 30day supply (prior auth per pharmacist Sai/Faisal approved by insurance), Dr. Desai and patient made aware.     Writer discussed case with Oncologist Dr. Burks >patient can followup paracentesis cytology results as outpatient with Dr. Son, cleared for discharge.   [facility] Hathaway confirmed lovenox return demonstration with patient, spoke with HOMER Sylvester> accepted to home care, discharged home via private transport

## 2023-08-05 NOTE — PROGRESS NOTE ADULT - PROBLEM SELECTOR PLAN 3
CT A&P: Worsening abdominal and pelvic ascites with mild thickening of the peritoneal reflections likely reflective of malignant ascites.  -diagnostic para done in ED. Likely 2/2 malignancy instead of infection  - Therapeutic paracentesis completed - 3.8L of ascitic fluid removed.   - Will return to outpatient IR for therapeutic paracentesis in the outpatient setting.
CT A&P: Worsening abdominal and pelvic ascites with mild thickening of the peritoneal reflections likely reflective of malignant ascites.  -diagnostic para done in ED. Likely 2/2 malignancy instead of infection  - Therapeutic paracentesis with procedure team planned for day of discharge - will plan for discharge tomorrow after discontinuing . Will need to hold hep gtt in anticipation of paracentesis.
CT A&P: Worsening abdominal and pelvic ascites with mild thickening of the peritoneal reflections likely reflective of malignant ascites.  -diagnostic para done in ED. Likely 2/2 malignancy instead of infection  - Will reach out to surgery to see if paracentesis can be performed. PT does not do procedures on Fridays.
CT A&P: Worsening abdominal and pelvic ascites with mild thickening of the peritoneal reflections likely reflective of malignant ascites.  -diagnostic para done in ED. Likely 2/2 malignancy instead of infection  - Therapeutic paracentesis completed - 3.8L of ascitic fluid removed.   - Will return to outpatient IR for therapeutic paracentesis in the outpatient setting.
CT A&P: Worsening abdominal and pelvic ascites with mild thickening of the peritoneal reflections likely reflective of malignant ascites.  -diagnostic para done in ED. Likely 2/2 malignancy instead of infection  - Therapeutic paracentesis completed - 3.8L of ascitic fluid removed.   - Will return to outpatient IR for therapeutic paracentesis in the outpatient setting.
CT A&P: Worsening abdominal and pelvic ascites with mild thickening of the peritoneal reflections likely reflective of malignant ascites.  -diagnostic para done in ED. Likely 2/2 malignancy instead of infection  - Will reach out to surgery to see if paracentesis can be performed. PT does not do procedures on Fridays.
CT A&P: Worsening abdominal and pelvic ascites with mild thickening of the peritoneal reflections likely reflective of malignant ascites.  -diagnostic para done in ED. Likely 2/2 malignancy instead of infection  - Will reach out to surgery to see if paracentesis can be performed. PT does not do procedures on Fridays.
CT A&P: Worsening abdominal and pelvic ascites with mild thickening of the peritoneal reflections likely reflective of malignant ascites.  -diagnostic para done in ED. Likely 2/2 malignancy instead of infection  - Therapeutic paracentesis completed - 3.8L of ascitic fluid removed.   - Will return to outpatient IR for therapeutic paracentesis in the outpatient setting.
CT A&P: Worsening abdominal and pelvic ascites with mild thickening of the peritoneal reflections likely reflective of malignant ascites.  -diagnostic para done in ED. Likely 2/2 malignancy instead of infection  - Therapeutic paracentesis with procedure team tentatively for 8/1. Will need to hold hep gtt in anticipation of paracentesis.

## 2023-08-05 NOTE — SWALLOW BEDSIDE ASSESSMENT ADULT - COMMENTS
Per Internal Medicine 8/4, "58M PMH lung cancer currently receiving chemotherapy last session (7/11), PE on xarelto presenting to the emergency department with shortness of breath is found to have new PE, worsening ascites, developing SBO and BETO...Patient reports he continues to feel like something is stuck in his esophagus but now he feels like this is happening after he eats. He was able to eat eggs today. He describes the feeling of fullness in his epigastric area as well as nausea and sometimes he feels like he wants to vomit."    Esophagram 8/3: IMPRESSION: Gastroesophageal reflux. Possible small sliding hiatal hernia.    CXR 7/27: IMPRESSION: No acute pulmonary disease. Trace left effusion with retrosternal air collection unchanged.    Patient received upright at edge of bed consuming breakfast tray, patient able to make wants/ needs known and able to follow simple directives. Patient reporting for 3-4 days has been experiencing a feeling of food and liquid "coming back up" and getting stuck in mid chest region.

## 2023-08-05 NOTE — PROGRESS NOTE ADULT - NUTRITIONAL ASSESSMENT
This patient has been assessed with a concern for Malnutrition and has been determined to have a diagnosis/diagnoses of Severe protein-calorie malnutrition.    This patient is being managed with:   Diet Regular-  Entered: Jul 31 2023  2:22PM  

## 2023-08-05 NOTE — PROGRESS NOTE ADULT - PROBLEM SELECTOR PLAN 7
DVT ppx: on heparin gtt for PE  Diet: NPO given SBO
DVT ppx: lovenox 70mg BID     Diet: Regular diet    DC home today. Time spent 50 mins
DVT ppx: on heparin gtt for PE  Diet: CLD and advance as tolerated - follow up with surgery team
DVT ppx: on heparin gtt for PE  Diet: Regular diet
DVT ppx: lovenox 70mg BID     Diet: Regular diet
DVT ppx: lovenox 70mg BID     Diet: Regular diet
DVT ppx: on heparin gtt for PE  Diet: Regular diet
DVT ppx: on heparin gtt for PE  Diet: NPO given SBO
DVT ppx: on heparin gtt for PE  Diet: NPO given SBO

## 2023-08-05 NOTE — SWALLOW BEDSIDE ASSESSMENT ADULT - ADDITIONAL RECOMMENDATIONS
1. GI follow-up given GERD indicated on Esophagram. 2. This service to follow up to ensure tolerance of recommended PO as schedule permits. 3. Medical team further advised to reconsult this service if patient is with a change in medical status or change in tolerance of recommended PO.

## 2023-08-05 NOTE — PROGRESS NOTE ADULT - PROVIDER SPECIALTY LIST ADULT
Surgery
Surgery
Heme/Onc
Surgery
Heme/Onc
Hospitalist

## 2023-08-05 NOTE — SWALLOW BEDSIDE ASSESSMENT ADULT - ORAL PREPARATORY PHASE
Left message on voicemail to return our call   To staff: when parent calls back please let them know that completed form has been placed at the peds check in desk for .    Within functional limits

## 2023-08-11 ENCOUNTER — OUTPATIENT (OUTPATIENT)
Dept: OUTPATIENT SERVICES | Facility: HOSPITAL | Age: 58
LOS: 1 days | Discharge: HOME | End: 2023-08-11
Payer: MEDICAID

## 2023-08-11 DIAGNOSIS — C34.90 MALIGNANT NEOPLASM OF UNSPECIFIED PART OF UNSPECIFIED BRONCHUS OR LUNG: ICD-10-CM

## 2023-08-11 DIAGNOSIS — Z98.890 OTHER SPECIFIED POSTPROCEDURAL STATES: Chronic | ICD-10-CM

## 2023-08-14 LAB — NON-GYNECOLOGICAL CYTOLOGY STUDY: SIGNIFICANT CHANGE UP

## 2023-08-16 ENCOUNTER — APPOINTMENT (OUTPATIENT)
Dept: HEMATOLOGY ONCOLOGY | Facility: CLINIC | Age: 58
End: 2023-08-16
Payer: MEDICAID

## 2023-08-16 VITALS
RESPIRATION RATE: 16 BRPM | DIASTOLIC BLOOD PRESSURE: 91 MMHG | OXYGEN SATURATION: 97 % | HEART RATE: 125 BPM | BODY MASS INDEX: 21.89 KG/M2 | TEMPERATURE: 97.3 F | SYSTOLIC BLOOD PRESSURE: 125 MMHG | WEIGHT: 152.56 LBS

## 2023-08-16 PROCEDURE — 99215 OFFICE O/P EST HI 40 MIN: CPT

## 2023-08-16 RX ORDER — RIVAROXABAN 20 MG/1
20 TABLET, FILM COATED ORAL
Qty: 30 | Refills: 6 | Status: DISCONTINUED | COMMUNITY
Start: 2022-11-07 | End: 2023-07-27

## 2023-08-16 RX ORDER — DEXAMETHASONE 4 MG/1
4 TABLET ORAL
Qty: 30 | Refills: 5 | Status: DISCONTINUED | COMMUNITY
Start: 2023-05-26 | End: 2023-08-16

## 2023-08-16 RX ORDER — MULTIVITAMIN
TABLET ORAL
Refills: 0 | Status: ACTIVE | COMMUNITY

## 2023-08-16 RX ORDER — ENOXAPARIN SODIUM 100 MG/ML
80 INJECTION SUBCUTANEOUS
Refills: 0 | Status: ACTIVE | COMMUNITY

## 2023-08-16 NOTE — ASSESSMENT
[FreeTextEntry1] : Metastatic NSCLC with adenocarcinoma histology; tumor tested PD-L1 low-positive and molecular testing was negative for actionable mutations (ARID1A, CASP8, among others).  Patient started first-line systemic therapy with combination chemotherapy and immunotherapy with carboplatin/pemetrexed and pembrolizumab in December 2022 and achieved a nice response.  He was treated with 4 cycles of triplet therapy followed by pemetrexed/pembrolizumab maintenance.  Pleurx catheter was subsequently removed in March 2023.  Pemetrexed was subsequently held due to hematologic toxicity with anemia and he was treated with single agent pembrolizumab since April 2023.  Restaging CT April 2023 with new perihepatic ascites with a sustained response otherwise.  He underwent US-paracentesis x 2 in May 2023 with positive ascitic fluid cytology for his known lung adenocarcinoma. Treatment changed with to single agent Pemetrexed on 5/30/23 as he never failed this agent; treated x 3 cycles through early July 2023 with lack of response.  Recommend: -D/C pemetrexed.  Given disease burden and symptomatology, would like to be able to achieve a response to systemic therapy.  Patient can Stop the Dex and finish current bottle of folic acid.  - Recommend treatment with second line nab-paclitaxel 100 mg/m2 administered for 2 weeks on with 1 week off.  Dose and schedule chosen given prior treatment history, hematologic toxicity and PS.  Risks, benefits and side effects of systemic therapy discussed with the patient who agreed to proceed and signed the consent form; drug information sheets provided.  Nab-paclitaxel is recommended as per NCCN guidelines in this setting for subsequent line therapy.   -PE:  Continue enoxaparin 1mg/kg sub-q q12 hours.  Dosing may need adjustment depending on his weight.  Enoxaparin can be held prior to paracentesis (hold the morning dose the day-of procedure).   - Iron deficiency anemia: Completed IV iron repletion with Venofer in May 2023; monitor hemoglobin and iron stores - Continue US paracentesis as needed to alleviate symptoms while awaiting response to systemic therapy; most recent procedure 8/2/23 as inpatient.  Patient to hold Enoxaparin dose prior to procedure that morning.   -Nutrition:  having difficulty getting/keeping food down.  Had esophagram as inpatient revealing GERD.  Continue PPI.  May need GI f/u - consider EGD.   -Patient can f/u prior to C1D8 to assess tolerability and at start of each cycle, or more often should problems arise.  Plan to obtain a restaging scan to assess response after 3 full cycles.

## 2023-08-16 NOTE — HISTORY OF PRESENT ILLNESS
[Disease: _____________________] : Disease: [unfilled] [de-identified] : Metastatic NSCLC with adenocarcinoma histology diagnosed September 2022.  Patient presented to the hospital with SOB and was found to have a left-sided hydropneumothorax with trapped lung.  He underwent a pleuroscopy with pleural biopsy as well as Pleurx catheter placement.  Pleural biopsy revealed adenocarcinoma consistent with lung primary.  Tumor tested PD-L1 low-positive and molecular testing was negative for actionable mutations (ARID1A, CASP8, among others).  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 Patient started first-line systemic therapy with combination chemotherapy and immunotherapy with carboplatin/pemetrexed and pembrolizumab in December 2022 and achieved a nice response.  He was treated with 4 cycles of triplet therapy followed by pemetrexed/pembrolizumab maintenance.  Pleurx catheter was subsequently removed in March 2023.  Pemetrexed was subsequently held due to hematologic toxicity with anemia and he has been on single agent pembrolizumab since April 2023.   Restaging scan in April 2023 revealed new perihepatic ascites with an otherwise sustained response.  He was sent for an ultrasound earlier this month revealing mild ascites.  He underwent ultrasound paracentesis on 5/5/2023 with 1200 cc fluid removed with subsequent US-paracentesis in late May with 900cc fluid removed; cytology was positive for his known lung adenocarcinoma.  Received IV iron repletion with Venofer completed late May 2023.  Started single agent Pemetrexed in late May 2023 and treated through early July 2023 without response.   [de-identified] : - Left pleural biopsy 9/14/2022: Adenocarcinoma consistent with lung primary.\par  PD-L1 low-positive at 10%.\par  [facility] One:  Negative for actionable mutations (ARID1A, CASP8, among others).   [de-identified] : Patient began treatment with single agent pemetrexed in late May 2023.  He was treated with 3 cycles through 7/11/2023. Patient is status post hospitalization 7/27 - 8/5/2023 after presenting with SOB.  Patient was found to have PE in the setting of anticoagulation with Xarelto being on hold for paracentesis.  He was started on a heparin drip.  He had N/V and CT scan revealed SBO that resolved with conservative measures/bowel rest.  He underwent bedside therapeutic paracentesis on 8/2/2023 with 3.8L of fluid removed.  Patient had dysphagia and underwent esophagram that revealed reflux.  Patient was transitioned to enoxaparin anticoagulation to allow for anticoagulation to be held prior to future paracenteses.  Patient reports malaise and fatigue.  He reports compliance with the enoxaparin self injections.  He has HORTON.  He reports difficulty getting food down and reports a sensation of food getting stuck.  He did start supplementation with Ensure.  He has lost weight due to decreased p.o. intake.

## 2023-08-16 NOTE — RESULTS/DATA
[FreeTextEntry1] : -CT CAP 4/12/23:   1. Since 1/6/2023, the millimeter right upper lobe nodules are unchanged. 2. The left pleural catheter has been removed. 3. The small left pleural effusion has decreased in size. 4. No change in the sclerotic lesions of the thoracic spine. 5. Perihepatic ascites is new. 6. No change in the left adrenal gland thickening. 7. No change in the sclerotic lesions of the pelvis.  -Abdominal U/S 5/4/23:  No cholelithiasis or biliary ductal dilatation.  Mild ascites.  -U/S Paracentesis 5/5/23:  Therapeutic paracentesis with 1200mL removed.  Specimen sent for cytology.  - US paracentesis 5/23/2023: Therapeutic paracentesis with 900 cc of fluid removed.  -US Paracentesis 7/3/23:  750mL fluid removed.   Images Reviewed/Interpreted:  -CT CAP 7/21/23:  Stable very small nodule in the right upper lobe when compared to previous exam.  Stable small left pleural effusion when compared to previous exam.  Increase in size of the ascites when compared to previous exam.  Stable sclerotic lesions in the visualized osseous structures when compared to previous exam.  Peripherally situated wedge-shaped low-attenuation is noted within the spleen. One of the differential diagnostic consideration includes an infarct.  -CT Abdomen/Pelvis 7/27/23:  New dilated fluid-filled thickened small bowel loops throughout the central abdomen with transition in the right lower quadrant where the loops of small bowel appear matted and thickened compared with prior exam from 7/21/2023 concerning for small bowel enteritis with developing small bowel obstruction.  Worsening abdominal and pelvic ascites with mild thickening of the peritoneal reflections likely reflective of malignant ascites.  New filling defect in the left lower lobar segmental pulmonary arteries concerning for pulmonary embolism. CTA of the chest should be considered.  Osseous metastatic disease, unchanged.  Unchanged peripheral cystic lesions in the right hepatic lobe which may be along the serosal surface (serosal implants).  Small left pleural effusion.  -Renal U/S 7/28/23:  No hydronephrosis.  Debris within the urinary bladder. Correlate with urinalysis.  Moderate volume ascites.  Partially imaged distended loops of bowel with wall thickening.  -Bedside Paracentesis 8/2/23:  3.9L removed.    -Esophagram 8/3/23:  Gastroesophageal reflux.  Possible small sliding hiatal hernia.  - Hospital labs 8/5/2023: WBC 7.1 hemoglobin 8.8 MCV 89 platelets 364K.  Serum creatinine 1.8.

## 2023-08-18 ENCOUNTER — NON-APPOINTMENT (OUTPATIENT)
Age: 58
End: 2023-08-18

## 2023-08-18 NOTE — ASSESSMENT
[FreeTextEntry1] : 57 M with lung adenoCA left sided pleurx cath for malignant pleural effusion here for IPC management \par \par Plan \par - Drain to once every other week \par - Once the draining has stopped will plan for removal \par - f/u with oncology for continued care and systemic disease directed therapy. \par - f/u with IP clinic in 4-6 weeks \par \par Continue drainage once every 2 weeks. Catheter protocols reviewed. All information provided. Adequate equipment available. Advised to call if experiencing any pain with drainage. Advised to call if any change in color of fluid or erythema or swelling at catheter site or experiencing systemic signs and symptoms of infection such as fever. Advised to call if any sudden worsening of shortness of breath. Catheter care instructions provided.\par \par 
no

## 2023-08-22 ENCOUNTER — NON-APPOINTMENT (OUTPATIENT)
Age: 58
End: 2023-08-22

## 2023-08-22 ENCOUNTER — APPOINTMENT (OUTPATIENT)
Dept: HEMATOLOGY ONCOLOGY | Facility: CLINIC | Age: 58
End: 2023-08-22

## 2023-08-22 ENCOUNTER — RESULT REVIEW (OUTPATIENT)
Age: 58
End: 2023-08-22

## 2023-08-22 ENCOUNTER — APPOINTMENT (OUTPATIENT)
Dept: INFUSION THERAPY | Facility: HOSPITAL | Age: 58
End: 2023-08-22

## 2023-08-22 DIAGNOSIS — Z51.11 ENCOUNTER FOR ANTINEOPLASTIC CHEMOTHERAPY: ICD-10-CM

## 2023-08-22 LAB
FERRITIN SERPL-MCNC: 501 NG/ML — HIGH (ref 30–400)
IRON SATN MFR SERPL: 10 % — LOW (ref 16–55)
IRON SATN MFR SERPL: 24 UG/DL — LOW (ref 45–165)
TIBC SERPL-MCNC: 226 UG/DL — SIGNIFICANT CHANGE UP (ref 220–430)
UIBC SERPL-MCNC: 202 UG/DL — SIGNIFICANT CHANGE UP (ref 110–370)

## 2023-08-24 ENCOUNTER — NON-APPOINTMENT (OUTPATIENT)
Age: 58
End: 2023-08-24

## 2023-08-29 ENCOUNTER — RESULT REVIEW (OUTPATIENT)
Age: 58
End: 2023-08-29

## 2023-08-29 ENCOUNTER — APPOINTMENT (OUTPATIENT)
Dept: INFUSION THERAPY | Facility: HOSPITAL | Age: 58
End: 2023-08-29

## 2023-08-29 ENCOUNTER — APPOINTMENT (OUTPATIENT)
Dept: HEMATOLOGY ONCOLOGY | Facility: CLINIC | Age: 58
End: 2023-08-29
Payer: MEDICAID

## 2023-08-29 VITALS
DIASTOLIC BLOOD PRESSURE: 89 MMHG | OXYGEN SATURATION: 96 % | WEIGHT: 157.41 LBS | RESPIRATION RATE: 16 BRPM | SYSTOLIC BLOOD PRESSURE: 121 MMHG | HEART RATE: 105 BPM | BODY MASS INDEX: 22.59 KG/M2 | TEMPERATURE: 97.3 F

## 2023-08-29 DIAGNOSIS — C79.51 SECONDARY MALIGNANT NEOPLASM OF BONE: ICD-10-CM

## 2023-08-29 LAB
BASOPHILS # BLD AUTO: 0.04 K/UL — SIGNIFICANT CHANGE UP (ref 0–0.2)
BASOPHILS NFR BLD AUTO: 0.8 % — SIGNIFICANT CHANGE UP (ref 0–2)
EOSINOPHIL # BLD AUTO: 0.03 K/UL — SIGNIFICANT CHANGE UP (ref 0–0.5)
EOSINOPHIL NFR BLD AUTO: 0.6 % — SIGNIFICANT CHANGE UP (ref 0–6)
HCT VFR BLD CALC: 29.2 % — LOW (ref 39–50)
HGB BLD-MCNC: 9.4 G/DL — LOW (ref 13–17)
IMM GRANULOCYTES NFR BLD AUTO: 4.3 % — HIGH (ref 0–0.9)
LYMPHOCYTES # BLD AUTO: 0.86 K/UL — LOW (ref 1–3.3)
LYMPHOCYTES # BLD AUTO: 17.7 % — SIGNIFICANT CHANGE UP (ref 13–44)
MCHC RBC-ENTMCNC: 29.9 PG — SIGNIFICANT CHANGE UP (ref 27–34)
MCHC RBC-ENTMCNC: 32.2 G/DL — SIGNIFICANT CHANGE UP (ref 32–36)
MCV RBC AUTO: 93 FL — SIGNIFICANT CHANGE UP (ref 80–100)
MONOCYTES # BLD AUTO: 0.55 K/UL — SIGNIFICANT CHANGE UP (ref 0–0.9)
MONOCYTES NFR BLD AUTO: 11.3 % — SIGNIFICANT CHANGE UP (ref 2–14)
NEUTROPHILS # BLD AUTO: 3.16 K/UL — SIGNIFICANT CHANGE UP (ref 1.8–7.4)
NEUTROPHILS NFR BLD AUTO: 65.3 % — SIGNIFICANT CHANGE UP (ref 43–77)
NRBC # BLD: 0 /100 WBCS — SIGNIFICANT CHANGE UP (ref 0–0)
PLATELET # BLD AUTO: 357 K/UL — SIGNIFICANT CHANGE UP (ref 150–400)
RBC # BLD: 3.14 M/UL — LOW (ref 4.2–5.8)
RBC # FLD: 23.5 % — HIGH (ref 10.3–14.5)
WBC # BLD: 4.85 K/UL — SIGNIFICANT CHANGE UP (ref 3.8–10.5)
WBC # FLD AUTO: 4.85 K/UL — SIGNIFICANT CHANGE UP (ref 3.8–10.5)

## 2023-08-29 PROCEDURE — 99214 OFFICE O/P EST MOD 30 MIN: CPT

## 2023-08-29 RX ORDER — ENOXAPARIN SODIUM 80 MG/.8ML
80 INJECTION, SOLUTION SUBCUTANEOUS
Qty: 60 | Refills: 3 | Status: ACTIVE | COMMUNITY
Start: 2023-08-29 | End: 1900-01-01

## 2023-08-29 RX ORDER — PANTOPRAZOLE 40 MG/1
40 TABLET, DELAYED RELEASE ORAL DAILY
Qty: 30 | Refills: 3 | Status: ACTIVE | COMMUNITY
Start: 1900-01-01 | End: 1900-01-01

## 2023-08-30 LAB
ALBUMIN SERPL ELPH-MCNC: 3 G/DL — LOW (ref 3.3–5)
ALP SERPL-CCNC: 90 U/L — SIGNIFICANT CHANGE UP (ref 40–120)
ALT FLD-CCNC: 6 U/L — LOW (ref 10–45)
ANION GAP SERPL CALC-SCNC: 11 MMOL/L — SIGNIFICANT CHANGE UP (ref 5–17)
AST SERPL-CCNC: 32 U/L — SIGNIFICANT CHANGE UP (ref 10–40)
BILIRUB SERPL-MCNC: <0.2 MG/DL — SIGNIFICANT CHANGE UP (ref 0.2–1.2)
BUN SERPL-MCNC: 20 MG/DL — SIGNIFICANT CHANGE UP (ref 7–23)
CALCIUM SERPL-MCNC: 9.6 MG/DL — SIGNIFICANT CHANGE UP (ref 8.4–10.5)
CHLORIDE SERPL-SCNC: 103 MMOL/L — SIGNIFICANT CHANGE UP (ref 96–108)
CO2 SERPL-SCNC: 25 MMOL/L — SIGNIFICANT CHANGE UP (ref 22–31)
CREAT SERPL-MCNC: 1.65 MG/DL — HIGH (ref 0.5–1.3)
EGFR: 48 ML/MIN/1.73M2 — LOW
GLUCOSE SERPL-MCNC: 82 MG/DL — SIGNIFICANT CHANGE UP (ref 70–99)
POTASSIUM SERPL-MCNC: 4.4 MMOL/L — SIGNIFICANT CHANGE UP (ref 3.5–5.3)
POTASSIUM SERPL-SCNC: 4.4 MMOL/L — SIGNIFICANT CHANGE UP (ref 3.5–5.3)
PROT SERPL-MCNC: 8.5 G/DL — HIGH (ref 6–8.3)
SODIUM SERPL-SCNC: 139 MMOL/L — SIGNIFICANT CHANGE UP (ref 135–145)

## 2023-09-05 ENCOUNTER — OUTPATIENT (OUTPATIENT)
Dept: OUTPATIENT SERVICES | Facility: HOSPITAL | Age: 58
LOS: 1 days | End: 2023-09-05
Payer: COMMERCIAL

## 2023-09-05 ENCOUNTER — APPOINTMENT (OUTPATIENT)
Dept: ULTRASOUND IMAGING | Facility: IMAGING CENTER | Age: 58
End: 2023-09-05
Payer: MEDICAID

## 2023-09-05 DIAGNOSIS — Z98.890 OTHER SPECIFIED POSTPROCEDURAL STATES: Chronic | ICD-10-CM

## 2023-09-05 DIAGNOSIS — C78.01 SECONDARY MALIGNANT NEOPLASM OF RIGHT LUNG: ICD-10-CM

## 2023-09-05 PROBLEM — C79.51 METASTATIC CARCINOMA TO BONE: Status: ACTIVE | Noted: 2023-05-09

## 2023-09-05 PROCEDURE — 49083 ABD PARACENTESIS W/IMAGING: CPT

## 2023-09-05 NOTE — PHYSICAL EXAM
[Restricted in physically strenuous activity but ambulatory and able to carry out work of a light or sedentary nature] : Status 1- Restricted in physically strenuous activity but ambulatory and able to carry out work of a light or sedentary nature, e.g., light house work, office work [Thin] : thin [Normal] : affect appropriate [de-identified] : No icterus  [de-identified] : MMM O/P Clear [de-identified] : Supple No LAD  [de-identified] : Clear [de-identified] : S1 S2. No carotid bruit appreciated [de-identified] : No edema [de-identified] : Mildly distended, Firm  [de-identified] : No spine/CVA tenderness [de-identified] : Ambulatory

## 2023-09-05 NOTE — ASSESSMENT
[FreeTextEntry1] : Metastatic NSCLC with adenocarcinoma histology; tumor tested PD-L1 low-positive and molecular testing was negative for actionable mutations (ARID1A, CASP8, among others).  Patient started first-line systemic therapy with combination chemotherapy and immunotherapy with carboplatin/pemetrexed and pembrolizumab in December 2022 and achieved a nice response.  He was treated with 4 cycles of triplet therapy followed by pemetrexed/pembrolizumab maintenance.  Pleurx catheter was subsequently removed in March 2023.  Pemetrexed was subsequently held due to hematologic toxicity with anemia and he was treated with single agent pembrolizumab since April 2023.  Restaging CT April 2023 with new perihepatic ascites with a sustained response otherwise.  He underwent US-paracentesis x 2 in May 2023 with positive ascitic fluid cytology for his known lung adenocarcinoma. Treatment changed with to single agent Pemetrexed on 5/30/23 as he never failed this agent; treated x 3 cycles through early July 2023 with lack of response. Treatment changed to Abraxane in late August 2023.  Recommend: -Continue today with C1,D8 of second line nab-paclitaxel 100 mg/m2 administered for 2 weeks on with 1 week off.  Dose and schedule chosen given prior treatment history, hematologic toxicity and PS.  Risks, benefits and side effects of systemic therapy discussed with the patient who agreed to proceed and signed the consent form; drug information sheets provided.  Nab-paclitaxel is recommended as per NCCN guidelines in this setting for subsequent line therapy.   -PE:  Continue enoxaparin 1mg/kg sub-q q12 hours.  Dosing may need adjustment depending on his weight.  Enoxaparin can be held prior to paracentesis (hold the morning dose the day-of procedure).   - Iron deficiency anemia: Completed IV iron repletion with Venofer in May 2023; recent labs with continued iron deficiency. LOMN submitted for use of Feraheme. Continue to monitor hemoglobin and iron stores - Order placed today for repeat paracentesis. Continue US paracentesis as needed to alleviate symptoms while awaiting response to systemic therapy; most recent procedure 8/2/23 as inpatient.  Patient to hold Enoxaparin dose prior to procedure that morning.   -Nutrition:  having difficulty getting/keeping food down.  Had esophagram as inpatient revealing GERD.  Continue PPI.  May need GI f/u - consider EGD.   -Patient can f/u prior to each cycle going forward or sooner if needed.  Plan to obtain a restaging scan to assess response after 3 full cycles.

## 2023-09-05 NOTE — HISTORY OF PRESENT ILLNESS
[Disease: _____________________] : Disease: [unfilled] [de-identified] : Metastatic NSCLC with adenocarcinoma histology diagnosed September 2022.  Patient presented to the hospital with SOB and was found to have a left-sided hydropneumothorax with trapped lung.  He underwent a pleuroscopy with pleural biopsy as well as Pleurx catheter placement.  Pleural biopsy revealed adenocarcinoma consistent with lung primary.  Tumor tested PD-L1 low-positive and molecular testing was negative for actionable mutations (ARID1A, CASP8, among others).  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 Patient started first-line systemic therapy with combination chemotherapy and immunotherapy with carboplatin/pemetrexed and pembrolizumab in December 2022 and achieved a nice response.  He was treated with 4 cycles of triplet therapy followed by pemetrexed/pembrolizumab maintenance.  Pleurx catheter was subsequently removed in March 2023.  Pemetrexed was subsequently held due to hematologic toxicity with anemia and he has been on single agent pembrolizumab since April 2023.   Restaging scan in April 2023 revealed new perihepatic ascites with an otherwise sustained response.  He was sent for an ultrasound earlier this month revealing mild ascites.  He underwent ultrasound paracentesis on 5/5/2023 with 1200 cc fluid removed with subsequent US-paracentesis in late May with 900cc fluid removed; cytology was positive for his known lung adenocarcinoma.  Received IV iron repletion with Venofer completed late May 2023.  Started single agent Pemetrexed in late May 2023 and treated through early July 2023 without response.   [de-identified] : - Left pleural biopsy 9/14/2022: Adenocarcinoma consistent with lung primary.\par  PD-L1 low-positive at 10%.\par  Foundation One:  Negative for actionable mutations (ARID1A, CASP8, among others).   [de-identified] : The patient presents prior to C1,D8 with 2nd line Abraxane.  He reports increased SOB which he believes is related to reaccumulation of ascites. He admits to mild constipation which is managed with Dulcolax. He notes fatigue but denies N/V. Dysphagia improved with magic mouthwash and pantoprazole. Appetite is decreased due to abdominal "tightness"; supplements with Ensure. He reports compliance with daily Lovenox injections.  S/P Venofer completed in May 2023; labs with continued iron deficiency on recent labs.

## 2023-09-12 ENCOUNTER — RESULT REVIEW (OUTPATIENT)
Age: 58
End: 2023-09-12

## 2023-09-12 ENCOUNTER — APPOINTMENT (OUTPATIENT)
Dept: HEMATOLOGY ONCOLOGY | Facility: CLINIC | Age: 58
End: 2023-09-12
Payer: MEDICAID

## 2023-09-12 ENCOUNTER — APPOINTMENT (OUTPATIENT)
Dept: INFUSION THERAPY | Facility: HOSPITAL | Age: 58
End: 2023-09-12

## 2023-09-12 VITALS
WEIGHT: 138.45 LBS | DIASTOLIC BLOOD PRESSURE: 94 MMHG | TEMPERATURE: 98.2 F | RESPIRATION RATE: 20 BRPM | SYSTOLIC BLOOD PRESSURE: 126 MMHG | BODY MASS INDEX: 19.87 KG/M2 | HEART RATE: 73 BPM

## 2023-09-12 VITALS — OXYGEN SATURATION: 96 %

## 2023-09-12 DIAGNOSIS — C77.1 SECONDARY AND UNSPECIFIED MALIGNANT NEOPLASM OF INTRATHORACIC LYMPH NODES: ICD-10-CM

## 2023-09-12 DIAGNOSIS — C77.2 SECONDARY AND UNSPECIFIED MALIGNANT NEOPLASM OF INTRA-ABDOMINAL LYMPH NODES: ICD-10-CM

## 2023-09-12 DIAGNOSIS — D50.9 IRON DEFICIENCY ANEMIA, UNSPECIFIED: ICD-10-CM

## 2023-09-12 DIAGNOSIS — C78.01 SECONDARY MALIGNANT NEOPLASM OF RIGHT LUNG: ICD-10-CM

## 2023-09-12 DIAGNOSIS — R11.2 NAUSEA WITH VOMITING, UNSPECIFIED: ICD-10-CM

## 2023-09-12 DIAGNOSIS — C77.0 SECONDARY AND UNSPECIFIED MALIGNANT NEOPLASM OF LYMPH NODES OF HEAD, FACE AND NECK: ICD-10-CM

## 2023-09-12 DIAGNOSIS — E86.0 DEHYDRATION: ICD-10-CM

## 2023-09-12 DIAGNOSIS — R18.0 MALIGNANT ASCITES: ICD-10-CM

## 2023-09-12 DIAGNOSIS — C78.2 SECONDARY MALIGNANT NEOPLASM OF PLEURA: ICD-10-CM

## 2023-09-12 DIAGNOSIS — C34.12 MALIGNANT NEOPLASM OF UPPER LOBE, LEFT BRONCHUS OR LUNG: ICD-10-CM

## 2023-09-12 DIAGNOSIS — I26.99 OTHER PULMONARY EMBOLISM W/OUT ACUTE COR PULMONALE: ICD-10-CM

## 2023-09-12 DIAGNOSIS — J91.0 MALIGNANT PLEURAL EFFUSION: ICD-10-CM

## 2023-09-12 DIAGNOSIS — Z51.11 ENCOUNTER FOR ANTINEOPLASTIC CHEMOTHERAPY: ICD-10-CM

## 2023-09-12 DIAGNOSIS — C78.6 SECONDARY MALIGNANT NEOPLASM OF RETROPERITONEUM AND PERITONEUM: ICD-10-CM

## 2023-09-12 LAB
ALBUMIN SERPL ELPH-MCNC: 3.2 G/DL — LOW (ref 3.3–5)
ALP SERPL-CCNC: 99 U/L — SIGNIFICANT CHANGE UP (ref 40–120)
ALT FLD-CCNC: <5 U/L — LOW (ref 10–45)
ANION GAP SERPL CALC-SCNC: 13 MMOL/L — SIGNIFICANT CHANGE UP (ref 5–17)
AST SERPL-CCNC: 28 U/L — SIGNIFICANT CHANGE UP (ref 10–40)
BASOPHILS # BLD AUTO: 0.01 K/UL — SIGNIFICANT CHANGE UP (ref 0–0.2)
BASOPHILS NFR BLD AUTO: 0.2 % — SIGNIFICANT CHANGE UP (ref 0–2)
BILIRUB SERPL-MCNC: 0.3 MG/DL — SIGNIFICANT CHANGE UP (ref 0.2–1.2)
BUN SERPL-MCNC: 28 MG/DL — HIGH (ref 7–23)
CALCIUM SERPL-MCNC: 9.6 MG/DL — SIGNIFICANT CHANGE UP (ref 8.4–10.5)
CHLORIDE SERPL-SCNC: 100 MMOL/L — SIGNIFICANT CHANGE UP (ref 96–108)
CO2 SERPL-SCNC: 27 MMOL/L — SIGNIFICANT CHANGE UP (ref 22–31)
CREAT SERPL-MCNC: 1.86 MG/DL — HIGH (ref 0.5–1.3)
EGFR: 42 ML/MIN/1.73M2 — LOW
EOSINOPHIL # BLD AUTO: 0.02 K/UL — SIGNIFICANT CHANGE UP (ref 0–0.5)
EOSINOPHIL NFR BLD AUTO: 0.4 % — SIGNIFICANT CHANGE UP (ref 0–6)
GLUCOSE SERPL-MCNC: 96 MG/DL — SIGNIFICANT CHANGE UP (ref 70–99)
HCT VFR BLD CALC: 29.1 % — LOW (ref 39–50)
HGB BLD-MCNC: 9.4 G/DL — LOW (ref 13–17)
IMM GRANULOCYTES NFR BLD AUTO: 0.2 % — SIGNIFICANT CHANGE UP (ref 0–0.9)
LYMPHOCYTES # BLD AUTO: 0.81 K/UL — LOW (ref 1–3.3)
LYMPHOCYTES # BLD AUTO: 16.7 % — SIGNIFICANT CHANGE UP (ref 13–44)
MCHC RBC-ENTMCNC: 30.5 PG — SIGNIFICANT CHANGE UP (ref 27–34)
MCHC RBC-ENTMCNC: 32.3 G/DL — SIGNIFICANT CHANGE UP (ref 32–36)
MCV RBC AUTO: 94.5 FL — SIGNIFICANT CHANGE UP (ref 80–100)
MONOCYTES # BLD AUTO: 0.6 K/UL — SIGNIFICANT CHANGE UP (ref 0–0.9)
MONOCYTES NFR BLD AUTO: 12.4 % — SIGNIFICANT CHANGE UP (ref 2–14)
NEUTROPHILS # BLD AUTO: 3.4 K/UL — SIGNIFICANT CHANGE UP (ref 1.8–7.4)
NEUTROPHILS NFR BLD AUTO: 70.1 % — SIGNIFICANT CHANGE UP (ref 43–77)
NRBC # BLD: 0 /100 WBCS — SIGNIFICANT CHANGE UP (ref 0–0)
PLATELET # BLD AUTO: 323 K/UL — SIGNIFICANT CHANGE UP (ref 150–400)
POTASSIUM SERPL-MCNC: 4.4 MMOL/L — SIGNIFICANT CHANGE UP (ref 3.5–5.3)
POTASSIUM SERPL-SCNC: 4.4 MMOL/L — SIGNIFICANT CHANGE UP (ref 3.5–5.3)
PROT SERPL-MCNC: 8.6 G/DL — HIGH (ref 6–8.3)
RBC # BLD: 3.08 M/UL — LOW (ref 4.2–5.8)
RBC # FLD: 18.4 % — HIGH (ref 10.3–14.5)
SODIUM SERPL-SCNC: 140 MMOL/L — SIGNIFICANT CHANGE UP (ref 135–145)
WBC # BLD: 4.85 K/UL — SIGNIFICANT CHANGE UP (ref 3.8–10.5)
WBC # FLD AUTO: 4.85 K/UL — SIGNIFICANT CHANGE UP (ref 3.8–10.5)

## 2023-09-12 PROCEDURE — 99214 OFFICE O/P EST MOD 30 MIN: CPT

## 2023-09-12 RX ORDER — ONDANSETRON 8 MG/1
8 TABLET, ORALLY DISINTEGRATING ORAL
Qty: 30 | Refills: 5 | Status: ACTIVE | COMMUNITY
Start: 2023-09-12 | End: 1900-01-01

## 2023-09-13 LAB
APTT BLD: 26.6 SEC — SIGNIFICANT CHANGE UP (ref 24.5–35.6)
INR BLD: 1.07 RATIO — SIGNIFICANT CHANGE UP (ref 0.85–1.18)
PROTHROM AB SERPL-ACNC: 12.1 SEC — SIGNIFICANT CHANGE UP (ref 9.5–13)

## 2023-09-14 ENCOUNTER — OUTPATIENT (OUTPATIENT)
Dept: OUTPATIENT SERVICES | Facility: HOSPITAL | Age: 58
LOS: 1 days | End: 2023-09-14
Payer: COMMERCIAL

## 2023-09-14 ENCOUNTER — APPOINTMENT (OUTPATIENT)
Dept: ULTRASOUND IMAGING | Facility: IMAGING CENTER | Age: 58
End: 2023-09-14
Payer: MEDICAID

## 2023-09-14 DIAGNOSIS — R18.0 MALIGNANT ASCITES: ICD-10-CM

## 2023-09-14 DIAGNOSIS — Z98.890 OTHER SPECIFIED POSTPROCEDURAL STATES: Chronic | ICD-10-CM

## 2023-09-14 DIAGNOSIS — C34.12 MALIGNANT NEOPLASM OF UPPER LOBE, LEFT BRONCHUS OR LUNG: ICD-10-CM

## 2023-09-14 PROCEDURE — 49083 ABD PARACENTESIS W/IMAGING: CPT

## 2023-09-15 PROBLEM — D50.9 ANEMIA, IRON DEFICIENCY: Status: ACTIVE | Noted: 2023-07-11

## 2023-09-15 PROBLEM — I26.99 ACUTE PULMONARY EMBOLISM WITHOUT ACUTE COR PULMONALE, UNSPECIFIED PULMONARY EMBOLISM TYPE: Status: ACTIVE | Noted: 2022-10-18

## 2023-09-15 PROBLEM — C78.6: Status: ACTIVE | Noted: 2023-05-09

## 2023-09-15 PROBLEM — C78.01 MALIGNANT NEOPLASM METASTATIC TO RIGHT LUNG: Status: ACTIVE | Noted: 2023-05-09

## 2023-09-15 PROBLEM — C77.2 SECONDARY MALIGNANT NEOPLASM OF MESENTERIC LYMPH NODES: Status: ACTIVE | Noted: 2023-05-09

## 2023-09-15 PROBLEM — C77.0 SECONDARY MALIGNANT NEOPLASM OF SUPRACLAVICULAR LYMPH NODES: Status: ACTIVE | Noted: 2023-05-09

## 2023-09-15 PROBLEM — R18.0 MALIGNANT ASCITES: Status: ACTIVE | Noted: 2023-06-02

## 2023-09-15 PROBLEM — J91.0 MALIGNANT PLEURAL EFFUSION: Status: ACTIVE | Noted: 2022-10-03

## 2023-09-15 PROBLEM — C34.12 MALIGNANT NEOPLASM OF UPPER LOBE OF LEFT LUNG: Status: ACTIVE | Noted: 2023-05-09

## 2023-09-15 PROBLEM — Z51.11 ENCOUNTER FOR ANTINEOPLASTIC CHEMOTHERAPY: Status: ACTIVE | Noted: 2023-06-21

## 2023-09-15 PROBLEM — C78.2 SECONDARY MALIGNANCY OF PARIETAL PLEURA: Status: ACTIVE | Noted: 2023-05-09

## 2023-09-15 PROBLEM — C77.1 SECONDARY MALIGNANT NEOPLASM OF BRONCHOPULMONARY LYMPH NODES: Status: ACTIVE | Noted: 2023-05-09

## 2023-09-18 ENCOUNTER — INPATIENT (INPATIENT)
Facility: HOSPITAL | Age: 58
LOS: 19 days | End: 2023-10-08
Attending: INTERNAL MEDICINE | Admitting: INTERNAL MEDICINE
Payer: MEDICAID

## 2023-09-18 VITALS
HEART RATE: 117 BPM | SYSTOLIC BLOOD PRESSURE: 126 MMHG | RESPIRATION RATE: 20 BRPM | HEIGHT: 70 IN | DIASTOLIC BLOOD PRESSURE: 81 MMHG | TEMPERATURE: 98 F | OXYGEN SATURATION: 100 %

## 2023-09-18 DIAGNOSIS — Z98.890 OTHER SPECIFIED POSTPROCEDURAL STATES: Chronic | ICD-10-CM

## 2023-09-18 LAB
ALBUMIN SERPL ELPH-MCNC: 3 G/DL — LOW (ref 3.3–5)
ALP SERPL-CCNC: 133 U/L — HIGH (ref 40–120)
ALT FLD-CCNC: 9 U/L — SIGNIFICANT CHANGE UP (ref 4–41)
ANION GAP SERPL CALC-SCNC: 11 MMOL/L — SIGNIFICANT CHANGE UP (ref 7–14)
APTT BLD: 29.9 SEC — SIGNIFICANT CHANGE UP (ref 24.5–35.6)
AST SERPL-CCNC: 23 U/L — SIGNIFICANT CHANGE UP (ref 4–40)
BASOPHILS # BLD AUTO: 0.02 K/UL — SIGNIFICANT CHANGE UP (ref 0–0.2)
BASOPHILS NFR BLD AUTO: 0.5 % — SIGNIFICANT CHANGE UP (ref 0–2)
BILIRUB SERPL-MCNC: 0.2 MG/DL — SIGNIFICANT CHANGE UP (ref 0.2–1.2)
BLD GP AB SCN SERPL QL: NEGATIVE — SIGNIFICANT CHANGE UP
BUN SERPL-MCNC: 25 MG/DL — HIGH (ref 7–23)
CALCIUM SERPL-MCNC: 9.4 MG/DL — SIGNIFICANT CHANGE UP (ref 8.4–10.5)
CHLORIDE SERPL-SCNC: 102 MMOL/L — SIGNIFICANT CHANGE UP (ref 98–107)
CO2 SERPL-SCNC: 27 MMOL/L — SIGNIFICANT CHANGE UP (ref 22–31)
CREAT SERPL-MCNC: 1.72 MG/DL — HIGH (ref 0.5–1.3)
EGFR: 46 ML/MIN/1.73M2 — LOW
EOSINOPHIL # BLD AUTO: 0.01 K/UL — SIGNIFICANT CHANGE UP (ref 0–0.5)
EOSINOPHIL NFR BLD AUTO: 0.3 % — SIGNIFICANT CHANGE UP (ref 0–6)
GLUCOSE SERPL-MCNC: 107 MG/DL — HIGH (ref 70–99)
HCT VFR BLD CALC: 33.6 % — LOW (ref 39–50)
HGB BLD-MCNC: 10.5 G/DL — LOW (ref 13–17)
IANC: 3.03 K/UL — SIGNIFICANT CHANGE UP (ref 1.8–7.4)
IMM GRANULOCYTES NFR BLD AUTO: 0.5 % — SIGNIFICANT CHANGE UP (ref 0–0.9)
INR BLD: 1.01 RATIO — SIGNIFICANT CHANGE UP (ref 0.85–1.18)
LIDOCAIN IGE QN: 43 U/L — SIGNIFICANT CHANGE UP (ref 7–60)
LYMPHOCYTES # BLD AUTO: 0.63 K/UL — LOW (ref 1–3.3)
LYMPHOCYTES # BLD AUTO: 15.9 % — SIGNIFICANT CHANGE UP (ref 13–44)
MCHC RBC-ENTMCNC: 29.7 PG — SIGNIFICANT CHANGE UP (ref 27–34)
MCHC RBC-ENTMCNC: 31.3 GM/DL — LOW (ref 32–36)
MCV RBC AUTO: 95.2 FL — SIGNIFICANT CHANGE UP (ref 80–100)
MONOCYTES # BLD AUTO: 0.26 K/UL — SIGNIFICANT CHANGE UP (ref 0–0.9)
MONOCYTES NFR BLD AUTO: 6.5 % — SIGNIFICANT CHANGE UP (ref 2–14)
NEUTROPHILS # BLD AUTO: 3.03 K/UL — SIGNIFICANT CHANGE UP (ref 1.8–7.4)
NEUTROPHILS NFR BLD AUTO: 76.3 % — SIGNIFICANT CHANGE UP (ref 43–77)
NRBC # BLD: 0 /100 WBCS — SIGNIFICANT CHANGE UP (ref 0–0)
NRBC # FLD: 0 K/UL — SIGNIFICANT CHANGE UP (ref 0–0)
PLATELET # BLD AUTO: 395 K/UL — SIGNIFICANT CHANGE UP (ref 150–400)
POTASSIUM SERPL-MCNC: 4.4 MMOL/L — SIGNIFICANT CHANGE UP (ref 3.5–5.3)
POTASSIUM SERPL-SCNC: 4.4 MMOL/L — SIGNIFICANT CHANGE UP (ref 3.5–5.3)
PROT SERPL-MCNC: 8.5 G/DL — HIGH (ref 6–8.3)
PROTHROM AB SERPL-ACNC: 11.3 SEC — SIGNIFICANT CHANGE UP (ref 9.5–13)
RBC # BLD: 3.53 M/UL — LOW (ref 4.2–5.8)
RBC # FLD: 17.6 % — HIGH (ref 10.3–14.5)
RH IG SCN BLD-IMP: POSITIVE — SIGNIFICANT CHANGE UP
SODIUM SERPL-SCNC: 140 MMOL/L — SIGNIFICANT CHANGE UP (ref 135–145)
WBC # BLD: 3.97 K/UL — SIGNIFICANT CHANGE UP (ref 3.8–10.5)
WBC # FLD AUTO: 3.97 K/UL — SIGNIFICANT CHANGE UP (ref 3.8–10.5)

## 2023-09-18 PROCEDURE — 93010 ELECTROCARDIOGRAM REPORT: CPT

## 2023-09-18 PROCEDURE — 70491 CT SOFT TISSUE NECK W/DYE: CPT | Mod: 26,MA

## 2023-09-18 PROCEDURE — 71275 CT ANGIOGRAPHY CHEST: CPT | Mod: 26,MA

## 2023-09-18 PROCEDURE — 99285 EMERGENCY DEPT VISIT HI MDM: CPT

## 2023-09-18 RX ORDER — FAMOTIDINE 10 MG/ML
20 INJECTION INTRAVENOUS ONCE
Refills: 0 | Status: COMPLETED | OUTPATIENT
Start: 2023-09-18 | End: 2023-09-18

## 2023-09-18 RX ADMIN — FAMOTIDINE 20 MILLIGRAM(S): 10 INJECTION INTRAVENOUS at 22:09

## 2023-09-18 NOTE — ED PROVIDER NOTE - PHYSICAL EXAMINATION
Nico Briseno MD (PGY1)   Physical Exam:    Gen: Cachectic, AOx3,  Head: NCAT  Lung: CTAB, no respiratory distress, no wheezes/rhonchi/rales B/L  CV: RRR, no murmurs, rubs or gallops, pitting edema of the lower extremities with R>L to the ankles  Abd: Tenderness to the epigastric area, soft, ND, no guarding, no rigidity, no rebound tenderness, no CVA tenderness   MSK: no visible deformities, ROM normal in UE/LE, no back pain  Neuro: No focal sensory or motor deficits  Skin: Warm, well perfused, no rash  Psych: normal affect, calm

## 2023-09-18 NOTE — ED ADULT NURSE NOTE - OBJECTIVE STATEMENT
as per pt, chief complaints-SOB and difficulty eating for 4 days. also complains of abdominal discomfort. denies pain at this time.

## 2023-09-18 NOTE — ED PROVIDER NOTE - NS ED ROS FT
GENERAL: Weakness, No fever or chills  EYES: No change in vision  HEENT: Inability to swallow solids. difficulty swallowing liquids  CARDIAC: No chest pain  PULMONARY: No cough or SOB  GI: abdominal pain, nausea, vomiting, no diarrhea or constipation  : No changes in urination  SKIN: No rashes  NEURO: No headache, no numbness  MSK: No joint pain  Otherwise as HPI or negative.

## 2023-09-18 NOTE — ED PROVIDER NOTE - OBJECTIVE STATEMENT
58-year-old male with past medical history of non-small cell lung cancer taking paclitaxel, hospitalization 1 month ago due to PE and SBO presenting due to 30 pound weight loss within the past month, inability to tolerate p.o., nausea, vomiting, and weakness.  The patient was having trouble swallowing p.o. during previous hospitalization endorses symptoms were better upon discharge.  Within the past 3 to 4 weeks, he has had increasingly difficulty swallowing claims in the last 2 days he has not been able to swallow solid foods without throwing up and states it is difficult to swallow liquids.  He states he feels like something is blocking him from swallowing and he feels a burning sensation when trying to swallow.  The patient received his last dose of chemo 2 days ago but endorses the symptoms began before this dose.  He also endorses weakness he states he is able to walk.  Denies fevers, chills, cough, chest pain, pain with urination, diarrhea, constipation, headaches, confusion.

## 2023-09-18 NOTE — ED PROVIDER NOTE - ATTENDING CONTRIBUTION TO CARE
58-year-old male past medical history of non-small cell lung cancer presents to ED for evaluation of shortness of breath worsening weakness with nausea and vomiting, decreased ability to swallow solidsPE: tired appearing, RRR, CTA BL lungs, abd soft NTND A/P Labs, imaging, medicate, admit

## 2023-09-18 NOTE — ED ADULT NURSE NOTE - NSFALLUNIVINTERV_ED_ALL_ED
Bed/Stretcher in lowest position, wheels locked, appropriate side rails in place/Call bell, personal items and telephone in reach/Instruct patient to call for assistance before getting out of bed/chair/stretcher/Non-slip footwear applied when patient is off stretcher/Mouthcard to call system/Physically safe environment - no spills, clutter or unnecessary equipment/Purposeful proactive rounding/Room/bathroom lighting operational, light cord in reach

## 2023-09-18 NOTE — ED PROVIDER NOTE - PROGRESS NOTE DETAILS
Spoke with Hospitalist who requested KUB X-ray to evaluate for obstructive patterns indicative of SBO. KUB x-ray showed no air fluid levels, and patient advocated having a bowel movement the morning of 9/18. Will reevaluate patient to determine if famotidine improved his nausea. If not, patient will be given zofran. Hospitalist agreed to admit patient.  Nico Briseno MD (PGY 1) Patient endorsed improved nausea. Said before it was a 7/10, now it is a 5/10. Will administer zofran for further symptomatic relief.   Nico Briseno MD (PGY 1)

## 2023-09-18 NOTE — ED PROVIDER NOTE - CLINICAL SUMMARY MEDICAL DECISION MAKING FREE TEXT BOX
58-year-old male past medical history of non-small cell lung cancer on Abraxane presenting due to nausea, vomiting, weight loss, inability about tolerate p.o., weakness, and shortness of breath.  Patient was hospitalized 1 month ago was found to have SBO and PE.  Concerned for PE due to weakness and shortness of breath, will order CTA PE to rule out PE.  Also concern for metastasis causing esophageal obstruction.  CTA elucidates a potential obstruction.  In addition concern for electrolyte abnormality and anemia due to patient's inability to tolerate p.o. and recent chemotherapy usage.  Will order CBC, CMP, lipase to evaluate.  Patient would likely be admitted due to inability to tolerate p.o.

## 2023-09-19 ENCOUNTER — APPOINTMENT (OUTPATIENT)
Dept: INFUSION THERAPY | Facility: HOSPITAL | Age: 58
End: 2023-09-19

## 2023-09-19 DIAGNOSIS — Z29.9 ENCOUNTER FOR PROPHYLACTIC MEASURES, UNSPECIFIED: ICD-10-CM

## 2023-09-19 DIAGNOSIS — C34.90 MALIGNANT NEOPLASM OF UNSPECIFIED PART OF UNSPECIFIED BRONCHUS OR LUNG: ICD-10-CM

## 2023-09-19 DIAGNOSIS — R13.10 DYSPHAGIA, UNSPECIFIED: ICD-10-CM

## 2023-09-19 DIAGNOSIS — N17.9 ACUTE KIDNEY FAILURE, UNSPECIFIED: ICD-10-CM

## 2023-09-19 DIAGNOSIS — R11.2 NAUSEA WITH VOMITING, UNSPECIFIED: ICD-10-CM

## 2023-09-19 LAB
APPEARANCE UR: CLEAR — SIGNIFICANT CHANGE UP
BACTERIA # UR AUTO: NEGATIVE /HPF — SIGNIFICANT CHANGE UP
BILIRUB UR-MCNC: NEGATIVE — SIGNIFICANT CHANGE UP
CAST: 1 /LPF — SIGNIFICANT CHANGE UP (ref 0–4)
COLOR SPEC: YELLOW — SIGNIFICANT CHANGE UP
CREAT ?TM UR-MCNC: 195 MG/DL — SIGNIFICANT CHANGE UP
DIFF PNL FLD: NEGATIVE — SIGNIFICANT CHANGE UP
GLUCOSE UR QL: NEGATIVE MG/DL — SIGNIFICANT CHANGE UP
KETONES UR-MCNC: NEGATIVE MG/DL — SIGNIFICANT CHANGE UP
LEUKOCYTE ESTERASE UR-ACNC: NEGATIVE — SIGNIFICANT CHANGE UP
NITRITE UR-MCNC: NEGATIVE — SIGNIFICANT CHANGE UP
OSMOLALITY UR: 753 MOSM/KG — SIGNIFICANT CHANGE UP (ref 50–1200)
PH UR: 5 — SIGNIFICANT CHANGE UP (ref 5–8)
POTASSIUM UR-SCNC: 64.8 MMOL/L — SIGNIFICANT CHANGE UP
PROT ?TM UR-MCNC: 91 MG/DL — SIGNIFICANT CHANGE UP
PROT UR-MCNC: 30 MG/DL
PROT/CREAT UR-RTO: 0.5 RATIO — HIGH (ref 0–0.2)
RBC CASTS # UR COMP ASSIST: 1 /HPF — SIGNIFICANT CHANGE UP (ref 0–4)
SODIUM UR-SCNC: 44 MMOL/L — SIGNIFICANT CHANGE UP
SP GR SPEC: 1.05 — HIGH (ref 1–1.03)
SQUAMOUS # UR AUTO: 1 /HPF — SIGNIFICANT CHANGE UP (ref 0–5)
UROBILINOGEN FLD QL: 0.2 MG/DL — SIGNIFICANT CHANGE UP (ref 0.2–1)
UUN UR-MCNC: 1026 MG/DL — SIGNIFICANT CHANGE UP
WBC UR QL: 0 /HPF — SIGNIFICANT CHANGE UP (ref 0–5)

## 2023-09-19 PROCEDURE — 74018 RADEX ABDOMEN 1 VIEW: CPT | Mod: 26

## 2023-09-19 PROCEDURE — 99223 1ST HOSP IP/OBS HIGH 75: CPT

## 2023-09-19 PROCEDURE — 31575 DIAGNOSTIC LARYNGOSCOPY: CPT

## 2023-09-19 PROCEDURE — 99223 1ST HOSP IP/OBS HIGH 75: CPT | Mod: GC

## 2023-09-19 PROCEDURE — 76770 US EXAM ABDO BACK WALL COMP: CPT | Mod: 26

## 2023-09-19 PROCEDURE — 76705 ECHO EXAM OF ABDOMEN: CPT | Mod: 26

## 2023-09-19 PROCEDURE — 99223 1ST HOSP IP/OBS HIGH 75: CPT | Mod: 25

## 2023-09-19 RX ORDER — SODIUM CHLORIDE 9 MG/ML
1000 INJECTION, SOLUTION INTRAVENOUS
Refills: 0 | Status: DISCONTINUED | OUTPATIENT
Start: 2023-09-19 | End: 2023-09-24

## 2023-09-19 RX ORDER — ONDANSETRON 8 MG/1
4 TABLET, FILM COATED ORAL ONCE
Refills: 0 | Status: COMPLETED | OUTPATIENT
Start: 2023-09-19 | End: 2023-09-20

## 2023-09-19 RX ORDER — ENOXAPARIN SODIUM 100 MG/ML
60 INJECTION SUBCUTANEOUS EVERY 12 HOURS
Refills: 0 | Status: COMPLETED | OUTPATIENT
Start: 2023-09-19 | End: 2023-09-19

## 2023-09-19 RX ORDER — PANTOPRAZOLE SODIUM 20 MG/1
40 TABLET, DELAYED RELEASE ORAL
Refills: 0 | Status: DISCONTINUED | OUTPATIENT
Start: 2023-09-19 | End: 2023-09-20

## 2023-09-19 RX ORDER — ENOXAPARIN SODIUM 100 MG/ML
70 INJECTION SUBCUTANEOUS EVERY 12 HOURS
Refills: 0 | Status: DISCONTINUED | OUTPATIENT
Start: 2023-09-19 | End: 2023-09-19

## 2023-09-19 RX ORDER — PANTOPRAZOLE SODIUM 20 MG/1
40 TABLET, DELAYED RELEASE ORAL DAILY
Refills: 0 | Status: DISCONTINUED | OUTPATIENT
Start: 2023-09-19 | End: 2023-09-19

## 2023-09-19 RX ADMIN — PANTOPRAZOLE SODIUM 40 MILLIGRAM(S): 20 TABLET, DELAYED RELEASE ORAL at 17:23

## 2023-09-19 RX ADMIN — SODIUM CHLORIDE 75 MILLILITER(S): 9 INJECTION, SOLUTION INTRAVENOUS at 10:43

## 2023-09-19 RX ADMIN — PANTOPRAZOLE SODIUM 40 MILLIGRAM(S): 20 TABLET, DELAYED RELEASE ORAL at 13:59

## 2023-09-19 RX ADMIN — ENOXAPARIN SODIUM 60 MILLIGRAM(S): 100 INJECTION SUBCUTANEOUS at 17:23

## 2023-09-19 NOTE — H&P ADULT - ASSESSMENT
58M h/o non small cell lung ca on paclitaxel (last 1 week ago, follows with Dr. Son) p/w 1 month progressively worsening dysphagia. Recent admission for PE and SBO. Endorsed dysphagia with that admission. Barium esophagram showing GERD and small sliding hiatal hernia. D/c'd with protonix and outpt f/u. Dysphagia worsened over last month, stating he feels solids and liquids get stuck in his esophagus, also feels burning pain in esophagus/chest. Has to throw up solids bc it does not go down. Endorses poor po intake the past month with 30lb weight loss. CT neck n/l. KUB showing no obstruction.

## 2023-09-19 NOTE — SWALLOW BEDSIDE ASSESSMENT ADULT - SWALLOW EVAL: DIAGNOSIS
Patient demonstrates functional oral and pharyngeal stages of swallow, however with a suspected esophageal dysphagia component across limited PO trials. Oral stage is characterized by functional oral prep and transport with adequate oral clearance. Pharyngeal stage is characterized by timely swallow trigger (suspected) with palpable hyolaryngeal elevation/excursion. No overt, clinical s/s of laryngeal penetration/aspiration noted across all trials. Pt does, however, describes a "stuck" sensation in the mid-distal esophagus which improves with rest. As a result, would suggest GI consult and defer to GI for PO diet at this time. Patient demonstrates functional oral and pharyngeal stages of swallow, however with a suspected esophageal dysphagia component across limited PO acceptance. Oral stage is characterized by functional oral prep and transport with adequate oral clearance. Pharyngeal stage is characterized by timely swallow trigger (suspected) with palpable hyolaryngeal elevation/excursion. No overt, clinical s/s of laryngeal penetration/aspiration noted. Pt does, however, describes a "stuck" sensation in the mid-distal esophagus which improves with rest. He declines additional PO trials as he states he'll regurgitate if he takes any more. As a result, would suggest GI consult and defer to GI for PO diet at this time.

## 2023-09-19 NOTE — DISCHARGE NOTE PROVIDER - NSDCCPTREATMENT_GEN_ALL_CORE_FT
PRINCIPAL PROCEDURE  Procedure: CT neck  Findings and Treatment: FINDINGS:  NASAL CAVITIES: Within normal limits.  PHARYNX: Within normal limits.  LARYNX: Within normal limits.  ESOPHAGUS: Within normal limits.  PAROTID AND SUBMANDIBULAR GLANDS: Within normal limits.  THYROID: Within normal limits.  LYMPH NODES: No lymphadenopathy.  VESSELS: Within normal limits.  BONES: Mild degenerative changes of the cervical spine.  VISUALIZED PORTIONS:  INTRACRANIAL STRUCTURES: Within normal limits.  PARANASAL SINUSES: Bilateral maxillary sinus polyps versus retention   cysts.  LUNGS: Emphysema. Refer to same day CT chest for detailed evaluation.  IMPRESSION:  No mass or fluid collection.        SECONDARY PROCEDURE  Procedure: KUB  Findings and Treatment: INTERPRETATION:  Excreted contrast material fills the bladder with an indentation on its   base likely N enlarged prostate.  Air and stool are seen throughout the bowel and rectum.  The visualized portions of the lung bases are clear.  IMPRESSION:  Nonobstructive bowel gas pattern.     PRINCIPAL PROCEDURE  Procedure: CT neck  Findings and Treatment: FINDINGS:  NASAL CAVITIES: Within normal limits.  PHARYNX: Within normal limits.  LARYNX: Within normal limits.  ESOPHAGUS: Within normal limits.  PAROTID AND SUBMANDIBULAR GLANDS: Within normal limits.  THYROID: Within normal limits.  LYMPH NODES: No lymphadenopathy.  VESSELS: Within normal limits.  BONES: Mild degenerative changes of the cervical spine.  VISUALIZED PORTIONS:  INTRACRANIAL STRUCTURES: Within normal limits.  PARANASAL SINUSES: Bilateral maxillary sinus polyps versus retention   cysts.  LUNGS: Emphysema. Refer to same day CT chest for detailed evaluation.  IMPRESSION:  No mass or fluid collection.        SECONDARY PROCEDURE  Procedure: UGI endoscopy  Findings and Treatment: Findings:       LA Grade C (one or more mucosal breaks continuous between tops of 2 or        more mucosal folds, less than 75% circumference) esophagitis with no        bleeding was found in the entire esophagus. Biopsies were taken with a        cold forceps for histology.       Multiple diminutive whtie plaques were found in the entire esophagus        with appearance consistent with esophageal candidiasis. Biopsies were        taken with a cold forceps for histology.       A 2 cm hiatal hernia was present.       Diffuse severely congested, edematous and erythematous mucosa was found        in the entire examined stomach, but most notable in the gastric body        with severely thickened folds. This severely limited gastric distention        despite attempts at use of room air in place of CO2. Biopsies were taken        with a cold forceps for histology.       No gross lesions were noted in the duodenal bulb and in the second        portion of the duodenum.                                                                                   Impression:          - LA Grade C candidiasis esophagitis. Biopsied.                       - Multiple plaques in the esophagus consistent with                        esophageal candidiasis. Biopsied.                       - 2 cm hiatal hernia.                       - Severely edematous gastropathy with thickened gastric                        folds and limited distensibility, limited distensibility                        possibly in setting of extrinsic lesions/ascites.                        Biopsied.                       - No gross lesions in the duodenal bulb and in the                        second portion of the duodenum.      Procedure: KUB  Findings and Treatment: INTERPRETATION:  Excreted contrast material fills the bladder with an indentation on its   base likely N enlarged prostate.  Air and stool are seen throughout the bowel and rectum.  The visualized portions of the lung bases are clear.  IMPRESSION:  Nonobstructive bowel gas pattern.

## 2023-09-19 NOTE — H&P ADULT - ATTENDING COMMENTS
58M h/o non small cell lung ca on paclitaxel (last 1 week ago, follows with Dr. Son) p/w 1 month progressively worsening dysphagia.    T(C): 36.2 (09-19-23 @ 12:30), Max: 36.2 (09-19-23 @ 12:30)  HR: 92 (09-19-23 @ 12:30) (92 - 92)  BP: 108/75 (09-19-23 @ 12:30) (108/75 - 108/75)  RR: 17 (09-19-23 @ 12:30) (17 - 17)  SpO2: 99% (09-19-23 @ 12:30) (99% - 99%)    Reviewed labs and imaging     Dysphagia- r/o Esophageal spasm / obstruction / tumour  Hiatal hernia on MBS     GI consulted   For EGD    ENT consult  appreciated     Mild BETO likely from dehydration   Fluids

## 2023-09-19 NOTE — DISCHARGE NOTE PROVIDER - NSDCFUSCHEDAPPT_GEN_ALL_CORE_FT
Dylon Son  [facility]  Scheduled Appointment: 10/03/2023    [facility]  Scheduled Appointment: 10/03/2023    [facility]  Scheduled Appointment: 10/10/2023    Dylon Son  [facility]  Scheduled Appointment: 10/24/2023    [facility]  Scheduled Appointment: 10/24/2023    [facility]  Scheduled Appointment: 10/31/2023

## 2023-09-19 NOTE — CONSULT NOTE ADULT - ASSESSMENT
57yo M w/ ?CKD, PMHx NSCLC Dx Sept 2022, started on chemo at the same time presents with chief complaint of dysphagia to solids and liquids x1 mo with associated 30 lbs weight loss. This is the 1st time he has had dysphagia.    #NSCLC on Paclitaxel (recently added chemo agent)  #Dysphagia/Odynophagia  #Vomiting/Regurgitation  #Heart Burn  *CT Neck 9/18 - no mass lesion or collection seen  *Esophagram 8/03 - GE reflux, small sliding HH, 13mm tablet passed with slow transit    DDx: Candidal esophagitis vs. viral esphagitis vs. esophagitis dessicans superficialis vs less likely stricture      Recommendation:  -trend CBC, BMP  -NPO at midnight for diagnostic EGD tomorrow  -please ensure early labs  -please do not provide carafate for now until EGD completed as it will obscure visualization during procedure  -please hold Lovenox x12-24 hours if medically able      All recommendations preliminary until note signed by service attending.    Thank you for involving us in the care of this patient. Please contact should any concern or questions arise.    Luc Holden MD   Gastroenterology/Hepatology Fellow PGY-5  Available on [employer] Teams 7am - 5pm  [id number]    NON-URGENT CONSULTS:  Please email:  [email or web address]   OR  [email or web address]    After 5pm, please contact the on-call GI fellow. [phone]    AT NIGHT AND ON WEEKENDS:  Contact on-call GI fellow via answering service ([phone]) from 5pm-8am and on weekends/holidays

## 2023-09-19 NOTE — CONSULT NOTE ADULT - ASSESSMENT
58M with NSCLC diagnosed 9/2022, now on paclitaxel, with worsening dysphagia x1 month, feels food getting stuck in mid/distal esophagus. SLP eval cleared for regular diet from oropharyngeal perspective. Pt denies coughing or choking with feeds and can tolerate smoothies/puree. Scope showing pooling of secretions but widely patent airway, vocal folds mobile bilaterally.    - No acute ENT intervention  - plan for EGD with GI 9/20  - Diet per SLP/GI as tolerated  - Page ENT with any questions or concerns  - Pending review by attending 58M with NSCLC diagnosed 9/2022, now on paclitaxel, with worsening dysphagia x1 month, feels food getting stuck in mid/distal esophagus. SLP eval cleared for regular diet from oropharyngeal perspective. Pt denies coughing or choking with feeds and can tolerate smoothies/puree. Scope showing pooling of secretions but widely patent airway, vocal folds mobile bilaterally.    - No acute ENT intervention  - plan for EGD with GI 9/20  - Diet per SLP/GI as tolerated  - Page E[facility] with any questions or concerns  - d/w attending

## 2023-09-19 NOTE — H&P ADULT - PROBLEM SELECTOR PLAN 1
- 1mo hx of dysphagia to solids and liquids; poor po intake and 30 lb weight loss over last month  - 8/3 barium esophogram: GERD and small sliding hiatal hernia  - CT neck: wnl  - KUB: no obstruction  - started on protonix 40 IV qd  - fluids iso poor po intake  - speech and swallow eval  - GI consult appreciated - 1mo hx of dysphagia to solids and liquids; poor po intake and 30 lb weight loss over last month  - 8/3 barium esophogram: GERD and small sliding hiatal hernia  - CT neck: wnl  - KUB: no obstruction  - started on protonix 40 IV qd  - fluids iso poor po intake  - speech and swallow eval  - GI consult appreciated  - ENT consult appreciated

## 2023-09-19 NOTE — CONSULT NOTE ADULT - ASSESSMENT
58M h/o non small cell lung ca on paclitaxel (last 1 week ago, follows with Dr. Son) p/w 1 month progressively worsening dysphagia. Recent admission for PE and SBO. Endorsed dysphagia with that admission. Barium esophagram showing GERD and small sliding hiatal hernia. Nephrology consult called for abnormal renal function      Assessment:  1) Non oliguric CKD stage III likely chemotherapy induced renal injury/ATN/renal hypoperfusion/ascites/compression/obstructive uropathy  2) NSCLC with metastasis on chemotherapy  3) History of PE  4) Progressive dysphagia/history of SBO/ Hiatal hernia/GERD/Gastritis  5) Anemia of chronic illness  6) Hypoalbuminemia    Recommend:  Strict I/o  Avoid Nephrotoxic agents  Monitor urine output  Received IV Contrast earlier during admission  Check urinalysis, urine electrolytes as per ordered  Bilateral renal and bladder ultrasound to assess obstruction  Oncology/hematology follow up  Barium swallow  Check iron studies/Hgb/Hct  Optimal pain control  Work up for proteinuria/hematuria after urine studies available  Check PTH, Vitamin D 1,25 level, Phos, calcium level  Volume status and electrolytes noted  No urgent need for RRT/HD  Goals of care discussion/Advanced directives per patient/family  Will follow

## 2023-09-19 NOTE — H&P ADULT - NSHPPHYSICALEXAM_GEN_ALL_CORE
PHYSICAL EXAM:  GENERAL: NAD, lying in bed comfortably  HEAD:  Atraumatic, Normocephalic  EYES: EOMI, conjunctiva and sclera clear  ENT: Dry mucous membranes  NECK: Supple  CHEST/LUNG: Clear to auscultation bilaterally; No rales, rhonchi, wheezing, or rubs. Unlabored respirations  HEART: Regular rate and rhythm; No murmurs, rubs, or gallops  ABDOMEN: Bowel sounds present; Soft, Nontender, + Distension. No hepatomegally  EXTREMITIES:  2+ Peripheral Pulses, brisk capillary refill. No clubbing, cyanosis, or edema  NERVOUS SYSTEM:  Alert & Oriented X3, speech clear. No deficits   MSK: FROM all 4 extremities, full and equal strength  SKIN: No rashes or lesions

## 2023-09-19 NOTE — H&P ADULT - HISTORY OF PRESENT ILLNESS
58M h/o non small cell lung ca on paclitaxel (last 1 week ago, follows with Dr. Son) p/w 1 month progressively worsening dysphagia. Recent admission for PE and SBO. Endorsed dysphagia with that admission. Barium esophagram showing GERD and small sliding hiatal hernia. D/c'd with protonix and outpt f/u. Dysphagia worsened over last month, stating he feels solids and liquids get stuck in his esophagus, also feels burning pain in esophagus/chest. Has to throw up solids bc it does not go down. Endorses poor po intake the past month with 30lb weight loss. Denies f/c, diarrhea/constipation, SOB, CP, HA, urinary sxs.

## 2023-09-19 NOTE — SWALLOW BEDSIDE ASSESSMENT ADULT - COMMENTS
Per charting, "58M h/o non small cell lung ca on paclitaxel (last 1 week ago, follows with Dr. Son) p/w 1 month progressively worsening dysphagia. Recent admission for PE and SBO. Endorsed dysphagia with that admission. Barium esophagram showing GERD and small sliding hiatal hernia. D/c'd with protonix and outpt f/u. Dysphagia worsened over last month, stating he feels solids and liquids get stuck in his esophagus, also feels burning pain in esophagus/chest. Has to throw up solids bc it does not go down. Endorses poor po intake the past month with 30lb weight loss. Denies f/c, diarrhea/constipation, SOB, CP, HA, urinary sxs."    CR Abd/Pelv 9/19/23: 'The visualized portions of the lung bases are clear."    CT Chest 9/18/23: "LUNGS AND AIRWAYS: Layering debris in the distal trachea. Emphysema with a large right upper lobe bulla crossing midline, similar in appearance to 7/21/2023. A 7 mm nodule along the right major fissure, likely intrapulmonary lymph node (2-48). Right lower lobe peripheral 4 mm nodule is unchanged (2-105). Left upper lobe peripheral 2 mm nodule is unchanged (2-41). Left lower lobe subsegmental atelectasis. Calcified left lower lobe nodules."    CT Neck 9/18/23: "No mass or fluid collection."    Patient was received awake, alert and cooperative. He describes a "stuck" sensation in the mid-distal esophagus and intermittent vomiting/regurgitation during PO, resulting in limited PO intake and concerns for malnutrition/dehydration. Per charting, "58M h/o non small cell lung ca on paclitaxel (last 1 week ago, follows with Dr. Son) p/w 1 month progressively worsening dysphagia. Recent admission for PE and SBO. Endorsed dysphagia with that admission. Barium esophagram showing GERD and small sliding hiatal hernia. D/c'd with protonix and outpt f/u. Dysphagia worsened over last month, stating he feels solids and liquids get stuck in his esophagus, also feels burning pain in esophagus/chest. Has to throw up solids bc it does not go down. Endorses poor po intake the past month with 30lb weight loss. Denies f/c, diarrhea/constipation, SOB, CP, HA, urinary sxs."    CR Abd/Pelv 9/19/23: 'The visualized portions of the lung bases are clear."    CT Chest 9/18/23: "LUNGS AND AIRWAYS: Layering debris in the distal trachea. Emphysema with a large right upper lobe bulla crossing midline, similar in appearance to 7/21/2023. A 7 mm nodule along the right major fissure, likely intrapulmonary lymph node (2-48). Right lower lobe peripheral 4 mm nodule is unchanged (2-105). Left upper lobe peripheral 2 mm nodule is unchanged (2-41). Left lower lobe subsegmental atelectasis. Calcified left lower lobe nodules."    CT Neck 9/18/23: "No mass or fluid collection."    Of note, pt is familiar to this service from recent admission (8/5/23), at which time a regular solid diet and thin liquids were recommended (see chart for full report). Patient was received awake, alert and cooperative this PM. He describes a worsening "stuck" sensation in the mid-distal esophagus and intermittent vomiting/regurgitation during PO, resulting in limited PO intake and concerns for malnutrition/dehydration.

## 2023-09-19 NOTE — DISCHARGE NOTE PROVIDER - HOSPITAL COURSE
HPI:  58M h/o non small cell lung ca on paclitaxel (last 1 week ago, follows with Dr. Son) p/w 1 month progressively worsening dysphagia. Recent admission for PE and SBO. Endorsed dysphagia with that admission. Barium esophagram showing GERD and small sliding hiatal hernia. D/c'd with protonix and outpt f/u. Dysphagia worsened over last month, stating he feels solids and liquids get stuck in his esophagus, also feels burning pain in esophagus/chest. Has to throw up solids bc it does not go down. Endorses poor po intake the past month with 30lb weight loss. Denies f/c, diarrhea/constipation, SOB, CP, HA, urinary sxs. (19 Sep 2023 09:06)    Hospital Course:  CT neck was wnl. KUB showed no obstruction. Pt was started on protonix 40 bid and fluids iso poor po intake and burning sensation. GI was consulted, and endoscopy showed ____. Additionally, SCr was elevated to 1.72 on admission likely iso poor po intake. Improved with fluids?  Abdominal US was done showing moderate volume complex ascites.    Important Medication Changes and Reason:    Active or Pending Issues Requiring Follow-up:    Advanced Directives:   [ x] Full code  [ ] DNR  [ ] Hospice    Discharge Diagnoses:         HPI:  58M h/o non small cell lung ca on paclitaxel (last 1 week ago, follows with Dr. Son) p/w 1 month progressively worsening dysphagia. Recent admission for PE and SBO. Endorsed dysphagia with that admission. Barium esophagram showing GERD and small sliding hiatal hernia. D/c'd with protonix and outpt f/u. Dysphagia worsened over last month, stating he feels solids and liquids get stuck in his esophagus, also feels burning pain in esophagus/chest. Has to throw up solids bc it does not go down. Endorses poor po intake the past month with 30lb weight loss. Denies f/c, diarrhea/constipation, SOB, CP, HA, urinary sxs. (19 Sep 2023 09:06)    Hospital Course:  CT neck was wnl. KUB showed no obstruction. Pt was started on protonix 40 bid and fluids iso poor po intake and burning sensation. GI was consulted, and endoscopy showed esophageal candidiasis, 2cm hiatal hernia, severely edematous gastropathy. Pt was started on PPI PO BID plan for 8 weeks, then daily. Pt also started on fluconazole 400mg PO once followed by 200mg PO daily for 13 days total (total of 14 day course).  Abdominal US was done showing moderate volume complex ascites. Paracentesis was done on 9/20, removing 2200cc fluid.  Home lovenox of 70 bid iso recent PE was adjusted to 60 bid given current weight.    Important Medication Changes and Reason:  -protonix 40 PO BID [id number], then daily  -fluconazole 200 PO daily for 13 days    Active or Pending Issues Requiring Follow-up:    Advanced Directives:   [ x] Full code  [ ] DNR  [ ] Hospice    Discharge Diagnoses:  -esophageal candidiasis

## 2023-09-19 NOTE — DISCHARGE NOTE PROVIDER - NSDCMRMEDTOKEN_GEN_ALL_CORE_FT
dexAMETHasone 4 mg oral tablet: 1 tab(s) orally 3 times a day take 1 tab 3x/day for 3 days starting the day before chemo, FOLLOWUP WITH YOUR ONCOLOGIST  enoxaparin: 70 milligram(s) subcutaneously every 12 hours please confirm with interventional radiologist/oncology for further management  folic acid 1 mg oral tablet: 1 tab(s) orally once a day  metoclopramide 10 mg oral tablet: 1 tab(s) orally 4 times a day (before meals and at bedtime), As Needed - for nausea  Multiple Vitamins oral tablet: 1 tab(s) orally once a day  pantoprazole 40 mg oral delayed release tablet: 1 tab(s) orally once a day (before a meal)

## 2023-09-19 NOTE — H&P ADULT - NSHPREVIEWOFSYSTEMS_GEN_ALL_CORE
Constitutional:  (-) fever, (-) chills, (-) lethargy  Eyes:  (-) eye pain (-) visual changes  ENMT: (-) nasal discharge, (-) sore throat. (-) neck pain or stiffness  Cardiac: (-) chest pain (-) palpitations  Respiratory:  (-) cough (-) respiratory distress.   GI:  (-) nausea (-) vomiting (-) diarrhea (-) abdominal pain.  :  (-) dysuria (-) frequency (-) burning.  MS:  (-) back pain (-) joint pain.  Neuro:  (-) headache (-) numbness (-) tingling (-) focal weakness  Skin:  (-) rash (-) petichiae   Except as documented in the HPI,  all other systems are negative

## 2023-09-19 NOTE — CONSULT NOTE ADULT - ATTENDING COMMENTS
# Progressive dysphagia/odynophagia: Notes difficulty and discomfort with solids > liquids, but also burning pain with liquids. Barium esophagram with reflux and likely small HH, but tablet eventually passes GE junction. Given immunosuppression, patient at risk of infectious esophagitis (fungal, viral) vs may have erosive esophagitis in setting of reflux.   # NSCLC on chemo  # Weight loss, FTT likely in setting of chemo therapy  # PE on A/C (lovenox)    --Tentatively plan for EGD tomorrow; if no absolute contraindication, please hold A/C prior to procedure (biopsies can be performed while on A/C, but would defer further interventions including dilation if A/C continued)  --OK for diet as tolerated, NPO at MN  --PPI 40mg BID    Additional recommendations as above.
- No acute ENT intervention  - plan for EGD with GI 9/20  - Diet per SLP/GI as tolerated  - Page ENT with any questions or concerns  Agree with above assessment and plan.  Thank you for your referral.  Cammie Traore MD

## 2023-09-19 NOTE — H&P ADULT - NSHPLABSRESULTS_GEN_ALL_CORE
LABS:  cret                        10.5   3.97  )-----------( 395      ( 18 Sep 2023 22:08 )             33.6     09-18    140  |  102  |  25<H>  ----------------------------<  107<H>  4.4   |  27  |  1.72<H>    Ca    9.4      18 Sep 2023 22:08  Phos  3.2     09-18  Mg     2.00     09-18    TPro  8.5<H>  /  Alb  3.0<L>  /  TBili  0.2  /  DBili  x   /  AST  23  /  ALT  9   /  AlkPhos  133<H>  09-18    PT/INR - ( 18 Sep 2023 22:08 )   PT: 11.3 sec;   INR: 1.01 ratio         PTT - ( 18 Sep 2023 22:08 )  PTT:29.9 sec

## 2023-09-19 NOTE — DISCHARGE NOTE PROVIDER - DETAILS OF MALNUTRITION DIAGNOSIS/DIAGNOSES
This patient has been assessed with a concern for Malnutrition and was treated during this hospitalization for the following Nutrition diagnosis/diagnoses:     -  09/20/2023: Severe protein-calorie malnutrition   -  09/20/2023: Underweight (BMI < 19)

## 2023-09-19 NOTE — PATIENT PROFILE ADULT - FALL HARM RISK - RISK INTERVENTIONS

## 2023-09-19 NOTE — H&P ADULT - PROBLEM SELECTOR PLAN 3
- non small cell lung ca on paclitaxel (last 1 week ago, follows with Dr. Son)  - onc consult appreciated - non small cell lung ca on paclitaxel (last 1 week ago, follows with Dr. Son)  - onc consult appreciated  - abd US for possible paracentesis; previous malignant ascites

## 2023-09-19 NOTE — CONSULT NOTE ADULT - SUBJECTIVE AND OBJECTIVE BOX
Patient is a 58y old  Male who presents with a chief complaint of dysphagia (19 Sep 2023 09:06)      HPI:  58M h/o non small cell lung ca on paclitaxel (last 1 week ago, follows with Dr. Son) p/w 1 month progressively worsening dysphagia. Recent admission for PE and SBO. Endorsed dysphagia with that admission. Barium esophagram showing GERD and small sliding hiatal hernia. D/c'd with protonix and outpt f/u. Dysphagia worsened over last month, stating he feels solids and liquids get stuck in his esophagus, also feels burning pain in esophagus/chest. Has to throw up solids bc it does not go down. Endorses poor po intake the past month with 30lb weight loss. Denies f/c, diarrhea/constipation, SOB, CP, HA, urinary sxs. (19 Sep 2023 09:06)       Oncologic History:       Metastatic NSCLC with adenocarcinoma histology diagnosed September 2022. Patient presented to the hospital with SOB and was found to have a left-sided hydropneumothorax with trapped lung. He underwent a pleuroscopy with pleural biopsy as well as Pleurx catheter placement. Pleural biopsy revealed adenocarcinoma consistent with lung primary. Tumor tested PD-L1 low-positive and molecular testing was negative for actionable mutations (ARID1A, CASP8, among others). 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  Patient started first-line systemic therapy with combination chemotherapy and immunotherapy with carboplatin/pemetrexed and pembrolizumab in December 2022 and achieved a nice response. He was treated with 4 cycles of triplet therapy followed by pemetrexed/pembrolizumab maintenance. Pleurx catheter was subsequently removed in March 2023. Pemetrexed was subsequently held due to hematologic toxicity with anemia and he has been on single agent pembrolizumab since April 2023.  Restaging scan in April 2023 revealed new perihepatic ascites with an otherwise sustained response. He was sent for an ultrasound earlier this month revealing mild ascites. He underwent ultrasound paracentesis on 5/5/2023 with 1200 cc fluid removed with subsequent US-paracentesis in late May with 900cc fluid removed; cytology was positive for his known lung adenocarcinoma. Received IV iron repletion with Venofer completed late May 2023. Started single agent Pemetrexed in late May 2023 and treated through early July 2023 without response.     Disease: NSCLC    Pathology: - Left pleural biopsy 9/14/2022: Adenocarcinoma consistent with lung primary.  PD-L1 low-positive at 10%.  Foundation One: Negative for actionable mutations (ARID1A, CASP8, among others).        Interval History: The patient presents prior to C2,D1 with 2nd line Abraxane and Feraheme D1.  Since his last visit, he is s/p paracentesis on 9/5 with >4000cc removed.  He reports feeling improved for 1-2 days following the paracentesis, however then with increased SOB which he believes is related to reaccumulation of ascites. Admits to decreased appetite due to abdominal fullness. He admits to mild constipation which is managed with Dulcolax. He notes fatigue but denies N/V. He reports compliance with daily Lovenox injections.  Discussed going to the hospital, however he refuses and prefers to schedule repeat paracentesis for this week.       ROS: as above     PAST MEDICAL & SURGICAL HISTORY:  Former smoker      Non-small cell lung cancer with metastasis      Status post cardiac surgery  s/p open right thoracotomy for posterior cardiac mass removal > 20 years ago, reported by patient to be benign.          SOCIAL HISTORY:    FAMILY HISTORY:  Family history of cancer in father  Cancer of unknown origin on Father's side.        MEDICATIONS  (STANDING):  enoxaparin Injectable 70 milliGRAM(s) SubCutaneous every 12 hours  lactated ringers. 1000 milliLiter(s) (75 mL/Hr) IV Continuous <Continuous>  ondansetron Injectable 4 milliGRAM(s) IV Push once  pantoprazole  Injectable 40 milliGRAM(s) IV Push daily    MEDICATIONS  (PRN):      Allergies    No Known Allergies    Intolerances        Vital Signs Last 24 Hrs  T(C): 36.2 (19 Sep 2023 05:29), Max: 37.1 (18 Sep 2023 23:51)  T(F): 97.2 (19 Sep 2023 05:29), Max: 98.7 (18 Sep 2023 23:51)  HR: 93 (19 Sep 2023 05:29) (91 - 134)  BP: 131/81 (19 Sep 2023 05:29) (111/83 - 131/81)  BP(mean): 98 (18 Sep 2023 23:51) (98 - 98)  RR: 16 (19 Sep 2023 05:29) (16 - 20)  SpO2: 99% (19 Sep 2023 05:29) (95% - 100%)    Parameters below as of 19 Sep 2023 05:29  Patient On (Oxygen Delivery Method): room air        PHYSICAL EXAM  General: adult in NAD  HEENT: clear oropharynx, anicteric sclera, pink conjunctiva  Neck: supple  CV: normal S1/S2 with no murmur rubs or gallops  Lungs: positive air movement b/l ant lungs, clear to auscultation, no wheezes, no rales  Abdomen: soft non-tender non-distended, no hepatosplenomegaly  Ext: no clubbing cyanosis or edema  Skin: no rashes and no petechiae  Neuro: alert and oriented X 3, none focal    LABS:                          10.5   3.97  )-----------( 395      ( 18 Sep 2023 22:08 )             33.6         Mean Cell Volume : 95.2 fL  Mean Cell Hemoglobin : 29.7 pg  Mean Cell Hemoglobin Concentration : 31.3 gm/dL  Auto Neutrophil # : 3.03 K/uL  Auto Lymphocyte # : 0.63 K/uL  Auto Monocyte # : 0.26 K/uL  Auto Eosinophil # : 0.01 K/uL  Auto Basophil # : 0.02 K/uL  Auto Neutrophil % : 76.3 %  Auto Lymphocyte % : 15.9 %  Auto Monocyte % : 6.5 %  Auto Eosinophil % : 0.3 %  Auto Basophil % : 0.5 %      Serial CBC's  09-18 @ 22:08  Hct-33.6 / Hgb-10.5 / Plat-395 / RBC-3.53 / WBC-3.97      09-18    140  |  102  |  25<H>  ----------------------------<  107<H>  4.4   |  27  |  1.72<H>    Ca    9.4      18 Sep 2023 22:08  Phos  3.2     09-18  Mg     2.00     09-18    TPro  8.5<H>  /  Alb  3.0<L>  /  TBili  0.2  /  DBili  x   /  AST  23  /  ALT  9   /  AlkPhos  133<H>  09-18      PT/INR - ( 18 Sep 2023 22:08 )   PT: 11.3 sec;   INR: 1.01 ratio         PTT - ( 18 Sep 2023 22:08 )  PTT:29.9 sec                RADIOLOGY & ADDITIONAL STUDIES:

## 2023-09-19 NOTE — DISCHARGE NOTE PROVIDER - NSFOLLOWUPCLINICS_GEN_ALL_ED_FT
[facility]  Gastroenterology  [address] [place] [address]  Phone: [phone]  Fax:     [facility]  Gastroenterology  [address]  [place] [address]  Phone: [phone]  Fax:

## 2023-09-19 NOTE — CONSULT NOTE ADULT - ASSESSMENT
58M h/o non small cell lung ca on paclitaxel  (last chemo 1 week ago, follows with Dr Son) p/w month history of dysphasia. Recent admission for PE and SBO. Had dysphasia sxs then. Barium esophagram showing GERD and anal sliding hiatal hernia. D/c'd with protonix and outpt f/u but has not. Progressive worsening of sxs over last month with 30 lb weight loss and poor po intake. Feels solids and liquids get stuck in esophagus, also endorses burning sensation in chest/esophagus. Oncology consulted for further recommendations    CT chest angio 9/18  No pulmonary embolism.  Stable subcentimeter pulmonary nodules.  Small left pleural effusion and loculated upper abdominal ascites.  Stable sclerotic osseous lesions.      KUB 9/19  Nonobstructive bowel gas pattern.      #dysphagia in the setting of Met NSCLC  Metastatic NSCLC with adenocarcinoma histology; tumor tested PD-L1 low-positive and molecular testing was negative for actionable mutations (ARID1A, CASP8, among others). Patient started first-line systemic therapy with combination chemotherapy and immunotherapy with carboplatin/pemetrexed and pembrolizumab in December 2022 and achieved a nice response. He was treated with 4 cycles of triplet therapy followed by pemetrexed/pembrolizumab maintenance. Pleurx catheter was subsequently removed in March 2023. Pemetrexed was subsequently held due to hematologic toxicity with anemia and he was treated with single agent pembrolizumab since April 2023. Restaging CT April 2023 with new perihepatic ascites with a sustained response otherwise. He underwent US-paracentesis x 2 in May 2023 with positive ascitic fluid cytology for his known lung adenocarcinoma. Treatment changed with to single agent Pemetrexed on 5/30/23 as he never failed this agent; treated x 3 cycles through early July 2023 with lack of response. Treatment changed to Abraxane in late August 2023. He is s/p paracentesis on 9/5 with >4000cc removed.  -SLP consult/barium swallow  -GI consult  -US abd for possible paracentesis  -Palliative Care for symptom management  --No plans for inpatient chemotherapy  -C/w Supportive care, pain control, Nutrition, PT, DVT ppx  -Rest of care as per primary team  -Patient to followup with Dr. Son ([facility]) upon discharge  -Oncology will continue to follow with you    Case d/w oncology attending      Frederick THOMAS  Oncology Physician Assistant  Darlene SEXTON/MARISELA [facility]    Pager [phone] also available on TEAMS as Frederick THOMAS    If before 8am/after 5pm or on weekends please page On-call Oncology Fellow   58M h/o non small cell lung ca on paclitaxel  (last chemo 1 week ago, follows with Dr Son) p/w month history of dysphasia. Recent admission for PE and SBO. Had dysphasia sxs then. Barium esophagram showing GERD and anal sliding hiatal hernia. D/c'd with protonix and outpt f/u but has not. Progressive worsening of sxs over last month with 30 lb weight loss and poor po intake. Feels solids and liquids get stuck in esophagus, also endorses burning sensation in chest/esophagus. Oncology consulted for further recommendations    CT chest angio 9/18  No pulmonary embolism.  Stable subcentimeter pulmonary nodules.  Small left pleural effusion and loculated upper abdominal ascites.  Stable sclerotic osseous lesions.    CT neck 9/18  IMPRESSION:  No mass or fluid collection.    KUB 9/19  Nonobstructive bowel gas pattern.      #dysphagia in the setting of Met NSCLC  Metastatic NSCLC with adenocarcinoma histology; tumor tested PD-L1 low-positive and molecular testing was negative for actionable mutations (ARID1A, CASP8, among others). Patient started first-line systemic therapy with combination chemotherapy and immunotherapy with carboplatin/pemetrexed and pembrolizumab in December 2022 and achieved a nice response. He was treated with 4 cycles of triplet therapy followed by pemetrexed/pembrolizumab maintenance. Pleurx catheter was subsequently removed in March 2023. Pemetrexed was subsequently held due to hematologic toxicity with anemia and he was treated with single agent pembrolizumab since April 2023. Restaging CT April 2023 with new perihepatic ascites with a sustained response otherwise. He underwent US-paracentesis x 2 in May 2023 with positive ascitic fluid cytology for his known lung adenocarcinoma. Treatment changed with to single agent Pemetrexed on 5/30/23 as he never failed this agent; treated x 3 cycles through early July 2023 with lack of response. Treatment changed to Abraxane in late August 2023. He is s/p paracentesis on 9/5 with >4000cc removed.  -SLP consult/barium swallow  -GI consult  -US abd for possible paracentesis  -Palliative Care for symptom management  --No plans for inpatient chemotherapy  -C/w Supportive care, pain control, Nutrition, PT, DVT ppx  -Rest of care as per primary team  -Patient to followup with Dr. Son ([facility]) upon discharge  -Oncology will continue to follow with you    Case d/w oncology attending      Frederick THOMAS  Oncology Physician Assistant  Darlene SEXTON/MARISELA [facility]    Pager [phone] also available on TEAMS as Frederick THOMAS    If before 8am/after 5pm or on weekends please page On-call Oncology Fellow

## 2023-09-19 NOTE — CONSULT NOTE ADULT - SUBJECTIVE AND OBJECTIVE BOX
ENT Consult Note    HPI:  58M h/o non small cell lung ca on paclitaxel (last 1 week ago, follows with Dr. Son) p/w 1 month progressively worsening dysphagia. Recent admission for PE and SBO. Endorsed dysphagia with that admission. Barium esophagram showing GERD and small sliding hiatal hernia. D/c'd with protonix and outpt f/u. Dysphagia worsened over last month, stating he feels solids and liquids get stuck in his esophagus, also feels burning pain in esophagus/chest. Has to throw up solids bc it does not go down. Endorses poor po intake the past month with 30lb weight loss. Denies f/c, diarrhea/constipation, SOB, CP, HA, urinary sxs. (19 Sep 2023 09:06)    ENT Consult:  ENT consulted for dysphagia. Pt reports difficulty tolerating solids and liquids and feels it getting stuck in the mid esophagus. Also reports burning sensation in his esophagus when he swallows. He reports he is able to tolerate puree and smoothies. Seen by SLP who cleared him for regular diet from an oropharyngeal perspective. Pt denies coughing or choking when swallowing. No respiratory issues, no stridor.     PAST MEDICAL & SURGICAL HISTORY:  Former smoker      Non-small cell lung cancer with metastasis      Status post cardiac surgery  s/p open right thoracotomy for posterior cardiac mass removal > 20 years ago, reported by patient to be benign.          MEDICATIONS  (STANDING):  lactated ringers. 1000 milliLiter(s) (75 mL/Hr) IV Continuous <Continuous>  ondansetron Injectable 4 milliGRAM(s) IV Push once  pantoprazole  Injectable 40 milliGRAM(s) IV Push two times a day    MEDICATIONS  (PRN):      Social History:  Denies cigarette, alcohol, other drug use. (19 Sep 2023 09:06)      Vital Signs Last 24 Hrs  T(C): 36.2 (19 Sep 2023 12:30), Max: 37.1 (18 Sep 2023 23:51)  T(F): 97.2 (19 Sep 2023 12:30), Max: 98.7 (18 Sep 2023 23:51)  HR: 92 (19 Sep 2023 12:30) (91 - 134)  BP: 108/75 (19 Sep 2023 12:30) (108/75 - 131/81)  BP(mean): 98 (18 Sep 2023 23:51) (98 - 98)  RR: 17 (19 Sep 2023 12:30) (16 - 20)  SpO2: 99% (19 Sep 2023 12:30) (95% - 100%)    Parameters below as of 19 Sep 2023 12:30  Patient On (Oxygen Delivery Method): room air      PHYSICAL EXAM:    CONSTITUTIONAL: appears malnourished, NON-DYSMORPHIC, and in no acute distress.    HEAD: normocephalic, atraumatic.  EARS: The right/left pinna was normal.    NOSE: Normal external nose. Anterior nasal cavity patent with no obstruction. Inferior turbinates normally sized.  ORAL CAVITY/OROPHARYNX: normal mucosa. No erythema, lesions or bleeding.  NECK: No cervical lymphadenopathy  RESPIRATORY: Respirations unlabored, no increased work of breathing with use of accessory muscles and retractions. No stridor.  CARDIAC: Warm extremities, no cyanosis.                              10.5   3.97  )-----------( 395      ( 18 Sep 2023 22:08 )             33.6       09-18    140  |  102  |  25<H>  ----------------------------<  107<H>  4.4   |  27  |  1.72<H>    Ca    9.4      18 Sep 2023 22:08  Phos  3.2     09-18  Mg     2.00     09-18    TPro  8.5<H>  /  Alb  3.0<L>  /  TBili  0.2  /  DBili  x   /  AST  23  /  ALT  9   /  AlkPhos  133<H>  09-18      PT/INR - ( 18 Sep 2023 22:08 )   PT: 11.3 sec;   INR: 1.01 ratio         PTT - ( 18 Sep 2023 22:08 )  PTT:29.9 sec    Fiberoptic Nasopharyngolaryngoscopy (NPL):   Nasopharynx wnl  BOT/vallecula normal  Epiglottis sharp  AE folds nonedematous  Arytenoids mobile  Vocal folds mobile bilaterally  No masses or lesions visualized in post cricoid space or pyriform sinuses bilaterally  Pooling of secretions in pyriform sinuses

## 2023-09-19 NOTE — CONSULT NOTE ADULT - SUBJECTIVE AND OBJECTIVE BOX
Theo Cardenas MD (Nephrology)  [address], [facility],  [address],  [facility]  TEl: [phone]  Cell: [phone]    Patient is a 58y old  Male who presents with a chief complaint of dysphagia (19 Sep 2023 12:16)      HPI:  58M h/o non small cell lung ca on paclitaxel (last 1 week ago, follows with Dr. Son) p/w 1 month progressively worsening dysphagia. Recent admission for PE and SBO. Endorsed dysphagia with that admission. Barium esophagram showing GERD and small sliding hiatal hernia. D/c'd with protonix and outpt f/u. Dysphagia worsened over last month, stating he feels solids and liquids get stuck in his esophagus, also feels burning pain in esophagus/chest. Has to throw up solids bc it does not go down. Endorses poor po intake the past month with 30lb weight loss. Denies f/c, diarrhea/constipation, SOB, CP, HA, urinary sxs. (19 Sep 2023 09:06)      PAST MEDICAL & SURGICAL HISTORY:  Former smoker      Non-small cell lung cancer with metastasis      Status post cardiac surgery  s/p open right thoracotomy for posterior cardiac mass removal > 20 years ago, reported by patient to be benign.            Allergies:  No Known Allergies      Home Medications:   enoxaparin Injectable 60 milliGRAM(s) SubCutaneous every 12 hours  lactated ringers. 1000 milliLiter(s) IV Continuous <Continuous>  ondansetron Injectable 4 milliGRAM(s) IV Push once  pantoprazole  Injectable 40 milliGRAM(s) IV Push daily      Hospital Medications:   MEDICATIONS  (STANDING):  enoxaparin Injectable 60 milliGRAM(s) SubCutaneous every 12 hours  lactated ringers. 1000 milliLiter(s) (75 mL/Hr) IV Continuous <Continuous>  ondansetron Injectable 4 milliGRAM(s) IV Push once  pantoprazole  Injectable 40 milliGRAM(s) IV Push daily      SOCIAL HISTORY:  Denies ETOh, Smoking,     Family History:  FAMILY HISTORY:  Family history of cancer in father  Cancer of unknown origin on Father's side.        ROS:  CONSTITUTIONAL: No fever or chills.  HEENT: No headche, visual disturbances, hearing impairment.  RESPIRATORY: No shortness of breath, cough, wheezing or hemoptysis  CARDIOVASCULAR: No Chest pain, shortness of breath, palpitations, dizziness, syncope, orthopnea, PND or leg swelling.  GASTROINTESTINAL: No abdominal pain, nausea, vomiting, diarrhea, hematemesis or blood per rectum.  GENITOURINARY: No urinary frequency, urgency, gross hematuria, dysuria, foamy urine, flank or supra pubic pain, no prior history of kidney stones.  NEUROLOGICAL: No headache,  focal limb weakness, tingling or numbness sensation or seizure like activity  SKIN: No rash or skin lesion  MUSCULOSKELETAL: No leg pain, calf pain or swelling  Psych: Denies suicidal or homicidal ideation    VITALS:  T(F): 97.2 (09-19-23 @ 12:30), Max: 98.7 (09-18-23 @ 23:51)  HR: 92 (09-19-23 @ 12:30)  BP: 108/75 (09-19-23 @ 12:30)  RR: 17 (09-19-23 @ 12:30)  SpO2: 99% (09-19-23 @ 12:30)  Wt(kg): --    Height (cm): 177.8 (09-18 @ 17:42)  Weight (kg): 56.3 (09-19 @ 05:29)  BMI (kg/m2): 17.8 (09-19 @ 05:29)  BSA (m2): 1.7 (09-19 @ 05:29)  CAPILLARY BLOOD GLUCOSE            PHYSICAL EXAM:  GENERAL: Alert, awake, oriented x3   HEENT: ROBYN, EOMI, neck supple, no JVP, no carotid bruits, no palpable thyromegaly or lymphadenopathy, no carotid bruits  CHEST/LUNG: Bilateral clear breath sounds, no rales, no crepitations, no wheezing  HEART: Regular rate and rhythm, LOVE II/VI at LPSB, no gallops, no rub   ABDOMEN: Soft, nontender, non distended, bowel sounds present, no palpable organomegaly, no guarding, no rigidity, no rebound tenderness.  : No flank or supra pubic tenderness.  EXTREMITIES: Peripheral pulses are palpable, no pedal edema, no calf tenderness  Musculoskeletal: No joint or spinal tenderness  Neurology: AAOx3, no focal neurological deficit, Motor and sensory systems are intact.  SKIN: No rash or skin lesion    LABS:  09-18    140  |  102  |  25<H>  ----------------------------<  107<H>  4.4   |  27  |  1.72<H>    Ca    9.4      18 Sep 2023 22:08  Phos  3.2     09-18  Mg     2.00     09-18    TPro  8.5<H>  /  Alb  3.0<L>  /  TBili  0.2  /  DBili      /  AST  23  /  ALT  9   /  AlkPhos  133<H>  09-18    Creatinine Trend: 1.72 <--                        10.5   3.97  )-----------( 395      ( 18 Sep 2023 22:08 )             33.6     Urine Studies:  Urinalysis Basic - ( 18 Sep 2023 22:08 )    Color:  / Appearance:  / SG:  / pH:   Gluc: 107 mg/dL / Ketone:   / Bili:  / Urobili:    Blood:  / Protein:  / Nitrite:    Leuk Esterase:  / RBC:  / WBC    Sq Epi:  / Non Sq Epi:  / Bacteria:       Osmolality, Random Urine: 753 mosm/kg (09-19 @ 14:15)  Potassium, Random Urine: 64.8 mmol/L (09-19 @ 14:15)      RADIOLOGY & ADDITIONAL STUDIES:    EKG findings reviewed.    Imaging Personally Reviewed:  [x] YES  [ ] NO    Consultant(s) and primary physician Notes Reviewed:  [x] YES  [ ] NO    Care Discussed with Primary team/ Consultants/Other Providers [x] YES  [ ] NO           Theo Cardenas MD (Nephrology)  [address], [facility],  [address],  [facility]  TEl: [phone]  Cell: [phone]    Patient is a 58y old  Male who presents with a chief complaint of dysphagia (19 Sep 2023 12:16)      HPI:  58M h/o non small cell lung ca on paclitaxel (last 1 week ago, follows with Dr. Son) p/w 1 month progressively worsening dysphagia. Recent admission for PE and SBO. Endorsed dysphagia with that admission. Barium esophagram showing GERD and small sliding hiatal hernia. D/c'd with protonix and outpt f/u. Dysphagia worsened over last month, stating he feels solids and liquids get stuck in his esophagus, also feels burning pain in esophagus/chest. Has to throw up solids bc it does not go down. Endorses poor po intake the past month with 30lb weight loss. Denies f/c, diarrhea/constipation, SOB, CP, HA, urinary sxs. (19 Sep 2023 09:06)      PAST MEDICAL & SURGICAL HISTORY:  Former smoker      Non-small cell lung cancer with metastasis      Status post cardiac surgery  s/p open right thoracotomy for posterior cardiac mass removal > 20 years ago, reported by patient to be benign.            Allergies:  No Known Allergies      Home Medications:   enoxaparin Injectable 60 milliGRAM(s) SubCutaneous every 12 hours  lactated ringers. 1000 milliLiter(s) IV Continuous <Continuous>  ondansetron Injectable 4 milliGRAM(s) IV Push once  pantoprazole  Injectable 40 milliGRAM(s) IV Push daily      Hospital Medications:   MEDICATIONS  (STANDING):  enoxaparin Injectable 60 milliGRAM(s) SubCutaneous every 12 hours  lactated ringers. 1000 milliLiter(s) (75 mL/Hr) IV Continuous <Continuous>  ondansetron Injectable 4 milliGRAM(s) IV Push once  pantoprazole  Injectable 40 milliGRAM(s) IV Push daily      SOCIAL HISTORY:  Denies ETOh, Smoking,     Family History:  FAMILY HISTORY:  Family history of cancer in father  Cancer of unknown origin on Father's side.        ROS:  CONSTITUTIONAL: No fever or chills.  HEENT: No headache visual disturbances, hearing impairment.  RESPIRATORY: No shortness of breath, cough, wheezing or hemoptysis  CARDIOVASCULAR: Mild SOB  GASTROINTESTINAL: Abdominal distension but denies nausea/vomiting/diarrhea  GENITOURINARY: No flank or supra pubic pain  NEUROLOGICAL: No headache,  focal limb weakness, tingling or numbness sensation or seizure like activity  SKIN: No rash or skin lesion  MUSCULOSKELETAL: Bilateral trace ankle edema  Psych: Denies suicidal or homicidal ideation    VITALS:  T(F): 97.2 (09-19-23 @ 12:30), Max: 98.7 (09-18-23 @ 23:51)  HR: 92 (09-19-23 @ 12:30)  BP: 108/75 (09-19-23 @ 12:30)  RR: 17 (09-19-23 @ 12:30)  SpO2: 99% (09-19-23 @ 12:30)  Wt(kg): --    Height (cm): 177.8 (09-18 @ 17:42)  Weight (kg): 56.3 (09-19 @ 05:29)  BMI (kg/m2): 17.8 (09-19 @ 05:29)  BSA (m2): 1.7 (09-19 @ 05:29)  CAPILLARY BLOOD GLUCOSE            PHYSICAL EXAM:  GENERAL: Alert, awake, oriented x3   HEENT: ROBYN, EOMI, neck supple, no JVP  CHEST/LUNG: Bilateral clear breath sounds, no rales, no crepitations, no wheezing  HEART: Regular rate and rhythm, LOVE II/VI at LPSB, no gallops, no rub   ABDOMEN: Soft, distended, ascites present, bowel sounds present, no palpable organomegaly.  : No flank or supra pubic tenderness.  EXTREMITIES: Peripheral pulses are palpable, trace pedal edema, no calf tenderness  Musculoskeletal: No joint or spinal tenderness  Neurology: AAOx3, no focal neurological deficit  SKIN: No rash or skin lesion    LABS:  09-18    140  |  102  |  25<H>  ----------------------------<  107<H>  4.4   |  27  |  1.72<H>    Ca    9.4      18 Sep 2023 22:08  Phos  3.2     09-18  Mg     2.00     09-18    TPro  8.5<H>  /  Alb  3.0<L>  /  TBili  0.2  /  DBili      /  AST  23  /  ALT  9   /  AlkPhos  133<H>  09-18    Creatinine Trend: 1.72 <--                        10.5   3.97  )-----------( 395      ( 18 Sep 2023 22:08 )             33.6     Urine Studies:  Urinalysis Basic - ( 18 Sep 2023 22:08 )    Color:  / Appearance:  / SG:  / pH:   Gluc: 107 mg/dL / Ketone:   / Bili:  / Urobili:    Blood:  / Protein:  / Nitrite:    Leuk Esterase:  / RBC:  / WBC    Sq Epi:  / Non Sq Epi:  / Bacteria:       Osmolality, Random Urine: 753 mosm/kg (09-19 @ 14:15)  Potassium, Random Urine: 64.8 mmol/L (09-19 @ 14:15)      RADIOLOGY & ADDITIONAL STUDIES:  rad< from: US Abdomen Limited (09.19.23 @ 13:32) >    ACC: [id number] EXAM:  US ABDOMEN LIMITED   ORDERED BY: JEAN PAUL PALOMINO     PROCEDURE DATE:  09/19/2023          INTERPRETATION:  CLINICAL INFORMATION: History of malignant ascites.    COMPARISON: Abdominal ultrasound 7/20/2023.    TECHNIQUE: Limited ultrasound of the abdomen to evaluate for ascites.    FINDINGS:  Moderate volume ascites. Septations are seen in the left upper quadrant.    IMPRESSION:    Moderate volume complex ascites.    --- End of Report ---            MONAE JOHNSON MD; Attending Radiologist  This document has been electronically signed. Sep 19 2023  3:31PM    < end of copied text >    EKG findings reviewed.    Imaging Personally Reviewed:  [x] YES  [ ] NO    Consultant(s) and primary physician Notes Reviewed:  [x] YES  [ ] NO    Care Discussed with Primary team/ Consultants/Other Providers [x] YES  [ ] NO

## 2023-09-19 NOTE — CONSULT NOTE ADULT - SUBJECTIVE AND OBJECTIVE BOX
Chief Complaint:  Patient is a 58y old  Male who presents with a chief complaint of dysphagia (19 Sep 2023 09:19)      HPI:  57yo M w/ ?CKD, PMHx NSCLC Dx Sept 2022, started on chemo at the same time, reports new chemo agent started 1-2 mo ago - paclitaxel which he received 3 doses of. last dose 9/12. He has also required recurrent paracentesis for malignant ascites, most recently 9/14. Patient presents with chief complaint of dysphagia to solids and liquids x1 mo with associated 30 lbs weight loss. This is the 1st time he has had dysphagia. He feels like whenever he eats or drinks he feels like things are getting stuck and has a burning sensation in his chest. In small quantities, food and liquids can pass, but when he tries more he reports that he has regurgitation/vomiting. Currently eating minimal amount and denies n/v/AP/f/c/change in bowel habit. Never had an EGD or c-scope before. Is taking Lovenox BiD for recent PE ([facility] admission in August). When he was admitted, because he was having dysphagia at that time he underwent an esophagram which demonstrated GE reflux to the mid esophagus, a small sliding HH, and 13mm contrast tablet was slow to pass, but eventually passed completely.    Allergies:  No Known Allergies        Hospital Medications:  enoxaparin Injectable 60 milliGRAM(s) SubCutaneous every 12 hours  lactated ringers. 1000 milliLiter(s) IV Continuous <Continuous>  ondansetron Injectable 4 milliGRAM(s) IV Push once  pantoprazole  Injectable 40 milliGRAM(s) IV Push daily      PMHX/PSHX:  No pertinent past medical history    No pertinent past medical history    Former smoker    Non-small cell lung cancer with metastasis    No significant past surgical history    Status post cardiac surgery        Family history:  No pertinent family history in first degree relatives    Social History:     Tob: remote hx of smoking (quit years ago, smoked for 15 yrs)  EtOH: Denies current use  Illicit Drugs: Denies    ROS:   General:  No wt loss, fevers, chills, night sweats, fatigue  Eyes:  Good vision, no reported pain  ENT:  No sore throat, pain, runny nose, dysphagia  CV:  No pain, palpitations, hypo/hypertension  Pulm:  No dyspnea, cough, tachypnea, wheezing  GI:  As per HPI  :  No pain, bleeding, incontinence, nocturia  Muscle:  No pain, weakness  Neuro:  No weakness, tingling, memory problems  Psych:  No fatigue, insomnia, mood problems, depression  Endocrine:  No polyuria, polydipsia, cold/heat intolerance  Heme:  No petechiae, ecchymosis, easy bruisability  Skin:  No rash, tattoos, scars, edema    PHYSICAL EXAM:   GENERAL:  No acute distress  HEENT:  Normocephalic/atraumatic, no scleral icterus, severe temporal wasting  CHEST:  No accessory muscle use  HEART:  Regular rate and rhythm  ABDOMEN:  Soft, non-tender, non-distended, normoactive bowel sounds  EXTREMITIES: no LE edema. severe muscle and fat wasting  SKIN:  No rash  NEURO:  Alert and oriented x 3  -Swallow mechanics based on drinking water appear normal - patietn without coughing or sputtering, but reports chest burning once sip of water passes down esophagus. No regurgitation or vomiting noted with small sips of water.    Vital Signs:  Vital Signs Last 24 Hrs  T(C): 36.2 (19 Sep 2023 05:29), Max: 37.1 (18 Sep 2023 23:51)  T(F): 97.2 (19 Sep 2023 05:29), Max: 98.7 (18 Sep 2023 23:51)  HR: 93 (19 Sep 2023 05:29) (91 - 134)  BP: 131/81 (19 Sep 2023 05:29) (111/83 - 131/81)  BP(mean): 98 (18 Sep 2023 23:51) (98 - 98)  RR: 16 (19 Sep 2023 05:29) (16 - 20)  SpO2: 99% (19 Sep 2023 05:29) (95% - 100%)    Parameters below as of 19 Sep 2023 05:29  Patient On (Oxygen Delivery Method): room air      Daily Height in cm: 177.8 (18 Sep 2023 17:42)    Daily     LABS:                        10.5   3.97  )-----------( 395      ( 18 Sep 2023 22:08 )             33.6     Mean Cell Volume: 95.2 fL (09-18-23 @ 22:08)    09-18    140  |  102  |  25<H>  ----------------------------<  107<H>  4.4   |  27  |  1.72<H>    Ca    9.4      18 Sep 2023 22:08  Phos  3.2     09-18  Mg     2.00     09-18    TPro  8.5<H>  /  Alb  3.0<L>  /  TBili  0.2  /  DBili  x   /  AST  23  /  ALT  9   /  AlkPhos  133<H>  09-18    LIVER FUNCTIONS - ( 18 Sep 2023 22:08 )  Alb: 3.0 g/dL / Pro: 8.5 g/dL / ALK PHOS: 133 U/L / ALT: 9 U/L / AST: 23 U/L / GGT: x           PT/INR - ( 18 Sep 2023 22:08 )   PT: 11.3 sec;   INR: 1.01 ratio         PTT - ( 18 Sep 2023 22:08 )  PTT:29.9 sec  Urinalysis Basic - ( 18 Sep 2023 22:08 )    Color: x / Appearance: x / SG: x / pH: x  Gluc: 107 mg/dL / Ketone: x  / Bili: x / Urobili: x   Blood: x / Protein: x / Nitrite: x   Leuk Esterase: x / RBC: x / WBC x   Sq Epi: x / Non Sq Epi: x / Bacteria: x      Amylase Serum--      Lipase serum43       Ammonia--                          10.5   3.97  )-----------( 395      ( 18 Sep 2023 22:08 )             33.6       Imaging:  < from: CT Neck Soft Tissue w/ IV Cont (09.18.23 @ 22:54) >  ACC: [id number] EXAM:  CT NECK SOFT TISSUE IC   ORDERED BY: MARLENA WOOD     PROCEDURE DATE:  09/18/2023          INTERPRETATION:  CLINICAL INDICATION: Inability to swallow. Associated   vomiting. Stage IV non-small cell lung cancer.    TECHNIQUE:  Contrast enhanced CT of the soft tissues of the neck was performed.  Sagittal and coronal reformations were created.  Intravenous contrast: 90 cc Omnipaque 350 was administered, 10 cc was   discarded.    COMPARISON: None.    FINDINGS:    NASAL CAVITIES: Within normal limits.  PHARYNX: Within normal limits.  LARYNX: Within normal limits.  ESOPHAGUS: Within normal limits.    PAROTID AND SUBMANDIBULAR GLANDS: Within normal limits.  THYROID: Within normal limits.    LYMPH NODES: No lymphadenopathy.  VESSELS: Within normal limits.  BONES: Mild degenerative changes of the cervical spine.    VISUALIZED PORTIONS:  INTRACRANIAL STRUCTURES: Within normal limits.  PARANASAL SINUSES: Bilateral maxillary sinus polyps versus retention   cysts.  LUNGS: Emphysema. Refer to same day CT chest for detailed evaluation.    IMPRESSION:  No mass or fluid collection.    --- End of Report ---          ANDERSON JAIN MD; Resident Radiologist  This document has been electronically signed.  SADE DUKE MD; Attending Radiologist  This document has been electronically signed. Sep 19 2023 12:50AM    < end of copied text >  < from: Xray Kidney Ureter Bladder (09.19.23 @ 02:41) >  ACC: [id number] EXAM:  XR KUB 1 VIEW   ORDERED BY: DOUGLAS VERNON     PROCEDURE DATE:  09/19/2023          INTERPRETATION:  CLINICAL INDICATION: decreased p.o. intake. Evaluate for   small bowel obstruction    TECHNIQUE: Frontal radiograph of the abdomen.    COMPARISON: CT abdomen and pelvis 7/27/2023    INTERPRETATION:    Excreted contrast material fills the bladder with an indentation on its   base likely N enlarged prostate.    Air and stool are seen throughout the bowel and rectum.    The visualized portions of the lung bases are clear.    IMPRESSION:    Nonobstructive bowel gas pattern.    --- End of Report ---          ANDERSON JAIN MD; Resident Radiologist  This document has been electronically signed.  JONI LOWE MD; Attending Radiologist  This document has been electronically signed. Sep 19 2023  5:20AM    < end of copied text >      < from: Xray Esophagram Single Contrast (08.03.23 @ 14:33) >  ACC: [id number] EXAM:  XR ESOPH SNGL CON STUDY   ORDERED BY: ALFIE MUNIZ     PROCEDURE DATE:  08/03/2023          INTERPRETATION:  CLINICAL INFORMATION: Dysphagia, history of globus   sensation in lower neck/thoracic inlet.    TECHNIQUE: An esophagram was performed under fluoroscopy utilizing barium   as the contrast agent. Multiple spot fluoroscopic images were obtained. A   13 mm barium tablet was administered.    COMPARISON: CT chest, abdomen, and pelvis 7/21/2023.    FLUORO TIME: 3.2 minutes.    FINDINGS:    A  radiograph of the chest demonstrates surgical clips overlying the   mediastinum.    The patient swallowed barium without difficulty.    The esophagus demonstrates normal distensibility, contour and course.   Contrast passesfreely through the gastroesophageal junction into the   stomach.    Possible small sliding hiatal hernia. Marked gastroesophageal reflux to   at least the mid esophagus was observed during the exam.    A 13 mm barium tablet was administered and passed freely through the   esophagus. Passage of the tablet was briefly delayed at the   gastroesophageal junction, but did eventually pass into the stomach.    IMPRESSION:  Gastroesophageal reflux.    Possible small sliding hiatal hernia.    --- End of Report ---          JONI FALL MD; Resident Radiologist  This document has been electronically signed.  AURY CHAVIS MD; Attending Radiologist  This document has been electronically signed. Aug  3 2023  4:04PM    < end of copied text >       HPI:  59yo M w/ ?CKD, PMHx NSCLC Dx Sept 2022, started on chemo at the same time, reports new chemo agent started 1-2 mo ago - paclitaxel which he received 3 doses of. last dose 9/12. He has also required recurrent paracentesis for malignant ascites, most recently 9/14. Patient presents with chief complaint of dysphagia to solids and liquids x1 mo with associated 30 lbs weight loss. This is the 1st time he has had dysphagia. He feels like whenever he eats or drinks he feels like things are getting stuck and has a burning sensation in his chest. In small quantities, food and liquids can pass, but when he tries more he reports that he has regurgitation/vomiting. Currently eating minimal amount and denies n/v/AP/f/c/change in bowel habit. Never had an EGD or c-scope before. Is taking Lovenox BiD for recent PE ([facility] admission in August). When he was admitted, because he was having dysphagia at that time he underwent an esophagram which demonstrated GE reflux to the mid esophagus, a small sliding HH, and 13mm contrast tablet was slow to pass, but eventually passed completely.    Allergies:  No Known Allergies        Hospital Medications:  enoxaparin Injectable 60 milliGRAM(s) SubCutaneous every 12 hours  lactated ringers. 1000 milliLiter(s) IV Continuous <Continuous>  ondansetron Injectable 4 milliGRAM(s) IV Push once  pantoprazole  Injectable 40 milliGRAM(s) IV Push daily      PMHX/PSHX:  No pertinent past medical history    No pertinent past medical history    Former smoker    Non-small cell lung cancer with metastasis    No significant past surgical history    Status post cardiac surgery        Family history:  No pertinent family history in first degree relatives    Social History:     Tob: remote hx of smoking (quit years ago, smoked for 15 yrs)  EtOH: Denies current use  Illicit Drugs: Denies    ROS: 14-point ROS reviewed and negative except as per HPI above    PHYSICAL EXAM:   GENERAL:  No acute distress  HEENT:  Normocephalic/atraumatic, no scleral icterus, severe temporal wasting  CHEST:  No accessory muscle use  HEART:  Regular rate and rhythm  ABDOMEN:  Soft, non-tender, non-distended, normoactive bowel sounds  EXTREMITIES: no LE edema. severe muscle and fat wasting  SKIN:  No rash  NEURO:  Alert and oriented x 3  -Swallow mechanics based on drinking water appear normal - patient without coughing or sputtering, but reports chest burning once sip of water passes down esophagus. No regurgitation or vomiting noted with small sips of water.    Vital Signs:  Vital Signs Last 24 Hrs  T(C): 36.2 (19 Sep 2023 05:29), Max: 37.1 (18 Sep 2023 23:51)  T(F): 97.2 (19 Sep 2023 05:29), Max: 98.7 (18 Sep 2023 23:51)  HR: 93 (19 Sep 2023 05:29) (91 - 134)  BP: 131/81 (19 Sep 2023 05:29) (111/83 - 131/81)  BP(mean): 98 (18 Sep 2023 23:51) (98 - 98)  RR: 16 (19 Sep 2023 05:29) (16 - 20)  SpO2: 99% (19 Sep 2023 05:29) (95% - 100%)    Parameters below as of 19 Sep 2023 05:29  Patient On (Oxygen Delivery Method): room air      Daily Height in cm: 177.8 (18 Sep 2023 17:42)    Daily     LABS:                        10.5   3.97  )-----------( 395      ( 18 Sep 2023 22:08 )             33.6     Mean Cell Volume: 95.2 fL (09-18-23 @ 22:08)    09-18    140  |  102  |  25<H>  ----------------------------<  107<H>  4.4   |  27  |  1.72<H>    Ca    9.4      18 Sep 2023 22:08  Phos  3.2     09-18  Mg     2.00     09-18    TPro  8.5<H>  /  Alb  3.0<L>  /  TBili  0.2  /  DBili  x   /  AST  23  /  ALT  9   /  AlkPhos  133<H>  09-18    LIVER FUNCTIONS - ( 18 Sep 2023 22:08 )  Alb: 3.0 g/dL / Pro: 8.5 g/dL / ALK PHOS: 133 U/L / ALT: 9 U/L / AST: 23 U/L / GGT: x           PT/INR - ( 18 Sep 2023 22:08 )   PT: 11.3 sec;   INR: 1.01 ratio         PTT - ( 18 Sep 2023 22:08 )  PTT:29.9 sec  Urinalysis Basic - ( 18 Sep 2023 22:08 )    Color: x / Appearance: x / SG: x / pH: x  Gluc: 107 mg/dL / Ketone: x  / Bili: x / Urobili: x   Blood: x / Protein: x / Nitrite: x   Leuk Esterase: x / RBC: x / WBC x   Sq Epi: x / Non Sq Epi: x / Bacteria: x      Amylase Serum--      Lipase serum43       Ammonia--                          10.5   3.97  )-----------( 395      ( 18 Sep 2023 22:08 )             33.6       Imaging:  < from: CT Neck Soft Tissue w/ IV Cont (09.18.23 @ 22:54) >  ACC: [id number] EXAM:  CT NECK SOFT TISSUE IC   ORDERED BY: MARLENA WOOD     PROCEDURE DATE:  09/18/2023          INTERPRETATION:  CLINICAL INDICATION: Inability to swallow. Associated   vomiting. Stage IV non-small cell lung cancer.    TECHNIQUE:  Contrast enhanced CT of the soft tissues of the neck was performed.  Sagittal and coronal reformations were created.  Intravenous contrast: 90 cc Omnipaque 350 was administered, 10 cc was   discarded.    COMPARISON: None.    FINDINGS:    NASAL CAVITIES: Within normal limits.  PHARYNX: Within normal limits.  LARYNX: Within normal limits.  ESOPHAGUS: Within normal limits.    PAROTID AND SUBMANDIBULAR GLANDS: Within normal limits.  THYROID: Within normal limits.    LYMPH NODES: No lymphadenopathy.  VESSELS: Within normal limits.  BONES: Mild degenerative changes of the cervical spine.    VISUALIZED PORTIONS:  INTRACRANIAL STRUCTURES: Within normal limits.  PARANASAL SINUSES: Bilateral maxillary sinus polyps versus retention   cysts.  LUNGS: Emphysema. Refer to same day CT chest for detailed evaluation.    IMPRESSION:  No mass or fluid collection.    --- End of Report ---          ANDERSON JAIN MD; Resident Radiologist  This document has been electronically signed.  SADE DUKE MD; Attending Radiologist  This document has been electronically signed. Sep 19 2023 12:50AM    < end of copied text >  < from: Xray Kidney Ureter Bladder (09.19.23 @ 02:41) >  ACC: [id number] EXAM:  XR KUB 1 VIEW   ORDERED BY: DOUGLAS VERNON     PROCEDURE DATE:  09/19/2023          INTERPRETATION:  CLINICAL INDICATION: decreased p.o. intake. Evaluate for   small bowel obstruction    TECHNIQUE: Frontal radiograph of the abdomen.    COMPARISON: CT abdomen and pelvis 7/27/2023    INTERPRETATION:    Excreted contrast material fills the bladder with an indentation on its   base likely N enlarged prostate.    Air and stool are seen throughout the bowel and rectum.    The visualized portions of the lung bases are clear.    IMPRESSION:    Nonobstructive bowel gas pattern.    --- End of Report ---          ANDERSON JAIN MD; Resident Radiologist  This document has been electronically signed.  JONI LOWE MD; Attending Radiologist  This document has been electronically signed. Sep 19 2023  5:20AM    < end of copied text >      < from: Xray Esophagram Single Contrast (08.03.23 @ 14:33) >  ACC: [id number] EXAM:  XR ESOPH SNGL CON STUDY   ORDERED BY: ALFIE MUNIZ     PROCEDURE DATE:  08/03/2023          INTERPRETATION:  CLINICAL INFORMATION: Dysphagia, history of globus   sensation in lower neck/thoracic inlet.    TECHNIQUE: An esophagram was performed under fluoroscopy utilizing barium   as the contrast agent. Multiple spot fluoroscopic images were obtained. A   13 mm barium tablet was administered.    COMPARISON: CT chest, abdomen, and pelvis 7/21/2023.    FLUORO TIME: 3.2 minutes.    FINDINGS:    A  radiograph of the chest demonstrates surgical clips overlying the   mediastinum.    The patient swallowed barium without difficulty.    The esophagus demonstrates normal distensibility, contour and course.   Contrast passesfreely through the gastroesophageal junction into the   stomach.    Possible small sliding hiatal hernia. Marked gastroesophageal reflux to   at least the mid esophagus was observed during the exam.    A 13 mm barium tablet was administered and passed freely through the   esophagus. Passage of the tablet was briefly delayed at the   gastroesophageal junction, but did eventually pass into the stomach.    IMPRESSION:  Gastroesophageal reflux.    Possible small sliding hiatal hernia.    --- End of Report ---          JONI FALL MD; Resident Radiologist  This document has been electronically signed.  AURY CHAVIS MD; Attending Radiologist  This document has been electronically signed. Aug  3 2023  4:04PM    < end of copied text >

## 2023-09-19 NOTE — DISCHARGE NOTE PROVIDER - NSDCCPCAREPLAN_GEN_ALL_CORE_FT
PRINCIPAL DISCHARGE DIAGNOSIS  Diagnosis: Dysphagia  Assessment and Plan of Treatment: You came to the hospital because of difficulty swallowing and not being able to eat/drink. CT scan of your neck was normal, and xray of your abdomen showed no obstrction. An endoscopy was done which showed     PRINCIPAL DISCHARGE DIAGNOSIS  Diagnosis: Dysphagia  Assessment and Plan of Treatment: You came to the hospital because of difficulty swallowing and not being able to eat/drink. CT scan of your neck was normal, and xray of your abdomen showed no obstruction. An endoscopy was done which showed esophageal candidiasis. You were started on a proton pump inhibitor medication twice a day for a planned 8 weeks, before switching back to once a day. You were also started on an antifungal medication, fluconazole, to complete 14 days. Please take these medications as directed.  Return to the ED if you experience worsening or recurrence of your symptoms.

## 2023-09-19 NOTE — ED ADULT NURSE REASSESSMENT NOTE - NS ED NURSE REASSESS COMMENT FT1
monica RN: unable obtain peripheral IV after many attempts. MD aware, USIV to be placed for needed CTA.
Break RN note- Patient resting quietly in bed, breathing even and nonlabored. No acute distress. Patient appears comfortable. Awaiting Ct results. Safety maintained. Patient stable upon exiting the room.

## 2023-09-19 NOTE — CONSULT NOTE ADULT - NS ATTEND AMEND GEN_ALL_CORE FT
59 y/o M with NSCLC s/p multiple lines of treatment most recently on paclitaxel is presenting with dysphagia- to both solids and liquids. GI consulted, will f/u their recommendations.

## 2023-09-19 NOTE — H&P ADULT - PROBLEM SELECTOR PLAN 2
awaiting discharge, no change
patient febrile
- Cr on admission 1.72  - likely iso of poor po intake  - fluids iso poor po intake  - send urine lytes
attempt PO trial

## 2023-09-20 LAB
ALBUMIN SERPL ELPH-MCNC: 2.3 G/DL — LOW (ref 3.3–5)
ALP SERPL-CCNC: 108 U/L — SIGNIFICANT CHANGE UP (ref 40–120)
ALT FLD-CCNC: 8 U/L — SIGNIFICANT CHANGE UP (ref 4–41)
ANION GAP SERPL CALC-SCNC: 11 MMOL/L — SIGNIFICANT CHANGE UP (ref 7–14)
ANISOCYTOSIS BLD QL: SLIGHT — SIGNIFICANT CHANGE UP
AST SERPL-CCNC: 19 U/L — SIGNIFICANT CHANGE UP (ref 4–40)
BASOPHILS # BLD AUTO: 0 K/UL — SIGNIFICANT CHANGE UP (ref 0–0.2)
BASOPHILS NFR BLD AUTO: 0 % — SIGNIFICANT CHANGE UP (ref 0–2)
BILIRUB SERPL-MCNC: <0.2 MG/DL — SIGNIFICANT CHANGE UP (ref 0.2–1.2)
BUN SERPL-MCNC: 20 MG/DL — SIGNIFICANT CHANGE UP (ref 7–23)
CALCIUM SERPL-MCNC: 8.6 MG/DL — SIGNIFICANT CHANGE UP (ref 8.4–10.5)
CHLORIDE SERPL-SCNC: 103 MMOL/L — SIGNIFICANT CHANGE UP (ref 98–107)
CO2 SERPL-SCNC: 25 MMOL/L — SIGNIFICANT CHANGE UP (ref 22–31)
CREAT SERPL-MCNC: 1.63 MG/DL — HIGH (ref 0.5–1.3)
EGFR: 49 ML/MIN/1.73M2 — LOW
EOSINOPHIL # BLD AUTO: 0 K/UL — SIGNIFICANT CHANGE UP (ref 0–0.5)
EOSINOPHIL NFR BLD AUTO: 0 % — SIGNIFICANT CHANGE UP (ref 0–6)
GIANT PLATELETS BLD QL SMEAR: PRESENT — SIGNIFICANT CHANGE UP
GLUCOSE SERPL-MCNC: 89 MG/DL — SIGNIFICANT CHANGE UP (ref 70–99)
HCT VFR BLD CALC: 26.5 % — LOW (ref 39–50)
HGB BLD-MCNC: 8.2 G/DL — LOW (ref 13–17)
IANC: 2.28 K/UL — SIGNIFICANT CHANGE UP (ref 1.8–7.4)
LYMPHOCYTES # BLD AUTO: 0.71 K/UL — LOW (ref 1–3.3)
LYMPHOCYTES # BLD AUTO: 21 % — SIGNIFICANT CHANGE UP (ref 13–44)
MACROCYTES BLD QL: SLIGHT — SIGNIFICANT CHANGE UP
MAGNESIUM SERPL-MCNC: 1.9 MG/DL — SIGNIFICANT CHANGE UP (ref 1.6–2.6)
MANUAL SMEAR VERIFICATION: SIGNIFICANT CHANGE UP
MCHC RBC-ENTMCNC: 29.8 PG — SIGNIFICANT CHANGE UP (ref 27–34)
MCHC RBC-ENTMCNC: 30.9 GM/DL — LOW (ref 32–36)
MCV RBC AUTO: 96.4 FL — SIGNIFICANT CHANGE UP (ref 80–100)
MONOCYTES # BLD AUTO: 0.18 K/UL — SIGNIFICANT CHANGE UP (ref 0–0.9)
MONOCYTES NFR BLD AUTO: 5.3 % — SIGNIFICANT CHANGE UP (ref 2–14)
NEUTROPHILS # BLD AUTO: 2.51 K/UL — SIGNIFICANT CHANGE UP (ref 1.8–7.4)
NEUTROPHILS NFR BLD AUTO: 72.8 % — SIGNIFICANT CHANGE UP (ref 43–77)
NEUTS BAND # BLD: 0.9 % — SIGNIFICANT CHANGE UP (ref 0–6)
PHOSPHATE SERPL-MCNC: 2.6 MG/DL — SIGNIFICANT CHANGE UP (ref 2.5–4.5)
PLAT MORPH BLD: NORMAL — SIGNIFICANT CHANGE UP
PLATELET # BLD AUTO: 300 K/UL — SIGNIFICANT CHANGE UP (ref 150–400)
PLATELET COUNT - ESTIMATE: NORMAL — SIGNIFICANT CHANGE UP
POIKILOCYTOSIS BLD QL AUTO: SLIGHT — SIGNIFICANT CHANGE UP
POTASSIUM SERPL-MCNC: 3.8 MMOL/L — SIGNIFICANT CHANGE UP (ref 3.5–5.3)
POTASSIUM SERPL-SCNC: 3.8 MMOL/L — SIGNIFICANT CHANGE UP (ref 3.5–5.3)
PROT SERPL-MCNC: 6.5 G/DL — SIGNIFICANT CHANGE UP (ref 6–8.3)
RBC # BLD: 2.75 M/UL — LOW (ref 4.2–5.8)
RBC # FLD: 17.8 % — HIGH (ref 10.3–14.5)
RBC BLD AUTO: ABNORMAL
SMUDGE CELLS # BLD: PRESENT — SIGNIFICANT CHANGE UP
SODIUM SERPL-SCNC: 139 MMOL/L — SIGNIFICANT CHANGE UP (ref 135–145)
WBC # BLD: 3.4 K/UL — LOW (ref 3.8–10.5)
WBC # FLD AUTO: 3.4 K/UL — LOW (ref 3.8–10.5)

## 2023-09-20 PROCEDURE — 49083 ABD PARACENTESIS W/IMAGING: CPT

## 2023-09-20 PROCEDURE — 43239 EGD BIOPSY SINGLE/MULTIPLE: CPT | Mod: GC

## 2023-09-20 RX ORDER — ENOXAPARIN SODIUM 100 MG/ML
60 INJECTION SUBCUTANEOUS EVERY 12 HOURS
Refills: 0 | Status: COMPLETED | OUTPATIENT
Start: 2023-09-20 | End: 2023-10-01

## 2023-09-20 RX ORDER — FLUCONAZOLE 150 MG/1
400 TABLET ORAL ONCE
Refills: 0 | Status: COMPLETED | OUTPATIENT
Start: 2023-09-20 | End: 2023-09-20

## 2023-09-20 RX ORDER — PANTOPRAZOLE SODIUM 20 MG/1
40 TABLET, DELAYED RELEASE ORAL
Refills: 0 | Status: DISCONTINUED | OUTPATIENT
Start: 2023-09-20 | End: 2023-10-08

## 2023-09-20 RX ORDER — FLUCONAZOLE 150 MG/1
200 TABLET ORAL DAILY
Refills: 0 | Status: COMPLETED | OUTPATIENT
Start: 2023-09-21 | End: 2023-10-04

## 2023-09-20 RX ADMIN — FLUCONAZOLE 400 MILLIGRAM(S): 150 TABLET ORAL at 14:40

## 2023-09-20 RX ADMIN — PANTOPRAZOLE SODIUM 40 MILLIGRAM(S): 20 TABLET, DELAYED RELEASE ORAL at 17:24

## 2023-09-20 RX ADMIN — ENOXAPARIN SODIUM 60 MILLIGRAM(S): 100 INJECTION SUBCUTANEOUS at 17:24

## 2023-09-20 RX ADMIN — SODIUM CHLORIDE 75 MILLILITER(S): 9 INJECTION, SOLUTION INTRAVENOUS at 11:25

## 2023-09-20 RX ADMIN — PANTOPRAZOLE SODIUM 40 MILLIGRAM(S): 20 TABLET, DELAYED RELEASE ORAL at 05:45

## 2023-09-20 RX ADMIN — ONDANSETRON 4 MILLIGRAM(S): 8 TABLET, FILM COATED ORAL at 19:43

## 2023-09-20 NOTE — DIETITIAN INITIAL EVALUATION ADULT - DIET TYPE
As medically feasible, recommend gradual diet advancement as tolerated: Clear Liquid --> Full Liquid --> Regular/high fiber

## 2023-09-20 NOTE — CONSULT NOTE ADULT - SUBJECTIVE AND OBJECTIVE BOX
Interventional Radiology    Evaluate for Procedure: Paracentesis     HPI: 58M h/o non small cell lung ca on paclitaxel (last 1 week ago, follows with Dr. Son) p/w 1 month progressively worsening dysphagia. Recent admission for PE and SBO. Endorsed dysphagia with that admission. Barium esophagram showing GERD and small sliding hiatal hernia. D/c'd with protonix and outpt f/u. Dysphagia worsened over last month, stating he feels solids and liquids get stuck in his esophagus, also feels burning pain in esophagus/chest. Has to throw up solids bc it does not go down. Endorses poor po intake the past month with 30lb weight loss. CT neck n/l. KUB showing no obstruction. IR consulted for paracentesis.    Allergies: No Known Allergies    Medications (Abx/Cardiac/Anticoagulation/Blood Products)    enoxaparin Injectable: 60 milliGRAM(s) SubCutaneous (09-19 @ 17:23)    Data:  177.8  56.3  T(C): 36.3  HR: 80  BP: 129/91  RR: 18  SpO2: 98%    -WBC 3.40 / HgB 8.2 / Hct 26.5 / Plt 300  -Na 139 / Cl 103 / BUN 20 / Glucose 89  -K 3.8 / CO2 25 / Cr 1.63  -ALT 8 / Alk Phos 108 / T.Bili <0.2  -INR 1.01 / PTT 29.9      Radiology: US Abdomen reviewed     Assessment/Plan:   58M h/o non small cell lung ca on paclitaxel (last 1 week ago, follows with Dr. Son) p/w 1 month progressively worsening dysphagia. Recent admission for PE and SBO. Endorsed dysphagia with that admission. Barium esophagram showing GERD and small sliding hiatal hernia. D/c'd with protonix and outpt f/u. Dysphagia worsened over last month, stating he feels solids and liquids get stuck in his esophagus, also feels burning pain in esophagus/chest. Has to throw up solids bc it does not go down. Endorses poor po intake the past month with 30lb weight loss. CT neck n/l. KUB showing no obstruction. IR consulted for paracentesis.        -- IR will plan to perform paracentesis today 09/20/23   -- Please complete IR pre-procedure note  -- please place IR procedure request order under Dr. Harkins     --  Saturnino Tucker,   PGY-2 Vascular and Interventional Radiology Resident   Available on [employer] Teams    - Non-emergent consults: Place IR consult order in [place]  - Emergent issues (pager): [facility] [phone]; [facility] [phone]; [phone]  - Scheduling questions: [facility] [phone]; [facility] [phone]  - Clinic/outpatient booking: [facility] [phone]; [facility] [phone]

## 2023-09-20 NOTE — DIETITIAN INITIAL EVALUATION ADULT - NSICDXPASTSURGICALHX_GEN_ALL_CORE_FT
Mom calls with request for referral for circumcision. Family would like it done this next month sometime. Mom is wondering where to refer to? Thank you    Last WCE 7/17/18       PAST SURGICAL HISTORY:  Status post cardiac surgery s/p open right thoracotomy for posterior cardiac mass removal > 20 years ago, reported by patient to be benign.

## 2023-09-20 NOTE — PROGRESS NOTE ADULT - PROBLEM SELECTOR PLAN 2
- Cr on admission 1.72  - likely iso of poor po intake  - fluids iso poor po intake  - send urine lytes - Cr on admission 1.72-> 1.6  - likely iso of poor po intake  - fluids iso poor po intake  - Fena 0.3%- likely prerenal  - US showing no hydro; urinary bladder distension and debris; UA neg

## 2023-09-20 NOTE — PROGRESS NOTE ADULT - PROBLEM SELECTOR PLAN 3
- non small cell lung ca on paclitaxel (last 1 week ago, follows with Dr. Son)  - onc consult appreciated  - abd US for possible paracentesis; previous malignant ascites - non small cell lung ca on paclitaxel (last 1 week ago, follows with Dr. Son)  - onc consult appreciated- no plan for inpt chemo  - abd US for possible paracentesis; previous malignant ascites- moderate volume ascites - non small cell lung ca on paclitaxel (last 1 week ago, follows with Dr. Son)  - onc consult appreciated- no plan for inpt chemo  - abd US for possible paracentesis; previous malignant ascites- moderate volume ascites  - consider inpt para

## 2023-09-20 NOTE — DIETITIAN INITIAL EVALUATION ADULT - REASON FOR ADMISSION
Nausea and vomiting    Patient is a 58y Male with PMH non-small cell lung cancer with metastasis who presents to [facility] with 1 month preogressively worsening dysphagia. Barium esophagram showed GERD and small sliding hiatal hernia. Endorses poor PO intake the past month with 30lb weight loss.

## 2023-09-20 NOTE — DIETITIAN INITIAL EVALUATION ADULT - ENTER TO (CAL/KG)
40

## 2023-09-20 NOTE — DIETITIAN INITIAL EVALUATION ADULT - PROBLEM SELECTOR PLAN 1
- 1mo hx of dysphagia to solids and liquids; poor po intake and 30 lb weight loss over last month  - 8/3 barium esophogram: GERD and small sliding hiatal hernia  - CT neck: wnl  - KUB: no obstruction  - started on protonix 40 IV qd  - fluids iso poor po intake  - speech and swallow eval  - GI consult appreciated  - ENT consult appreciated

## 2023-09-20 NOTE — DIETITIAN INITIAL EVALUATION ADULT - PERTINENT MEDS FT
MEDICATIONS  (STANDING):  enoxaparin Injectable 60 milliGRAM(s) SubCutaneous every 12 hours  lactated ringers. 1000 milliLiter(s) (75 mL/Hr) IV Continuous <Continuous>  ondansetron Injectable 4 milliGRAM(s) IV Push once  pantoprazole    Tablet 40 milliGRAM(s) Oral two times a day    MEDICATIONS  (PRN):

## 2023-09-20 NOTE — DIETITIAN INITIAL EVALUATION ADULT - OTHER INFO
Patient is currently ordered for a clear liquid diet, previously NPO this AM for EGD. Per physician documentation, "KUB showing no obstruction. EGD showing esophageal candidiasis." Per bedside swallow assessment (9/19) documentation, "From an oropharyngeal standpoint, pt is deemed safe to resume a regular solid diet and thin liquids." Patient continues to report difficulty swallowing, sensation of "food getting stuck and burning." Reports nausea, vomiting, and diarrhea. Patient would like to receive Ensure supplements, ice cream, and gingerale with meals as able as he reports he is able to tolerate these. Denies chewing difficulty or constipation. Last BM 9/18/23 per patient report.    Writer provided verbal education regarding current diet order and anticipated diet progression during course of admission. Patient verbalized understanding to the discussion.

## 2023-09-20 NOTE — PROGRESS NOTE ADULT - ASSESSMENT
58M h/o non small cell lung ca on paclitaxel (last 1 week ago, follows with Dr. Son) p/w 1 month progressively worsening dysphagia. Recent admission for PE and SBO. Endorsed dysphagia with that admission. Barium esophagram showing GERD and small sliding hiatal hernia. Nephrology consult called for abnormal renal function      Assessment:  1) Non oliguric CKD stage III likely chemotherapy induced renal injury/ATN/renal hypoperfusion/ascites/compression/obstructive uropathy  2) NSCLC with metastasis on chemotherapy  3) History of PE  4) Progressive dysphagia/history of SBO/ Hiatal hernia/GERD/Gastritis  5) Anemia of chronic illness  6) Hypoalbuminemia    Recommend:  Strict I/o  Avoid Nephrotoxic agents  Monitor urine output  S cr 1.6 with non oliguria  Received IV Contrast earlier during admission  Urinalysis, urine electrolytes reviewed  Bilateral renal and bladder ultrasound results reviewed  Oncology/hematology follow up  Optimal pain control  Work up for proteinuria/hematuria after urine studies available  Intact PTH, Vitamin D 1,25 level, Phos, calcium level  Volume status and electrolytes noted  No urgent need for RRT/HD  Will follow

## 2023-09-20 NOTE — DIETITIAN NUTRITION RISK NOTIFICATION - ADDITIONAL COMMENTS/DIETITIAN RECOMMENDATIONS
Please see Dietitian Initial Assessment for complete recommendations.     Jennifer Mireles MS RDN CDN  On Microsoft Teams, Pager #[phone]

## 2023-09-20 NOTE — DIETITIAN INITIAL EVALUATION ADULT - ADD RECOMMEND
1. Monitor weights, labs, BM's, skin integrity, p.o. intake., diet progression  2. Please monitor % PO intake on flowsheets   3. Honor food preferences as able within therapeutic diet order.   4. RD remains available, please consult as needed

## 2023-09-20 NOTE — DIETITIAN INITIAL EVALUATION ADULT - SIGNS/SYMPTOMS
</=75% estimated energy needs for >/=1 month, physical findings as above </=75% estimated energy needs for >/=1 month, >10% weight loss x6 months, physical findings as above

## 2023-09-20 NOTE — PROGRESS NOTE ADULT - SUBJECTIVE AND OBJECTIVE BOX
PROGRESS NOTE:     Patient is a 58y old  Male who presents with a chief complaint of dysphagia (19 Sep 2023 17:35)      SUBJECTIVE / OVERNIGHT EVENTS:    ADDITIONAL REVIEW OF SYSTEMS: 10 point ROS negative except per HPI    MEDICATIONS  (STANDING):  lactated ringers. 1000 milliLiter(s) (75 mL/Hr) IV Continuous <Continuous>  ondansetron Injectable 4 milliGRAM(s) IV Push once  pantoprazole  Injectable 40 milliGRAM(s) IV Push two times a day    MEDICATIONS  (PRN):      CAPILLARY BLOOD GLUCOSE        I&O's Summary    19 Sep 2023 07:01  -  20 Sep 2023 07:00  --------------------------------------------------------  IN: 1625 mL / OUT: 300 mL / NET: 1325 mL        PHYSICAL EXAM:  Vital Signs Last 24 Hrs  T(C): 36.7 (20 Sep 2023 05:31), Max: 36.7 (20 Sep 2023 05:31)  T(F): 98.1 (20 Sep 2023 05:31), Max: 98.1 (20 Sep 2023 05:31)  HR: 79 (20 Sep 2023 05:31) (79 - 92)  BP: 116/77 (20 Sep 2023 05:31) (108/75 - 128/86)  BP(mean): --  RR: 17 (20 Sep 2023 05:31) (17 - 17)  SpO2: 100% (20 Sep 2023 05:31) (99% - 100%)    Parameters below as of 20 Sep 2023 05:31  Patient On (Oxygen Delivery Method): room air        CONSTITUTIONAL: NAD, well-developed, A&Ox3 to person, place, time.  RESPIRATORY: Normal respiratory effort; lungs are clear to auscultation bilaterally  CARDIOVASCULAR: Regular rate and rhythm, normal S1 and S2, no murmur/rub/gallop; No lower extremity edema; Peripheral pulses are 2+ bilaterally  ABDOMEN: Nontender to palpation, no rebound/guarding; No hepatosplenomegaly  MUSCLOSKELETAL: no clubbing or cyanosis of digits; no joint swelling or tenderness to palpation  NEURO: CN 2-12 grossly intact, moves all limbs spontaneously      LABS:                          8.2    3.40  )-----------( 300      ( 20 Sep 2023 03:40 )             26.5     09-20    139  |  103  |  20  ----------------------------<  89  3.8   |  25  |  1.63<H>    Ca    8.6      20 Sep 2023 03:40  Phos  2.6     09-20  Mg     1.90     09-20    TPro  6.5  /  Alb  2.3<L>  /  TBili  <0.2  /  DBili  x   /  AST  19  /  ALT  8   /  AlkPhos  108  09-20    PT/INR - ( 18 Sep 2023 22:08 )   PT: 11.3 sec;   INR: 1.01 ratio         PTT - ( 18 Sep 2023 22:08 )  PTT:29.9 sec      -----    MICRO  Urinalysis Basic - ( 20 Sep 2023 03:40 )    Color: x / Appearance: x / SG: x / pH: x  Gluc: 89 mg/dL / Ketone: x  / Bili: x / Urobili: x   Blood: x / Protein: x / Nitrite: x   Leuk Esterase: x / RBC: x / WBC x   Sq Epi: x / Non Sq Epi: x / Bacteria: x        -----    TRENDS  Hemoglobin: 8.2 g/dL (09-20 @ 03:40)  Hemoglobin: 10.5 g/dL (09-18 @ 22:08)    Creatinine Trend: 1.63<--, 1.72<--, 1.86<--, 1.65<--  ----  RADIOLOGY & ADDITIONAL TESTS:  Results Reviewed:   Imaging Personally Reviewed:  Electrocardiogram Personally Reviewed:    COORDINATION OF CARE:  Care Discussed with Consultants/Other Providers [Y/N]:  Prior or Outpatient Records Reviewed [Y/N]:   PROGRESS NOTE:     Patient is a 58y old  Male who presents with a chief complaint of dysphagia (19 Sep 2023 17:35)      SUBJECTIVE / OVERNIGHT EVENTS: No events overnight. Pt seen after endoscopy. Able to tolerate ice/smoothies easiest. Was able to eat clear liquids last night.    ADDITIONAL REVIEW OF SYSTEMS: 10 point ROS negative except per HPI    MEDICATIONS  (STANDING):  lactated ringers. 1000 milliLiter(s) (75 mL/Hr) IV Continuous <Continuous>  ondansetron Injectable 4 milliGRAM(s) IV Push once  pantoprazole  Injectable 40 milliGRAM(s) IV Push two times a day    MEDICATIONS  (PRN):      CAPILLARY BLOOD GLUCOSE        I&O's Summary    19 Sep 2023 07:01  -  20 Sep 2023 07:00  --------------------------------------------------------  IN: 1625 mL / OUT: 300 mL / NET: 1325 mL        PHYSICAL EXAM:  Vital Signs Last 24 Hrs  T(C): 36.7 (20 Sep 2023 05:31), Max: 36.7 (20 Sep 2023 05:31)  T(F): 98.1 (20 Sep 2023 05:31), Max: 98.1 (20 Sep 2023 05:31)  HR: 79 (20 Sep 2023 05:31) (79 - 92)  BP: 116/77 (20 Sep 2023 05:31) (108/75 - 128/86)  BP(mean): --  RR: 17 (20 Sep 2023 05:31) (17 - 17)  SpO2: 100% (20 Sep 2023 05:31) (99% - 100%)    Parameters below as of 20 Sep 2023 05:31  Patient On (Oxygen Delivery Method): room air        GENERAL: NAD, lying in bed comfortably  HEAD:  Atraumatic, Normocephalic  EYES: EOMI, conjunctiva and sclera clear  ENT: Dry mucous membranes  NECK: Supple  CHEST/LUNG: Clear to auscultation bilaterally; No rales, rhonchi, wheezing, or rubs. Unlabored respirations  HEART: Regular rate and rhythm; No murmurs, rubs, or gallops  ABDOMEN: Bowel sounds present; Soft, Nontender, + Distension. No hepatomegally  EXTREMITIES:  2+ Peripheral Pulses, brisk capillary refill. No clubbing, cyanosis, or edema  NERVOUS SYSTEM:  Alert & Oriented X3, speech clear. No deficits   MSK: FROM all 4 extremities, full and equal strength  SKIN: No rashes or lesions      LABS:                          8.2    3.40  )-----------( 300      ( 20 Sep 2023 03:40 )             26.5     09-20    139  |  103  |  20  ----------------------------<  89  3.8   |  25  |  1.63<H>    Ca    8.6      20 Sep 2023 03:40  Phos  2.6     09-20  Mg     1.90     09-20    TPro  6.5  /  Alb  2.3<L>  /  TBili  <0.2  /  DBili  x   /  AST  19  /  ALT  8   /  AlkPhos  108  09-20    PT/INR - ( 18 Sep 2023 22:08 )   PT: 11.3 sec;   INR: 1.01 ratio         PTT - ( 18 Sep 2023 22:08 )  PTT:29.9 sec      -----    MICRO  Urinalysis Basic - ( 20 Sep 2023 03:40 )    Color: x / Appearance: x / SG: x / pH: x  Gluc: 89 mg/dL / Ketone: x  / Bili: x / Urobili: x   Blood: x / Protein: x / Nitrite: x   Leuk Esterase: x / RBC: x / WBC x   Sq Epi: x / Non Sq Epi: x / Bacteria: x        -----    TRENDS  Hemoglobin: 8.2 g/dL (09-20 @ 03:40)  Hemoglobin: 10.5 g/dL (09-18 @ 22:08)    Creatinine Trend: 1.63<--, 1.72<--, 1.86<--, 1.65<--  ----  RADIOLOGY & ADDITIONAL TESTS:  Results Reviewed:   Imaging Personally Reviewed:  Electrocardiogram Personally Reviewed:    COORDINATION OF CARE:  Care Discussed with Consultants/Other Providers [Y/N]:  Prior or Outpatient Records Reviewed [Y/N]:

## 2023-09-20 NOTE — DIETITIAN INITIAL EVALUATION ADULT - PROBLEM SELECTOR PLAN 2
- Cr on admission 1.72  - likely iso of poor po intake  - fluids iso poor po intake  - send urine lytes

## 2023-09-20 NOTE — PRE PROCEDURE NOTE - PRE PROCEDURE EVALUATION
Interventional Radiology    HPI: 58y Male with pmh non small cell lung cancer p/w ascites. Patient presents for paracentesis. Has had multiple keila in the past.     Allergies: No Known Allergies    Medications (Abx/Cardiac/Anticoagulation/Blood Products)    enoxaparin Injectable: 60 milliGRAM(s) SubCutaneous (09-19 @ 17:23)  fluconAZOLE   Tablet: 400 milliGRAM(s) Oral (09-20 @ 14:40)    Data:  177.8  56.3  T(C): 36.3  HR: 80  BP: 129/91  RR: 18  SpO2: 98%    Exam  General: No acute distress  Chest: Non labored breathing  Abdomen: Non-distended  Extremities: No swelling, warm    -WBC 3.40 / HgB 8.2 / Hct 26.5 / Plt 300  -Na 139 / Cl 103 / BUN 20 / Glucose 89  -K 3.8 / CO2 25 / Cr 1.63  -ALT 8 / Alk Phos 108 / T.Bili <0.2  -INR1.01    Imaging: reviewed    Plan: 58y Male presents for paracentesis  -Risks/Benefits/alternatives explained with the patient and/or healthcare proxy and witnessed informed consent obtained.   
Interventional Radiology Pre-Procedure Note    58M h/o non small cell lung ca on paclitaxel (last 1 week ago, follows with Dr. Son) p/w 1 month progressively worsening dysphagia. Recent admission for PE and SBO. Endorsed dysphagia with that admission. Barium esophagram showing GERD and small sliding hiatal hernia. D/c'd with protonix and outpt f/u. Dysphagia worsened over last month, stating he feels solids and liquids get stuck in his esophagus, also feels burning pain in esophagus/chest. Has to throw up solids bc it does not go down. Endorses poor po intake the past month with 30lb weight loss. CT neck n/l. KUB showing no obstruction. EGD showing esophageal candidiasis    NPO:  Antibiotics: no  Anticoagulation: lovenox  Adverse events to anesethsia: no    PAST MEDICAL & SURGICAL HISTORY:  Former smoker      Non-small cell lung cancer with metastasis      Status post cardiac surgery  s/p open right thoracotomy for posterior cardiac mass removal > 20 years ago, reported by patient to be benign.           Vitals:Vital Signs Last 24 Hrs  T(C): 36.3 (20 Sep 2023 12:32), Max: 36.7 (20 Sep 2023 05:31)  T(F): 97.3 (20 Sep 2023 12:32), Max: 98.1 (20 Sep 2023 05:31)  HR: 80 (20 Sep 2023 12:32) (79 - 90)  BP: 129/91 (20 Sep 2023 12:32) (96/77 - 129/91)  BP(mean): --  RR: 18 (20 Sep 2023 12:32) (13 - 19)  SpO2: 98% (20 Sep 2023 12:32) (97% - 100%)    Parameters below as of 20 Sep 2023 12:32  Patient On (Oxygen Delivery Method): room air        Allergies: Allergies    No Known Allergies    Intolerances        Physical Exam:   GENERAL: NAD, lying in bed comfortably  HEAD:  Atraumatic, Normocephalic  EYES: EOMI, conjunctiva and sclera clear  ENT: Dry mucous membranes  NECK: Supple  CHEST/LUNG: Clear to auscultation bilaterally; No rales, rhonchi, wheezing, or rubs. Unlabored respirations  HEART: Regular rate and rhythm; No murmurs, rubs, or gallops  ABDOMEN: Bowel sounds present; Soft, Nontender, + Distension. No hepatomegally  EXTREMITIES:  2+ Peripheral Pulses, brisk capillary refill. No clubbing, cyanosis, or edema  NERVOUS SYSTEM:  Alert & Oriented X3, speech clear. No deficits   MSK: FROM all 4 extremities, full and equal strength  SKIN: No rashes or lesions    Labs:                         8.2    3.40  )-----------( 300      ( 20 Sep 2023 03:40 )             26.5     09-20    139  |  103  |  20  ----------------------------<  89  3.8   |  25  |  1.63<H>    Ca    8.6      20 Sep 2023 03:40  Phos  2.6     09-20  Mg     1.90     09-20    TPro  6.5  /  Alb  2.3<L>  /  TBili  <0.2  /  DBili  x   /  AST  19  /  ALT  8   /  AlkPhos  108  09-20    PT/INR - ( 18 Sep 2023 22:08 )   PT: 11.3 sec;   INR: 1.01 ratio         PTT - ( 18 Sep 2023 22:08 )  PTT:29.9 sec    Informed consent obtained. All questions and concerns have been addressed at this time.

## 2023-09-20 NOTE — DIETITIAN INITIAL EVALUATION ADULT - PERTINENT LABORATORY DATA
09-20    139  |  103  |  20  ----------------------------<  89  3.8   |  25  |  1.63<H>    Ca    8.6      20 Sep 2023 03:40  Phos  2.6     09-20  Mg     1.90     09-20    TPro  6.5  /  Alb  2.3<L>  /  TBili  <0.2  /  DBili  x   /  AST  19  /  ALT  8   /  AlkPhos  108  09-20  A1C with Estimated Average Glucose Result: 5.9 % (07-27-23 @ 11:30)  A1C with Estimated Average Glucose Result: 5.6 % (11-24-22 @ 05:20)

## 2023-09-20 NOTE — PROGRESS NOTE ADULT - PROBLEM SELECTOR PLAN 4
- Diet: pending S&S, on IVF   - DVT ppx: lovenox 70 bid given recent PE - Diet: clears advance as tolerated, on IVF iso poor po  - DVT ppx: lovenox 60 bid given recent PE

## 2023-09-20 NOTE — DIETITIAN INITIAL EVALUATION ADULT - ORAL INTAKE PTA/DIET HISTORY
Patient reports no known food allergies or food intolerances. Does not take any nutrition supplements at home. Patient reports a poor appetite, poor PO intake with progressively worsening swallowing difficulty x6 months period of time. Reports swallowing difficulty worsened further x1 month ago to the point where he was unable to consume any substantial amount of food, typically consuming <25% estimated energy needs. Reports nausea and vomiting, unable to tolerate much PO feeding aside from cold smooth food items (i.e. ice cream, smoothies). Denies chewing difficulties.    Patient reports a usual body weight of 160lb x1 month ago, now reports weight as 132lb.  Per Darlene NIEVES, noted the following weight history: 56.3kg (9/20/23), 75.1kg (6/20/23), 82.3kg (3/7/23)  Objective weight measurements suggest 26kg (32%) x6 months period of time (significant)

## 2023-09-20 NOTE — CHART NOTE - NSCHARTNOTEFT_GEN_A_CORE
Procedure team evaluation of ascites, ascites is loculated mostly in RUQ and LUQ without good pocket not involving solid organ.  Recommend IR consult for consideration of paracentesis if still warranted.    -Dylon Ortez M.D.   PGY-5 EM/IM/CC  Pager #[phone]

## 2023-09-20 NOTE — DIETITIAN INITIAL EVALUATION ADULT - ORAL NUTRITION SUPPLEMENTS
Pending diet advancement, recommend Ensure Plus High Protein (provides 350kcal, 20gms protein per serving) TID

## 2023-09-20 NOTE — PROGRESS NOTE ADULT - SUBJECTIVE AND OBJECTIVE BOX
Manjeet Cardenas MD (Nephrology progress note)  [address], [facility],  [id number],  [id number]  TEl: [phone]  Cell: [phone]    Patient is a 58y Male seen and evaluated at bedside. Vital signs, laboratory data, imaging studies, consult notes reviewed done within past 24 hours. Overnight events noted. Patient lying in bed alert and awake in no respiratory distress. Interval stable renal function with non oliguria.     Allergies    No Known Allergies    Intolerances        ROS:  CONSTITUTIONAL: No fever or chills.  HEENT: No headache or visual disturbances.  RESPIRATORY: No shortness of breath, cough, hemoptysis or wheezing  CARDIOVASCULAR: No Chest pain or SOB  GASTROINTESTINAL: No abdominal pain, nausea, vomiting, diarrhea, hematemesis or blood per rectum.  GENITOURINARY: No flank or supra pubic pain  NEUROLOGICAL: No headache, focal limb weakness, tingling or numbness   SKIN: No skin rash or lesion  MUSCULOSKELETAL: No leg pain, calf pain or swelling    VITALS:    T(C): 36.2 (09-20-23 @ 09:50), Max: 36.7 (09-20-23 @ 05:31)  HR: 84 (09-20-23 @ 10:20) (79 - 92)  BP: 117/89 (09-20-23 @ 10:20) (96/77 - 128/86)  RR: 19 (09-20-23 @ 10:20) (13 - 19)  SpO2: 98% (09-20-23 @ 10:20) (97% - 100%)  CAPILLARY BLOOD GLUCOSE          09-19-23 @ 07:01  -  09-20-23 @ 07:00  --------------------------------------------------------  IN: 1625 mL / OUT: 300 mL / NET: 1325 mL      MEDICATIONS  (STANDING):  enoxaparin Injectable 60 milliGRAM(s) SubCutaneous every 12 hours  lactated ringers. 1000 milliLiter(s) (75 mL/Hr) IV Continuous <Continuous>  ondansetron Injectable 4 milliGRAM(s) IV Push once  pantoprazole  Injectable 40 milliGRAM(s) IV Push two times a day    MEDICATIONS  (PRN):      PHYSICAL EXAM:  GENERAL: Alert, awake and oriented x3  HEENT: ROBYN, EOMI, neck supple, no JVP  CHEST/LUNG: Bilateral clear breath sounds, no rales, no crepitations, no rhonchi, no wheezing  HEART: Regular rate and rhythm, LOVE II/VI at LPSB, no gallops, no rub   ABDOMEN: Soft, nontender, non distended, bowel sounds present  : No flank or supra pubic tenderness.  EXTREMITIES: Peripheral pulses are palpable, no pedal edema  Neurology: AAOx3, no focal neurological deficit  SKIN: No rash or skin lesion  Musculoskeletal: No joint deformity or spinal tenderness.      Vascular ACCESS: None    LABS:                        8.2    3.40  )-----------( 300      ( 20 Sep 2023 03:40 )             26.5     09-20    139  |  103  |  20  ----------------------------<  89  3.8   |  25  |  1.63<H>    Ca    8.6      20 Sep 2023 03:40  Phos  2.6     09-20  Mg     1.90     09-20    TPro  6.5  /  Alb  2.3<L>  /  TBili  <0.2  /  DBili  x   /  AST  19  /  ALT  8   /  AlkPhos  108  09-20      PT/INR - ( 18 Sep 2023 22:08 )   PT: 11.3 sec;   INR: 1.01 ratio         PTT - ( 18 Sep 2023 22:08 )  PTT:29.9 sec  Urinalysis Basic - ( 20 Sep 2023 03:40 )    Color: x / Appearance: x / SG: x / pH: x  Gluc: 89 mg/dL / Ketone: x  / Bili: x / Urobili: x   Blood: x / Protein: x / Nitrite: x   Leuk Esterase: x / RBC: x / WBC x   Sq Epi: x / Non Sq Epi: x / Bacteria: x      Sodium, Random Urine: 44 mmol/L (09-19 @ 14:15)  Creatinine, Random Urine: 195 mg/dL (09-19 @ 14:15)  Protein/Creatinine Ratio Calculation: 0.5 Ratio (09-19 @ 14:15)  Osmolality, Random Urine: 753 mosm/kg (09-19 @ 14:15)  Potassium, Random Urine: 64.8 mmol/L (09-19 @ 14:15)        RADIOLOGY & ADDITIONAL STUDIES:  rad< from: US Kidney and Bladder (09.19.23 @ 20:24) >    ACC: [id number] EXAM:  US KIDNEYS AND BLADDER   ORDERED BY: MANJEET CARDENAS     PROCEDURE DATE:  09/19/2023          INTERPRETATION:  CLINICAL INFORMATION: Acute on chronic renal   insufficiency.    COMPARISON: None available.    TECHNIQUE: Sonography ofthe kidneys and bladder.    FINDINGS:  Right kidney: 10.5 cm. No renal mass, hydronephrosis or calculi.    Left kidney: 8.6 cm. No renal mass, hydronephrosis or calculi. A 1.9 cm   lower pole cyst with peripheral calcification  Urinary bladder: Over distended bladder with a volume of of 479 cc.   Debris within the bladder lumen.      IMPRESSION:  No hydronephrosis.    Bladder distention and urinary bladder debris. Please correlate with   urinalysis to exclude cystitis.    --- End of Report ---            SADE DUKE MD; Attending Radiologist  This document has been electronically signed. Sep 20 2023  5:36AM    < end of copied text >    Imaging Personally Reviewed:  [x] YES  [ ] NO    Consultant(s) Notes Reviewed:  [x] YES  [ ] NO    Care Discussed with Primary team/ Other Providers [x] YES  [ ] NO

## 2023-09-20 NOTE — DIETITIAN INITIAL EVALUATION ADULT - PROBLEM SELECTOR PLAN 3
- non small cell lung ca on paclitaxel (last 1 week ago, follows with Dr. Son)  - onc consult appreciated  - abd US for possible paracentesis; previous malignant ascites

## 2023-09-20 NOTE — PROGRESS NOTE ADULT - PROBLEM SELECTOR PLAN 1
- 1mo hx of dysphagia to solids and liquids; poor po intake and 30 lb weight loss over last month  - 8/3 barium esophogram: GERD and small sliding hiatal hernia  - CT neck: wnl  - KUB: no obstruction  - started on protonix 40 IV qd  - fluids iso poor po intake  - speech and swallow eval  - GI consult appreciated  - ENT consult appreciated - 1mo hx of dysphagia to solids and liquids; poor po intake and 30 lb weight loss over last month  - 8/3 barium esophogram: GERD and small sliding hiatal hernia  - CT neck: wnl  - KUB: no obstruction  - started on protonix 40 po bid  - fluids iso poor po intake  - speech and swallow eval- cleared for regular diet  - GI consult appreciated  - egd 9/20 showing esophageal candidiasis, 2cm hiatal hernia, severely edematous gastropathy with thickened gastric folds  - ENT consult appreciated- no acute intervention  - plan for proton pump inhibitor PO BID x 8 weeks followed by once daily; Diflucan (fluconazole) 400 mg PO once today followed by 200mg PO daily for 13 days for a total of 14 day course.

## 2023-09-21 DIAGNOSIS — N18.32 CHRONIC KIDNEY DISEASE, STAGE 3B: ICD-10-CM

## 2023-09-21 DIAGNOSIS — B37.81 CANDIDAL ESOPHAGITIS: ICD-10-CM

## 2023-09-21 LAB
ALBUMIN SERPL ELPH-MCNC: 2.2 G/DL — LOW (ref 3.3–5)
ALP SERPL-CCNC: 107 U/L — SIGNIFICANT CHANGE UP (ref 40–120)
ALT FLD-CCNC: 6 U/L — SIGNIFICANT CHANGE UP (ref 4–41)
ANION GAP SERPL CALC-SCNC: 11 MMOL/L — SIGNIFICANT CHANGE UP (ref 7–14)
AST SERPL-CCNC: 16 U/L — SIGNIFICANT CHANGE UP (ref 4–40)
BILIRUB SERPL-MCNC: <0.2 MG/DL — SIGNIFICANT CHANGE UP (ref 0.2–1.2)
BUN SERPL-MCNC: 18 MG/DL — SIGNIFICANT CHANGE UP (ref 7–23)
CALCIUM SERPL-MCNC: 8.4 MG/DL — SIGNIFICANT CHANGE UP (ref 8.4–10.5)
CHLORIDE SERPL-SCNC: 101 MMOL/L — SIGNIFICANT CHANGE UP (ref 98–107)
CO2 SERPL-SCNC: 25 MMOL/L — SIGNIFICANT CHANGE UP (ref 22–31)
CREAT SERPL-MCNC: 1.69 MG/DL — HIGH (ref 0.5–1.3)
EGFR: 47 ML/MIN/1.73M2 — LOW
GLUCOSE SERPL-MCNC: 88 MG/DL — SIGNIFICANT CHANGE UP (ref 70–99)
HCT VFR BLD CALC: 29.5 % — LOW (ref 39–50)
HGB BLD-MCNC: 9.4 G/DL — LOW (ref 13–17)
MAGNESIUM SERPL-MCNC: 1.7 MG/DL — SIGNIFICANT CHANGE UP (ref 1.6–2.6)
MCHC RBC-ENTMCNC: 30.2 PG — SIGNIFICANT CHANGE UP (ref 27–34)
MCHC RBC-ENTMCNC: 31.9 GM/DL — LOW (ref 32–36)
MCV RBC AUTO: 94.9 FL — SIGNIFICANT CHANGE UP (ref 80–100)
NRBC # BLD: 0 /100 WBCS — SIGNIFICANT CHANGE UP (ref 0–0)
NRBC # FLD: 0 K/UL — SIGNIFICANT CHANGE UP (ref 0–0)
PHOSPHATE SERPL-MCNC: 2.6 MG/DL — SIGNIFICANT CHANGE UP (ref 2.5–4.5)
PLATELET # BLD AUTO: 305 K/UL — SIGNIFICANT CHANGE UP (ref 150–400)
POTASSIUM SERPL-MCNC: 4 MMOL/L — SIGNIFICANT CHANGE UP (ref 3.5–5.3)
POTASSIUM SERPL-SCNC: 4 MMOL/L — SIGNIFICANT CHANGE UP (ref 3.5–5.3)
PROT SERPL-MCNC: 6.6 G/DL — SIGNIFICANT CHANGE UP (ref 6–8.3)
RBC # BLD: 3.11 M/UL — LOW (ref 4.2–5.8)
RBC # FLD: 17.6 % — HIGH (ref 10.3–14.5)
SODIUM SERPL-SCNC: 137 MMOL/L — SIGNIFICANT CHANGE UP (ref 135–145)
WBC # BLD: 4.8 K/UL — SIGNIFICANT CHANGE UP (ref 3.8–10.5)
WBC # FLD AUTO: 4.8 K/UL — SIGNIFICANT CHANGE UP (ref 3.8–10.5)

## 2023-09-21 PROCEDURE — 99232 SBSQ HOSP IP/OBS MODERATE 35: CPT

## 2023-09-21 PROCEDURE — 99231 SBSQ HOSP IP/OBS SF/LOW 25: CPT | Mod: GC

## 2023-09-21 RX ADMIN — FLUCONAZOLE 200 MILLIGRAM(S): 150 TABLET ORAL at 12:05

## 2023-09-21 RX ADMIN — SODIUM CHLORIDE 75 MILLILITER(S): 9 INJECTION, SOLUTION INTRAVENOUS at 07:39

## 2023-09-21 RX ADMIN — ENOXAPARIN SODIUM 60 MILLIGRAM(S): 100 INJECTION SUBCUTANEOUS at 06:06

## 2023-09-21 RX ADMIN — PANTOPRAZOLE SODIUM 40 MILLIGRAM(S): 20 TABLET, DELAYED RELEASE ORAL at 17:08

## 2023-09-21 RX ADMIN — SODIUM CHLORIDE 75 MILLILITER(S): 9 INJECTION, SOLUTION INTRAVENOUS at 21:58

## 2023-09-21 RX ADMIN — PANTOPRAZOLE SODIUM 40 MILLIGRAM(S): 20 TABLET, DELAYED RELEASE ORAL at 06:06

## 2023-09-21 RX ADMIN — ENOXAPARIN SODIUM 60 MILLIGRAM(S): 100 INJECTION SUBCUTANEOUS at 17:09

## 2023-09-21 NOTE — PROGRESS NOTE ADULT - SUBJECTIVE AND OBJECTIVE BOX
POST ANESTHESIA EVALUATION    58y Male POSTOP DAY 1     MENTAL STATUS: Patient participation [ x ] Awake     [  ] Arousable     [  ] Sedated    AIRWAY PATENCY: [ x ] Satisfactory  [  ] Other:     Vital Signs Last 24 Hrs  T(C): 36.7 (21 Sep 2023 09:11), Max: 36.7 (20 Sep 2023 20:43)  T(F): 98 (21 Sep 2023 09:11), Max: 98 (20 Sep 2023 20:43)  HR: 89 (21 Sep 2023 09:11) (88 - 94)  BP: 111/97 (21 Sep 2023 09:11) (111/97 - 118/83)  BP(mean): --  RR: 18 (21 Sep 2023 09:11) (16 - 18)  SpO2: 100% (21 Sep 2023 09:11) (98% - 100%)    Parameters below as of 21 Sep 2023 09:11  Patient On (Oxygen Delivery Method): room air      I&O's Summary        NAUSEA/ VOMITTING:  [ x ] NONE  [  ] CONTROLLED [  ] OTHER     PAIN: [  x] CONTROLLED WITH CURRENT REGIMEN  [  ] OTHER    [ x ] NO APPARENT ANESTHESIA COMPLICATIONS      Comments: Discussed with RN

## 2023-09-21 NOTE — PROGRESS NOTE ADULT - SUBJECTIVE AND OBJECTIVE BOX
Theo Cardenas MD (Nephrology progress note)  [address], [facility],  [address],  [facility]  TEl: [phone]  Cell: [phone]    Patient is a 58y Male seen and evaluated at bedside. Vital signs, laboratory data, imaging studies, consult notes reviewed done within past 24 hours. Overnight events noted.    Allergies    No Known Allergies    Intolerances        ROS:  CONSTITUTIONAL: No fever or chills.  HEENT: No headcahe or visual disturbances.  RESPIRATORY: No shortness of breath, cough, hemoptysis or wheezing  CARDIOVASCULAR: No Chest pain, shortness of breath, palpitations, dizziness, syncope, orthopnea, PND or leg swelling.  GASTROINTESTINAL: No abdominal pain, nausea, vomiting, diarrhea, hematemesis or blood per rectum.  GENITOURINARY: No urinary frequency, urgency, gross hematuria, dysuria, flank or supra pubic pain, difficulty passing urine.  NEUROLOGICAL: No headache, focal limb weakness, tingling or numbness or seizure like activity  SKIN: No skin rash or lesion  MUSCULOSKELETAL: No leg pain, calf pain or swelling    VITALS:    T(C): 36.7 (09-21-23 @ 09:11), Max: 36.7 (09-20-23 @ 20:43)  HR: 89 (09-21-23 @ 09:11) (80 - 94)  BP: 111/97 (09-21-23 @ 09:11) (96/77 - 129/91)  RR: 18 (09-21-23 @ 09:11) (13 - 19)  SpO2: 100% (09-21-23 @ 09:11) (98% - 100%)  CAPILLARY BLOOD GLUCOSE          MEDICATIONS  (STANDING):  enoxaparin Injectable 60 milliGRAM(s) SubCutaneous every 12 hours  fluconAZOLE   Tablet 200 milliGRAM(s) Oral daily  lactated ringers. 1000 milliLiter(s) (75 mL/Hr) IV Continuous <Continuous>  pantoprazole    Tablet 40 milliGRAM(s) Oral two times a day    MEDICATIONS  (PRN):      PHYSICAL EXAM:  GENERAL: Alert, awake and oriented x3 in no distress  HEENT: ROBYN, EOMI, neck supple, no JVP, no carotid bruit, oral mucosa moist and pink.  CHEST/LUNG: Bilateral clear breath sounds, no rales, no crepitations, no rhonchi, no wheezing  HEART: Regular rate and rhythm, LOVE II/VI at LPSB, no gallops, no rub   ABDOMEN: Soft, nontender, non distended, bowel sounds present, no palpable organomegaly  : No flank or supra pubic tenderness.  EXTREMITIES: Peripheral pulses are palpable, no pedal edema  Neurology: AAOx3, no focal neurological deficit  SKIN: No rash or skin lesion  Musculoskeletal: No joint deformity or spinal tenderness.      Vascular ACCESS:     LABS:                        9.4    4.80  )-----------( 305      ( 21 Sep 2023 05:39 )             29.5     09-21    137  |  101  |  18  ----------------------------<  88  4.0   |  25  |  1.69<H>    Ca    8.4      21 Sep 2023 05:39  Phos  2.6     09-21  Mg     1.70     09-21    TPro  6.6  /  Alb  2.2<L>  /  TBili  <0.2  /  DBili  x   /  AST  16  /  ALT  6   /  AlkPhos  107  09-21        Urinalysis Basic - ( 21 Sep 2023 05:39 )    Color: x / Appearance: x / SG: x / pH: x  Gluc: 88 mg/dL / Ketone: x  / Bili: x / Urobili: x   Blood: x / Protein: x / Nitrite: x   Leuk Esterase: x / RBC: x / WBC x   Sq Epi: x / Non Sq Epi: x / Bacteria: x      Sodium, Random Urine: 44 mmol/L (09-19 @ 14:15)  Creatinine, Random Urine: 195 mg/dL (09-19 @ 14:15)  Protein/Creatinine Ratio Calculation: 0.5 Ratio (09-19 @ 14:15)  Osmolality, Random Urine: 753 mosm/kg (09-19 @ 14:15)  Potassium, Random Urine: 64.8 mmol/L (09-19 @ 14:15)        RADIOLOGY & ADDITIONAL STUDIES:    Imaging Personally Reviewed:  [x] YES  [ ] NO    Consultant(s) Notes Reviewed:  [x] YES  [ ] NO    Care Discussed with Primary team/ Other Providers [x] YES  [ ] NO       Theo Cardenas MD (Nephrology progress note)  [address], [facility],  [address],  [facility]  TEl: [phone]  Cell: [phone]    Patient is a 58y Male seen and evaluated at bedside. Vital signs, laboratory data, imaging studies, consult notes reviewed done within past 24 hours. Overnight events noted. Patient lying in bed alert and awake in no distress still complains of dysphagia and poor appetite. Interval S cr 1.7 with non oliguria.    Allergies    No Known Allergies    Intolerances        ROS:  CONSTITUTIONAL: No fever or chills.  HEENT: No headache or visual disturbances.  RESPIRATORY: No shortness of breath, cough, hemoptysis or wheezing  CARDIOVASCULAR: No Chest pain or SOB  GASTROINTESTINAL: No abdominal pain, nausea, vomiting, diarrhea, hematemesis or blood per rectum.  GENITOURINARY: No flank or supra pubic pain  NEUROLOGICAL: No headache, focal limb weakness, tingling or numbness  SKIN: No skin rash or lesion  MUSCULOSKELETAL: No leg pain, calf pain or swelling    VITALS:    T(C): 36.7 (09-21-23 @ 09:11), Max: 36.7 (09-20-23 @ 20:43)  HR: 89 (09-21-23 @ 09:11) (80 - 94)  BP: 111/97 (09-21-23 @ 09:11) (96/77 - 129/91)  RR: 18 (09-21-23 @ 09:11) (13 - 19)  SpO2: 100% (09-21-23 @ 09:11) (98% - 100%)  CAPILLARY BLOOD GLUCOSE          MEDICATIONS  (STANDING):  enoxaparin Injectable 60 milliGRAM(s) SubCutaneous every 12 hours  fluconAZOLE   Tablet 200 milliGRAM(s) Oral daily  lactated ringers. 1000 milliLiter(s) (75 mL/Hr) IV Continuous <Continuous>  pantoprazole    Tablet 40 milliGRAM(s) Oral two times a day    MEDICATIONS  (PRN):      PHYSICAL EXAM:  GENERAL: Alert, awake and oriented x3 in no distress  HEENT: ROBYN, EOMI, neck supple, no JVP, no carotid bruit, oral mucosa moist and pink.  CHEST/LUNG: Bilateral clear breath sounds, no rales, no crepitations, no rhonchi, no wheezing  HEART: Regular rate and rhythm, LOVE II/VI at LPSB, no gallops, no rub   ABDOMEN: Soft, nontender, non distended, bowel sounds present, no palpable organomegaly  : No flank or supra pubic tenderness.  EXTREMITIES: Peripheral pulses are palpable, no pedal edema  Neurology: AAOx3, no focal neurological deficit  SKIN: No rash or skin lesion  Musculoskeletal: No joint deformity or spinal tenderness.      Vascular ACCESS: None    LABS:                        9.4    4.80  )-----------( 305      ( 21 Sep 2023 05:39 )             29.5     09-21    137  |  101  |  18  ----------------------------<  88  4.0   |  25  |  1.69<H>    Ca    8.4      21 Sep 2023 05:39  Phos  2.6     09-21  Mg     1.70     09-21    TPro  6.6  /  Alb  2.2<L>  /  TBili  <0.2  /  DBili  x   /  AST  16  /  ALT  6   /  AlkPhos  107  09-21        Urinalysis Basic - ( 21 Sep 2023 05:39 )    Color: x / Appearance: x / SG: x / pH: x  Gluc: 88 mg/dL / Ketone: x  / Bili: x / Urobili: x   Blood: x / Protein: x / Nitrite: x   Leuk Esterase: x / RBC: x / WBC x   Sq Epi: x / Non Sq Epi: x / Bacteria: x      Sodium, Random Urine: 44 mmol/L (09-19 @ 14:15)  Creatinine, Random Urine: 195 mg/dL (09-19 @ 14:15)  Protein/Creatinine Ratio Calculation: 0.5 Ratio (09-19 @ 14:15)  Osmolality, Random Urine: 753 mosm/kg (09-19 @ 14:15)  Potassium, Random Urine: 64.8 mmol/L (09-19 @ 14:15)        RADIOLOGY & ADDITIONAL STUDIES:  rad  Imaging Personally Reviewed:  [x] YES  [ ] NO    Consultant(s) Notes Reviewed:  [x] YES  [ ] NO    Care Discussed with Primary team/ Other Providers [x] YES  [ ] NO

## 2023-09-21 NOTE — PROGRESS NOTE ADULT - PROBLEM SELECTOR PLAN 1
- egd 9/20 shows esophageal candidiasis  -Diflucan (fluconazole) 400 mg PO once today followed by 200mg PO daily for 13 days for a total of 14 day course.

## 2023-09-21 NOTE — PROGRESS NOTE ADULT - ASSESSMENT
58M h/o non small cell lung ca on paclitaxel (last 1 week ago, follows with Dr. Son) p/w 1 month progressively worsening dysphagia. Recent admission for PE and SBO. Endorsed dysphagia with that admission. Barium esophagram showing GERD and small sliding hiatal hernia. D/c'd with protonix and outpt f/u. Dysphagia worsened over last month, stating he feels solids and liquids get stuck in his esophagus, also feels burning pain in esophagus/chest. Has to throw up solids bc it does not go down. Endorses poor po intake the past month with 30lb weight loss. CT neck n/l. KUB showing no obstruction. 58M h/o non small cell lung ca on paclitaxel (last 1 week ago, follows with Dr. Son) p/w 1 month progressively worsening dysphagia to solids and liquids, found to have esophageal candidiasis.

## 2023-09-21 NOTE — PROGRESS NOTE ADULT - ASSESSMENT
57yo M w/ ?CKD, PMHx NSCLC Dx Sept 2022, started on chemo at the same time presents with chief complaint of dysphagia to solids and liquids x1 mo with associated 30 lbs weight loss. This is the 1st time he has had dysphagia.    #NSCLC on Paclitaxel (recently added chemo agent)  #Dysphagia/Odynophagia  #Vomiting/Regurgitation  #Heart Burn  *CT Neck 9/18 - no mass lesion or collection seen  *Esophagram 8/03 - GE reflux, small sliding HH, 13mm tablet passed with slow transit  *EGD 9/20 - grade C candidiasis esophagitis, 2cm HH, severe edematous, gastropathy. biopsied obtained      Recommendation:  -c/w PPI PO BID x 8 weeks followed by once daily.  -c/w fluconazole  -can provide sucralfate 1g suspension QiD  -follow up pathology  -additional supportive measures as per team  -will sign off at this time, however please call back with questions or should any worsening/persistent issues arise.  Please provide patient with Gastroenterology Clinic to confirm appointment; [phone] ([facility]ulty [facility] at [facility]) or [phone] ([facility] at [address] [place] [address]) or [phone] ([facility] at [address] [place] [address]).      All recommendations preliminary until note signed by service attending.    Thank you for involving us in the care of this patient. Please contact should any concern or questions arise.    Luc Holden MD   Gastroenterology/Hepatology Fellow PGY-5  Available on [employer] Teams 7am - 5pm  [id number]    NON-URGENT CONSULTS:  Please email:  [email or web address]   OR  [email or web address]    After 5pm, please contact the on-call GI fellow. [phone]    AT NIGHT AND ON WEEKENDS:  Contact on-call GI fellow via answering service ([phone]) from 5pm-8am and on weekends/holidays

## 2023-09-21 NOTE — PROGRESS NOTE ADULT - PROBLEM SELECTOR PLAN 2
- started on protonix 40 po bid  - egd 9/20 showing esophageal candidiasis, 2cm hiatal hernia, severely edematous gastropathy with thickened gastric folds  - plan for proton pump inhibitor PO BID x 8 weeks followed by once daily; Diflucan (fluconazole) 400 mg PO once today followed by 200mg PO daily for 13 days for a total of 14 day course. - started on protonix 40 po bid  - egd 9/20 showing esophageal candidiasis, 2cm hiatal hernia, severely edematous gastropathy with thickened gastric folds  - plan for proton pump inhibitor PO BID x 8 weeks followed by once daily; Diflucan (fluconazole) 400 mg PO once today followed by 200mg PO daily for 13 days for a total of 14 day course.  - PO intake still very poor today

## 2023-09-21 NOTE — PROGRESS NOTE ADULT - ASSESSMENT
58M h/o non small cell lung ca on paclitaxel  (last chemo 1 week ago, follows with Dr Son) p/w month history of dysphasia. Recent admission for PE and SBO. Had dysphasia sxs then. Barium esophagram showing GERD and anal sliding hiatal hernia. D/c'd with protonix and outpt f/u but has not. Progressive worsening of sxs over last month with 30 lb weight loss and poor po intake. Feels solids and liquids get stuck in esophagus, also endorses burning sensation in chest/esophagus. Oncology consulted for further recommendations    CT chest angio 9/18  No pulmonary embolism.  Stable subcentimeter pulmonary nodules.  Small left pleural effusion and loculated upper abdominal ascites.  Stable sclerotic osseous lesions.    CT neck 9/18  IMPRESSION:  No mass or fluid collection.    KUB 9/19  Nonobstructive bowel gas pattern.      #dysphagia in the setting of Met NSCLC  -As previously documeneted  -GI consult rec appreciated, S/p EGD on 9/20 exam showing esophageal candidiasis, 2cm hiatal hernia, severely edematous gastropathy with thickened gastric folds  - plan for proton pump inhibitor PO BID x 8 weeks followed by once daily; Diflucan (fluconazole) 400 mg PO once today followed by 200mg PO daily for 13 days for a total of 14 day course.  - PO intake still very poor today.  -US abd for possible paracentesis, + Moderate volume complex ascites on 9/19. Consider inpatient para if asymptomatic    -Palliative Care for symptom management  -No plans for inpatient chemotherapy  -C/w Supportive care, pain control, Nutrition, PT, DVT ppx  -Rest of care as per primary team  -Patient to followup with Dr. Son ([facility]) upon discharge  -Oncology will continue to follow with you    Case d/w oncology attending      Frederick THOMAS  Oncology Physician Assistant  Darlene SEXTON/[facility]    Pager [phone] also available on TEAMS as Frederick THOMAS    If before 8am/after 5pm or on weekends please page On-call Oncology Fellow

## 2023-09-21 NOTE — PROGRESS NOTE ADULT - ASSESSMENT
58M h/o non small cell lung ca on paclitaxel (last 1 week ago, follows with Dr. Son) p/w 1 month progressively worsening dysphagia. Recent admission for PE and SBO. Endorsed dysphagia with that admission. Barium esophagram showing GERD and small sliding hiatal hernia. Nephrology consult called for abnormal renal function    Assessment:  1) Non oliguric CKD stage III likely chemotherapy induced renal injury/ATN/renal hypoperfusion/ascites/compression/obstructive uropathy  2) NSCLC with metastasis on chemotherapy  3) History of PE  4) Progressive dysphagia/history of SBO/ Hiatal hernia/GERD/Gastritis/Esophageal candidasis  5) Anemia of chronic illness  6) Hypoalbuminemia    Recommend:  Strict I/o  Avoid Nephrotoxic agents  Monitor urine output  S cr 1.7 with non oliguria  Received IV Contrast earlier during admission  Replete electrolytes with goal K>4 and <5, Mag> 2 and Phos 2.5 to 3.5  Urinalysis, urine electrolytes reviewed  Bilateral renal and bladder ultrasound results reviewed  S/p EGD  IV/po fluconazole for candida esophagitis  Oncology/hematology follow up  Optimal pain control  Urine studies reviewed  Intact PTH, Vitamin D 1,25 level, Phos, calcium level  Volume status and electrolytes noted  No urgent need for RRT/HD  Will follow

## 2023-09-21 NOTE — PROGRESS NOTE ADULT - SUBJECTIVE AND OBJECTIVE BOX
INTERVAL HPI/OVERNIGHT EVENTS:  Patient seen at bedside.  Patient feeling cold and tired  No acute complaints of pain     VITAL SIGNS:  T(F): 98 (09-21-23 @ 09:11)  HR: 89 (09-21-23 @ 09:11)  BP: 111/97 (09-21-23 @ 09:11)  RR: 18 (09-21-23 @ 09:11)  SpO2: 100% (09-21-23 @ 09:11)  Wt(kg): --    PHYSICAL EXAM:    Constitutional: NAD, cachetic  male resting in bed comfortably  Eyes: EOMI, sclera non-icteric  Neck: supple, no LAD  Respiratory: CTA b/l, good air entry b/l,   Cardiovascular: RRR, normal S1S2, no M/R/G  Gastrointestinal: soft, NTND  Extremities: no edema  Neurological: AAOx3, non focal  Skin: Normal temperature    MEDICATIONS  (STANDING):  enoxaparin Injectable 60 milliGRAM(s) SubCutaneous every 12 hours  fluconAZOLE   Tablet 200 milliGRAM(s) Oral daily  lactated ringers. 1000 milliLiter(s) (75 mL/Hr) IV Continuous <Continuous>  pantoprazole    Tablet 40 milliGRAM(s) Oral two times a day    MEDICATIONS  (PRN):      Allergies    No Known Allergies    Intolerances        LABS:                        9.4    4.80  )-----------( 305      ( 21 Sep 2023 05:39 )             29.5     09-21    137  |  101  |  18  ----------------------------<  88  4.0   |  25  |  1.69<H>    Ca    8.4      21 Sep 2023 05:39  Phos  2.6     09-21  Mg     1.70     09-21    TPro  6.6  /  Alb  2.2<L>  /  TBili  <0.2  /  DBili  x   /  AST  16  /  ALT  6   /  AlkPhos  107  09-21      Urinalysis Basic - ( 21 Sep 2023 05:39 )    Color: x / Appearance: x / SG: x / pH: x  Gluc: 88 mg/dL / Ketone: x  / Bili: x / Urobili: x   Blood: x / Protein: x / Nitrite: x   Leuk Esterase: x / RBC: x / WBC x   Sq Epi: x / Non Sq Epi: x / Bacteria: x        RADIOLOGY & ADDITIONAL TESTS:  Studies reviewed.

## 2023-09-21 NOTE — PROGRESS NOTE ADULT - SUBJECTIVE AND OBJECTIVE BOX
Interval Events:   -still with same level of burning sensation with any solid or liquid ingestion as well as dysphagia  -started on fluconazole  -AC resumed    ROS:   12 point review of systems performed and negative except otherwise noted in HPI.    Hospital Medications:  enoxaparin Injectable 60 milliGRAM(s) SubCutaneous every 12 hours  fluconAZOLE   Tablet 200 milliGRAM(s) Oral daily  lactated ringers. 1000 milliLiter(s) IV Continuous <Continuous>  pantoprazole    Tablet 40 milliGRAM(s) Oral two times a day      PHYSICAL EXAM:   Vital Signs:  Vital Signs Last 24 Hrs  T(C): 36.7 (21 Sep 2023 09:11), Max: 36.7 (20 Sep 2023 20:43)  T(F): 98 (21 Sep 2023 09:11), Max: 98 (20 Sep 2023 20:43)  HR: 89 (21 Sep 2023 09:11) (88 - 94)  BP: 111/97 (21 Sep 2023 09:11) (111/97 - 118/83)  BP(mean): --  RR: 18 (21 Sep 2023 09:11) (16 - 18)  SpO2: 100% (21 Sep 2023 09:11) (98% - 100%)    Parameters below as of 21 Sep 2023 09:11  Patient On (Oxygen Delivery Method): room air      Daily     Daily     GENERAL:  No acute distress  HEENT:  Normocephalic/atraumatic, no scleral icterus, severe temporal wasting  CHEST:  No accessory muscle use  HEART:  Regular rate and rhythm  ABDOMEN:  Soft, non-tender, non-distended, normoactive bowel sounds  EXTREMITIES: no LE edema. severe muscle and fat wasting  SKIN:  No rash  NEURO:  Alert and oriented x 3      LABS: reviewed                        9.4    4.80  )-----------( 305      ( 21 Sep 2023 05:39 )             29.5     09-21    137  |  101  |  18  ----------------------------<  88  4.0   |  25  |  1.69<H>    Ca    8.4      21 Sep 2023 05:39  Phos  2.6     09-21  Mg     1.70     09-21    TPro  6.6  /  Alb  2.2<L>  /  TBili  <0.2  /  DBili  x   /  AST  16  /  ALT  6   /  AlkPhos  107  09-21    LIVER FUNCTIONS - ( 21 Sep 2023 05:39 )  Alb: 2.2 g/dL / Pro: 6.6 g/dL / ALK PHOS: 107 U/L / ALT: 6 U/L / AST: 16 U/L / GGT: x             Interval Diagnostic Studies:  < from: Upper Endoscopy (09.20.23 @ 08:01) >    [facility]  _______________________________________________________________________________  Patient Name: Lewis Cordova           Procedure Date: 9/20/2023 8:01 AM  MRN: [id number]                     Account Number: [id number]  YOB: 1965               Admit Type: Inpatient  Room: [place] [address]                         Gender: Male  Attending MD: CLOVIS OSORIO MD        _______________________________________________________________________________     Procedure:           Upper GI endoscopy  Indications:         Dysphagia, Heartburn  Providers:           CLOVIS OSORIO MD, Luc Holden MD (Fellow)  Medicines:           Monitored Anesthesia Care  Complications:       No immediate complications.  Procedure:       Pre-Anesthesia Assessment:                       - Prior to the procedure, a History and Physical was                        performed, and patient medications and allergies were                        reviewed. The patient is competent. The risks and                        benefits of the procedure and the sedation options and                        risks were discussed with the patient. All questions                        were answered and informed consent was obtained. Patient                 identification and proposed procedure were verified by                        the physician, the nurse and the anesthetist in the                        pre-procedure area in the procedure room in the                        endoscopy suite. Mental Status Examination: normal.                        Prophylactic Antibiotics: The patient does not require                        prophylactic antibiotics. Prior Anticoagulants: The                        patient has taken Lovenox (enoxaparin), last dose was 1                        day prior to procedure. ASA Grade Assessment: IV - A                        patient with severe systemic disease that is a constant                        threat to life. After reviewing the risks and benefits,                        the patient was deemed in satisfactory condition to                        undergo the procedure. The anesthesia plan was to use                        monitored anesthesia care (MAC). Immediately prior to       administration of medications, the patient was                        re-assessed for adequacy to receive sedatives. The heart                        rate, respiratory rate, oxygen saturations, blood                        pressure, adequacy of pulmonary ventilation, and                        response to care were monitored throughout the                        procedure. The physical status of the patient was                        re-assessed after the procedure.                       After obtaining informed consent, the endoscope was                        passed under direct vision. Throughout the procedure,                        the patient's blood pressure, pulse, and oxygen                        saturations were monitored continuously. The Endoscope                        was introduced through the mouth, and advanced to the                        second part of duodenum. The upper GI endoscopy was                        accomplished without difficulty. The patient tolerated                        the procedure well.                                                                                   Findings:       LA Grade C (one or more mucosal breaks continuous between tops of 2 or        more mucosal folds, less than 75% circumference) esophagitis with no        bleeding was found in the entire esophagus. Biopsies were taken with a        cold forceps for histology.       Multiple diminutive whtie plaques were found in the entire esophagus        with appearance consistent with esophageal candidiasis. Biopsies were        taken with a cold forceps for histology.       A 2 cm hiatal hernia was present.       Diffuse severely congested, edematous and erythematous mucosa was found        in the entire examined stomach, but most notable in the gastric body        with severely thickened folds. This severely limited gastric distention        despite attempts at use of room air in place of CO2. Biopsies were taken        with a cold forceps for histology.       No gross lesions were noted in the duodenal bulb and in the second        portion of the duodenum.                                                                                   Impression:          - LA Grade C candidiasis esophagitis. Biopsied.                      - Multiple plaques in the esophagus consistent with                        esophageal candidiasis. Biopsied.                       - 2 cm hiatal hernia.                       - Severely edematous gastropathy with thickened gastric                       folds and limited distensibility, limited distensibility                        possibly in setting of extrinsic lesions/ascites.                        Biopsied.                       - No gross lesions in the duodenal bulb and in the                        second portion of the duodenum.  Recommendation:      - OK to resume diet. Can begin with liquids and advance                        as tolerated.                       - [facility].                       - Await pathology results.                       - Use a proton pump inhibitor PO BID x 8 weeks followed                        by once daily.                       - Diflucan (fluconazole) 400 mg PO once today followed                  by 200mg PO daily for 13 days for a total of 14 day                        course.                                                                                   Attending Participation:       I was present and participated during the entire procedure from        insertion to removal of the endoscope.                                                                                     _________________  CLOVIS OSORIO MD  9/20/2023 10:12:41 AM  This report has been signed electronically.  Number of Addenda: 0    Note Initiated On: 9/20/2023 8:01 AM    < end of copied text >

## 2023-09-21 NOTE — PROGRESS NOTE ADULT - PROBLEM SELECTOR PLAN 4
- non small cell lung ca on paclitaxel (last 1 week ago, follows with Dr. Son)  - onc consult appreciated- no plan for inpt chemo  - abd US for possible paracentesis; previous malignant ascites- moderate volume ascites  - consider inpt para - Cr at baseline

## 2023-09-21 NOTE — PROGRESS NOTE ADULT - SUBJECTIVE AND OBJECTIVE BOX
Dylon Ortez MD  EM/IM/CC PGY-5  [id number]    PROGRESS NOTE:     Patient is a 58y old  Male who presents with a chief complaint of dysphagia (21 Sep 2023 06:36)      SUBJECTIVE / OVERNIGHT EVENTS:  No acute overnight events, I explained results of EGD to him and how the candidiasis treatment should improve his swallowing, he said he currently cannot swallow still, cannot eat.  Says he has not had bowel movement because he hasn't eaten anything.  Otherwise has no complaints and feels okay otherwise, abdomen less distended after paracentesis, pain controlled.    MEDICATIONS  (STANDING):  enoxaparin Injectable 60 milliGRAM(s) SubCutaneous every 12 hours  fluconAZOLE   Tablet 200 milliGRAM(s) Oral daily  lactated ringers. 1000 milliLiter(s) (75 mL/Hr) IV Continuous <Continuous>  pantoprazole    Tablet 40 milliGRAM(s) Oral two times a day    MEDICATIONS  (PRN):  CAPILLARY BLOOD GLUCOSE    I&O's Summary      PHYSICAL EXAM:  Vital Signs Last 24 Hrs  T(C): 36.6 (21 Sep 2023 06:06), Max: 36.7 (20 Sep 2023 20:43)  T(F): 97.8 (21 Sep 2023 06:06), Max: 98 (20 Sep 2023 20:43)  HR: 94 (21 Sep 2023 06:06) (80 - 94)  BP: 113/86 (21 Sep 2023 06:06) (96/77 - 129/91)  BP(mean): --  RR: 16 (21 Sep 2023 06:06) (13 - 19)  SpO2: 98% (21 Sep 2023 06:06) (98% - 99%)    Parameters below as of 21 Sep 2023 06:06  Patient On (Oxygen Delivery Method): room air        CONSTITUTIONAL: NAD, cachectic appearing, A&Ox3 to person, place, time.  Dry oral mucosa  RESPIRATORY: Normal respiratory effort; lungs are clear to auscultation bilaterally  CARDIOVASCULAR: Regular rate and rhythm, normal S1 and S2, no murmur/rub/gallop; No lower extremity edema; Peripheral pulses are 2+ bilaterally  ABDOMEN: Nontender to palpation, no rebound/guarding; No hepatosplenomegaly  MUSCLOSKELETAL: no clubbing or cyanosis of digits; no joint swelling or tenderness to palpation  NEURO: CN 2-12 grossly intact, moves all limbs spontaneously      LABS:                        9.4    4.80  )-----------( 305      ( 21 Sep 2023 05:39 )             29.5     09-21    137  |  101  |  18  ----------------------------<  88  4.0   |  25  |  1.69<H>    Ca    8.4      21 Sep 2023 05:39  Phos  2.6     09-21  Mg     1.70     09-21    TPro  6.6  /  Alb  2.2<L>  /  TBili  <0.2  /  DBili  x   /  AST  16  /  ALT  6   /  AlkPhos  107  09-21      Urinalysis Basic - ( 21 Sep 2023 05:39 )    Color: x / Appearance: x / SG: x / pH: x  Gluc: 88 mg/dL / Ketone: x  / Bili: x / Urobili: x   Blood: x / Protein: x / Nitrite: x   Leuk Esterase: x / RBC: x / WBC x   Sq Epi: x / Non Sq Epi: x / Bacteria: x          RADIOLOGY & ADDITIONAL TESTS:  Results Reviewed:   Imaging Personally Reviewed:  Electrocardiogram Personally Reviewed:    COORDINATION OF CARE:  Care Discussed with Consultants/Other Providers [Y/N]:  Prior or Outpatient Records Reviewed [Y/N]:

## 2023-09-21 NOTE — PROGRESS NOTE ADULT - PROBLEM SELECTOR PLAN 3
- Cr on admission 1.72-> 1.6  - likely iso of poor po intake  - fluids iso poor po intake  - Fena 0.3%- likely prerenal  - US showing no hydro; urinary bladder distension and debris; UA neg - non small cell lung ca on paclitaxel (last 1 week ago, follows with Dr. Son)  - onc consult appreciated- no plan for inpt chemo  - s/p paracentesis

## 2023-09-22 LAB
ANION GAP SERPL CALC-SCNC: 8 MMOL/L — SIGNIFICANT CHANGE UP (ref 7–14)
BUN SERPL-MCNC: 21 MG/DL — SIGNIFICANT CHANGE UP (ref 7–23)
CALCIUM SERPL-MCNC: 8.6 MG/DL — SIGNIFICANT CHANGE UP (ref 8.4–10.5)
CHLORIDE SERPL-SCNC: 103 MMOL/L — SIGNIFICANT CHANGE UP (ref 98–107)
CO2 SERPL-SCNC: 26 MMOL/L — SIGNIFICANT CHANGE UP (ref 22–31)
CREAT SERPL-MCNC: 1.71 MG/DL — HIGH (ref 0.5–1.3)
EGFR: 46 ML/MIN/1.73M2 — LOW
GLUCOSE SERPL-MCNC: 77 MG/DL — SIGNIFICANT CHANGE UP (ref 70–99)
HCT VFR BLD CALC: 28.7 % — LOW (ref 39–50)
HGB BLD-MCNC: 9.1 G/DL — LOW (ref 13–17)
MAGNESIUM SERPL-MCNC: 1.8 MG/DL — SIGNIFICANT CHANGE UP (ref 1.6–2.6)
MCHC RBC-ENTMCNC: 30 PG — SIGNIFICANT CHANGE UP (ref 27–34)
MCHC RBC-ENTMCNC: 31.7 GM/DL — LOW (ref 32–36)
MCV RBC AUTO: 94.7 FL — SIGNIFICANT CHANGE UP (ref 80–100)
NRBC # BLD: 0 /100 WBCS — SIGNIFICANT CHANGE UP (ref 0–0)
NRBC # FLD: 0 K/UL — SIGNIFICANT CHANGE UP (ref 0–0)
PHOSPHATE SERPL-MCNC: 3 MG/DL — SIGNIFICANT CHANGE UP (ref 2.5–4.5)
PLATELET # BLD AUTO: 340 K/UL — SIGNIFICANT CHANGE UP (ref 150–400)
POTASSIUM SERPL-MCNC: 4.4 MMOL/L — SIGNIFICANT CHANGE UP (ref 3.5–5.3)
POTASSIUM SERPL-SCNC: 4.4 MMOL/L — SIGNIFICANT CHANGE UP (ref 3.5–5.3)
RBC # BLD: 3.03 M/UL — LOW (ref 4.2–5.8)
RBC # FLD: 17.5 % — HIGH (ref 10.3–14.5)
SODIUM SERPL-SCNC: 137 MMOL/L — SIGNIFICANT CHANGE UP (ref 135–145)
WBC # BLD: 4.69 K/UL — SIGNIFICANT CHANGE UP (ref 3.8–10.5)
WBC # FLD AUTO: 4.69 K/UL — SIGNIFICANT CHANGE UP (ref 3.8–10.5)

## 2023-09-22 RX ADMIN — ENOXAPARIN SODIUM 60 MILLIGRAM(S): 100 INJECTION SUBCUTANEOUS at 05:38

## 2023-09-22 RX ADMIN — PANTOPRAZOLE SODIUM 40 MILLIGRAM(S): 20 TABLET, DELAYED RELEASE ORAL at 17:56

## 2023-09-22 RX ADMIN — PANTOPRAZOLE SODIUM 40 MILLIGRAM(S): 20 TABLET, DELAYED RELEASE ORAL at 05:38

## 2023-09-22 RX ADMIN — FLUCONAZOLE 200 MILLIGRAM(S): 150 TABLET ORAL at 11:33

## 2023-09-22 RX ADMIN — SODIUM CHLORIDE 75 MILLILITER(S): 9 INJECTION, SOLUTION INTRAVENOUS at 09:25

## 2023-09-22 RX ADMIN — ENOXAPARIN SODIUM 60 MILLIGRAM(S): 100 INJECTION SUBCUTANEOUS at 17:56

## 2023-09-22 NOTE — PROGRESS NOTE ADULT - ASSESSMENT
58M h/o non small cell lung ca on paclitaxel (last 1 week ago, follows with Dr. Son) p/w 1 month progressively worsening dysphagia to solids and liquids, found to have esophageal candidiasis.

## 2023-09-22 NOTE — PROGRESS NOTE ADULT - PROBLEM SELECTOR PLAN 5
- Diet: clears advance as tolerated, on IVF iso poor po  - DVT ppx: lovenox 60 bid given recent PE - Diet: full liquid, advance as tolerated, on IVF iso poor po  - DVT ppx: lovenox 60 bid given recent PE

## 2023-09-22 NOTE — PROGRESS NOTE ADULT - SUBJECTIVE AND OBJECTIVE BOX
PROGRESS NOTE:     Patient is a 58y old  Male who presents with a chief complaint of dysphagia (21 Sep 2023 15:26)      SUBJECTIVE / OVERNIGHT EVENTS:    ADDITIONAL REVIEW OF SYSTEMS: 10 point ROS negative except per HPI    MEDICATIONS  (STANDING):  enoxaparin Injectable 60 milliGRAM(s) SubCutaneous every 12 hours  fluconAZOLE   Tablet 200 milliGRAM(s) Oral daily  lactated ringers. 1000 milliLiter(s) (75 mL/Hr) IV Continuous <Continuous>  pantoprazole    Tablet 40 milliGRAM(s) Oral two times a day    MEDICATIONS  (PRN):      CAPILLARY BLOOD GLUCOSE        I&O's Summary      PHYSICAL EXAM:  Vital Signs Last 24 Hrs  T(C): 36.9 (22 Sep 2023 05:40), Max: 36.9 (21 Sep 2023 22:00)  T(F): 98.4 (22 Sep 2023 05:40), Max: 98.5 (21 Sep 2023 22:00)  HR: 88 (22 Sep 2023 05:40) (88 - 97)  BP: 108/83 (22 Sep 2023 05:40) (108/83 - 111/97)  BP(mean): --  RR: 18 (22 Sep 2023 05:40) (18 - 18)  SpO2: 99% (22 Sep 2023 05:40) (99% - 100%)    Parameters below as of 22 Sep 2023 05:40  Patient On (Oxygen Delivery Method): room air        CONSTITUTIONAL: NAD, well-developed, A&Ox3 to person, place, time.  RESPIRATORY: Normal respiratory effort; lungs are clear to auscultation bilaterally  CARDIOVASCULAR: Regular rate and rhythm, normal S1 and S2, no murmur/rub/gallop; No lower extremity edema; Peripheral pulses are 2+ bilaterally  ABDOMEN: Nontender to palpation, no rebound/guarding; No hepatosplenomegaly  MUSCLOSKELETAL: no clubbing or cyanosis of digits; no joint swelling or tenderness to palpation  NEURO: CN 2-12 grossly intact, moves all limbs spontaneously      LABS:                          9.1    4.69  )-----------( 340      ( 22 Sep 2023 05:26 )             28.7     09-21    137  |  101  |  18  ----------------------------<  88  4.0   |  25  |  1.69<H>    Ca    8.4      21 Sep 2023 05:39  Phos  2.6     09-21  Mg     1.70     09-21    TPro  6.6  /  Alb  2.2<L>  /  TBili  <0.2  /  DBili  x   /  AST  16  /  ALT  6   /  AlkPhos  107  09-21          -----    MICRO  Urinalysis Basic - ( 21 Sep 2023 05:39 )    Color: x / Appearance: x / SG: x / pH: x  Gluc: 88 mg/dL / Ketone: x  / Bili: x / Urobili: x   Blood: x / Protein: x / Nitrite: x   Leuk Esterase: x / RBC: x / WBC x   Sq Epi: x / Non Sq Epi: x / Bacteria: x        -----    TRENDS  Hemoglobin: 9.1 g/dL (09-22 @ 05:26)  Hemoglobin: 9.4 g/dL (09-21 @ 05:39)  Hemoglobin: 8.2 g/dL (09-20 @ 03:40)  Hemoglobin: 10.5 g/dL (09-18 @ 22:08)    Creatinine Trend: 1.69<--, 1.63<--, 1.72<--, 1.86<--, 1.65<--  ----  RADIOLOGY & ADDITIONAL TESTS:  Results Reviewed:   Imaging Personally Reviewed:  Electrocardiogram Personally Reviewed:    COORDINATION OF CARE:  Care Discussed with Consultants/Other Providers [Y/N]:  Prior or Outpatient Records Reviewed [Y/N]:   PROGRESS NOTE:     Patient is a 58y old  Male who presents with a chief complaint of dysphagia (21 Sep 2023 15:26)      SUBJECTIVE / OVERNIGHT EVENTS: No events overnight. States he had trouble drinking water last night, though smoothies/ice cream is still ok.    ADDITIONAL REVIEW OF SYSTEMS: 10 point ROS negative except per HPI    MEDICATIONS  (STANDING):  enoxaparin Injectable 60 milliGRAM(s) SubCutaneous every 12 hours  fluconAZOLE   Tablet 200 milliGRAM(s) Oral daily  lactated ringers. 1000 milliLiter(s) (75 mL/Hr) IV Continuous <Continuous>  pantoprazole    Tablet 40 milliGRAM(s) Oral two times a day    MEDICATIONS  (PRN):      CAPILLARY BLOOD GLUCOSE        I&O's Summary      PHYSICAL EXAM:  Vital Signs Last 24 Hrs  T(C): 36.9 (22 Sep 2023 05:40), Max: 36.9 (21 Sep 2023 22:00)  T(F): 98.4 (22 Sep 2023 05:40), Max: 98.5 (21 Sep 2023 22:00)  HR: 88 (22 Sep 2023 05:40) (88 - 97)  BP: 108/83 (22 Sep 2023 05:40) (108/83 - 111/97)  BP(mean): --  RR: 18 (22 Sep 2023 05:40) (18 - 18)  SpO2: 99% (22 Sep 2023 05:40) (99% - 100%)    Parameters below as of 22 Sep 2023 05:40  Patient On (Oxygen Delivery Method): room air        CONSTITUTIONAL: NAD, well-developed, A&Ox3 to person, place, time, dry oral mucosa  RESPIRATORY: Normal respiratory effort; lungs are clear to auscultation bilaterally  CARDIOVASCULAR: Regular rate and rhythm, normal S1 and S2, no murmur/rub/gallop; No lower extremity edema; Peripheral pulses are 2+ bilaterally  ABDOMEN: Nontender to palpation, no rebound/guarding; No hepatosplenomegaly  MUSCLOSKELETAL: no clubbing or cyanosis of digits; no joint swelling or tenderness to palpation  NEURO: CN 2-12 grossly intact, moves all limbs spontaneously      LABS:                          9.1    4.69  )-----------( 340      ( 22 Sep 2023 05:26 )             28.7     09-21    137  |  101  |  18  ----------------------------<  88  4.0   |  25  |  1.69<H>    Ca    8.4      21 Sep 2023 05:39  Phos  2.6     09-21  Mg     1.70     09-21    TPro  6.6  /  Alb  2.2<L>  /  TBili  <0.2  /  DBili  x   /  AST  16  /  ALT  6   /  AlkPhos  107  09-21          -----    MICRO  Urinalysis Basic - ( 21 Sep 2023 05:39 )    Color: x / Appearance: x / SG: x / pH: x  Gluc: 88 mg/dL / Ketone: x  / Bili: x / Urobili: x   Blood: x / Protein: x / Nitrite: x   Leuk Esterase: x / RBC: x / WBC x   Sq Epi: x / Non Sq Epi: x / Bacteria: x        -----    TRENDS  Hemoglobin: 9.1 g/dL (09-22 @ 05:26)  Hemoglobin: 9.4 g/dL (09-21 @ 05:39)  Hemoglobin: 8.2 g/dL (09-20 @ 03:40)  Hemoglobin: 10.5 g/dL (09-18 @ 22:08)    Creatinine Trend: 1.69<--, 1.63<--, 1.72<--, 1.86<--, 1.65<--  ----  RADIOLOGY & ADDITIONAL TESTS:  Results Reviewed:   Imaging Personally Reviewed:  Electrocardiogram Personally Reviewed:    COORDINATION OF CARE:  Care Discussed with Consultants/Other Providers [Y/N]:  Prior or Outpatient Records Reviewed [Y/N]:   PROGRESS NOTE:     Patient is a 58y old  Male who presents with a chief complaint of dysphagia (21 Sep 2023 15:26)      SUBJECTIVE / OVERNIGHT EVENTS: No events overnight. States he had trouble drinking water last night, though smoothies/ice cream is still ok. Still poor po intake in general.    ADDITIONAL REVIEW OF SYSTEMS: 10 point ROS negative except per HPI    MEDICATIONS  (STANDING):  enoxaparin Injectable 60 milliGRAM(s) SubCutaneous every 12 hours  fluconAZOLE   Tablet 200 milliGRAM(s) Oral daily  lactated ringers. 1000 milliLiter(s) (75 mL/Hr) IV Continuous <Continuous>  pantoprazole    Tablet 40 milliGRAM(s) Oral two times a day    MEDICATIONS  (PRN):      CAPILLARY BLOOD GLUCOSE        I&O's Summary      PHYSICAL EXAM:  Vital Signs Last 24 Hrs  T(C): 36.9 (22 Sep 2023 05:40), Max: 36.9 (21 Sep 2023 22:00)  T(F): 98.4 (22 Sep 2023 05:40), Max: 98.5 (21 Sep 2023 22:00)  HR: 88 (22 Sep 2023 05:40) (88 - 97)  BP: 108/83 (22 Sep 2023 05:40) (108/83 - 111/97)  BP(mean): --  RR: 18 (22 Sep 2023 05:40) (18 - 18)  SpO2: 99% (22 Sep 2023 05:40) (99% - 100%)    Parameters below as of 22 Sep 2023 05:40  Patient On (Oxygen Delivery Method): room air        CONSTITUTIONAL: NAD, well-developed, A&Ox3 to person, place, time, dry oral mucosa  RESPIRATORY: Normal respiratory effort; lungs are clear to auscultation bilaterally  CARDIOVASCULAR: Regular rate and rhythm, normal S1 and S2, no murmur/rub/gallop; No lower extremity edema; Peripheral pulses are 2+ bilaterally  ABDOMEN: Nontender to palpation, no rebound/guarding; No hepatosplenomegaly  MUSCLOSKELETAL: no clubbing or cyanosis of digits; no joint swelling or tenderness to palpation  NEURO: CN 2-12 grossly intact, moves all limbs spontaneously      LABS:                          9.1    4.69  )-----------( 340      ( 22 Sep 2023 05:26 )             28.7     09-21    137  |  101  |  18  ----------------------------<  88  4.0   |  25  |  1.69<H>    Ca    8.4      21 Sep 2023 05:39  Phos  2.6     09-21  Mg     1.70     09-21    TPro  6.6  /  Alb  2.2<L>  /  TBili  <0.2  /  DBili  x   /  AST  16  /  ALT  6   /  AlkPhos  107  09-21          -----    MICRO  Urinalysis Basic - ( 21 Sep 2023 05:39 )    Color: x / Appearance: x / SG: x / pH: x  Gluc: 88 mg/dL / Ketone: x  / Bili: x / Urobili: x   Blood: x / Protein: x / Nitrite: x   Leuk Esterase: x / RBC: x / WBC x   Sq Epi: x / Non Sq Epi: x / Bacteria: x        -----    TRENDS  Hemoglobin: 9.1 g/dL (09-22 @ 05:26)  Hemoglobin: 9.4 g/dL (09-21 @ 05:39)  Hemoglobin: 8.2 g/dL (09-20 @ 03:40)  Hemoglobin: 10.5 g/dL (09-18 @ 22:08)    Creatinine Trend: 1.69<--, 1.63<--, 1.72<--, 1.86<--, 1.65<--  ----  RADIOLOGY & ADDITIONAL TESTS:  Results Reviewed:   Imaging Personally Reviewed:  Electrocardiogram Personally Reviewed:    COORDINATION OF CARE:  Care Discussed with Consultants/Other Providers [Y/N]:  Prior or Outpatient Records Reviewed [Y/N]:

## 2023-09-22 NOTE — PROGRESS NOTE ADULT - PROBLEM SELECTOR PLAN 3
- non small cell lung ca on paclitaxel (last 1 week ago, follows with Dr. Son)  - onc consult appreciated- no plan for inpt chemo  - s/p paracentesis - non small cell lung ca on paclitaxel (last 1 week ago, follows with Dr. Son)  - onc consult appreciated- no plan for inpt chemo  - s/p paracentesis- 2200cc removed

## 2023-09-22 NOTE — PROGRESS NOTE ADULT - PROBLEM SELECTOR PLAN 2
- started on protonix 40 po bid  - egd 9/20 showing esophageal candidiasis, 2cm hiatal hernia, severely edematous gastropathy with thickened gastric folds  - plan for proton pump inhibitor PO BID x 8 weeks followed by once daily; Diflucan (fluconazole) 400 mg PO once today followed by 200mg PO daily for 13 days for a total of 14 day course.  - PO intake still very poor today - started on protonix 40 po bid  - egd 9/20 showing esophageal candidiasis, 2cm hiatal hernia, severely edematous gastropathy with thickened gastric folds  - plan for proton pump inhibitor PO BID x 8 weeks followed by once daily; Diflucan (fluconazole) 400 mg PO once today followed by 200mg PO daily for 13 days for a total of 14 day course.  - PO intake still very poor today  - plan to advance diet as tolerated, d/c fluids when tolerating po better

## 2023-09-22 NOTE — PROGRESS NOTE ADULT - SUBJECTIVE AND OBJECTIVE BOX
Theo Cardenas MD (Nephrology progress note)  [address], [facility],  [address],  [facility]  TEl: [phone]  Cell: [phone]    Patient is a 58y Male seen and evaluated at bedside. Vital signs, laboratory data, imaging studies, consult notes reviewed done within past 24 hours. Overnight events noted.    Allergies    No Known Allergies    Intolerances        ROS:  CONSTITUTIONAL: No fever or chills.  HEENT: No headcahe or visual disturbances.  RESPIRATORY: No shortness of breath, cough, hemoptysis or wheezing  CARDIOVASCULAR: No Chest pain, shortness of breath, palpitations, dizziness, syncope, orthopnea, PND or leg swelling.  GASTROINTESTINAL: No abdominal pain, nausea, vomiting, diarrhea, hematemesis or blood per rectum.  GENITOURINARY: No urinary frequency, urgency, gross hematuria, dysuria, flank or supra pubic pain, difficulty passing urine.  NEUROLOGICAL: No headache, focal limb weakness, tingling or numbness or seizure like activity  SKIN: No skin rash or lesion  MUSCULOSKELETAL: No leg pain, calf pain or swelling    VITALS:    T(C): 36.6 (09-22-23 @ 09:28), Max: 36.9 (09-21-23 @ 22:00)  HR: 90 (09-22-23 @ 09:28) (88 - 97)  BP: 109/85 (09-22-23 @ 09:28) (108/83 - 111/84)  RR: 18 (09-22-23 @ 09:28) (18 - 18)  SpO2: 100% (09-22-23 @ 09:28) (99% - 100%)  CAPILLARY BLOOD GLUCOSE          09-21-23 @ 07:01  -  09-22-23 @ 07:00  --------------------------------------------------------  IN: 0 mL / OUT: 200 mL / NET: -200 mL      MEDICATIONS  (STANDING):  enoxaparin Injectable 60 milliGRAM(s) SubCutaneous every 12 hours  fluconAZOLE   Tablet 200 milliGRAM(s) Oral daily  lactated ringers. 1000 milliLiter(s) (75 mL/Hr) IV Continuous <Continuous>  pantoprazole    Tablet 40 milliGRAM(s) Oral two times a day    MEDICATIONS  (PRN):      PHYSICAL EXAM:  GENERAL: Alert, awake and oriented x3 in no distress  HEENT: ROBYN, EOMI, neck supple, no JVP, no carotid bruit, oral mucosa moist and pink.  CHEST/LUNG: Bilateral clear breath sounds, no rales, no crepitations, no rhonchi, no wheezing  HEART: Regular rate and rhythm, LOVE II/VI at LPSB, no gallops, no rub   ABDOMEN: Soft, nontender, non distended, bowel sounds present, no palpable organomegaly  : No flank or supra pubic tenderness.  EXTREMITIES: Peripheral pulses are palpable, no pedal edema  Neurology: AAOx3, no focal neurological deficit  SKIN: No rash or skin lesion  Musculoskeletal: No joint deformity or spinal tenderness.      Vascular ACCESS:     LABS:                        9.1    4.69  )-----------( 340      ( 22 Sep 2023 05:26 )             28.7     09-22    137  |  103  |  21  ----------------------------<  77  4.4   |  26  |  1.71<H>    Ca    8.6      22 Sep 2023 05:26  Phos  3.0     09-22  Mg     1.80     09-22    TPro  6.6  /  Alb  2.2<L>  /  TBili  <0.2  /  DBili  x   /  AST  16  /  ALT  6   /  AlkPhos  107  09-21        Urinalysis Basic - ( 22 Sep 2023 05:26 )    Color: x / Appearance: x / SG: x / pH: x  Gluc: 77 mg/dL / Ketone: x  / Bili: x / Urobili: x   Blood: x / Protein: x / Nitrite: x   Leuk Esterase: x / RBC: x / WBC x   Sq Epi: x / Non Sq Epi: x / Bacteria: x            RADIOLOGY & ADDITIONAL STUDIES:    Imaging Personally Reviewed:  [x] YES  [ ] NO    Consultant(s) Notes Reviewed:  [x] YES  [ ] NO    Care Discussed with Primary team/ Other Providers [x] YES  [ ] NO       Theo Cardenas MD (Nephrology progress note)  [address], [facility],  [address],  [facility]  TEl: [phone]  Cell: [phone]    Patient is a 58y Male seen and evaluated at bedside. Vital signs, laboratory data, imaging studies, consult notes reviewed done within past 24 hours. Overnight events noted. Patient lying in bed alert and awake still remains with dysphagia but able to swallow. Interval S cr 1.7 with non oliguria.     Allergies    No Known Allergies    Intolerances        ROS:  CONSTITUTIONAL: No fever or chills.  HEENT: No headache or visual disturbances.  RESPIRATORY: No shortness of breath, cough, hemoptysis or wheezing  CARDIOVASCULAR: No Chest pain or SOB  GASTROINTESTINAL: No abdominal pain, nausea, vomiting, diarrhea, hematemesis or blood per rectum.  GENITOURINARY: No flank or supra pubic pain  NEUROLOGICAL: No headache, focal limb weakness, tingling or numbness  SKIN: No skin rash or lesion  MUSCULOSKELETAL: No leg pain, calf pain or swelling    VITALS:    T(C): 36.6 (09-22-23 @ 09:28), Max: 36.9 (09-21-23 @ 22:00)  HR: 90 (09-22-23 @ 09:28) (88 - 97)  BP: 109/85 (09-22-23 @ 09:28) (108/83 - 111/84)  RR: 18 (09-22-23 @ 09:28) (18 - 18)  SpO2: 100% (09-22-23 @ 09:28) (99% - 100%)  CAPILLARY BLOOD GLUCOSE          09-21-23 @ 07:01  -  09-22-23 @ 07:00  --------------------------------------------------------  IN: 0 mL / OUT: 200 mL / NET: -200 mL      MEDICATIONS  (STANDING):  enoxaparin Injectable 60 milliGRAM(s) SubCutaneous every 12 hours  fluconAZOLE   Tablet 200 milliGRAM(s) Oral daily  lactated ringers. 1000 milliLiter(s) (75 mL/Hr) IV Continuous <Continuous>  pantoprazole    Tablet 40 milliGRAM(s) Oral two times a day    MEDICATIONS  (PRN):      PHYSICAL EXAM:  GENERAL: Alert, awake and oriented x2-3 in no distress  HEENT: ROBYN, EOMI, neck supple, no JVP, no carotid bruit, oral mucosa moist and pale  CHEST/LUNG: Bilateral clear breath sounds, no rales, no crepitations, no rhonchi, no wheezing  HEART: Regular rate and rhythm, LOVE II/VI at LPSB, no gallops, no rub   ABDOMEN: Soft, nontender, non distended, bowel sounds present, no palpable organomegaly  : No flank or supra pubic tenderness.  EXTREMITIES: Peripheral pulses are palpable, no pedal edema  Neurology: AAOx2-3, no focal neurological deficit  SKIN: No rash or skin lesion  Musculoskeletal: No joint deformity or spinal tenderness.      Vascular ACCESS: None    LABS:                        9.1    4.69  )-----------( 340      ( 22 Sep 2023 05:26 )             28.7     09-22    137  |  103  |  21  ----------------------------<  77  4.4   |  26  |  1.71<H>    Ca    8.6      22 Sep 2023 05:26  Phos  3.0     09-22  Mg     1.80     09-22    TPro  6.6  /  Alb  2.2<L>  /  TBili  <0.2  /  DBili  x   /  AST  16  /  ALT  6   /  AlkPhos  107  09-21        Urinalysis Basic - ( 22 Sep 2023 05:26 )    Color: x / Appearance: x / SG: x / pH: x  Gluc: 77 mg/dL / Ketone: x  / Bili: x / Urobili: x   Blood: x / Protein: x / Nitrite: x   Leuk Esterase: x / RBC: x / WBC x   Sq Epi: x / Non Sq Epi: x / Bacteria: x            RADIOLOGY & ADDITIONAL STUDIES:  < from: IR Procedure (09.20.23 @ 15:58) >    ACC: [id number] EXAM:  IR PROCEDURE   ORDERED BY: JEAN PAUL PALOMINO     PROCEDURE DATE:  09/20/2023          INTERPRETATION:  Procedure: Therapeutic Paracentesis. Images saved to   PACS.    Clinical Information: 58-year-old male with non-small cell lung cancer   and recurrent ascites referred for therapeutic paracentesis.    Technique: Informed consent was obtained. The patient was placed supine   on the procedure table.  Using ultrasound guidance, a large pocket of   fluid was localized in the right lower quadrant which was prepped and   draped in the usual sterile fashion. Timeout performed. 1% lidocaine used   for local anesthesia.    Under sonographic guidance, the fluid pocket was accessed with a 5 Citizen of [place]   Medifyeh centesis sheath introducer needle with return of yellow ascites.   About 2200 cc of fluid were then removed. Upon completion, the introducer   sheath was removed and a sterile dressing was placed. No complications.    Sedation: None.    Impression: Successful sonographic guided therapeutic paracentesis.    --- End of Report ---          JEZ STEIN MD; Resident Radiologist  This document has been electronically signed.  CINDY JUÁREZ MD; Attending Interventional Radiologist  This document has been electronically signed. Sep 21 2023  3:31PM    < end of copied text >    Imaging Personally Reviewed:  [x] YES  [ ] NO    Consultant(s) Notes Reviewed:  [x] YES  [ ] NO    Care Discussed with Primary team/ Other Providers [x] YES  [ ] NO

## 2023-09-22 NOTE — PROGRESS NOTE ADULT - ASSESSMENT
58M h/o non small cell lung ca on paclitaxel (last 1 week ago, follows with Dr. Son) p/w 1 month progressively worsening dysphagia. Recent admission for PE and SBO. Endorsed dysphagia with that admission. Barium esophagram showing GERD and small sliding hiatal hernia. Nephrology consult called for abnormal renal function    Assessment:  1) Non oliguric CKD stage III likely chemotherapy induced renal injury/ATN/renal hypoperfusion/ascites/compression/obstructive uropathy  2) NSCLC with metastasis on chemotherapy  3) History of PE  4) Progressive dysphagia/history of SBO/ Hiatal hernia/GERD/Gastritis/Esophageal candidasis  5) Anemia of chronic illness  6) Hypoalbuminemia  7) Ascites     Recommend:  Strict I/o  Avoid Nephrotoxic agents  Monitor urine output  S cr 1.7 with non oliguria  Replete electrolytes with goal K>4 and <5, Mag> 2 and Phos 2.5 to 3.5  Urinalysis, urine electrolytes reviewed  Bilateral renal and bladder ultrasound results reviewed  S/p EGD  IV/po fluconazole for candida esophagitis  Oncology/hematology follow up  Optimal pain control  Intact PTH, Vitamin D 1,25 level, Phos, calcium level  Volume status and electrolytes noted  No urgent need for RRT/HD  Will follow

## 2023-09-23 LAB
ALBUMIN SERPL ELPH-MCNC: 2.1 G/DL — LOW (ref 3.3–5)
ALP SERPL-CCNC: 121 U/L — HIGH (ref 40–120)
ALT FLD-CCNC: 6 U/L — SIGNIFICANT CHANGE UP (ref 4–41)
ANION GAP SERPL CALC-SCNC: 10 MMOL/L — SIGNIFICANT CHANGE UP (ref 7–14)
AST SERPL-CCNC: 17 U/L — SIGNIFICANT CHANGE UP (ref 4–40)
BILIRUB SERPL-MCNC: 0.2 MG/DL — SIGNIFICANT CHANGE UP (ref 0.2–1.2)
BUN SERPL-MCNC: 21 MG/DL — SIGNIFICANT CHANGE UP (ref 7–23)
CALCIUM SERPL-MCNC: 8.5 MG/DL — SIGNIFICANT CHANGE UP (ref 8.4–10.5)
CHLORIDE SERPL-SCNC: 102 MMOL/L — SIGNIFICANT CHANGE UP (ref 98–107)
CO2 SERPL-SCNC: 25 MMOL/L — SIGNIFICANT CHANGE UP (ref 22–31)
CREAT SERPL-MCNC: 1.66 MG/DL — HIGH (ref 0.5–1.3)
EGFR: 48 ML/MIN/1.73M2 — LOW
GLUCOSE SERPL-MCNC: 85 MG/DL — SIGNIFICANT CHANGE UP (ref 70–99)
HCT VFR BLD CALC: 29.8 % — LOW (ref 39–50)
HGB BLD-MCNC: 9.5 G/DL — LOW (ref 13–17)
MAGNESIUM SERPL-MCNC: 1.7 MG/DL — SIGNIFICANT CHANGE UP (ref 1.6–2.6)
MCHC RBC-ENTMCNC: 30.2 PG — SIGNIFICANT CHANGE UP (ref 27–34)
MCHC RBC-ENTMCNC: 31.9 GM/DL — LOW (ref 32–36)
MCV RBC AUTO: 94.6 FL — SIGNIFICANT CHANGE UP (ref 80–100)
NRBC # BLD: 0 /100 WBCS — SIGNIFICANT CHANGE UP (ref 0–0)
NRBC # FLD: 0 K/UL — SIGNIFICANT CHANGE UP (ref 0–0)
PHOSPHATE SERPL-MCNC: 3 MG/DL — SIGNIFICANT CHANGE UP (ref 2.5–4.5)
PLATELET # BLD AUTO: 338 K/UL — SIGNIFICANT CHANGE UP (ref 150–400)
POTASSIUM SERPL-MCNC: 4.5 MMOL/L — SIGNIFICANT CHANGE UP (ref 3.5–5.3)
POTASSIUM SERPL-SCNC: 4.5 MMOL/L — SIGNIFICANT CHANGE UP (ref 3.5–5.3)
PROT SERPL-MCNC: 6.2 G/DL — SIGNIFICANT CHANGE UP (ref 6–8.3)
RBC # BLD: 3.15 M/UL — LOW (ref 4.2–5.8)
RBC # FLD: 17.2 % — HIGH (ref 10.3–14.5)
SODIUM SERPL-SCNC: 137 MMOL/L — SIGNIFICANT CHANGE UP (ref 135–145)
VIT D25+D1,25 OH+D1,25 PNL SERPL-MCNC: <5 PG/ML — LOW (ref 19.9–79.3)
WBC # BLD: 4.34 K/UL — SIGNIFICANT CHANGE UP (ref 3.8–10.5)
WBC # FLD AUTO: 4.34 K/UL — SIGNIFICANT CHANGE UP (ref 3.8–10.5)

## 2023-09-23 RX ADMIN — SODIUM CHLORIDE 75 MILLILITER(S): 9 INJECTION, SOLUTION INTRAVENOUS at 11:31

## 2023-09-23 RX ADMIN — PANTOPRAZOLE SODIUM 40 MILLIGRAM(S): 20 TABLET, DELAYED RELEASE ORAL at 17:17

## 2023-09-23 RX ADMIN — PANTOPRAZOLE SODIUM 40 MILLIGRAM(S): 20 TABLET, DELAYED RELEASE ORAL at 05:03

## 2023-09-23 RX ADMIN — FLUCONAZOLE 200 MILLIGRAM(S): 150 TABLET ORAL at 11:31

## 2023-09-23 RX ADMIN — ENOXAPARIN SODIUM 60 MILLIGRAM(S): 100 INJECTION SUBCUTANEOUS at 17:17

## 2023-09-23 RX ADMIN — ENOXAPARIN SODIUM 60 MILLIGRAM(S): 100 INJECTION SUBCUTANEOUS at 05:03

## 2023-09-23 NOTE — PROGRESS NOTE ADULT - PROBLEM SELECTOR PLAN 3
- non small cell lung ca on paclitaxel (last 1 week ago, follows with Dr. Son)  - onc consult appreciated- no plan for inpt chemo  - s/p paracentesis- 2200cc removed

## 2023-09-23 NOTE — PROGRESS NOTE ADULT - PROBLEM SELECTOR PLAN 5
- Diet: full liquid, advance as tolerated, on IVF iso poor po  - DVT ppx: lovenox 60 bid given recent PE

## 2023-09-23 NOTE — PROGRESS NOTE ADULT - SUBJECTIVE AND OBJECTIVE BOX
Patient is a 58y old  Male who presents with a chief complaint of dysphagia (23 Sep 2023 14:50)      INTERVAL HPI/OVERNIGHT EVENTS:  T(C): 36.7 (09-23-23 @ 18:00), Max: 37 (09-23-23 @ 05:00)  HR: 79 (09-23-23 @ 18:00) (79 - 99)  BP: 117/87 (09-23-23 @ 18:00) (108/83 - 117/87)  RR: 20 (09-23-23 @ 18:00) (18 - 20)  SpO2: 100% (09-23-23 @ 18:00) (99% - 100%)  Wt(kg): --  I&O's Summary    23 Sep 2023 07:01  -  23 Sep 2023 20:11  --------------------------------------------------------  IN: 0 mL / OUT: 400 mL / NET: -400 mL        PAST MEDICAL & SURGICAL HISTORY:  Former smoker      Non-small cell lung cancer with metastasis      Status post cardiac surgery  s/p open right thoracotomy for posterior cardiac mass removal > 20 years ago, reported by patient to be benign.          SOCIAL HISTORY  Alcohol:  Tobacco:  Illicit substance use:    FAMILY HISTORY:    REVIEW OF SYSTEMS:  CONSTITUTIONAL: No fever, weight loss, or fatigue  EYES: No eye pain, visual disturbances, or discharge  ENMT:  No difficulty hearing, tinnitus, vertigo; No sinus or throat pain  NECK: No pain or stiffness  RESPIRATORY: No cough, wheezing, chills or hemoptysis; No shortness of breath  CARDIOVASCULAR: No chest pain, palpitations, dizziness, or leg swelling  GASTROINTESTINAL: No abdominal or epigastric pain. No nausea, vomiting, or hematemesis; No diarrhea or constipation. No melena or hematochezia.  GENITOURINARY: No dysuria, frequency, hematuria, or incontinence  NEUROLOGICAL: No headaches, memory loss, loss of strength, numbness, or tremors  SKIN: No itching, burning, rashes, or lesions   LYMPH NODES: No enlarged glands  ENDOCRINE: No heat or cold intolerance; No hair loss  MUSCULOSKELETAL: No joint pain or swelling; No muscle, back, or extremity pain  PSYCHIATRIC: No depression, anxiety, mood swings, or difficulty sleeping  HEME/LYMPH: No easy bruising, or bleeding gums  ALLERY AND IMMUNOLOGIC: No hives or eczema    RADIOLOGY & ADDITIONAL TESTS:    Imaging Personally Reviewed:  [ ] YES  [ ] NO    Consultant(s) Notes Reviewed:  [ ] YES  [ ] NO    PHYSICAL EXAM:  GENERAL: NAD, well-groomed, well-developed  HEAD:  Atraumatic, Normocephalic  EYES: EOMI, PERRLA, conjunctiva and sclera clear  ENMT: No tonsillar erythema, exudates, or enlargement; Moist mucous membranes, Good dentition, No lesions  NECK: Supple, No JVD, Normal thyroid  NERVOUS SYSTEM:  Alert & Oriented X3, Good concentration; Motor Strength 5/5 B/L upper and lower extremities; DTRs 2+ intact and symmetric  CHEST/LUNG: Clear to percussion bilaterally; No rales, rhonchi, wheezing, or rubs  HEART: Regular rate and rhythm; No murmurs, rubs, or gallops  ABDOMEN: Soft, Nontender, Nondistended; Bowel sounds present  EXTREMITIES:  2+ Peripheral Pulses, No clubbing, cyanosis, or edema  LYMPH: No lymphadenopathy noted  SKIN: No rashes or lesions    LABS:                        9.5    4.34  )-----------( 338      ( 23 Sep 2023 05:15 )             29.8     09-23    137  |  102  |  21  ----------------------------<  85  4.5   |  25  |  1.66<H>    Ca    8.5      23 Sep 2023 05:38  Phos  3.0     09-23  Mg     1.70     09-23    TPro  6.2  /  Alb  2.1<L>  /  TBili  0.2  /  DBili  x   /  AST  17  /  ALT  6   /  AlkPhos  121<H>  09-23      Urinalysis Basic - ( 23 Sep 2023 05:38 )    Color: x / Appearance: x / SG: x / pH: x  Gluc: 85 mg/dL / Ketone: x  / Bili: x / Urobili: x   Blood: x / Protein: x / Nitrite: x   Leuk Esterase: x / RBC: x / WBC x   Sq Epi: x / Non Sq Epi: x / Bacteria: x      CAPILLARY BLOOD GLUCOSE            Urinalysis Basic - ( 23 Sep 2023 05:38 )    Color: x / Appearance: x / SG: x / pH: x  Gluc: 85 mg/dL / Ketone: x  / Bili: x / Urobili: x   Blood: x / Protein: x / Nitrite: x   Leuk Esterase: x / RBC: x / WBC x   Sq Epi: x / Non Sq Epi: x / Bacteria: x        MEDICATIONS  (STANDING):  enoxaparin Injectable 60 milliGRAM(s) SubCutaneous every 12 hours  fluconAZOLE   Tablet 200 milliGRAM(s) Oral daily  lactated ringers. 1000 milliLiter(s) (75 mL/Hr) IV Continuous <Continuous>  pantoprazole    Tablet 40 milliGRAM(s) Oral two times a day    MEDICATIONS  (PRN):      Care Discussed with Consultants/Other Providers [ ] YES  [ ] NO Patient is a 58y old  Male who presents with a chief complaint of dysphagia (23 Sep 2023 14:50)      INTERVAL HPI/OVERNIGHT EVENTS: seen and examined   T(C): 36.7 (09-23-23 @ 18:00), Max: 37 (09-23-23 @ 05:00)  HR: 79 (09-23-23 @ 18:00) (79 - 99)  BP: 117/87 (09-23-23 @ 18:00) (108/83 - 117/87)  RR: 20 (09-23-23 @ 18:00) (18 - 20)  SpO2: 100% (09-23-23 @ 18:00) (99% - 100%)  Wt(kg): --  I&O's Summary    23 Sep 2023 07:01  -  23 Sep 2023 20:11  --------------------------------------------------------  IN: 0 mL / OUT: 400 mL / NET: -400 mL        PAST MEDICAL & SURGICAL HISTORY:  Former smoker      Non-small cell lung cancer with metastasis      Status post cardiac surgery  s/p open right thoracotomy for posterior cardiac mass removal > 20 years ago, reported by patient to be benign.          SOCIAL HISTORY  Alcohol:  Tobacco:  Illicit substance use:    FAMILY HISTORY:    REVIEW OF SYSTEMS:  CONSTITUTIONAL: No fever, weight loss, or fatigue  EYES: No eye pain, visual disturbances, or discharge  ENMT:  No difficulty hearing, tinnitus, vertigo; No sinus or throat pain  NECK: No pain or stiffness  RESPIRATORY: No cough, wheezing, chills or hemoptysis; No shortness of breath  CARDIOVASCULAR: No chest pain, palpitations, dizziness, or leg swelling  GASTROINTESTINAL: No abdominal or epigastric pain. No nausea, vomiting, or hematemesis; No diarrhea or constipation. No melena or hematochezia.  GENITOURINARY: No dysuria, frequency, hematuria, or incontinence  NEUROLOGICAL: No headaches, memory loss, loss of strength, numbness, or tremors  SKIN: No itching, burning, rashes, or lesions   LYMPH NODES: No enlarged glands  ENDOCRINE: No heat or cold intolerance; No hair loss  MUSCULOSKELETAL: No joint pain or swelling; No muscle, back, or extremity pain  PSYCHIATRIC: No depression, anxiety, mood swings, or difficulty sleeping  HEME/LYMPH: No easy bruising, or bleeding gums  ALLERY AND IMMUNOLOGIC: No hives or eczema    RADIOLOGY & ADDITIONAL TESTS:    Imaging Personally Reviewed:  [ ] YES  [ ] NO    Consultant(s) Notes Reviewed:  [ ] YES  [ ] NO    PHYSICAL EXAM:  GENERAL: NAD, well-groomed, well-developed  HEAD:  Atraumatic, Normocephalic  EYES: EOMI, PERRLA, conjunctiva and sclera clear  ENMT: No tonsillar erythema, exudates, or enlargement; Moist mucous membranes, Good dentition, No lesions  NECK: Supple, No JVD, Normal thyroid  NERVOUS SYSTEM:  Alert & Oriented X3, Good concentration; Motor Strength 5/5 B/L upper and lower extremities; DTRs 2+ intact and symmetric  CHEST/LUNG: Clear to percussion bilaterally; No rales, rhonchi, wheezing, or rubs  HEART: Regular rate and rhythm; No murmurs, rubs, or gallops  ABDOMEN: Soft, Nontender, Nondistended; Bowel sounds present  EXTREMITIES:  2+ Peripheral Pulses, No clubbing, cyanosis, or edema  LYMPH: No lymphadenopathy noted  SKIN: No rashes or lesions    LABS:                        9.5    4.34  )-----------( 338      ( 23 Sep 2023 05:15 )             29.8     09-23    137  |  102  |  21  ----------------------------<  85  4.5   |  25  |  1.66<H>    Ca    8.5      23 Sep 2023 05:38  Phos  3.0     09-23  Mg     1.70     09-23    TPro  6.2  /  Alb  2.1<L>  /  TBili  0.2  /  DBili  x   /  AST  17  /  ALT  6   /  AlkPhos  121<H>  09-23      Urinalysis Basic - ( 23 Sep 2023 05:38 )    Color: x / Appearance: x / SG: x / pH: x  Gluc: 85 mg/dL / Ketone: x  / Bili: x / Urobili: x   Blood: x / Protein: x / Nitrite: x   Leuk Esterase: x / RBC: x / WBC x   Sq Epi: x / Non Sq Epi: x / Bacteria: x      CAPILLARY BLOOD GLUCOSE            Urinalysis Basic - ( 23 Sep 2023 05:38 )    Color: x / Appearance: x / SG: x / pH: x  Gluc: 85 mg/dL / Ketone: x  / Bili: x / Urobili: x   Blood: x / Protein: x / Nitrite: x   Leuk Esterase: x / RBC: x / WBC x   Sq Epi: x / Non Sq Epi: x / Bacteria: x        MEDICATIONS  (STANDING):  enoxaparin Injectable 60 milliGRAM(s) SubCutaneous every 12 hours  fluconAZOLE   Tablet 200 milliGRAM(s) Oral daily  lactated ringers. 1000 milliLiter(s) (75 mL/Hr) IV Continuous <Continuous>  pantoprazole    Tablet 40 milliGRAM(s) Oral two times a day    MEDICATIONS  (PRN):      Care Discussed with Consultants/Other Providers [ ] YES  [ ] NO

## 2023-09-23 NOTE — PROGRESS NOTE ADULT - ASSESSMENT
58M h/o non small cell lung ca on paclitaxel (last 1 week ago, follows with Dr. Son) p/w 1 month progressively worsening dysphagia. Recent admission for PE and SBO. Endorsed dysphagia with that admission. Barium esophagram showing GERD and small sliding hiatal hernia. Nephrology consult called for abnormal renal function    Assessment:  1) Non oliguric CKD stage III likely chemotherapy induced renal injury/ATN/renal hypoperfusion/ascites/compression/obstructive uropathy  2) NSCLC with metastasis on chemotherapy  3) History of PE  4) Progressive dysphagia/history of SBO/ Hiatal hernia/GERD/Gastritis/Esophageal candidasis  5) Anemia of chronic illness  6) Hypoalbuminemia    Recommend:  Strict I/o  Avoid Nephrotoxic agents  Monitor urine output  S cr 1.7 with non oliguria  Received IV Contrast earlier during admission  Replete electrolytes with goal K>4 and <5, Mag> 2 and Phos 2.5 to 3.5  Urinalysis, urine electrolytes reviewed  Bilateral renal and bladder ultrasound results reviewed  S/p EGD  S/p abdominal paracentesis  IV/po fluconazole for candida esophagitis  Oncology/hematology follow up  Optimal pain control  Urine studies reviewed  Intact PTH, Vitamin D 1,25 level, Phos, calcium level  Volume status and electrolytes noted  No urgent need for RRT/HD  Will follow

## 2023-09-23 NOTE — PROGRESS NOTE ADULT - PROBLEM SELECTOR PLAN 2
- started on protonix 40 po bid  - egd 9/20 showing esophageal candidiasis, 2cm hiatal hernia, severely edematous gastropathy with thickened gastric folds  - plan for proton pump inhibitor PO BID x 8 weeks followed by once daily; Diflucan (fluconazole) 400 mg PO once today followed by 200mg PO daily for 13 days for a total of 14 day course.  - PO intake still very poor today  - plan to advance diet as tolerated, d/c fluids when tolerating po better

## 2023-09-23 NOTE — PROGRESS NOTE ADULT - SUBJECTIVE AND OBJECTIVE BOX
Theo Cardenas MD (Nephrology progress note)  [address], [facility],  [address],  [facility]  TEl: [phone]  Cell: [phone]    Patient is a 58y Male seen and evaluated at bedside. Vital signs, laboratory data, imaging studies, consult notes reviewed done within past 24 hours. Overnight events noted. Interval stable renal function with non oliguria and S cr 1.6.    Allergies    No Known Allergies    Intolerances        ROS:  CONSTITUTIONAL: No fever or chills.  HEENT: No headache or visual disturbances.  RESPIRATORY: No shortness of breath, cough, hemoptysis or wheezing  CARDIOVASCULAR: No Chest pain, shortness of breath, palpitations, dizziness, syncope, orthopnea, PND or leg swelling.  GASTROINTESTINAL: Mild abdominal discomfort and poor appetite  GENITOURINARY: No urinary frequency, urgency, gross hematuria, dysuria, flank or supra pubic pain, difficulty passing urine.  NEUROLOGICAL: No headache, focal limb weakness, tingling or numbness or seizure like activity  SKIN: No skin rash or lesion  MUSCULOSKELETAL: No leg pain, calf pain or swelling    VITALS:    T(C): 36.6 (09-23-23 @ 09:45), Max: 37 (09-23-23 @ 05:00)  HR: 88 (09-23-23 @ 09:45) (83 - 99)  BP: 108/83 (09-23-23 @ 09:45) (108/83 - 113/84)  RR: 18 (09-23-23 @ 09:45) (18 - 18)  SpO2: 100% (09-23-23 @ 09:45) (99% - 100%)  CAPILLARY BLOOD GLUCOSE          MEDICATIONS  (STANDING):  enoxaparin Injectable 60 milliGRAM(s) SubCutaneous every 12 hours  fluconAZOLE   Tablet 200 milliGRAM(s) Oral daily  lactated ringers. 1000 milliLiter(s) (75 mL/Hr) IV Continuous <Continuous>  pantoprazole    Tablet 40 milliGRAM(s) Oral two times a day    MEDICATIONS  (PRN):      PHYSICAL EXAM:  GENERAL: Alert, awake and oriented x3 in no distress  HEENT: ROBYN, EOMI, neck supple, no JVP, no carotid bruit, oral mucosa moist and pink.  CHEST/LUNG: Bilateral clear breath sounds, no rales, no crepitations, no rhonchi, no wheezing  HEART: Regular rate and rhythm, LOVE II/VI at LPSB, no gallops, no rub   ABDOMEN: Soft, mildly distended, non tender, bowel sounds present, no palpable organomegaly  : No flank or supra pubic tenderness.  EXTREMITIES: Peripheral pulses are palpable, no pedal edema  Neurology: AAOx3, no focal neurological deficit  SKIN: No rash or skin lesion  Musculoskeletal: No joint deformity or spinal tenderness.      Vascular ACCESS:     LABS:                        9.5    4.34  )-----------( 338      ( 23 Sep 2023 05:15 )             29.8     09-23    137  |  102  |  21  ----------------------------<  85  4.5   |  25  |  1.66<H>    Ca    8.5      23 Sep 2023 05:38  Phos  3.0     09-23  Mg     1.70     09-23    TPro  6.2  /  Alb  2.1<L>  /  TBili  0.2  /  DBili  x   /  AST  17  /  ALT  6   /  AlkPhos  121<H>  09-23        Urinalysis Basic - ( 23 Sep 2023 05:38 )    Color: x / Appearance: x / SG: x / pH: x  Gluc: 85 mg/dL / Ketone: x  / Bili: x / Urobili: x   Blood: x / Protein: x / Nitrite: x   Leuk Esterase: x / RBC: x / WBC x   Sq Epi: x / Non Sq Epi: x / Bacteria: x            RADIOLOGY & ADDITIONAL STUDIES:    Imaging Personally Reviewed:  [x] YES  [ ] NO    Consultant(s) Notes Reviewed:  [x] YES  [ ] NO    Care Discussed with Primary team/ Other Providers [x] YES  [ ] NO

## 2023-09-24 LAB
ANION GAP SERPL CALC-SCNC: 15 MMOL/L — HIGH (ref 7–14)
BUN SERPL-MCNC: 21 MG/DL — SIGNIFICANT CHANGE UP (ref 7–23)
CALCIUM SERPL-MCNC: 8.8 MG/DL — SIGNIFICANT CHANGE UP (ref 8.4–10.5)
CHLORIDE SERPL-SCNC: 100 MMOL/L — SIGNIFICANT CHANGE UP (ref 98–107)
CO2 SERPL-SCNC: 20 MMOL/L — LOW (ref 22–31)
CREAT SERPL-MCNC: 1.54 MG/DL — HIGH (ref 0.5–1.3)
EGFR: 52 ML/MIN/1.73M2 — LOW
GLUCOSE BLDC GLUCOMTR-MCNC: 115 MG/DL — HIGH (ref 70–99)
GLUCOSE BLDC GLUCOMTR-MCNC: 61 MG/DL — LOW (ref 70–99)
GLUCOSE SERPL-MCNC: 52 MG/DL — CRITICAL LOW (ref 70–99)
HCT VFR BLD CALC: 32 % — LOW (ref 39–50)
HGB BLD-MCNC: 9.9 G/DL — LOW (ref 13–17)
MAGNESIUM SERPL-MCNC: 1.8 MG/DL — SIGNIFICANT CHANGE UP (ref 1.6–2.6)
MCHC RBC-ENTMCNC: 30.5 PG — SIGNIFICANT CHANGE UP (ref 27–34)
MCHC RBC-ENTMCNC: 30.9 GM/DL — LOW (ref 32–36)
MCV RBC AUTO: 98.5 FL — SIGNIFICANT CHANGE UP (ref 80–100)
NRBC # BLD: 0 /100 WBCS — SIGNIFICANT CHANGE UP (ref 0–0)
NRBC # FLD: 0 K/UL — SIGNIFICANT CHANGE UP (ref 0–0)
PHOSPHATE SERPL-MCNC: 2.9 MG/DL — SIGNIFICANT CHANGE UP (ref 2.5–4.5)
PLATELET # BLD AUTO: 347 K/UL — SIGNIFICANT CHANGE UP (ref 150–400)
POTASSIUM SERPL-MCNC: 4.9 MMOL/L — SIGNIFICANT CHANGE UP (ref 3.5–5.3)
POTASSIUM SERPL-SCNC: 4.9 MMOL/L — SIGNIFICANT CHANGE UP (ref 3.5–5.3)
RBC # BLD: 3.25 M/UL — LOW (ref 4.2–5.8)
RBC # FLD: 17.3 % — HIGH (ref 10.3–14.5)
SODIUM SERPL-SCNC: 135 MMOL/L — SIGNIFICANT CHANGE UP (ref 135–145)
WBC # BLD: 3.13 K/UL — LOW (ref 3.8–10.5)
WBC # FLD AUTO: 3.13 K/UL — LOW (ref 3.8–10.5)

## 2023-09-24 RX ORDER — DEXTROSE 50 % IN WATER 50 %
25 SYRINGE (ML) INTRAVENOUS ONCE
Refills: 0 | Status: COMPLETED | OUTPATIENT
Start: 2023-09-24 | End: 2023-09-24

## 2023-09-24 RX ORDER — DEXTROSE 50 % IN WATER 50 %
25 SYRINGE (ML) INTRAVENOUS ONCE
Refills: 0 | Status: DISCONTINUED | OUTPATIENT
Start: 2023-09-24 | End: 2023-10-08

## 2023-09-24 RX ORDER — DEXTROSE 50 % IN WATER 50 %
12.5 SYRINGE (ML) INTRAVENOUS ONCE
Refills: 0 | Status: DISCONTINUED | OUTPATIENT
Start: 2023-09-24 | End: 2023-10-08

## 2023-09-24 RX ORDER — SODIUM CHLORIDE 9 MG/ML
1000 INJECTION, SOLUTION INTRAVENOUS
Refills: 0 | Status: DISCONTINUED | OUTPATIENT
Start: 2023-09-24 | End: 2023-09-28

## 2023-09-24 RX ORDER — DEXTROSE 50 % IN WATER 50 %
15 SYRINGE (ML) INTRAVENOUS ONCE
Refills: 0 | Status: DISCONTINUED | OUTPATIENT
Start: 2023-09-24 | End: 2023-10-08

## 2023-09-24 RX ORDER — GLUCAGON INJECTION, SOLUTION 0.5 MG/.1ML
1 INJECTION, SOLUTION SUBCUTANEOUS ONCE
Refills: 0 | Status: DISCONTINUED | OUTPATIENT
Start: 2023-09-24 | End: 2023-10-08

## 2023-09-24 RX ADMIN — Medication 25 GRAM(S): at 10:47

## 2023-09-24 RX ADMIN — ENOXAPARIN SODIUM 60 MILLIGRAM(S): 100 INJECTION SUBCUTANEOUS at 17:08

## 2023-09-24 RX ADMIN — PANTOPRAZOLE SODIUM 40 MILLIGRAM(S): 20 TABLET, DELAYED RELEASE ORAL at 17:08

## 2023-09-24 RX ADMIN — SODIUM CHLORIDE 75 MILLILITER(S): 9 INJECTION, SOLUTION INTRAVENOUS at 21:25

## 2023-09-24 RX ADMIN — FLUCONAZOLE 200 MILLIGRAM(S): 150 TABLET ORAL at 11:49

## 2023-09-24 RX ADMIN — ENOXAPARIN SODIUM 60 MILLIGRAM(S): 100 INJECTION SUBCUTANEOUS at 05:51

## 2023-09-24 RX ADMIN — PANTOPRAZOLE SODIUM 40 MILLIGRAM(S): 20 TABLET, DELAYED RELEASE ORAL at 05:51

## 2023-09-24 RX ADMIN — SODIUM CHLORIDE 75 MILLILITER(S): 9 INJECTION, SOLUTION INTRAVENOUS at 13:40

## 2023-09-24 NOTE — PROGRESS NOTE ADULT - ASSESSMENT
58M h/o non small cell lung ca on paclitaxel (last 1 week ago, follows with Dr. Son) p/w 1 month progressively worsening dysphagia. Recent admission for PE and SBO. Endorsed dysphagia with that admission. Barium esophagram showing GERD and small sliding hiatal hernia. Nephrology consult called for abnormal renal function    Assessment:  1) Non oliguric stable CKD stage III likely chemotherapy induced renal injury/ATN/renal hypoperfusion/ascites/compression/obstructive uropathy  2) NSCLC with metastasis on chemotherapy  3) History of PE  4) Progressive dysphagia/ Hiatal hernia/GERD/Gastritis/Esophageal candidiases  5) Anemia of chronic illness  6) Hypoalbuminemia  7) Ascites  8) Dysphagia/Poor appetite/Hypoglycemia/Failure to thrive    Recommend:  Strict I/o  Avoid Nephrotoxic agent  S cr 1.6  with non oliguria  IV/po hydration  Replete electrolytes with goal K>4 and <5, Mag> 2 and Phos 2.5 to 3.5  Urinalysis, urine electrolytes reviewed  Bilateral renal and bladder ultrasound results reviewed  IV/po fluconazole for candida esophagitis  GI/Oncology/hematology follow up  Optimal pain control  Intact PTH, Vitamin D 1,25 level, Phos, calcium level  Volume status and electrolytes noted  No urgent need for RRT/HD  Will follow

## 2023-09-24 NOTE — PROGRESS NOTE ADULT - SUBJECTIVE AND OBJECTIVE BOX
Theo Cardenas MD (Nephrology progress note)  [address], [facility],  [id number],  [facility]  TEl: [phone]  Cell: [phone]    Patient is a 58y Male seen and evaluated at bedside. Vital signs, laboratory data, imaging studies, consult notes reviewed done within past 24 hours. Overnight events noted. Patient lying in bed alert and awake in no respiratory distress remains with poor appetite and hypoglycemia. Interval stable renal function with S cr 1.5 with non oliguria.     Allergies    No Known Allergies    Intolerances        ROS:  CONSTITUTIONAL: No fever or chills.  HEENT: No headcahe or visual disturbances.  RESPIRATORY: No shortness of breath, cough, hemoptysis or wheezing  CARDIOVASCULAR: No Chest pain or SOB  GASTROINTESTINAL: Poor appetite, dysphagia  GENITOURINARY: No flank or supra pubic pain  NEUROLOGICAL: No headache, focal limb weakness, tingling  SKIN: No skin rash or lesion  MUSCULOSKELETAL: No leg pain, calf pain or swelling    VITALS:    T(C): 36.8 (09-24-23 @ 17:00), Max: 36.8 (09-24-23 @ 17:00)  HR: 94 (09-24-23 @ 17:00) (73 - 94)  BP: 125/94 (09-24-23 @ 17:00) (120/84 - 128/95)  RR: 20 (09-24-23 @ 17:00) (18 - 20)  SpO2: 98% (09-24-23 @ 17:00) (98% - 100%)  CAPILLARY BLOOD GLUCOSE      POCT Blood Glucose.: 115 mg/dL (24 Sep 2023 11:03)  POCT Blood Glucose.: 61 mg/dL (24 Sep 2023 09:37)      09-23-23 @ 07:01  -  09-24-23 @ 07:00  --------------------------------------------------------  IN: 0 mL / OUT: 400 mL / NET: -400 mL    09-24-23 @ 07:01  -  09-24-23 @ 20:53  --------------------------------------------------------  IN: 0 mL / OUT: 350 mL / NET: -350 mL      MEDICATIONS  (STANDING):  dextrose 50% Injectable 25 Gram(s) IV Push once  dextrose 50% Injectable 25 Gram(s) IV Push once  dextrose 50% Injectable 12.5 Gram(s) IV Push once  dextrose Oral Gel 15 Gram(s) Oral once  enoxaparin Injectable 60 milliGRAM(s) SubCutaneous every 12 hours  fluconAZOLE   Tablet 200 milliGRAM(s) Oral daily  glucagon  Injectable 1 milliGRAM(s) IntraMuscular once  lactated ringers. 1000 milliLiter(s) (75 mL/Hr) IV Continuous <Continuous>  pantoprazole    Tablet 40 milliGRAM(s) Oral two times a day    MEDICATIONS  (PRN):      PHYSICAL EXAM:  GENERAL: Alert, awake and oriented x3 in no distress  HEENT: ROBYN, EOMI, neck supple  CHEST/LUNG: Bilateral clear breath sounds, no rales, no crepitations, no rhonchi, no wheezing  HEART: Regular rate and rhythm, LOVE II/VI at LPSB, no gallops, no rub   ABDOMEN: Soft, nontender, non distended, bowel sounds present  : No flank or supra pubic tenderness.  EXTREMITIES: Peripheral pulses are palpable, no pedal edema  Neurology: AAOx3, no focal neurological deficit  SKIN: No rash or skin lesion  Musculoskeletal: No joint deformity or spinal tenderness.      Vascular ACCESS:     LABS:                        9.9    3.13  )-----------( 347      ( 24 Sep 2023 08:30 )             32.0     09-24    135  |  100  |  21  ----------------------------<  52<LL>  4.9   |  20<L>  |  1.54<H>    Ca    8.8      24 Sep 2023 08:30  Phos  2.9     09-24  Mg     1.80     09-24    TPro  6.2  /  Alb  2.1<L>  /  TBili  0.2  /  DBili  x   /  AST  17  /  ALT  6   /  AlkPhos  121<H>  09-23        Urinalysis Basic - ( 24 Sep 2023 08:30 )    Color: x / Appearance: x / SG: x / pH: x  Gluc: 52 mg/dL / Ketone: x  / Bili: x / Urobili: x   Blood: x / Protein: x / Nitrite: x   Leuk Esterase: x / RBC: x / WBC x   Sq Epi: x / Non Sq Epi: x / Bacteria: x            RADIOLOGY & ADDITIONAL STUDIES:    Imaging Personally Reviewed:  [x] YES  [ ] NO    Consultant(s) Notes Reviewed:  [x] YES  [ ] NO    Care Discussed with Primary team/ Other Providers [x] YES  [ ] NO

## 2023-09-24 NOTE — PROGRESS NOTE ADULT - SUBJECTIVE AND OBJECTIVE BOX
Patient is a 58y old  Male who presents with a chief complaint of dysphagia (24 Sep 2023 20:52)      INTERVAL HPI/OVERNIGHT EVENTS: seen and examined   T(C): 36.8 (09-24-23 @ 17:00), Max: 36.8 (09-24-23 @ 17:00)  HR: 94 (09-24-23 @ 17:00) (73 - 94)  BP: 125/94 (09-24-23 @ 17:00) (120/84 - 128/95)  RR: 20 (09-24-23 @ 17:00) (18 - 20)  SpO2: 98% (09-24-23 @ 17:00) (98% - 100%)  Wt(kg): --  I&O's Summary    23 Sep 2023 07:01  -  24 Sep 2023 07:00  --------------------------------------------------------  IN: 0 mL / OUT: 400 mL / NET: -400 mL    24 Sep 2023 07:01  -  24 Sep 2023 21:32  --------------------------------------------------------  IN: 0 mL / OUT: 350 mL / NET: -350 mL        PAST MEDICAL & SURGICAL HISTORY:  Former smoker      Non-small cell lung cancer with metastasis      Status post cardiac surgery  s/p open right thoracotomy for posterior cardiac mass removal > 20 years ago, reported by patient to be benign.          SOCIAL HISTORY  Alcohol:  Tobacco:  Illicit substance use:    FAMILY HISTORY:    REVIEW OF SYSTEMS:  CONSTITUTIONAL: No fever, weight loss, or fatigue  EYES: No eye pain, visual disturbances, or discharge  ENMT:  No difficulty hearing, tinnitus, vertigo; No sinus or throat pain  NECK: No pain or stiffness  RESPIRATORY: No cough, wheezing, chills or hemoptysis; No shortness of breath  CARDIOVASCULAR: No chest pain, palpitations, dizziness, or leg swelling  GASTROINTESTINAL: No abdominal or epigastric pain. No nausea, vomiting, or hematemesis; No diarrhea or constipation. No melena or hematochezia.  GENITOURINARY: No dysuria, frequency, hematuria, or incontinence  NEUROLOGICAL: No headaches, memory loss, loss of strength, numbness, or tremors  SKIN: No itching, burning, rashes, or lesions   LYMPH NODES: No enlarged glands  ENDOCRINE: No heat or cold intolerance; No hair loss  MUSCULOSKELETAL: No joint pain or swelling; No muscle, back, or extremity pain  PSYCHIATRIC: No depression, anxiety, mood swings, or difficulty sleeping  HEME/LYMPH: No easy bruising, or bleeding gums  ALLERY AND IMMUNOLOGIC: No hives or eczema    RADIOLOGY & ADDITIONAL TESTS:    Imaging Personally Reviewed:  [ ] YES  [ ] NO    Consultant(s) Notes Reviewed:  [ ] YES  [ ] NO    PHYSICAL EXAM:  GENERAL: NAD, well-groomed, well-developed  HEAD:  Atraumatic, Normocephalic  EYES: EOMI, PERRLA, conjunctiva and sclera clear  ENMT: No tonsillar erythema, exudates, or enlargement; Moist mucous membranes, Good dentition, No lesions  NECK: Supple, No JVD, Normal thyroid  NERVOUS SYSTEM:  Alert & Oriented X3, Good concentration; Motor Strength 5/5 B/L upper and lower extremities; DTRs 2+ intact and symmetric  CHEST/LUNG: Clear to percussion bilaterally; No rales, rhonchi, wheezing, or rubs  HEART: Regular rate and rhythm; No murmurs, rubs, or gallops  ABDOMEN: Soft, Nontender, Nondistended; Bowel sounds present  EXTREMITIES:  2+ Peripheral Pulses, No clubbing, cyanosis, or edema  LYMPH: No lymphadenopathy noted  SKIN: No rashes or lesions    LABS:                        9.9    3.13  )-----------( 347      ( 24 Sep 2023 08:30 )             32.0     09-24    135  |  100  |  21  ----------------------------<  52<LL>  4.9   |  20<L>  |  1.54<H>    Ca    8.8      24 Sep 2023 08:30  Phos  2.9     09-24  Mg     1.80     09-24    TPro  6.2  /  Alb  2.1<L>  /  TBili  0.2  /  DBili  x   /  AST  17  /  ALT  6   /  AlkPhos  121<H>  09-23      Urinalysis Basic - ( 24 Sep 2023 08:30 )    Color: x / Appearance: x / SG: x / pH: x  Gluc: 52 mg/dL / Ketone: x  / Bili: x / Urobili: x   Blood: x / Protein: x / Nitrite: x   Leuk Esterase: x / RBC: x / WBC x   Sq Epi: x / Non Sq Epi: x / Bacteria: x      CAPILLARY BLOOD GLUCOSE      POCT Blood Glucose.: 115 mg/dL (24 Sep 2023 11:03)  POCT Blood Glucose.: 61 mg/dL (24 Sep 2023 09:37)        Urinalysis Basic - ( 24 Sep 2023 08:30 )    Color: x / Appearance: x / SG: x / pH: x  Gluc: 52 mg/dL / Ketone: x  / Bili: x / Urobili: x   Blood: x / Protein: x / Nitrite: x   Leuk Esterase: x / RBC: x / WBC x   Sq Epi: x / Non Sq Epi: x / Bacteria: x        MEDICATIONS  (STANDING):  dextrose 5% + sodium chloride 0.45%. 1000 milliLiter(s) (75 mL/Hr) IV Continuous <Continuous>  dextrose 50% Injectable 25 Gram(s) IV Push once  dextrose 50% Injectable 25 Gram(s) IV Push once  dextrose 50% Injectable 12.5 Gram(s) IV Push once  dextrose Oral Gel 15 Gram(s) Oral once  enoxaparin Injectable 60 milliGRAM(s) SubCutaneous every 12 hours  fluconAZOLE   Tablet 200 milliGRAM(s) Oral daily  glucagon  Injectable 1 milliGRAM(s) IntraMuscular once  pantoprazole    Tablet 40 milliGRAM(s) Oral two times a day    MEDICATIONS  (PRN):      Care Discussed with Consultants/Other Providers [ ] YES  [ ] NO

## 2023-09-25 LAB
ANION GAP SERPL CALC-SCNC: 17 MMOL/L — HIGH (ref 7–14)
BUN SERPL-MCNC: 19 MG/DL — SIGNIFICANT CHANGE UP (ref 7–23)
CALCIUM SERPL-MCNC: 9.4 MG/DL — SIGNIFICANT CHANGE UP (ref 8.4–10.5)
CHLORIDE SERPL-SCNC: 96 MMOL/L — LOW (ref 98–107)
CO2 SERPL-SCNC: 22 MMOL/L — SIGNIFICANT CHANGE UP (ref 22–31)
CREAT SERPL-MCNC: 1.65 MG/DL — HIGH (ref 0.5–1.3)
EGFR: 48 ML/MIN/1.73M2 — LOW
GLUCOSE BLDC GLUCOMTR-MCNC: 106 MG/DL — HIGH (ref 70–99)
GLUCOSE SERPL-MCNC: 131 MG/DL — HIGH (ref 70–99)
HCT VFR BLD CALC: 35.6 % — LOW (ref 39–50)
HGB BLD-MCNC: 11.2 G/DL — LOW (ref 13–17)
MAGNESIUM SERPL-MCNC: 1.9 MG/DL — SIGNIFICANT CHANGE UP (ref 1.6–2.6)
MCHC RBC-ENTMCNC: 30.3 PG — SIGNIFICANT CHANGE UP (ref 27–34)
MCHC RBC-ENTMCNC: 31.5 GM/DL — LOW (ref 32–36)
MCV RBC AUTO: 96.2 FL — SIGNIFICANT CHANGE UP (ref 80–100)
NRBC # BLD: 0 /100 WBCS — SIGNIFICANT CHANGE UP (ref 0–0)
NRBC # FLD: 0 K/UL — SIGNIFICANT CHANGE UP (ref 0–0)
PHOSPHATE SERPL-MCNC: 2.9 MG/DL — SIGNIFICANT CHANGE UP (ref 2.5–4.5)
PLATELET # BLD AUTO: 444 K/UL — HIGH (ref 150–400)
POTASSIUM SERPL-MCNC: 4 MMOL/L — SIGNIFICANT CHANGE UP (ref 3.5–5.3)
POTASSIUM SERPL-SCNC: 4 MMOL/L — SIGNIFICANT CHANGE UP (ref 3.5–5.3)
RBC # BLD: 3.7 M/UL — LOW (ref 4.2–5.8)
RBC # FLD: 17 % — HIGH (ref 10.3–14.5)
SODIUM SERPL-SCNC: 135 MMOL/L — SIGNIFICANT CHANGE UP (ref 135–145)
WBC # BLD: 4.17 K/UL — SIGNIFICANT CHANGE UP (ref 3.8–10.5)
WBC # FLD AUTO: 4.17 K/UL — SIGNIFICANT CHANGE UP (ref 3.8–10.5)

## 2023-09-25 PROCEDURE — 93010 ELECTROCARDIOGRAM REPORT: CPT

## 2023-09-25 RX ORDER — SUCRALFATE 1 G
1 TABLET ORAL
Refills: 0 | Status: DISCONTINUED | OUTPATIENT
Start: 2023-09-25 | End: 2023-10-08

## 2023-09-25 RX ORDER — ONDANSETRON 8 MG/1
4 TABLET, FILM COATED ORAL ONCE
Refills: 0 | Status: COMPLETED | OUTPATIENT
Start: 2023-09-25 | End: 2023-09-25

## 2023-09-25 RX ADMIN — FLUCONAZOLE 200 MILLIGRAM(S): 150 TABLET ORAL at 12:40

## 2023-09-25 RX ADMIN — PANTOPRAZOLE SODIUM 40 MILLIGRAM(S): 20 TABLET, DELAYED RELEASE ORAL at 17:07

## 2023-09-25 RX ADMIN — ENOXAPARIN SODIUM 60 MILLIGRAM(S): 100 INJECTION SUBCUTANEOUS at 05:57

## 2023-09-25 RX ADMIN — ONDANSETRON 4 MILLIGRAM(S): 8 TABLET, FILM COATED ORAL at 06:31

## 2023-09-25 RX ADMIN — PANTOPRAZOLE SODIUM 40 MILLIGRAM(S): 20 TABLET, DELAYED RELEASE ORAL at 06:00

## 2023-09-25 RX ADMIN — Medication 1 GRAM(S): at 19:16

## 2023-09-25 RX ADMIN — ENOXAPARIN SODIUM 60 MILLIGRAM(S): 100 INJECTION SUBCUTANEOUS at 17:07

## 2023-09-25 NOTE — PROGRESS NOTE ADULT - SUBJECTIVE AND OBJECTIVE BOX
PANTERA ACRBAJAL  58y  Male      Patient is a 58y old  Male who presents with a chief complaint of dysphagia (25 Sep 2023 10:31)  Patient was seen and examined.chart reviewed,covering   feels ok.c/o difficulty in swallowing.no abd pain,no cp,no sob,Denies any pain  REVIEW OF SYSTEMS:  CONSTITUTIONAL: No fever  RESPIRATORY: No cough, hemoptysis or shortness of breath  CARDIOVASCULAR: No chest pain, palpitations, dizziness, or leg swelling  GASTROINTESTINAL: No abdominal pain. nausea, vomiting, hematemesis  GENITOURINARY: No dysuria, frequency, hematuria   NEUROLOGICAL: No headaches, no dizziness  MUSCULOSKELETAL: No joint pain or swelling;     INTERVAL HPI/OVERNIGHT EVENTS:  T(C): 37.8 (09-25-23 @ 14:55), Max: 37.8 (09-25-23 @ 14:55)  HR: 123 (09-25-23 @ 14:55) (93 - 123)  BP: 117/90 (09-25-23 @ 14:55) (117/90 - 129/100)  RR: 18 (09-25-23 @ 14:55) (18 - 19)  SpO2: 96% (09-25-23 @ 14:55) (95% - 100%)  Wt(kg): --  I&O's Summary    24 Sep 2023 07:01  -  25 Sep 2023 07:00  --------------------------------------------------------  IN: 0 mL / OUT: 350 mL / NET: -350 mL      T(C): 37.8 (09-25-23 @ 14:55), Max: 37.8 (09-25-23 @ 14:55)  HR: 123 (09-25-23 @ 14:55) (93 - 123)  BP: 117/90 (09-25-23 @ 14:55) (117/90 - 129/100)  RR: 18 (09-25-23 @ 14:55) (18 - 19)  SpO2: 96% (09-25-23 @ 14:55) (95% - 100%)  Wt(kg): --Vital Signs Last 24 Hrs  T(C): 37.8 (25 Sep 2023 14:55), Max: 37.8 (25 Sep 2023 14:55)  T(F): 100 (25 Sep 2023 14:55), Max: 100 (25 Sep 2023 14:55)  HR: 123 (25 Sep 2023 14:55) (93 - 123)  BP: 117/90 (25 Sep 2023 14:55) (117/90 - 129/100)  BP(mean): --  RR: 18 (25 Sep 2023 14:55) (18 - 19)  SpO2: 96% (25 Sep 2023 14:55) (95% - 100%)    Parameters below as of 25 Sep 2023 14:55  Patient On (Oxygen Delivery Method): room air        LABS:                        11.2   4.17  )-----------( 444      ( 25 Sep 2023 05:50 )             35.6     09-25    135  |  96<L>  |  19  ----------------------------<  131<H>  4.0   |  22  |  1.65<H>    Ca    9.4      25 Sep 2023 05:50  Phos  2.9     09-25  Mg     1.90     09-25        Urinalysis Basic - ( 25 Sep 2023 05:50 )    Color: x / Appearance: x / SG: x / pH: x  Gluc: 131 mg/dL / Ketone: x  / Bili: x / Urobili: x   Blood: x / Protein: x / Nitrite: x   Leuk Esterase: x / RBC: x / WBC x   Sq Epi: x / Non Sq Epi: x / Bacteria: x      CAPILLARY BLOOD GLUCOSE      POCT Blood Glucose.: 106 mg/dL (25 Sep 2023 04:14)        Urinalysis Basic - ( 25 Sep 2023 05:50 )    Color: x / Appearance: x / SG: x / pH: x  Gluc: 131 mg/dL / Ketone: x  / Bili: x / Urobili: x   Blood: x / Protein: x / Nitrite: x   Leuk Esterase: x / RBC: x / WBC x   Sq Epi: x / Non Sq Epi: x / Bacteria: x        PAST MEDICAL & SURGICAL HISTORY:  Former smoker      Non-small cell lung cancer with metastasis      Status post cardiac surgery  s/p open right thoracotomy for posterior cardiac mass removal > 20 years ago, reported by patient to be benign.          MEDICATIONS  (STANDING):  dextrose 5% + sodium chloride 0.45%. 1000 milliLiter(s) (75 mL/Hr) IV Continuous <Continuous>  dextrose 50% Injectable 25 Gram(s) IV Push once  dextrose 50% Injectable 25 Gram(s) IV Push once  dextrose 50% Injectable 12.5 Gram(s) IV Push once  dextrose Oral Gel 15 Gram(s) Oral once  enoxaparin Injectable 60 milliGRAM(s) SubCutaneous every 12 hours  fluconAZOLE   Tablet 200 milliGRAM(s) Oral daily  glucagon  Injectable 1 milliGRAM(s) IntraMuscular once  pantoprazole    Tablet 40 milliGRAM(s) Oral two times a day  sucralfate suspension 1 Gram(s) Oral four times a day    MEDICATIONS  (PRN):        RADIOLOGY & ADDITIONAL TESTS:    Imaging Personally Reviewed:  [ ] YES  [ ] NO    Consultant(s) Notes Reviewed:  [x] YES  [ ] NO    PHYSICAL EXAM:  GENERAL: Alert and awake lying in bed in no distress  HEAD:  Atraumatic, Normocephalic  EYES: EOMI, ROBYN, conjunctiva and sclera clear  NECK: Supple, No JVD, Normal thyroid  NERVOUS SYSTEM:  Alert & Oriented X3, Motor and sensory systems are intact,   CHEST/LUNG: Bilateral clear breath sounds, no rhochi, no wheezing, no crepitations,  HEART: Regular rate and rhythm; No murmurs, rubs, or gallops  ABDOMEN: Soft, Nontender, Nondistended; Bowel sounds present  EXTREMITIES:   Peripheral Pulses are palpable, no  edema        Care Discussed with Consultants/Other Providers [x ] YES  [ ] NO      Code Status: [] Full Code [] DNR [] DNI [] Goals of Care:   Disposition: [] ICU [] Stroke Unit [] RCU []PCU []Floor [] Discharge Home         MANISH WorleyMultiCare [facility] HospitalP

## 2023-09-25 NOTE — RESULTS/DATA
PHYSICAL / OCCUPATIONAL THERAPY - DAILY TREATMENT NOTE (updated )    Patient Name: Shelton Garcia    Date: 2023    : 1963  Insurance: Payor: Jodi Sosa / Plan: [employer] / Product Type: *No Product type* /      Patient  verified Yes     Visit #   Current / Total 23 24   Time   In / Out 5:33 pm 6:33 pm   Pain   In / Out 1-2 1   Subjective Functional Status/Changes: Pt reports he saw the MD today. 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 Pt reports having low back spasms that last Friday. He has spasms 3 x lasting ~ 30 seconds. One sitting at desk, getting up and handing someone paper     TREATMENT AREA =  Other low back pain [M54.59]    OBJECTIVE    Modalities Rationale:     decrease edema, decrease inflammation, decrease pain, and increase tissue extensibility to improve patient's ability to progress to PLOF and address remaining functional goals. min [] Estim Unattended, type/location:                                      []  w/ice    []  w/heat    min [] Estim Attended, type/location:                                     []  w/US     []  w/ice    []  w/heat    []  TENS insruct      min []  Mechanical Traction: type/lbs                   []  pro   []  sup   []  int   []  cont    []  before manual    []  after manual    min []  Ultrasound, settings/location:     10 min  unbill []  Ice     [x]  Heat    location/position: Supine low back    min []  Paraffin,  details:     min []  Vasopneumatic Device, press/temp:     min []  Arleene Coats / Harlene : If using vaso (only need to measure limb vaso being performed on)      pre-treatment girth :       post-treatment girth :       measured at (landmark location) :      min []  Other:    Skin assessment post-treatment:   Intact      Therapeutic Procedures:     Tx Min Billable or 1:1 Min (if diff from Tx Min) Procedure, Rationale, Specifics   [id number] Therapeutic Exercise (timed):  increase ROM, strength, [FreeTextEntry1] : -CT CAP 4/12/23:  \par 1. Since 1/6/2023, the millimeter right upper lobe nodules are unchanged.\par 2. The left pleural catheter has been removed.\par 3. The small left pleural effusion has decreased in size.\par 4. No change in the sclerotic lesions of the thoracic spine.\par 5. Perihepatic ascites is new.\par 6. No change in the left adrenal gland thickening.\par 7. No change in the sclerotic lesions of the pelvis.\par \par -Abdominal U/S 5/4/23:  No cholelithiasis or biliary ductal dilatation.  Mild ascites.\par \par -U/S Paracentesis 5/5/23:  Therapeutic paracentesis with 1200mL removed.  Specimen sent for cytology.\par \par – US paracentesis 5/23/2023: Therapeutic paracentesis with 900 cc of fluid removed.\par \par

## 2023-09-25 NOTE — PROGRESS NOTE ADULT - PROBLEM SELECTOR PLAN 2
- started on protonix 40 po bid  - egd 9/20 showing esophageal candidiasis, 2cm hiatal hernia, severely edematous gastropathy with thickened gastric folds  - plan for proton pump inhibitor PO BID x 8 weeks followed by once daily; Diflucan (fluconazole) 400 mg PO once today followed by 200mg PO daily for 13 days for a total of 14 day course.  - clear liquid diet  - plan to advance diet as tolerated, d/c fluids when tolerating po better  -GI f/u

## 2023-09-26 LAB
ANION GAP SERPL CALC-SCNC: 9 MMOL/L — SIGNIFICANT CHANGE UP (ref 7–14)
BLD GP AB SCN SERPL QL: NEGATIVE — SIGNIFICANT CHANGE UP
BUN SERPL-MCNC: 17 MG/DL — SIGNIFICANT CHANGE UP (ref 7–23)
CALCIUM SERPL-MCNC: 8.6 MG/DL — SIGNIFICANT CHANGE UP (ref 8.4–10.5)
CHLORIDE SERPL-SCNC: 99 MMOL/L — SIGNIFICANT CHANGE UP (ref 98–107)
CO2 SERPL-SCNC: 24 MMOL/L — SIGNIFICANT CHANGE UP (ref 22–31)
CREAT SERPL-MCNC: 1.67 MG/DL — HIGH (ref 0.5–1.3)
EGFR: 47 ML/MIN/1.73M2 — LOW
GLUCOSE SERPL-MCNC: 95 MG/DL — SIGNIFICANT CHANGE UP (ref 70–99)
HCT VFR BLD CALC: 23.8 % — LOW (ref 39–50)
HCT VFR BLD CALC: 30.5 % — LOW (ref 39–50)
HGB BLD-MCNC: 7.5 G/DL — LOW (ref 13–17)
HGB BLD-MCNC: 9.6 G/DL — LOW (ref 13–17)
MAGNESIUM SERPL-MCNC: 1.7 MG/DL — SIGNIFICANT CHANGE UP (ref 1.6–2.6)
MCHC RBC-ENTMCNC: 29.9 PG — SIGNIFICANT CHANGE UP (ref 27–34)
MCHC RBC-ENTMCNC: 30.1 PG — SIGNIFICANT CHANGE UP (ref 27–34)
MCHC RBC-ENTMCNC: 31.5 GM/DL — LOW (ref 32–36)
MCHC RBC-ENTMCNC: 31.5 GM/DL — LOW (ref 32–36)
MCV RBC AUTO: 94.8 FL — SIGNIFICANT CHANGE UP (ref 80–100)
MCV RBC AUTO: 95.6 FL — SIGNIFICANT CHANGE UP (ref 80–100)
NRBC # BLD: 0 /100 WBCS — SIGNIFICANT CHANGE UP (ref 0–0)
NRBC # BLD: 0 /100 WBCS — SIGNIFICANT CHANGE UP (ref 0–0)
NRBC # FLD: 0 K/UL — SIGNIFICANT CHANGE UP (ref 0–0)
NRBC # FLD: 0 K/UL — SIGNIFICANT CHANGE UP (ref 0–0)
PHOSPHATE SERPL-MCNC: 2.4 MG/DL — LOW (ref 2.5–4.5)
PLATELET # BLD AUTO: 334 K/UL — SIGNIFICANT CHANGE UP (ref 150–400)
PLATELET # BLD AUTO: 338 K/UL — SIGNIFICANT CHANGE UP (ref 150–400)
POTASSIUM SERPL-MCNC: 3.9 MMOL/L — SIGNIFICANT CHANGE UP (ref 3.5–5.3)
POTASSIUM SERPL-SCNC: 3.9 MMOL/L — SIGNIFICANT CHANGE UP (ref 3.5–5.3)
RBC # BLD: 2.51 M/UL — LOW (ref 4.2–5.8)
RBC # BLD: 3.19 M/UL — LOW (ref 4.2–5.8)
RBC # FLD: 17.1 % — HIGH (ref 10.3–14.5)
RBC # FLD: 17.2 % — HIGH (ref 10.3–14.5)
RH IG SCN BLD-IMP: POSITIVE — SIGNIFICANT CHANGE UP
SODIUM SERPL-SCNC: 132 MMOL/L — LOW (ref 135–145)
WBC # BLD: 5.67 K/UL — SIGNIFICANT CHANGE UP (ref 3.8–10.5)
WBC # BLD: 6.46 K/UL — SIGNIFICANT CHANGE UP (ref 3.8–10.5)
WBC # FLD AUTO: 5.67 K/UL — SIGNIFICANT CHANGE UP (ref 3.8–10.5)
WBC # FLD AUTO: 6.46 K/UL — SIGNIFICANT CHANGE UP (ref 3.8–10.5)

## 2023-09-26 PROCEDURE — 99233 SBSQ HOSP IP/OBS HIGH 50: CPT | Mod: GC

## 2023-09-26 PROCEDURE — 99232 SBSQ HOSP IP/OBS MODERATE 35: CPT

## 2023-09-26 RX ORDER — SODIUM,POTASSIUM PHOSPHATES 278-250MG
1 POWDER IN PACKET (EA) ORAL ONCE
Refills: 0 | Status: COMPLETED | OUTPATIENT
Start: 2023-09-26 | End: 2023-09-26

## 2023-09-26 RX ADMIN — SODIUM CHLORIDE 75 MILLILITER(S): 9 INJECTION, SOLUTION INTRAVENOUS at 12:54

## 2023-09-26 RX ADMIN — Medication 1 GRAM(S): at 00:44

## 2023-09-26 RX ADMIN — Medication 1 GRAM(S): at 12:28

## 2023-09-26 RX ADMIN — ENOXAPARIN SODIUM 60 MILLIGRAM(S): 100 INJECTION SUBCUTANEOUS at 17:35

## 2023-09-26 RX ADMIN — PANTOPRAZOLE SODIUM 40 MILLIGRAM(S): 20 TABLET, DELAYED RELEASE ORAL at 17:36

## 2023-09-26 RX ADMIN — Medication 1 GRAM(S): at 17:35

## 2023-09-26 RX ADMIN — PANTOPRAZOLE SODIUM 40 MILLIGRAM(S): 20 TABLET, DELAYED RELEASE ORAL at 05:12

## 2023-09-26 RX ADMIN — Medication 1 PACKET(S): at 09:49

## 2023-09-26 RX ADMIN — ENOXAPARIN SODIUM 60 MILLIGRAM(S): 100 INJECTION SUBCUTANEOUS at 05:12

## 2023-09-26 RX ADMIN — Medication 1 GRAM(S): at 05:14

## 2023-09-26 RX ADMIN — FLUCONAZOLE 200 MILLIGRAM(S): 150 TABLET ORAL at 12:27

## 2023-09-26 NOTE — PROGRESS NOTE ADULT - ASSESSMENT
57yo M w/ ?CKD, PMHx NSCLC Dx Sept 2022, started on chemo at the same time presents with chief complaint of dysphagia to solids and liquids x1 mo with associated 30 lbs weight loss. This is the 1st time he has had dysphagia.    #NSCLC on Paclitaxel (recently added chemo agent)  #Dysphagia/odynophagia  #Vomiting/Regurgitation  #Heart Burn (resolved with fluconazole)  *CT Neck 9/18 - no mass lesion or collection seen  *Esophagram 8/03 - GE reflux, small sliding HH, 13mm tablet passed with slow transit  *EGD 9/20 - grade C candidiasis esophagitis, 2cm HH, severe edematous, gastropathy. biopsies obtained    Recommendation:  -please obtain barium esophagram  -c/w fluconazole  -c/w PPI PO BID x 8 weeks total followed by once daily.  -still awaiting pathology - will try and call path to expedite results  -additional supportive measures as per team      All recommendations preliminary until note signed by service attending.    Thank you for involving us in the care of this patient. Please contact should any concern or questions arise.    Luc Holden MD   Gastroenterology/Hepatology Fellow PGY-5  Available on [employer] Teams 7am - 5pm  [email or web address]    NON-URGENT CONSULTS:  Please email:  [email or web address]   OR  [email or web address]    After 5pm, please contact the on-call GI fellow. [phone]    AT NIGHT AND ON WEEKENDS:  Contact on-call GI fellow via answering service ([phone]) from 5pm-8am and on weekends/holidays   59yo M w/ ?CKD, PMHx NSCLC Dx Sept 2022, started on chemo at the same time presents with chief complaint of dysphagia to solids and liquids x1 mo with associated 30 lbs weight loss. This is the 1st time he has had dysphagia.    #NSCLC on Paclitaxel (recently added chemo agent)  #Dysphagia/odynophagia  #Vomiting/Regurgitation  #Heart Burn (resolved with fluconazole)  *CT Neck 9/18 - no mass lesion or collection seen  *Esophagram 8/03 - GE reflux, small sliding HH, 13mm tablet passed with slow transit  *EGD 9/20 - grade C candidiasis esophagitis, 2cm HH, severe edematous, gastropathy. biopsies obtained    Recommendation:  -c/w fluconazole  -c/w PPI PO BID x 8 weeks total followed by once daily.  -still awaiting pathology - will try and call path to expedite results  -additional supportive measures as per team      All recommendations preliminary until note signed by service attending.    Thank you for involving us in the care of this patient. Please contact should any concern or questions arise.    Luc Holden MD   Gastroenterology/Hepatology Fellow PGY-5  Available on [employer] Teams 7am - 5pm  [email or web address]    NON-URGENT CONSULTS:  Please email:  [email or web address]   OR  [email or web address]    After 5pm, please contact the on-call GI fellow. [phone]    AT NIGHT AND ON WEEKENDS:  Contact on-call GI fellow via answering service ([phone]) from 5pm-8am and on weekends/holidays

## 2023-09-26 NOTE — PROGRESS NOTE ADULT - ASSESSMENT
58M h/o non small cell lung ca on paclitaxel  (last chemo 1 week ago, follows with Dr Son) p/w month history of dysphasia. Recent admission for PE and SBO. Had dysphasia sxs then. Barium esophagram showing GERD and anal sliding hiatal hernia. D/c'd with protonix and outpt f/u but has not. Progressive worsening of sxs over last month with 30 lb weight loss and poor po intake. Feels solids and liquids get stuck in esophagus, also endorses burning sensation in chest/esophagus. Oncology consulted for further recommendations    CT chest angio 9/18  No pulmonary embolism.  Stable subcentimeter pulmonary nodules.  Small left pleural effusion and loculated upper abdominal ascites.  Stable sclerotic osseous lesions.    CT neck 9/18  IMPRESSION:  No mass or fluid collection.    KUB 9/19  Nonobstructive bowel gas pattern.      #dysphagia in the setting of Met NSCLC  -As previously documented  -GI consult rec appreciated, S/p EGD on 9/20 exam showing esophageal candidiasis, 2cm hiatal hernia, severely edematous gastropathy with thickened gastric folds  - plan for proton pump inhibitor PO BID x 8 weeks followed by once daily; Diflucan (fluconazole) 400 mg PO once today followed by 200mg PO daily for 13 days for a total of 14 day course.  - PO intake still very poor today.  -US abd for possible paracentesis, + Moderate volume complex ascites on 9/19. s/p para on 9/20   -Palliative Care for symptom management  - please trend CBC daily and monitor for signs of bleeding  -No plans for inpatient chemotherapy  -C/w Supportive care, pain control, Nutrition, PT, DVT ppx  -Rest of care as per primary team  -Patient to followup with Dr. Son ([facility]) upon discharge  -Oncology will continue to follow with you    Case d/w oncology attending      Frederick THOMAS  Oncology Physician Assistant  Darlene SEXTON/MARISELA [facility]    Pager [phone] also available on TEAMS as Frederick THOMAS    If before 8am/after 5pm or on weekends please page On-call Oncology Fellow

## 2023-09-26 NOTE — PROGRESS NOTE ADULT - ASSESSMENT
58M h/o non small cell lung ca on paclitaxel (last 1 week ago, follows with Dr. Son) p/w 1 month progressively worsening dysphagia. Recent admission for PE and SBO. Endorsed dysphagia with that admission. Barium esophagram showing GERD and small sliding hiatal hernia. Nephrology consult called for abnormal renal function    Assessment:  1) Non oliguric stable CKD stage III likely chemotherapy induced renal injury/ATN/renal hypoperfusion/ascites/compression/obstructive uropathy  2) NSCLC with metastasis on chemotherapy  3) History of PE  4) Progressive dysphagia/ Hiatal hernia/GERD/Gastritis/Esophageal candidiases  5) Anemia of chronic illness  6) Hypoalbuminemia  7) Ascites  8) Dysphagia/Poor appetite/Hypoglycemia/Failure to thrive  9) Electrolytes disorder with mild hyponatremia    Recommend:  Strict I/o  Avoid Nephrotoxic agent  S cr 1.6  with non oliguria  IV/po hydration  Na level 132  Replete electrolytes with goal K>4 and <5, Mag> 2 and Phos 2.5 to 3.5  Urinalysis, urine electrolytes reviewed  Bilateral renal and bladder ultrasound results reviewed  IV/po fluconazole for candida esophagitis  GI/Oncology/hematology follow up  Optimal pain control  Intact PTH, Vitamin D 1,25 level, Phos, calcium level  Volume status and electrolytes noted  No urgent need for RRT/HD  Will follow

## 2023-09-26 NOTE — PROGRESS NOTE ADULT - SUBJECTIVE AND OBJECTIVE BOX
INTERVAL HPI/OVERNIGHT EVENTS:  Patient seen at bedside.  Patient with continued dysphagia  Water even makes him throw up  No abdominal pain, improved since para last week  no signs of overt bleeding      VITAL SIGNS:  T(F): 98.6 (09-26-23 @ 12:00)  HR: 97 (09-26-23 @ 12:00)  BP: 114/85 (09-26-23 @ 12:00)  RR: 19 (09-26-23 @ 12:00)  SpO2: 96% (09-26-23 @ 12:00)  Wt(kg): --    PHYSICAL EXAM:    PHYSICAL EXAM:  GENERAL: Alert and awake lying in bed in no distress  HEAD:  Atraumatic, Normocephalic  EYES: EOMI, ROBYN, conjunctiva and sclera clear  NECK: Supple, No JVD, Normal thyroid  NERVOUS SYSTEM:  Alert & Oriented X3, Motor and sensory systems are intact,   CHEST/LUNG: Bilateral clear breath sounds, no rhochi, no wheezing, no crepitations,  HEART: Regular rate and rhythm; No murmurs, rubs, or gallops  ABDOMEN: Soft, Nontender, Nondistended; Bowel sounds present  EXTREMITIES:   Peripheral Pulses are palpable, no  edema        MEDICATIONS  (STANDING):  dextrose 5% + sodium chloride 0.45%. 1000 milliLiter(s) (75 mL/Hr) IV Continuous <Continuous>  dextrose 50% Injectable 25 Gram(s) IV Push once  dextrose 50% Injectable 12.5 Gram(s) IV Push once  dextrose 50% Injectable 25 Gram(s) IV Push once  dextrose Oral Gel 15 Gram(s) Oral once  enoxaparin Injectable 60 milliGRAM(s) SubCutaneous every 12 hours  fluconAZOLE   Tablet 200 milliGRAM(s) Oral daily  glucagon  Injectable 1 milliGRAM(s) IntraMuscular once  pantoprazole    Tablet 40 milliGRAM(s) Oral two times a day  sucralfate suspension 1 Gram(s) Oral four times a day    MEDICATIONS  (PRN):      Allergies    No Known Allergies    Intolerances        LABS:                        9.6    5.67  )-----------( 334      ( 26 Sep 2023 09:49 )             30.5     09-26    132<L>  |  99  |  17  ----------------------------<  95  3.9   |  24  |  1.67<H>    Ca    8.6      26 Sep 2023 07:28  Phos  2.4     09-26  Mg     1.70     09-26        Urinalysis Basic - ( 26 Sep 2023 07:28 )    Color: x / Appearance: x / SG: x / pH: x  Gluc: 95 mg/dL / Ketone: x  / Bili: x / Urobili: x   Blood: x / Protein: x / Nitrite: x   Leuk Esterase: x / RBC: x / WBC x   Sq Epi: x / Non Sq Epi: x / Bacteria: x        RADIOLOGY & ADDITIONAL TESTS:  Studies reviewed.

## 2023-09-26 NOTE — CHART NOTE - NSCHARTNOTEFT_GEN_A_CORE
Source: Patient [X]    Family [ ]     other [ ]    Medical Course: Patient is a 58y Male 58M with PMH non small cell lung ca on paclitaxel who presents with 1 month progressively worsening dysphagia to solids and liquids, found to have esophageal candidiasis.    Nutrition Course: Patient is currently ordered for a full liquid diet, order active 9/20 (x7 days total). Patient seen at bedside this AM by writer. Patient continues to endorse swallowing difficulty, vomiting upon ingestion of any food/liquid, and poor appetite. As previously requested, patient receiving Ensure Plus High Protein, Magic Cup, ice cream, and gingerale. Patient reports poor tolerance of all the above, consumes </=50% of Ensure at a time. Patient amenable to change supplement to Ensure Compact as it provides less fluid ounces per serving and may be better tolerated. Patient denies nausea, diarrhea, or constipation.     Diet : Diet, Full Liquid:   Supplement Feeding Modality:  Oral  Ensure Plus High Protein Cans or Servings Per Day:  1       Frequency:  Three Times a day (09-20-23 @ 15:57)    Current Weight: 70.8kg (bed scale weight obtained by RD during visit, may be inaccurate due to heavy blanket noted on bed), 56.3kg (09-20 @ 07:59)  % Weight Change: +14.5kg (20%) over 6 days period of time, question accuracy    Pertinent Medications: MEDICATIONS  (STANDING):  dextrose 5% + sodium chloride 0.45%. 1000 milliLiter(s) (75 mL/Hr) IV Continuous <Continuous>  dextrose 50% Injectable 25 Gram(s) IV Push once  dextrose 50% Injectable 25 Gram(s) IV Push once  dextrose 50% Injectable 12.5 Gram(s) IV Push once  dextrose Oral Gel 15 Gram(s) Oral once  enoxaparin Injectable 60 milliGRAM(s) SubCutaneous every 12 hours  fluconAZOLE   Tablet 200 milliGRAM(s) Oral daily  glucagon  Injectable 1 milliGRAM(s) IntraMuscular once  pantoprazole    Tablet 40 milliGRAM(s) Oral two times a day  sucralfate suspension 1 Gram(s) Oral four times a day    MEDICATIONS  (PRN):    Pertinent Labs:  09-26 Na132 mmol/L<L> Glu 95 mg/dL K+ 3.9 mmol/L Cr  1.67 mg/dL<H> BUN 17 mg/dL 09-26 Phos 2.4 mg/dL<L> 09-23 Alb 2.1 g/dL<L>    Skin: No pressure ulcers/injuries documented per RN flowsheets     Fluid: No edema documented per RN flowsheets     GI: Last BM 9/25/23 per patient report.    Estimated Needs:   [X] no change since previous assessment  Weight used: Current body weight 124lb/56.2kg  Estimated energy needs: 1967-2248kcal (based on 35-40kcal/kg)  Estimated protein needs: 78.68-89.92gms (based on 1.4-1.6gms/kg)    Previous Nutrition Diagnosis: Severe malnutrition    Nutrition Diagnosis is [X] ongoing    New Nutrition Diagnosis: Not applicable    Education: [X] Given today    Type of education provided: Verbal education provided regarding current diet order, nutrition care plan. Patient verbalized understanding to the discussion.     Nutrition Recommendations  - Continue current diet order: Full liquid  --> Recommend advance PO diet to regular as medically feasible, in accordance with SLP recommendations  - Recommend change oral nutrition supplement from Ensure Plus High Protein to Ensure Compact (provides 220kcal, 9gms protein per serving) TID  - Continues Magic Cup (provides 290kcal, 9gms protein per serving) once daily (Food and Nutrition Department will provide)  - Monitor weights, labs, BM's, skin integrity, p.o. intake., diet progression  - Please monitor % PO intake on flowsheets   - Honor food preferences as able within therapeutic diet order.     Monitoring and Evaluation:   [X] PO intake [X] Tolerance to diet prescription [X] weights [X] follow up per protocol    Jennifer Mireles MS RDN CDN  On [employer] Teams, Pager #[phone]

## 2023-09-26 NOTE — PROGRESS NOTE ADULT - SUBJECTIVE AND OBJECTIVE BOX
Interval Events:   -patient with continued dysphagia to solids and liquids  -reports improvement of burning discomfort s/p diflucan, but still feels liquids getting stuck in chest  -minimal PO  -has some abdominal discomfort, but no pain    ROS:   12 point review of systems performed and negative except otherwise noted in HPI.    Hospital Medications:  dextrose 5% + sodium chloride 0.45%. 1000 milliLiter(s) IV Continuous <Continuous>  dextrose 50% Injectable 25 Gram(s) IV Push once  dextrose 50% Injectable 25 Gram(s) IV Push once  dextrose 50% Injectable 12.5 Gram(s) IV Push once  dextrose Oral Gel 15 Gram(s) Oral once  enoxaparin Injectable 60 milliGRAM(s) SubCutaneous every 12 hours  fluconAZOLE   Tablet 200 milliGRAM(s) Oral daily  glucagon  Injectable 1 milliGRAM(s) IntraMuscular once  pantoprazole    Tablet 40 milliGRAM(s) Oral two times a day  sucralfate suspension 1 Gram(s) Oral four times a day      PHYSICAL EXAM:   Vital Signs:  Vital Signs Last 24 Hrs  T(C): 37 (26 Sep 2023 12:00), Max: 37 (25 Sep 2023 22:15)  T(F): 98.6 (26 Sep 2023 12:00), Max: 98.6 (25 Sep 2023 22:15)  HR: 97 (26 Sep 2023 12:00) (97 - 105)  BP: 114/85 (26 Sep 2023 12:00) (114/85 - 125/96)  BP(mean): --  RR: 19 (26 Sep 2023 12:00) (19 - 19)  SpO2: 96% (26 Sep 2023 12:00) (96% - 98%)    Parameters below as of 26 Sep 2023 12:00  Patient On (Oxygen Delivery Method): room air      Daily     Daily     GENERAL:  No acute distress  HEENT:  Normocephalic/atraumatic, no scleral icterus, severe temporal wasting  CHEST:  No accessory muscle use  HEART:  Regular rate and rhythm  ABDOMEN:  Soft, non-tender, non-distended, normoactive bowel sounds  EXTREMITIES: no LE edema. severe muscle and fat wasting  SKIN:  No rash  NEURO:  Alert and oriented x 3    LABS: reviewed                        9.6    5.67  )-----------( 334      ( 26 Sep 2023 09:49 )             30.5     09-26    132<L>  |  99  |  17  ----------------------------<  95  3.9   |  24  |  1.67<H>    Ca    8.6      26 Sep 2023 07:28  Phos  2.4     09-26  Mg     1.70     09-26          Interval Diagnostic Studies: see sunrise for full report

## 2023-09-26 NOTE — PROGRESS NOTE ADULT - SUBJECTIVE AND OBJECTIVE BOX
Theo Cardenas MD (Nephrology progress note)  [address], [facility],  [id number],  [id number]  TEl: [phone]  Cell: [phone]    Patient is a 58y Male seen and evaluated at bedside. Vital signs, laboratory data, imaging studies, consult notes reviewed done within past 24 hours. Overnight events noted. Patient with interval stable renal function with S cr 1.6 with non oliguria. Remains with poor appetite.     Allergies    No Known Allergies    Intolerances        ROS:  CONSTITUTIONAL: No fever or chills.  HEENT: No headache or visual disturbances.  RESPIRATORY: No shortness of breath, cough, hemoptysis or wheezing  CARDIOVASCULAR: No Chest pain or SOB  GASTROINTESTINAL: Poor appetite but denies abdominal pain, nausea, vomiting, diarrhea, hematemesis.  GENITOURINARY: No flank or supra pubic pain  NEUROLOGICAL: No headache, focal limb weakness, tingling or numbness  SKIN: No skin rash or lesion  MUSCULOSKELETAL: No leg pain, calf pain or swelling    VITALS:    T(C): 36.7 (09-26-23 @ 17:16), Max: 37 (09-25-23 @ 22:15)  HR: 95 (09-26-23 @ 17:16) (95 - 105)  BP: 112/80 (09-26-23 @ 17:16) (112/80 - 125/96)  RR: 18 (09-26-23 @ 17:16) (18 - 19)  SpO2: 100% (09-26-23 @ 17:16) (96% - 100%)  CAPILLARY BLOOD GLUCOSE          MEDICATIONS  (STANDING):  dextrose 5% + sodium chloride 0.45%. 1000 milliLiter(s) (75 mL/Hr) IV Continuous <Continuous>  dextrose 50% Injectable 25 Gram(s) IV Push once  dextrose 50% Injectable 12.5 Gram(s) IV Push once  dextrose 50% Injectable 25 Gram(s) IV Push once  dextrose Oral Gel 15 Gram(s) Oral once  enoxaparin Injectable 60 milliGRAM(s) SubCutaneous every 12 hours  fluconAZOLE   Tablet 200 milliGRAM(s) Oral daily  glucagon  Injectable 1 milliGRAM(s) IntraMuscular once  pantoprazole    Tablet 40 milliGRAM(s) Oral two times a day  sucralfate suspension 1 Gram(s) Oral four times a day    MEDICATIONS  (PRN):      PHYSICAL EXAM:  GENERAL: Alert, awake and oriented x3  HEENT: ROBYN, EOMI, neck supple, no JVP  CHEST/LUNG: Bilateral clear breath sounds, no rales, no crepitations, no rhonchi, no wheezing  HEART: Regular rate and rhythm, LOVE II/VI at LPSB, no gallops, no rub   ABDOMEN: Soft, nontender, non distended, bowel sounds present  : No flank or supra pubic tenderness.  EXTREMITIES: Peripheral pulses are palpable, no pedal edema  Neurology: AAOx3, no focal neurological deficit  SKIN: No rash or skin lesion  Musculoskeletal: No joint deformity or spinal tenderness.      Vascular ACCESS: None    LABS:                        9.6    5.67  )-----------( 334      ( 26 Sep 2023 09:49 )             30.5     09-26    132<L>  |  99  |  17  ----------------------------<  95  3.9   |  24  |  1.67<H>    Ca    8.6      26 Sep 2023 07:28  Phos  2.4     09-26  Mg     1.70     09-26          Urinalysis Basic - ( 26 Sep 2023 07:28 )    Color: x / Appearance: x / SG: x / pH: x  Gluc: 95 mg/dL / Ketone: x  / Bili: x / Urobili: x   Blood: x / Protein: x / Nitrite: x   Leuk Esterase: x / RBC: x / WBC x   Sq Epi: x / Non Sq Epi: x / Bacteria: x            RADIOLOGY & ADDITIONAL STUDIES:  rad  Imaging Personally Reviewed:  [x] YES  [ ] NO    Consultant(s) Notes Reviewed:  [x] YES  [ ] NO    Care Discussed with Primary team/ Other Providers [x] YES  [ ] NO

## 2023-09-26 NOTE — PROGRESS NOTE ADULT - SUBJECTIVE AND OBJECTIVE BOX
PANTERA CARBAJAL  58y  Male      Patient is a 58y old  Male who presents with a chief complaint of dysphagia (25 Sep 2023 10:31)  Patient was seen and examined.chart reviewed,covering   feels ok.c/o difficulty in swallowing.no abd pain,no cp,no sob,Denies any pain  REVIEW OF SYSTEMS:  CONSTITUTIONAL: No fever  RESPIRATORY: No cough, hemoptysis or shortness of breath  CARDIOVASCULAR: No chest pain, palpitations, dizziness, or leg swelling  GASTROINTESTINAL: No abdominal pain. nausea, vomiting, hematemesis  GENITOURINARY: No dysuria, frequency, hematuria   NEUROLOGICAL: No headaches, no dizziness  MUSCULOSKELETAL: No joint pain or swelling;     INTERVAL HPI/OVERNIGHT EVENTS:  T(C): 37.8 (09-25-23 @ 14:55), Max: 37.8 (09-25-23 @ 14:55)  HR: 123 (09-25-23 @ 14:55) (93 - 123)  BP: 117/90 (09-25-23 @ 14:55) (117/90 - 129/100)  RR: 18 (09-25-23 @ 14:55) (18 - 19)  SpO2: 96% (09-25-23 @ 14:55) (95% - 100%)  Wt(kg): --  I&O's Summary    24 Sep 2023 07:01  -  25 Sep 2023 07:00  --------------------------------------------------------  IN: 0 mL / OUT: 350 mL / NET: -350 mL      T(C): 37.8 (09-25-23 @ 14:55), Max: 37.8 (09-25-23 @ 14:55)  HR: 123 (09-25-23 @ 14:55) (93 - 123)  BP: 117/90 (09-25-23 @ 14:55) (117/90 - 129/100)  RR: 18 (09-25-23 @ 14:55) (18 - 19)  SpO2: 96% (09-25-23 @ 14:55) (95% - 100%)  Wt(kg): --Vital Signs Last 24 Hrs  T(C): 37.8 (25 Sep 2023 14:55), Max: 37.8 (25 Sep 2023 14:55)  T(F): 100 (25 Sep 2023 14:55), Max: 100 (25 Sep 2023 14:55)  HR: 123 (25 Sep 2023 14:55) (93 - 123)  BP: 117/90 (25 Sep 2023 14:55) (117/90 - 129/100)  BP(mean): --  RR: 18 (25 Sep 2023 14:55) (18 - 19)  SpO2: 96% (25 Sep 2023 14:55) (95% - 100%)    Parameters below as of 25 Sep 2023 14:55  Patient On (Oxygen Delivery Method): room air        LABS:                        11.2   4.17  )-----------( 444      ( 25 Sep 2023 05:50 )             35.6     09-25    135  |  96<L>  |  19  ----------------------------<  131<H>  4.0   |  22  |  1.65<H>    Ca    9.4      25 Sep 2023 05:50  Phos  2.9     09-25  Mg     1.90     09-25        Urinalysis Basic - ( 25 Sep 2023 05:50 )    Color: x / Appearance: x / SG: x / pH: x  Gluc: 131 mg/dL / Ketone: x  / Bili: x / Urobili: x   Blood: x / Protein: x / Nitrite: x   Leuk Esterase: x / RBC: x / WBC x   Sq Epi: x / Non Sq Epi: x / Bacteria: x      CAPILLARY BLOOD GLUCOSE      POCT Blood Glucose.: 106 mg/dL (25 Sep 2023 04:14)        Urinalysis Basic - ( 25 Sep 2023 05:50 )    Color: x / Appearance: x / SG: x / pH: x  Gluc: 131 mg/dL / Ketone: x  / Bili: x / Urobili: x   Blood: x / Protein: x / Nitrite: x   Leuk Esterase: x / RBC: x / WBC x   Sq Epi: x / Non Sq Epi: x / Bacteria: x        PAST MEDICAL & SURGICAL HISTORY:  Former smoker      Non-small cell lung cancer with metastasis      Status post cardiac surgery  s/p open right thoracotomy for posterior cardiac mass removal > 20 years ago, reported by patient to be benign.          MEDICATIONS  (STANDING):  dextrose 5% + sodium chloride 0.45%. 1000 milliLiter(s) (75 mL/Hr) IV Continuous <Continuous>  dextrose 50% Injectable 25 Gram(s) IV Push once  dextrose 50% Injectable 25 Gram(s) IV Push once  dextrose 50% Injectable 12.5 Gram(s) IV Push once  dextrose Oral Gel 15 Gram(s) Oral once  enoxaparin Injectable 60 milliGRAM(s) SubCutaneous every 12 hours  fluconAZOLE   Tablet 200 milliGRAM(s) Oral daily  glucagon  Injectable 1 milliGRAM(s) IntraMuscular once  pantoprazole    Tablet 40 milliGRAM(s) Oral two times a day  sucralfate suspension 1 Gram(s) Oral four times a day    MEDICATIONS  (PRN):        RADIOLOGY & ADDITIONAL TESTS:    Imaging Personally Reviewed:  [ ] YES  [ ] NO    Consultant(s) Notes Reviewed:  [x] YES  [ ] NO    PHYSICAL EXAM:  GENERAL: Alert and awake lying in bed in no distress  HEAD:  Atraumatic, Normocephalic  EYES: EOMI, ROBYN, conjunctiva and sclera clear  NECK: Supple, No JVD, Normal thyroid  NERVOUS SYSTEM:  Alert & Oriented X3, Motor and sensory systems are intact,   CHEST/LUNG: Bilateral clear breath sounds, no rhochi, no wheezing, no crepitations,  HEART: Regular rate and rhythm; No murmurs, rubs, or gallops  ABDOMEN: Soft, Nontender, Nondistended; Bowel sounds present  EXTREMITIES:   Peripheral Pulses are palpable, no  edema        Care Discussed with Consultants/Other Providers [x ] YES  [ ] NO      Code Status: [] Full Code [] DNR [] DNI [] Goals of Care:   Disposition: [] ICU [] Stroke Unit [] RCU []PCU []Floor [] Discharge Home         MANISH WorleyOverlake Hospital Medical CenterP     PANTERA CARBAJAL  58y  Male      Patient is a 58y old  Male who presents with a chief complaint of dysphagia (25 Sep 2023 10:31)  Patient was seen and examined.chart reviewed,covering   feels ok.c/o difficulty in swallowing.no abd pain,no cp,no sob,Denies any pain  REVIEW OF SYSTEMS:  CONSTITUTIONAL: No fever  RESPIRATORY: No cough, hemoptysis or shortness of breath  CARDIOVASCULAR: No chest pain, palpitations, dizziness, or leg swelling  GASTROINTESTINAL: No abdominal pain. nausea, vomiting, hematemesis  GENITOURINARY: No dysuria, frequency, hematuria   NEUROLOGICAL: No headaches, no dizziness  MUSCULOSKELETAL: No joint pain or swelling;     T(C): 37 (09-26-23 @ 21:15), Max: 37 (09-26-23 @ 12:00)  HR: 95 (09-26-23 @ 21:15) (95 - 97)  BP: 119/84 (09-26-23 @ 21:15) (112/80 - 119/84)  RR: 18 (09-26-23 @ 21:15) (18 - 19)  SpO2: 99% (09-26-23 @ 21:15) (96% - 100%)Parameters below as of 25 Sep 2023 14:55    MEDICATIONS  (STANDING):  dextrose 5% + sodium chloride 0.45%. 1000 milliLiter(s) (75 mL/Hr) IV Continuous <Continuous>  dextrose 50% Injectable 25 Gram(s) IV Push once  dextrose 50% Injectable 12.5 Gram(s) IV Push once  dextrose 50% Injectable 25 Gram(s) IV Push once  dextrose Oral Gel 15 Gram(s) Oral once  enoxaparin Injectable 60 milliGRAM(s) SubCutaneous every 12 hours  fluconAZOLE   Tablet 200 milliGRAM(s) Oral daily  glucagon  Injectable 1 milliGRAM(s) IntraMuscular once  pantoprazole    Tablet 40 milliGRAM(s) Oral two times a day  sucralfate suspension 1 Gram(s) Oral four times a day    MEDICATIONS  (PRN):    Patient On (Oxygen Delivery Method): room air                          9.6    5.67  )-----------( 334      ( 26 Sep 2023 09:49 )             30.5               132|99|17<95  3.9|24|1.67  8.6,1.70,2.4  09-26 @ 07:28  Mg     1.90     09-25        Urinalysis Basic - ( 25 Sep 2023 05:50 )    Color: x / Appearance: x / SG: x / pH: x  Gluc: 131 mg/dL / Ketone: x  / Bili: x / Urobili: x   Blood: x / Protein: x / Nitrite: x   Leuk Esterase: x / RBC: x / WBC x   Sq Epi: x / Non Sq Epi: x / Bacteria: x      CAPILLARY BLOOD GLUCOSE      POCT Blood Glucose.: 106 mg/dL (25 Sep 2023 04:14)        Urinalysis Basic - ( 25 Sep 2023 05:50 )    Color: x / Appearance: x / SG: x / pH: x  Gluc: 131 mg/dL / Ketone: x  / Bili: x / Urobili: x   Blood: x / Protein: x / Nitrite: x   Leuk Esterase: x / RBC: x / WBC x   Sq Epi: x / Non Sq Epi: x / Bacteria: x        PAST MEDICAL & SURGICAL HISTORY:  Former smoker      Non-small cell lung cancer with metastasis      Status post cardiac surgery  s/p open right thoracotomy for posterior cardiac mass removal > 20 years ago, reported by patient to be benign.          MEDICATIONS  (STANDING):  dextrose 5% + sodium chloride 0.45%. 1000 milliLiter(s) (75 mL/Hr) IV Continuous <Continuous>  dextrose 50% Injectable 25 Gram(s) IV Push once  dextrose 50% Injectable 25 Gram(s) IV Push once  dextrose 50% Injectable 12.5 Gram(s) IV Push once  dextrose Oral Gel 15 Gram(s) Oral once  enoxaparin Injectable 60 milliGRAM(s) SubCutaneous every 12 hours  fluconAZOLE   Tablet 200 milliGRAM(s) Oral daily  glucagon  Injectable 1 milliGRAM(s) IntraMuscular once  pantoprazole    Tablet 40 milliGRAM(s) Oral two times a day  sucralfate suspension 1 Gram(s) Oral four times a day    MEDICATIONS  (PRN):        RADIOLOGY & ADDITIONAL TESTS:    Imaging Personally Reviewed:  [ ] YES  [ ] NO    Consultant(s) Notes Reviewed:  [x] YES  [ ] NO    PHYSICAL EXAM:  GENERAL: Alert and awake lying in bed in no distress  HEAD:  Atraumatic, Normocephalic  EYES: EOMI, ROBYN, conjunctiva and sclera clear  NECK: Supple, No JVD, Normal thyroid  NERVOUS SYSTEM:  Alert & Oriented X3, Motor and sensory systems are intact,   CHEST/LUNG: Bilateral clear breath sounds, no rhochi, no wheezing, no crepitations,  HEART: Regular rate and rhythm; No murmurs, rubs, or gallops  ABDOMEN: Soft, Nontender, Nondistended; Bowel sounds present  EXTREMITIES:   Peripheral Pulses are palpable, no  edema        Care Discussed with Consultants/Other Providers [x ] YES  [ ] NO      Code Status: [] Full Code [] DNR [] DNI [] Goals of Care:   Disposition: [] ICU [] Stroke Unit [] RCU []PCU []Floor [] Discharge Home         DELVIS [email or web address]

## 2023-09-27 LAB
ANION GAP SERPL CALC-SCNC: 12 MMOL/L — SIGNIFICANT CHANGE UP (ref 7–14)
BUN SERPL-MCNC: 17 MG/DL — SIGNIFICANT CHANGE UP (ref 7–23)
CALCIUM SERPL-MCNC: 8.7 MG/DL — SIGNIFICANT CHANGE UP (ref 8.4–10.5)
CHLORIDE SERPL-SCNC: 97 MMOL/L — LOW (ref 98–107)
CO2 SERPL-SCNC: 23 MMOL/L — SIGNIFICANT CHANGE UP (ref 22–31)
CREAT SERPL-MCNC: 1.54 MG/DL — HIGH (ref 0.5–1.3)
EGFR: 52 ML/MIN/1.73M2 — LOW
GLUCOSE BLDC GLUCOMTR-MCNC: 92 MG/DL — SIGNIFICANT CHANGE UP (ref 70–99)
GLUCOSE BLDC GLUCOMTR-MCNC: 98 MG/DL — SIGNIFICANT CHANGE UP (ref 70–99)
GLUCOSE SERPL-MCNC: 105 MG/DL — HIGH (ref 70–99)
HCT VFR BLD CALC: 30.2 % — LOW (ref 39–50)
HGB BLD-MCNC: 9.7 G/DL — LOW (ref 13–17)
MAGNESIUM SERPL-MCNC: 1.8 MG/DL — SIGNIFICANT CHANGE UP (ref 1.6–2.6)
MCHC RBC-ENTMCNC: 30.3 PG — SIGNIFICANT CHANGE UP (ref 27–34)
MCHC RBC-ENTMCNC: 32.1 GM/DL — SIGNIFICANT CHANGE UP (ref 32–36)
MCV RBC AUTO: 94.4 FL — SIGNIFICANT CHANGE UP (ref 80–100)
NRBC # BLD: 0 /100 WBCS — SIGNIFICANT CHANGE UP (ref 0–0)
NRBC # FLD: 0 K/UL — SIGNIFICANT CHANGE UP (ref 0–0)
PHOSPHATE SERPL-MCNC: 2.3 MG/DL — LOW (ref 2.5–4.5)
PLATELET # BLD AUTO: 313 K/UL — SIGNIFICANT CHANGE UP (ref 150–400)
POTASSIUM SERPL-MCNC: 3.9 MMOL/L — SIGNIFICANT CHANGE UP (ref 3.5–5.3)
POTASSIUM SERPL-SCNC: 3.9 MMOL/L — SIGNIFICANT CHANGE UP (ref 3.5–5.3)
RBC # BLD: 3.2 M/UL — LOW (ref 4.2–5.8)
RBC # FLD: 17 % — HIGH (ref 10.3–14.5)
SODIUM SERPL-SCNC: 132 MMOL/L — LOW (ref 135–145)
SURGICAL PATHOLOGY STUDY: SIGNIFICANT CHANGE UP
WBC # BLD: 3.75 K/UL — LOW (ref 3.8–10.5)
WBC # FLD AUTO: 3.75 K/UL — LOW (ref 3.8–10.5)

## 2023-09-27 PROCEDURE — 99232 SBSQ HOSP IP/OBS MODERATE 35: CPT | Mod: GC

## 2023-09-27 RX ORDER — SODIUM,POTASSIUM PHOSPHATES 278-250MG
1 POWDER IN PACKET (EA) ORAL ONCE
Refills: 0 | Status: COMPLETED | OUTPATIENT
Start: 2023-09-27 | End: 2023-09-27

## 2023-09-27 RX ADMIN — Medication 1 GRAM(S): at 05:25

## 2023-09-27 RX ADMIN — ENOXAPARIN SODIUM 60 MILLIGRAM(S): 100 INJECTION SUBCUTANEOUS at 05:26

## 2023-09-27 RX ADMIN — Medication 1 GRAM(S): at 17:21

## 2023-09-27 RX ADMIN — Medication 1 GRAM(S): at 00:53

## 2023-09-27 RX ADMIN — Medication 1 GRAM(S): at 23:38

## 2023-09-27 RX ADMIN — FLUCONAZOLE 200 MILLIGRAM(S): 150 TABLET ORAL at 11:53

## 2023-09-27 RX ADMIN — ENOXAPARIN SODIUM 60 MILLIGRAM(S): 100 INJECTION SUBCUTANEOUS at 17:21

## 2023-09-27 RX ADMIN — PANTOPRAZOLE SODIUM 40 MILLIGRAM(S): 20 TABLET, DELAYED RELEASE ORAL at 05:25

## 2023-09-27 RX ADMIN — PANTOPRAZOLE SODIUM 40 MILLIGRAM(S): 20 TABLET, DELAYED RELEASE ORAL at 17:21

## 2023-09-27 RX ADMIN — SODIUM CHLORIDE 75 MILLILITER(S): 9 INJECTION, SOLUTION INTRAVENOUS at 13:48

## 2023-09-27 RX ADMIN — Medication 1 GRAM(S): at 11:53

## 2023-09-27 RX ADMIN — Medication 1 PACKET(S): at 09:29

## 2023-09-27 NOTE — PROGRESS NOTE ADULT - ASSESSMENT
57yo M w/ NSCLC Dx Sept 2022 who presents with new onset progressive dysphagia to solids and liquids x1 mo with associated 30 lbs weight loss.     #NSCLC on Paclitaxel (recently added chemo agent)  #Candida esophagitis  #Dysphagia/odynophagia  #Vomiting/Regurgitation  #Heart Burn (resolved with fluconazole)  *CT Neck 9/18 - no mass lesion or collection seen  *Esophagram 8/03 - GE reflux, small sliding HH, 13mm tablet passed with slow transit  *EGD 9/20 - grade C candidiasis esophagitis, 2cm HH, severe edematous, gastropathy. biopsies obtained    Recommendation:  - fluconazole 200 mg daily to complete 14-day course  - PPI PO BID x 8 weeks followed by once daily.  - awaiting pathology from esophageal biopsy   - supportive measures as per team    All recommendations preliminary until note signed by service attending.    Lewis Gatica MD  Gastroenterology/Hepatology Fellow   Available on Microsoft Teams 7am - 5pm    NON-URGENT CONSULTS:  Please email:  [email or web address]   OR  [email or web address]    After 5pm, please contact the on-call GI fellow. [phone]    AT NIGHT AND ON WEEKENDS:  Contact on-call GI fellow via answering service ([phone]) from 5pm-8am and on weekends/holidays   57yo M w/ NSCLC Dx Sept 2022 who presents with new onset progressive dysphagia to solids and liquids x1 mo with associated 30 lbs weight loss.     #NSCLC on Paclitaxel (recently added chemo agent)  #Candida esophagitis  #Dysphagia/odynophagia  #Vomiting/Regurgitation  #Heart Burn (resolved with fluconazole)  *CT Neck 9/18 - no mass lesion or collection seen  *Esophagram 8/03 - GE reflux, small sliding HH, 13mm tablet passed with slow transit  *EGD 9/20 - grade C candidiasis esophagitis, 2cm HH, severe edematous, gastropathy. biopsies obtained  - pathology is consistent with candida esophagitis and chronic inactive gastritis, negative for H pylori    Recommendation:  - Obtain CT Abdomen/Pelvis w IV contrast to evaluate for progression of malignancy possibly causing extrinsic compression on gastrointestinal tract  - fluconazole 200 mg daily to complete 14-day course  - PPI PO BID x 8 weeks followed by once daily.  - supportive measures as per team    All recommendations preliminary until note signed by service attending.    Lewis Gatica MD  Gastroenterology/Hepatology Fellow   Available on Microsoft Teams 7am - 5pm    NON-URGENT CONSULTS:  Please email:  [email or web address]   OR  [email or web address]    After 5pm, please contact the on-call GI fellow. [phone]    AT NIGHT AND ON WEEKENDS:  Contact on-call GI fellow via answering service ([phone]) from 5pm-8am and on weekends/holidays

## 2023-09-27 NOTE — PROGRESS NOTE ADULT - SUBJECTIVE AND OBJECTIVE BOX
PANTERA CARBAJAL  58y  Male      Patient is a 58y old  Male who presents with a chief complaint of dysphagia (25 Sep 2023 10:31)  Patient was seen and examined.chart reviewed,covering   feels ok.c/o difficulty in swallowing.no abd pain,no cp,no sob,Denies any pain  REVIEW OF SYSTEMS:  CONSTITUTIONAL: No fever  RESPIRATORY: No cough, hemoptysis or shortness of breath  CARDIOVASCULAR: No chest pain, palpitations, dizziness, or leg swelling  GASTROINTESTINAL: No abdominal pain. nausea, vomiting, hematemesis  GENITOURINARY: No dysuria, frequency, hematuria   NEUROLOGICAL: No headaches, no dizziness  MUSCULOSKELETAL: No joint pain or swelling;     T(C): 37.3 (09-27-23 @ 21:20), Max: 37.3 (09-27-23 @ 21:20)  HR: 92 (09-27-23 @ 21:20) (92 - 92)  BP: 120/98 (09-27-23 @ 21:20) (120/98 - 128/95)  RR: 18 (09-27-23 @ 21:20) (18 - 18)  SpO2: 99% (09-27-23 @ 21:20) (99% - 100%)    MEDICATIONS  (STANDING):  dextrose 5% + sodium chloride 0.45%. 1000 milliLiter(s) (75 mL/Hr) IV Continuous <Continuous>  dextrose 50% Injectable 25 Gram(s) IV Push once  dextrose 50% Injectable 12.5 Gram(s) IV Push once  dextrose 50% Injectable 25 Gram(s) IV Push once  dextrose Oral Gel 15 Gram(s) Oral once  enoxaparin Injectable 60 milliGRAM(s) SubCutaneous every 12 hours  fluconAZOLE   Tablet 200 milliGRAM(s) Oral daily  glucagon  Injectable 1 milliGRAM(s) IntraMuscular once  pantoprazole    Tablet 40 milliGRAM(s) Oral two times a day  sucralfate suspension 1 Gram(s) Oral four times a day    MEDICATIONS  (PRN):      MEDICATIONS  (PRN):    Patient On (Oxygen Delivery Method): room air                          9.6    5.67  )-----------( 334      ( 26 Sep 2023 09:49 )             30.5               132|99|17<95  3.9|24|1.67  8.6,1.70,2.4  09-26 @ 07:28  Mg     1.90     09-25        Urinalysis Basic - ( 25 Sep 2023 05:50 )    Color: x / Appearance: x / SG: x / pH: x  Gluc: 131 mg/dL / Ketone: x  / Bili: x / Urobili: x   Blood: x / Protein: x / Nitrite: x   Leuk Esterase: x / RBC: x / WBC x   Sq Epi: x / Non Sq Epi: x / Bacteria: x      CAPILLARY BLOOD GLUCOSE      POCT Blood Glucose.: 106 mg/dL (25 Sep 2023 04:14)        Urinalysis Basic - ( 25 Sep 2023 05:50 )    Color: x / Appearance: x / SG: x / pH: x  Gluc: 131 mg/dL / Ketone: x  / Bili: x / Urobili: x   Blood: x / Protein: x / Nitrite: x   Leuk Esterase: x / RBC: x / WBC x   Sq Epi: x / Non Sq Epi: x / Bacteria: x        PAST MEDICAL & SURGICAL HISTORY:  Former smoker      Non-small cell lung cancer with metastasis      Status post cardiac surgery  s/p open right thoracotomy for posterior cardiac mass removal > 20 years ago, reported by patient to be benign.          MEDICATIONS  (STANDING):  dextrose 5% + sodium chloride 0.45%. 1000 milliLiter(s) (75 mL/Hr) IV Continuous <Continuous>  dextrose 50% Injectable 25 Gram(s) IV Push once  dextrose 50% Injectable 25 Gram(s) IV Push once  dextrose 50% Injectable 12.5 Gram(s) IV Push once  dextrose Oral Gel 15 Gram(s) Oral once  enoxaparin Injectable 60 milliGRAM(s) SubCutaneous every 12 hours  fluconAZOLE   Tablet 200 milliGRAM(s) Oral daily  glucagon  Injectable 1 milliGRAM(s) IntraMuscular once  pantoprazole    Tablet 40 milliGRAM(s) Oral two times a day  sucralfate suspension 1 Gram(s) Oral four times a day    MEDICATIONS  (PRN):        RADIOLOGY & ADDITIONAL TESTS:    Imaging Personally Reviewed:  [ ] YES  [ ] NO    Consultant(s) Notes Reviewed:  [x] YES  [ ] NO    PHYSICAL EXAM:  GENERAL: Alert and awake lying in bed in no distress  HEAD:  Atraumatic, Normocephalic  EYES: EOMI, ROBYN, conjunctiva and sclera clear  NECK: Supple, No JVD, Normal thyroid  NERVOUS SYSTEM:  Alert & Oriented X3, Motor and sensory systems are intact,   CHEST/LUNG: Bilateral clear breath sounds, no rhochi, no wheezing, no crepitations,  HEART: Regular rate and rhythm; No murmurs, rubs, or gallops  ABDOMEN: Soft, Nontender, Nondistended; Bowel sounds present  EXTREMITIES:   Peripheral Pulses are palpable, no  edema        Care Discussed with Consultants/Other Providers [x ] YES  [ ] NO      Code Status: [] Full Code [] DNR [] DNI [] Goals of Care:   Disposition: [] ICU [] Stroke Unit [] RCU []PCU []Floor [] Discharge Home         [email or web address]

## 2023-09-27 NOTE — PROGRESS NOTE ADULT - ASSESSMENT
58M h/o non small cell lung ca on paclitaxel (last 1 week ago, follows with Dr. Son) p/w 1 month progressively worsening dysphagia. Recent admission for PE and SBO. Endorsed dysphagia with that admission. Barium esophagram showing GERD and small sliding hiatal hernia. Nephrology consult called for abnormal renal function    Assessment:  1) Non oliguric stable CKD stage III likely chemotherapy induced renal injury/ATN/renal hypoperfusion/ascites/compression/obstructive uropathy  2) NSCLC with metastasis on chemotherapy  3) History of PE  4) Progressive dysphagia/ Hiatal hernia/GERD/Gastritis/Esophageal candidiases  5) Anemia of chronic illness  6) Hypoalbuminemia  7) Ascites  8) Dysphagia/Poor appetite/Hypoglycemia/Failure to thrive  9) Electrolytes disorder with mild hyponatremia    Recommend:  Strict I/o  Avoid Nephrotoxic agent  S cr 1.6  with non oliguria  IV/po hydration  Na level 132  Replete electrolytes with goal K>4 and <5, Mag> 2 and Phos 2.5 to 3.5  Urinalysis, urine electrolytes reviewed  Bilateral renal and bladder ultrasound results reviewed  IV/po fluconazole for candida esophagitis  GI/Oncology/hematology follow up  PO intake as tolerated  Nutritional consult follow up  Optimal pain control  Intact PTH, Vitamin D 1,25 level, Phos, calcium level  Volume status and electrolytes noted  No urgent need for RRT/HD  Will follow

## 2023-09-27 NOTE — PROGRESS NOTE ADULT - PROBLEM SELECTOR PLAN 1
- egd 9/20 shows esophageal candidiasis  -Diflucan (fluconazole) 200mg PO daily .   Obtain CT Abdomen/Pelvis w IV contrast to evaluate for progression of malignancy possibly causing extrinsic compression on gastrointestinal tract

## 2023-09-27 NOTE — PROGRESS NOTE ADULT - SUBJECTIVE AND OBJECTIVE BOX
Interval Events:   - pt continues to experience dysphagia to liquids including water with regurgitation   - no abdominal pain    Hospital Medications:  dextrose 5% + sodium chloride 0.45%. 1000 milliLiter(s) IV Continuous <Continuous>  dextrose 50% Injectable 12.5 Gram(s) IV Push once  dextrose 50% Injectable 25 Gram(s) IV Push once  dextrose 50% Injectable 25 Gram(s) IV Push once  dextrose Oral Gel 15 Gram(s) Oral once  enoxaparin Injectable 60 milliGRAM(s) SubCutaneous every 12 hours  fluconAZOLE   Tablet 200 milliGRAM(s) Oral daily  glucagon  Injectable 1 milliGRAM(s) IntraMuscular once  pantoprazole    Tablet 40 milliGRAM(s) Oral two times a day  sucralfate suspension 1 Gram(s) Oral four times a day    ROS: All system reviewed and negative except as mentioned above.    PHYSICAL EXAM:   Vital Signs:  Vital Signs Last 24 Hrs  T(C): 36.9 (27 Sep 2023 09:45), Max: 37 (26 Sep 2023 12:00)  T(F): 98.4 (27 Sep 2023 09:45), Max: 98.6 (26 Sep 2023 12:00)  HR: 90 (27 Sep 2023 09:45) (89 - 97)  BP: 118/88 (27 Sep 2023 09:45) (112/80 - 119/84)  BP(mean): --  RR: 18 (27 Sep 2023 09:45) (18 - 19)  SpO2: 100% (27 Sep 2023 09:45) (96% - 100%)    Parameters below as of 27 Sep 2023 09:45  Patient On (Oxygen Delivery Method): room air    Daily       GENERAL:  fatigued, resting in bed  HEENT:  sclera anicteric  CHEST:  Normal Effort  ABDOMEN:  Soft, non-tende  SKIN:  Warm & Dry.   NEURO:  Alert, conversant    LABS:                        9.7    3.75  )-----------( 313      ( 27 Sep 2023 06:22 )             30.2     Mean Cell Volume: 94.4 fL (09-27-23 @ 06:22)    09-27    132<L>  |  97<L>  |  17  ----------------------------<  105<H>  3.9   |  23  |  1.54<H>    Ca    8.7      27 Sep 2023 06:22  Phos  2.3     09-27  Mg     1.80     09-27    Urinalysis Basic - ( 27 Sep 2023 06:22 )    Color: x / Appearance: x / SG: x / pH: x  Gluc: 105 mg/dL / Ketone: x  / Bili: x / Urobili: x   Blood: x / Protein: x / Nitrite: x   Leuk Esterase: x / RBC: x / WBC x   Sq Epi: x / Non Sq Epi: x / Bacteria: x                          9.7    3.75  )-----------( 313      ( 27 Sep 2023 06:22 )             30.2                         9.6    5.67  )-----------( 334      ( 26 Sep 2023 09:49 )             30.5                         7.5    6.46  )-----------( 338      ( 26 Sep 2023 07:19 )             23.8                         11.2   4.17  )-----------( 444      ( 25 Sep 2023 05:50 )             35.6

## 2023-09-27 NOTE — PROGRESS NOTE ADULT - SUBJECTIVE AND OBJECTIVE BOX
Theo Cardenas MD (Nephrology progress note)  [address], [facility],  [id number],  [id number]  TEl: [id number]  Cell: [id number]    Patient is a 58y Male seen and evaluated at bedside. Vital signs, laboratory data, imaging studies, consult notes reviewed done within past 24 hours. Overnight events noted. Patient lying in bed remains with poor appetite and dyspepsia. Interval stable renal function with mild hyponatremia with Na level 132    Allergies    No Known Allergies    Intolerances        ROS:  CONSTITUTIONAL: No fever or chills.  HEENT: No headcahe or visual disturbances.  RESPIRATORY: No shortness of breath, cough, hemoptysis or wheezing  CARDIOVASCULAR: No Chest pain or SOB  GASTROINTESTINAL: poor appetite and nausea but denies vomiting, diarrhea, hematemesis or blood per rectum.  GENITOURINARY: No flank or supra pubic pain  NEUROLOGICAL: No headache, focal limb weakness, tingling or numbness or seizure like activity  SKIN: No skin rash or lesion  MUSCULOSKELETAL: No leg pain, calf pain or swelling    VITALS:    T(C): 36.9 (09-27-23 @ 09:45), Max: 37 (09-26-23 @ 12:00)  HR: 90 (09-27-23 @ 09:45) (89 - 97)  BP: 118/88 (09-27-23 @ 09:45) (112/80 - 119/84)  RR: 18 (09-27-23 @ 09:45) (18 - 19)  SpO2: 100% (09-27-23 @ 09:45) (96% - 100%)  CAPILLARY BLOOD GLUCOSE      POCT Blood Glucose.: 98 mg/dL (27 Sep 2023 07:42)      MEDICATIONS  (STANDING):  dextrose 5% + sodium chloride 0.45%. 1000 milliLiter(s) (75 mL/Hr) IV Continuous <Continuous>  dextrose 50% Injectable 25 Gram(s) IV Push once  dextrose 50% Injectable 25 Gram(s) IV Push once  dextrose 50% Injectable 12.5 Gram(s) IV Push once  dextrose Oral Gel 15 Gram(s) Oral once  enoxaparin Injectable 60 milliGRAM(s) SubCutaneous every 12 hours  fluconAZOLE   Tablet 200 milliGRAM(s) Oral daily  glucagon  Injectable 1 milliGRAM(s) IntraMuscular once  pantoprazole    Tablet 40 milliGRAM(s) Oral two times a day  sucralfate suspension 1 Gram(s) Oral four times a day    MEDICATIONS  (PRN):      PHYSICAL EXAM:  GENERAL: Alert, awake and oriented x3 in no distress  HEENT: ROBYN, EOMI, neck supple  CHEST/LUNG: Bilateral clear breath sounds, no rales, no crepitations, no rhonchi, no wheezing  HEART: Regular rate and rhythm, LOVE II/VI at LPSB, no gallops, no rub   ABDOMEN: Soft, mildly distended, non tender, bowel sounds present, no palpable organomegaly  : No flank or supra pubic tenderness.  EXTREMITIES: Peripheral pulses are palpable, no pedal edema  Neurology: AAOx3, no focal neurological deficit  SKIN: No rash or skin lesion  Musculoskeletal: No joint deformity or spinal tenderness.      Vascular ACCESS: None    LABS:                        9.7    3.75  )-----------( 313      ( 27 Sep 2023 06:22 )             30.2     09-27    132<L>  |  97<L>  |  17  ----------------------------<  105<H>  3.9   |  23  |  1.54<H>    Ca    8.7      27 Sep 2023 06:22  Phos  2.3     09-27  Mg     1.80     09-27          Urinalysis Basic - ( 27 Sep 2023 06:22 )    Color: x / Appearance: x / SG: x / pH: x  Gluc: 105 mg/dL / Ketone: x  / Bili: x / Urobili: x   Blood: x / Protein: x / Nitrite: x   Leuk Esterase: x / RBC: x / WBC x   Sq Epi: x / Non Sq Epi: x / Bacteria: x            RADIOLOGY & ADDITIONAL STUDIES:  rad  Imaging Personally Reviewed:  [x] YES  [ ] NO    Consultant(s) Notes Reviewed:  [x] YES  [ ] NO    Care Discussed with Primary team/ Other Providers [x] YES  [ ] NO

## 2023-09-28 LAB
ANION GAP SERPL CALC-SCNC: 9 MMOL/L — SIGNIFICANT CHANGE UP (ref 7–14)
APPEARANCE UR: ABNORMAL
BACTERIA # UR AUTO: NEGATIVE /HPF — SIGNIFICANT CHANGE UP
BILIRUB UR-MCNC: NEGATIVE — SIGNIFICANT CHANGE UP
BUN SERPL-MCNC: 16 MG/DL — SIGNIFICANT CHANGE UP (ref 7–23)
CALCIUM SERPL-MCNC: 8.5 MG/DL — SIGNIFICANT CHANGE UP (ref 8.4–10.5)
CAST: 4 /LPF — SIGNIFICANT CHANGE UP (ref 0–4)
CHLORIDE SERPL-SCNC: 100 MMOL/L — SIGNIFICANT CHANGE UP (ref 98–107)
CO2 SERPL-SCNC: 22 MMOL/L — SIGNIFICANT CHANGE UP (ref 22–31)
COLOR SPEC: YELLOW — SIGNIFICANT CHANGE UP
CREAT SERPL-MCNC: 1.5 MG/DL — HIGH (ref 0.5–1.3)
DIFF PNL FLD: NEGATIVE — SIGNIFICANT CHANGE UP
EGFR: 54 ML/MIN/1.73M2 — LOW
GLUCOSE BLDC GLUCOMTR-MCNC: 108 MG/DL — HIGH (ref 70–99)
GLUCOSE SERPL-MCNC: 105 MG/DL — HIGH (ref 70–99)
GLUCOSE UR QL: NEGATIVE MG/DL — SIGNIFICANT CHANGE UP
GRAN CASTS # UR COMP ASSIST: PRESENT
HCT VFR BLD CALC: 28.8 % — LOW (ref 39–50)
HGB BLD-MCNC: 9.4 G/DL — LOW (ref 13–17)
HYALINE CASTS # UR AUTO: PRESENT
KETONES UR-MCNC: NEGATIVE MG/DL — SIGNIFICANT CHANGE UP
LEUKOCYTE ESTERASE UR-ACNC: NEGATIVE — SIGNIFICANT CHANGE UP
MAGNESIUM SERPL-MCNC: 1.6 MG/DL — SIGNIFICANT CHANGE UP (ref 1.6–2.6)
MCHC RBC-ENTMCNC: 29.9 PG — SIGNIFICANT CHANGE UP (ref 27–34)
MCHC RBC-ENTMCNC: 32.6 GM/DL — SIGNIFICANT CHANGE UP (ref 32–36)
MCV RBC AUTO: 91.7 FL — SIGNIFICANT CHANGE UP (ref 80–100)
NITRITE UR-MCNC: NEGATIVE — SIGNIFICANT CHANGE UP
NRBC # BLD: 0 /100 WBCS — SIGNIFICANT CHANGE UP (ref 0–0)
NRBC # FLD: 0 K/UL — SIGNIFICANT CHANGE UP (ref 0–0)
OSMOLALITY UR: 672 MOSM/KG — SIGNIFICANT CHANGE UP (ref 50–1200)
PH UR: 5.5 — SIGNIFICANT CHANGE UP (ref 5–8)
PHOSPHATE SERPL-MCNC: 2.3 MG/DL — LOW (ref 2.5–4.5)
PLATELET # BLD AUTO: 298 K/UL — SIGNIFICANT CHANGE UP (ref 150–400)
POTASSIUM SERPL-MCNC: 4 MMOL/L — SIGNIFICANT CHANGE UP (ref 3.5–5.3)
POTASSIUM SERPL-SCNC: 4 MMOL/L — SIGNIFICANT CHANGE UP (ref 3.5–5.3)
PROT UR-MCNC: 30 MG/DL
RBC # BLD: 3.14 M/UL — LOW (ref 4.2–5.8)
RBC # FLD: 16.5 % — HIGH (ref 10.3–14.5)
RBC CASTS # UR COMP ASSIST: 0 /HPF — SIGNIFICANT CHANGE UP (ref 0–4)
REVIEW: SIGNIFICANT CHANGE UP
SODIUM SERPL-SCNC: 131 MMOL/L — LOW (ref 135–145)
SODIUM UR-SCNC: 20 MMOL/L — SIGNIFICANT CHANGE UP
SP GR SPEC: 1.02 — SIGNIFICANT CHANGE UP (ref 1–1.03)
SQUAMOUS # UR AUTO: 2 /HPF — SIGNIFICANT CHANGE UP (ref 0–5)
URATE CRY FLD QL MICRO: PRESENT
UROBILINOGEN FLD QL: 1 MG/DL — SIGNIFICANT CHANGE UP (ref 0.2–1)
WBC # BLD: 3.14 K/UL — LOW (ref 3.8–10.5)
WBC # FLD AUTO: 3.14 K/UL — LOW (ref 3.8–10.5)
WBC UR QL: 0 /HPF — SIGNIFICANT CHANGE UP (ref 0–5)

## 2023-09-28 PROCEDURE — 74177 CT ABD & PELVIS W/CONTRAST: CPT | Mod: 26

## 2023-09-28 PROCEDURE — 99232 SBSQ HOSP IP/OBS MODERATE 35: CPT | Mod: GC

## 2023-09-28 RX ORDER — SODIUM,POTASSIUM PHOSPHATES 278-250MG
1 POWDER IN PACKET (EA) ORAL
Refills: 0 | Status: COMPLETED | OUTPATIENT
Start: 2023-09-28 | End: 2023-09-29

## 2023-09-28 RX ORDER — SENNA PLUS 8.6 MG/1
2 TABLET ORAL AT BEDTIME
Refills: 0 | Status: DISCONTINUED | OUTPATIENT
Start: 2023-09-28 | End: 2023-10-04

## 2023-09-28 RX ADMIN — PANTOPRAZOLE SODIUM 40 MILLIGRAM(S): 20 TABLET, DELAYED RELEASE ORAL at 05:25

## 2023-09-28 RX ADMIN — Medication 1 GRAM(S): at 23:27

## 2023-09-28 RX ADMIN — SENNA PLUS 2 TABLET(S): 8.6 TABLET ORAL at 21:37

## 2023-09-28 RX ADMIN — Medication 1 PACKET(S): at 18:05

## 2023-09-28 RX ADMIN — FLUCONAZOLE 200 MILLIGRAM(S): 150 TABLET ORAL at 11:24

## 2023-09-28 RX ADMIN — PANTOPRAZOLE SODIUM 40 MILLIGRAM(S): 20 TABLET, DELAYED RELEASE ORAL at 18:02

## 2023-09-28 RX ADMIN — ENOXAPARIN SODIUM 60 MILLIGRAM(S): 100 INJECTION SUBCUTANEOUS at 18:01

## 2023-09-28 RX ADMIN — ENOXAPARIN SODIUM 60 MILLIGRAM(S): 100 INJECTION SUBCUTANEOUS at 05:24

## 2023-09-28 RX ADMIN — Medication 1 GRAM(S): at 05:25

## 2023-09-28 RX ADMIN — Medication 1 GRAM(S): at 18:02

## 2023-09-28 RX ADMIN — Medication 1 GRAM(S): at 11:23

## 2023-09-28 NOTE — PROGRESS NOTE ADULT - SUBJECTIVE AND OBJECTIVE BOX
Interval Events:   - no acute events   - pt reports ongoing dysphagia to liquids, without abdominal pain or N/V    Hospital Medications:  dextrose 50% Injectable 25 Gram(s) IV Push once  dextrose 50% Injectable 25 Gram(s) IV Push once  dextrose 50% Injectable 12.5 Gram(s) IV Push once  dextrose Oral Gel 15 Gram(s) Oral once  enoxaparin Injectable 60 milliGRAM(s) SubCutaneous every 12 hours  fluconAZOLE   Tablet 200 milliGRAM(s) Oral daily  glucagon  Injectable 1 milliGRAM(s) IntraMuscular once  pantoprazole    Tablet 40 milliGRAM(s) Oral two times a day  potassium phosphate / sodium phosphate Powder (PHOS-NaK) 1 Packet(s) Oral two times a day  senna 2 Tablet(s) Oral at bedtime  sucralfate suspension 1 Gram(s) Oral four times a day    ROS: All system reviewed and negative except as mentioned above.    PHYSICAL EXAM:   Vital Signs:  Vital Signs Last 24 Hrs  T(C): 36.8 (28 Sep 2023 09:49), Max: 37.3 (27 Sep 2023 21:20)  T(F): 98.2 (28 Sep 2023 09:49), Max: 99.1 (27 Sep 2023 21:20)  HR: 93 (28 Sep 2023 09:49) (92 - 94)  BP: 116/86 (28 Sep 2023 09:49) (116/81 - 128/95)  BP(mean): --  RR: 18 (28 Sep 2023 09:49) (18 - 18)  SpO2: 97% (28 Sep 2023 09:49) (97% - 100%)    Parameters below as of 28 Sep 2023 09:49  Patient On (Oxygen Delivery Method): room air    Daily     GENERAL:  ill-appearing, NAD  HEENT:  sclera anicteric  CHEST:  Normal Effort  ABDOMEN:  Soft, non-tender, non-distended  SKIN:  Warm & Dry.   NEURO:  Fatigued, conversant, no focal deficit    LABS:                        9.4    3.14  )-----------( 298      ( 28 Sep 2023 05:16 )             28.8     Mean Cell Volume: 91.7 fL (-23 @ 05:16)        131<L>  |  100  |  16  ----------------------------<  105<H>  4.0   |  22  |  1.50<H>    Ca    8.5      28 Sep 2023 05:16  Phos  2.3       Mg     1.60           Urinalysis Basic - ( 28 Sep 2023 10:11 )    Color: Yellow / Appearance: Cloudy / S.025 / pH: x  Gluc: x / Ketone: Negative mg/dL  / Bili: Negative / Urobili: 1.0 mg/dL   Blood: x / Protein: 30 mg/dL / Nitrite: Negative   Leuk Esterase: Negative / RBC: 0 /HPF / WBC 0 /HPF   Sq Epi: x / Non Sq Epi: 2 /HPF / Bacteria: Negative /HPF                          9.4    3.14  )-----------( 298      ( 28 Sep 2023 05:16 )             28.8                         9.7    3.75  )-----------( 313      ( 27 Sep 2023 06:22 )             30.2                         9.6    5.67  )-----------( 334      ( 26 Sep 2023 09:49 )             30.5                         7.5    6.46  )-----------( 338      ( 26 Sep 2023 07:19 )             23.8

## 2023-09-28 NOTE — PROGRESS NOTE ADULT - ASSESSMENT
58M h/o non small cell lung ca on paclitaxel (last 1 week ago, follows with Dr. Son) p/w 1 month progressively worsening dysphagia. Recent admission for PE and SBO. Endorsed dysphagia with that admission. Barium esophagram showing GERD and small sliding hiatal hernia. Nephrology consult called for abnormal renal function    Assessment:  1) Non oliguric stable CKD stage III likely chemotherapy induced renal injury/ATN/renal hypoperfusion/ascites/compression/obstructive uropathy  2) NSCLC with metastasis on chemotherapy  3) History of PE  4) Progressive dysphagia/ Hiatal hernia/GERD/Gastritis/Esophageal candidiases  5) Anemia of chronic illness  6) Hypoalbuminemia  7) Ascites  8) Dysphagia/Poor appetite/Hypoglycemia/Failure to thrive  9) Electrolytes disorder with mild hyponatremia with Na level 131  10) Constipation    Recommend:  Strict I/o  Avoid Nephrotoxic agent  S cr 1.6  with non oliguria  Na level 131  Monitor BMP and electrolytes daily  Stool softener  Discontinue d5w  Replete electrolytes with goal K>4 and <5, Mag> 2 and Phos 2.5 to 3.5  Check urinalysis, urine electrolytes  Bilateral renal and bladder ultrasound results reviewed  IV/po fluconazole for candida esophagitis  GI/Oncology/hematology follow up  PO intake as tolerated  Nutritional consult follow up  Optimal pain control  Intact PTH, Vitamin D 1,25 level, Phos, calcium level  Volume status and electrolytes noted  No urgent need for RRT/HD  Will follow

## 2023-09-28 NOTE — PROGRESS NOTE ADULT - ASSESSMENT
57yo M w/ NSCLC Dx Sept 2022 who presents with new onset progressive dysphagia to solids and liquids x1 mo with associated 30 lbs weight loss.     #NSCLC on Paclitaxel (recently added chemo agent)  #Candidal esophagitis  #Dysphagia  #Vomiting/Regurgitation  #Heart Burn (resolved with fluconazole)  *CT Neck 9/18 - no mass lesion or collection seen  *Esophagram 8/03 - GE reflux, small sliding HH, 13mm tablet passed with slow transit  *EGD 9/20 - grade C candidal esophagitis, 2cm HH, severe edematous, gastropathy. biopsied   - pathology is consistent with candida esophagitis and chronic inactive gastritis, negative for H pylori    Recommendation:  - Obtain CT Abdomen/Pelvis w IV contrast to evaluate for progression of malignancy possibly causing extrinsic compression on gastrointestinal tract  - fluconazole 200 mg daily to complete 14-day course  - PPI PO BID x 8 weeks followed by once daily.  - supportive measures as per team    All recommendations preliminary until note signed by service attending.    Lewis Gatica MD  Gastroenterology/Hepatology Fellow   Available on [employer] Teams 7am - 5pm    NON-URGENT CONSULTS:  Please email:  [email or web address]   OR  [email or web address]    After 5pm, please contact the on-call GI fellow. [phone]    AT NIGHT AND ON WEEKENDS:  Contact on-call GI fellow via answering service ([phone]) from 5pm-8am and on weekends/holidays

## 2023-09-28 NOTE — PROGRESS NOTE ADULT - SUBJECTIVE AND OBJECTIVE BOX
Theo Cardenas MD (Nephrology progress note)  [address], [facility], [place], operated by [facility],  [address],  [facility]  TEl: [phone]  Cell: [phone]    Patient is a 58y Male seen and evaluated at bedside. Vital signs, laboratory data, imaging studies, consult notes reviewed done within past 24 hours. Overnight events noted.    Allergies    No Known Allergies    Intolerances        ROS:  CONSTITUTIONAL: No fever or chills.  HEENT: No headcahe or visual disturbances.  RESPIRATORY: No shortness of breath, cough, hemoptysis or wheezing  CARDIOVASCULAR: No Chest pain, shortness of breath, palpitations, dizziness, syncope, orthopnea, PND or leg swelling.  GASTROINTESTINAL: No abdominal pain, nausea, vomiting, diarrhea, hematemesis or blood per rectum.  GENITOURINARY: No urinary frequency, urgency, gross hematuria, dysuria, flank or supra pubic pain, difficulty passing urine.  NEUROLOGICAL: No headache, focal limb weakness, tingling or numbness or seizure like activity  SKIN: No skin rash or lesion  MUSCULOSKELETAL: No leg pain, calf pain or swelling    VITALS:    T(C): 36.9 (09-28-23 @ 05:20), Max: 37.3 (09-27-23 @ 21:20)  HR: 94 (09-28-23 @ 05:20) (90 - 94)  BP: 116/81 (09-28-23 @ 05:20) (116/81 - 128/95)  RR: 18 (09-28-23 @ 05:20) (18 - 18)  SpO2: 99% (09-28-23 @ 05:20) (99% - 100%)  CAPILLARY BLOOD GLUCOSE      POCT Blood Glucose.: 108 mg/dL (28 Sep 2023 07:10)  POCT Blood Glucose.: 92 mg/dL (27 Sep 2023 12:29)      MEDICATIONS  (STANDING):  dextrose 50% Injectable 12.5 Gram(s) IV Push once  dextrose 50% Injectable 25 Gram(s) IV Push once  dextrose 50% Injectable 25 Gram(s) IV Push once  dextrose Oral Gel 15 Gram(s) Oral once  enoxaparin Injectable 60 milliGRAM(s) SubCutaneous every 12 hours  fluconAZOLE   Tablet 200 milliGRAM(s) Oral daily  glucagon  Injectable 1 milliGRAM(s) IntraMuscular once  pantoprazole    Tablet 40 milliGRAM(s) Oral two times a day  potassium phosphate / sodium phosphate Powder (PHOS-NaK) 1 Packet(s) Oral two times a day  sucralfate suspension 1 Gram(s) Oral four times a day    MEDICATIONS  (PRN):      PHYSICAL EXAM:  GENERAL: Alert, awake and oriented x3 in no distress  HEENT: ROBYN, EOMI, neck supple, no JVP, no carotid bruit, oral mucosa moist and pink.  CHEST/LUNG: Bilateral clear breath sounds, no rales, no crepitations, no rhonchi, no wheezing  HEART: Regular rate and rhythm, LOVE II/VI at LPSB, no gallops, no rub   ABDOMEN: Soft, nontender, non distended, bowel sounds present, no palpable organomegaly  : No flank or supra pubic tenderness.  EXTREMITIES: Peripheral pulses are palpable, no pedal edema  Neurology: AAOx3, no focal neurological deficit  SKIN: No rash or skin lesion  Musculoskeletal: No joint deformity or spinal tenderness.      Vascular ACCESS:     LABS:                        9.4    3.14  )-----------( 298      ( 28 Sep 2023 05:16 )             28.8     09-28    131<L>  |  100  |  16  ----------------------------<  105<H>  4.0   |  22  |  1.50<H>    Ca    8.5      28 Sep 2023 05:16  Phos  2.3     09-28  Mg     1.60     09-28          Urinalysis Basic - ( 28 Sep 2023 05:16 )    Color: x / Appearance: x / SG: x / pH: x  Gluc: 105 mg/dL / Ketone: x  / Bili: x / Urobili: x   Blood: x / Protein: x / Nitrite: x   Leuk Esterase: x / RBC: x / WBC x   Sq Epi: x / Non Sq Epi: x / Bacteria: x            RADIOLOGY & ADDITIONAL STUDIES:    Imaging Personally Reviewed:  [x] YES  [ ] NO    Consultant(s) Notes Reviewed:  [x] YES  [ ] NO    Care Discussed with Primary team/ Other Providers [x] YES  [ ] NO       Theo Cardenas MD (Nephrology progress note)  [address], [facility],  [address],  [facility]  TEl: [phone]  Cell: [phone]    Patient is a 58y Male seen and evaluated at bedside. Vital signs, laboratory data, imaging studies, consult notes reviewed done within past 24 hours. Overnight events noted. Interval stable renal function with S cr 1.5 with Na level 131. Still remains with poor appetite and generalized fatigue and weakness.    Allergies    No Known Allergies    Intolerances        ROS:  CONSTITUTIONAL: No fever or chills.  HEENT: No headache or visual disturbances.  RESPIRATORY: No shortness of breath, cough, hemoptysis or wheezing  CARDIOVASCULAR: Mild SOB but denies chest pain, palpitations, dizziness, syncope  GASTROINTESTINAL: Abdominal discomfort and constipation but denies nausea/vomiting/diarrhea  GENITOURINARY: No flank or supra pubic pain  NEUROLOGICAL: No headache, focal limb weakness, tingling or numbness or seizure like activity  SKIN: No skin rash or lesion  MUSCULOSKELETAL: No leg pain, calf pain or swelling    VITALS:    T(C): 36.9 (09-28-23 @ 05:20), Max: 37.3 (09-27-23 @ 21:20)  HR: 94 (09-28-23 @ 05:20) (90 - 94)  BP: 116/81 (09-28-23 @ 05:20) (116/81 - 128/95)  RR: 18 (09-28-23 @ 05:20) (18 - 18)  SpO2: 99% (09-28-23 @ 05:20) (99% - 100%)  CAPILLARY BLOOD GLUCOSE      POCT Blood Glucose.: 108 mg/dL (28 Sep 2023 07:10)  POCT Blood Glucose.: 92 mg/dL (27 Sep 2023 12:29)      MEDICATIONS  (STANDING):  dextrose 50% Injectable 12.5 Gram(s) IV Push once  dextrose 50% Injectable 25 Gram(s) IV Push once  dextrose 50% Injectable 25 Gram(s) IV Push once  dextrose Oral Gel 15 Gram(s) Oral once  enoxaparin Injectable 60 milliGRAM(s) SubCutaneous every 12 hours  fluconAZOLE   Tablet 200 milliGRAM(s) Oral daily  glucagon  Injectable 1 milliGRAM(s) IntraMuscular once  pantoprazole    Tablet 40 milliGRAM(s) Oral two times a day  potassium phosphate / sodium phosphate Powder (PHOS-NaK) 1 Packet(s) Oral two times a day  sucralfate suspension 1 Gram(s) Oral four times a day    MEDICATIONS  (PRN):      PHYSICAL EXAM:  GENERAL: Alert, awake and oriented x3 in no distress  HEENT: ROBYN, EOMI, neck supple, no JVP, no carotid bruit, oral mucosa moist and pink.  CHEST/LUNG: Bilateral clear breath sounds, no rales, no crepitations, no rhonchi, no wheezing  HEART: Regular rate and rhythm, LOVE II/VI at LPSB, no gallops, no rub   ABDOMEN: Soft, mildly distended, non tender, bowel sounds present, no palpable organomegaly  : No flank or supra pubic tenderness.  EXTREMITIES: Peripheral pulses are palpable, no pedal edema  Neurology: AAOx3, no focal neurological deficit  SKIN: No rash or skin lesion  Musculoskeletal: No joint deformity or spinal tenderness.      Vascular ACCESS:     LABS:                        9.4    3.14  )-----------( 298      ( 28 Sep 2023 05:16 )             28.8     09-28    131<L>  |  100  |  16  ----------------------------<  105<H>  4.0   |  22  |  1.50<H>    Ca    8.5      28 Sep 2023 05:16  Phos  2.3     09-28  Mg     1.60     09-28          Urinalysis Basic - ( 28 Sep 2023 05:16 )    Color: x / Appearance: x / SG: x / pH: x  Gluc: 105 mg/dL / Ketone: x  / Bili: x / Urobili: x   Blood: x / Protein: x / Nitrite: x   Leuk Esterase: x / RBC: x / WBC x   Sq Epi: x / Non Sq Epi: x / Bacteria: x            RADIOLOGY & ADDITIONAL STUDIES:    Imaging Personally Reviewed:  [x] YES  [ ] NO    Consultant(s) Notes Reviewed:  [x] YES  [ ] NO    Care Discussed with Primary team/ Other Providers [x] YES  [ ] NO

## 2023-09-28 NOTE — PHYSICAL THERAPY INITIAL EVALUATION ADULT - LEVEL OF INDEPENDENCE: SCOOT/BRIDGE, REHAB EVAL
Pt deferring functional activity due to fatigue.  Pt engaged in therex (SLR b/l x 20).  Pt appears to be able to walk without complications given Gross MMT scores.  Will continue to monitor patients functional abilities./unable to perform

## 2023-09-28 NOTE — PHYSICAL THERAPY INITIAL EVALUATION ADULT - NSPTDISCHREC_GEN_A_CORE
Unable to determine.  Pt deferring functional activity 2/2 fatigue.  Will continue to monitor patients functional abilities.

## 2023-09-28 NOTE — PROGRESS NOTE ADULT - PROBLEM SELECTOR PLAN 1
- egd 9/20 shows esophageal candidiasis  -Diflucan (fluconazole) 200mg PO daily .   CT Abdomen/Pelvis w IV contrast shows Large volume loculated ascites, predominantly in the   upper quadrants causing mass effect on the liver, spleen, kidneys, and   stomach. There is no associated bowel obstruction by CT. Within the   ascites, there are high attenuation foci, for example in the right upper   quadrant (2-24), which may represent peritoneal implants.     IR consult

## 2023-09-29 LAB
GLUCOSE BLDC GLUCOMTR-MCNC: 65 MG/DL — LOW (ref 70–99)
GLUCOSE BLDC GLUCOMTR-MCNC: 71 MG/DL — SIGNIFICANT CHANGE UP (ref 70–99)
GLUCOSE BLDC GLUCOMTR-MCNC: 77 MG/DL — SIGNIFICANT CHANGE UP (ref 70–99)
GLUCOSE BLDC GLUCOMTR-MCNC: 88 MG/DL — SIGNIFICANT CHANGE UP (ref 70–99)

## 2023-09-29 PROCEDURE — 99232 SBSQ HOSP IP/OBS MODERATE 35: CPT | Mod: GC

## 2023-09-29 RX ORDER — SODIUM CHLORIDE 9 MG/ML
1000 INJECTION, SOLUTION INTRAVENOUS
Refills: 0 | Status: DISCONTINUED | OUTPATIENT
Start: 2023-09-29 | End: 2023-09-30

## 2023-09-29 RX ORDER — SODIUM CHLORIDE 9 MG/ML
1000 INJECTION, SOLUTION INTRAVENOUS
Refills: 0 | Status: DISCONTINUED | OUTPATIENT
Start: 2023-09-29 | End: 2023-09-29

## 2023-09-29 RX ORDER — SODIUM CHLORIDE 9 MG/ML
1000 INJECTION INTRAMUSCULAR; INTRAVENOUS; SUBCUTANEOUS
Refills: 0 | Status: DISCONTINUED | OUTPATIENT
Start: 2023-09-29 | End: 2023-09-29

## 2023-09-29 RX ADMIN — SENNA PLUS 2 TABLET(S): 8.6 TABLET ORAL at 22:04

## 2023-09-29 RX ADMIN — Medication 1 GRAM(S): at 05:21

## 2023-09-29 RX ADMIN — FLUCONAZOLE 200 MILLIGRAM(S): 150 TABLET ORAL at 12:32

## 2023-09-29 RX ADMIN — SODIUM CHLORIDE 75 MILLILITER(S): 9 INJECTION, SOLUTION INTRAVENOUS at 18:28

## 2023-09-29 RX ADMIN — Medication 1 PACKET(S): at 05:20

## 2023-09-29 RX ADMIN — Medication 1 GRAM(S): at 12:32

## 2023-09-29 RX ADMIN — ENOXAPARIN SODIUM 60 MILLIGRAM(S): 100 INJECTION SUBCUTANEOUS at 05:20

## 2023-09-29 RX ADMIN — PANTOPRAZOLE SODIUM 40 MILLIGRAM(S): 20 TABLET, DELAYED RELEASE ORAL at 05:20

## 2023-09-29 RX ADMIN — ENOXAPARIN SODIUM 60 MILLIGRAM(S): 100 INJECTION SUBCUTANEOUS at 18:28

## 2023-09-29 RX ADMIN — PANTOPRAZOLE SODIUM 40 MILLIGRAM(S): 20 TABLET, DELAYED RELEASE ORAL at 18:28

## 2023-09-29 NOTE — PROGRESS NOTE ADULT - ASSESSMENT
58M h/o non small cell lung ca on paclitaxel (last 1 week ago, follows with Dr. Son) p/w 1 month progressively worsening dysphagia. Recent admission for PE and SBO. Endorsed dysphagia with that admission. Barium esophagram showing GERD and small sliding hiatal hernia. Nephrology consult called for abnormal renal function    Assessment:  1) Non oliguric stable CKD stage III likely chemotherapy induced renal injury/ATN/renal hypoperfusion/ascites/compression/obstructive uropathy  2) NSCLC with metastasis on chemotherapy  3) History of PE  4) Progressive dysphagia/ Hiatal hernia/GERD/Gastritis/Esophageal candidiases/ Ascites/ Intrinsic GI compression  5) Anemia of chronic illness  6) Hypoalbuminemia  7) Ascites  8) Dysphagia/Poor appetite/Hypoglycemia/Failure to thrive  9) Electrolytes disorder with mild hyponatremia with Na level 131 likely due to poor oral intake/  10) Constipation    Recommend:  Strict I/o  Avoid Nephrotoxic agent  S cr 1.6  with non oliguria  Na level 131  Monitor BMP and electrolytes daily  Stool softener  Urine lytes reviewed  IV fluids with NS @ 75cc/hr  Received IV contrast on 9/28  Replete electrolytes with goal K>4 and <5, Mag> 2 and Phos 2.5 to 3.5  Bilateral renal and bladder ultrasound results reviewed  IV/po fluconazole for candida esophagitis  GI/Oncology/hematology follow up reviewed  CT with IV contrast done on 9/28 reviewed  Consider Ascites drain with IR due to worsening intrinsic compression on multiple organ  PO intake as tolerated  Nutritional consult follow up  Optimal pain control  Intact PTH, Vitamin D 1,25 level, Phos, calcium level  Volume status and electrolytes noted  Goals of care discussion  Consider Palliative care consultation   Advance directives per primary team/Family/Patient  No urgent need for RRT/HD  Will follow

## 2023-09-29 NOTE — PROGRESS NOTE ADULT - PROBLEM SELECTOR PLAN 2
- started on protonix 40 po bid  - egd 9/20 showing esophageal candidiasis, 2cm hiatal hernia, severely edematous gastropathy with thickened gastric folds  - plan for proton pump inhibitor PO BID x 8 weeks followed by once daily; Diflucan (fluconazole) 400 mg PO once today followed by 200mg PO daily for 13 days for a total of 14 day course.  - clear liquid diet  - plan to advance diet as tolerated, d/c fluids when tolerating po better  -GI f/u noted

## 2023-09-29 NOTE — PROGRESS NOTE ADULT - ASSESSMENT
57yo M w/ NSCLC Dx Sept 2022 who presents with new onset progressive dysphagia to solids and liquids x1 mo with associated 30 lbs weight loss.     #NSCLC on Paclitaxel (recently added chemo agent)  #Candidal esophagitis  #Dysphagia  #Regurgitation  #Heart Burn (resolved with fluconazole)  *CT Neck 9/18 - no mass lesion or collection seen  *Esophagram 8/03 - GE reflux, small sliding HH, 13mm tablet passed with slow transit  *EGD 9/20 - grade C candidal esophagitis, 2cm HH, severe edematous, gastropathy. biopsied   - pathology is consistent with candida esophagitis and chronic inactive gastritis, negative for H pylori  *CT Abdomen/Pelvis w IV contrast 9/28 with large volume loculated ascites, predominantly in the upper quadrants causing mass effect on the liver, spleen, kidneys, and stomach. No associated bowel obstruction.  This is likely contributing to patient's dysphagia and regurgitation   Recommendation:  - therapeutic paracentesis as needed to minimize gastrointestinal compression  - fluconazole 200 mg daily to complete 14-day course  - PPI PO BID x 8 weeks followed by once daily.  - supportive measures as per team  - palliative medicine consultation     Thank you for inviting us to participate in this patient's care. Please call back with any further questions or concerns.     Lewis Gatica MD  Gastroenterology/Hepatology Fellow   Available on [employer] Teams 7am - 5pm    NON-URGENT CONSULTS:  Please email:  [email or web address]   OR  [email or web address]    After 5pm, please contact the on-call GI fellow. [phone]    AT NIGHT AND ON WEEKENDS:  Contact on-call GI fellow via answering service ([phone]) from 5pm-8am and on weekends/holidays

## 2023-09-29 NOTE — PROGRESS NOTE ADULT - SUBJECTIVE AND OBJECTIVE BOX
Interval Events:   No acute events  Pt reports ongoing dysphagia and abdominal discomfort   CT A/P: Large loculated ascites with mass effect on multiple abdominal organs including the stomach, likely contributing to dysphagia    Hospital Medications:  dextrose 50% Injectable 25 Gram(s) IV Push once  dextrose 50% Injectable 25 Gram(s) IV Push once  dextrose 50% Injectable 12.5 Gram(s) IV Push once  dextrose Oral Gel 15 Gram(s) Oral once  enoxaparin Injectable 60 milliGRAM(s) SubCutaneous every 12 hours  fluconAZOLE   Tablet 200 milliGRAM(s) Oral daily  glucagon  Injectable 1 milliGRAM(s) IntraMuscular once  pantoprazole    Tablet 40 milliGRAM(s) Oral two times a day  senna 2 Tablet(s) Oral at bedtime  sodium chloride 0.9%. 1000 milliLiter(s) IV Continuous <Continuous>  sucralfate suspension 1 Gram(s) Oral four times a day      ROS: All system reviewed and negative except as mentioned above.    PHYSICAL EXAM:   Vital Signs:  Vital Signs Last 24 Hrs  T(C): 36.9 (29 Sep 2023 11:00), Max: 36.9 (29 Sep 2023 11:00)  T(F): 98.4 (29 Sep 2023 11:00), Max: 98.4 (29 Sep 2023 11:00)  HR: 113 (29 Sep 2023 11:00) (87 - 113)  BP: 131/81 (29 Sep 2023 11:00) (116/83 - 131/81)  BP(mean): --  RR: 18 (29 Sep 2023 11:00) (18 - 18)  SpO2: 98% (29 Sep 2023 11:00) (98% - 100%)    Parameters below as of 29 Sep 2023 11:00  Patient On (Oxygen Delivery Method): room air    Daily     GENERAL:  fatigued, resting comfortably in bed  HEENT:  sclera anicteric  CHEST:  Normal Effort  HEART:  RRR, HDS  ABDOMEN:  Soft, non-tender  SKIN:  Warm & Dry.   NEURO:  conversant, no focal deficit    LABS:                        9.4    3.14  )-----------( 298      ( 28 Sep 2023 05:16 )             28.8       09-28    131<L>  |  100  |  16  ----------------------------<  105<H>  4.0   |  22  |  1.50<H>    Ca    8.5      28 Sep 2023 05:16  Phos  2.3       Mg     1.60               Urinalysis Basic - ( 28 Sep 2023 10:11 )    Color: Yellow / Appearance: Cloudy / S.025 / pH: x  Gluc: x / Ketone: Negative mg/dL  / Bili: Negative / Urobili: 1.0 mg/dL   Blood: x / Protein: 30 mg/dL / Nitrite: Negative   Leuk Esterase: Negative / RBC: 0 /HPF / WBC 0 /HPF   Sq Epi: x / Non Sq Epi: 2 /HPF / Bacteria: Negative /HPF                              9.4    3.14  )-----------( 298      ( 28 Sep 2023 05:16 )             28.8                         9.7    3.75  )-----------( 313      ( 27 Sep 2023 06:22 )             30.2       Imaging: Images reviewed.    < from: CT Abdomen and Pelvis w/ IV Cont (23 @ 17:56) >   Large volume loculated ascites, predominantly in the   upper quadrants causing mass effect on the liver, spleen, kidneys, and   stomach. There is no associated bowel obstruction by CT. Within the   ascites, there are highattenuation foci, for example in the right upper   quadrant (2-24), which may represent peritoneal implants.    Diffuse bowel wall thickening is redemonstrated, although with decreased   caliber in comparison to the prior exam on 2023. Findings may   represent enteritis and/or serosal implants.    Small left pleural effusion. Increased opacities in the left lower lobe   which may be infectious or inflammatory.    < end of copied text >

## 2023-09-29 NOTE — PROGRESS NOTE ADULT - SUBJECTIVE AND OBJECTIVE BOX
Theo Cardenas MD (Nephrology progress note)  [address], [facility],  [address],  [facility]  TEl: [phone]  Cell: [phone]    Patient is a 58y Male seen and evaluated at bedside. Vital signs, laboratory data, imaging studies, consult notes reviewed done within past 24 hours. Overnight events noted. Patient lying in bed alert and awake in no distress still remains with poor appetite. No new BMP available    Allergies    No Known Allergies    Intolerances        ROS:  CONSTITUTIONAL: No fever or chills.  HEENT: No headache or visual disturbances.  RESPIRATORY: No shortness of breath, cough, hemoptysis or wheezing  CARDIOVASCULAR: No Chest pain or SOB  GASTROINTESTINAL: No abdominal pain, nausea, vomiting, diarrhea  GENITOURINARY: No flank or supra pubic pain  NEUROLOGICAL: No headache, focal limb weakness, tingling or numbness  SKIN: No skin rash or lesion  MUSCULOSKELETAL: No leg pain, calf pain or swelling    VITALS:    T(C): 36.4 (23 @ 05:20), Max: 36.8 (23 @ 09:49)  HR: 97 (23 @ 05:20) (87 - 97)  BP: 117/82 (23 @ 05:20) (116/86 - 118/78)  RR: 18 (23 @ 05:20) (18 - 18)  SpO2: 100% (23 @ 05:20) (97% - 100%)  CAPILLARY BLOOD GLUCOSE      POCT Blood Glucose.: 71 mg/dL (29 Sep 2023 08:05)  POCT Blood Glucose.: 77 mg/dL (29 Sep 2023 08:02)  POCT Blood Glucose.: 65 mg/dL (29 Sep 2023 07:57)      23 @ 07:01  -  23 @ 07:00  --------------------------------------------------------  IN: 800 mL / OUT: 400 mL / NET: 400 mL      MEDICATIONS  (STANDING):  dextrose 50% Injectable 25 Gram(s) IV Push once  dextrose 50% Injectable 25 Gram(s) IV Push once  dextrose 50% Injectable 12.5 Gram(s) IV Push once  dextrose Oral Gel 15 Gram(s) Oral once  enoxaparin Injectable 60 milliGRAM(s) SubCutaneous every 12 hours  fluconAZOLE   Tablet 200 milliGRAM(s) Oral daily  glucagon  Injectable 1 milliGRAM(s) IntraMuscular once  pantoprazole    Tablet 40 milliGRAM(s) Oral two times a day  senna 2 Tablet(s) Oral at bedtime  sucralfate suspension 1 Gram(s) Oral four times a day    MEDICATIONS  (PRN):      PHYSICAL EXAM:  GENERAL: Alert, awake and oriented x3 in no distress  HEENT: ROBYN, EOMI, neck supple, no JVP  CHEST/LUNG: Bilateral clear breath sounds, no rales, no crepitations, no rhonchi, no wheezing  HEART: Regular rate and rhythm, LOVE II/VI at LPSB, no gallops, no rub   ABDOMEN: Soft, nontender, non distended, bowel sounds present, no palpable organomegaly  : No flank or supra pubic tenderness.  EXTREMITIES: Peripheral pulses are palpable, no pedal edema  Neurology: AAOx3, no focal neurological deficit  SKIN: No rash or skin lesion  Musculoskeletal: No joint deformity or spinal tenderness.      Vascular ACCESS:     LABS:                        9.4    3.14  )-----------( 298      ( 28 Sep 2023 05:16 )             28.8         131<L>  |  100  |  16  ----------------------------<  105<H>  4.0   |  22  |  1.50<H>    Ca    8.5      28 Sep 2023 05:16  Phos  2.3       Mg     1.60               Urinalysis Basic - ( 28 Sep 2023 10:11 )    Color: Yellow / Appearance: Cloudy / S.025 / pH: x  Gluc: x / Ketone: Negative mg/dL  / Bili: Negative / Urobili: 1.0 mg/dL   Blood: x / Protein: 30 mg/dL / Nitrite: Negative   Leuk Esterase: Negative / RBC: 0 /HPF / WBC 0 /HPF   Sq Epi: x / Non Sq Epi: 2 /HPF / Bacteria: Negative /HPF      Sodium, Random Urine: 20 mmol/L ( @ 10:11)  Osmolality, Random Urine: 672 mosm/kg ( @ 10:11)        RADIOLOGY & ADDITIONAL STUDIES:  rad< from: CT Abdomen and Pelvis w/ IV Cont (23 @ 17:56) >    ******PRELIMINARY REPORT******      ******PRELIMINARY REPORT******         ACC: [id number] EXAM:  CT ABDOMEN AND PELVIS IC   ORDERED BY: BELKIS CHAMBERS     PROCEDURE DATE:  2023    ******PRELIMINARY REPORT******      ******PRELIMINARY REPORT******           INTERPRETATION:  Large volume loculated ascites, predominantly in the   upper quadrants causing mass effect on the liver, spleen, kidneys, and   stomach. There is no associated bowel obstruction by CT. Within the   ascites, there are highattenuation foci, for example in the right upper   quadrant (2-24), which may represent peritoneal implants.    Diffuse bowel wall thickening is redemonstrated, although with decreased   caliber in comparison to the prior exam on 2023. Findings may   represent enteritis and/or serosal implants.    Small left pleural effusion. Increased opacities in the left lower lobe   which may be infectious or inflammatory.        ******PRELIMINARY REPORT******      ******PRELIMINARY REPORT******       ERICK BEATTY MD; Resident Radiologist  This document is a PRELIMINARY interpretation and is pending final   attending approval. Sep 28 2023  7:17PM    < end of copied text >    Imaging Personally Reviewed:  [x] YES  [ ] NO    Consultant(s) Notes Reviewed:  [x] YES  [ ] NO    Care Discussed with Primary team/ Other Providers [x] YES  [ ] NO

## 2023-09-29 NOTE — PROGRESS NOTE ADULT - SUBJECTIVE AND OBJECTIVE BOX
PANTERA CARBAJAL  58y  Male      Patient is a 58y old  Male who presents with a chief complaint of dysphagia (29 Sep 2023 11:08)  Patient was seen and examined. chart reviewed.Above noted.no cough,no abd pain,no cp    REVIEW OF SYSTEMS:  CONSTITUTIONAL: No fever  RESPIRATORY: No cough, hemoptysis or shortness of breath  CARDIOVASCULAR: No chest pain, palpitations, dizziness, or leg swelling  GASTROINTESTINAL: No abdominal pain. nausea, vomiting, hematemesis  GENITOURINARY: No dysuria, frequency, hematuria   NEUROLOGICAL: No headaches, no dizziness  MUSCULOSKELETAL: No joint pain or swelling;     INTERVAL HPI/OVERNIGHT EVENTS:  T(C): 36.8 (23 @ 18:29), Max: 36.9 (23 @ 11:00)  HR: 115 (23 @ 18:29) (97 - 115)  BP: 122/82 (23 @ 18:29) (116/ - 131/81)  RR: 18 (23 @ 18:29) (18 - )  SpO2: 100% (23 @ 18:29) (98% - 100%)  Wt(kg): --  I&O's Summary    28 Sep 2023 07:01  -  29 Sep 2023 07:00  --------------------------------------------------------  IN: 800 mL / OUT: 400 mL / NET: 400 mL      T(C): 36.8 (23 @ 18:29), Max: 36.9 (23 @ 11:00)  HR: 115 (23 @ 18:29) (97 - 115)  BP: 122/82 (23 @ 18:29) (116/83 - 131/81)  RR: 18 (23 @ 18:29) (18 - 18)  SpO2: 100% (23 @ 18:29) (98% - 100%)  Wt(kg): --Vital Signs Last 24 Hrs  T(C): 36.8 (29 Sep 2023 18:29), Max: 36.9 (29 Sep 2023 11:00)  T(F): 98.2 (29 Sep 2023 18:29), Max: 98.4 (29 Sep 2023 11:00)  HR: 115 (29 Sep 2023 18:29) (97 - 115)  BP: 122/82 (29 Sep 2023 18:29) (116/83 - 131/81)  BP(mean): --  RR: 18 (29 Sep 2023 18:29) (18 - 18)  SpO2: 100% (29 Sep 2023 18:29) (98% - 100%)    Parameters below as of 29 Sep 2023 18:29  Patient On (Oxygen Delivery Method): room air        LABS:                        9.4    3.14  )-----------( 298      ( 28 Sep 2023 05:16 )             28.8     09-28    131<L>  |  100  |  16  ----------------------------<  105<H>  4.0   |  22  |  1.50<H>    Ca    8.5      28 Sep 2023 05:16  Phos  2.3     -  Mg     1.60     -        Urinalysis Basic - ( 28 Sep 2023 10:11 )    Color: Yellow / Appearance: Cloudy / S.025 / pH: x  Gluc: x / Ketone: Negative mg/dL  / Bili: Negative / Urobili: 1.0 mg/dL   Blood: x / Protein: 30 mg/dL / Nitrite: Negative   Leuk Esterase: Negative / RBC: 0 /HPF / WBC 0 /HPF   Sq Epi: x / Non Sq Epi: 2 /HPF / Bacteria: Negative /HPF      CAPILLARY BLOOD GLUCOSE      POCT Blood Glucose.: 71 mg/dL (29 Sep 2023 08:05)  POCT Blood Glucose.: 77 mg/dL (29 Sep 2023 08:02)  POCT Blood Glucose.: 65 mg/dL (29 Sep 2023 07:57)        Urinalysis Basic - ( 28 Sep 2023 10:11 )    Color: Yellow / Appearance: Cloudy / S.025 / pH: x  Gluc: x / Ketone: Negative mg/dL  / Bili: Negative / Urobili: 1.0 mg/dL   Blood: x / Protein: 30 mg/dL / Nitrite: Negative   Leuk Esterase: Negative / RBC: 0 /HPF / WBC 0 /HPF   Sq Epi: x / Non Sq Epi: 2 /HPF / Bacteria: Negative /HPF        PAST MEDICAL & SURGICAL HISTORY:  Former smoker      Non-small cell lung cancer with metastasis      Status post cardiac surgery  s/p open right thoracotomy for posterior cardiac mass removal > 20 years ago, reported by patient to be benign.          MEDICATIONS  (STANDING):  dextrose 5%. 1000 milliLiter(s) (75 mL/Hr) IV Continuous <Continuous>  dextrose 50% Injectable 25 Gram(s) IV Push once  dextrose 50% Injectable 25 Gram(s) IV Push once  dextrose 50% Injectable 12.5 Gram(s) IV Push once  dextrose Oral Gel 15 Gram(s) Oral once  enoxaparin Injectable 60 milliGRAM(s) SubCutaneous every 12 hours  fluconAZOLE   Tablet 200 milliGRAM(s) Oral daily  glucagon  Injectable 1 milliGRAM(s) IntraMuscular once  pantoprazole    Tablet 40 milliGRAM(s) Oral two times a day  senna 2 Tablet(s) Oral at bedtime  sucralfate suspension 1 Gram(s) Oral four times a day    MEDICATIONS  (PRN):        RADIOLOGY & ADDITIONAL TESTS:    Imaging Personally Reviewed:  [ ] YES  [ ] NO    Consultant(s) Notes Reviewed:  [x ] YES  [ ] NO    PHYSICAL EXAM:  GENERAL: Alert and awake lying in bed in no distress  HEAD:  Atraumatic, Normocephalic  EYES: EOMI, ROBYN, conjunctiva and sclera clear  NECK: Supple, No JVD, Normal thyroid  NERVOUS SYSTEM:  Alert & Oriented X3, Motor and sensory systems are intact,   CHEST/LUNG: clear b/l,  HEART: Regular rate and rhythm; No murmurs, rubs, or gallops  ABDOMEN: Soft, Nontender,mildly distended  EXTREMITIES:   Peripheral Pulses are palpable, no  edema        Care Discussed with Consultants/Other Providers [x ] YES  [ ] NO      Code Status: [] Full Code [] DNR [] DNI [] Goals of Care:   Disposition: [] ICU [] Stroke Unit [] RCU []PCU []Floor [] Discharge Home         MATTEO Worley

## 2023-09-29 NOTE — PROGRESS NOTE ADULT - ATTENDING COMMENTS
58 Male with metastatic NSCLC with dysphagia to solids and liquids and weight loss.  S/p EGD one week prior with severe erosive esophagitis (LA Grade C), candidal esophagitis, and diffuse gastropathy with limited gastric distensibility.  Prior esophagram with reflux.  Status post therapeutic paracentesis on 9/20.  CT A/P with loculated ascites causing extrinsic compression of liver and stomach.  Continue PPI BID.  Patient will need therapeutic image-guided paracentesis to improve extrinsic compression to relieve dysphagia.
58 Male with metastatic NSCLC with dysphagia to solids and liquids and weight loss.  S/p EGD one week prior with severe erosive esophagitis (LA Grade C), candidal esophagitis, and diffuse gastropathy with limited gastric distensibility.  Prior esophagram with reflux.  Status post therapeutic paracentesis on 9/20.  Continue PPI BID.  Suspect dysphagia, decreased intake due to progression of disease/ carcinomatosis causing partial obstruction/ extrinsic compression or paraneoplastic syndrome.  Follow up CT A/P.
S/p EGD yesterday with severe erosive esophagitis (LA Grade C), candidal esophagitis, and diffuse gastropathy with limited gastric distensibility. Biopsies obtained, but pending. Today without new complaints, but reports similar ongoing symptoms. Continue high dose PPI x 8 weeks followed by daily for esophagitis management. Suggest fluconazole for findings of candidal esophagitis. Follow up biopsies results. Consider carafate if no interaction with other medications and if ongoing odynophagia/dysphagia. Additional recommendations as above.
58 Male with metastatic NSCLC with dysphagia to solids and liquids and weight loss.  S/p EGD one week prior with severe erosive esophagitis (LA Grade C), candidal esophagitis, and diffuse gastropathy with limited gastric distensibility.  Prior esophagram with reflux.  Status post therapeutic paracentesis on 9/20.  Continue PPI BID.  Suspect dysphagia, decreased intake due to progression of disease/ carcinomatosis or paraneoplastic syndrome.  Consider repeat CT A/P.
58 Male with metastatic NSCLC with dysphagia to solids and liquids and weight loss.  S/p EGD one week prior with severe erosive esophagitis (LA Grade C), candidal esophagitis, and diffuse gastropathy with limited gastric distensibility. Biopsies obtained, but pending.  Prior esophagram with reflux.  Status post therapeutic paracentesis on 9/20.  Continue PPI BID, follow up EGD pathology results.  Symptoms may be due to progression of disease/ carcinomatosis or paraneoplastic syndrome.  May need repeat imaging of abdomen pending biopsy results.
Patient seen and examined.    - EGD 9/20 showing esophageal candidiasis, 2cm hiatal hernia, severely edematous gastropathy with thickened gastric folds  s/p EGD plan for proton pump inhibitor PO BID x 8 weeks followed by once daily; Diflucan (fluconazole) 400 mg PO once today followed by 200mg PO daily for 13 days for a total of 14 day course.  D/C planning once patient tolerates diet .
Patient seen and examined.    GI consult appreciated  - EGD 9/20 showing esophageal candidiasis, 2cm hiatal hernia, severely edematous gastropathy with thickened gastric folds  s/p EGD plan for proton pump inhibitor PO BID x 8 weeks followed by once daily; Diflucan (fluconazole) 400 mg PO once today followed by 200mg PO daily for 13 days for a total of 14 day course.
Patient seen and examined.    - EGD 9/20 showing esophageal candidiasis, 2cm hiatal hernia, severely edematous gastropathy with thickened gastric folds  s/p EGD plan for proton pump inhibitor PO BID x 8 weeks followed by once daily; Diflucan (fluconazole) 400 mg PO once today followed by 200mg PO daily for 13 days for a total of 14 day course.  D/C planning once patient tolerates diet .
Patient seen and examined.    GI consult appreciated  - EGD 9/20 showing esophageal candidiasis, 2cm hiatal hernia, severely edematous gastropathy with thickened gastric folds  s/p EGD plan for proton pump inhibitor PO BID x 8 weeks followed by once daily; Diflucan (fluconazole) 400 mg PO once today followed by 200mg PO daily for 13 days for a total of 14 day course.  D/C planning once patient tolerates diet

## 2023-09-29 NOTE — PROGRESS NOTE ADULT - PROBLEM SELECTOR PLAN 1
- egd 9/20 shows esophageal candidiasis  -Diflucan (fluconazole) 200mg PO daily .   CT Abdomen/Pelvis w IV contrast shows Large volume loculated ascites, predominantly in the   upper quadrants causing mass effect on the liver, spleen, kidneys, and   stomach. There is no associated bowel obstruction by CT. Within the   ascites, there are high attenuation foci, for example in the right upper   quadrant (2-24), which may represent peritoneal implants.   -GI f/u noted  IR consult

## 2023-09-30 LAB
ANION GAP SERPL CALC-SCNC: 9 MMOL/L — SIGNIFICANT CHANGE UP (ref 7–14)
APPEARANCE UR: CLEAR — SIGNIFICANT CHANGE UP
BACTERIA # UR AUTO: NEGATIVE /HPF — SIGNIFICANT CHANGE UP
BILIRUB UR-MCNC: NEGATIVE — SIGNIFICANT CHANGE UP
BUN SERPL-MCNC: 21 MG/DL — SIGNIFICANT CHANGE UP (ref 7–23)
CALCIUM SERPL-MCNC: 9 MG/DL — SIGNIFICANT CHANGE UP (ref 8.4–10.5)
CAST: 0 /LPF — SIGNIFICANT CHANGE UP (ref 0–4)
CHLORIDE SERPL-SCNC: 94 MMOL/L — LOW (ref 98–107)
CO2 SERPL-SCNC: 23 MMOL/L — SIGNIFICANT CHANGE UP (ref 22–31)
COLOR SPEC: YELLOW — SIGNIFICANT CHANGE UP
CREAT SERPL-MCNC: 1.67 MG/DL — HIGH (ref 0.5–1.3)
DIFF PNL FLD: NEGATIVE — SIGNIFICANT CHANGE UP
EGFR: 47 ML/MIN/1.73M2 — LOW
GLUCOSE BLDC GLUCOMTR-MCNC: 98 MG/DL — SIGNIFICANT CHANGE UP (ref 70–99)
GLUCOSE SERPL-MCNC: 103 MG/DL — HIGH (ref 70–99)
GLUCOSE UR QL: NEGATIVE MG/DL — SIGNIFICANT CHANGE UP
HCT VFR BLD CALC: 27.3 % — LOW (ref 39–50)
HGB BLD-MCNC: 8.8 G/DL — LOW (ref 13–17)
KETONES UR-MCNC: NEGATIVE MG/DL — SIGNIFICANT CHANGE UP
LEUKOCYTE ESTERASE UR-ACNC: NEGATIVE — SIGNIFICANT CHANGE UP
MAGNESIUM SERPL-MCNC: 1.8 MG/DL — SIGNIFICANT CHANGE UP (ref 1.6–2.6)
MCHC RBC-ENTMCNC: 29.9 PG — SIGNIFICANT CHANGE UP (ref 27–34)
MCHC RBC-ENTMCNC: 32.2 GM/DL — SIGNIFICANT CHANGE UP (ref 32–36)
MCV RBC AUTO: 92.9 FL — SIGNIFICANT CHANGE UP (ref 80–100)
NITRITE UR-MCNC: NEGATIVE — SIGNIFICANT CHANGE UP
NRBC # BLD: 0 /100 WBCS — SIGNIFICANT CHANGE UP (ref 0–0)
NRBC # FLD: 0 K/UL — SIGNIFICANT CHANGE UP (ref 0–0)
OSMOLALITY UR: 612 MOSM/KG — SIGNIFICANT CHANGE UP (ref 50–1200)
PH UR: 5.5 — SIGNIFICANT CHANGE UP (ref 5–8)
PHOSPHATE SERPL-MCNC: 2.9 MG/DL — SIGNIFICANT CHANGE UP (ref 2.5–4.5)
PLATELET # BLD AUTO: 288 K/UL — SIGNIFICANT CHANGE UP (ref 150–400)
POTASSIUM SERPL-MCNC: 4.2 MMOL/L — SIGNIFICANT CHANGE UP (ref 3.5–5.3)
POTASSIUM SERPL-SCNC: 4.2 MMOL/L — SIGNIFICANT CHANGE UP (ref 3.5–5.3)
PROT UR-MCNC: 30 MG/DL
RBC # BLD: 2.94 M/UL — LOW (ref 4.2–5.8)
RBC # FLD: 16.3 % — HIGH (ref 10.3–14.5)
RBC CASTS # UR COMP ASSIST: 1 /HPF — SIGNIFICANT CHANGE UP (ref 0–4)
REVIEW: SIGNIFICANT CHANGE UP
SODIUM SERPL-SCNC: 126 MMOL/L — LOW (ref 135–145)
SODIUM UR-SCNC: <20 MMOL/L — SIGNIFICANT CHANGE UP
SP GR SPEC: 1.04 — HIGH (ref 1–1.03)
SQUAMOUS # UR AUTO: 0 /HPF — SIGNIFICANT CHANGE UP (ref 0–5)
URATE CRY FLD QL MICRO: PRESENT
UROBILINOGEN FLD QL: 0.2 MG/DL — SIGNIFICANT CHANGE UP (ref 0.2–1)
WBC # BLD: 5.74 K/UL — SIGNIFICANT CHANGE UP (ref 3.8–10.5)
WBC # FLD AUTO: 5.74 K/UL — SIGNIFICANT CHANGE UP (ref 3.8–10.5)
WBC UR QL: 0 /HPF — SIGNIFICANT CHANGE UP (ref 0–5)

## 2023-09-30 RX ORDER — SODIUM CHLORIDE 9 MG/ML
1000 INJECTION, SOLUTION INTRAVENOUS
Refills: 0 | Status: DISCONTINUED | OUTPATIENT
Start: 2023-09-30 | End: 2023-10-01

## 2023-09-30 RX ADMIN — ENOXAPARIN SODIUM 60 MILLIGRAM(S): 100 INJECTION SUBCUTANEOUS at 17:42

## 2023-09-30 RX ADMIN — FLUCONAZOLE 200 MILLIGRAM(S): 150 TABLET ORAL at 11:22

## 2023-09-30 RX ADMIN — PANTOPRAZOLE SODIUM 40 MILLIGRAM(S): 20 TABLET, DELAYED RELEASE ORAL at 17:41

## 2023-09-30 RX ADMIN — SODIUM CHLORIDE 50 MILLILITER(S): 9 INJECTION, SOLUTION INTRAVENOUS at 22:12

## 2023-09-30 RX ADMIN — ENOXAPARIN SODIUM 60 MILLIGRAM(S): 100 INJECTION SUBCUTANEOUS at 05:29

## 2023-09-30 RX ADMIN — PANTOPRAZOLE SODIUM 40 MILLIGRAM(S): 20 TABLET, DELAYED RELEASE ORAL at 05:29

## 2023-09-30 NOTE — PROGRESS NOTE ADULT - SUBJECTIVE AND OBJECTIVE BOX
PANTERA CARBAJAL  58y  Male      Patient is a 58y old  Male who presents with a chief complaint of dysphagia (29 Sep 2023 11:08)  Patient was seen and examined. chart reviewed.Above noted.no cough,no abd pain,no cp    REVIEW OF SYSTEMS: Dysphagia   CONSTITUTIONAL: No fever  RESPIRATORY: No cough, hemoptysis or shortness of breath  CARDIOVASCULAR: No chest pain, palpitations, dizziness, or leg swelling  GASTROINTESTINAL: No abdominal pain. nausea, vomiting, hematemesis  GENITOURINARY: No dysuria, frequency, hematuria   NEUROLOGICAL: No headaches, no dizziness  MUSCULOSKELETAL: No joint pain or swelling;       T(C): 36.7 (23 @ 17:00), Max: 36.9 (23 @ 10:45)  HR: 91 (23 @ 10:45) (91 - 91)  BP: 126/90 (23 @ 17:00) (106/80 - 126/90)  RR: 17 (23 @ 17:00) (17 - 17)  SpO2: 100% (23 @ 17:00) (99% - 100%)      MEDICATIONS  (STANDING):  dextrose 5%. 1000 milliLiter(s) (50 mL/Hr) IV Continuous <Continuous>  dextrose 50% Injectable 25 Gram(s) IV Push once  dextrose 50% Injectable 25 Gram(s) IV Push once  dextrose 50% Injectable 12.5 Gram(s) IV Push once  dextrose Oral Gel 15 Gram(s) Oral once  enoxaparin Injectable 60 milliGRAM(s) SubCutaneous every 12 hours  fluconAZOLE   Tablet 200 milliGRAM(s) Oral daily  glucagon  Injectable 1 milliGRAM(s) IntraMuscular once  pantoprazole    Tablet 40 milliGRAM(s) Oral two times a day  senna 2 Tablet(s) Oral at bedtime  sucralfate suspension 1 Gram(s) Oral four times a day    MEDICATIONS  (PRN):          Urinalysis Basic - ( 28 Sep 2023 10:11 )    Color: Yellow / Appearance: Cloudy / S.025 / pH: x  Gluc: x / Ketone: Negative mg/dL  / Bili: Negative / Urobili: 1.0 mg/dL   Blood: x / Protein: 30 mg/dL / Nitrite: Negative   Leuk Esterase: Negative / RBC: 0 /HPF / WBC 0 /HPF   Sq Epi: x / Non Sq Epi: 2 /HPF / Bacteria: Negative /HPF        PAST MEDICAL & SURGICAL HISTORY:  Former smoker      Non-small cell lung cancer with metastasis      Status post cardiac surgery  s/p open right thoracotomy for posterior cardiac mass removal > 20 years ago, reported by patient to be benign.          MEDICATIONS  (STANDING):  dextrose 5%. 1000 milliLiter(s) (75 mL/Hr) IV Continuous <Continuous>  dextrose 50% Injectable 25 Gram(s) IV Push once  dextrose 50% Injectable 25 Gram(s) IV Push once  dextrose 50% Injectable 12.5 Gram(s) IV Push once  dextrose Oral Gel 15 Gram(s) Oral once  enoxaparin Injectable 60 milliGRAM(s) SubCutaneous every 12 hours  fluconAZOLE   Tablet 200 milliGRAM(s) Oral daily  glucagon  Injectable 1 milliGRAM(s) IntraMuscular once  pantoprazole    Tablet 40 milliGRAM(s) Oral two times a day  senna 2 Tablet(s) Oral at bedtime  sucralfate suspension 1 Gram(s) Oral four times a day    MEDICATIONS  (PRN):        RADIOLOGY & ADDITIONAL TESTS:    Imaging Personally Reviewed:  [ ] YES  [ ] NO    Consultant(s) Notes Reviewed:  [x ] YES  [ ] NO    PHYSICAL EXAM:  GENERAL: Alert and awake lying in bed in no distress  HEAD:  Atraumatic, Normocephalic  EYES: EOMI, ROBYN, conjunctiva and sclera clear  NECK: Supple, No JVD, Normal thyroid  NERVOUS SYSTEM:  Alert & Oriented X3, Motor and sensory systems are intact,   CHEST/LUNG: clear b/l,  HEART: Regular rate and rhythm; No murmurs, rubs, or gallops  ABDOMEN: Soft, Nontender,mildly distended  EXTREMITIES:   Peripheral Pulses are palpable, no  edema                              8.8    5.74  )-----------( 288      ( 30 Sep 2023 05:16 )             27.3               126|94|21<103  4.2|23|1.67  9.0,1.80,2.9  09-30 @ 05:16

## 2023-09-30 NOTE — CONSULT NOTE ADULT - ASSESSMENT
Interventional Radiology    Evaluate for Procedure:     HPI: 58M h/o non small cell lung ca on paclitaxel (last 1 week ago, follows with Dr. Son) p/w 1 month progressively worsening dysphagia to solids and liquids, found to have esophageal candidiasis. IR c/s for paracentesis.    Allergies: No Known Allergies    Medications (Abx/Cardiac/Anticoagulation/Blood Products)    enoxaparin Injectable: 60 milliGRAM(s) SubCutaneous (09-30 @ 17:42)  fluconAZOLE   Tablet: 200 milliGRAM(s) Oral (09-30 @ 11:22)    Data:    T(C): 36.7  HR: 91  BP: 126/90  RR: 17  SpO2: 100%    -WBC 5.74 / HgB 8.8 / Hct 27.3 / Plt 288  -Na 126 / Cl 94 / BUN 21 / Glucose 103  -K 4.2 / CO2 23 / Cr 1.67  -ALT -- / Alk Phos -- / T.Bili --  -INR 1.01 / PTT 29.9      Radiology:     Assessment/Plan:   58M h/o non small cell lung ca on paclitaxel (last 1 week ago, follows with Dr. Son) p/w 1 month progressively worsening dysphagia to solids and liquids, found to have esophageal candidiasis. IR c/s for paracentesis.    -- IR will plan to perform 10/2  -- hold therapeutic lovenox 10/1 AM  -- please place IR procedure request order under Dr. Harkins  -- Please write IR pre-procedure note    Wicho Cueva M.D.  PGY4/R3, Interventional Radiology Resident    -Available on [employer] TEAMS for all non-urgent questions  -Emergent issues: [facility]-p.[phone]; [facility]-p.[phone] ([phone])  -Non-emergent consults: Please place a [place] order "Consult-Interventional Radiology" with an appropriate callback number  -Scheduling questions: [facility]: [phone]; [facility]: [phone]  -Clinic/Outpatient booking: [facility]: [phone]; [facility]: [phone]     HPI:  76 y/o gentleman with PMH of kidney ca and colon ca s/p resection, HTN, came to the ED complaining of pleuritic chest pain. He was found to have a moderate-large pericardial effusion and underwent pericardiocentesis 12/13/21 and blood fluid drained. There is suspicion for this being a maligant pericardial effusion. He has also had hematuria during this admission.    Early this am on telemetry he had a runs of irregular narrow complex tachycardia lasting 26 seconds (c/w brief pAF). Prior tele has noted brief episodes of NSVT as well.     He denies any prior known history of arrhythmia/AF. He denies syncope. He reports brief palpitation.      PAST MEDICAL & SURGICAL HISTORY:  HTN (hypertension)  as above    Neuropathy        REVIEW OF SYSTEMS  brief palpitation. denies dizziness, syncope, fevers. otherwise negative other than as per HPI    Allergic/Immunologic:	    MEDICATIONS  (STANDING):  amLODIPine   Tablet 5 milliGRAM(s) Oral daily  carvedilol 6.25 milliGRAM(s) Oral every 12 hours  gabapentin 600 milliGRAM(s) Oral two times a day  hydrALAZINE 50 milliGRAM(s) Oral three times a day  influenza  Vaccine (HIGH DOSE) 0.7 milliLiter(s) IntraMuscular once  pantoprazole    Tablet 40 milliGRAM(s) Oral before breakfast  tamsulosin 0.4 milliGRAM(s) Oral at bedtime    MEDICATIONS  (PRN):  ALBUTerol    90 MICROgram(s) HFA Inhaler 2 Puff(s) Inhalation every 6 hours PRN Shortness of Breath and/or Wheezing      Allergies    penicillin (Rash)    Intolerances        SOCIAL HISTORY: n/c. visiting family [place] but planning to stay for medical care    FAMILY HISTORY:  FHx: stomach cancer (Father)        Vital Signs Last 24 Hrs  T(C): 37.2 (14 Dec 2021 16:09), Max: 37.2 (14 Dec 2021 04:31)  T(F): 98.9 (14 Dec 2021 16:09), Max: 98.9 (14 Dec 2021 04:31)  HR: 57 (14 Dec 2021 16:09) (57 - 70)  BP: 155/60 (14 Dec 2021 16:09) (147/61 - 169/75)  BP(mean): 109 (13 Dec 2021 19:19) (109 - 109)  RR: 18 (14 Dec 2021 16:09) (16 - 19)  SpO2: 97% (14 Dec 2021 16:09) (92% - 98%)    Physical Exam:  Constitutional: AAOx3, NAD  Neck: supple, No JVD  Cardiovascular: +S1S2 RRR, no murmurs, rubs, gallops   Pulmonary: CTA b/l, unlabored, no wheezes, rales. rhonci  Abdomen: +BS, soft NTND  Extremities: no edema b/l, +distal pulses b/l  Neuro: non focal, speech clear, ENGLE x 4    LABS:                        12.5   11.49 )-----------( 355      ( 14 Dec 2021 07:31 )             37.1   12-14    135  |  100  |  20.0  ----------------------------<  116<H>  4.7   |  25.0  |  1.03    Ca    8.0<L>      14 Dec 2021 07:31  Phos  2.5     12-14  Mg     2.0     12-14              RADIOLOGY & ADDITIONAL STUDIES:  < from: TTE Echo Limited or F/U (12.13.21 @ 09:14) >   1. Technically limited study.   2. Pre Pericardiocentesis: Small pericardial effusion.The pericardial   effusion is anterior to the right ventricle.   3. Status post pericardiocentesis.   4. Trivial pericardial effusion seen post procedure.   5. Compared to prior imaging on 12/12/2021, there is now trivial   pericardial effusion.      < end of copied text >  < from: TTE Echo Complete w/o Contrast w/ Doppler (12.12.21 @ 08:47) >   1. Left ventricular ejection fraction, by visual estimation, is 55 to   60%.   2. Normal global left ventricular systolic function.   3. LV Ejection Fraction by Prince's Method with a biplane EF of 57 %.   4. Spectral Doppler shows impaired relaxation pattern of left   ventricular myocardial filling (Grade I diastolic dysfunction).   5. There is moderate concentric left ventricular hypertrophy.   6. Mildly enlarged left atrium.   7. Moderate pericardial effusion.   8. Mild mitral annular calcification.   9. Trace mitral valve regurgitation.  10. Mild tricuspid regurgitation.  11. Moderate aortic regurgitation.  12. Estimated pulmonary artery systolic pressure is 48.6 mmHg assuming a   right atrial pressure of 3 mmHg, which is consistent with mild pulmonary   hypertension.  13. There is respiratory variation of Mitral inflow >25%, RA diastolic   collapse, suggestive of early tamponade physiology.      < end of copied text >    < from: CT Angio Chest PE Protocol w/ IV Cont (12.11.21 @ 16:33) >  No pulmonary embolus.  Enlarged prostate.  Moderately large pericardial effusion with evidence of right-sided   congestive heart failure.  Indeterminant right renal lesion. Suggest elective ultrasound.      < end of copied text >  ECG 12/11/21 sinus rhythm with frequent atrial ectopy, HR 82 bpm, narrow QRS

## 2023-09-30 NOTE — PROGRESS NOTE ADULT - SUBJECTIVE AND OBJECTIVE BOX
Theo Cardenas MD (Nephrology progress note)  [address], [facility],  [address],  [facility]  TEl: [phone]  Cell: [phone]    Patient is a 58y Male seen and evaluated at bedside. Vital signs, laboratory data, imaging studies, consult notes reviewed done within past 24 hours. Overnight events noted. Patient lying in bed alert and awake in no respiratory distress complains of abdominal discomfort and poor appetite. Interval Na level 126 today morning. Family at bedside    Allergies    No Known Allergies    Intolerances        ROS:  CONSTITUTIONAL: No fever or chills.  HEENT: No headache or visual disturbances.  RESPIRATORY: No shortness of breath, cough, hemoptysis or wheezing  CARDIOVASCULAR: No Chest pain or SOB  GASTROINTESTINAL: Abdominal discomfort and constipation  GENITOURINARY: No flank or supra pubic pain  NEUROLOGICAL: No headache, focal limb weakness, tingling or numbness or seizure like activity  SKIN: No skin rash or lesion  MUSCULOSKELETAL: No leg pain, calf pain or swelling    VITALS:    T(C): 36.7 (09-30-23 @ 17:00), Max: 36.9 (09-30-23 @ 10:45)  HR: 91 (09-30-23 @ 10:45) (91 - 104)  BP: 126/90 (09-30-23 @ 17:00) (106/80 - 126/90)  RR: 17 (09-30-23 @ 17:00) (17 - 18)  SpO2: 100% (09-30-23 @ 17:00) (99% - 100%)  CAPILLARY BLOOD GLUCOSE      POCT Blood Glucose.: 98 mg/dL (30 Sep 2023 07:29)  POCT Blood Glucose.: 88 mg/dL (29 Sep 2023 22:02)      MEDICATIONS  (STANDING):  dextrose 50% Injectable 25 Gram(s) IV Push once  dextrose 50% Injectable 12.5 Gram(s) IV Push once  dextrose 50% Injectable 25 Gram(s) IV Push once  dextrose Oral Gel 15 Gram(s) Oral once  enoxaparin Injectable 60 milliGRAM(s) SubCutaneous every 12 hours  fluconAZOLE   Tablet 200 milliGRAM(s) Oral daily  glucagon  Injectable 1 milliGRAM(s) IntraMuscular once  pantoprazole    Tablet 40 milliGRAM(s) Oral two times a day  senna 2 Tablet(s) Oral at bedtime  sucralfate suspension 1 Gram(s) Oral four times a day    MEDICATIONS  (PRN):      PHYSICAL EXAM:  GENERAL: Alert, awake and oriented x3 in no distress  HEENT: ROBYN, EOMI, neck supple, no JVP  CHEST/LUNG: Bilateral clear breath sounds, no rales, no crepitations, no rhonchi, no wheezing  HEART: Regular rate and rhythm, LOVE II/VI at LPSB, no gallops, no rub   ABDOMEN: Soft, distended, diffuse abdominal tenderness, bowel sounds present, no palpable organomegaly  : No flank or supra pubic tenderness.  EXTREMITIES: Peripheral pulses are palpable, no pedal edema  Neurology: AAOx3, no focal neurological deficit  SKIN: No rash or skin lesion  Musculoskeletal: No joint deformity or spinal tenderness.      Vascular ACCESS: None    LABS:                        8.8    5.74  )-----------( 288      ( 30 Sep 2023 05:16 )             27.3     09-30    126<L>  |  94<L>  |  21  ----------------------------<  103<H>  4.2   |  23  |  1.67<H>    Ca    9.0      30 Sep 2023 05:16  Phos  2.9     09-30  Mg     1.80     09-30          Urinalysis Basic - ( 30 Sep 2023 05:16 )    Color: x / Appearance: x / SG: x / pH: x  Gluc: 103 mg/dL / Ketone: x  / Bili: x / Urobili: x   Blood: x / Protein: x / Nitrite: x   Leuk Esterase: x / RBC: x / WBC x   Sq Epi: x / Non Sq Epi: x / Bacteria: x            RADIOLOGY & ADDITIONAL STUDIES:  radConsult Note:   Provider Specialty:  Intervent Radiology.     Consult Type: Non Face-to-Face.     Time-Base Billing for Non-Face-to-Face consult (Minutes) 5-10.    Referral/Consultation:   Initial Consult:  · Requested by Name:	Primary team  · Date/Time:	30-Sep-2023 19:37  · Reason for Referral/Consultation:	Paracentesis  · Reason for Admission	dysphagia    Assessment and Recommendation:   · Assessment	  Interventional Radiology    Evaluate for Procedure:     HPI: 58M h/o non small cell lung ca on paclitaxel (last 1 week ago, follows with Dr. Son) p/w 1 month progressively worsening dysphagia to solids and liquids, found to have esophageal candidiasis. IR c/s for paracentesis.    Allergies: No Known Allergies    Medications (Abx/Cardiac/Anticoagulation/Blood Products)    enoxaparin Injectable: 60 milliGRAM(s) SubCutaneous (09-30 @ 17:42)  fluconAZOLE   Tablet: 200 milliGRAM(s) Oral (09-30 @ 11:22)    Data:    T(C): 36.7  HR: 91  BP: 126/90  RR: 17  SpO2: 100%    -WBC 5.74 / HgB 8.8 / Hct 27.3 / Plt 288  -Na 126 / Cl 94 / BUN 21 / Glucose 103  -K 4.2 / CO2 23 / Cr 1.67  -ALT -- / Alk Phos -- / T.Bili --  -INR 1.01 / PTT 29.9      Radiology:     Assessment/Plan:   58M h/o non small cell lung ca on paclitaxel (last 1 week ago, follows with Dr. Son) p/w 1 month progressively worsening dysphagia to solids and liquids, found to have esophageal candidiasis. IR c/s for paracentesis.    -- IR will plan to perform 10/2  -- hold therapeutic lovenox 10/1 AM  -- please place IR procedure request order under Dr. Harkins  -- Please write IR pre-procedure note    Wicho Cueva M.D.  PGY4/R3, Interventional Radiology Resident    -Available on [employer] TEAMS for all non-urgent questions  -Emergent issues: [facility]-p.[phone]; [facility]-p.[phone] ([phone])  -Non-emergent consults: Please place a [facility] order "Consult-Interventional Radiology" with an appropriate callback number  -Scheduling questions: [facility]: [phone]; [facility]: [phone]  -Clinic/Outpatient booking: [facility]: [phone]; [facility]: [phone]            Electronic Signatures:  Saturnino Harkins)  (Signature Pending)  	Co-Signer: Consult Note, Referral/Consultation, Assessment and Recommendation  Wicho Cueva)  (Signed 30-Sep-2023 19:39)  	Authored: Consult Note, Referral/Consultation, Assessment and Recommendation      Last Updated: 30-Sep-2023 19:39 by Wicho Cueva)  Imaging Personally Reviewed:  [x] YES  [ ] NO    Consultant(s) Notes Reviewed:  [x] YES  [ ] NO    Care Discussed with Primary team/ Other Providers [x] YES  [ ] NO

## 2023-09-30 NOTE — PROGRESS NOTE ADULT - ASSESSMENT
58M h/o non small cell lung ca on paclitaxel (last 1 week ago, follows with Dr. Son) p/w 1 month progressively worsening dysphagia. Recent admission for PE and SBO. Endorsed dysphagia with that admission. Barium esophagram showing GERD and small sliding hiatal hernia. Nephrology consult called for abnormal renal function    Assessment:  1) Non oliguric stable CKD stage III likely chemotherapy induced renal injury/ATN/renal hypoperfusion/ascites/compression/obstructive uropathy  2) NSCLC with metastasis on chemotherapy  3) History of PE  4) Progressive dysphagia/ Hiatal hernia/GERD/Gastritis/Esophageal candidiases/ Ascites/ Intrinsic GI compression  5) Anemia of chronic illness  6) Hypoalbuminemia  7) Ascites  8) Dysphagia/Poor appetite/Hypoglycemia/Failure to thrive  9) Electrolytes disorder with worsening euvolemic hyponatremia likely pain/constipation/ADH response/SIADH/d5w infusion  10) Constipation    Recommend:  Strict I/o  Avoid Nephrotoxic agent  S cr 1.6  with non oliguria  Na level 126  Monitor BMP and electrolytes every 12 hrly  Discontinue IV D5w  Stool softener  Repeat urine electrolytes, serum osmolality, TSH, Uric acid level,   If persistent hyponatremia start sodium chloride 1 gm po tid with goal sodium correction not to exceed 6 to 8 mEq in 24 hours  Free water restriction to <1L/day  Received IV contrast on 9/28  Replete electrolytes with goal K>4 and <5, Mag> 2 and Phos 2.5 to 3.5  Bilateral renal and bladder ultrasound results reviewed  IV/po fluconazole for candida esophagitis  GI/Oncology/hematology follow up reviewed  IR guided paracentesis on Monday  Nutritional consult follow up  Optimal pain control  Check  Intact PTH, Vitamin D 1,25 level, Phos, calcium level  Volume status and electrolytes noted  Goals of care discussion  Consider Palliative care consultation   Advance directives per primary team/Family/Patient  No urgent need for RRT/HD  Will follow

## 2023-10-01 LAB
ANION GAP SERPL CALC-SCNC: 12 MMOL/L — SIGNIFICANT CHANGE UP (ref 7–14)
BUN SERPL-MCNC: 16 MG/DL — SIGNIFICANT CHANGE UP (ref 7–23)
CALCIUM SERPL-MCNC: 8.9 MG/DL — SIGNIFICANT CHANGE UP (ref 8.4–10.5)
CHLORIDE SERPL-SCNC: 93 MMOL/L — LOW (ref 98–107)
CO2 SERPL-SCNC: 22 MMOL/L — SIGNIFICANT CHANGE UP (ref 22–31)
CREAT SERPL-MCNC: 1.48 MG/DL — HIGH (ref 0.5–1.3)
EGFR: 55 ML/MIN/1.73M2 — LOW
GLUCOSE SERPL-MCNC: 88 MG/DL — SIGNIFICANT CHANGE UP (ref 70–99)
HCT VFR BLD CALC: 28.7 % — LOW (ref 39–50)
HGB BLD-MCNC: 9.3 G/DL — LOW (ref 13–17)
MAGNESIUM SERPL-MCNC: 1.8 MG/DL — SIGNIFICANT CHANGE UP (ref 1.6–2.6)
MCHC RBC-ENTMCNC: 30 PG — SIGNIFICANT CHANGE UP (ref 27–34)
MCHC RBC-ENTMCNC: 32.4 GM/DL — SIGNIFICANT CHANGE UP (ref 32–36)
MCV RBC AUTO: 92.6 FL — SIGNIFICANT CHANGE UP (ref 80–100)
NRBC # BLD: 0 /100 WBCS — SIGNIFICANT CHANGE UP (ref 0–0)
NRBC # FLD: 0 K/UL — SIGNIFICANT CHANGE UP (ref 0–0)
PHOSPHATE SERPL-MCNC: 2 MG/DL — LOW (ref 2.5–4.5)
PLATELET # BLD AUTO: 293 K/UL — SIGNIFICANT CHANGE UP (ref 150–400)
POTASSIUM SERPL-MCNC: 3.7 MMOL/L — SIGNIFICANT CHANGE UP (ref 3.5–5.3)
POTASSIUM SERPL-SCNC: 3.7 MMOL/L — SIGNIFICANT CHANGE UP (ref 3.5–5.3)
PTH-INTACT FLD-MCNC: 21 PG/ML — SIGNIFICANT CHANGE UP (ref 15–65)
RBC # BLD: 3.1 M/UL — LOW (ref 4.2–5.8)
RBC # FLD: 16.6 % — HIGH (ref 10.3–14.5)
SODIUM SERPL-SCNC: 127 MMOL/L — LOW (ref 135–145)
URATE SERPL-MCNC: 5.6 MG/DL — SIGNIFICANT CHANGE UP (ref 3.4–8.8)
WBC # BLD: 3.38 K/UL — LOW (ref 3.8–10.5)
WBC # FLD AUTO: 3.38 K/UL — LOW (ref 3.8–10.5)

## 2023-10-01 RX ORDER — SODIUM,POTASSIUM PHOSPHATES 278-250MG
1 POWDER IN PACKET (EA) ORAL
Refills: 0 | Status: DISCONTINUED | OUTPATIENT
Start: 2023-10-01 | End: 2023-10-03

## 2023-10-01 RX ORDER — SODIUM CHLORIDE 9 MG/ML
1000 INJECTION, SOLUTION INTRAVENOUS
Refills: 0 | Status: DISCONTINUED | OUTPATIENT
Start: 2023-10-01 | End: 2023-10-02

## 2023-10-01 RX ORDER — ONDANSETRON 8 MG/1
4 TABLET, FILM COATED ORAL EVERY 6 HOURS
Refills: 0 | Status: DISCONTINUED | OUTPATIENT
Start: 2023-10-01 | End: 2023-10-01

## 2023-10-01 RX ORDER — ONDANSETRON 8 MG/1
4 TABLET, FILM COATED ORAL EVERY 6 HOURS
Refills: 0 | Status: DISCONTINUED | OUTPATIENT
Start: 2023-10-01 | End: 2023-10-04

## 2023-10-01 RX ADMIN — FLUCONAZOLE 200 MILLIGRAM(S): 150 TABLET ORAL at 11:29

## 2023-10-01 RX ADMIN — PANTOPRAZOLE SODIUM 40 MILLIGRAM(S): 20 TABLET, DELAYED RELEASE ORAL at 05:46

## 2023-10-01 RX ADMIN — PANTOPRAZOLE SODIUM 40 MILLIGRAM(S): 20 TABLET, DELAYED RELEASE ORAL at 17:47

## 2023-10-01 RX ADMIN — SODIUM CHLORIDE 60 MILLILITER(S): 9 INJECTION, SOLUTION INTRAVENOUS at 22:10

## 2023-10-01 RX ADMIN — ENOXAPARIN SODIUM 60 MILLIGRAM(S): 100 INJECTION SUBCUTANEOUS at 05:47

## 2023-10-01 NOTE — PROGRESS NOTE ADULT - SUBJECTIVE AND OBJECTIVE BOX
Theo Cardenas MD (Nephrology progress note)  [address], [facility],  [address],  [facility]  TEl: [phone]  Cell: [phone]    Patient is a 58y Male seen and evaluated at bedside. Vital signs, laboratory data, imaging studies, consult notes reviewed done within past 24 hours. Overnight events noted. Patient lying in bed still remains with abdominal distension and discomfort. Interval Na level 127 and phos 2. Interval stable renal function with S cr 1.5    Allergies    No Known Allergies    Intolerances        ROS:  CONSTITUTIONAL: No fever or chills.  HEENT: No headache or visual disturbances.  RESPIRATORY: No shortness of breath, cough, hemoptysis or wheezing  CARDIOVASCULAR: No Chest pain or SOB  GASTROINTESTINAL: Abdomina distension but denies nausea, vomiting, diarrhea, hematemesis or blood per rectum.  GENITOURINARY: No flank or supra pubic pain.  NEUROLOGICAL: No headache, focal limb weakness, tingling or numbness  SKIN: No skin rash or lesion  MUSCULOSKELETAL: No leg pain, calf pain or swelling    VITALS:    T(C): 36.8 (10-01-23 @ 10:14), Max: 36.8 (09-30-23 @ 22:50)  HR: 107 (10-01-23 @ 10:14) (102 - 107)  BP: 123/92 (10-01-23 @ 10:14) (123/92 - 128/99)  RR: 17 (10-01-23 @ 10:14) (16 - 17)  SpO2: 100% (10-01-23 @ 10:14) (98% - 100%)  CAPILLARY BLOOD GLUCOSE          09-30-23 @ 07:01  -  10-01-23 @ 07:00  --------------------------------------------------------  IN: 0 mL / OUT: 500 mL / NET: -500 mL      MEDICATIONS  (STANDING):  dextrose 5% + sodium chloride 0.9%. 1000 milliLiter(s) (60 mL/Hr) IV Continuous <Continuous>  dextrose 50% Injectable 25 Gram(s) IV Push once  dextrose 50% Injectable 25 Gram(s) IV Push once  dextrose 50% Injectable 12.5 Gram(s) IV Push once  dextrose Oral Gel 15 Gram(s) Oral once  fluconAZOLE   Tablet 200 milliGRAM(s) Oral daily  glucagon  Injectable 1 milliGRAM(s) IntraMuscular once  pantoprazole    Tablet 40 milliGRAM(s) Oral two times a day  senna 2 Tablet(s) Oral at bedtime  sucralfate suspension 1 Gram(s) Oral four times a day    MEDICATIONS  (PRN):  ondansetron Injectable 4 milliGRAM(s) IV Push every 6 hours PRN Nausea and/or Vomiting      PHYSICAL EXAM:  GENERAL: Alert, awake and oriented x3 in no distress  HEENT: ROBYN, EOMI, neck supple  CHEST/LUNG: Bilateral clear breath sounds, no rales, no crepitations, no rhonchi, no wheezing  HEART: Regular rate and rhythm, LOVE II/VI at LPSB, no gallops, no rub   ABDOMEN: Soft, distended, diffuse abdominal tenderness,, bowel sounds present, no palpable organomegaly  : No flank or supra pubic tenderness.  EXTREMITIES: Peripheral pulses are palpable, no pedal edema  Neurology: AAOx3, no focal neurological deficit  SKIN: No rash or skin lesion  Musculoskeletal: No joint deformity or spinal tenderness.      Vascular ACCESS:     LABS:                        9.3    3.38  )-----------( 293      ( 01 Oct 2023 04:58 )             28.7     10-01    127<L>  |  93<L>  |  16  ----------------------------<  88  3.7   |  22  |  1.48<H>    Ca    8.9      01 Oct 2023 04:58  Phos  2.0     10-01  Mg     1.80     10-01      Uric Acid: 5.6 mg/dL [3.4 - 8.8] (10-01 @ 04:58)      Urinalysis Basic - ( 01 Oct 2023 04:58 )    Color: x / Appearance: x / SG: x / pH: x  Gluc: 88 mg/dL / Ketone: x  / Bili: x / Urobili: x   Blood: x / Protein: x / Nitrite: x   Leuk Esterase: x / RBC: x / WBC x   Sq Epi: x / Non Sq Epi: x / Bacteria: x      Sodium, Random Urine: <20 mmol/L (09-30 @ 22:20)  Osmolality, Random Urine: 612 mosm/kg (09-30 @ 22:20)        RADIOLOGY & ADDITIONAL STUDIES:  rad  Imaging Personally Reviewed:  [x] YES  [ ] NO    Consultant(s) Notes Reviewed:  [x] YES  [ ] NO    Care Discussed with Primary team/ Other Providers [x] YES  [ ] NO

## 2023-10-01 NOTE — PROGRESS NOTE ADULT - ASSESSMENT
58M h/o non small cell lung ca on paclitaxel (last 1 week ago, follows with Dr. Son) p/w 1 month progressively worsening dysphagia. Recent admission for PE and SBO. Endorsed dysphagia with that admission. Barium esophagram showing GERD and small sliding hiatal hernia. Nephrology consult called for abnormal renal function    Assessment:  1) Non oliguric stable CKD stage III with S cr 1.4 likely chemotherapy induced renal injury/ATN/renal hypoperfusion/ascites/compression/obstructive uropathy  2) NSCLC with metastasis on chemotherapy  3) History of PE  4) Progressive dysphagia/ Hiatal hernia/GERD/Gastritis/Esophageal candidiases/ Ascites/ Intrinsic GI compression  5) Anemia of chronic illness  6) Hypoalbuminemia  7) Ascites  8) Dysphagia/Poor appetite/recurrent Hypoglycemia/Failure to thrive  9) Electrolytes disorder with euvolemic hyponatremia likely pain/constipation/ADH response/d5w infusion/ Liver disease with ascites  10) Constipation    Recommend:  Strict I/o  Avoid Nephrotoxic agent  S cr 1.5  with non oliguria  Na level 127  Monitor BMP and electrolytes every 12 hrly  IV fluids with d5NS @60cc/hr   Stool softener  Urine lytes reviewed with urine sodium <20 and urine osm >650  Free water restriction to <1L/day  Replete electrolytes with goal K>4 and <5, Mag> 2 and Phos 2.5 to 3.5  Bilateral renal and bladder ultrasound results reviewed  IV/po fluconazole for candida esophagitis  GI/Oncology/hematology follow up reviewed  IR guided paracentesis on Monday  Nutritional consult follow up  Optimal pain control  Intact PTH, Vitamin D 1,25 level, Phos, calcium level reviewed  Volume status and electrolytes noted  Goals of care discussion  Consider Palliative care consultation   Advance directives per primary team/Family/Patient  No urgent need for RRT/HD  Will follow

## 2023-10-01 NOTE — PROGRESS NOTE ADULT - PROBLEM SELECTOR PLAN 1
- egd 9/20 shows esophageal candidiasis  -Diflucan (fluconazole) 200mg PO daily .   CT Abdomen/Pelvis w IV contrast shows Large volume loculated ascites, predominantly in the   upper quadrants causing mass effect on the liver, spleen, kidneys, and   stomach. There is no associated bowel obstruction by CT. Within the   ascites, there are high attenuation foci, for example in the right upper   quadrant (2-24), which may represent peritoneal implants.   -GI f/u noted  IR consult-for possible paracentesis tomorrow.

## 2023-10-01 NOTE — PROGRESS NOTE ADULT - PROBLEM SELECTOR PLAN 3
- non small cell lung ca on paclitaxel (last 1 week ago, follows with Dr. Son)  - onc consult appreciated- no plan for inpt chemo

## 2023-10-01 NOTE — PROGRESS NOTE ADULT - SUBJECTIVE AND OBJECTIVE BOX
PANTERA CARBAJAL  58y  Male      Patient is a 58y old  Male who presents with a chief complaint of dysphagia (01 Oct 2023 20:02)  Patient was seen and examined.chart reviewed.c/o poor eating.no cp,no sob,no fever,no acute abd pain    REVIEW OF SYSTEMS:  CONSTITUTIONAL: No fever  RESPIRATORY: No cough, hemoptysis or shortness of breath  CARDIOVASCULAR: No chest pain, palpitations, dizziness, or leg swelling  GASTROINTESTINAL: No abdominal pain. nausea, vomiting, hematemesis  GENITOURINARY: No dysuria, frequency, hematuria   NEUROLOGICAL: No headaches, no dizziness  MUSCULOSKELETAL: No joint pain or swelling;     INTERVAL HPI/OVERNIGHT EVENTS:  T(C): 36.8 (10-01-23 @ 10:14), Max: 36.8 (09-30-23 @ 22:50)  HR: 107 (10-01-23 @ 10:14) (102 - 107)  BP: 123/92 (10-01-23 @ 10:14) (123/92 - 128/99)  RR: 17 (10-01-23 @ 10:14) (16 - 17)  SpO2: 100% (10-01-23 @ 10:14) (98% - 100%)  Wt(kg): --  I&O's Summary    30 Sep 2023 07:01  -  01 Oct 2023 07:00  --------------------------------------------------------  IN: 0 mL / OUT: 500 mL / NET: -500 mL      T(C): 36.8 (10-01-23 @ 10:14), Max: 36.8 (09-30-23 @ 22:50)  HR: 107 (10-01-23 @ 10:14) (102 - 107)  BP: 123/92 (10-01-23 @ 10:14) (123/92 - 128/99)  RR: 17 (10-01-23 @ 10:14) (16 - 17)  SpO2: 100% (10-01-23 @ 10:14) (98% - 100%)  Wt(kg): --Vital Signs Last 24 Hrs  T(C): 36.8 (01 Oct 2023 10:14), Max: 36.8 (30 Sep 2023 22:50)  T(F): 98.3 (01 Oct 2023 10:14), Max: 98.3 (01 Oct 2023 10:14)  HR: 107 (01 Oct 2023 10:14) (102 - 107)  BP: 123/92 (01 Oct 2023 10:14) (123/92 - 128/99)  BP(mean): --  RR: 17 (01 Oct 2023 10:14) (16 - 17)  SpO2: 100% (01 Oct 2023 10:14) (98% - 100%)    Parameters below as of 01 Oct 2023 10:14  Patient On (Oxygen Delivery Method): room air        LABS:                        9.3    3.38  )-----------( 293      ( 01 Oct 2023 04:58 )             28.7     10-01    127<L>  |  93<L>  |  16  ----------------------------<  88  3.7   |  22  |  1.48<H>    Ca    8.9      01 Oct 2023 04:58  Phos  2.0     10-01  Mg     1.80     10-01        Urinalysis Basic - ( 01 Oct 2023 04:58 )    Color: x / Appearance: x / SG: x / pH: x  Gluc: 88 mg/dL / Ketone: x  / Bili: x / Urobili: x   Blood: x / Protein: x / Nitrite: x   Leuk Esterase: x / RBC: x / WBC x   Sq Epi: x / Non Sq Epi: x / Bacteria: x      CAPILLARY BLOOD GLUCOSE            Urinalysis Basic - ( 01 Oct 2023 04:58 )    Color: x / Appearance: x / SG: x / pH: x  Gluc: 88 mg/dL / Ketone: x  / Bili: x / Urobili: x   Blood: x / Protein: x / Nitrite: x   Leuk Esterase: x / RBC: x / WBC x   Sq Epi: x / Non Sq Epi: x / Bacteria: x        PAST MEDICAL & SURGICAL HISTORY:  Former smoker      Non-small cell lung cancer with metastasis      Status post cardiac surgery  s/p open right thoracotomy for posterior cardiac mass removal > 20 years ago, reported by patient to be benign.          MEDICATIONS  (STANDING):  dextrose 5% + sodium chloride 0.9%. 1000 milliLiter(s) (60 mL/Hr) IV Continuous <Continuous>  dextrose 50% Injectable 25 Gram(s) IV Push once  dextrose 50% Injectable 25 Gram(s) IV Push once  dextrose 50% Injectable 12.5 Gram(s) IV Push once  dextrose Oral Gel 15 Gram(s) Oral once  fluconAZOLE   Tablet 200 milliGRAM(s) Oral daily  glucagon  Injectable 1 milliGRAM(s) IntraMuscular once  pantoprazole    Tablet 40 milliGRAM(s) Oral two times a day  potassium phosphate / sodium phosphate Powder (PHOS-NaK) 1 Packet(s) Oral three times a day before meals  senna 2 Tablet(s) Oral at bedtime  sucralfate suspension 1 Gram(s) Oral four times a day    MEDICATIONS  (PRN):  ondansetron Injectable 4 milliGRAM(s) IV Push every 6 hours PRN Nausea and/or Vomiting        RADIOLOGY & ADDITIONAL TESTS:    Imaging Personally Reviewed:  [ ] YES  [ ] NO    Consultant(s) Notes Reviewed:  [x ] YES  [ ] NO    PHYSICAL EXAM:  GENERAL: Alert and awake lying in bed in no distress  HEAD:  Atraumatic, Normocephalic  EYES: EOMI, ROBYN, conjunctiva and sclera clear  NECK: Supple, No JVD, Normal thyroid  NERVOUS SYSTEM:  Alert & Oriented X3, Motor and sensory systems are intact,   CHEST/LUNG: Bilateral clear breath sounds, no rhochi, no wheezing, no crepitations,  HEART: Regular rate and rhythm; No murmurs, rubs, or gallops  ABDOMEN: Soft, Nontender,  mod distended; Bowel sounds present  EXTREMITIES:   Peripheral Pulses are palpable, no  edema        Care Discussed with Consultants/Other Providers [ x] YES  [ ] NO      Code Status: [] Full Code [] DNR [] DNI [] Goals of Care:   Disposition: [] ICU [] Stroke Unit [] RCU []PCU []Floor [] Discharge Home         MATTEO Worley

## 2023-10-01 NOTE — PROGRESS NOTE ADULT - PROBLEM SELECTOR PLAN 2
- started on protonix 40 po bid  - egd 9/20 showing esophageal candidiasis, 2cm hiatal hernia, severely edematous gastropathy with thickened gastric folds  - plan for proton pump inhibitor PO BID x 8 weeks followed by once daily; Diflucan (fluconazole) 400 mg PO once today followed by 200mg PO daily for 13 days for a total of 14 day course.  - clear liquid diet  -iv fluid d5 ns  -GI f/u noted

## 2023-10-01 NOTE — CHART NOTE - NSCHARTNOTEFT_GEN_A_CORE
Pre-Interventional Radiology Procedure Note    Patient Age: 58  Patient Gender: M  Procedure: Paracentesis   Diagnosis/Indication: Ascites, hx NSCLC  Interventional Radiology Attending Physician: Dr. Harkins  Ordering Attending Physician: Dr. Worthy  Pertinent Medical History: NSCLC  Pertinent labs:                      9.3    3.38  )-----------( 293      ( 01 Oct 2023 04:58 )             28.7       10-01    127<L>  |  93<L>  |  16  ----------------------------<  88  3.7   |  22  |  1.48<H>    Ca    8.9      01 Oct 2023 04:58  Phos  2.0     10-01  Mg     1.80     10-01      Patient and Family Aware ? Yes

## 2023-10-02 LAB
ANION GAP SERPL CALC-SCNC: 10 MMOL/L — SIGNIFICANT CHANGE UP (ref 7–14)
ANION GAP SERPL CALC-SCNC: 7 MMOL/L — SIGNIFICANT CHANGE UP (ref 7–14)
APTT BLD: 31 SEC — SIGNIFICANT CHANGE UP (ref 24.5–35.6)
BLD GP AB SCN SERPL QL: NEGATIVE — SIGNIFICANT CHANGE UP
BUN SERPL-MCNC: 12 MG/DL — SIGNIFICANT CHANGE UP (ref 7–23)
BUN SERPL-MCNC: 13 MG/DL — SIGNIFICANT CHANGE UP (ref 7–23)
CALCIUM SERPL-MCNC: 8.8 MG/DL — SIGNIFICANT CHANGE UP (ref 8.4–10.5)
CALCIUM SERPL-MCNC: 8.9 MG/DL — SIGNIFICANT CHANGE UP (ref 8.4–10.5)
CHLORIDE SERPL-SCNC: 95 MMOL/L — LOW (ref 98–107)
CHLORIDE SERPL-SCNC: 96 MMOL/L — LOW (ref 98–107)
CO2 SERPL-SCNC: 22 MMOL/L — SIGNIFICANT CHANGE UP (ref 22–31)
CO2 SERPL-SCNC: 23 MMOL/L — SIGNIFICANT CHANGE UP (ref 22–31)
CREAT SERPL-MCNC: 1.35 MG/DL — HIGH (ref 0.5–1.3)
CREAT SERPL-MCNC: 1.46 MG/DL — HIGH (ref 0.5–1.3)
EGFR: 56 ML/MIN/1.73M2 — LOW
EGFR: 61 ML/MIN/1.73M2 — SIGNIFICANT CHANGE UP
GLUCOSE SERPL-MCNC: 76 MG/DL — SIGNIFICANT CHANGE UP (ref 70–99)
GLUCOSE SERPL-MCNC: 88 MG/DL — SIGNIFICANT CHANGE UP (ref 70–99)
HCT VFR BLD CALC: 25.8 % — LOW (ref 39–50)
HCT VFR BLD CALC: 31.2 % — LOW (ref 39–50)
HGB BLD-MCNC: 10.4 G/DL — LOW (ref 13–17)
HGB BLD-MCNC: 8.4 G/DL — LOW (ref 13–17)
INR BLD: 1.09 RATIO — SIGNIFICANT CHANGE UP (ref 0.85–1.18)
MAGNESIUM SERPL-MCNC: 1.7 MG/DL — SIGNIFICANT CHANGE UP (ref 1.6–2.6)
MAGNESIUM SERPL-MCNC: 1.8 MG/DL — SIGNIFICANT CHANGE UP (ref 1.6–2.6)
MCHC RBC-ENTMCNC: 30 PG — SIGNIFICANT CHANGE UP (ref 27–34)
MCHC RBC-ENTMCNC: 30.1 PG — SIGNIFICANT CHANGE UP (ref 27–34)
MCHC RBC-ENTMCNC: 32.6 GM/DL — SIGNIFICANT CHANGE UP (ref 32–36)
MCHC RBC-ENTMCNC: 33.3 GM/DL — SIGNIFICANT CHANGE UP (ref 32–36)
MCV RBC AUTO: 90.2 FL — SIGNIFICANT CHANGE UP (ref 80–100)
MCV RBC AUTO: 92.1 FL — SIGNIFICANT CHANGE UP (ref 80–100)
NRBC # BLD: 0 /100 WBCS — SIGNIFICANT CHANGE UP (ref 0–0)
NRBC # BLD: 0 /100 WBCS — SIGNIFICANT CHANGE UP (ref 0–0)
NRBC # FLD: 0 K/UL — SIGNIFICANT CHANGE UP (ref 0–0)
NRBC # FLD: 0 K/UL — SIGNIFICANT CHANGE UP (ref 0–0)
PHOSPHATE SERPL-MCNC: 2.1 MG/DL — LOW (ref 2.5–4.5)
PHOSPHATE SERPL-MCNC: 2.3 MG/DL — LOW (ref 2.5–4.5)
PLATELET # BLD AUTO: 247 K/UL — SIGNIFICANT CHANGE UP (ref 150–400)
PLATELET # BLD AUTO: 266 K/UL — SIGNIFICANT CHANGE UP (ref 150–400)
POTASSIUM SERPL-MCNC: 3.5 MMOL/L — SIGNIFICANT CHANGE UP (ref 3.5–5.3)
POTASSIUM SERPL-MCNC: 3.7 MMOL/L — SIGNIFICANT CHANGE UP (ref 3.5–5.3)
POTASSIUM SERPL-SCNC: 3.5 MMOL/L — SIGNIFICANT CHANGE UP (ref 3.5–5.3)
POTASSIUM SERPL-SCNC: 3.7 MMOL/L — SIGNIFICANT CHANGE UP (ref 3.5–5.3)
PROTHROM AB SERPL-ACNC: 12.2 SEC — SIGNIFICANT CHANGE UP (ref 9.5–13)
RBC # BLD: 2.8 M/UL — LOW (ref 4.2–5.8)
RBC # BLD: 3.46 M/UL — LOW (ref 4.2–5.8)
RBC # FLD: 16.1 % — HIGH (ref 10.3–14.5)
RBC # FLD: 16.3 % — HIGH (ref 10.3–14.5)
RH IG SCN BLD-IMP: POSITIVE — SIGNIFICANT CHANGE UP
SODIUM SERPL-SCNC: 125 MMOL/L — LOW (ref 135–145)
SODIUM SERPL-SCNC: 128 MMOL/L — LOW (ref 135–145)
WBC # BLD: 2.91 K/UL — LOW (ref 3.8–10.5)
WBC # BLD: 3.4 K/UL — LOW (ref 3.8–10.5)
WBC # FLD AUTO: 2.91 K/UL — LOW (ref 3.8–10.5)
WBC # FLD AUTO: 3.4 K/UL — LOW (ref 3.8–10.5)

## 2023-10-02 PROCEDURE — 49083 ABD PARACENTESIS W/IMAGING: CPT

## 2023-10-02 RX ADMIN — PANTOPRAZOLE SODIUM 40 MILLIGRAM(S): 20 TABLET, DELAYED RELEASE ORAL at 17:50

## 2023-10-02 RX ADMIN — PANTOPRAZOLE SODIUM 40 MILLIGRAM(S): 20 TABLET, DELAYED RELEASE ORAL at 06:16

## 2023-10-02 RX ADMIN — Medication 1 PACKET(S): at 19:17

## 2023-10-02 RX ADMIN — FLUCONAZOLE 200 MILLIGRAM(S): 150 TABLET ORAL at 16:25

## 2023-10-02 RX ADMIN — Medication 1 PACKET(S): at 06:16

## 2023-10-02 NOTE — PROGRESS NOTE ADULT - SUBJECTIVE AND OBJECTIVE BOX
Theo Cardenas MD (Nephrology progress note)  [address], [facility], [place], operated by [facility],  [address],  [facility]  TEl: [phone]  Cell: [phone]    Patient is a 58y Male seen and evaluated at bedside. Vital signs, laboratory data, imaging studies, consult notes reviewed done within past 24 hours. Overnight events noted.    Allergies    No Known Allergies    Intolerances        ROS:  CONSTITUTIONAL: No fever or chills.  HEENT: No headcahe or visual disturbances.  RESPIRATORY: No shortness of breath, cough, hemoptysis or wheezing  CARDIOVASCULAR: No Chest pain, shortness of breath, palpitations, dizziness, syncope, orthopnea, PND or leg swelling.  GASTROINTESTINAL: No abdominal pain, nausea, vomiting, diarrhea, hematemesis or blood per rectum.  GENITOURINARY: No urinary frequency, urgency, gross hematuria, dysuria, flank or supra pubic pain, difficulty passing urine.  NEUROLOGICAL: No headache, focal limb weakness, tingling or numbness or seizure like activity  SKIN: No skin rash or lesion  MUSCULOSKELETAL: No leg pain, calf pain or swelling    VITALS:    T(C): 36.7 (10-02-23 @ 06:10), Max: 36.7 (10-01-23 @ 21:45)  HR: 94 (10-02-23 @ 06:10) (94 - 101)  BP: 126/89 (10-02-23 @ 06:10) (126/89 - 136/99)  RR: 16 (10-02-23 @ 06:10) (16 - 16)  SpO2: 100% (10-02-23 @ 06:10) (99% - 100%)  CAPILLARY BLOOD GLUCOSE          MEDICATIONS  (STANDING):  dextrose 5% + sodium chloride 0.9%. 1000 milliLiter(s) (60 mL/Hr) IV Continuous <Continuous>  dextrose 50% Injectable 25 Gram(s) IV Push once  dextrose 50% Injectable 25 Gram(s) IV Push once  dextrose 50% Injectable 12.5 Gram(s) IV Push once  dextrose Oral Gel 15 Gram(s) Oral once  fluconAZOLE   Tablet 200 milliGRAM(s) Oral daily  glucagon  Injectable 1 milliGRAM(s) IntraMuscular once  pantoprazole    Tablet 40 milliGRAM(s) Oral two times a day  potassium phosphate / sodium phosphate Powder (PHOS-NaK) 1 Packet(s) Oral three times a day before meals  senna 2 Tablet(s) Oral at bedtime  sucralfate suspension 1 Gram(s) Oral four times a day    MEDICATIONS  (PRN):  ondansetron Injectable 4 milliGRAM(s) IV Push every 6 hours PRN Nausea and/or Vomiting      PHYSICAL EXAM:  GENERAL: Alert, awake and oriented x3 in no distress  HEENT: ROBYN, EOMI, neck supple, no JVP, no carotid bruit, oral mucosa moist and pink.  CHEST/LUNG: Bilateral clear breath sounds, no rales, no crepitations, no rhonchi, no wheezing  HEART: Regular rate and rhythm, LOVE II/VI at LPSB, no gallops, no rub   ABDOMEN: Soft, nontender, non distended, bowel sounds present, no palpable organomegaly  : No flank or supra pubic tenderness.  EXTREMITIES: Peripheral pulses are palpable, no pedal edema  Neurology: AAOx3, no focal neurological deficit  SKIN: No rash or skin lesion  Musculoskeletal: No joint deformity or spinal tenderness.      Vascular ACCESS:     LABS:                        8.4    2.91  )-----------( 266      ( 02 Oct 2023 05:00 )             25.8     10-02    125<L>  |  95<L>  |  13  ----------------------------<  88  3.5   |  23  |  1.46<H>    Ca    8.8      02 Oct 2023 05:00  Phos  2.1     10-02  Mg     1.80     10-02        PT/INR - ( 02 Oct 2023 05:00 )   PT: 12.2 sec;   INR: 1.09 ratio         PTT - ( 02 Oct 2023 05:00 )  PTT:31.0 sec  Urinalysis Basic - ( 02 Oct 2023 05:00 )    Color: x / Appearance: x / SG: x / pH: x  Gluc: 88 mg/dL / Ketone: x  / Bili: x / Urobili: x   Blood: x / Protein: x / Nitrite: x   Leuk Esterase: x / RBC: x / WBC x   Sq Epi: x / Non Sq Epi: x / Bacteria: x      Sodium, Random Urine: <20 mmol/L (09-30 @ 22:20)  Osmolality, Random Urine: 612 mosm/kg (09-30 @ 22:20)        RADIOLOGY & ADDITIONAL STUDIES:    Imaging Personally Reviewed:  [x] YES  [ ] NO    Consultant(s) Notes Reviewed:  [x] YES  [ ] NO    Care Discussed with Primary team/ Other Providers [x] YES  [ ] NO       Theo Cardenas MD (Nephrology progress note)  [address], [facility],  [address],  [facility]  TEl: [phone]  Cell: [phone]    Patient is a 58y Male seen and evaluated at bedside. Vital signs, laboratory data, imaging studies, consult notes reviewed done within past 24 hours. Overnight events noted.  Patient lying in bed alert and awake complains of diffuse abdominal discomfort and poor appetite. Denies any chest pain, SOB. Interval stable renal function with hyponatremia with Na level 125.    Allergies    No Known Allergies    Intolerances        ROS:  CONSTITUTIONAL: No fever or chills.  HEENT: No headache or visual disturbances.  RESPIRATORY: No shortness of breath, cough, hemoptysis or wheezing  CARDIOVASCULAR: No Chest pain or SOB  GASTROINTESTINAL: Diffuse abdominal pain but denies nausea, vomiting, diarrhea, hematemesis or blood per rectum.  GENITOURINARY: No flank or supra pubic pain  NEUROLOGICAL: No headache, focal limb weakness, tingling or numbness   SKIN: No skin rash or lesion  MUSCULOSKELETAL: No leg pain, calf pain or swelling    VITALS:    T(C): 36.7 (10-02-23 @ 06:10), Max: 36.7 (10-01-23 @ 21:45)  HR: 94 (10-02-23 @ 06:10) (94 - 101)  BP: 126/89 (10-02-23 @ 06:10) (126/89 - 136/99)  RR: 16 (10-02-23 @ 06:10) (16 - 16)  SpO2: 100% (10-02-23 @ 06:10) (99% - 100%)  CAPILLARY BLOOD GLUCOSE          MEDICATIONS  (STANDING):  dextrose 5% + sodium chloride 0.9%. 1000 milliLiter(s) (60 mL/Hr) IV Continuous <Continuous>  dextrose 50% Injectable 25 Gram(s) IV Push once  dextrose 50% Injectable 25 Gram(s) IV Push once  dextrose 50% Injectable 12.5 Gram(s) IV Push once  dextrose Oral Gel 15 Gram(s) Oral once  fluconAZOLE   Tablet 200 milliGRAM(s) Oral daily  glucagon  Injectable 1 milliGRAM(s) IntraMuscular once  pantoprazole    Tablet 40 milliGRAM(s) Oral two times a day  potassium phosphate / sodium phosphate Powder (PHOS-NaK) 1 Packet(s) Oral three times a day before meals  senna 2 Tablet(s) Oral at bedtime  sucralfate suspension 1 Gram(s) Oral four times a day    MEDICATIONS  (PRN):  ondansetron Injectable 4 milliGRAM(s) IV Push every 6 hours PRN Nausea and/or Vomiting      PHYSICAL EXAM:  GENERAL: Alert, awake and oriented x3 in no distress  HEENT: ROBYN, EOMI, neck supple, no JVP  CHEST/LUNG: Bilateral decreased breath sounds, bibasilar minimal rhonchi, no wheezing  HEART: Regular rate and rhythm, LOVE II/VI at LPSB, no gallops, no rub   ABDOMEN: Soft, distended diffuse abdominal tenderness with voluntary guading, bowel sounds present, no palpable organomegaly  : No flank or supra pubic tenderness.  EXTREMITIES: Peripheral pulses are palpable, trace ankle pedal edema  Neurology: AAOx3, no focal neurological deficit  SKIN: No rash or skin lesion  Musculoskeletal: No joint deformity or spinal tenderness.      Vascular ACCESS:     LABS:                        8.4    2.91  )-----------( 266      ( 02 Oct 2023 05:00 )             25.8     10-02    125<L>  |  95<L>  |  13  ----------------------------<  88  3.5   |  23  |  1.46<H>    Ca    8.8      02 Oct 2023 05:00  Phos  2.1     10-02  Mg     1.80     10-02        PT/INR - ( 02 Oct 2023 05:00 )   PT: 12.2 sec;   INR: 1.09 ratio         PTT - ( 02 Oct 2023 05:00 )  PTT:31.0 sec  Urinalysis Basic - ( 02 Oct 2023 05:00 )    Color: x / Appearance: x / SG: x / pH: x  Gluc: 88 mg/dL / Ketone: x  / Bili: x / Urobili: x   Blood: x / Protein: x / Nitrite: x   Leuk Esterase: x / RBC: x / WBC x   Sq Epi: x / Non Sq Epi: x / Bacteria: x      Sodium, Random Urine: <20 mmol/L (09-30 @ 22:20)  Osmolality, Random Urine: 612 mosm/kg (09-30 @ 22:20)        RADIOLOGY & ADDITIONAL STUDIES:  rad  Imaging Personally Reviewed:  [x] YES  [ ] NO    Consultant(s) Notes Reviewed:  [x] YES  [ ] NO    Care Discussed with Primary team/ Other Providers [x] YES  [ ] NO

## 2023-10-02 NOTE — PROGRESS NOTE ADULT - SUBJECTIVE AND OBJECTIVE BOX
PANTERA CARBAJAL  58y  Male      Patient is a 58y old  Male who presents with a chief complaint of dysphagia (01 Oct 2023 20:02)  Patient was seen and examined.chart reviewed.c/o poor eating.no cp,no sob,no fever,no acute abd pain    REVIEW OF SYSTEMS:  CONSTITUTIONAL: No fever  RESPIRATORY: No cough, hemoptysis or shortness of breath  CARDIOVASCULAR: No chest pain, palpitations, dizziness, or leg swelling  GASTROINTESTINAL: No abdominal pain. nausea, vomiting, hematemesis  GENITOURINARY: No dysuria, frequency, hematuria   NEUROLOGICAL: No headaches, no dizziness  MUSCULOSKELETAL: No joint pain or swelling;       PAST MEDICAL & SURGICAL HISTORY:  Former smoker      Non-small cell lung cancer with metastasis      Status post cardiac surgery  s/p open right thoracotomy for posterior cardiac mass removal > 20 years ago, reported by patient to be benign.      PHYSICAL EXAM:  GENERAL: Alert and awake lying in bed in no distress  HEAD:  Atraumatic, Normocephalic  EYES: EOMI, ROBYN, conjunctiva and sclera clear  NECK: Supple, No JVD, Normal thyroid  NERVOUS SYSTEM:  Alert & Oriented X3, Motor and sensory systems are intact,   CHEST/LUNG: Bilateral clear breath sounds, no rhochi, no wheezing, no crepitations,  HEART: Regular rate and rhythm; No murmurs, rubs, or gallops  ABDOMEN: Soft, Nontender,  mod distended; Bowel sounds present  EXTREMITIES:   Peripheral Pulses are palpable, no  edema                                   8.4    2.91  )-----------( 266      ( 02 Oct 2023 05:00 )             25.8             PT/INR - ( 02 Oct 2023 05:00 )   PT: 12.2 sec;   INR: 1.09 ratio         PTT - ( 02 Oct 2023 05:00 )  PTT:31.0 sec  125|95|13<88  3.5|23|1.46  8.8,1.80,2.1  10-02 @ 05:00

## 2023-10-02 NOTE — PROGRESS NOTE ADULT - ASSESSMENT
58M h/o non small cell lung ca on paclitaxel (last 1 week ago, follows with Dr. Son) p/w 1 month progressively worsening dysphagia. Recent admission for PE and SBO. Endorsed dysphagia with that admission. Barium esophagram showing GERD and small sliding hiatal hernia. Nephrology consult called for abnormal renal function    Assessment:  1) Non oliguric stable CKD stage III with S cr 1.4 likely chemotherapy induced renal injury/ATN/renal hypoperfusion/ascites/compression/obstructive uropathy  2) NSCLC with metastasis on chemotherapy  3) History of PE  4) Progressive dysphagia/ Hiatal hernia/GERD/Gastritis/Esophageal candidiases/ Ascites/ Intrinsic GI compression  5) Anemia of chronic illness  6) Hypoalbuminemia  7) Ascites  8) Dysphagia/Poor appetite/recurrent Hypoglycemia/Failure to thrive  9) Electrolytes disorder with euvolemic hyponatremia likely pain/constipation/ADH response/d5w infusion/ Liver disease with ascites  10) Constipation    Recommend:  Strict I/o  Avoid Nephrotoxic agent  S cr 1.4  with non oliguria  Na level 125  Monitor BMP and electrolytes every 12 hrly  Add sodium chloride 1 gm po tid with goal sodium not to exceed 6 to 8 mEq in 24 hours  Free water restriction to <1L/day  Replete electrolytes with goal K>4 and <5, Mag> 2 and Phos 2.5 to 3.5  Bilateral renal and bladder ultrasound results reviewed  IV/po fluconazole for candida esophagitis  GI/Oncology/hematology follow up reviewed  IR guided paracentesis today  Nutritional consult follow up  Optimal pain control  Intact PTH, Vitamin D 1,25 level, Phos, calcium level reviewed  Volume status and electrolytes noted  Goals of care discussion  Consider Palliative care consultation   Advance directives per primary team/Family/Patient  No urgent need for RRT/HD  Will follow

## 2023-10-03 ENCOUNTER — APPOINTMENT (OUTPATIENT)
Dept: HEMATOLOGY ONCOLOGY | Facility: CLINIC | Age: 58
End: 2023-10-03

## 2023-10-03 ENCOUNTER — APPOINTMENT (OUTPATIENT)
Dept: INFUSION THERAPY | Facility: HOSPITAL | Age: 58
End: 2023-10-03

## 2023-10-03 LAB
ANION GAP SERPL CALC-SCNC: 12 MMOL/L — SIGNIFICANT CHANGE UP (ref 7–14)
ANION GAP SERPL CALC-SCNC: 13 MMOL/L — SIGNIFICANT CHANGE UP (ref 7–14)
BUN SERPL-MCNC: 13 MG/DL — SIGNIFICANT CHANGE UP (ref 7–23)
BUN SERPL-MCNC: 15 MG/DL — SIGNIFICANT CHANGE UP (ref 7–23)
CALCIUM SERPL-MCNC: 8.8 MG/DL — SIGNIFICANT CHANGE UP (ref 8.4–10.5)
CALCIUM SERPL-MCNC: 9.1 MG/DL — SIGNIFICANT CHANGE UP (ref 8.4–10.5)
CHLORIDE SERPL-SCNC: 95 MMOL/L — LOW (ref 98–107)
CHLORIDE SERPL-SCNC: 98 MMOL/L — SIGNIFICANT CHANGE UP (ref 98–107)
CO2 SERPL-SCNC: 18 MMOL/L — LOW (ref 22–31)
CO2 SERPL-SCNC: 22 MMOL/L — SIGNIFICANT CHANGE UP (ref 22–31)
CREAT SERPL-MCNC: 1.38 MG/DL — HIGH (ref 0.5–1.3)
CREAT SERPL-MCNC: 1.52 MG/DL — HIGH (ref 0.5–1.3)
EGFR: 53 ML/MIN/1.73M2 — LOW
EGFR: 60 ML/MIN/1.73M2 — SIGNIFICANT CHANGE UP
GLUCOSE BLDC GLUCOMTR-MCNC: 115 MG/DL — HIGH (ref 70–99)
GLUCOSE BLDC GLUCOMTR-MCNC: 63 MG/DL — LOW (ref 70–99)
GLUCOSE BLDC GLUCOMTR-MCNC: 67 MG/DL — LOW (ref 70–99)
GLUCOSE BLDC GLUCOMTR-MCNC: 76 MG/DL — SIGNIFICANT CHANGE UP (ref 70–99)
GLUCOSE BLDC GLUCOMTR-MCNC: 88 MG/DL — SIGNIFICANT CHANGE UP (ref 70–99)
GLUCOSE BLDC GLUCOMTR-MCNC: 93 MG/DL — SIGNIFICANT CHANGE UP (ref 70–99)
GLUCOSE SERPL-MCNC: 65 MG/DL — LOW (ref 70–99)
GLUCOSE SERPL-MCNC: 85 MG/DL — SIGNIFICANT CHANGE UP (ref 70–99)
HCT VFR BLD CALC: 32.4 % — LOW (ref 39–50)
HGB BLD-MCNC: 10.6 G/DL — LOW (ref 13–17)
MAGNESIUM SERPL-MCNC: 1.7 MG/DL — SIGNIFICANT CHANGE UP (ref 1.6–2.6)
MCHC RBC-ENTMCNC: 30.1 PG — SIGNIFICANT CHANGE UP (ref 27–34)
MCHC RBC-ENTMCNC: 32.7 GM/DL — SIGNIFICANT CHANGE UP (ref 32–36)
MCV RBC AUTO: 92 FL — SIGNIFICANT CHANGE UP (ref 80–100)
NRBC # BLD: 0 /100 WBCS — SIGNIFICANT CHANGE UP (ref 0–0)
NRBC # FLD: 0 K/UL — SIGNIFICANT CHANGE UP (ref 0–0)
OSMOLALITY UR: 427 MOSM/KG — SIGNIFICANT CHANGE UP (ref 50–1200)
PHOSPHATE SERPL-MCNC: 2.6 MG/DL — SIGNIFICANT CHANGE UP (ref 2.5–4.5)
PLATELET # BLD AUTO: 288 K/UL — SIGNIFICANT CHANGE UP (ref 150–400)
POTASSIUM SERPL-MCNC: 3.7 MMOL/L — SIGNIFICANT CHANGE UP (ref 3.5–5.3)
POTASSIUM SERPL-MCNC: 4 MMOL/L — SIGNIFICANT CHANGE UP (ref 3.5–5.3)
POTASSIUM SERPL-SCNC: 3.7 MMOL/L — SIGNIFICANT CHANGE UP (ref 3.5–5.3)
POTASSIUM SERPL-SCNC: 4 MMOL/L — SIGNIFICANT CHANGE UP (ref 3.5–5.3)
RBC # BLD: 3.52 M/UL — LOW (ref 4.2–5.8)
RBC # FLD: 16.4 % — HIGH (ref 10.3–14.5)
SODIUM SERPL-SCNC: 128 MMOL/L — LOW (ref 135–145)
SODIUM SERPL-SCNC: 130 MMOL/L — LOW (ref 135–145)
SODIUM UR-SCNC: 42 MMOL/L — SIGNIFICANT CHANGE UP
WBC # BLD: 4.12 K/UL — SIGNIFICANT CHANGE UP (ref 3.8–10.5)
WBC # FLD AUTO: 4.12 K/UL — SIGNIFICANT CHANGE UP (ref 3.8–10.5)

## 2023-10-03 RX ORDER — ENOXAPARIN SODIUM 100 MG/ML
60 INJECTION SUBCUTANEOUS EVERY 12 HOURS
Refills: 0 | Status: DISCONTINUED | OUTPATIENT
Start: 2023-10-03 | End: 2023-10-03

## 2023-10-03 RX ORDER — SODIUM CHLORIDE 9 MG/ML
1 INJECTION INTRAMUSCULAR; INTRAVENOUS; SUBCUTANEOUS THREE TIMES A DAY
Refills: 0 | Status: DISCONTINUED | OUTPATIENT
Start: 2023-10-03 | End: 2023-10-04

## 2023-10-03 RX ORDER — DEXTROSE 10 % IN WATER 10 %
1000 INTRAVENOUS SOLUTION INTRAVENOUS
Refills: 0 | Status: DISCONTINUED | OUTPATIENT
Start: 2023-10-03 | End: 2023-10-08

## 2023-10-03 RX ORDER — DEXTROSE 50 % IN WATER 50 %
25 SYRINGE (ML) INTRAVENOUS ONCE
Refills: 0 | Status: COMPLETED | OUTPATIENT
Start: 2023-10-03 | End: 2023-10-03

## 2023-10-03 RX ORDER — ENOXAPARIN SODIUM 100 MG/ML
60 INJECTION SUBCUTANEOUS
Refills: 0 | Status: DISCONTINUED | OUTPATIENT
Start: 2023-10-03 | End: 2023-10-04

## 2023-10-03 RX ADMIN — PANTOPRAZOLE SODIUM 40 MILLIGRAM(S): 20 TABLET, DELAYED RELEASE ORAL at 17:43

## 2023-10-03 RX ADMIN — ENOXAPARIN SODIUM 60 MILLIGRAM(S): 100 INJECTION SUBCUTANEOUS at 13:20

## 2023-10-03 RX ADMIN — Medication 25 GRAM(S): at 08:00

## 2023-10-03 RX ADMIN — FLUCONAZOLE 200 MILLIGRAM(S): 150 TABLET ORAL at 11:56

## 2023-10-03 RX ADMIN — Medication 25 MILLILITER(S): at 15:01

## 2023-10-03 RX ADMIN — PANTOPRAZOLE SODIUM 40 MILLIGRAM(S): 20 TABLET, DELAYED RELEASE ORAL at 05:34

## 2023-10-03 RX ADMIN — SODIUM CHLORIDE 1 GRAM(S): 9 INJECTION INTRAMUSCULAR; INTRAVENOUS; SUBCUTANEOUS at 21:33

## 2023-10-03 NOTE — ADVANCED PRACTICE NURSE CONSULT - RECOMMEDATIONS
Topical recommendations:     Sacrum: Cleanse with NS, pat dry. Apply Liquid barrier film to periwound skin (allow to dry). Cover with silicone foam with border. Change daily and PRN if soiled. Monitor for tissue type changes.     Apply Sween 24 Moisturizer to b/l UE and LE daily, avoiding in between the toes.     Continue low air loss bed therapy, continue heel elevation, continue to turn & reposition as per protocol, soft pillow between bony prominences, continue moisture management with barrier creams & single breathable pad, continue measures to decrease friction/shear/pressure. Continue with nutritional support as per dietary/orders.     Plan of care discussed with Marisela Hernandez and     Please contact Wound/Ostomy Care Service Line if we can be of further assistance (ext [phone]).  Topical recommendations:     Sacrum: Cleanse with NS, pat dry. Apply Liquid barrier film to periwound skin (allow to dry). Cover with silicone foam with border. Change daily and PRN if soiled. Monitor for tissue type changes.     Apply Sween 24 Moisturizer to b/l UE and LE daily, avoiding in between the toes.     Continue low air loss bed therapy, continue heel elevation, continue to turn & reposition as per protocol, soft pillow between bony prominences, continue moisture management with barrier creams & single breathable pad, continue measures to decrease friction/shear/pressure. Continue with nutritional support as per dietary/orders.     Plan of care discussed with Covering RADHA Hernandez and Finn Rivera (PA).    Please contact Wound/Ostomy Care Service Line if we can be of further assistance (ext [phone]).

## 2023-10-03 NOTE — PROGRESS NOTE ADULT - PROBLEM SELECTOR PLAN 1
Pt present on today for blood pressure check. Pt readings stable on today and pt informed to continue medication regime as prescribed. Pt voiced understanding./Kitw     - egd 9/20 shows esophageal candidiasis  -Diflucan (fluconazole) 200mg PO daily .   CT Abdomen/Pelvis w IV contrast shows Large volume loculated ascites, predominantly in the   upper quadrants causing mass effect on the liver, spleen, kidneys, and   stomach. There is no associated bowel obstruction by CT. Within the   ascites, there are high attenuation foci, for example in the right upper   quadrant (2-24), which may represent peritoneal implants.   -GI f/u noted  IR consult-for possible paracentesis

## 2023-10-03 NOTE — PROGRESS NOTE ADULT - SUBJECTIVE AND OBJECTIVE BOX
Patient is a 58y old  Male who presents with a chief complaint of dysphagia (03 Oct 2023 12:17)      INTERVAL HPI/OVERNIGHT EVENTS:  T(C): 37.1 (10-03-23 @ 22:25), Max: 37.3 (10-03-23 @ 05:25)  HR: 105 (10-03-23 @ 22:25) (89 - 123)  BP: 126/96 (10-03-23 @ 22:25) (114/95 - 132/97)  RR: 17 (10-03-23 @ 22:25) (17 - 19)  SpO2: 97% (10-03-23 @ 22:25) (97% - 100%)  Wt(kg): --  I&O's Summary    03 Oct 2023 07:01  -  04 Oct 2023 00:01  --------------------------------------------------------  IN: 0 mL / OUT: 300 mL / NET: -300 mL        PAST MEDICAL & SURGICAL HISTORY:  Former smoker      Non-small cell lung cancer with metastasis      Status post cardiac surgery  s/p open right thoracotomy for posterior cardiac mass removal > 20 years ago, reported by patient to be benign.          SOCIAL HISTORY  Alcohol:  Tobacco:  Illicit substance use:    FAMILY HISTORY:    REVIEW OF SYSTEMS:  CONSTITUTIONAL: No fever, weight loss, or fatigue  EYES: No eye pain, visual disturbances, or discharge  ENMT:  No difficulty hearing, tinnitus, vertigo; No sinus or throat pain  NECK: No pain or stiffness  RESPIRATORY: No cough, wheezing, chills or hemoptysis; No shortness of breath  CARDIOVASCULAR: No chest pain, palpitations, dizziness, or leg swelling  GASTROINTESTINAL: No abdominal or epigastric pain. No nausea, vomiting, or hematemesis; No diarrhea or constipation. No melena or hematochezia.  GENITOURINARY: No dysuria, frequency, hematuria, or incontinence  NEUROLOGICAL: No headaches, memory loss, loss of strength, numbness, or tremors  SKIN: No itching, burning, rashes, or lesions   LYMPH NODES: No enlarged glands  ENDOCRINE: No heat or cold intolerance; No hair loss  MUSCULOSKELETAL: No joint pain or swelling; No muscle, back, or extremity pain  PSYCHIATRIC: No depression, anxiety, mood swings, or difficulty sleeping  HEME/LYMPH: No easy bruising, or bleeding gums  ALLERY AND IMMUNOLOGIC: No hives or eczema    RADIOLOGY & ADDITIONAL TESTS:    Imaging Personally Reviewed:  [ ] YES  [ ] NO    Consultant(s) Notes Reviewed:  [ ] YES  [ ] NO    PHYSICAL EXAM:  GENERAL: NAD, well-groomed, well-developed  HEAD:  Atraumatic, Normocephalic  EYES: EOMI, PERRLA, conjunctiva and sclera clear  ENMT: No tonsillar erythema, exudates, or enlargement; Moist mucous membranes, Good dentition, No lesions  NECK: Supple, No JVD, Normal thyroid  NERVOUS SYSTEM:  Alert & Oriented X3, Good concentration; Motor Strength 5/5 B/L upper and lower extremities; DTRs 2+ intact and symmetric  CHEST/LUNG: Clear to percussion bilaterally; No rales, rhonchi, wheezing, or rubs  HEART: Regular rate and rhythm; No murmurs, rubs, or gallops  ABDOMEN: Soft, Nontender, Nondistended; Bowel sounds present  EXTREMITIES:  2+ Peripheral Pulses, No clubbing, cyanosis, or edema  LYMPH: No lymphadenopathy noted  SKIN: No rashes or lesions    LABS:                        10.6   4.12  )-----------( 288      ( 03 Oct 2023 04:49 )             32.4     10-03    128<L>  |  98  |  15  ----------------------------<  85  4.0   |  18<L>  |  1.38<H>    Ca    8.8      03 Oct 2023 18:39  Phos  2.6     10-03  Mg     1.70     10-03      PT/INR - ( 02 Oct 2023 05:00 )   PT: 12.2 sec;   INR: 1.09 ratio         PTT - ( 02 Oct 2023 05:00 )  PTT:31.0 sec  Urinalysis Basic - ( 03 Oct 2023 18:39 )    Color: x / Appearance: x / SG: x / pH: x  Gluc: 85 mg/dL / Ketone: x  / Bili: x / Urobili: x   Blood: x / Protein: x / Nitrite: x   Leuk Esterase: x / RBC: x / WBC x   Sq Epi: x / Non Sq Epi: x / Bacteria: x      CAPILLARY BLOOD GLUCOSE      POCT Blood Glucose.: 93 mg/dL (03 Oct 2023 21:34)  POCT Blood Glucose.: 76 mg/dL (03 Oct 2023 17:05)  POCT Blood Glucose.: 88 mg/dL (03 Oct 2023 11:16)  POCT Blood Glucose.: 115 mg/dL (03 Oct 2023 09:18)  POCT Blood Glucose.: 67 mg/dL (03 Oct 2023 07:48)  POCT Blood Glucose.: 63 mg/dL (03 Oct 2023 07:45)        Urinalysis Basic - ( 03 Oct 2023 18:39 )    Color: x / Appearance: x / SG: x / pH: x  Gluc: 85 mg/dL / Ketone: x  / Bili: x / Urobili: x   Blood: x / Protein: x / Nitrite: x   Leuk Esterase: x / RBC: x / WBC x   Sq Epi: x / Non Sq Epi: x / Bacteria: x        MEDICATIONS  (STANDING):  dextrose 10%. 1000 milliLiter(s) (25 mL/Hr) IV Continuous <Continuous>  dextrose 50% Injectable 12.5 Gram(s) IV Push once  dextrose 50% Injectable 25 Gram(s) IV Push once  dextrose 50% Injectable 25 Gram(s) IV Push once  dextrose Oral Gel 15 Gram(s) Oral once  enoxaparin Injectable 60 milliGRAM(s) SubCutaneous <User Schedule>  fluconAZOLE   Tablet 200 milliGRAM(s) Oral daily  glucagon  Injectable 1 milliGRAM(s) IntraMuscular once  pantoprazole    Tablet 40 milliGRAM(s) Oral two times a day  senna 2 Tablet(s) Oral at bedtime  sodium chloride 1 Gram(s) Oral three times a day  sucralfate suspension 1 Gram(s) Oral four times a day    MEDICATIONS  (PRN):  ondansetron Injectable 4 milliGRAM(s) IV Push every 6 hours PRN Nausea and/or Vomiting      Care Discussed with Consultants/Other Providers [ ] YES  [ ] NO

## 2023-10-03 NOTE — PROGRESS NOTE ADULT - ASSESSMENT
58M h/o non small cell lung ca on paclitaxel (last 1 week ago, follows with Dr. Son) p/w 1 month progressively worsening dysphagia. Recent admission for PE and SBO. Endorsed dysphagia with that admission. Barium esophagram showing GERD and small sliding hiatal hernia. Nephrology consult called for abnormal renal function    Assessment:  1) Non oliguric stable CKD stage III with S cr 1.4 likely chemotherapy induced renal injury/ATN/renal hypoperfusion/ascites/compression/obstructive uropathy  2) NSCLC with metastasis on chemotherapy  3) History of PE  4) Progressive dysphagia/ Hiatal hernia/GERD/Gastritis/Esophageal candidiases/ Ascites/ Intrinsic GI compression  5) Anemia of chronic illness  6) Hypoalbuminemia  7) Ascites  8) Dysphagia/Poor appetite/recurrent Hypoglycemia/Failure to thrive  9) Electrolytes disorder with euvolemic hyponatremia likely pain/constipation/ADH response/d5w infusion/ Liver disease with ascites  10) Constipation    Recommend:  Strict I/o  Avoid Nephrotoxic agent  S cr 1.4  with non oliguria  Na level 130  Monitor BMP and electrolytes every 12 hrly  Free water restriction to <1L/day  Replete electrolytes with goal K>4 and <5, Mag> 2 and Phos 2.5 to 3.5  Bilateral renal and bladder ultrasound results reviewed  IV/po fluconazole for candida esophagitis  GI/Oncology/hematology follow up reviewed  IR guided paracentesis completed  Nutritional consult follow up  Optimal pain control  Intact PTH, Vitamin D 1,25 level, Phos, calcium level reviewed  Volume status and electrolytes noted  Goals of care discussion  Consider Palliative care consultation   Advance directives per primary team/Family/Patient  No urgent need for RRT/HD  Will follow

## 2023-10-03 NOTE — PROGRESS NOTE ADULT - SUBJECTIVE AND OBJECTIVE BOX
Theo Cardenas MD (Nephrology progress note)  [address], [facility],  [address],  [facility]  TEl: [phone]  Cell: [phone]    Patient is a 58y Male seen and evaluated at bedside. Vital signs, laboratory data, imaging studies, consult notes reviewed done within past 24 hours. Overnight events noted.    Allergies    No Known Allergies    Intolerances        ROS:  CONSTITUTIONAL: No fever or chills.  HEENT: No headcahe or visual disturbances.  RESPIRATORY: No shortness of breath, cough, hemoptysis or wheezing  CARDIOVASCULAR: No Chest pain, shortness of breath, palpitations, dizziness, syncope, orthopnea, PND or leg swelling.  GASTROINTESTINAL: No abdominal pain, nausea, vomiting, diarrhea, hematemesis or blood per rectum.  GENITOURINARY: No urinary frequency, urgency, gross hematuria, dysuria, flank or supra pubic pain, difficulty passing urine.  NEUROLOGICAL: No headache, focal limb weakness, tingling or numbness or seizure like activity  SKIN: No skin rash or lesion  MUSCULOSKELETAL: No leg pain, calf pain or swelling    VITALS:    T(C): 36.9 (10-03-23 @ 09:22), Max: 37.3 (10-02-23 @ 21:30)  HR: 89 (10-03-23 @ 09:22) (83 - 100)  BP: 132/97 (10-03-23 @ 09:22) (121/88 - 132/97)  RR: 19 (10-03-23 @ 09:22) (16 - 19)  SpO2: 100% (10-03-23 @ 09:22) (100% - 100%)  CAPILLARY BLOOD GLUCOSE      POCT Blood Glucose.: 88 mg/dL (03 Oct 2023 11:16)  POCT Blood Glucose.: 115 mg/dL (03 Oct 2023 09:18)  POCT Blood Glucose.: 67 mg/dL (03 Oct 2023 07:48)  POCT Blood Glucose.: 63 mg/dL (03 Oct 2023 07:45)      MEDICATIONS  (STANDING):  dextrose 50% Injectable 25 Gram(s) IV Push once  dextrose 50% Injectable 12.5 Gram(s) IV Push once  dextrose 50% Injectable 25 Gram(s) IV Push once  dextrose Oral Gel 15 Gram(s) Oral once  fluconAZOLE   Tablet 200 milliGRAM(s) Oral daily  glucagon  Injectable 1 milliGRAM(s) IntraMuscular once  pantoprazole    Tablet 40 milliGRAM(s) Oral two times a day  potassium phosphate / sodium phosphate Powder (PHOS-NaK) 1 Packet(s) Oral three times a day before meals  senna 2 Tablet(s) Oral at bedtime  sucralfate suspension 1 Gram(s) Oral four times a day    MEDICATIONS  (PRN):  ondansetron Injectable 4 milliGRAM(s) IV Push every 6 hours PRN Nausea and/or Vomiting      PHYSICAL EXAM:  GENERAL: Alert, awake and oriented x3 in no distress  HEENT: ROBYN, EOMI, neck supple, no JVP, no carotid bruit, oral mucosa moist and pink.  CHEST/LUNG: Bilateral clear breath sounds, no rales, no crepitations, no rhonchi, no wheezing  HEART: Regular rate and rhythm, LOVE II/VI at LPSB, no gallops, no rub   ABDOMEN: Soft, nontender, non distended, bowel sounds present, no palpable organomegaly  : No flank or supra pubic tenderness.  EXTREMITIES: Peripheral pulses are palpable, no pedal edema  Neurology: AAOx3, no focal neurological deficit  SKIN: No rash or skin lesion  Musculoskeletal: No joint deformity or spinal tenderness.      Vascular ACCESS:     LABS:                        10.6   4.12  )-----------( 288      ( 03 Oct 2023 04:49 )             32.4     10-03    130<L>  |  95<L>  |  13  ----------------------------<  65<L>  3.7   |  22  |  1.52<H>    Ca    9.1      03 Oct 2023 04:49  Phos  2.6     10-03  Mg     1.70     10-03        PT/INR - ( 02 Oct 2023 05:00 )   PT: 12.2 sec;   INR: 1.09 ratio         PTT - ( 02 Oct 2023 05:00 )  PTT:31.0 sec  Urinalysis Basic - ( 03 Oct 2023 04:49 )    Color: x / Appearance: x / SG: x / pH: x  Gluc: 65 mg/dL / Ketone: x  / Bili: x / Urobili: x   Blood: x / Protein: x / Nitrite: x   Leuk Esterase: x / RBC: x / WBC x   Sq Epi: x / Non Sq Epi: x / Bacteria: x            RADIOLOGY & ADDITIONAL STUDIES:    Imaging Personally Reviewed:  [x] YES  [ ] NO    Consultant(s) Notes Reviewed:  [x] YES  [ ] NO    Care Discussed with Primary team/ Other Providers [x] YES  [ ] NO       Theo Cardenas MD (Nephrology progress note)  [address], [facility],  [address],  [facility]  TEl: [phone]  Cell: [phone]    Patient is a 58y Male seen and evaluated at bedside. Vital signs, laboratory data, imaging studies, consult notes reviewed done within past 24 hours. Overnight events noted. Patient lying in bed complains of poor oral intake/nausea and vomiting    Allergies    No Known Allergies    Intolerances        ROS:  CONSTITUTIONAL: No fever or chills.  HEENT: No headcahe or visual disturbances.  RESPIRATORY: No shortness of breath, cough, hemoptysis or wheezing  CARDIOVASCULAR: No Chest pain, shortness of breath, palpitations, dizziness, syncope, orthopnea, PND or leg swelling.  GASTROINTESTINAL: No abdominal pain, nausea, vomiting, diarrhea, hematemesis or blood per rectum.  GENITOURINARY: No urinary frequency, urgency, gross hematuria, dysuria, flank or supra pubic pain, difficulty passing urine.  NEUROLOGICAL: No headache, focal limb weakness, tingling or numbness or seizure like activity  SKIN: No skin rash or lesion  MUSCULOSKELETAL: No leg pain, calf pain or swelling    VITALS:    T(C): 36.9 (10-03-23 @ 09:22), Max: 37.3 (10-02-23 @ 21:30)  HR: 89 (10-03-23 @ 09:22) (83 - 100)  BP: 132/97 (10-03-23 @ 09:22) (121/88 - 132/97)  RR: 19 (10-03-23 @ 09:22) (16 - 19)  SpO2: 100% (10-03-23 @ 09:22) (100% - 100%)  CAPILLARY BLOOD GLUCOSE      POCT Blood Glucose.: 88 mg/dL (03 Oct 2023 11:16)  POCT Blood Glucose.: 115 mg/dL (03 Oct 2023 09:18)  POCT Blood Glucose.: 67 mg/dL (03 Oct 2023 07:48)  POCT Blood Glucose.: 63 mg/dL (03 Oct 2023 07:45)      MEDICATIONS  (STANDING):  dextrose 50% Injectable 25 Gram(s) IV Push once  dextrose 50% Injectable 12.5 Gram(s) IV Push once  dextrose 50% Injectable 25 Gram(s) IV Push once  dextrose Oral Gel 15 Gram(s) Oral once  fluconAZOLE   Tablet 200 milliGRAM(s) Oral daily  glucagon  Injectable 1 milliGRAM(s) IntraMuscular once  pantoprazole    Tablet 40 milliGRAM(s) Oral two times a day  potassium phosphate / sodium phosphate Powder (PHOS-NaK) 1 Packet(s) Oral three times a day before meals  senna 2 Tablet(s) Oral at bedtime  sucralfate suspension 1 Gram(s) Oral four times a day    MEDICATIONS  (PRN):  ondansetron Injectable 4 milliGRAM(s) IV Push every 6 hours PRN Nausea and/or Vomiting      PHYSICAL EXAM:  GENERAL: Alert, awake and oriented x3 in no distress  HEENT: ROBYN, EOMI, neck supple, no JVP, no carotid bruit, oral mucosa moist and pink.  CHEST/LUNG: Bilateral clear breath sounds, no rales, no crepitations, no rhonchi, no wheezing  HEART: Regular rate and rhythm, LOVE II/VI at LPSB, no gallops, no rub   ABDOMEN: Soft, nontender, non distended, bowel sounds present, no palpable organomegaly  : No flank or supra pubic tenderness.  EXTREMITIES: Peripheral pulses are palpable, no pedal edema  Neurology: AAOx3, no focal neurological deficit  SKIN: No rash or skin lesion  Musculoskeletal: No joint deformity or spinal tenderness.      Vascular ACCESS:     LABS:                        10.6   4.12  )-----------( 288      ( 03 Oct 2023 04:49 )             32.4     10-03    130<L>  |  95<L>  |  13  ----------------------------<  65<L>  3.7   |  22  |  1.52<H>    Ca    9.1      03 Oct 2023 04:49  Phos  2.6     10-03  Mg     1.70     10-03        PT/INR - ( 02 Oct 2023 05:00 )   PT: 12.2 sec;   INR: 1.09 ratio         PTT - ( 02 Oct 2023 05:00 )  PTT:31.0 sec  Urinalysis Basic - ( 03 Oct 2023 04:49 )    Color: x / Appearance: x / SG: x / pH: x  Gluc: 65 mg/dL / Ketone: x  / Bili: x / Urobili: x   Blood: x / Protein: x / Nitrite: x   Leuk Esterase: x / RBC: x / WBC x   Sq Epi: x / Non Sq Epi: x / Bacteria: x            RADIOLOGY & ADDITIONAL STUDIES:    Imaging Personally Reviewed:  [x] YES  [ ] NO    Consultant(s) Notes Reviewed:  [x] YES  [ ] NO    Care Discussed with Primary team/ Other Providers [x] YES  [ ] NO       Theo Cardenas MD (Nephrology progress note)  [address], [facility],  [address],  [facility]  TEl: [phone]  Cell: [phone]    Patient is a 58y Male seen and evaluated at bedside. Vital signs, laboratory data, imaging studies, consult notes reviewed done within past 24 hours. Overnight events noted. Patient lying in bed complains of poor oral intake/nausea and vomiting.  Interval stable renal function with improving Na level 130 today morning. Patient remains with persistent hypoglycemia now on IV D10w    Allergies    No Known Allergies    Intolerances        ROS:  CONSTITUTIONAL: No fever or chills.  HEENT: No headache or visual disturbances.  RESPIRATORY: No shortness of breath, cough, hemoptysis or wheezing  CARDIOVASCULAR: No Chest pain, shortness of breath, palpitations, dizziness, syncope, orthopnea, PND or leg swelling.  GASTROINTESTINAL: No abdominal pain, nausea, vomiting, diarrhea, hematemesis or blood per rectum.  GENITOURINARY: No urinary frequency, urgency, gross hematuria, dysuria, flank or supra pubic pain, difficulty passing urine.  NEUROLOGICAL: No headache, focal limb weakness, tingling or numbness or seizure like activity  SKIN: No skin rash or lesion  MUSCULOSKELETAL: No leg pain, calf pain or swelling    VITALS:    T(C): 36.9 (10-03-23 @ 09:22), Max: 37.3 (10-02-23 @ 21:30)  HR: 89 (10-03-23 @ 09:22) (83 - 100)  BP: 132/97 (10-03-23 @ 09:22) (121/88 - 132/97)  RR: 19 (10-03-23 @ 09:22) (16 - 19)  SpO2: 100% (10-03-23 @ 09:22) (100% - 100%)  CAPILLARY BLOOD GLUCOSE      POCT Blood Glucose.: 88 mg/dL (03 Oct 2023 11:16)  POCT Blood Glucose.: 115 mg/dL (03 Oct 2023 09:18)  POCT Blood Glucose.: 67 mg/dL (03 Oct 2023 07:48)  POCT Blood Glucose.: 63 mg/dL (03 Oct 2023 07:45)      MEDICATIONS  (STANDING):  dextrose 50% Injectable 25 Gram(s) IV Push once  dextrose 50% Injectable 12.5 Gram(s) IV Push once  dextrose 50% Injectable 25 Gram(s) IV Push once  dextrose Oral Gel 15 Gram(s) Oral once  fluconAZOLE   Tablet 200 milliGRAM(s) Oral daily  glucagon  Injectable 1 milliGRAM(s) IntraMuscular once  pantoprazole    Tablet 40 milliGRAM(s) Oral two times a day  potassium phosphate / sodium phosphate Powder (PHOS-NaK) 1 Packet(s) Oral three times a day before meals  senna 2 Tablet(s) Oral at bedtime  sucralfate suspension 1 Gram(s) Oral four times a day    MEDICATIONS  (PRN):  ondansetron Injectable 4 milliGRAM(s) IV Push every 6 hours PRN Nausea and/or Vomiting      PHYSICAL EXAM:  GENERAL: Alert, awake and oriented x3 in no distress  HEENT: ROBYN, EOMI, neck supple, no JVP  CHEST/LUNG: Bilateral decreased breath sounds, no rales, no rhonchi, no wheezing  HEART: Regular rate and rhythm, LOVE II/VI at LPSB, no gallops, no rub   ABDOMEN: Soft, diffuse abdominal tenderness, mildly distended, bowel sounds present  : No flank or supra pubic tenderness.  EXTREMITIES: Peripheral pulses are palpable, no pedal edema  Neurology: AAOx3, no focal neurological deficit  SKIN: No rash or skin lesion  Musculoskeletal: No joint deformity or spinal tenderness.      Vascular ACCESS: None    LABS:                        10.6   4.12  )-----------( 288      ( 03 Oct 2023 04:49 )             32.4     10-03    130<L>  |  95<L>  |  13  ----------------------------<  65<L>  3.7   |  22  |  1.52<H>    Ca    9.1      03 Oct 2023 04:49  Phos  2.6     10-03  Mg     1.70     10-03        PT/INR - ( 02 Oct 2023 05:00 )   PT: 12.2 sec;   INR: 1.09 ratio         PTT - ( 02 Oct 2023 05:00 )  PTT:31.0 sec  Urinalysis Basic - ( 03 Oct 2023 04:49 )    Color: x / Appearance: x / SG: x / pH: x  Gluc: 65 mg/dL / Ketone: x  / Bili: x / Urobili: x   Blood: x / Protein: x / Nitrite: x   Leuk Esterase: x / RBC: x / WBC x   Sq Epi: x / Non Sq Epi: x / Bacteria: x            RADIOLOGY & ADDITIONAL STUDIES:  rad  Imaging Personally Reviewed:  [x] YES  [ ] NO    Consultant(s) Notes Reviewed:  [x] YES  [ ] NO    Care Discussed with Primary team/ Other Providers [x] YES  [ ] NO

## 2023-10-03 NOTE — CHART NOTE - NSCHARTNOTEFT_GEN_A_CORE
[facility] MED[facility]INE NIGHT COVERAGE    Patient with sodium level 128 down from 130. Patient is being followed by nephrology for CKD with hyponatremia. Per recommendation of nephrology attending, Dr. Theo Cardenas, sodium chloride 1g po TID ordered. Will continue to monitor. RN made aware.    Constance Del Cid PA-C  Medicine [id number]

## 2023-10-03 NOTE — ADVANCED PRACTICE NURSE CONSULT - REASON FOR CONSULT
Patient seen on skin care rounds after wound care referral received for assessment of skin impairment and recommendations of topical management. As per H&P, patient is a 58M h/o non small cell lung ca on paclitaxel (last 1 week ago, follows with Dr. Son) p/w 1 month progressively worsening dysphagia to solids and liquids, found to have esophageal candidiasis. Chart reviewed: WBC 4.12, H/H 10.6/32.4, Platelets 288, INR 1.09, Serum albumin 2.1, A1C 5.9, BMI 19.9, Hai 17.

## 2023-10-03 NOTE — CHART NOTE - NSCHARTNOTEFT_GEN_A_CORE
Source: Patient [X]    Family [ ]     other (chart review) [X]    Medical Course: Patient is a 58y Male with PMH non small cell lung ca on paclitaxel (last 1 week ago, follows with Dr. Son) who presented to [facility] with 1 month progressively worsening dysphagia to solids and liquids, found to have esophageal candidiasis. Patient with persistent hypoglycemia, now on IV D10w. IR guided paracentesis completed today 10/3/23.    Nutrition Course: Patient had been ordered for a full liquid diet from 9/20-10/3 (x13 days total). Diet advanced this afternoon to minced and moist. Noted bedside swallow assessment 9/19, recommendations made for regular solids and thin liquids. Patient has been observed with poor PO intake and poor tolerance to diet throughout course of admission. Patient seen this AM by writer. Patient endorsed a continued poor appetite secondary to swallowing difficulty and emesis following the ingestion of any food/liquid. Documented on RN flowsheet as consuming 0-25% of meals. Patient reports he had not been taking the Ensure Compact supplement previously ordered due to unable to tolerate PO. Denies nausea, diarrhea, or constipation.  --> Given prolonged nature of inadequate PO intake secondary to swallowing difficulty, recommend consideration of artificial nutrition as acceptable per goals of care and as medically feasible. Please consult nutrition for recommendations as needed. The above has been communicated with physician via [employer] Teams.    Diet: Minced and Moist  Supplement: Ensure Enlive TID    Weight (kg): 62.9 (10-03 @ 12:59), 70.8kg (bed scale weight obtained by RD 9/26, may be inaccurate due to heavy blanket noted on bed), 56.3kg (09-20 @ 07:59)  % Weight Change: -7.9kg (11%) over 1 week period of time; question accuracy given large amount over a short timeframe, 9/26 weight previously questioned accuracy    Pertinent Medications: MEDICATIONS  (STANDING):  dextrose 10%. 1000 milliLiter(s) (25 mL/Hr) IV Continuous <Continuous>  dextrose 50% Injectable 12.5 Gram(s) IV Push once  dextrose 50% Injectable 25 Gram(s) IV Push once  dextrose 50% Injectable 25 Gram(s) IV Push once  dextrose Oral Gel 15 Gram(s) Oral once  enoxaparin Injectable 60 milliGRAM(s) SubCutaneous <User Schedule>  fluconAZOLE   Tablet 200 milliGRAM(s) Oral daily  glucagon  Injectable 1 milliGRAM(s) IntraMuscular once  pantoprazole    Tablet 40 milliGRAM(s) Oral two times a day  potassium phosphate / sodium phosphate Powder (PHOS-NaK) 1 Packet(s) Oral three times a day before meals  senna 2 Tablet(s) Oral at bedtime  sucralfate suspension 1 Gram(s) Oral four times a day    MEDICATIONS  (PRN):  ondansetron Injectable 4 milliGRAM(s) IV Push every 6 hours PRN Nausea and/or Vomiting    Pertinent Labs:  10-03 Na130 mmol/L<L> Glu 65 mg/dL<L> K+ 3.7 mmol/L Cr  1.52 mg/dL<H> BUN 13 mg/dL 10-03 Phos 2.6 mg/dL    Skin: No pressure ulcers/injuries documented per RN flowsheets     Fluid: No edema documented per RN flowsheets     GI: Last BM 9/29/23 per RN flowsheet documentation, noted to be on a bowel regimen    Estimated Needs:   [X] no change since previous assessment  Weight used: Current body weight 124lb/56.2kg  Estimated energy needs: 1967-2248kcal (based on 35-40kcal/kg)  Estimated protein needs: 78.68-89.92gms (based on 1.4-1.6gms/kg)    Previous Nutrition Diagnosis: Severe malnutrition    Nutrition Diagnosis is [X] ongoing    New Nutrition Diagnosis: Not applicable    Education: [X] Given on previous assessment by RD    Nutrition Recommendations  - Continue current diet as medically feasible and as tolerated: Minced and moist  --> If patient is observed with increased chewing or swallowing difficulty, recommend SLP consult  - Recommend change supplement to Ensure Compact (provides 220kcal, 9gms protein per serving) TID for potential improved tolerance  - Defer fluid management to medical team  - Recommend thiamine supplementation at medical team's discretion, if able to provide safely  - Monitor weights, labs, BM's, skin integrity, p.o. intake.   - Please monitor % PO intake on flowsheets   - Honor food preferences as able within therapeutic diet order  - If patient continues to have inadequate PO intake, consider initiation of artificial nutrition as acceptable per goals of care    Monitoring and Evaluation:   [X] PO intake [ ] Tolerance to diet prescription [X] weights [X] follow up per protocol    Jennifer Mireles MS RDN CDN  On [employer] Teams, Pager #[phone] Source: Patient [X]    Family [ ]     other (chart review) [X]    Medical Course: Patient is a 58y Male with PMH non small cell lung ca on paclitaxel (last 1 week ago, follows with Dr. Son) who presented to [facility] with 1 month progressively worsening dysphagia to solids and liquids, found to have esophageal candidiasis. Patient with persistent hypoglycemia, now on IV D10w. IR guided paracentesis completed today 10/3/23.    Nutrition Course: Patient had been ordered for a full liquid diet from 9/20-10/3 (x13 days total). Diet advanced this afternoon to minced and moist. Noted bedside swallow assessment 9/19, recommendations made for regular solids and thin liquids. Patient has been observed with poor PO intake and poor tolerance to diet throughout course of admission. Patient seen this AM by writer. Patient endorsed a continued poor appetite secondary to swallowing difficulty and emesis following the ingestion of any food/liquid. Documented on RN flowsheet as consuming 0-25% of meals. Patient reports he had not been taking the Ensure Compact supplement previously ordered due to unable to tolerate PO. Denies nausea, diarrhea, or constipation.  --> Given prolonged nature of inadequate PO intake secondary to swallowing difficulty, recommend consideration of artificial nutrition as acceptable per goals of care and as medically feasible. Please consult nutrition for recommendations as needed. The above has been communicated with physician via [employer] Teams.    Diet: Minced and Moist  Supplement: Ensure Enlive TID    Weight (kg): 62.9 (10-03 @ 12:59), 70.8kg (bed scale weight obtained by RD 9/26, may be inaccurate due to heavy blanket noted on bed), 56.3kg (09-20 @ 07:59)  % Weight Change: -7.9kg (11%) over 1 week period of time; question accuracy given large amount over a short timeframe, 9/26 weight previously questioned accuracy    Pertinent Medications: MEDICATIONS  (STANDING):  dextrose 10%. 1000 milliLiter(s) (25 mL/Hr) IV Continuous <Continuous>  dextrose 50% Injectable 12.5 Gram(s) IV Push once  dextrose 50% Injectable 25 Gram(s) IV Push once  dextrose 50% Injectable 25 Gram(s) IV Push once  dextrose Oral Gel 15 Gram(s) Oral once  enoxaparin Injectable 60 milliGRAM(s) SubCutaneous <User Schedule>  fluconAZOLE   Tablet 200 milliGRAM(s) Oral daily  glucagon  Injectable 1 milliGRAM(s) IntraMuscular once  pantoprazole    Tablet 40 milliGRAM(s) Oral two times a day  potassium phosphate / sodium phosphate Powder (PHOS-NaK) 1 Packet(s) Oral three times a day before meals  senna 2 Tablet(s) Oral at bedtime  sucralfate suspension 1 Gram(s) Oral four times a day    MEDICATIONS  (PRN):  ondansetron Injectable 4 milliGRAM(s) IV Push every 6 hours PRN Nausea and/or Vomiting    Pertinent Labs:  10-03 Na130 mmol/L<L> Glu 65 mg/dL<L> K+ 3.7 mmol/L Cr  1.52 mg/dL<H> BUN 13 mg/dL 10-03 Phos 2.6 mg/dL    Skin: Stage II sacrum pressure injury per RN flowsheet    Fluid: No edema documented per RN flowsheets     GI: Last BM 9/29/23 per RN flowsheet documentation, noted to be on a bowel regimen    Estimated Needs:   [X] no change since previous assessment  Weight used: Current body weight 124lb/56.2kg  Estimated energy needs: 1967-2248kcal (based on 35-40kcal/kg)  Estimated protein needs: 78.68-89.92gms (based on 1.4-1.6gms/kg)    Previous Nutrition Diagnosis: Severe malnutrition    Nutrition Diagnosis is [X] ongoing    New Nutrition Diagnosis: Not applicable    Education: [X] Given on previous assessment by RD    Nutrition Recommendations  - Continue current diet as medically feasible and as tolerated: Minced and moist  --> If patient is observed with increased chewing or swallowing difficulty, recommend SLP consult  - Recommend change supplement to Ensure Compact (provides 220kcal, 9gms protein per serving) TID for potential improved tolerance  - Defer fluid management to medical team  - Recommend thiamine supplementation at medical team's discretion, if able to provide safely  - Monitor weights, labs, BM's, skin integrity, p.o. intake.   - Please monitor % PO intake on flowsheets   - Honor food preferences as able within therapeutic diet order  - If patient continues to have inadequate PO intake, consider initiation of artificial nutrition as acceptable per goals of care    Monitoring and Evaluation:   [X] PO intake [ ] Tolerance to diet prescription [X] weights [X] follow up per protocol    Jennifer Mireles MS RDN CDN  On [employer] Teams, Pager #[phone]

## 2023-10-03 NOTE — [facility]ACT[facility] NURSE CONSULT - ASSESSMENT
General: A&Ox4, cachetic and temporal wasting, able to turn with 1x assistance, bedbound, incontinent of urine and stool. Skin warm, dry with increased moisture in intertriginous folds, scattered areas of hyperpigmentation and hypopigmentation, scattered areas of ecchymosis without hematoma on bilateral upper extremities.     Vascular of b/l lower extremities: Bilateral lower extremities with scattered areas of hyper and hypopigmentation. Dry, flaky skin. Thickened-yellow hyperpigmented overgrown toenails. Increased coolness from midfoot to distal toes. +1 Edema. Capillary refill less than 3 seconds. +1 DP/PT pulses.    Sacrum: Stage 2 pressure injury measuring [id number]. Presenting as 10% red dermis, 20% yellow moist fibrin, and 70% pink dermis. Small serofibrinous drainage, mild odor. No purulence able to be expressed. Tenderness upon palpation as per patient. Periwound hypopigmentation. No increased warmth, no fluctuance, no erythema, no induration, no edema, no overt signs of infection noted at this time. Goals of care: monitor for tissue type changes and continue measures to decrease friction/shear/pressure.  General: A&Ox4, cachetic and temporal wasting, able to turn with 1x assistance, bedbound, incontinent of urine and stool. Skin warm, dry with increased moisture in intertriginous folds, scattered areas of hyperpigmentation and hypopigmentation, scattered areas of ecchymosis without hematoma on bilateral upper extremities.     Vascular of b/l lower extremities: Bilateral lower extremities with scattered areas of hyper and hypopigmentation. Dry, flaky skin. Thickened-yellow hyperpigmented overgrown toenails. Increased coolness from midfoot to distal toes. +1 Edema. Capillary refill less than 3 seconds. +1 DP/PT pulses.    Sacrum: Stage 2 pressure injury c/b friction measuring [id number]. Presenting as 10% red dermis, 20% yellow moist fibrin, and 70% pink dermis. Small serofibrinous drainage, mild odor. No purulence able to be expressed. Tenderness upon palpation as per patient. Able to be visualized in natural anatomical position. Periwound hypopigmentation. No increased warmth, no fluctuance, no erythema, no induration, no edema, no overt signs of infection noted at this time. Goals of care: monitor for tissue type changes and continue measures to decrease friction/shear/pressure.  General: A&Ox4, cachetic and temporal wasting, able to turn with 1x assistance, bedbound, incontinent of urine and stool. Skin warm, dry with increased moisture in intertriginous folds, scattered areas of hyperpigmentation and hypopigmentation, scattered areas of ecchymosis without hematoma on bilateral upper extremities.     Vascular of b/l lower extremities: Bilateral lower extremities with scattered areas of hyper and hypopigmentation. Dry, flaky skin. Thickened-yellow hyperpigmented overgrown toenails. Increased coolness from midfoot to distal toes. +1 Edema. Capillary refill less than 3 seconds. +1 DP/PT pulses.    Sacrum: Stage 2 pressure injury c/b friction measuring [id number]. Presenting as 10% red dermis, 20% yellow moist fibrin, and 70% pink dermis. Small serofibrinous drainage, mild odor. No purulence able to be expressed. Tenderness upon palpation as per patient. Able to be visualized in natural anatomical position. Noted to be over gregoria prominence. Periwound hypopigmentation. No increased warmth, no fluctuance, no erythema, no induration, no edema, no overt signs of infection noted at this time. Goals of care: monitor for tissue type changes and continue measures to decrease friction/shear/pressure.

## 2023-10-04 DIAGNOSIS — Z51.5 ENCOUNTER FOR PALLIATIVE CARE: ICD-10-CM

## 2023-10-04 DIAGNOSIS — Z71.89 OTHER SPECIFIED COUNSELING: ICD-10-CM

## 2023-10-04 DIAGNOSIS — R18.8 OTHER ASCITES: ICD-10-CM

## 2023-10-04 DIAGNOSIS — Z91.89 OTHER SPECIFIED PERSONAL RISK FACTORS, NOT ELSEWHERE CLASSIFIED: ICD-10-CM

## 2023-10-04 LAB
ANION GAP SERPL CALC-SCNC: 10 MMOL/L — SIGNIFICANT CHANGE UP (ref 7–14)
ANION GAP SERPL CALC-SCNC: 13 MMOL/L — SIGNIFICANT CHANGE UP (ref 7–14)
APPEARANCE UR: ABNORMAL
BILIRUB UR-MCNC: NEGATIVE — SIGNIFICANT CHANGE UP
BUN SERPL-MCNC: 17 MG/DL — SIGNIFICANT CHANGE UP (ref 7–23)
BUN SERPL-MCNC: 19 MG/DL — SIGNIFICANT CHANGE UP (ref 7–23)
CALCIUM SERPL-MCNC: 8.8 MG/DL — SIGNIFICANT CHANGE UP (ref 8.4–10.5)
CALCIUM SERPL-MCNC: 8.8 MG/DL — SIGNIFICANT CHANGE UP (ref 8.4–10.5)
CHLORIDE SERPL-SCNC: 93 MMOL/L — LOW (ref 98–107)
CHLORIDE SERPL-SCNC: 98 MMOL/L — SIGNIFICANT CHANGE UP (ref 98–107)
CO2 SERPL-SCNC: 18 MMOL/L — LOW (ref 22–31)
CO2 SERPL-SCNC: 23 MMOL/L — SIGNIFICANT CHANGE UP (ref 22–31)
COLOR SPEC: SIGNIFICANT CHANGE UP
CREAT SERPL-MCNC: 1.39 MG/DL — HIGH (ref 0.5–1.3)
CREAT SERPL-MCNC: 1.48 MG/DL — HIGH (ref 0.5–1.3)
DIFF PNL FLD: NEGATIVE — SIGNIFICANT CHANGE UP
EGFR: 55 ML/MIN/1.73M2 — LOW
EGFR: 59 ML/MIN/1.73M2 — LOW
GLUCOSE BLDC GLUCOMTR-MCNC: 110 MG/DL — HIGH (ref 70–99)
GLUCOSE BLDC GLUCOMTR-MCNC: 115 MG/DL — HIGH (ref 70–99)
GLUCOSE BLDC GLUCOMTR-MCNC: 79 MG/DL — SIGNIFICANT CHANGE UP (ref 70–99)
GLUCOSE SERPL-MCNC: 118 MG/DL — HIGH (ref 70–99)
GLUCOSE SERPL-MCNC: 129 MG/DL — HIGH (ref 70–99)
GLUCOSE UR QL: NEGATIVE MG/DL — SIGNIFICANT CHANGE UP
HCT VFR BLD CALC: 31.9 % — LOW (ref 39–50)
HGB BLD-MCNC: 10.6 G/DL — LOW (ref 13–17)
KETONES UR-MCNC: ABNORMAL MG/DL
LEUKOCYTE ESTERASE UR-ACNC: NEGATIVE — SIGNIFICANT CHANGE UP
MAGNESIUM SERPL-MCNC: 1.7 MG/DL — SIGNIFICANT CHANGE UP (ref 1.6–2.6)
MCHC RBC-ENTMCNC: 30.5 PG — SIGNIFICANT CHANGE UP (ref 27–34)
MCHC RBC-ENTMCNC: 33.2 GM/DL — SIGNIFICANT CHANGE UP (ref 32–36)
MCV RBC AUTO: 91.9 FL — SIGNIFICANT CHANGE UP (ref 80–100)
NITRITE UR-MCNC: NEGATIVE — SIGNIFICANT CHANGE UP
NRBC # BLD: 0 /100 WBCS — SIGNIFICANT CHANGE UP (ref 0–0)
NRBC # FLD: 0 K/UL — SIGNIFICANT CHANGE UP (ref 0–0)
OSMOLALITY UR: 581 MOSM/KG — SIGNIFICANT CHANGE UP (ref 50–1200)
PH UR: 5.5 — SIGNIFICANT CHANGE UP (ref 5–8)
PHOSPHATE SERPL-MCNC: 2.9 MG/DL — SIGNIFICANT CHANGE UP (ref 2.5–4.5)
PLATELET # BLD AUTO: 260 K/UL — SIGNIFICANT CHANGE UP (ref 150–400)
POTASSIUM SERPL-MCNC: 3.6 MMOL/L — SIGNIFICANT CHANGE UP (ref 3.5–5.3)
POTASSIUM SERPL-MCNC: 5 MMOL/L — SIGNIFICANT CHANGE UP (ref 3.5–5.3)
POTASSIUM SERPL-SCNC: 3.6 MMOL/L — SIGNIFICANT CHANGE UP (ref 3.5–5.3)
POTASSIUM SERPL-SCNC: 5 MMOL/L — SIGNIFICANT CHANGE UP (ref 3.5–5.3)
PROT UR-MCNC: 30 MG/DL
RBC # BLD: 3.47 M/UL — LOW (ref 4.2–5.8)
RBC # FLD: 16.5 % — HIGH (ref 10.3–14.5)
RBC CASTS # UR COMP ASSIST: SIGNIFICANT CHANGE UP /HPF
SODIUM SERPL-SCNC: 126 MMOL/L — LOW (ref 135–145)
SODIUM SERPL-SCNC: 129 MMOL/L — LOW (ref 135–145)
SODIUM UR-SCNC: 34 MMOL/L — SIGNIFICANT CHANGE UP
SP GR SPEC: 1.02 — SIGNIFICANT CHANGE UP (ref 1–1.03)
UROBILINOGEN FLD QL: 1 MG/DL — SIGNIFICANT CHANGE UP (ref 0.2–1)
WBC # BLD: 8.22 K/UL — SIGNIFICANT CHANGE UP (ref 3.8–10.5)
WBC # FLD AUTO: 8.22 K/UL — SIGNIFICANT CHANGE UP (ref 3.8–10.5)
WBC UR QL: SIGNIFICANT CHANGE UP /HPF (ref 0–5)

## 2023-10-04 PROCEDURE — 99222 1ST HOSP IP/OBS MODERATE 55: CPT

## 2023-10-04 PROCEDURE — 99497 ADVNCD CARE PLAN 30 MIN: CPT | Mod: 25

## 2023-10-04 PROCEDURE — 93010 ELECTROCARDIOGRAM REPORT: CPT

## 2023-10-04 RX ORDER — MAGNESIUM SULFATE 500 MG/ML
1 VIAL (ML) INJECTION ONCE
Refills: 0 | Status: COMPLETED | OUTPATIENT
Start: 2023-10-04 | End: 2023-10-04

## 2023-10-04 RX ORDER — ENOXAPARIN SODIUM 100 MG/ML
60 INJECTION SUBCUTANEOUS EVERY 12 HOURS
Refills: 0 | Status: DISCONTINUED | OUTPATIENT
Start: 2023-10-04 | End: 2023-10-08

## 2023-10-04 RX ORDER — SODIUM CHLORIDE 9 MG/ML
2 INJECTION INTRAMUSCULAR; INTRAVENOUS; SUBCUTANEOUS EVERY 8 HOURS
Refills: 0 | Status: DISCONTINUED | OUTPATIENT
Start: 2023-10-04 | End: 2023-10-08

## 2023-10-04 RX ORDER — POTASSIUM CHLORIDE 20 MEQ
10 PACKET (EA) ORAL
Refills: 0 | Status: COMPLETED | OUTPATIENT
Start: 2023-10-04 | End: 2023-10-04

## 2023-10-04 RX ORDER — METOCLOPRAMIDE HCL 10 MG
10 TABLET ORAL EVERY 6 HOURS
Refills: 0 | Status: COMPLETED | OUTPATIENT
Start: 2023-10-04 | End: 2023-10-05

## 2023-10-04 RX ADMIN — PANTOPRAZOLE SODIUM 40 MILLIGRAM(S): 20 TABLET, DELAYED RELEASE ORAL at 04:37

## 2023-10-04 RX ADMIN — ONDANSETRON 4 MILLIGRAM(S): 8 TABLET, FILM COATED ORAL at 04:37

## 2023-10-04 RX ADMIN — ENOXAPARIN SODIUM 60 MILLIGRAM(S): 100 INJECTION SUBCUTANEOUS at 17:25

## 2023-10-04 RX ADMIN — SODIUM CHLORIDE 2 GRAM(S): 9 INJECTION INTRAMUSCULAR; INTRAVENOUS; SUBCUTANEOUS at 21:41

## 2023-10-04 RX ADMIN — SODIUM CHLORIDE 2 GRAM(S): 9 INJECTION INTRAMUSCULAR; INTRAVENOUS; SUBCUTANEOUS at 13:08

## 2023-10-04 RX ADMIN — Medication 100 GRAM(S): at 12:42

## 2023-10-04 RX ADMIN — ENOXAPARIN SODIUM 60 MILLIGRAM(S): 100 INJECTION SUBCUTANEOUS at 04:37

## 2023-10-04 RX ADMIN — FLUCONAZOLE 200 MILLIGRAM(S): 150 TABLET ORAL at 11:14

## 2023-10-04 RX ADMIN — Medication 10 MILLIGRAM(S): at 17:24

## 2023-10-04 RX ADMIN — Medication 100 MILLIEQUIVALENT(S): at 11:14

## 2023-10-04 RX ADMIN — SODIUM CHLORIDE 1 GRAM(S): 9 INJECTION INTRAMUSCULAR; INTRAVENOUS; SUBCUTANEOUS at 04:37

## 2023-10-04 RX ADMIN — Medication 100 MILLIEQUIVALENT(S): at 10:09

## 2023-10-04 RX ADMIN — Medication 5 MILLIGRAM(S): at 21:41

## 2023-10-04 RX ADMIN — PANTOPRAZOLE SODIUM 40 MILLIGRAM(S): 20 TABLET, DELAYED RELEASE ORAL at 17:24

## 2023-10-04 NOTE — CONSULT NOTE ADULT - SUBJECTIVE AND OBJECTIVE BOX
[facility]  Dianne Suggs Palliative Care Attending  Contact Info: Page [phone] (including Nights/Weekends), message on Microsoft Teams (Dianne Suggs), or leave VM at Palliative Office [phone] (non-urgent)     Date of Hyiwbfe05-09-50 @ 10:47  HPI:  58M h/o non small cell lung ca on paclitaxel (last 1 week ago, follows with Dr. Son) p/w 1 month progressively worsening dysphagia. Recent admission for PE and SBO. Endorsed dysphagia with that admission. Barium esophagram showing GERD and small sliding hiatal hernia. D/c'd with protonix and outpt f/u. Dysphagia worsened over last month, stating he feels solids and liquids get stuck in his esophagus, also feels burning pain in esophagus/chest. Has to throw up solids bc it does not go down. Endorses poor po intake the past month with 30lb weight loss. Denies f/c, diarrhea/constipation, SOB, CP, HA, urinary sxs. (19 Sep 2023 09:06)    PERTINENT PM/SXH:   No pertinent past medical history    No pertinent past medical history    Former smoker    Non-small cell lung cancer with metastasis      No significant past surgical history    Status post cardiac surgery      FAMILY HISTORY:  Family history of cancer in father  Cancer of unknown origin on Father's side.      Family Hx substance abuse [ ]yes [ ]no  ITEMS NOT CHECKED ARE NOT PRESENT    SOCIAL HISTORY:   Significant other/partner[ ]  Children[ ]  Latter day/Spirituality:  Substance hx:  [ ]   Tobacco hx:  [ ]   Alcohol hx: [ ]   Home Opioid hx:  [ ] I-Stop Reference No:  Living Situation: [ ]Home  [ ]Long term care  [ ]Rehab [ ]Other    ADVANCE DIRECTIVES:    DNR/MOLST  [ ]  Living Will  [ ]   DECISION MAKER(s):  [ ] Health Care Proxy(s)  [ ] Surrogate(s)  [ ] Guardian           Name(s): Phone Number(s):    BASELINE (I)ADL(s) (prior to admission):  [place]: [ ]Total  [ ] Moderate [ ]Dependent    Allergies    No Known Allergies    Intolerances    MEDICATIONS  (STANDING):  dextrose 10%. 1000 milliLiter(s) (25 mL/Hr) IV Continuous <Continuous>  dextrose 50% Injectable 25 Gram(s) IV Push once  dextrose 50% Injectable 25 Gram(s) IV Push once  dextrose 50% Injectable 12.5 Gram(s) IV Push once  dextrose Oral Gel 15 Gram(s) Oral once  enoxaparin Injectable 60 milliGRAM(s) SubCutaneous every 12 hours  fluconAZOLE   Tablet 200 milliGRAM(s) Oral daily  glucagon  Injectable 1 milliGRAM(s) IntraMuscular once  magnesium sulfate  IVPB 1 Gram(s) IV Intermittent once  pantoprazole    Tablet 40 milliGRAM(s) Oral two times a day  potassium chloride  10 mEq/100 mL IVPB 10 milliEquivalent(s) IV Intermittent every 1 hour  senna 2 Tablet(s) Oral at bedtime  sodium chloride 1 Gram(s) Oral three times a day  sucralfate suspension 1 Gram(s) Oral four times a day    MEDICATIONS  (PRN):  ondansetron Injectable 4 milliGRAM(s) IV Push every 6 hours PRN Nausea and/or Vomiting    PRESENT SYMPTOMS: [ ]Unable to self-report  [ ] CPOT [ ] PAINADs [ ] RDOS  Source if other than patient:  [ ]Family   [ ]Team     Pain: [ ]yes [ ]no  QOL impact -   Location -                    Aggravating factors -  Quality -  Radiation -  Timing-  Severity (0-10 scale):  Minimal acceptable level/pain goal (0-10 scale):     CPOT:    https://www.[facility].org/getattachment/[email or web address]/Critical-Care-Pain-Observation-Tool-(CPOT)    Dyspnea:                           [ ]Mild [ ]Moderate [ ]Severe  Anxiety:                             [ ]Mild [ ]Moderate [ ]Severe  Fatigue:                             [ ]Mild [ ]Moderate [ ]Severe  Nausea:                             [ ]Mild [ ]Moderate [ ]Severe  Loss of appetite:              [ ]Mild [ ]Moderate [ ]Severe  Constipation:                    [ ]Mild [ ]Moderate [ ]Severe    Other Symptoms:  [x]All other review of systems negative     PCSSQ[Palliative Care Spiritual Screening Question]   Severity (0-10):  Chaplaincy Referral: [ ] yes [ ] refused [ ] following [ ] deferred     Caregiver [place]? : [ ] yes [ ] no [ ] Deferred [ ] Declined             Social work referral [ ] Patient & Family Centered Care Referral [ ]  Anticipatory Grief present?:  [ ] yes [ ] no  [ ] Deferred                  Social work referral [ ] Patient & Family Centered Care Referral [ ]    PHYSICAL EXAM:  Vital Signs Last 24 Hrs  T(C): 36.7 (04 Oct 2023 09:34), Max: 37.1 (03 Oct 2023 22:25)  T(F): 98 (04 Oct 2023 09:34), Max: 98.8 (03 Oct 2023 22:25)  HR: 114 (04 Oct 2023 09:34) (90 - 123)  BP: 119/97 (04 Oct 2023 09:34) (114/95 - 126/96)  BP(mean): --  RR: 18 (04 Oct 2023 09:34) (17 - 18)  SpO2: 95% (04 Oct 2023 09:34) (95% - 97%)    Parameters below as of 04 Oct 2023 09:34  Patient On (Oxygen Delivery Method): room air     I&O's Summary    03 Oct 2023 07:01  -  04 Oct 2023 07:00  --------------------------------------------------------  IN: 0 mL / OUT: 300 mL / NET: -300 mL      GENERAL: [ ]Cachexia    [x]Alert  [x ]Oriented x 3  [ ]Lethargic  [ ]Unarousable  [ ]Verbal  [ ]Non-Verbal  Behavioral:   [ ] Anxiety  [ ] Delirium [ ] Agitation [ ] Other  HEENT:  [ ]Normal   [x ]Dry mouth   [ ]ET Tube/Trach  [ ]Oral lesions  PULMONARY:   [x]Clear [ ]Tachypnea  [ ]Audible excessive secretions   [ ]Rhonchi        [ ]Right [ ]Left [ ]Bilateral  [ ]Crackles        [ ]Right [ ]Left [ ]Bilateral  [ ]Wheezing     [ ]Right [ ]Left [ ]Bilateral  [ ]Diminished breath sounds [ ]right [ ]left [ ]bilateral  CARDIOVASCULAR:    [x]Regular [ ]Irregular [ ]Tachy  [ ]Devin [ ]Murmur [ ]Other  GASTROINTESTINAL:  [x]Soft  [ ]Distended   [x]+BS  [x ]Non tender [ ]Tender  [ ]Other [ ]PEG [ ]OGT/ NGT  Last BM:  GENITOURINARY:  [ ]Normal [ ] Incontinent   [ ]Oliguria/Anuria   [x][place]  MUSCULOSKELETAL:   [ ]Normal   [x ]Weakness  [x ]Bed/Wheelchair bound [ ]Edema  NEUROLOGIC:   [ ]No focal deficits  [x]Cognitive impairment  [ ]Dysphagia [ ]Dysarthria [ ]Paresis [ ]Other   SKIN: Please see flowsheets   [ ]Normal  [ ]Rash  [ ]Other  [ ]Pressure ulcer(s)       Present on admission [ ]y [ ]n    CRITICAL CARE:  [ ] Shock Present  [ ]Septic [ ]Cardiogenic [ ]Neurologic [ ]Hypovolemic  [ ]  Vasopressors [ ]  Inotropes   [ ]Respiratory failure present [ ]Mechanical ventilation [ ]Non-invasive ventilatory support [ ]High flow    [ ]Acute  [ ]Chronic [ ]Hypoxic  [ ]Hypercarbic [ ]Other  [ ]Other organ failure     LABS:                        10.6   8.22  )-----------( 260      ( 04 Oct 2023 07:19 )             31.9   10-04    126<L>  |  93<L>  |  17  ----------------------------<  118<H>  3.6   |  23  |  1.39<H>    Ca    8.8      04 Oct 2023 07:19  Phos  2.9     10-04  Mg     1.70     10-04        Urinalysis Basic - ( 04 Oct 2023 07:19 )    Color: x / Appearance: x / SG: x / pH: x  Gluc: 118 mg/dL / Ketone: x  / Bili: x / Urobili: x   Blood: x / Protein: x / Nitrite: x   Leuk Esterase: x / RBC: x / WBC x   Sq Epi: x / Non Sq Epi: x / Bacteria: x      RADIOLOGY & ADDITIONAL STUDIES:    PROTEIN CALORIE MALNUTRITION PRESENT: [ ]mild [ ]moderate [ ]severe [ ]underweight [ ]morbid obesity  https://www.andeal.org/vault/2440/web/files/ONC/Table_Clinical%20Characteristics%20to%20Document%20Malnutrition-White%20JV%20et%20al%202012.pdf    Height (cm): 177.8 (09-20-23 @ 07:59), 177.8 (09-12-23 @ 13:00), 177.8 (07-27-23 @ 00:15)  Weight (kg): 62.9 (10-03-23 @ 12:59), 63 (09-15-23 @ 17:59), 69.6 (07-27-23 @ 06:08)  BMI (kg/m2): 19.9 (10-03-23 @ 12:59), 19.9 (09-20-23 @ 07:59), 19.9 (09-15-23 @ 17:59)    [ ]PPSV2 < or = to 30% [ ]significant weight loss  [ ]poor nutritional intake  [ ]anasarca[ ]Artificial Nutrition      Other REFERRALS:  [ ]Hospice  [ ]Child Life  [ ]Social Work  [ ]Case management [ ]Holistic Therapy     Goals of Care Document:  [facility]  Dianne Suggs Palliative Care Attending  Contact Info: Page [phone] (including Nights/Weekends), message on Microsoft Teams (Dianne Suggs), or leave VM at Palliative Office [phone] (non-urgent)     Date of [id number] @ 10:47  HPI:  58M h/o non small cell lung ca on paclitaxel (last 1 week ago, follows with Dr. Son), PE (on eliquis), p/w 1 month progressively worsening dysphagia. Recent admission for PE and SBO. Endorsed dysphagia with that admission. Barium esophagram showing GERD and small sliding hiatal hernia. D/c'd with protonix and outpt f/u. Dysphagia worsened over last month, stating he feels solids and liquids get stuck in his esophagus, also feels burning pain in esophagus/chest. Has to throw up solids bc it does not go down. Endorses poor po intake the past month with 30lb weight loss. Denies f/c, diarrhea/constipation, SOB, CP, HA, urinary sxs. (19 Sep 2023 09:06)      Met with patient this morning. He appeared very tired. Shared that he's been unable to eat or drink anything significantly for >1 month at this point and has lost a lot of weight. He shared his understanding of current treatment plan -- that he had esophagitis that has not improved with antibiotics and that the hope was that he would improve after paracentesis and that did not help either. He shared that he's been vomiting a lot -- no nausea; mostly throwing up after eating. He shared that they trialled some zofran and that did not help.  He had home order of reglan in meds list -- he's not sure if this could help.     He denies any pain -- shares most discomfort with regurgitation  Has not had bowel movement >3 days    Patient shared his cancer hx -- diagnosed in sept 2022 and he had pleurX until March 2023; later developed ascites requiring LVP every few weeks, now feeling like he's needing them every week or so. 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 He has no other significant support system. He shared that he would hope that he could start to eat and he could live but worries that he's going to die from not being able to eat and drink. We discussed that currently PEG likely may not be an option for him given that he's recurrent ascites from his cancer.     PERTINENT PM/SXH:   No pertinent past medical history    No pertinent past medical history    Former smoker    Non-small cell lung cancer with metastasis      No significant past surgical history    Status post cardiac surgery      FAMILY HISTORY:  Family history of cancer in father  Cancer of unknown origin on Father's side.      Family Hx substance abuse [ ]yes [ ]no  ITEMS NOT CHECKED ARE NOT PRESENT    SOCIAL HISTORY:   Significant other/partner[ ]  Children[ ]  Latter day/Spirituality: Buddhist  Substance hx:  [ ]   Tobacco hx:  [ x]   Alcohol hx: [ ]    Home Opioid hx:  [ ] I-Stop Reference No:  Living Situation: [x ]Home - with sister  [ ]Long term care  [ ]Rehab [ ]Other    ADVANCE DIRECTIVES:    DNR/MOLST  [ ]  Living Will  [ ]   DECISION MAKER(s):  [ ] Health Care Proxy(s)  [ x] Surrogate(s)  [ ] Guardian           Name(s): Phone Number(s):  Amanda Cordova sister ([phone])- patient would like to assign her as HCP to make decision on his behalf.    BASELINE (I)ADL(s) (prior to admission):  [place]: [ x]Total  [ ] Moderate [ ]Dependent    Allergies    No Known Allergies    Intolerances    MEDICATIONS  (STANDING):  dextrose 10%. 1000 milliLiter(s) (25 mL/Hr) IV Continuous <Continuous>  dextrose 50% Injectable 25 Gram(s) IV Push once  dextrose 50% Injectable 25 Gram(s) IV Push once  dextrose 50% Injectable 12.5 Gram(s) IV Push once  dextrose Oral Gel 15 Gram(s) Oral once  enoxaparin Injectable 60 milliGRAM(s) SubCutaneous every 12 hours  fluconAZOLE   Tablet 200 milliGRAM(s) Oral daily  glucagon  Injectable 1 milliGRAM(s) IntraMuscular once  magnesium sulfate  IVPB 1 Gram(s) IV Intermittent once  pantoprazole    Tablet 40 milliGRAM(s) Oral two times a day  potassium chloride  10 mEq/100 mL IVPB 10 milliEquivalent(s) IV Intermittent every 1 hour  senna 2 Tablet(s) Oral at bedtime  sodium chloride 1 Gram(s) Oral three times a day  sucralfate suspension 1 Gram(s) Oral four times a day    MEDICATIONS  (PRN):  ondansetron Injectable 4 milliGRAM(s) IV Push every 6 hours PRN Nausea and/or Vomiting    PRESENT SYMPTOMS: [ ]Unable to self-report  [ ] CPOT [ ] PAINADs [ ] RDOS  Source if other than patient:  [ ]Family   [ ]Team     Pain: [ ]yes [ x]no  QOL impact -   Location -                    Aggravating factors -  Quality -  Radiation -  Timing-  Severity (0-10 scale):  Minimal acceptable level/pain goal (0-10 scale):     CPOT:    https://www.[facility].org/getattachment/[email or web address]/Critical-Care-Pain-Observation-Tool-(CPOT)    Dyspnea:                           [ ]Mild [ ]Moderate [ ]Severe  Anxiety:                             [ ]Mild [ ]Moderate [ ]Severe  Fatigue:                             [ ]Mild [ ]Moderate [ ]Severe  Nausea:                             [ x]Mild [ ]Moderate [ ]Severe  Loss of appetite:              [ ]Mild [ ]Moderate [ ]Severe  Constipation:                    [ ]Mild [ ]Moderate [ ]Severe    Other Symptoms:  [x]All other review of systems negative     PCSSQ[Palliative Care Spiritual Screening Question]   Severity (0-10):  Chaplaincy Referral: [x ] yes [ ] refused [ ] following [ ] deferred     Caregiver [place]? : [ ] yes [ ] no [ x] Deferred [ ] Declined             Social work referral [ ] Patient & Family Centered Care Referral [ ]  Anticipatory Grief present?:  [ ] yes [ ] no  [ x] Deferred                  Social work referral [ ] Patient & Family Centered Care Referral [ ]    PHYSICAL EXAM:  Vital Signs Last 24 Hrs  T(C): 36.7 (04 Oct 2023 09:34), Max: 37.1 (03 Oct 2023 22:25)  T(F): 98 (04 Oct 2023 09:34), Max: 98.8 (03 Oct 2023 22:25)  HR: 114 (04 Oct 2023 09:34) (90 - 123)  BP: 119/97 (04 Oct 2023 09:34) (114/95 - 126/96)  BP(mean): --  RR: 18 (04 Oct 2023 09:34) (17 - 18)  SpO2: 95% (04 Oct 2023 09:34) (95% - 97%)    Parameters below as of 04 Oct 2023 09:34  Patient On (Oxygen Delivery Method): room air     I&O's Summary    03 Oct 2023 07:01  -  04 Oct 2023 07:00  --------------------------------------------------------  IN: 0 mL / OUT: 300 mL / NET: -300 mL      GENERAL: [x ]Cachexia with bilateral temporal wasting    [x]Alert  [x ]Oriented x 3  [ ]Lethargic  [ ]Unarousable  [ ]Verbal  [ ]Non-Verbal  Behavioral:   [ ] Anxiety  [ ] Delirium [ ] Agitation [x ] Other; depressed mood  HEENT:  [ ]Normal   [x ]Dry mouth   [ ]ET Tube/Trach  [ ]Oral lesions  PULMONARY:   [x]Clear [ ]Tachypnea  [ ]Audible excessive secretions   [ ]Rhonchi        [ ]Right [ ]Left [ ]Bilateral  [ ]Crackles        [ ]Right [ ]Left [ ]Bilateral  [ ]Wheezing     [ ]Right [ ]Left [ ]Bilateral  [ ]Diminished breath sounds [ ]right [ ]left [ ]bilateral  CARDIOVASCULAR:    [x]Regular [ ]Irregular [ ]Tachy  [ ]Devin [ ]Murmur [ ]Other  GASTROINTESTINAL:  [x]Soft  [ ]Distended   [x]+BS  [x ]Non tender [ ]Tender  [ ]Other [ ]PEG [ ]OGT/ NGT  Last BM: 3 days ago  GENITOURINARY:  [ ]Normal [ ] Incontinent   [ ]Oliguria/Anuria   [x][place]  MUSCULOSKELETAL:   [ ]Normal   [x ]Weakness  [ ]Bed/Wheelchair bound [ ]Edema  NEUROLOGIC:   [ ]No focal deficits  [x]Cognitive impairment  [ ]Dysphagia [ ]Dysarthria [ ]Paresis [ ]Other   SKIN: Please see flowsheets   [ ]Normal  [ ]Rash  [ ]Other  [x ]Pressure ulcer(s)  - sacral ulcer per wound care note stage II     Present on admission [x ]y [ ]n    CRITICAL CARE:  [ ] Shock Present  [ ]Septic [ ]Cardiogenic [ ]Neurologic [ ]Hypovolemic  [ ]  Vasopressors [ ]  Inotropes   [ ]Respiratory failure present [ ]Mechanical ventilation [ ]Non-invasive ventilatory support [ ]High flow    [ ]Acute  [ ]Chronic [ ]Hypoxic  [ ]Hypercarbic [ ]Other  [ ]Other organ failure     LABS:                        10.6   8.22  )-----------( 260      ( 04 Oct 2023 07:19 )             31.9   10-04    126<L>  |  93<L>  |  17  ----------------------------<  118<H>  3.6   |  23  |  1.39<H>    Ca    8.8      04 Oct 2023 07:19  Phos  2.9     10-04  Mg     1.70     10-04        Urinalysis Basic - ( 04 Oct 2023 07:19 )    Color: x / Appearance: x / SG: x / pH: x  Gluc: 118 mg/dL / Ketone: x  / Bili: x / Urobili: x   Blood: x / Protein: x / Nitrite: x   Leuk Esterase: x / RBC: x / WBC x   Sq Epi: x / Non Sq Epi: x / Bacteria: x      RADIOLOGY & ADDITIONAL STUDIES:  < from: CT Abdomen and Pelvis w/ IV Cont (09.28.23 @ 17:56) >  FINDINGS:  LOWER CHEST: Left lower lobe bronchial wall thickening, airspace opacity,   and atelectasis. Small left pleural effusion with loculations and   nodularity. Faint right lower lobe groundglass and tree-in-bud opacities.    LIVER: Subcapsular cystic lesions in the right hepatic lobe, appear   overall progressed, concerning for metastatic spread.  BILE DUCTS: Normal caliber.  GALLBLADDER: Within normal limits.  SPLEEN: Peripheral hypodense wedge shaped region, unchanged.  PANCREAS: Within normal limits.  ADRENALS: Left 1.0 cm adrenal nodule, unchanged since 7/27/2023.  KIDNEYS/URETERS: No hydronephrosis. Left renal cyst.    BLADDER: Minimal hyperdense material layering dependently.  REPRODUCTIVE ORGANS: Mildly enlarged prostate.    BOWEL: Redemonstrated centralized small bowel with circumferential wall   thickening, likely reactive to malignant ascites, improved from prior. No   bowel obstruction. Appendix is not visualized. No evidence of   inflammation in the pericecal region.  PERITONEUM: Large volume abdominal loculated ascites, mildly increased   from prior, compressing the liver, spleen, stomach, pancreas, and kidneys   with peritoneal thickening. Hyperdense focus of 2.4 cm in the right upper   quadrant (2-24) concerning for peritoneal implant.  VESSELS: Atherosclerotic changes.  RETROPERITONEUM/LYMPH NODES: No lymphadenopathy.  ABDOMINAL WALL: Anasarca.  BONES: Redemonstrated sclerotic focus in S1 and T12 left-sided pedicle as   well as the left sacrum and iliac bones.    IMPRESSION:  Suggestion of pneumonia in the left lung base.  Large volume loculated ascites, increased, with associated mass effect on   adjacent viscera and centralized small bowel.  A new nodular density in the right upper quadrant and slight progression   of subcapsular lesions in the liver.    < end of copied text >  < from: IR Procedure (10.02.23 @ 12:35) >  Impression:  Successful sonographic guided therapeutic paracentesis with aspiration of   3160 cc of ascitic fluid.    < end of copied text >    PROTEIN CALORIE MALNUTRITION PRESENT: [ ]mild [ ]moderate [ ]severe [ ]underweight [ ]morbid obesity  https://www.andeal.org/vault/2440/web/files/ONC/Table_Clinical%20Characteristics%20to%20Document%20Malnutrition-White%20JV%20et%20al%663763.pdf    Height (cm): 177.8 (09-20-23 @ 07:59), 177.8 (09-12-23 @ 13:00), 177.8 (07-27-23 @ 00:15)  Weight (kg): 62.9 (10-03-23 @ 12:59), 63 (09-15-23 @ 17:59), 69.6 (07-27-23 @ 06:08)  BMI (kg/m2): 19.9 (10-03-23 @ 12:59), 19.9 (09-20-23 @ 07:59), 19.9 (09-15-23 @ 17:59)    [ ]PPSV2 < or = to 30% [ ]significant weight loss  [ ]poor nutritional intake  [ ]anasarca[ ]Artificial Nutrition      Other REFERRALS:  [ ]Hospice  [ ]Child Life  [ ]Social Work  [ ]Case management [ ]Holistic Therapy     Goals of Care Document:  [facility]  Dianne Suggs Palliative Care Attending  Contact Info: Page [phone] (including Nights/Weekends), message on Microsoft Teams (Dianne Suggs), or leave VM at Palliative Office [phone] (non-urgent)     Date of [id number] @ 10:47  HPI:  58M h/o non small cell lung ca on paclitaxel (last 1 week ago, follows with Dr. Son), PE (on eliquis), p/w 1 month progressively worsening dysphagia. Recent admission for PE and SBO. Endorsed dysphagia with that admission. Barium esophagram showing GERD and small sliding hiatal hernia. D/c'd with protonix and outpt f/u. Dysphagia worsened over last month, stating he feels solids and liquids get stuck in his esophagus, also feels burning pain in esophagus/chest. Has to throw up solids bc it does not go down. Endorses poor po intake the past month with 30lb weight loss. Denies f/c, diarrhea/constipation, SOB, CP, HA, urinary sxs. (19 Sep 2023 09:06)      Met with patient this morning. He appeared very tired. Shared that he's been unable to eat or drink anything significantly for >1 month at this point and has lost a lot of weight. He shared his understanding of current treatment plan -- that he had esophagitis that has not improved with antibiotics and that the hope was that he would improve after paracentesis and that did not help either. He shared that he's been vomiting a lot -- no nausea; mostly throwing up after eating. He shared that they trialled some zofran and that did not help.  He had home order of reglan in meds list -- he's not sure if this could help.     He denies any pain -- shares most discomfort with regurgitation  Has not had bowel movement >3 days    Patient shared his cancer hx -- diagnosed in sept 2022 and he had pleurX until March 2023; later developed ascites requiring LVP every few weeks, now feeling like he's needing them every week or so. 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 He has no other significant support system. He shared that he would hope that he could start to eat and he could live but worries that he's going to die from not being able to eat and drink. We discussed that currently PEG likely may not be an option for him given that he's recurrent ascites from his cancer.     PERTINENT PM/SXH:   Former smoker  Non-small cell lung cancer with metastasis  PSH: Status post cardiac surgery      FAMILY HISTORY:  Family history of cancer in father  Cancer of unknown origin on Father's side.      Family Hx substance abuse [ ]yes [ ]no  ITEMS NOT CHECKED ARE NOT PRESENT    SOCIAL HISTORY:   Significant other/partner[ ]  Children[ ]  [employer]ist/Spirituality: [employer]  Substance hx:  [ ]   Tobacco hx:  [ x]   Alcohol hx: [ ]    Home Opioid hx:  [ ] I-Stop Reference No:  Living Situation: [x ]Home - with sister  [ ]Long term care  [ ]Rehab [ ]Other    ADVANCE DIRECTIVES:    DNR/MOLST  [ ]  Living Will  [ ]   DECISION MAKER(s):  [ ] Health Care Proxy(s)  [ x] Surrogate(s)  [ ] Guardian           Name(s): Phone Number(s):  Amanda Cordova sister ([phone])- patient would like to assign her as HCP to make decision on his behalf.    BASELINE (I)ADL(s) (prior to admission):  [place]: [ x]Total  [ ] Moderate [ ]Dependent    Allergies    No Known Allergies    Intolerances    MEDICATIONS  (STANDING):  dextrose 10%. 1000 milliLiter(s) (25 mL/Hr) IV Continuous <Continuous>  dextrose 50% Injectable 25 Gram(s) IV Push once  dextrose 50% Injectable 25 Gram(s) IV Push once  dextrose 50% Injectable 12.5 Gram(s) IV Push once  dextrose Oral Gel 15 Gram(s) Oral once  enoxaparin Injectable 60 milliGRAM(s) SubCutaneous every 12 hours  fluconAZOLE   Tablet 200 milliGRAM(s) Oral daily  glucagon  Injectable 1 milliGRAM(s) IntraMuscular once  magnesium sulfate  IVPB 1 Gram(s) IV Intermittent once  pantoprazole    Tablet 40 milliGRAM(s) Oral two times a day  potassium chloride  10 mEq/100 mL IVPB 10 milliEquivalent(s) IV Intermittent every 1 hour  senna 2 Tablet(s) Oral at bedtime  sodium chloride 1 Gram(s) Oral three times a day  sucralfate suspension 1 Gram(s) Oral four times a day    MEDICATIONS  (PRN):  ondansetron Injectable 4 milliGRAM(s) IV Push every 6 hours PRN Nausea and/or Vomiting    PRESENT SYMPTOMS: [ ]Unable to self-report  [ ] CPOT [ ] PAINADs [ ] RDOS  Source if other than patient:  [ ]Family   [ ]Team     Pain: [ ]yes [ x]no  QOL impact -   Location -                    Aggravating factors -  Quality -  Radiation -  Timing-  Severity (0-10 scale):  Minimal acceptable level/pain goal (0-10 scale):     CPOT:    https://www.[facility].org/getattachment/[email or web address]/Critical-Care-Pain-Observation-Tool-(CPOT)    Dyspnea:                           [ ]Mild [ ]Moderate [ ]Severe  Anxiety:                             [ ]Mild [ ]Moderate [ ]Severe  Fatigue:                             [ ]Mild [ ]Moderate [ ]Severe  Nausea:                             [ x]Mild [ ]Moderate [ ]Severe  Loss of appetite:              [ ]Mild [ ]Moderate [ ]Severe  Constipation:                    [ ]Mild [x]Moderate [ ]Severe    Other Symptoms:  [x]All other review of systems negative     PCSSQ[Palliative Care Spiritual Screening Question]   Severity (0-10):  Chaplaincy Referral: [x ] yes [ ] refused [ ] following [ ] deferred     Caregiver [place]? : [ ] yes [ ] no [ x] Deferred [ ] Declined             Social work referral [ ] Patient & Family Centered Care Referral [ ]  Anticipatory Grief present?:  [ ] yes [ ] no  [ x] Deferred                  Social work referral [ ] Patient & Family Centered Care Referral [ ]    PHYSICAL EXAM:  Vital Signs Last 24 Hrs  T(C): 36.7 (04 Oct 2023 09:34), Max: 37.1 (03 Oct 2023 22:25)  T(F): 98 (04 Oct 2023 09:34), Max: 98.8 (03 Oct 2023 22:25)  HR: 114 (04 Oct 2023 09:34) (90 - 123)  BP: 119/97 (04 Oct 2023 09:34) (114/95 - 126/96)  BP(mean): --  RR: 18 (04 Oct 2023 09:34) (17 - 18)  SpO2: 95% (04 Oct 2023 09:34) (95% - 97%)    Parameters below as of 04 Oct 2023 09:34  Patient On (Oxygen Delivery Method): room air     I&O's Summary    03 Oct 2023 07:01  -  04 Oct 2023 07:00  --------------------------------------------------------  IN: 0 mL / OUT: 300 mL / NET: -300 mL      GENERAL: [x ]Cachexia with bilateral temporal wasting    [x]Alert  [x ]Oriented x 3  [ ]Lethargic  [ ]Unarousable  [ ]Verbal  [ ]Non-Verbal  Behavioral:   [ ] Anxiety  [ ] Delirium [ ] Agitation [x ] Other; depressed mood  HEENT:  [ ]Normal   [x ]Dry mouth   [ ]ET Tube/Trach  [ ]Oral lesions  PULMONARY:   [x]Clear [ ]Tachypnea  [ ]Audible excessive secretions   [ ]Rhonchi        [ ]Right [ ]Left [ ]Bilateral  [ ]Crackles        [ ]Right [ ]Left [ ]Bilateral  [ ]Wheezing     [ ]Right [ ]Left [ ]Bilateral  [ ]Diminished breath sounds [ ]right [ ]left [ ]bilateral  CARDIOVASCULAR:    [x]Regular [ ]Irregular [ ]Tachy  [ ]Devin [ ]Murmur [ ]Other  GASTROINTESTINAL:  [x]Soft  [ ]Distended   [x]+BS  [x ]Non tender [ ]Tender  [ ]Other [ ]PEG [ ]OGT/ NGT  Last BM: 3 days ago  GENITOURINARY:  [ ]Normal [ ] Incontinent   [ ]Oliguria/Anuria   [x][place]  MUSCULOSKELETAL:   [ ]Normal   [x ]Weakness  [ ]Bed/Wheelchair bound [ ]Edema  NEUROLOGIC:   [ ]No focal deficits  [x]Cognitive impairment  [ ]Dysphagia [ ]Dysarthria [ ]Paresis [ ]Other   SKIN: Please see flowsheets   [ ]Normal  [ ]Rash  [ ]Other  [x ]Pressure ulcer(s)  - sacral ulcer per wound care note stage II     Present on admission [x ]y [ ]n    CRITICAL CARE:  [ ] Shock Present  [ ]Septic [ ]Cardiogenic [ ]Neurologic [ ]Hypovolemic  [ ]  Vasopressors [ ]  Inotropes   [ ]Respiratory failure present [ ]Mechanical ventilation [ ]Non-invasive ventilatory support [ ]High flow    [ ]Acute  [ ]Chronic [ ]Hypoxic  [ ]Hypercarbic [ ]Other  [ ]Other organ failure     LABS:                        10.6   8.22  )-----------( 260      ( 04 Oct 2023 07:19 )             31.9   10-04    126<L>  |  93<L>  |  17  ----------------------------<  118<H>  3.6   |  23  |  1.39<H>    Ca    8.8      04 Oct 2023 07:19  Phos  2.9     10-04  Mg     1.70     10-04        Urinalysis Basic - ( 04 Oct 2023 07:19 )    Color: x / Appearance: x / SG: x / pH: x  Gluc: 118 mg/dL / Ketone: x  / Bili: x / Urobili: x   Blood: x / Protein: x / Nitrite: x   Leuk Esterase: x / RBC: x / WBC x   Sq Epi: x / Non Sq Epi: x / Bacteria: x      RADIOLOGY & ADDITIONAL STUDIES:  < from: CT Abdomen and Pelvis w/ IV Cont (09.28.23 @ 17:56) >  FINDINGS:  LOWER CHEST: Left lower lobe bronchial wall thickening, airspace opacity,   and atelectasis. Small left pleural effusion with loculations and   nodularity. Faint right lower lobe groundglass and tree-in-bud opacities.    LIVER: Subcapsular cystic lesions in the right hepatic lobe, appear   overall progressed, concerning for metastatic spread.  BILE DUCTS: Normal caliber.  GALLBLADDER: Within normal limits.  SPLEEN: Peripheral hypodense wedge shaped region, unchanged.  PANCREAS: Within normal limits.  ADRENALS: Left 1.0 cm adrenal nodule, unchanged since 7/27/2023.  KIDNEYS/URETERS: No hydronephrosis. Left renal cyst.    BLADDER: Minimal hyperdense material layering dependently.  REPRODUCTIVE ORGANS: Mildly enlarged prostate.    BOWEL: Redemonstrated centralized small bowel with circumferential wall   thickening, likely reactive to malignant ascites, improved from prior. No   bowel obstruction. Appendix is not visualized. No evidence of   inflammation in the pericecal region.  PERITONEUM: Large volume abdominal loculated ascites, mildly increased   from prior, compressing the liver, spleen, stomach, pancreas, and kidneys   with peritoneal thickening. Hyperdense focus of 2.4 cm in the right upper   quadrant (2-24) concerning for peritoneal implant.  VESSELS: Atherosclerotic changes.  RETROPERITONEUM/LYMPH NODES: No lymphadenopathy.  ABDOMINAL WALL: Anasarca.  BONES: Redemonstrated sclerotic focus in S1 and T12 left-sided pedicle as   well as the left sacrum and iliac bones.    IMPRESSION:  Suggestion of pneumonia in the left lung base.  Large volume loculated ascites, increased, with associated mass effect on   adjacent viscera and centralized small bowel.  A new nodular density in the right upper quadrant and slight progression   of subcapsular lesions in the liver.    < end of copied text >  < from: IR Procedure (10.02.23 @ 12:35) >  Impression:  Successful sonographic guided therapeutic paracentesis with aspiration of   3160 cc of ascitic fluid.    < end of copied text >    PROTEIN CALORIE MALNUTRITION PRESENT: [ ]mild [ ]moderate [ ]severe [ ]underweight [ ]morbid obesity  https://www.andeal.org/vault/2440/web/files/ONC/Table_Clinical%20Characteristics%20to%20Document%20Malnutrition-White%20JV%20et%20al%320682.pdf    Height (cm): 177.8 (09-20-23 @ 07:59), 177.8 (09-12-23 @ 13:00), 177.8 (07-27-23 @ 00:15)  Weight (kg): 62.9 (10-03-23 @ 12:59), 63 (09-15-23 @ 17:59), 69.6 (07-27-23 @ 06:08)  BMI (kg/m2): 19.9 (10-03-23 @ 12:59), 19.9 (09-20-23 @ 07:59), 19.9 (09-15-23 @ 17:59)    [ ]PPSV2 < or = to 30% [ ]significant weight loss  [ ]poor nutritional intake  [ ]anasarca[ ]Artificial Nutrition      Other REFERRALS:  [ ]Hospice  [ ]Child Life  [ ]Social Work  [ ]Case management [ ]Holistic Therapy     Goals of Care Document:

## 2023-10-04 NOTE — CONSULT NOTE ADULT - PROBLEM SELECTOR RECOMMENDATION 2
Diagnosed in Sept 2022 after presenting with sob found to have pleural effusion/trapped lung, required PleurX  Course c/b development of ascites earlier in 2023 now requiring frequent paracentesis    Oncology hx per onc notes: "Metastatic NSCLC with adenocarcinoma histology; tumor tested PD-L1 low-positive and molecular testing was negative for actionable mutations (ARID1A, CASP8, among others). Patient started first-line systemic therapy with combination chemotherapy and immunotherapy with carboplatin/pemetrexed and pembrolizumab in December 2022 and achieved a nice response. He was treated with 4 cycles of triplet therapy followed by pemetrexed/pembrolizumab maintenance. Pleurx catheter was subsequently removed in March 2023. Pemetrexed was subsequently held due to hematologic toxicity with anemia and he was treated with single agent pembrolizumab since April 2023. Restaging CT April 2023 with new perihepatic ascites with a sustained response otherwise. He underwent US-paracentesis x 2 in May 2023 with positive ascitic fluid cytology for his known lung adenocarcinoma. Treatment changed with to single agent Pemetrexed on 5/30/23 as he never failed this agent; treated x 3 cycles through early July 2023 with lack of response. Treatment changed to Abraxane in late August 2023. He is s/p paracentesis on 9/5 with >4000cc removed."    -CTAP as above with loculated ascites with mass effect; possible peritoneal implants  -Underwent LVP on October 2nd with >3100 cc removed  -Oncology following, appreciate recs -- spoke to primary team for follow up to see if there's any additional interventions possible as that would assist with further goc/plan of care discussions

## 2023-10-04 NOTE — PROGRESS NOTE ADULT - PROBLEM SELECTOR PLAN 4
- started on protonix 40 po bid  - egd 9/20 showing esophageal candidiasis, 2cm hiatal hernia, severely edematous gastropathy with thickened gastric folds  - plan for proton pump inhibitor PO BID x 8 weeks followed by once daily; Diflucan (fluconazole) 400 mg PO once today followed by 200mg PO daily for 13 days for a total of 14 day course.  - Advanced diet   iv fluids

## 2023-10-04 NOTE — PROGRESS NOTE ADULT - SUBJECTIVE AND OBJECTIVE BOX
Theo Cardenas MD (Nephrology progress note)  [address], [facility],  [id number],  [id number]  TEl: [phone]  Cell: [phone]    Patient is a 58y Male seen and evaluated at bedside. Vital signs, laboratory data, imaging studies, consult notes reviewed done within past 24 hours. Overnight events noted. Patient remains with poor appetite and fatigue with abdominal discomfort. Interval stable renal function with non oliguria. Na level 126 today morning on Po salt tablets and continueous D10 infusion. Palliative care consult in progress for GOC/advance directives    Allergies    No Known Allergies    Intolerances        ROS:  Limited  Nausea/vomiting/poor appetite/Fatigue  Abdominal fullness/discomfort  Denies chest pain, SOB    VITALS:    T(C): 36.7 (10-04-23 @ 09:34), Max: 37.1 (10-03-23 @ 22:25)  HR: 114 (10-04-23 @ 09:34) (90 - 123)  BP: 119/97 (10-04-23 @ 09:34) (114/95 - 126/96)  RR: 18 (10-04-23 @ 09:34) (17 - 18)  SpO2: 95% (10-04-23 @ 09:34) (95% - 97%)  CAPILLARY BLOOD GLUCOSE      POCT Blood Glucose.: 115 mg/dL (04 Oct 2023 07:16)  POCT Blood Glucose.: 93 mg/dL (03 Oct 2023 21:34)  POCT Blood Glucose.: 76 mg/dL (03 Oct 2023 17:05)      10-03-23 @ 07:01  -  10-04-23 @ 07:00  --------------------------------------------------------  IN: 0 mL / OUT: 300 mL / NET: -300 mL      MEDICATIONS  (STANDING):  dextrose 10%. 1000 milliLiter(s) (25 mL/Hr) IV Continuous <Continuous>  dextrose 50% Injectable 25 Gram(s) IV Push once  dextrose 50% Injectable 25 Gram(s) IV Push once  dextrose 50% Injectable 12.5 Gram(s) IV Push once  dextrose Oral Gel 15 Gram(s) Oral once  enoxaparin Injectable 60 milliGRAM(s) SubCutaneous every 12 hours  glucagon  Injectable 1 milliGRAM(s) IntraMuscular once  magnesium sulfate  IVPB 1 Gram(s) IV Intermittent once  pantoprazole    Tablet 40 milliGRAM(s) Oral two times a day  senna 2 Tablet(s) Oral at bedtime  sodium chloride 1 Gram(s) Oral three times a day  sucralfate suspension 1 Gram(s) Oral four times a day    MEDICATIONS  (PRN):  ondansetron Injectable 4 milliGRAM(s) IV Push every 6 hours PRN Nausea and/or Vomiting      PHYSICAL EXAM:  GENERAL: Alert, awake and oriented x3 in no distress  HEENT: ROBYN, EOMI, neck supple, no JVP, no carotid bruit, oral mucosa moist and pink.  CHEST/LUNG: Bilateral clear breath sounds, no rales, no crepitations, no rhonchi, no wheezing  HEART: Regular rate and rhythm, LOVE II/VI at LPSB, no gallops, no rub   ABDOMEN: Soft, distended, bowel sounds present, no palpable organomegaly  : No flank or supra pubic tenderness.  EXTREMITIES: Peripheral pulses are palpable, trace pedal edema  Neurology: AAOx3, no focal neurological deficit  SKIN: No rash or skin lesion  Musculoskeletal: No joint deformity or spinal tenderness.      Vascular ACCESS: None    LABS:                        10.6   8.  )-----------( 260      ( 04 Oct 2023 07:19 )             31.9     10-04    126<L>  |  93<L>  |  17  ----------------------------<  118<H>  3.6   |  23  |  1.39<H>    Ca    8.8      04 Oct 2023 07:19  Phos  2.9     10-04  Mg     1.70     10-04          Urinalysis Basic - ( 04 Oct 2023 11:22 )    Color: Dark Yellow / Appearance: Cloudy / S.023 / pH: x  Gluc: x / Ketone: Trace mg/dL  / Bili: Negative / Urobili: 1.0 mg/dL   Blood: x / Protein: 30 mg/dL / Nitrite: Negative   Leuk Esterase: Negative / RBC: x / WBC x   Sq Epi: x / Non Sq Epi: x / Bacteria: x      Sodium, Random Urine: 34 mmol/L (10-04 @ 11:22)  Osmolality, Random Urine: 581 mosm/kg (10-04 @ 11:22)  Osmolality, Random Urine: 427 mosm/kg (10-03 @ 14:20)  Sodium, Random Urine: 42 mmol/L (10-03 @ 14:20)        RADIOLOGY & ADDITIONAL STUDIES:  rad  Imaging Personally Reviewed:  [x] YES  [ ] NO    Consultant(s) Notes Reviewed:  [x] YES  [ ] NO    Care Discussed with Primary team/ Other Providers [x] YES  [ ] NO

## 2023-10-04 NOTE — CONSULT NOTE ADULT - PROBLEM SELECTOR RECOMMENDATION 7
Patient at this time is hoping to improve his po intake   FULL code  GOC discussion as above  Further discussions to be continued   Patient wants to assign his sister Amanda as HCP

## 2023-10-04 NOTE — CONSULT NOTE ADULT - ASSESSMENT
58M h/o metastatic non small cell lung ca on paclitaxel  (last chemo 1 week prior to admission, mets to the bone follows with Dr Son) p/w month history of dysphagia and 30 lbs weight loss. Recent admission for PE and SBO. Had dysphagia sxs then. Barium esophagram showing GERD and anal sliding hiatal hernia. D/c'd with protonix and outpt f/u but has not. Progressive worsening of sxs over last month with 30 lb weight loss and poor po intake. Feels solids and liquids get stuck in esophagus, also endorses burning sensation in chest/esophagus. Admitted for dysphagia, EGD with evidence of candida esophagitis, now completing two weeks of treatment with fluconazole and PIPI, without improvement of symptoms. Patient has had recurrent ascites with CT showing loculated ascites and possible peritoneal implants for which he underwent large volume paracentesis this week given concerns for possible mass effect contributing to dysphagia. However, he continues to not have any improvement post para. Palliative consulted for complex medical decision making and symptom management in the setting of malignancy.    58M h/o metastatic non small cell lung ca on paclitaxel  (last chemo 1 week prior to admission, mets to the bone and possible liver and peritoneal implants, follows with Dr Son) p/w month history of dysphagia and 30 lbs weight loss. Recent admission for PE and SBO. Had dysphagia sxs then. Barium esophagram showing GERD and sliding hiatal hernia. D/c'd with protonix and outpt f/u but has not. Progressive worsening of sxs over last month with 30 lb weight loss and poor po intake. Feels solids and liquids get stuck in esophagus, also endorses burning sensation in chest/esophagus. Admitted for dysphagia, EGD with evidence of candida esophagitis, now completing two weeks of treatment with fluconazole and PIPI, without improvement of symptoms. Patient has had recurrent ascites with CT showing loculated ascites and possible peritoneal implants for which he underwent large volume paracentesis this week given concerns for possible mass effect contributing to dysphagia. However, he continues to not have any improvement post para. Palliative consulted for complex medical decision making and symptom management in the setting of malignancy.    58M h/o metastatic non small cell lung ca on paclitaxel  (last chemo 1 week prior to admission, mets to the bone and possible liver and peritoneal implants, follows with Dr Son), PE on eliquis, p/w month history of dysphagia and 30 lbs weight loss. Recent admission for PE and SBO. Had dysphagia sxs then. Barium esophagram showing GERD and sliding hiatal hernia. D/c'd with protonix and outpt f/u but has not. Progressive worsening of sxs over last month with 30 lb weight loss and poor po intake. Feels solids and liquids get stuck in esophagus, also endorses burning sensation in chest/esophagus. Admitted for dysphagia, EGD with evidence of candida esophagitis, now completing two weeks of treatment with fluconazole and PIPI, without improvement of symptoms. Patient has had recurrent ascites with CT showing loculated ascites and possible peritoneal implants for which he underwent large volume paracentesis this week given concerns for possible mass effect contributing to dysphagia. However, he continues to not have any improvement post para. Palliative consulted for complex medical decision making and symptom management in the setting of malignancy.

## 2023-10-04 NOTE — CONSULT NOTE ADULT - PROBLEM SELECTOR RECOMMENDATION 8
Thank you for allowing us to participate in your patient's care. We will continue to follow with you. Please page [phone] for any q's or c's. The Geriatric and Palliative Medicine service has coverage 24 hours a day/ 7 days a week to provide medical recommendations regarding symptom management needs via telephone.    Dianne Suggs MD  Palliative Medicine

## 2023-10-04 NOTE — CONSULT NOTE ADULT - PROBLEM SELECTOR RECOMMENDATION 9
Per GI:  "*CT Neck 9/18 - no mass lesion or collection seen  *Esophagram 8/03 - GE reflux, small sliding HH, 13mm tablet passed with slow transit  *EGD 9/20 - grade C candidal esophagitis, 2cm HH, severe edematous, gastropathy. biopsied   - pathology is consistent with candida esophagitis and chronic inactive gastritis, negative for H pylori  *CT Abdomen/Pelvis w IV contrast 9/28 with large volume loculated ascites, predominantly in the upper quadrants causing mass effect on the liver, spleen, kidneys, and stomach. No associated bowel obstruction.  This is likely contributing to patient's dysphagia and regurgitation   Recommendation:  - therapeutic paracentesis as needed to minimize gastrointestinal compression" Etiology likely multifactorial -- initially thought to be 2/2 candida esophagitis, but no improvement with treatment; possibly secondary to recurrent ascites but also no improvement with paracentesis. Concern for possible paraneoplastic syndrome contributing to esophageal dysmotility.   -Dysphagia to  *Esophagram 8/03 - GE reflux, small sliding HH, 13mm tablet passed with slow transit  *EGD 9/20 - grade C candidal esophagitis, 2cm HH, severe edematous, gastropathy. biopsied   - pathology is consistent with candida esophagitis and chronic inactive gastritis, negative for H pylori  *CT Abdomen/Pelvis w IV contrast 9/28 with large volume loculated ascites, predominantly in the upper quadrants causing mass effect on the liver, spleen, kidneys, and stomach. No associated bowel obstruction.    -Patient currently on day 13/14 of fluconazole (plan for proton pump inhibitor PO BID x 8 weeks followed by once daily; Diflucan (fluconazole) 400 mg PO once today followed by 200mg PO daily for 13 days for a total of 14 day course)  -Patient not candidate for PEG per GI given recurrent ascites    Recommendation  -Please trial Reglan 10 mg IV q6h for 24 hours given refractory GERD and recurrent vomiting   -GI following, recs appreciated

## 2023-10-04 NOTE — CONSULT NOTE ADULT - TIME BILLING
Patient/Primary team
Time spent for extensive review of the physical chart, electronic medical record, and documentation to obtain collateral information including but not limited to:  [x ] Inpatient records (ED, H&P, primary team, and consultants if applicable, care coordination)  [x] Inpatient values/results (biomarkers, immunoassays, imaging, and microbiology results)  [x] Current or proposed treatment plans  [x] Discussion with the primary team  [x] Discussion with the patient, surrogate decision maker, or family    Time spent: >76

## 2023-10-04 NOTE — CHART NOTE - NSCHARTNOTEFT_GEN_A_CORE
Patient is s/p repeat therapeutic paracentesis on Monday with 3L removed, however per primary remains unable to tolerate oral intake.     Patient's dysphagia is likely multifactorial and related to progression of malignancy, carcinomatosis/ascites causing partial obstruction/extrinsic compression and potential paraneoplastic syndrome causing dysmotility of the esophagus and stomach for which there are no suitable medical or procedural treatment options. Patient is not a candidate for enteral feeding via G-tube given recurrent ascites and carcinomatosis.     Recommend ongoing goals of care discussion and assistance from Palliative Care and Nutrition support teams.     We do not anticipate further GI procedures or medication changes at this time. Thank you for involving us in this patient's care. Please call back with any further questions or concerns.     Lewis Gatica MD  Gastroenterology/Hepatology Fellow

## 2023-10-04 NOTE — PROGRESS NOTE ADULT - SUBJECTIVE AND OBJECTIVE BOX
Patient is a 58y old  Male who presents with a chief complaint of dysphagia (03 Oct 2023 12:17)      INTERVAL HPI/OVERNIGHT EVENTS:      T(C): 36.7 (10-04-23 @ 19:22), Max: 36.7 (10-04-23 @ 09:34)  HR: 123 (10-04-23 @ 19:22) (114 - 123)  BP: 121/88 (10-04-23 @ 19:22) (119/97 - 121/88)  RR: 18 (10-04-23 @ 19:22) (18 - 18)  SpO2: 97% (10-04-23 @ 19:22) (95% - 97%)        SOCIAL HISTORY  Alcohol:  Tobacco:  Illicit substance use:    FAMILY HISTORY:    REVIEW OF SYSTEMS:  CONSTITUTIONAL: No fever, weight loss, or fatigue  EYES: No eye pain, visual disturbances, or discharge  ENMT:  No difficulty hearing, tinnitus, vertigo; No sinus or throat pain  NECK: No pain or stiffness  RESPIRATORY: No cough, wheezing, chills or hemoptysis; No shortness of breath  CARDIOVASCULAR: No chest pain, palpitations, dizziness, or leg swelling  GASTROINTESTINAL: No abdominal or epigastric pain. No nausea, vomiting, or hematemesis; No diarrhea or constipation. No melena or hematochezia.  GENITOURINARY: No dysuria, frequency, hematuria, or incontinence  NEUROLOGICAL: No headaches, memory loss, loss of strength, numbness, or tremors  SKIN: No itching, burning, rashes, or lesions   LYMPH NODES: No enlarged glands  ENDOCRINE: No heat or cold intolerance; No hair loss  MUSCULOSKELETAL: No joint pain or swelling; No muscle, back, or extremity pain  PSYCHIATRIC: No depression, anxiety, mood swings, or difficulty sleeping  HEME/LYMPH: No easy bruising, or bleeding gums  ALLERY AND IMMUNOLOGIC: No hives or eczema    RADIOLOGY & ADDITIONAL TESTS:    Imaging Personally Reviewed:  [ ] YES  [ ] NO    Consultant(s) Notes Reviewed:  [ ] YES  [ ] NO    PHYSICAL EXAM:  GENERAL: NAD, well-groomed, well-developed  HEAD:  Atraumatic, Normocephalic  EYES: EOMI, PERRLA, conjunctiva and sclera clear  ENMT: No tonsillar erythema, exudates, or enlargement; Moist mucous membranes, Good dentition, No lesions  NECK: Supple, No JVD, Normal thyroid  NERVOUS SYSTEM:  Alert & Oriented X3, Good concentration; Motor Strength 5/5 B/L upper and lower extremities; DTRs 2+ intact and symmetric  CHEST/LUNG: Clear to percussion bilaterally; No rales, rhonchi, wheezing, or rubs  HEART: Regular rate and rhythm; No murmurs, rubs, or gallops  ABDOMEN: Soft, Nontender, Nondistended; Bowel sounds present  EXTREMITIES:  2+ Peripheral Pulses, No clubbing, cyanosis, or edema  LYMPH: No lymphadenopathy noted  SKIN: No rashes or lesions                            10.6   8.22  )-----------( 260      ( 04 Oct 2023 07:19 )             31.9               126|93|17<118  3.6|23|1.39  8.8,1.70,2.9  10-04 @ 07:19

## 2023-10-04 NOTE — PROGRESS NOTE ADULT - PROBLEM SELECTOR PLAN 3
- egd 9/20 shows esophageal candidiasis  -Diflucan (fluconazole) 200mg PO daily .   CT Abdomen/Pelvis w IV contrast shows Large volume loculated ascites, predominantly in the   upper quadrants causing mass effect on the liver, spleen, kidneys, and   stomach. There is no associated bowel obstruction by CT. Within the   ascites, there are high attenuation foci, for example in the right upper   quadrant (2-24), which may represent peritoneal implants.   -GI f/u noted

## 2023-10-04 NOTE — CONSULT NOTE ADULT - CONVERSATION DETAILS
Palliative consulted for complex medical decision making and symptom management in the setting of serious illness. Introduced our team's role in symptoms management and assisting patients in making decisions regarding their care.   He shared his cancer hx -- diagnosed in sept 2022 and he had pleurX until March 2023; later developed ascites requiring LVP every few weeks, now feeling like he's needing them every week or so. 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 He has no other significant support system. He shared that he would hope that he could start to eat and he could live but worries that he's going to die from not being able to eat and drink. We discussed that currently PEG likely may not be an option for him given that he's recurrent ascites from his cancer.     He shared his understanding of current treatment plan -- that he had esophagitis that has not improved with antibiotics and that the hope was that he would improve after paracentesis and that did not help either. He shared that he's been vomiting a lot -- no nausea; mostly throwing up after eating. He shared that they trialled some zofran and that did not help.  He had home order of reglan in meds list -- he's not sure if this could help.       Mr. Cordova shared that he currently is retired/in disability after his cancer diagnosis and currently lives with his sister. He would like to assign Amanda Cordova ([phone]) as his HCP to make decision on his behalf if he's unable to.     Discussed advanced directives including CPR and mechanical ventilation in setting of malignancy.   Patient shared that he would like to live and "hope that you guys could save me" but also shared worries that he may be dying from his cancer as he's been unable to eat. Discussed that patient is currently FULL code per previous discussions.   Reviewed the risks and benefits of these interventions at the EOL in setting of malignancy sharing that these interventions might pose more burden than benefit, including becoming bedbound and living a life dependant on machines/life sustaining treatments. Mr. Cordova shared that his independence is important to him. I advised him to think about his wishes further and that he did not need to make any decisions right away. Palliative consulted for complex medical decision making and symptom management in the setting of serious illness. Introduced our team's role in symptoms management and assisting patients in making decisions regarding their care.     Mr. Cordova shared that he currently is retired/in disability after his cancer diagnosis and currently lives with his sister. He would like to assign Amanda Cordova ([phone]) as his HCP to make decision on his behalf if he's unable to.     He shared his understanding of current treatment plan -- that he had esophagitis that has not improved with antibiotics and that the hope was that he would improve after paracentesis and that did not help either. He shared that he's been vomiting a lot -- no nausea; mostly throwing up after eating. He shared that they trialled some zofran and that did not help.  He had home order of reglan in meds list -- he's not sure if this could help. He shared that he would hope that he could start to eat and he could live but worries that he's going to die from not being able to eat and drink. We discussed that currently PEG likely may not be an option for him given that he's recurrent ascites from his cancer.       Discussed advanced directives including CPR and mechanical ventilation in setting of malignancy. Patient shared that he would like to live and "hope that you guys could save me" but also shared worries that he may be dying from his cancer as he's been unable to eat. Discussed that patient is currently FULL code per previous discussions. Reviewed the risks and benefits of these interventions at the EOL in setting of malignancy sharing that these interventions might pose more burden than benefit, including becoming bedbound and living a life dependant on machines/life sustaining treatments. Mr. Cordova shared that his independence is important to him. I advised him to think about his wishes further and that he did not need to make any decisions right away.

## 2023-10-04 NOTE — PROGRESS NOTE ADULT - PROBLEM SELECTOR PLAN 1
CT Abdomen/Pelvis w IV contrast shows Large volume loculated ascites, predominantly in the   upper quadrants causing mass effect on the liver, spleen, kidneys, and   stomach. There is no associated bowel obstruction by CT. Within the   ascites, there are high attenuation foci, for example in the right upper   quadrant (2-24), which may represent peritoneal implants.   -GI f/u noted  s/p Paracentesis   GI follow up appreciated

## 2023-10-04 NOTE — PROGRESS NOTE ADULT - ASSESSMENT
58M h/o non small cell lung ca on paclitaxel (last 1 week ago, follows with Dr. Son) p/w 1 month progressively worsening dysphagia. Recent admission for PE and SBO. Endorsed dysphagia with that admission. Barium esophagram showing GERD and small sliding hiatal hernia. Nephrology consult called for abnormal renal function    Assessment:  1) Non oliguric stable CKD stage III with S cr 1.4 likely chemotherapy induced renal injury/ATN/renal hypoperfusion/ascites/compression/obstructive uropathy  2) NSCLC with metastasis on chemotherapy  3) History of PE  4) Progressive dysphagia/ Hiatal hernia/GERD/Gastritis/Esophageal candidiases/ Ascites/ Intrinsic GI compression s/p therapeutic paracentesis  5) Anemia of chronic illness  6) Hypoalbuminemia  7) Ascites  8) Dysphagia/Poor appetite/recurrent Hypoglycemia/Failure to thrive  9) Electrolytes disorder with euvolemic/hypervolemic hyponatremia likely pain/constipation/ADH response/d10w infusion/ Liver disease with ascites  10) Chronic constipation    Recommend:  Strict I/o  Avoid Nephrotoxic agent  S cr 1.4  with non oliguria  Na level 126  Increase salt tablet to 2 gm po tid   Monitor BMP and electrolytes every 12 hrly  Free water restriction to <1L/day  Replete electrolytes with goal K>4 and <5, Mag> 2 and Phos 2.5 to 3.5  Bilateral renal and bladder ultrasound results reviewed  IV/po fluconazole for candida esophagitis  GI/Oncology/hematology follow up reviewed  IR guided paracentesis therapeutic  Nutritional consult follow up  Optimal pain control  Intact PTH, Vitamin D 1,25 level, Phos, calcium level reviewed  Volume status and electrolytes noted  Goals of care discussion  Consider Palliative care consultation   Advance directives per primary team/Family/Patient  No urgent need for RRT/HD  Will follow

## 2023-10-04 NOTE — CONSULT NOTE ADULT - PROBLEM SELECTOR RECOMMENDATION 6
Patient with po intake <25% of meals in the setting of severe dysphagia  Patient not candidate for PEG per GI given recurrent ascites  Nutrition following  Do not think TPN is indicated yet pending further treatment decisions

## 2023-10-04 NOTE — CONSULT NOTE ADULT - PROBLEM SELECTOR RECOMMENDATION 5
Patient with po intake <25% of meals in the setting of severe dysphagia  Patient not candidate for PEG per GI given recurrent ascites  Nutrition following  Do not think TPN is indicated yet pending further treatment decisions Concerns for possible chemo induced renal injury  Electrolyte abnormalities including hyponatremia requiring salt tabs   Baseline Cr 1.4  Nephrology following, recs appreciated

## 2023-10-04 NOTE — CONSULT NOTE ADULT - PROBLEM SELECTOR RECOMMENDATION 3
Day 13/14 of fluconazole and PPI BID Dx in EGD  Day 13/14 of fluconazole and PPI BID without improvement  Recommend trial of reglan as above

## 2023-10-05 LAB
ANION GAP SERPL CALC-SCNC: 10 MMOL/L — SIGNIFICANT CHANGE UP (ref 7–14)
ANION GAP SERPL CALC-SCNC: 11 MMOL/L — SIGNIFICANT CHANGE UP (ref 7–14)
BUN SERPL-MCNC: 19 MG/DL — SIGNIFICANT CHANGE UP (ref 7–23)
BUN SERPL-MCNC: 19 MG/DL — SIGNIFICANT CHANGE UP (ref 7–23)
CALCIUM SERPL-MCNC: 8.6 MG/DL — SIGNIFICANT CHANGE UP (ref 8.4–10.5)
CALCIUM SERPL-MCNC: 8.8 MG/DL — SIGNIFICANT CHANGE UP (ref 8.4–10.5)
CHLORIDE SERPL-SCNC: 97 MMOL/L — LOW (ref 98–107)
CHLORIDE SERPL-SCNC: 97 MMOL/L — LOW (ref 98–107)
CO2 SERPL-SCNC: 23 MMOL/L — SIGNIFICANT CHANGE UP (ref 22–31)
CO2 SERPL-SCNC: 25 MMOL/L — SIGNIFICANT CHANGE UP (ref 22–31)
CREAT SERPL-MCNC: 1.37 MG/DL — HIGH (ref 0.5–1.3)
CREAT SERPL-MCNC: 1.44 MG/DL — HIGH (ref 0.5–1.3)
EGFR: 57 ML/MIN/1.73M2 — LOW
EGFR: 60 ML/MIN/1.73M2 — SIGNIFICANT CHANGE UP
GLUCOSE BLDC GLUCOMTR-MCNC: 101 MG/DL — HIGH (ref 70–99)
GLUCOSE BLDC GLUCOMTR-MCNC: 104 MG/DL — HIGH (ref 70–99)
GLUCOSE BLDC GLUCOMTR-MCNC: 109 MG/DL — HIGH (ref 70–99)
GLUCOSE SERPL-MCNC: 100 MG/DL — HIGH (ref 70–99)
GLUCOSE SERPL-MCNC: 112 MG/DL — HIGH (ref 70–99)
HCT VFR BLD CALC: 32.6 % — LOW (ref 39–50)
HGB BLD-MCNC: 10.4 G/DL — LOW (ref 13–17)
MAGNESIUM SERPL-MCNC: 1.8 MG/DL — SIGNIFICANT CHANGE UP (ref 1.6–2.6)
MAGNESIUM SERPL-MCNC: 2 MG/DL — SIGNIFICANT CHANGE UP (ref 1.6–2.6)
MCHC RBC-ENTMCNC: 29.5 PG — SIGNIFICANT CHANGE UP (ref 27–34)
MCHC RBC-ENTMCNC: 31.9 GM/DL — LOW (ref 32–36)
MCV RBC AUTO: 92.4 FL — SIGNIFICANT CHANGE UP (ref 80–100)
NRBC # BLD: 0 /100 WBCS — SIGNIFICANT CHANGE UP (ref 0–0)
NRBC # FLD: 0 K/UL — SIGNIFICANT CHANGE UP (ref 0–0)
PHOSPHATE SERPL-MCNC: 2.7 MG/DL — SIGNIFICANT CHANGE UP (ref 2.5–4.5)
PHOSPHATE SERPL-MCNC: 2.8 MG/DL — SIGNIFICANT CHANGE UP (ref 2.5–4.5)
PLATELET # BLD AUTO: 278 K/UL — SIGNIFICANT CHANGE UP (ref 150–400)
POTASSIUM SERPL-MCNC: 4.1 MMOL/L — SIGNIFICANT CHANGE UP (ref 3.5–5.3)
POTASSIUM SERPL-MCNC: 4.3 MMOL/L — SIGNIFICANT CHANGE UP (ref 3.5–5.3)
POTASSIUM SERPL-SCNC: 4.1 MMOL/L — SIGNIFICANT CHANGE UP (ref 3.5–5.3)
POTASSIUM SERPL-SCNC: 4.3 MMOL/L — SIGNIFICANT CHANGE UP (ref 3.5–5.3)
RBC # BLD: 3.53 M/UL — LOW (ref 4.2–5.8)
RBC # FLD: 16.7 % — HIGH (ref 10.3–14.5)
SODIUM SERPL-SCNC: 131 MMOL/L — LOW (ref 135–145)
SODIUM SERPL-SCNC: 132 MMOL/L — LOW (ref 135–145)
T4 FREE SERPL-MCNC: 0.9 NG/DL — SIGNIFICANT CHANGE UP (ref 0.9–1.8)
TSH SERPL-MCNC: 6.09 UIU/ML — HIGH (ref 0.27–4.2)
WBC # BLD: 8.17 K/UL — SIGNIFICANT CHANGE UP (ref 3.8–10.5)
WBC # FLD AUTO: 8.17 K/UL — SIGNIFICANT CHANGE UP (ref 3.8–10.5)

## 2023-10-05 PROCEDURE — 99232 SBSQ HOSP IP/OBS MODERATE 35: CPT

## 2023-10-05 PROCEDURE — 99233 SBSQ HOSP IP/OBS HIGH 50: CPT

## 2023-10-05 PROCEDURE — 99497 ADVNCD CARE PLAN 30 MIN: CPT | Mod: 25

## 2023-10-05 PROCEDURE — 74019 RADEX ABDOMEN 2 VIEWS: CPT | Mod: 26

## 2023-10-05 RX ORDER — DIPHENHYDRAMINE HYDROCHLORIDE AND LIDOCAINE HYDROCHLORIDE AND ALUMINUM HYDROXIDE AND MAGNESIUM HYDRO
15 KIT
Refills: 0 | Status: DISCONTINUED | OUTPATIENT
Start: 2023-10-05 | End: 2023-10-08

## 2023-10-05 RX ORDER — BACLOFEN 100 %
5 POWDER (GRAM) MISCELLANEOUS EVERY 12 HOURS
Refills: 0 | Status: DISCONTINUED | OUTPATIENT
Start: 2023-10-05 | End: 2023-10-08

## 2023-10-05 RX ORDER — ONDANSETRON 8 MG/1
4 TABLET, FILM COATED ORAL ONCE
Refills: 0 | Status: COMPLETED | OUTPATIENT
Start: 2023-10-05 | End: 2023-10-05

## 2023-10-05 RX ADMIN — SODIUM CHLORIDE 2 GRAM(S): 9 INJECTION INTRAMUSCULAR; INTRAVENOUS; SUBCUTANEOUS at 21:22

## 2023-10-05 RX ADMIN — ENOXAPARIN SODIUM 60 MILLIGRAM(S): 100 INJECTION SUBCUTANEOUS at 05:35

## 2023-10-05 RX ADMIN — ENOXAPARIN SODIUM 60 MILLIGRAM(S): 100 INJECTION SUBCUTANEOUS at 18:12

## 2023-10-05 RX ADMIN — PANTOPRAZOLE SODIUM 40 MILLIGRAM(S): 20 TABLET, DELAYED RELEASE ORAL at 05:35

## 2023-10-05 RX ADMIN — Medication 10 MILLIGRAM(S): at 05:35

## 2023-10-05 RX ADMIN — Medication 5 MILLIGRAM(S): at 21:22

## 2023-10-05 RX ADMIN — Medication 10 MILLIGRAM(S): at 01:20

## 2023-10-05 RX ADMIN — ONDANSETRON 4 MILLIGRAM(S): 8 TABLET, FILM COATED ORAL at 05:35

## 2023-10-05 NOTE — PROGRESS NOTE ADULT - ASSESSMENT
58M h/o non small cell lung ca on paclitaxel  (last chemo 1 week ago, follows with Dr Son) p/w month history of dysphasia. Recent admission for PE and SBO. Had dysphasia sxs then. Barium esophagram showing GERD and anal sliding hiatal hernia. D/c'd with protonix and outpt f/u but has not. Progressive worsening of sxs over last month with 30 lb weight loss and poor po intake. Feels solids and liquids get stuck in esophagus, also endorses burning sensation in chest/esophagus. Oncology consulted for further recommendations    CT chest angio 9/18  No pulmonary embolism.  Stable subcentimeter pulmonary nodules.  Small left pleural effusion and loculated upper abdominal ascites.  Stable sclerotic osseous lesions.    CT neck 9/18  IMPRESSION:  No mass or fluid collection.    KUB 9/19  Nonobstructive bowel gas pattern.      #dysphagia in the setting of Met NSCLC  -As previously documented  -GI consult rec appreciated, S/p EGD on 9/20 exam showing esophageal candidiasis, 2cm hiatal hernia, severely edematous gastropathy with thickened gastric folds  - plan for proton pump inhibitor PO BID x 8 weeks followed by once daily; Diflucan (fluconazole) 400 mg PO on day 1 followed by 200mg PO daily for 13 days for a total of 14 day course.  - PO intake still very poor today.  -US abd for possible paracentesis, + Moderate volume complex ascites on 9/19. s/p para on 9/20 and again on 10/2 3160 cc remove   Given patient's  advanced disease and poor PS, would not be a candidate for systemic therapy. Risks of disease modifying treatment would outweigh any benefit. Would recommend supportive management and/or hospice services. Please consult Palliative Care for further GOC and symptom management.  This was discussed with patient, his sister and niece at bedside on 10/5. All questions and concerns were  addressed.  Patient endorsing he lives with sister but sister works full time and unable to support patient at home. Will need CM and SW for further dispo management (KATHLEEN w hospice vs inpatient hospice ?)  -C/w Supportive care, pain control, Nutrition, PT, DVT ppx  -Rest of care as per primary team, no further oncological recs indicated at this time  -Please feel free to call oncology if any questions    Case d/w oncology attending        Frederick THOMAS  Oncology Physician Assistant  [facility] [place]    Pager [phone] also available on TEAMS as Frederick THOMAS    If before 8am/after 5pm or on weekends please page On-call Oncology Fellow

## 2023-10-05 NOTE — PROGRESS NOTE ADULT - PROBLEM SELECTOR PLAN 7
Patient at this time is hoping to improve his po intake   FULL code  GOC discussion as above  Further discussions to be continued   Patient wants to assign his sister Amanda as HCP. Patient at this time is hoping to improve his po intake but recognizes his decline and inability to take po  FULL code for now; provided with molst form for review  GOC discussion as above  Further discussions to be continued   Patient assigned his sister Amanda Cordova as HCP ([phone] and niece Antoinette Kruse as alternate ([phone]) - HCP form completed placed in chart

## 2023-10-05 NOTE — PROGRESS NOTE ADULT - SUBJECTIVE AND OBJECTIVE BOX
[facility] Geriatrics and Palliative Care  Dianne Suggs MD, Palliative Care Attending  Contact Info: Page [phone] (including Nights/Weekends), message on Microsoft Teams (Dianne Suggs MD), or leave VM at Palliative Office [phone] (non-urgent)   Date of [id number] @ 11:33    SUBJECTIVE AND OBJECTIVE:    Indication for Geriatrics and Palliative Care Services/INTERVAL HPI:    OVERNIGHT EVENTS:    DNR on chart:  Allergies    No Known Allergies    Intolerances    MEDICATIONS  (STANDING):  bisacodyl 5 milliGRAM(s) Oral at bedtime  dextrose 10%. 1000 milliLiter(s) (25 mL/Hr) IV Continuous <Continuous>  dextrose 50% Injectable 25 Gram(s) IV Push once  dextrose 50% Injectable 25 Gram(s) IV Push once  dextrose 50% Injectable 12.5 Gram(s) IV Push once  dextrose Oral Gel 15 Gram(s) Oral once  enoxaparin Injectable 60 milliGRAM(s) SubCutaneous every 12 hours  glucagon  Injectable 1 milliGRAM(s) IntraMuscular once  metoclopramide Injectable 10 milliGRAM(s) IV Push every 6 hours  pantoprazole    Tablet 40 milliGRAM(s) Oral two times a day  sodium chloride 2 Gram(s) Oral every 8 hours  sucralfate suspension 1 Gram(s) Oral four times a day    MEDICATIONS  (PRN):      ITEMS UNCHECKED ARE NOT PRESENT    PRESENT SYMPTOMS: [ ]Unable to self-report - see [ ] CPOT [ ] PAINADS [ ] RDOS  Source if other than patient:  [ ]Family   [ ]Team     Pain:  [ ]yes [ ]no  QOL impact -   Location -                    Aggravating factors -  Quality -  Radiation -  Timing-  Severity (0-10 scale):  Minimal acceptable level/ pain goal (0-10 scale):     CPOT:    https://www.[facility].org/getattachment/[email or web address]-[email or web address]-5974p7652n5f/Critical-Care-Pain-Observation-Tool-(CPOT)    Dyspnea:                           [ ]Mild [ ]Moderate [ ]Severe  Anxiety:                             [ ]Mild [ ]Moderate [ ]Severe  Fatigue:                             [ ]Mild [ ]Moderate [ ]Severe  Nausea:                             [ ]Mild [ ]Moderate [ ]Severe  Loss of appetite:              [ ]Mild [ ]Moderate [ ]Severe  Constipation:                    [ ]Mild [ ]Moderate [ ]Severe  Other Symptoms:  [ ]All other review of systems negative     PCSSQ[Palliative Care Spiritual Screening Question]   Severity (0-10):  Score of 4 or > indicate consideration of Chaplaincy referral.  Chaplaincy Referral: [ ] yes [ ] refused [ ] following [ ] deferred    Caregiver [place]? : [ ] yes [ ] no [ ] Deferred [ ] Declined             Social work referral [ ] Patient & Family Centered Care Referral [ ]  Anticipatory Grief present?:  [ ] yes [ ] no  [ ] Deferred                  Social work referral [ ] Patient & Family Centered Care Referral [ ]      PHYSICAL EXAM:  Vital Signs Last 24 Hrs  T(C): 36.7 (05 Oct 2023 10:24), Max: 36.8 (05 Oct 2023 05:30)  T(F): 98.1 (05 Oct 2023 10:24), Max: 98.2 (05 Oct 2023 05:30)  HR: 113 (05 Oct 2023 10:24) (110 - 123)  BP: 132/99 (05 Oct 2023 10:24) (117/85 - 132/99)  BP(mean): --  RR: 17 (05 Oct 2023 10:24) (17 - 18)  SpO2: 96% (05 Oct 2023 10:24) (96% - 97%)    Parameters below as of 05 Oct 2023 10:24  Patient On (Oxygen Delivery Method): room air     I&O's Summary    04 Oct 2023 07:01  -  05 Oct 2023 07:00  --------------------------------------------------------  IN: 0 mL / OUT: 500 mL / NET: -500 mL         General: well nourished, alert, NAD  HENT: normocephalic, face relaxed  Eyes: no icterus, conjunctiva clear  Chest: symmetric excursion, no accessory muscle use  Heart: peripheral pulse 2+ and regular  Lungs: no dyspnea with speech  Abdomen: soft, NT, ND  Ext: no cyanosis, clubbing, visible veins, ulcers, wounds  Neuro: alert, coherent speech, grossly non-focal  Psych: calm, rational, linear thought process  Skin: ]Normal  [ ]Rash  [ ]Other  [ ]Pressure ulcer(s) [ ]y [ ]n present on admission    CRITICAL CARE:  [ ]Shock Present  [ ]Septic [ ]Cardiogenic [ ]Neurologic [ ]Hypovolemic  [ ]Vasopressors [ ]Inotropes  [ ]Respiratory failure present [ ]Mechanical Ventilation [ ]Non-invasive ventilatory support [ ]High-Flow   [ ]Acute  [ ]Chronic [ ]Hypoxic  [ ]Hypercarbic [ ]Other  [ ]Other organ failure     LABS:                        10.4   8.17  )-----------( 278      ( 05 Oct 2023 05:23 )             32.6   10-05    131<L>  |  97<L>  |  19  ----------------------------<  112<H>  4.3   |  23  |  1.44<H>    Ca    8.8      05 Oct 2023 05:23  Phos  2.8     10-05  Mg     2.00     10-05        Urinalysis Basic - ( 05 Oct 2023 05:23 )    Color: x / Appearance: x / SG: x / pH: x  Gluc: 112 mg/dL / Ketone: x  / Bili: x / Urobili: x   Blood: x / Protein: x / Nitrite: x   Leuk Esterase: x / RBC: x / WBC x   Sq Epi: x / Non Sq Epi: x / Bacteria: x      RADIOLOGY & ADDITIONAL STUDIES:    Protein Calorie Malnutrition Present: [ ]mild [ ]moderate [ ]severe [ ]underweight [ ]morbid obesity  https://www.andeal.org/vault/2440/web/files/ONC/Table_Clinical%20Characteristics%20to%20Document%20Malnutrition-White%20JV%20et%20al%923691.pdf    Height (cm): 177.8 (09-20-23 @ 07:59), 177.8 (09-12-23 @ 13:00), 177.8 (07-27-23 @ 00:15)  Weight (kg): 62.9 (10-03-23 @ 12:59), 63 (09-15-23 @ 17:59), 69.6 (07-27-23 @ 06:08)  BMI (kg/m2): 19.9 (10-03-23 @ 12:59), 19.9 (09-20-23 @ 07:59), 19.9 (09-15-23 @ 17:59)    [ ]PPSV2 < or = 30%  [ ]significant weight loss [ ]poor nutritional intake [ ]anasarca[ ]Artificial Nutrition    Other REFERRALS:  [ ]Hospice  [ ]Child Life  [ ]Social Work  [ ]Case management [ ]Holistic Therapy     Goals of Care Document: [facility] Geriatrics and Palliative Care  Dianne Suggs MD, Palliative Care Attending  Contact Info: Page [phone] (including Nights/Weekends), message on Microsoft Teams (Dianne Suggs MD), or leave VM at Palliative Office [phone] (non-urgent)   Date of [id number] @ 11:33    SUBJECTIVE AND OBJECTIVE:  Patient seen this morning at bedside. They are accompanied by their sister Amanda Cordova (lives with ) and niece Antoinette Kruse (visiting from [place])  Patient shared that he is still having significant burning regurgitation pain and coughing up phlegm and also one episode of vomitus that he's concerned might be "bowels."  Family expressed that they'd requested an IDT meeting to discuss hospital course and plan of action -- spoke with primary team and agree that a meeting is important including with oncology to clarify plan of care/goals. Patient and family asked about nutrition option - shared that PEG not an option, and TPN would not make sense with a clear plan of care regarding his cancer as it's not a long term option.  Patient also hasn't passed gas >5 days and has hx of SBO.  Last bowel movement: >5 days ago  Patient is concerned about: ability to eat;   Spoke to patient's primary team, RN who shared that    Indication for Geriatrics and Palliative Care Services/INTERVAL HPI: goals of care/symptoms     OVERNIGHT EVENTS:  Patient started on reglan     DNR on chart:  Allergies    No Known Allergies    Intolerances    MEDICATIONS  (STANDING):  bisacodyl 5 milliGRAM(s) Oral at bedtime  dextrose 10%. 1000 milliLiter(s) (25 mL/Hr) IV Continuous <Continuous>  dextrose 50% Injectable 25 Gram(s) IV Push once  dextrose 50% Injectable 25 Gram(s) IV Push once  dextrose 50% Injectable 12.5 Gram(s) IV Push once  dextrose Oral Gel 15 Gram(s) Oral once  enoxaparin Injectable 60 milliGRAM(s) SubCutaneous every 12 hours  glucagon  Injectable 1 milliGRAM(s) IntraMuscular once  metoclopramide Injectable 10 milliGRAM(s) IV Push every 6 hours  pantoprazole    Tablet 40 milliGRAM(s) Oral two times a day  sodium chloride 2 Gram(s) Oral every 8 hours  sucralfate suspension 1 Gram(s) Oral four times a day    MEDICATIONS  (PRN):      ITEMS UNCHECKED ARE NOT PRESENT    PRESENT SYMPTOMS: [ ]Unable to self-report - see [ ] CPOT [ ] PAINADS [ ] RDOS  Source if other than patient:  [ ]Family   [ ]Team     Pain:  [ ]yes [ x]no  QOL impact -   Location -                    Aggravating factors -  Quality -  Radiation -  Timing-  Severity (0-10 scale):  Minimal acceptable level/ pain goal (0-10 scale):     CPOT:    https://www.[facility].org/getattachment/[email or web address]-5i4o-3f7l-2429r4577c4a/Critical-Care-Pain-Observation-Tool-(CPOT)    Dyspnea:                           [ ]Mild [ ]Moderate [ ]Severe  Anxiety:                             [ ]Mild [ ]Moderate [ ]Severe  Fatigue:                             [ ]Mild [ ]Moderate [ ]Severe  Nausea:                             [ ]Mild [ ]Moderate [ ]Severe  Loss of appetite:              [ ]Mild [ ]Moderate [ ]Severe  Constipation:                    [ ]Mild [x ]Moderate [ ]Severe  Other Symptoms:  [ ]All other review of systems negative     PCSSQ[Palliative Care Spiritual Screening Question]   Severity (0-10):  Score of 4 or > indicate consideration of Chaplaincy referral.  Chaplaincy Referral: [ ] yes [ ] refused [ ] following [ ] deferred    Caregiver [place]? : [ ] yes [ ] no [ ] Deferred [ ] Declined             Social work referral [ ] Patient & Family Centered Care Referral [ ]  Anticipatory Grief present?:  [ ] yes [ ] no  [ ] Deferred                  Social work referral [ ] Patient & Family Centered Care Referral [ ]      PHYSICAL EXAM:  Vital Signs Last 24 Hrs  T(C): 36.7 (05 Oct 2023 10:24), Max: 36.8 (05 Oct 2023 05:30)  T(F): 98.1 (05 Oct 2023 10:24), Max: 98.2 (05 Oct 2023 05:30)  HR: 113 (05 Oct 2023 10:24) (110 - 123)  BP: 132/99 (05 Oct 2023 10:24) (117/85 - 132/99)  BP(mean): --  RR: 17 (05 Oct 2023 10:24) (17 - 18)  SpO2: 96% (05 Oct 2023 10:24) (96% - 97%)    Parameters below as of 05 Oct 2023 10:24  Patient On (Oxygen Delivery Method): room air     I&O's Summary    04 Oct 2023 07:01  -  05 Oct 2023 07:00  --------------------------------------------------------  IN: 0 mL / OUT: 500 mL / NET: -500 mL         General:  cachectic, NAD  HENT: dyry mouth   Chest: symmetric excursion, no accessory muscle use  Heart: peripheral pulse 2+ and regular  Lungs: no dyspnea with speech  Abdomen: distended; nontender  Ext: no cyanosis, clubbing, visible veins, ulcers, wounds  Neuro: alert, coherent speech, grossly non-focal  Psych: calm, rational, linear thought process  Skin: ]Normal  [ ]Rash  [ ]Other  [x ]Pressure ulcer(s) [x ]y [ ]n present on admission    LABS:                        10.4   8.17  )-----------( 278      ( 05 Oct 2023 05:23 )             32.6   10-05    131<L>  |  97<L>  |  19  ----------------------------<  112<H>  4.3   |  23  |  1.44<H>    Ca    8.8      05 Oct 2023 05:23  Phos  2.8     10-05  Mg     2.00     10-05        Urinalysis Basic - ( 05 Oct 2023 05:23 )    Color: x / Appearance: x / SG: x / pH: x  Gluc: 112 mg/dL / Ketone: x  / Bili: x / Urobili: x   Blood: x / Protein: x / Nitrite: x   Leuk Esterase: x / RBC: x / WBC x   Sq Epi: x / Non Sq Epi: x / Bacteria: x      RADIOLOGY & ADDITIONAL STUDIES:    Protein Calorie Malnutrition Present: [ ]mild [ ]moderate [ ]severe [ ]underweight [ ]morbid obesity  https://www.andeal.org/vault/9380/web/files/ONC/Table_Clinical%20Characteristics%20to%20Document%20Malnutrition-White%20JV%20et%20al%202012.pdf    Height (cm): 177.8 (09-20-23 @ 07:59), 177.8 (09-12-23 @ 13:00), 177.8 (07-27-23 @ 00:15)  Weight (kg): 62.9 (10-03-23 @ 12:59), 63 (09-15-23 @ 17:59), 69.6 (07-27-23 @ 06:08)  BMI (kg/m2): 19.9 (10-03-23 @ 12:59), 19.9 (09-20-23 @ 07:59), 19.9 (09-15-23 @ 17:59)    [ ]PPSV2 < or = 30%  [ ]significant weight loss [ ]poor nutritional intake [ ]anasarca[ ]Artificial Nutrition    Other REFERRALS:  [ ]Hospice  [ ]Child Life  [ ]Social Work  [ ]Case management [ ]Holistic Therapy     Goals of Care Document: [facility] Geriatrics and Palliative Care  Dianne Suggs MD, Palliative Care Attending  Contact Info: Page [phone] (including Nights/Weekends), message on Microsoft Teams (Dianne Suggs MD), or leave VM at Palliative Office [phone] (non-urgent)   Date of [id number] @ 11:33    SUBJECTIVE AND OBJECTIVE:  Patient seen this morning at bedside. They are accompanied by their sister Amanda Cordova (lives with ) and niece Antoinette Krues (visiting from [place])  Patient shared that he is still having significant burning regurgitation pain and coughing up phlegm and also one episode of vomitus that he's concerned might be "bowels."  Family expressed that they'd requested an IDT meeting to discuss hospital course and plan of action -- spoke with primary team and agree that a meeting is important including with oncology to clarify plan of care/goals. Patient and family asked about nutrition option - shared that PEG not an option, and TPN would not make sense with a clear plan of care regarding his cancer as it's not a long term option.  Patient also hasn't passed gas >5 days and has hx of SBO.  Last bowel movement: >5 days ago  Patient is concerned about: ability to eat;   Spoke to patient's primary team, RN who shared that    Indication for Geriatrics and Palliative Care Services/INTERVAL HPI: goals of care/symptoms     OVERNIGHT EVENTS:  Patient started on reglan     DNR on chart:  Allergies    No Known Allergies    Intolerances    MEDICATIONS  (STANDING):  bisacodyl 5 milliGRAM(s) Oral at bedtime  dextrose 10%. 1000 milliLiter(s) (25 mL/Hr) IV Continuous <Continuous>  dextrose 50% Injectable 25 Gram(s) IV Push once  dextrose 50% Injectable 25 Gram(s) IV Push once  dextrose 50% Injectable 12.5 Gram(s) IV Push once  dextrose Oral Gel 15 Gram(s) Oral once  enoxaparin Injectable 60 milliGRAM(s) SubCutaneous every 12 hours  glucagon  Injectable 1 milliGRAM(s) IntraMuscular once  metoclopramide Injectable 10 milliGRAM(s) IV Push every 6 hours  pantoprazole    Tablet 40 milliGRAM(s) Oral two times a day  sodium chloride 2 Gram(s) Oral every 8 hours  sucralfate suspension 1 Gram(s) Oral four times a day    MEDICATIONS  (PRN):      ITEMS UNCHECKED ARE NOT PRESENT    PRESENT SYMPTOMS: [ ]Unable to self-report - see [ ] CPOT [ ] PAINADS [ ] RDOS  Source if other than patient:  [ ]Family   [ ]Team     Pain:  [ ]yes [ x]no  QOL impact -   Location -                    Aggravating factors -  Quality -  Radiation -  Timing-  Severity (0-10 scale):  Minimal acceptable level/ pain goal (0-10 scale):     CPOT:    https://www.[facility].org/getattachment/[email or web address]/Critical-Care-Pain-Observation-Tool-(CPOT)    Dyspnea:                           [ ]Mild [ ]Moderate [ ]Severe  Anxiety:                             [ ]Mild [ ]Moderate [ ]Severe  Fatigue:                             [ ]Mild [ ]Moderate [ ]Severe  Nausea:                             [ ]Mild [ ]Moderate [ ]Severe  Loss of appetite:              [ ]Mild [ ]Moderate [ ]Severe  Constipation:                    [ ]Mild [x ]Moderate [ ]Severe  Other Symptoms:  [x ]All other review of systems negative     PCSSQ[Palliative Care Spiritual Screening Question]   Severity (0-10):  Score of 4 or > indicate consideration of Chaplaincy referral.  Chaplaincy Referral: [ x] yes [ ] refused [ ] following [ ] deferred    Caregiver [place]? : [ ] yes [x ] no [ ] Deferred [ ] Declined             Social work referral [ ] Patient & Family Centered Care Referral [ ]  Anticipatory Grief present?:  [ ] yes [x ] no  [ ] Deferred                  Social work referral [ ] Patient & Family Centered Care Referral [ ]      PHYSICAL EXAM:  Vital Signs Last 24 Hrs  T(C): 36.7 (05 Oct 2023 10:24), Max: 36.8 (05 Oct 2023 05:30)  T(F): 98.1 (05 Oct 2023 10:24), Max: 98.2 (05 Oct 2023 05:30)  HR: 113 (05 Oct 2023 10:24) (110 - 123)  BP: 132/99 (05 Oct 2023 10:24) (117/85 - 132/99)  BP(mean): --  RR: 17 (05 Oct 2023 10:24) (17 - 18)  SpO2: 96% (05 Oct 2023 10:24) (96% - 97%)    Parameters below as of 05 Oct 2023 10:24  Patient On (Oxygen Delivery Method): room air     I&O's Summary    04 Oct 2023 07:01  -  05 Oct 2023 07:00  --------------------------------------------------------  IN: 0 mL / OUT: 500 mL / NET: -500 mL         General:  cachectic, NAD  HENT: dyry mouth   Chest: symmetric excursion, no accessory muscle use  Heart: peripheral pulse 2+ and regular  Lungs: no dyspnea with speech  Abdomen: distended; nontender  Ext: no cyanosis, clubbing, visible veins, ulcers, wounds  Neuro: alert, coherent speech, grossly non-focal  Psych: calm, rational, linear thought process  Skin: ]Normal  [ ]Rash  [ ]Other  [x ]Pressure ulcer(s) [x ]y [ ]n present on admission    LABS:                        10.4   8.17  )-----------( 278      ( 05 Oct 2023 05:23 )             32.6   10-05    131<L>  |  97<L>  |  19  ----------------------------<  112<H>  4.3   |  23  |  1.44<H>    Ca    8.8      05 Oct 2023 05:23  Phos  2.8     10-05  Mg     2.00     10-05        Urinalysis Basic - ( 05 Oct 2023 05:23 )    Color: x / Appearance: x / SG: x / pH: x  Gluc: 112 mg/dL / Ketone: x  / Bili: x / Urobili: x   Blood: x / Protein: x / Nitrite: x   Leuk Esterase: x / RBC: x / WBC x   Sq Epi: x / Non Sq Epi: x / Bacteria: x      RADIOLOGY & ADDITIONAL STUDIES:    Protein Calorie Malnutrition Present: [ ]mild [ ]moderate [ ]severe [ ]underweight [ ]morbid obesity  https://www.andeal.org/vault/2520/web/files/ONC/Table_Clinical%20Characteristics%20to%20Document%20Malnutrition-White%20JV%20et%20al%202012.pdf    Height (cm): 177.8 (09-20-23 @ 07:59), 177.8 (09-12-23 @ 13:00), 177.8 (07-27-23 @ 00:15)  Weight (kg): 62.9 (10-03-23 @ 12:59), 63 (09-15-23 @ 17:59), 69.6 (07-27-23 @ 06:08)  BMI (kg/m2): 19.9 (10-03-23 @ 12:59), 19.9 (09-20-23 @ 07:59), 19.9 (09-15-23 @ 17:59)    [ ]PPSV2 < or = 30%  [ ]significant weight loss [ ]poor nutritional intake [ ]anasarca[ ]Artificial Nutrition    Other REFERRALS:  [ ]Hospice  [ ]Child Life  [ ]Social Work  [ ]Case management [ ]Holistic Therapy     Goals of Care Document:

## 2023-10-05 NOTE — PROGRESS NOTE ADULT - SUBJECTIVE AND OBJECTIVE BOX
Patient is a 58y old  Male who presents with a chief complaint of dysphagia (03 Oct 2023 12:17)      INTERVAL HPI/OVERNIGHT EVENTS:    T(C): 36.6 (10-06-23 @ 09:24), Max: 36.6 (10-06-23 @ 05:20)  HR: 112 (10-06-23 @ 09:24) (112 - 117)  BP: 126/93 (10-06-23 @ 09:24) (126/93 - 137/102)  RR: 16 (10-06-23 @ 09:24) (16 - 18)  SpO2: 99% (10-06-23 @ 09:24) (98% - 99%)      SOCIAL HISTORY  Alcohol:  Tobacco:  Illicit substance use:    FAMILY HISTORY:    REVIEW OF SYSTEMS:  CONSTITUTIONAL: No fever, weight loss, or fatigue  EYES: No eye pain, visual disturbances, or discharge  ENMT:  No difficulty hearing, tinnitus, vertigo; No sinus or throat pain  NECK: No pain or stiffness  RESPIRATORY: No cough, wheezing, chills or hemoptysis; No shortness of breath  CARDIOVASCULAR: No chest pain, palpitations, dizziness, or leg swelling  GASTROINTESTINAL: No abdominal or epigastric pain. No nausea, vomiting, or hematemesis; No diarrhea or constipation. No melena or hematochezia.  GENITOURINARY: No dysuria, frequency, hematuria, or incontinence  NEUROLOGICAL: No headaches, memory loss, loss of strength, numbness, or tremors  SKIN: No itching, burning, rashes, or lesions   LYMPH NODES: No enlarged glands  ENDOCRINE: No heat or cold intolerance; No hair loss  MUSCULOSKELETAL: No joint pain or swelling; No muscle, back, or extremity pain  PSYCHIATRIC: No depression, anxiety, mood swings, or difficulty sleeping  HEME/LYMPH: No easy bruising, or bleeding gums  ALLERY AND IMMUNOLOGIC: No hives or eczema    RADIOLOGY & ADDITIONAL TESTS:    Imaging Personally Reviewed:  [ ] YES  [ ] NO    Consultant(s) Notes Reviewed:  [ ] YES  [ ] NO    PHYSICAL EXAM:  GENERAL: NAD, well-groomed, well-developed  HEAD:  Atraumatic, Normocephalic  EYES: EOMI, PERRLA, conjunctiva and sclera clear  ENMT: No tonsillar erythema, exudates, or enlargement; Moist mucous membranes, Good dentition, No lesions  NECK: Supple, No JVD, Normal thyroid  NERVOUS SYSTEM:  Alert & Oriented X3, Good concentration; Motor Strength 5/5 B/L upper and lower extremities; DTRs 2+ intact and symmetric  CHEST/LUNG: Clear to percussion bilaterally; No rales, rhonchi, wheezing, or rubs  HEART: Regular rate and rhythm; No murmurs, rubs, or gallops  ABDOMEN: Soft, Nontender, Nondistended; Bowel sounds present  EXTREMITIES:  2+ Peripheral Pulses, No clubbing, cyanosis, or edema  LYMPH: No lymphadenopathy noted  SKIN: No rashes or lesions                                     10.4   6.07  )-----------( 272      ( 06 Oct 2023 06:15 )             32.4               132|100|21<105  4.3|21|1.39  8.9,2.00,2.9  10-06 @ 06:15  132|97|19<100  4.1|25|1.37  8.6,1.80,2.7  10-05 @ 18:36

## 2023-10-05 NOTE — PROGRESS NOTE ADULT - PROBLEM SELECTOR PLAN 1
CT Abdomen/Pelvis w IV contrast shows Large volume loculated ascites, predominantly in the   upper quadrants causing mass effect on the liver, spleen, kidneys, and   stomach. There is no associated bowel obstruction by CT. Within the   ascites, there are high attenuation foci, for example in the right upper   quadrant (2-24), which may represent peritoneal implants.   -GI f/u noted  s/p Paracentesis   GI follow up appreciated    Hem onc discussed with family today   Patient with  poor prognosis   Hospice eval

## 2023-10-05 NOTE — PROGRESS NOTE ADULT - PROBLEM SELECTOR PLAN 5
Concerns for possible chemo induced renal injury  Electrolyte abnormalities including hyponatremia requiring salt tabs   Baseline Cr 1.4  Nephrology following, recs appreciated.

## 2023-10-05 NOTE — PROGRESS NOTE ADULT - PROBLEM SELECTOR PLAN 2
Diagnosed in Sept 2022 after presenting with sob found to have pleural effusion/trapped lung, required PleurX  Course c/b development of ascites earlier in 2023 now requiring frequent paracentesis    Oncology hx per onc notes: "Metastatic NSCLC with adenocarcinoma histology; tumor tested PD-L1 low-positive and molecular testing was negative for actionable mutations (ARID1A, CASP8, among others). Patient started first-line systemic therapy with combination chemotherapy and immunotherapy with carboplatin/pemetrexed and pembrolizumab in December 2022 and achieved a nice response. He was treated with 4 cycles of triplet therapy followed by pemetrexed/pembrolizumab maintenance. Pleurx catheter was subsequently removed in March 2023. Pemetrexed was subsequently held due to hematologic toxicity with anemia and he was treated with single agent pembrolizumab since April 2023. Restaging CT April 2023 with new perihepatic ascites with a sustained response otherwise. He underwent US-paracentesis x 2 in May 2023 with positive ascitic fluid cytology for his known lung adenocarcinoma. Treatment changed with to single agent Pemetrexed on 5/30/23 as he never failed this agent; treated x 3 cycles through early July 2023 with lack of response. Treatment changed to Abraxane in late August 2023. He is s/p paracentesis on 9/5 with >4000cc removed."    -CTAP as above with loculated ascites with mass effect; possible peritoneal implants  -Underwent LVP on October 2nd with >3100 cc removed  -Oncology following, appreciate recs -- spoke to primary team for follow up to see if there's any additional interventions possible as that would assist with further goc/plan of care discussions. Diagnosed in Sept 2022 after presenting with sob found to have pleural effusion/trapped lung, required PleurX  Course c/b development of ascites earlier in 2023 now requiring frequent paracentesis    Oncology hx per onc notes: "Metastatic NSCLC with adenocarcinoma histology; tumor tested PD-L1 low-positive and molecular testing was negative for actionable mutations (ARID1A, CASP8, among others). Patient started first-line systemic therapy with combination chemotherapy and immunotherapy with carboplatin/pemetrexed and pembrolizumab in December 2022 and achieved a nice response. He was treated with 4 cycles of triplet therapy followed by pemetrexed/pembrolizumab maintenance. Pleurx catheter was subsequently removed in March 2023. Pemetrexed was subsequently held due to hematologic toxicity with anemia and he was treated with single agent pembrolizumab since April 2023. Restaging CT April 2023 with new perihepatic ascites with a sustained response otherwise. He underwent US-paracentesis x 2 in May 2023 with positive ascitic fluid cytology for his known lung adenocarcinoma. Treatment changed with to single agent Pemetrexed on 5/30/23 as he never failed this agent; treated x 3 cycles through early July 2023 with lack of response. Treatment changed to Abraxane in late August 2023. He is s/p paracentesis on 9/5 with >4000cc removed."    -CTAP as above with loculated ascites with mass effect; possible peritoneal implants  -Underwent LVP on October 2nd with >3100 cc removed  -Oncology following, appreciate recs -- spoke to primary team for follow up to see if there's any additional interventions possible as that would assist with further goc/plan of care discussions.  -Available for family meeting with IDT as needed. Discussed plan of care with VERONICA Naomi

## 2023-10-05 NOTE — PROGRESS NOTE ADULT - PROBLEM SELECTOR PLAN 4
Patient had hx of malignant SBO in July which was managed medically  Last BM:  Passing gas:   Currently on senna 2 tabs qhs and dulcolax per patient preference. Patient had hx of malignant SBO in July which was managed medically  Last BM: >5 days  Passing gas: No; none for 5 days;   One episode of possible emesis earlier today  Currently on senna 2 tabs qhs and dulcolax per patient preference.    PLAN:  Recommend obtaining abdominal imaging (XR) to evaluate for malignant SBO

## 2023-10-05 NOTE — PROGRESS NOTE ADULT - ASSESSMENT
58M h/o metastatic non small cell lung ca on paclitaxel  (last chemo 1 week prior to admission, mets to the bone and possible liver and peritoneal implants, follows with Dr Son), PE on eliquis, p/w month history of dysphagia and 30 lbs weight loss. Recent admission for PE and SBO. Had dysphagia sxs then. Barium esophagram showing GERD and sliding hiatal hernia. D/c'd with protonix and outpt f/u but has not. Progressive worsening of sxs over last month with 30 lb weight loss and poor po intake. Feels solids and liquids get stuck in esophagus, also endorses burning sensation in chest/esophagus. Admitted for dysphagia, EGD with evidence of candida esophagitis, now completing two weeks of treatment with fluconazole and PIPI, without improvement of symptoms. Patient has had recurrent ascites with CT showing loculated ascites and possible peritoneal implants for which he underwent large volume paracentesis this week given concerns for possible mass effect contributing to dysphagia. However, he continues to not have any improvement post para. Palliative consulted for complex medical decision making and symptom management in the setting of malignancy.

## 2023-10-05 NOTE — PROGRESS NOTE ADULT - ASSESSMENT
58M h/o non small cell lung ca on paclitaxel (last 1 week ago, follows with Dr. Son) p/w 1 month progressively worsening dysphagia. Recent admission for PE and SBO. Endorsed dysphagia with that admission. Barium esophagram showing GERD and small sliding hiatal hernia. Nephrology consult called for abnormal renal function    Assessment:  1) Non oliguric stable CKD stage III with S cr 1.4 likely chemotherapy induced renal injury/ATN/renal hypoperfusion/ascites/compression/obstructive uropathy  2) NSCLC with metastasis on chemotherapy  3) History of PE  4) Progressive dysphagia/ Hiatal hernia/GERD/Gastritis/Esophageal candidiases/ Ascites/ Intrinsic GI compression s/p therapeutic paracentesis  5) Anemia of chronic illness  6) Hypoalbuminemia/Malnutrition  7) Ascites  8) Dysphagia/Poor appetite/recurrent Hypoglycemia/Failure to thrive  9) Electrolytes disorder with euvolemic/hypervolemic hyponatremia likely pain/constipation/ADH response/d10w infusion/ Liver disease with ascites  10) Chronic constipation    Recommend:  Strict I/o  Avoid Nephrotoxic agent  S cr 1.4  with non oliguria  Na level 131  Continue salt tablet to 2 gm po tid   Monitor BMP and electrolytes  every 12 hrly with goal sodium not to exceed 6 to 8 mEq in 24 hours  Free water restriction to <1L/day  Replete electrolytes with goal K>4 and <5, Mag> 2 and Phos 2.5 to 3.5  Bilateral renal and bladder ultrasound results reviewed  IV/po fluconazole for candida esophagitis  GI/Oncology/hematology follow up reviewed  IR guided paracentesis therapeutic  Nutritional consult follow up  Optimal pain control  Stool sofeneter  Intact PTH, Vitamin D 1,25 level, Phos, calcium level reviewed  Volume status and electrolytes noted  Palliative care consultation and GOC discussion  Advance directives per primary team/Family/Patient  No urgent need for RRT/HD  Will follow

## 2023-10-05 NOTE — PROGRESS NOTE ADULT - SUBJECTIVE AND OBJECTIVE BOX
INTERVAL HPI/OVERNIGHT EVENTS:  Patient seen at bedside.  Patient with continued symptoms of " getting food stuck in throat"  Unable to keep food down   Accompanied by his sister and niece at bedside     VITAL SIGNS:  T(F): 98.1 (10-05-23 @ 10:24)  HR: 113 (10-05-23 @ 10:24)  BP: 132/99 (10-05-23 @ 10:24)  RR: 17 (10-05-23 @ 10:24)  SpO2: 96% (10-05-23 @ 10:24)  Wt(kg): --    PHYSICAL EXAM:  GENERAL: NAD, well-groomed, well-developed  HEAD:  Atraumatic, Normocephalic  EYES: EOMI, PERRLA, conjunctiva and sclera clear  ENMT: No tonsillar erythema, exudates, or enlargement; Moist mucous membranes, Good dentition, No lesions  NECK: Supple, No JVD, Normal thyroid  NERVOUS SYSTEM:  Alert & Oriented X3, Good concentration; Motor Strength 5/5 B/L upper and lower extremities; DTRs 2+ intact and symmetric  CHEST/LUNG: Clear to percussion bilaterally; No rales, rhonchi, wheezing, or rubs  HEART: Regular rate and rhythm; No murmurs, rubs, or gallops  ABDOMEN: Soft, Nontender, Nondistended; Bowel sounds present  EXTREMITIES:  2+ Peripheral Pulses, No clubbing, cyanosis, or edema  LYMPH: No lymphadenopathy noted  SKIN: No rashes or lesions      MEDICATIONS  (STANDING):  baclofen 5 milliGRAM(s) Oral every 12 hours  bisacodyl 5 milliGRAM(s) Oral at bedtime  dextrose 10%. 1000 milliLiter(s) (25 mL/Hr) IV Continuous <Continuous>  dextrose 50% Injectable 25 Gram(s) IV Push once  dextrose 50% Injectable 25 Gram(s) IV Push once  dextrose 50% Injectable 12.5 Gram(s) IV Push once  dextrose Oral Gel 15 Gram(s) Oral once  enoxaparin Injectable 60 milliGRAM(s) SubCutaneous every 12 hours  glucagon  Injectable 1 milliGRAM(s) IntraMuscular once  pantoprazole    Tablet 40 milliGRAM(s) Oral two times a day  sodium chloride 2 Gram(s) Oral every 8 hours  sucralfate suspension 1 Gram(s) Oral four times a day    MEDICATIONS  (PRN):      Allergies    No Known Allergies    Intolerances        LABS:                        10.4   8.17  )-----------( 278      ( 05 Oct 2023 05:23 )             32.6     10-05    131<L>  |  97<L>  |  19  ----------------------------<  112<H>  4.3   |  23  |  1.44<H>    Ca    8.8      05 Oct 2023 05:23  Phos  2.8     10-05  Mg     2.00     10-05        Urinalysis Basic - ( 05 Oct 2023 05:23 )    Color: x / Appearance: x / SG: x / pH: x  Gluc: 112 mg/dL / Ketone: x  / Bili: x / Urobili: x   Blood: x / Protein: x / Nitrite: x   Leuk Esterase: x / RBC: x / WBC x   Sq Epi: x / Non Sq Epi: x / Bacteria: x        RADIOLOGY & ADDITIONAL TESTS:  Studies reviewed.   INTERVAL HPI/OVERNIGHT EVENTS:  Patient seen at bedside.  Patient with continued symptoms of " getting food stuck in throat"  Unable to keep food down   Accompanied by his sister and niece at bedside     VITAL SIGNS:  T(F): 98.1 (10-05-23 @ 10:24)  HR: 113 (10-05-23 @ 10:24)  BP: 132/99 (10-05-23 @ 10:24)  RR: 17 (10-05-23 @ 10:24)  SpO2: 96% (10-05-23 @ 10:24)  Wt(kg): --    PHYSICAL EXAM:  GENERAL: NAD, cachetic  male  HEAD:  Atraumatic, Normocephalic  EYES: EOMI, PERRLA, conjunctiva and sclera clear  ENMT: No tonsillar erythema, exudates, or enlargement; Moist mucous membranes, Good dentition, No lesions  NECK: Supple, No JVD, Normal thyroid  NERVOUS SYSTEM:  Alert & Oriented X3, Good concentration;  extremities; DTRs 2+ intact and symmetric  CHEST/LUNG: Clear to percussion bilaterally; HEART: Regular rate and rhythm; No murmurs, rubs, or gallops  ABDOMEN: Soft, Nontender, Nondistended; Bowel sounds present        MEDICATIONS  (STANDING):  baclofen 5 milliGRAM(s) Oral every 12 hours  bisacodyl 5 milliGRAM(s) Oral at bedtime  dextrose 10%. 1000 milliLiter(s) (25 mL/Hr) IV Continuous <Continuous>  dextrose 50% Injectable 25 Gram(s) IV Push once  dextrose 50% Injectable 25 Gram(s) IV Push once  dextrose 50% Injectable 12.5 Gram(s) IV Push once  dextrose Oral Gel 15 Gram(s) Oral once  enoxaparin Injectable 60 milliGRAM(s) SubCutaneous every 12 hours  glucagon  Injectable 1 milliGRAM(s) IntraMuscular once  pantoprazole    Tablet 40 milliGRAM(s) Oral two times a day  sodium chloride 2 Gram(s) Oral every 8 hours  sucralfate suspension 1 Gram(s) Oral four times a day    MEDICATIONS  (PRN):      Allergies    No Known Allergies    Intolerances        LABS:                        10.4   8.17  )-----------( 278      ( 05 Oct 2023 05:23 )             32.6     10-05    131<L>  |  97<L>  |  19  ----------------------------<  112<H>  4.3   |  23  |  1.44<H>    Ca    8.8      05 Oct 2023 05:23  Phos  2.8     10-05  Mg     2.00     10-05        Urinalysis Basic - ( 05 Oct 2023 05:23 )    Color: x / Appearance: x / SG: x / pH: x  Gluc: 112 mg/dL / Ketone: x  / Bili: x / Urobili: x   Blood: x / Protein: x / Nitrite: x   Leuk Esterase: x / RBC: x / WBC x   Sq Epi: x / Non Sq Epi: x / Bacteria: x        RADIOLOGY & ADDITIONAL TESTS:  Studies reviewed.

## 2023-10-05 NOTE — CHART NOTE - NSCHARTNOTEFT_GEN_A_CORE
Per chart------58M h/o metastatic non small cell lung ca on paclitaxel  (last chemo 1 week prior to admission, mets to the bone and possible liver and peritoneal implants, follows with Dr Son), PE on eliquis, p/w month history of dysphagia and 30 lbs weight loss. Recent admission for PE and SBO. Had dysphagia sxs then. Barium esophagram showing GERD and sliding hiatal hernia. D/c'd with protonix and outpt f/u but has not. Progressive worsening of sxs over last month with 30 lb weight loss and poor po intake. Feels solids and liquids get stuck in esophagus, also endorses burning sensation in chest/esophagus. Admitted for dysphagia, EGD with evidence of candida esophagitis, now completing two weeks of treatment with fluconazole and PIPI, without improvement of symptoms. Patient has had recurrent ascites with CT showing loculated ascites and possible peritoneal implants for which he underwent large volume paracentesis this week given concerns for possible mass effect contributing to dysphagia. However, he continues to not have any improvement post para.     Nutrition follow up for consult received as Pt with poor po and Stage 2 pressure injury.  Pt remains with inability to tolerate PO, Dysphagia and ascites. S/P repeat therapeutic paracentesis on Monday with 3 L removed. Per chart notes and discussion with NP, Pt is not a candidate for G Tube placement. Pt remains severely malnourished, with increased nutrient needs. Suggest consider J Tube placement and or TPN if in accordance with Pt's GOC. Pt is followed by Palliative care.     DIET : Minced and Moist, Ensure Compact 3x daily.     WEIGHT: Weight (kg): 62.9 (10-03 @ 12:59)     PERTINENT MEDICATIONS: MEDICATIONS  (STANDING):  bisacodyl 5 milliGRAM(s) Oral at bedtime  dextrose 10%. 1000 milliLiter(s) (25 mL/Hr) IV Continuous <Continuous>  dextrose 50% Injectable 25 Gram(s) IV Push once  dextrose 50% Injectable 12.5 Gram(s) IV Push once  dextrose 50% Injectable 25 Gram(s) IV Push once  dextrose Oral Gel 15 Gram(s) Oral once  enoxaparin Injectable 60 milliGRAM(s) SubCutaneous every 12 hours  glucagon  Injectable 1 milliGRAM(s) IntraMuscular once  pantoprazole    Tablet 40 milliGRAM(s) Oral two times a day  sodium chloride 2 Gram(s) Oral every 8 hours  sucralfate suspension 1 Gram(s) Oral four times a day    LABS:  10-05 Na131 mmol/L<L> Glu 112 mg/dL<H> K+ 4.3 mmol/L Cr  1.44 mg/dL<H> BUN 19 mg/dL 10-05 Phos 2.8 mg/dL    SKIN: Sacrum 2 Pressure Injury    EDEMA: no edema.    Nutrient Needs:  [X] No Change from previous Assessment.      Previous Nutrition Diagnosis:   Severe Malnutrition.  Nutrition Dx is ongoing.                                      ~~~~~RECOMMEND~~~~~    1.  Consider Alternate means of Nutrition to meet needs. ( J Tube vs PN).   2.  Re consult Nutrition as needed.     CATHY Dillard RD, CDN, CDCES

## 2023-10-05 NOTE — PROGRESS NOTE ADULT - PROBLEM SELECTOR PLAN 1
Etiology likely multifactorial -- initially thought to be 2/2 candida esophagitis, but no improvement with treatment; possibly secondary to recurrent ascites but also no improvement with paracentesis. Concern for possible paraneoplastic syndrome contributing to esophageal dysmotility.   -Dysphagia to solids and liquids with regurgitation of any intake  *Esophagram 8/03 - GE reflux, small sliding HH, 13mm tablet passed with slow transit  *EGD 9/20 - grade C candidal esophagitis, 2cm HH, severe edematous, gastropathy. biopsied   - pathology is consistent with candida esophagitis and chronic inactive gastritis, negative for H pylori  *CT Abdomen/Pelvis w IV contrast 9/28 with large volume loculated ascites, predominantly in the upper quadrants causing mass effect on the liver, spleen, kidneys, and stomach. No associated bowel obstruction.    -Patient currently on day 13/14 of fluconazole (plan for proton pump inhibitor PO BID x 8 weeks followed by once daily; Diflucan (fluconazole) 400 mg PO once today followed by 200mg PO daily for 13 days for a total of 14 day course)  -Patient not candidate for PEG per GI given recurrent ascites    Recommendation  -Please trial Reglan 10 mg IV q6h for 24 hours given refractory GERD and recurrent vomiting   -GI following, recs appreciated. Etiology likely multifactorial -- initially thought to be 2/2 candida esophagitis, but no improvement with treatment; possibly secondary to recurrent ascites but also no improvement with paracentesis. Concern for possible paraneoplastic syndrome contributing to esophageal dysmotility.   -Dysphagia to solids and liquids with regurgitation of any intake  *Esophagram 8/03 - GE reflux, small sliding HH, 13mm tablet passed with slow transit  *EGD 9/20 - grade C candidal esophagitis, 2cm HH, severe edematous, gastropathy. biopsied   - pathology is consistent with candida esophagitis and chronic inactive gastritis, negative for H pylori  *CT Abdomen/Pelvis w IV contrast 9/28 with large volume loculated ascites, predominantly in the upper quadrants causing mass effect on the liver, spleen, kidneys, and stomach. No associated bowel obstruction.    -Patient currently on day 13/14 of fluconazole (plan for proton pump inhibitor PO BID x 8 weeks followed by once daily; Diflucan (fluconazole) 400 mg PO once today followed by 200mg PO daily for 13 days for a total of 14 day course)  -Patient not candidate for PEG per GI given recurrent ascites    Recommendation  -Continue trial Reglan 10 mg IV q6h for 24 hours given refractory GERD and recurrent vomiting   -GI following, recs appreciated. Etiology likely multifactorial -- initially thought to be 2/2 candida esophagitis, but no improvement with treatment; possibly secondary to recurrent ascites but also no improvement with paracentesis. Concern for possible paraneoplastic syndrome contributing to esophageal dysmotility.   -Dysphagia to solids and liquids with regurgitation of any intake  *Esophagram 8/03 - GE reflux, small sliding HH, 13mm tablet passed with slow transit  *EGD 9/20 - grade C candidal esophagitis, 2cm HH, severe edematous, gastropathy. biopsied   - pathology is consistent with candida esophagitis and chronic inactive gastritis, negative for H pylori  *CT Abdomen/Pelvis w IV contrast 9/28 with large volume loculated ascites, predominantly in the upper quadrants causing mass effect on the liver, spleen, kidneys, and stomach. No associated bowel obstruction.    -Patient currently on day 13/14 of fluconazole (plan for proton pump inhibitor PO BID x 8 weeks followed by once daily; Diflucan (fluconazole) 400 mg PO once today followed by 200mg PO daily for 13 days for a total of 14 day course)  -Patient not candidate for PEG per GI given recurrent ascites    Recommendation  -Continue trial Reglan 10 mg IV q6h given refractory GERD and recurrent vomiting   - Start baclofen 5 mg TID for hiccups   -GI signed off Etiology likely multifactorial -- initially thought to be 2/2 candida esophagitis, but no improvement with treatment; possibly secondary to recurrent ascites but also no improvement with paracentesis. Concern for possible paraneoplastic syndrome contributing to esophageal dysmotility.   -Dysphagia to solids and liquids with regurgitation of any intake  *Esophagram 8/03 - GE reflux, small sliding HH, 13mm tablet passed with slow transit  *EGD 9/20 - grade C candidal esophagitis, 2cm HH, severe edematous, gastropathy. biopsied   - pathology is consistent with candida esophagitis and chronic inactive gastritis, negative for H pylori  *CT Abdomen/Pelvis w IV contrast 9/28 with large volume loculated ascites, predominantly in the upper quadrants causing mass effect on the liver, spleen, kidneys, and stomach. No associated bowel obstruction.    -Patient currently on day 13/14 of fluconazole (plan for proton pump inhibitor PO BID x 8 weeks followed by once daily; Diflucan (fluconazole) 400 mg PO once today followed by 200mg PO daily for 13 days for a total of 14 day course)  -Patient not candidate for PEG per GI given recurrent ascites    Recommendation  -Continue trial Reglan 10 mg IV q6h given refractory GERD and recurrent vomiting   - Start baclofen 5 mg BID for hiccups and Magic mouthwash (GFR 58)  -GI signed off

## 2023-10-05 NOTE — PROGRESS NOTE ADULT - PROBLEM SELECTOR PLAN 3
Dx in EGD  Day 13/14 of fluconazole and PPI BID without improvement  Recommend trial of reglan as above.    PLAN:  -Please add magic mouthwash for possible symptomatic relief for mucositis Dx in EGD  Day 14/14 of fluconazole and PPI BID without improvement  Recommend trial of reglan as above.    PLAN:  -Please add magic mouthwash for possible symptomatic relief for mucositis

## 2023-10-05 NOTE — PROGRESS NOTE ADULT - SUBJECTIVE AND OBJECTIVE BOX
Theo Cardenas MD (Nephrology progress note)  [address], [facility],  [address],  [facility]  TEl: [phone]  Cell: [phone]    Patient is a 58y Male seen and evaluated at bedside. Vital signs, laboratory data, imaging studies, consult notes reviewed done within past 24 hours. Overnight events noted.    Allergies    No Known Allergies    Intolerances        ROS:  CONSTITUTIONAL: No fever or chills.  HEENT: No headcahe or visual disturbances.  RESPIRATORY: No shortness of breath, cough, hemoptysis or wheezing  CARDIOVASCULAR: No Chest pain, shortness of breath, palpitations, dizziness, syncope, orthopnea, PND or leg swelling.  GASTROINTESTINAL: No abdominal pain, nausea, vomiting, diarrhea, hematemesis or blood per rectum.  GENITOURINARY: No urinary frequency, urgency, gross hematuria, dysuria, flank or supra pubic pain, difficulty passing urine.  NEUROLOGICAL: No headache, focal limb weakness, tingling or numbness or seizure like activity  SKIN: No skin rash or lesion  MUSCULOSKELETAL: No leg pain, calf pain or swelling    VITALS:    T(C): 36.8 (10-05-23 @ 05:30), Max: 36.8 (10-05-23 @ 05:30)  HR: 110 (10-05-23 @ 05:30) (110 - 123)  BP: 125/89 (10-05-23 @ 05:30) (117/85 - 125/89)  RR: 18 (10-05-23 @ 05:30) (18 - 18)  SpO2: 96% (10-05-23 @ 05:30) (96% - 97%)  CAPILLARY BLOOD GLUCOSE      POCT Blood Glucose.: 101 mg/dL (05 Oct 2023 07:17)  POCT Blood Glucose.: 79 mg/dL (04 Oct 2023 19:13)  POCT Blood Glucose.: 110 mg/dL (04 Oct 2023 13:42)      10-04-23 @ 07:01  -  10-05-23 @ 07:00  --------------------------------------------------------  IN: 0 mL / OUT: 500 mL / NET: -500 mL      MEDICATIONS  (STANDING):  bisacodyl 5 milliGRAM(s) Oral at bedtime  dextrose 10%. 1000 milliLiter(s) (25 mL/Hr) IV Continuous <Continuous>  dextrose 50% Injectable 25 Gram(s) IV Push once  dextrose 50% Injectable 25 Gram(s) IV Push once  dextrose 50% Injectable 12.5 Gram(s) IV Push once  dextrose Oral Gel 15 Gram(s) Oral once  enoxaparin Injectable 60 milliGRAM(s) SubCutaneous every 12 hours  glucagon  Injectable 1 milliGRAM(s) IntraMuscular once  metoclopramide Injectable 10 milliGRAM(s) IV Push every 6 hours  pantoprazole    Tablet 40 milliGRAM(s) Oral two times a day  sodium chloride 2 Gram(s) Oral every 8 hours  sucralfate suspension 1 Gram(s) Oral four times a day    MEDICATIONS  (PRN):      PHYSICAL EXAM:  GENERAL: Alert, awake and oriented x3 in no distress  HEENT: ROBYN, EOMI, neck supple, no JVP, no carotid bruit, oral mucosa moist and pink.  CHEST/LUNG: Bilateral clear breath sounds, no rales, no crepitations, no rhonchi, no wheezing  HEART: Regular rate and rhythm, LOVE II/VI at LPSB, no gallops, no rub   ABDOMEN: Soft, nontender, non distended, bowel sounds present, no palpable organomegaly  : No flank or supra pubic tenderness.  EXTREMITIES: Peripheral pulses are palpable, no pedal edema  Neurology: AAOx3, no focal neurological deficit  SKIN: No rash or skin lesion  Musculoskeletal: No joint deformity or spinal tenderness.      Vascular ACCESS:     LABS:                        10.4   8.17  )-----------( 278      ( 05 Oct 2023 05:23 )             32.6     10-05    131<L>  |  97<L>  |  19  ----------------------------<  112<H>  4.3   |  23  |  1.44<H>    Ca    8.8      05 Oct 2023 05:23  Phos  2.8     10-05  Mg     2.00     10-05          Urinalysis Basic - ( 05 Oct 2023 05:23 )    Color: x / Appearance: x / SG: x / pH: x  Gluc: 112 mg/dL / Ketone: x  / Bili: x / Urobili: x   Blood: x / Protein: x / Nitrite: x   Leuk Esterase: x / RBC: x / WBC x   Sq Epi: x / Non Sq Epi: x / Bacteria: x      Sodium, Random Urine: 34 mmol/L (10-04 @ 11:22)  Osmolality, Random Urine: 581 mosm/kg (10-04 @ 11:22)  Osmolality, Random Urine: 427 mosm/kg (10-03 @ 14:20)  Sodium, Random Urine: 42 mmol/L (10-03 @ 14:20)        RADIOLOGY & ADDITIONAL STUDIES:    Imaging Personally Reviewed:  [x] YES  [ ] NO    Consultant(s) Notes Reviewed:  [x] YES  [ ] NO    Care Discussed with Primary team/ Other Providers [x] YES  [ ] NO       Theo Cardenas MD (Nephrology progress note)  [address] [facility],  [address],  [facility]  TEl: [phone]  Cell: [phone]    Patient is a 58y Male seen and evaluated at bedside. Vital signs, laboratory data, imaging studies, consult notes reviewed done within past 24 hours. Overnight events noted. Patient ill appearing lying in bed alert and awake remains with poor appetite, fatigue and persistent nausea/vomiting sensation. Interval Na level 131 with stable renal function. [facility] discussion and palliative care team on board in progress    Allergies    No Known Allergies    Intolerances        ROS:  CONSTITUTIONAL: No fever or chills.  HEENT: No headache or visual disturbances.  RESPIRATORY: Mild SOB but denies cough, hemoptysis or wheezing  CARDIOVASCULAR: Dyspnea but denies chest pain, palpitations, dizziness  GASTROINTESTINAL:  Abdominal discomfort, nausea/vomiting, poor appetite diarrhea, hematemesis or blood per rectum.  GENITOURINARY: No flank or supra pubic pain  NEUROLOGICAL: No headache, focal limb weakness, tingling or numbness   SKIN: No skin rash or lesion  MUSCULOSKELETAL: No leg pain, calf pain or swelling    VITALS:    T(C): 36.8 (10-05-23 @ 05:30), Max: 36.8 (10-05-23 @ 05:30)  HR: 110 (10-05-23 @ 05:30) (110 - 123)  BP: 125/89 (10-05-23 @ 05:30) (117/85 - 125/89)  RR: 18 (10-05-23 @ 05:30) (18 - 18)  SpO2: 96% (10-05-23 @ 05:30) (96% - 97%)  CAPILLARY BLOOD GLUCOSE      POCT Blood Glucose.: 101 mg/dL (05 Oct 2023 07:17)  POCT Blood Glucose.: 79 mg/dL (04 Oct 2023 19:13)  POCT Blood Glucose.: 110 mg/dL (04 Oct 2023 13:42)      10-04-23 @ 07:01  -  10-05-23 @ 07:00  --------------------------------------------------------  IN: 0 mL / OUT: 500 mL / NET: -500 mL      MEDICATIONS  (STANDING):  bisacodyl 5 milliGRAM(s) Oral at bedtime  dextrose 10%. 1000 milliLiter(s) (25 mL/Hr) IV Continuous <Continuous>  dextrose 50% Injectable 25 Gram(s) IV Push once  dextrose 50% Injectable 25 Gram(s) IV Push once  dextrose 50% Injectable 12.5 Gram(s) IV Push once  dextrose Oral Gel 15 Gram(s) Oral once  enoxaparin Injectable 60 milliGRAM(s) SubCutaneous every 12 hours  glucagon  Injectable 1 milliGRAM(s) IntraMuscular once  metoclopramide Injectable 10 milliGRAM(s) IV Push every 6 hours  pantoprazole    Tablet 40 milliGRAM(s) Oral two times a day  sodium chloride 2 Gram(s) Oral every 8 hours  sucralfate suspension 1 Gram(s) Oral four times a day    MEDICATIONS  (PRN):      PHYSICAL EXAM:  GENERAL: Alert and awake but lethargic, cachectic, ill appearing lying in bed on room air oxygen  HEENT: ROBYN, EOMI, neck supple, no JVP, no carotid bruit, oral mucosa moist and pale  CHEST/LUNG: Bilateral decreased breath sounds at bases, no rales, no rhonchi, no wheezing  HEART: Regular rate and rhythm, LOVE II/VI at LPSB, no gallops, no rub   ABDOMEN: Soft, distended, bowel sounds present, mild diffuse abdominal tenderness, no guarding, no rigidity, no rebound  : No flank or supra pubic tenderness.  EXTREMITIES: Peripheral pulses are palpable, no pedal edema  Neurology: AAOx3, no focal neurological deficit  SKIN: No rash or skin lesion  Musculoskeletal: No joint deformity or spinal tenderness.      Vascular ACCESS: None    LABS:                        10.4   8.17  )-----------( 278      ( 05 Oct 2023 05:23 )             32.6     10-05    131<L>  |  97<L>  |  19  ----------------------------<  112<H>  4.3   |  23  |  1.44<H>    Ca    8.8      05 Oct 2023 05:23  Phos  2.8     10-05  Mg     2.00     10-05          Urinalysis Basic - ( 05 Oct 2023 05:23 )    Color: x / Appearance: x / SG: x / pH: x  Gluc: 112 mg/dL / Ketone: x  / Bili: x / Urobili: x   Blood: x / Protein: x / Nitrite: x   Leuk Esterase: x / RBC: x / WBC x   Sq Epi: x / Non Sq Epi: x / Bacteria: x      Sodium, Random Urine: 34 mmol/L (10-04 @ 11:22)  Osmolality, Random Urine: 581 mosm/kg (10-04 @ 11:22)  Osmolality, Random Urine: 427 mosm/kg (10-03 @ 14:20)  Sodium, Random Urine: 42 mmol/L (10-03 @ 14:20)        RADIOLOGY & ADDITIONAL STUDIES:    Imaging Personally Reviewed:  [x] YES  [ ] NO    Consultant(s) Notes Reviewed:  [x] YES  [ ] NO    Care Discussed with Primary team/ Other Providers [x] YES  [ ] NO

## 2023-10-05 NOTE — PROGRESS NOTE ADULT - CONVERSATION DETAILS
Palliative consulted for complex medical decision making and symptom management in the setting of serious illness.  Spoke to patient's sister Amanad and niece Antoinette to discuss current hospitalization -- Antoinette is visiting from [place] and wanted to have more information about plan of care. We discussed debilitated condition. Family described patient's overall decline and wondered about nutrition -- shared that they know he's not candidate for PEG; wondering about J-tube or TPN. Explained that TPN is a temporary form of nutrition and that prior to pursuing it, it would be more helpful to look at the big picture and patient's overall condition and whether patient can continue to receive further treatments for his cancer for which we need further clarification. Family expressed understanding.  Antoinette requested IDT meeting to discuss patient's care. I also reviewed possible options for where patient may go to post discharge -- currently live with his sister and only has private insurance.     We reviewed HCP form and Patient assigned his sister Amanda Cordova as HCP ([phone] and niece Antoinette Kruse as alternate ([phone]) - HCP form completed placed in chart.    Also reviewed MOLST form and discussed code status and advised patient to review and discuss with this family and that we can follow up.

## 2023-10-06 LAB
ANION GAP SERPL CALC-SCNC: 11 MMOL/L — SIGNIFICANT CHANGE UP (ref 7–14)
BUN SERPL-MCNC: 21 MG/DL — SIGNIFICANT CHANGE UP (ref 7–23)
CALCIUM SERPL-MCNC: 8.9 MG/DL — SIGNIFICANT CHANGE UP (ref 8.4–10.5)
CHLORIDE SERPL-SCNC: 100 MMOL/L — SIGNIFICANT CHANGE UP (ref 98–107)
CO2 SERPL-SCNC: 21 MMOL/L — LOW (ref 22–31)
CREAT SERPL-MCNC: 1.39 MG/DL — HIGH (ref 0.5–1.3)
EGFR: 59 ML/MIN/1.73M2 — LOW
GLUCOSE BLDC GLUCOMTR-MCNC: 97 MG/DL — SIGNIFICANT CHANGE UP (ref 70–99)
GLUCOSE SERPL-MCNC: 105 MG/DL — HIGH (ref 70–99)
HCT VFR BLD CALC: 32.4 % — LOW (ref 39–50)
HGB BLD-MCNC: 10.4 G/DL — LOW (ref 13–17)
MAGNESIUM SERPL-MCNC: 2 MG/DL — SIGNIFICANT CHANGE UP (ref 1.6–2.6)
MCHC RBC-ENTMCNC: 29.5 PG — SIGNIFICANT CHANGE UP (ref 27–34)
MCHC RBC-ENTMCNC: 32.1 GM/DL — SIGNIFICANT CHANGE UP (ref 32–36)
MCV RBC AUTO: 92 FL — SIGNIFICANT CHANGE UP (ref 80–100)
NRBC # BLD: 0 /100 WBCS — SIGNIFICANT CHANGE UP (ref 0–0)
NRBC # FLD: 0 K/UL — SIGNIFICANT CHANGE UP (ref 0–0)
PHOSPHATE SERPL-MCNC: 2.9 MG/DL — SIGNIFICANT CHANGE UP (ref 2.5–4.5)
PLATELET # BLD AUTO: 272 K/UL — SIGNIFICANT CHANGE UP (ref 150–400)
POTASSIUM SERPL-MCNC: 4.3 MMOL/L — SIGNIFICANT CHANGE UP (ref 3.5–5.3)
POTASSIUM SERPL-SCNC: 4.3 MMOL/L — SIGNIFICANT CHANGE UP (ref 3.5–5.3)
RBC # BLD: 3.52 M/UL — LOW (ref 4.2–5.8)
RBC # FLD: 16.6 % — HIGH (ref 10.3–14.5)
SODIUM SERPL-SCNC: 132 MMOL/L — LOW (ref 135–145)
WBC # BLD: 6.07 K/UL — SIGNIFICANT CHANGE UP (ref 3.8–10.5)
WBC # FLD AUTO: 6.07 K/UL — SIGNIFICANT CHANGE UP (ref 3.8–10.5)

## 2023-10-06 PROCEDURE — 99232 SBSQ HOSP IP/OBS MODERATE 35: CPT

## 2023-10-06 RX ORDER — METOCLOPRAMIDE HCL 10 MG
10 TABLET ORAL EVERY 6 HOURS
Refills: 0 | Status: COMPLETED | OUTPATIENT
Start: 2023-10-06 | End: 2023-10-07

## 2023-10-06 RX ORDER — FAMOTIDINE 10 MG/ML
20 INJECTION INTRAVENOUS ONCE
Refills: 0 | Status: COMPLETED | OUTPATIENT
Start: 2023-10-06 | End: 2023-10-06

## 2023-10-06 RX ADMIN — DIPHENHYDRAMINE HYDROCHLORIDE AND LIDOCAINE HYDROCHLORIDE AND ALUMINUM HYDROXIDE AND MAGNESIUM HYDRO 15 MILLILITER(S): KIT at 11:30

## 2023-10-06 RX ADMIN — FAMOTIDINE 20 MILLIGRAM(S): 10 INJECTION INTRAVENOUS at 22:49

## 2023-10-06 RX ADMIN — Medication 30 MILLILITER(S): at 20:11

## 2023-10-06 RX ADMIN — Medication 10 MILLIGRAM(S): at 12:19

## 2023-10-06 RX ADMIN — Medication 5 MILLIGRAM(S): at 05:18

## 2023-10-06 RX ADMIN — SODIUM CHLORIDE 2 GRAM(S): 9 INJECTION INTRAMUSCULAR; INTRAVENOUS; SUBCUTANEOUS at 22:56

## 2023-10-06 RX ADMIN — PANTOPRAZOLE SODIUM 40 MILLIGRAM(S): 20 TABLET, DELAYED RELEASE ORAL at 17:55

## 2023-10-06 RX ADMIN — SODIUM CHLORIDE 2 GRAM(S): 9 INJECTION INTRAMUSCULAR; INTRAVENOUS; SUBCUTANEOUS at 05:18

## 2023-10-06 RX ADMIN — Medication 1 GRAM(S): at 17:55

## 2023-10-06 RX ADMIN — PANTOPRAZOLE SODIUM 40 MILLIGRAM(S): 20 TABLET, DELAYED RELEASE ORAL at 05:19

## 2023-10-06 RX ADMIN — Medication 5 MILLIGRAM(S): at 17:55

## 2023-10-06 RX ADMIN — ENOXAPARIN SODIUM 60 MILLIGRAM(S): 100 INJECTION SUBCUTANEOUS at 05:18

## 2023-10-06 RX ADMIN — DIPHENHYDRAMINE HYDROCHLORIDE AND LIDOCAINE HYDROCHLORIDE AND ALUMINUM HYDROXIDE AND MAGNESIUM HYDRO 15 MILLILITER(S): KIT at 00:05

## 2023-10-06 RX ADMIN — DIPHENHYDRAMINE HYDROCHLORIDE AND LIDOCAINE HYDROCHLORIDE AND ALUMINUM HYDROXIDE AND MAGNESIUM HYDRO 15 MILLILITER(S): KIT at 17:53

## 2023-10-06 RX ADMIN — Medication 25 MILLILITER(S): at 10:07

## 2023-10-06 RX ADMIN — ENOXAPARIN SODIUM 60 MILLIGRAM(S): 100 INJECTION SUBCUTANEOUS at 17:53

## 2023-10-06 RX ADMIN — Medication 10 MILLIGRAM(S): at 17:56

## 2023-10-06 RX ADMIN — DIPHENHYDRAMINE HYDROCHLORIDE AND LIDOCAINE HYDROCHLORIDE AND ALUMINUM HYDROXIDE AND MAGNESIUM HYDRO 15 MILLILITER(S): KIT at 05:19

## 2023-10-06 NOTE — PROGRESS NOTE ADULT - PROBLEM SELECTOR PLAN 1
Etiology likely multifactorial -- initially thought to be 2/2 candida esophagitis, but no improvement with treatment; possibly secondary to recurrent ascites but also no improvement with paracentesis. Concern for possible paraneoplastic syndrome contributing to esophageal dysmotility.   -Dysphagia to solids and liquids with regurgitation of any intake  *Esophagram 8/03 - GE reflux, small sliding HH, 13mm tablet passed with slow transit  *EGD 9/20 - grade C candidal esophagitis, 2cm HH, severe edematous, gastropathy. biopsied   - pathology is consistent with candida esophagitis and chronic inactive gastritis, negative for H pylori  *CT Abdomen/Pelvis w IV contrast 9/28 with large volume loculated ascites, predominantly in the upper quadrants causing mass effect on the liver, spleen, kidneys, and stomach. No associated bowel obstruction.    -Patient currently on day 13/14 of fluconazole (plan for proton pump inhibitor PO BID x 8 weeks followed by once daily; Diflucan (fluconazole) 400 mg PO once today followed by 200mg PO daily for 13 days for a total of 14 day course)  -Patient not candidate for PEG per GI given recurrent ascites    Recommendation  -Continue trial Reglan 10 mg IV q6h given refractory GERD and recurrent vomiting   -Continue baclofen 5 mg BID for hiccups and Magic mouthwash (GFR 58)

## 2023-10-06 NOTE — PROGRESS NOTE ADULT - PROBLEM SELECTOR PROBLEM 1
Esophageal candidiasis
Esophageal candidiasis
Ascites
Esophageal candidiasis
Esophageal candidiasis
Dysphagia
Esophageal candidiasis
Esophageal candidiasis
Ascites
Esophageal candidiasis
Dysphagia
Dysphagia

## 2023-10-06 NOTE — PROVIDER CONTACT NOTE (OTHER) - RECOMMENDATIONS
ACP notified
Provider notified
Sonia Roberts notified. No new orders at this time.
Provider notified.

## 2023-10-06 NOTE — PROVIDER CONTACT NOTE (OTHER) - BACKGROUND
Admitted for nausea and vomiting.
58yr old male admitted with poor PO intake. Found to have esophageal candidiasis.
Patient admitted for Nausea and vomiting.
58yr old male admitted with dysphagia. Dx with esophageal candidiasis.  Hx of lung CA with mets.

## 2023-10-06 NOTE — PROGRESS NOTE ADULT - PROBLEM SELECTOR PLAN 8
Thank you for allowing us to participate in your patient's care. We will continue to follow with you. Please page [phone] for any q's or c's. The Geriatric and Palliative Medicine service has coverage 24 hours a day/ 7 days a week to provide medical recommendations regarding symptom management needs via telephone.
Thank you for allowing us to participate in your patient's care. We will continue to follow with you. Please page [phone] for any q's or c's. The Geriatric and Palliative Medicine service has coverage 24 hours a day/ 7 days a week to provide medical recommendations regarding symptom management needs via telephone.

## 2023-10-06 NOTE — PROGRESS NOTE ADULT - PROBLEM SELECTOR PLAN 2
Diagnosed in Sept 2022 after presenting with sob found to have pleural effusion/trapped lung, required PleurX  Course c/b development of ascites earlier in 2023 now requiring frequent paracentesis    Oncology hx per onc notes: "Metastatic NSCLC with adenocarcinoma histology; tumor tested PD-L1 low-positive and molecular testing was negative for actionable mutations (ARID1A, CASP8, among others). Patient started first-line systemic therapy with combination chemotherapy and immunotherapy with carboplatin/pemetrexed and pembrolizumab in December 2022 and achieved a nice response. He was treated with 4 cycles of triplet therapy followed by pemetrexed/pembrolizumab maintenance. Pleurx catheter was subsequently removed in March 2023. Pemetrexed was subsequently held due to hematologic toxicity with anemia and he was treated with single agent pembrolizumab since April 2023. Restaging CT April 2023 with new perihepatic ascites with a sustained response otherwise. He underwent US-paracentesis x 2 in May 2023 with positive ascitic fluid cytology for his known lung adenocarcinoma. Treatment changed with to single agent Pemetrexed on 5/30/23 as he never failed this agent; treated x 3 cycles through early July 2023 with lack of response. Treatment changed to Abraxane in late August 2023. He is s/p paracentesis on 9/5 with >4000cc removed."  -CTAP: loculated ascites with mass effect; possible peritoneal implants  -Underwent LVP on October 2nd with >3100 cc removed  -Oncology team followed up with patient and recommended that further treatments likely more harmful than beneficial and recommended hospice; patient and family would like to take him with home hospice to [place] where there's more support/family members. Patient currently lives with Amanda his sister in [place]; no other family here.     PLAN  -Evaluate for possible PleurX for recurrent abdominal ascites for symptomatic relief  -Dispo likely to be difficult in attempting to get patient to [place] given overall decline

## 2023-10-06 NOTE — PROGRESS NOTE ADULT - PROBLEM SELECTOR PLAN 4
Patient had hx of malignant SBO in July which was managed medically  Last BM: >5 days  Passing gas: No; none for 5 days;   One episode of possible emesis earlier today  Currently on senna 2 tabs qhs and dulcolax per patient preference.  Abdominal Xray ruled out SBO.

## 2023-10-06 NOTE — PROGRESS NOTE ADULT - PROBLEM SELECTOR PROBLEM 5
Stage 3b chronic kidney disease
Preventive measure
Stage 3b chronic kidney disease
Preventive measure
Preventive measure
Stage 3b chronic kidney disease
Preventive measure
Preventive measure
Stage 3b chronic kidney disease
Preventive measure

## 2023-10-06 NOTE — PROVIDER CONTACT NOTE (OTHER) - ASSESSMENT
Vital signs are as follow:  B0 125/90    SpO2 100%  Temp 97.7
Patient /102. Patient asymptomatic
Stage 2 noted to sacrum. Pt at end of life with poor PO intake.
Glucose 52 on am labs, Fingerstick repeat 61, Dextrose given, repeat .

## 2023-10-06 NOTE — PROGRESS NOTE ADULT - PROBLEM SELECTOR PROBLEM 2
"Garrick"Surjit Khoury was seen and treated in our emergency department on 8/3/2023.  He may return to work on 08/07/2023.       If you have any questions or concerns, please don't hesitate to call.      Nicole Vigil MD"
"Garrick"Surjit Khoury was seen and treated in our emergency department on 8/3/2023.  He may return to work on 08/07/2023.       If you have any questions or concerns, please don't hesitate to call.      Nicole Vigil MD"
Dysphagia
BETO (acute kidney injury)
Dysphagia
Lung cancer
Dysphagia
Lung cancer

## 2023-10-06 NOTE — PROGRESS NOTE ADULT - PROBLEM SELECTOR PLAN 7
Patient at this time is hoping to improve his po intake but recognizes his decline and inability to take po  FULL code for now; provided with molst form for review  GOC discussion as above  Further discussions to be continued   Patient assigned his sister Amanda Cordova as HCP ([phone] and niece Antoinette Kruse as alternate ([phone]) - HCP form completed placed in chart Patient at this time is hoping to improve his po intake but recognizes his decline and inability to take po  FULL code for now; provided with molst form for review  GOC discussion as documented previously;   We discussed MOLST today -- they shared they have not reviwed it yet as family but planned to do so  Offered opioids as a trial for possible relief from the pain he's experiencing, family shared they're not ready for that yet  Further discussions to be continued   Patient assigned his sister Amanda Cordova as HCP ([phone] and niboo Curry Uriah as alternate ([phone]) - HCP form completed placed in chart

## 2023-10-06 NOTE — PROGRESS NOTE ADULT - PROBLEM SELECTOR PLAN 6
Patient with po intake <25% of meals in the setting of severe dysphagia  Patient not candidate for PEG per GI given recurrent ascites  Nutrition following  Do not think TPN is indicated yet pending further treatment decisions.
DVT ppx: lovenox 60 bid given recent PE
Patient with po intake <25% of meals in the setting of severe dysphagia  Patient not candidate for PEG per GI given recurrent ascites  Nutrition following  Do not think TPN is indicated yet pending further treatment decisions.
DVT ppx: lovenox 60 bid given recent PE

## 2023-10-06 NOTE — PROVIDER CONTACT NOTE (OTHER) - ACTION/TREATMENT ORDERED:
Provider notified
Provider notified. Wound consult placed.
ACP notified. Will monitor patient BP.
Sonia Roberts notified. No new orders at this time. Safety maintained.

## 2023-10-06 NOTE — PROGRESS NOTE ADULT - ASSESSMENT
58M h/o non small cell lung ca on paclitaxel (last 1 week ago, follows with Dr. Son) p/w 1 month progressively worsening dysphagia. Recent admission for PE and SBO. Endorsed dysphagia with that admission. Barium esophagram showing GERD and small sliding hiatal hernia. Nephrology consult called for abnormal renal function    Assessment:  1) Non oliguric stable CKD stage III with S cr 1.4 likely chemotherapy induced renal injury/ATN/renal hypoperfusion/ascites/compression/obstructive uropathy  2) NSCLC with metastasis on chemotherapy  3) History of PE  4) Progressive dysphagia/ Hiatal hernia/GERD/Gastritis/Esophageal candidiases/ Ascites/ Intrinsic GI compression s/p therapeutic paracentesis  5) Anemia of chronic illness  6) Hypoalbuminemia/Malnutrition  7) Ascites  8) Dysphagia/Poor appetite/recurrent Hypoglycemia/Failure to thrive  9) Electrolytes disorder with euvolemic/hypervolemic hyponatremia likely pain/constipation/ADH response/d10w infusion/ Liver disease with ascites  10) Chronic constipation    Recommend:  Strict I/o  Avoid Nephrotoxic agent  S cr 1.4  with non oliguria  Na level 132  Continue salt tablet to 2 gm po tid   Monitor BMP and electrolytes  every 12 hrly with goal sodium not to exceed 6 to 8 mEq in 24 hours  Free water restriction to <1L/day  Replete electrolytes with goal K>4 and <5, Mag> 2 and Phos 2.5 to 3.5  Bilateral renal and bladder ultrasound results reviewed  IV/po fluconazole for candida esophagitis  GI/Oncology/hematology follow up reviewed  IR guided paracentesis therapeutic  Nutritional consult follow up  Optimal pain control  Stool softener  Intact PTH, Vitamin D 1,25 level, Phos, calcium level reviewed  Volume status and electrolytes noted  Palliative care consultation and GOC discussion in progress  Advance directives per primary team/Family/Patient  No urgent need for RRT/HD  Will follow

## 2023-10-06 NOTE — PROGRESS NOTE ADULT - PROBLEM SELECTOR PLAN 3
Dx in EGD  Day 14/14 of fluconazole and PPI BID without improvement  Recommend trial of reglan as above.    PLAN:  -add magic mouthwash for possible symptomatic relief for mucositis Dx in EGD  Day 14/14 of fluconazole and PPI BID without improvement  Recommend trial of reglan as above.    PLAN:  -Today team added simethicone for further management

## 2023-10-06 NOTE — PROVIDER CONTACT NOTE (OTHER) - SITUATION
Stage 2 noted to sacrum. Pt at end of life with poor PO intake.
Glucose 52 on am labs, Fingerstick repeat 61, Dextrose given, repeat .
/102
Patient's  on vitals machine. Patient asymptomatic. Denies any chest pain, headache or SOB. Patient is not on tele.

## 2023-10-06 NOTE — PROGRESS NOTE ADULT - SUBJECTIVE AND OBJECTIVE BOX
[facility] Geriatrics and Palliative Care  Dianne Suggs MD, Palliative Care Attending  Contact Info: Page [phone] (including Nights/Weekends), message on Microsoft Teams (Dianne Suggs MD), or leave VM at Palliative Office [phone] (non-urgent)   Date of [id number] @ 19:45    SUBJECTIVE AND OBJECTIVE:  Patient seen this morning, had vomited and was resting     Indication for Geriatrics and Palliative Care Services/INTERVAL HPI: goals of care    OVERNIGHT EVENTS:     Per RN, patient had multiple episodes of emesis.   Per  family looking into options for taking patient home to [place] with hospice service  Spoke to primary team regarding evaluation for SBO -- xray resulted, no bowel obstruction; regarding disposition patient may be too weak to travel to [place]     DNR on chart:  Allergies    No Known Allergies    Intolerances    MEDICATIONS  (STANDING):  baclofen 5 milliGRAM(s) Oral every 12 hours  bisacodyl 5 milliGRAM(s) Oral at bedtime  dextrose 10%. 1000 milliLiter(s) (25 mL/Hr) IV Continuous <Continuous>  dextrose 50% Injectable 25 Gram(s) IV Push once  dextrose 50% Injectable 12.5 Gram(s) IV Push once  dextrose 50% Injectable 25 Gram(s) IV Push once  dextrose Oral Gel 15 Gram(s) Oral once  enoxaparin Injectable 60 milliGRAM(s) SubCutaneous every 12 hours  FIRST- Mouthwash  BLM 15 milliLiter(s) Swish and Spit four times a day  glucagon  Injectable 1 milliGRAM(s) IntraMuscular once  metoclopramide Injectable 10 milliGRAM(s) IV Push every 6 hours  pantoprazole    Tablet 40 milliGRAM(s) Oral two times a day  sodium chloride 2 Gram(s) Oral every 8 hours  sucralfate suspension 1 Gram(s) Oral four times a day    MEDICATIONS  (PRN):      ITEMS UNCHECKED ARE NOT PRESENT    PRESENT SYMPTOMS: [ ]Unable to self-report - see [ ] CPOT [ ] PAINADS [ ] RDOS  Source if other than patient:  [ ]Family   [ ]Team     Pain:  [ ]yes [x]no  QOL impact -   Location -                    Aggravating factors -  Quality -  Radiation -  Timing-  Severity (0-10 scale):  Minimal acceptable level/ pain goal (0-10 scale):     CPOT:    https://www.[facility].org/getattachment/rho40r21-2f5g-2c6y-1r0g-7468g7558r4a/Critical-Care-Pain-Observation-Tool-(CPOT)    Dyspnea:                           [ ]Mild [ ]Moderate [ ]Severe  Anxiety:                             [ ]Mild [ ]Moderate [ ]Severe  Fatigue:                             [ ]Mild [ ]Moderate [ ]Severe  Nausea:                             [x ]Mild [ ]Moderate [ ]Severe  Loss of appetite:              [ x]Mild [ ]Moderate [ ]Severe  Constipation:                    [ ]Mild [ ]Moderate [ ]Severe  Other Symptoms:  [ ]All other review of systems negative     PCSSQ[Palliative Care Spiritual Screening Question]   Severity (0-10):  Score of 4 or > indicate consideration of Chaplaincy referral.  Chaplaincy Referral: [ x] yes [ ] refused [ ] following [ ] deferred    Caregiver [place]? : [ ] yes [x ] no [ ] Deferred [ ] Declined             Social work referral [ ] Patient & Family Centered Care Referral [ ]  Anticipatory Grief present?:  [ ] yes [ ] no  [ ] Deferred                  Social work referral [ ] Patient & Family Centered Care Referral [ ]      PHYSICAL EXAM:  Vital Signs Last 24 Hrs  T(C): 36.7 (06 Oct 2023 17:47), Max: 36.8 (05 Oct 2023 21:15)  T(F): 98.1 (06 Oct 2023 17:47), Max: 98.2 (05 Oct 2023 21:15)  HR: 77 (06 Oct 2023 17:47) (77 - 117)  BP: 142/70 (06 Oct 2023 17:47) (126/93 - 142/70)  BP(mean): --  RR: 18 (06 Oct 2023 17:47) (16 - 18)  SpO2: 100% (06 Oct 2023 17:47) (98% - 100%)    Parameters below as of 06 Oct 2023 17:47  Patient On (Oxygen Delivery Method): room air         General:  cachectic, NAD  HENT: dry mouth   Chest: symmetric excursion, no accessory muscle use  Heart: peripheral pulse 2+ and regular  Lungs: no dyspnea with speech  Abdomen: distended; nontender  Ext: no cyanosis, clubbing, visible veins, ulcers, wounds  Neuro: alert, coherent speech, grossly non-focal  Psych: calm, rational, linear thought process  Skin: ]Normal  [ ]Rash  [ ]Other  [x ]Pressure ulcer(s) [x ]y [ ]n present on admission      LABS:                        10.4   6.07  )-----------( 272      ( 06 Oct 2023 06:15 )             32.4   10-06    132<L>  |  100  |  21  ----------------------------<  105<H>  4.3   |  21<L>  |  1.39<H>    Ca    8.9      06 Oct 2023 06:15  Phos  2.9     10-06  Mg     2.00     10-06        Urinalysis Basic - ( 06 Oct 2023 06:15 )    Color: x / Appearance: x / SG: x / pH: x  Gluc: 105 mg/dL / Ketone: x  / Bili: x / Urobili: x   Blood: x / Protein: x / Nitrite: x   Leuk Esterase: x / RBC: x / WBC x   Sq Epi: x / Non Sq Epi: x / Bacteria: x      RADIOLOGY & ADDITIONAL STUDIES:    Protein Calorie Malnutrition Present: [ ]mild [ ]moderate [ ]severe [ ]underweight [ ]morbid obesity  https://www.andeal.org/vault/2440/web/files/ONC/Table_Clinical%20Characteristics%20to%20Document%20Malnutrition-White%20JV%20et%20al%202012.pdf    Height (cm): 177.8 (09-20-23 @ 07:59), 177.8 (09-12-23 @ 13:00), 177.8 (07-27-23 @ 00:15)  Weight (kg): 62.9 (10-03-23 @ 12:59), 63 (09-15-23 @ 17:59), 69.6 (07-27-23 @ 06:08)  BMI (kg/m2): 19.9 (10-03-23 @ 12:59), 19.9 (09-20-23 @ 07:59), 19.9 (09-15-23 @ 17:59)    [ ]PPSV2 < or = 30%  [ ]significant weight loss [ ]poor nutritional intake [ ]anasarca[ ]Artificial Nutrition    Other REFERRALS:  [ ]Hospice  [ ]Child Life  [ ]Social Work  [ ]Case management [ ]Holistic Therapy     Goals of Care Document: [facility] Geriatrics and Palliative Care  Dianne Suggs MD, Palliative Care Attending  Contact Info: Page [phone] (including Nights/Weekends), message on Microsoft Teams (Dianne Suggs MD), or leave  at Palliative Office [phone] (non-urgent)   Date of [id number] @ 19:45    SUBJECTIVE AND OBJECTIVE:  Patient seen this morning, had vomited and was resting   Later met patient, accompanied by his family.  Shared that they had made a few changes/additions to his medications for symptomatic improvement of reflext GERD      Indication for Geriatrics and Palliative Care Services/INTERVAL HPI: goals of care    OVERNIGHT EVENTS:     Per RN, patient had multiple episodes of emesis.   Per  family looking into options for taking patient home to [place] with hospice service  Spoke to primary team regarding evaluation for SBO -- xray resulted, no bowel obstruction; regarding disposition patient may be too weak to travel to [place]     DNR on chart:  Allergies    No Known Allergies    Intolerances    MEDICATIONS  (STANDING):  baclofen 5 milliGRAM(s) Oral every 12 hours  bisacodyl 5 milliGRAM(s) Oral at bedtime  dextrose 10%. 1000 milliLiter(s) (25 mL/Hr) IV Continuous <Continuous>  dextrose 50% Injectable 25 Gram(s) IV Push once  dextrose 50% Injectable 12.5 Gram(s) IV Push once  dextrose 50% Injectable 25 Gram(s) IV Push once  dextrose Oral Gel 15 Gram(s) Oral once  enoxaparin Injectable 60 milliGRAM(s) SubCutaneous every 12 hours  FIRST- Mouthwash  BLM 15 milliLiter(s) Swish and Spit four times a day  glucagon  Injectable 1 milliGRAM(s) IntraMuscular once  metoclopramide Injectable 10 milliGRAM(s) IV Push every 6 hours  pantoprazole    Tablet 40 milliGRAM(s) Oral two times a day  sodium chloride 2 Gram(s) Oral every 8 hours  sucralfate suspension 1 Gram(s) Oral four times a day    MEDICATIONS  (PRN):      ITEMS UNCHECKED ARE NOT PRESENT    PRESENT SYMPTOMS: [ ]Unable to self-report - see [ ] CPOT [ ] PAINADS [ ] RDOS  Source if other than patient:  [ ]Family   [ ]Team     Pain:  [ ]yes [x]no  QOL impact -   Location -                    Aggravating factors -  Quality -  Radiation -  Timing-  Severity (0-10 scale):  Minimal acceptable level/ pain goal (0-10 scale):     CPOT:    https://www.[facility].org/getattachment/[email or web address]-0y6h-2d3g-4477e2631k9u/Critical-Care-Pain-Observation-Tool-(CPOT)    Dyspnea:                           [ ]Mild [ ]Moderate [ ]Severe  Anxiety:                             [ ]Mild [ ]Moderate [ ]Severe  Fatigue:                             [ ]Mild [ ]Moderate [ ]Severe  Nausea:                             [x ]Mild [ ]Moderate [ ]Severe  Loss of appetite:              [ x]Mild [ ]Moderate [ ]Severe  Constipation:                    [ ]Mild [ ]Moderate [ ]Severe  Other Symptoms:  [ ]All other review of systems negative     PCSSQ[Palliative Care Spiritual Screening Question]   Severity (0-10):  Score of 4 or > indicate consideration of Chaplaincy referral.  Chaplaincy Referral: [ x] yes [ ] refused [ ] following [ ] deferred    Caregiver [place]? : [ ] yes [x ] no [ ] Deferred [ ] Declined             Social work referral [ ] Patient & Family Centered Care Referral [ ]  Anticipatory Grief present?:  [ ] yes [ ] no  [ ] Deferred                  Social work referral [ ] Patient & Family Centered Care Referral [ ]      PHYSICAL EXAM:  Vital Signs Last 24 Hrs  T(C): 36.7 (06 Oct 2023 17:47), Max: 36.8 (05 Oct 2023 21:15)  T(F): 98.1 (06 Oct 2023 17:47), Max: 98.2 (05 Oct 2023 21:15)  HR: 77 (06 Oct 2023 17:47) (77 - 117)  BP: 142/70 (06 Oct 2023 17:47) (126/93 - 142/70)  BP(mean): --  RR: 18 (06 Oct 2023 17:47) (16 - 18)  SpO2: 100% (06 Oct 2023 17:47) (98% - 100%)    Parameters below as of 06 Oct 2023 17:47  Patient On (Oxygen Delivery Method): room air         General:  cachectic, NAD  HENT: dry mouth   Chest: symmetric excursion, no accessory muscle use  Heart: peripheral pulse 2+ and regular  Lungs: no dyspnea with speech  Abdomen: distended; nontender  Ext: no cyanosis, clubbing, visible veins, ulcers, wounds  Neuro: alert, coherent speech, grossly non-focal  Psych: calm, rational, linear thought process  Skin: ]Normal  [ ]Rash  [ ]Other  [x ]Pressure ulcer(s) [x ]y [ ]n present on admission      LABS:                        10.4   6.07  )-----------( 272      ( 06 Oct 2023 06:15 )             32.4   10-06    132<L>  |  100  |  21  ----------------------------<  105<H>  4.3   |  21<L>  |  1.39<H>    Ca    8.9      06 Oct 2023 06:15  Phos  2.9     10-06  Mg     2.00     10-06        Urinalysis Basic - ( 06 Oct 2023 06:15 )    Color: x / Appearance: x / SG: x / pH: x  Gluc: 105 mg/dL / Ketone: x  / Bili: x / Urobili: x   Blood: x / Protein: x / Nitrite: x   Leuk Esterase: x / RBC: x / WBC x   Sq Epi: x / Non Sq Epi: x / Bacteria: x      RADIOLOGY & ADDITIONAL STUDIES:    Protein Calorie Malnutrition Present: [ ]mild [ ]moderate [ ]severe [ ]underweight [ ]morbid obesity  https://www.andeal.org/vault/2440/web/files/ONC/Table_Clinical%20Characteristics%20to%20Document%20Malnutrition-White%20JV%20et%20al%202012.pdf    Height (cm): 177.8 (09-20-23 @ 07:59), 177.8 (09-12-23 @ 13:00), 177.8 (07-27-23 @ 00:15)  Weight (kg): 62.9 (10-03-23 @ 12:59), 63 (09-15-23 @ 17:59), 69.6 (07-27-23 @ 06:08)  BMI (kg/m2): 19.9 (10-03-23 @ 12:59), 19.9 (09-20-23 @ 07:59), 19.9 (09-15-23 @ 17:59)    [ ]PPSV2 < or = 30%  [ ]significant weight loss [ ]poor nutritional intake [ ]anasarca[ ]Artificial Nutrition    Other REFERRALS:  [ ]Hospice  [ ]Child Life  [ ]Social Work  [ ]Case management [ ]Holistic Therapy     Goals of Care Document:

## 2023-10-06 NOTE — PROGRESS NOTE ADULT - SUBJECTIVE AND OBJECTIVE BOX
Theo Cardenas MD (Nephrology progress note)  [address], [facility],  [address],  [id number]  TEl: [phone]  Cell: [phone]    Patient is a 58y Male seen and evaluated at bedside. Vital signs, laboratory data, imaging studies, consult notes reviewed done within past 24 hours. Overnight events noted. Patient lying in bed remains with intermittent nausea/vomiting and poor appetite. Interval Na level 132 with elevated but stable renal function.    Allergies    No Known Allergies    Intolerances        ROS:  CONSTITUTIONAL: No fever or chills.  HEENT: No headache or visual disturbances.  RESPIRATORY: No shortness of breath, cough, hemoptysis or wheezing  CARDIOVASCULAR: No Chest pain or SOB  GASTROINTESTINAL: No abdominal pain, nausea, vomiting, diarrhea, hematemesis  GENITOURINARY: No flank or supra pubic pain  NEUROLOGICAL: No headache, focal limb weakness, tingling or numbness   SKIN: No skin rash or lesion  MUSCULOSKELETAL: No leg pain, calf pain or swelling    VITALS:    T(C): 36.7 (10-06-23 @ 17:47), Max: 36.7 (10-06-23 @ 17:47)  HR: 77 (10-06-23 @ 17:47) (77 - 117)  BP: 142/70 (10-06-23 @ 17:47) (126/93 - 142/70)  RR: 18 (10-06-23 @ 17:47) (16 - 18)  SpO2: 100% (10-06-23 @ 17:47) (98% - 100%)  CAPILLARY BLOOD GLUCOSE          MEDICATIONS  (STANDING):  baclofen 5 milliGRAM(s) Oral every 12 hours  bisacodyl 5 milliGRAM(s) Oral at bedtime  dextrose 10%. 1000 milliLiter(s) (25 mL/Hr) IV Continuous <Continuous>  dextrose 50% Injectable 12.5 Gram(s) IV Push once  dextrose 50% Injectable 25 Gram(s) IV Push once  dextrose 50% Injectable 25 Gram(s) IV Push once  dextrose Oral Gel 15 Gram(s) Oral once  enoxaparin Injectable 60 milliGRAM(s) SubCutaneous every 12 hours  famotidine Injectable 20 milliGRAM(s) IV Push once  FIRST- Mouthwash  BLM 15 milliLiter(s) Swish and Spit four times a day  glucagon  Injectable 1 milliGRAM(s) IntraMuscular once  metoclopramide Injectable 10 milliGRAM(s) IV Push every 6 hours  pantoprazole    Tablet 40 milliGRAM(s) Oral two times a day  sodium chloride 2 Gram(s) Oral every 8 hours  sucralfate suspension 1 Gram(s) Oral four times a day    MEDICATIONS  (PRN):  aluminum hydroxide/magnesium hydroxide/simethicone Suspension 30 milliLiter(s) Oral every 6 hours PRN Dyspepsia      PHYSICAL EXAM:  GENERAL: Alert, awake and oriented x3 in no distress  HEENT: ROBYN, EOMI, neck supple, no JVP, no carotid bruit, oral mucosa moist and pink.  CHEST/LUNG: Bilateral clear breath sounds, no rales, no crepitations, no rhonchi, no wheezing  HEART: Regular rate and rhythm, LOVE II/VI at LPSB, no gallops, no rub   ABDOMEN: Soft, nontender, non distended, bowel sounds present, no palpable organomegaly  : No flank or supra pubic tenderness.  EXTREMITIES: Peripheral pulses are palpable, no pedal edema  Neurology: AAOx3, no focal neurological deficit  SKIN: No rash or skin lesion  Musculoskeletal: No joint deformity or spinal tenderness.      Vascular ACCESS: None    LABS:                        10.4   6.07  )-----------( 272      ( 06 Oct 2023 06:15 )             32.4     10-06    132<L>  |  100  |  21  ----------------------------<  105<H>  4.3   |  21<L>  |  1.39<H>    Ca    8.9      06 Oct 2023 06:15  Phos  2.9     10-06  Mg     2.00     10-06          Urinalysis Basic - ( 06 Oct 2023 06:15 )    Color: x / Appearance: x / SG: x / pH: x  Gluc: 105 mg/dL / Ketone: x  / Bili: x / Urobili: x   Blood: x / Protein: x / Nitrite: x   Leuk Esterase: x / RBC: x / WBC x   Sq Epi: x / Non Sq Epi: x / Bacteria: x            RADIOLOGY & ADDITIONAL STUDIES:  rad< from: Xray Abdomen 2 Views (10.05.23 @ 17:26) >    ACC: [id number] EXAM:  XR ABDOMEN 2V   ORDERED BY: ART GUZMAN     PROCEDURE DATE:  10/05/2023          INTERPRETATION:  CLINICAL INFORMATION: Small cell lung cancer, rule out   SBO    TECHNIQUE: Frontal view of the abdomen    COMPARISON: 9/19/2023    FINDINGS:  Nonspecific paucity of bowel gas which is likely related to patient's   ascites. No acute osseous findings.    IMPRESSION:  Nonspecific paucity of bowel gas which is likely related to patient's   ascites.    --- End of Report ---          RUTH HERRERA MD; Resident Radiologist  This document has been electronically signed.  JONI LOWE MD; Attending Radiologist  This document has been electronically signed. Oct  6 2023 12:05PM    < end of copied text >    Imaging Personally Reviewed:  [x] YES  [ ] NO    Consultant(s) Notes Reviewed:  [x] YES  [ ] NO    Care Discussed with Primary team/ Other Providers [x] YES  [ ] NO

## 2023-10-07 LAB
ALBUMIN SERPL ELPH-MCNC: 1.8 G/DL — LOW (ref 3.3–5)
ALP SERPL-CCNC: 104 U/L — SIGNIFICANT CHANGE UP (ref 40–120)
ALT FLD-CCNC: 8 U/L — SIGNIFICANT CHANGE UP (ref 4–41)
ANION GAP SERPL CALC-SCNC: 12 MMOL/L — SIGNIFICANT CHANGE UP (ref 7–14)
ANION GAP SERPL CALC-SCNC: 13 MMOL/L — SIGNIFICANT CHANGE UP (ref 7–14)
ANION GAP SERPL CALC-SCNC: 15 MMOL/L — HIGH (ref 7–14)
ANISOCYTOSIS BLD QL: SLIGHT — SIGNIFICANT CHANGE UP
APTT BLD: 42.5 SEC — HIGH (ref 24.5–35.6)
AST SERPL-CCNC: 23 U/L — SIGNIFICANT CHANGE UP (ref 4–40)
BASE EXCESS BLDA CALC-SCNC: -8.7 MMOL/L — LOW (ref -2–3)
BASOPHILS # BLD AUTO: 0.02 K/UL — SIGNIFICANT CHANGE UP (ref 0–0.2)
BASOPHILS NFR BLD AUTO: 0.9 % — SIGNIFICANT CHANGE UP (ref 0–2)
BILIRUB SERPL-MCNC: 0.3 MG/DL — SIGNIFICANT CHANGE UP (ref 0.2–1.2)
BLD GP AB SCN SERPL QL: NEGATIVE — SIGNIFICANT CHANGE UP
BLOOD GAS ARTERIAL COMPREHENSIVE RESULT: SIGNIFICANT CHANGE UP
BUN SERPL-MCNC: 25 MG/DL — HIGH (ref 7–23)
BUN SERPL-MCNC: 34 MG/DL — HIGH (ref 7–23)
BUN SERPL-MCNC: 35 MG/DL — HIGH (ref 7–23)
CALCIUM SERPL-MCNC: 8.1 MG/DL — LOW (ref 8.4–10.5)
CALCIUM SERPL-MCNC: 8.2 MG/DL — LOW (ref 8.4–10.5)
CALCIUM SERPL-MCNC: 8.9 MG/DL — SIGNIFICANT CHANGE UP (ref 8.4–10.5)
CHLORIDE SERPL-SCNC: 100 MMOL/L — SIGNIFICANT CHANGE UP (ref 98–107)
CHLORIDE SERPL-SCNC: 100 MMOL/L — SIGNIFICANT CHANGE UP (ref 98–107)
CHLORIDE SERPL-SCNC: 99 MMOL/L — SIGNIFICANT CHANGE UP (ref 98–107)
CO2 BLDA-SCNC: 20 MMOL/L — SIGNIFICANT CHANGE UP (ref 19–24)
CO2 SERPL-SCNC: 17 MMOL/L — LOW (ref 22–31)
CO2 SERPL-SCNC: 20 MMOL/L — LOW (ref 22–31)
CO2 SERPL-SCNC: 21 MMOL/L — LOW (ref 22–31)
CREAT SERPL-MCNC: 1.37 MG/DL — HIGH (ref 0.5–1.3)
CREAT SERPL-MCNC: 2.01 MG/DL — HIGH (ref 0.5–1.3)
CREAT SERPL-MCNC: 2.13 MG/DL — HIGH (ref 0.5–1.3)
EGFR: 35 ML/MIN/1.73M2 — LOW
EGFR: 38 ML/MIN/1.73M2 — LOW
EGFR: 60 ML/MIN/1.73M2 — SIGNIFICANT CHANGE UP
EOSINOPHIL # BLD AUTO: 0.02 K/UL — SIGNIFICANT CHANGE UP (ref 0–0.5)
EOSINOPHIL NFR BLD AUTO: 0.9 % — SIGNIFICANT CHANGE UP (ref 0–6)
GAS PNL BLDA: SIGNIFICANT CHANGE UP
GIANT PLATELETS BLD QL SMEAR: PRESENT — SIGNIFICANT CHANGE UP
GLUCOSE BLDC GLUCOMTR-MCNC: 120 MG/DL — HIGH (ref 70–99)
GLUCOSE BLDC GLUCOMTR-MCNC: 32 MG/DL — CRITICAL LOW (ref 70–99)
GLUCOSE BLDC GLUCOMTR-MCNC: 48 MG/DL — CRITICAL LOW (ref 70–99)
GLUCOSE BLDC GLUCOMTR-MCNC: 63 MG/DL — LOW (ref 70–99)
GLUCOSE BLDC GLUCOMTR-MCNC: 72 MG/DL — SIGNIFICANT CHANGE UP (ref 70–99)
GLUCOSE BLDC GLUCOMTR-MCNC: 81 MG/DL — SIGNIFICANT CHANGE UP (ref 70–99)
GLUCOSE BLDC GLUCOMTR-MCNC: 85 MG/DL — SIGNIFICANT CHANGE UP (ref 70–99)
GLUCOSE BLDC GLUCOMTR-MCNC: 90 MG/DL — SIGNIFICANT CHANGE UP (ref 70–99)
GLUCOSE BLDC GLUCOMTR-MCNC: 92 MG/DL — SIGNIFICANT CHANGE UP (ref 70–99)
GLUCOSE BLDC GLUCOMTR-MCNC: 96 MG/DL — SIGNIFICANT CHANGE UP (ref 70–99)
GLUCOSE BLDC GLUCOMTR-MCNC: <25 MG/DL — CRITICAL LOW (ref 70–99)
GLUCOSE SERPL-MCNC: 158 MG/DL — HIGH (ref 70–99)
GLUCOSE SERPL-MCNC: 44 MG/DL — CRITICAL LOW (ref 70–99)
GLUCOSE SERPL-MCNC: 95 MG/DL — SIGNIFICANT CHANGE UP (ref 70–99)
HCO3 BLDA-SCNC: 18 MMOL/L — LOW (ref 21–28)
HCT VFR BLD CALC: 26.9 % — LOW (ref 39–50)
HCT VFR BLD CALC: 27.9 % — LOW (ref 39–50)
HCT VFR BLD CALC: 35 % — LOW (ref 39–50)
HGB BLD-MCNC: 11 G/DL — LOW (ref 13–17)
HGB BLD-MCNC: 8.5 G/DL — LOW (ref 13–17)
HGB BLD-MCNC: 8.9 G/DL — LOW (ref 13–17)
HOROWITZ INDEX BLDA+IHG-RTO: 21 — SIGNIFICANT CHANGE UP
IANC: 1.41 K/UL — LOW (ref 1.8–7.4)
INR BLD: 1.45 RATIO — HIGH (ref 0.85–1.18)
INR BLD: 1.54 RATIO — HIGH (ref 0.85–1.18)
LYMPHOCYTES # BLD AUTO: 0.73 K/UL — LOW (ref 1–3.3)
LYMPHOCYTES # BLD AUTO: 33.6 % — SIGNIFICANT CHANGE UP (ref 13–44)
MACROCYTES BLD QL: SLIGHT — SIGNIFICANT CHANGE UP
MAGNESIUM SERPL-MCNC: 1.9 MG/DL — SIGNIFICANT CHANGE UP (ref 1.6–2.6)
MAGNESIUM SERPL-MCNC: 2.3 MG/DL — SIGNIFICANT CHANGE UP (ref 1.6–2.6)
MAGNESIUM SERPL-MCNC: 2.4 MG/DL — SIGNIFICANT CHANGE UP (ref 1.6–2.6)
MANUAL SMEAR VERIFICATION: SIGNIFICANT CHANGE UP
MCHC RBC-ENTMCNC: 29.6 PG — SIGNIFICANT CHANGE UP (ref 27–34)
MCHC RBC-ENTMCNC: 29.8 PG — SIGNIFICANT CHANGE UP (ref 27–34)
MCHC RBC-ENTMCNC: 30.1 PG — SIGNIFICANT CHANGE UP (ref 27–34)
MCHC RBC-ENTMCNC: 31.4 GM/DL — LOW (ref 32–36)
MCHC RBC-ENTMCNC: 31.6 GM/DL — LOW (ref 32–36)
MCHC RBC-ENTMCNC: 31.9 GM/DL — LOW (ref 32–36)
MCV RBC AUTO: 93.3 FL — SIGNIFICANT CHANGE UP (ref 80–100)
MCV RBC AUTO: 94.3 FL — SIGNIFICANT CHANGE UP (ref 80–100)
MCV RBC AUTO: 95.4 FL — SIGNIFICANT CHANGE UP (ref 80–100)
METAMYELOCYTES # FLD: 6.4 % — HIGH (ref 0–1)
MONOCYTES # BLD AUTO: 0.1 K/UL — SIGNIFICANT CHANGE UP (ref 0–0.9)
MONOCYTES NFR BLD AUTO: 4.5 % — SIGNIFICANT CHANGE UP (ref 2–14)
MYELOCYTES NFR BLD: 0.9 % — HIGH (ref 0–0)
NEUTROPHILS # BLD AUTO: 1.15 K/UL — LOW (ref 1.8–7.4)
NEUTROPHILS NFR BLD AUTO: 45.5 % — SIGNIFICANT CHANGE UP (ref 43–77)
NEUTS BAND # BLD: 7.3 % — HIGH (ref 0–6)
NRBC # BLD: 0 /100 WBCS — SIGNIFICANT CHANGE UP (ref 0–0)
NRBC # BLD: 0 /100 WBCS — SIGNIFICANT CHANGE UP (ref 0–0)
NRBC # FLD: 0 K/UL — SIGNIFICANT CHANGE UP (ref 0–0)
NRBC # FLD: 0 K/UL — SIGNIFICANT CHANGE UP (ref 0–0)
NT-PROBNP SERPL-SCNC: 3114 PG/ML — HIGH
PCO2 BLDA: 43 MMHG — SIGNIFICANT CHANGE UP (ref 35–48)
PH BLDA: 7.24 — LOW (ref 7.35–7.45)
PHOSPHATE SERPL-MCNC: 2.8 MG/DL — SIGNIFICANT CHANGE UP (ref 2.5–4.5)
PHOSPHATE SERPL-MCNC: 3.7 MG/DL — SIGNIFICANT CHANGE UP (ref 2.5–4.5)
PLAT MORPH BLD: NORMAL — SIGNIFICANT CHANGE UP
PLATELET # BLD AUTO: 191 K/UL — SIGNIFICANT CHANGE UP (ref 150–400)
PLATELET # BLD AUTO: 213 K/UL — SIGNIFICANT CHANGE UP (ref 150–400)
PLATELET # BLD AUTO: 220 K/UL — SIGNIFICANT CHANGE UP (ref 150–400)
PLATELET COUNT - ESTIMATE: NORMAL — SIGNIFICANT CHANGE UP
PO2 BLDA: 76 MMHG — LOW (ref 83–108)
POLYCHROMASIA BLD QL SMEAR: SLIGHT — SIGNIFICANT CHANGE UP
POTASSIUM SERPL-MCNC: 4.2 MMOL/L — SIGNIFICANT CHANGE UP (ref 3.5–5.3)
POTASSIUM SERPL-MCNC: 4.5 MMOL/L — SIGNIFICANT CHANGE UP (ref 3.5–5.3)
POTASSIUM SERPL-MCNC: 4.8 MMOL/L — SIGNIFICANT CHANGE UP (ref 3.5–5.3)
POTASSIUM SERPL-SCNC: 4.2 MMOL/L — SIGNIFICANT CHANGE UP (ref 3.5–5.3)
POTASSIUM SERPL-SCNC: 4.5 MMOL/L — SIGNIFICANT CHANGE UP (ref 3.5–5.3)
POTASSIUM SERPL-SCNC: 4.8 MMOL/L — SIGNIFICANT CHANGE UP (ref 3.5–5.3)
PROT SERPL-MCNC: 5.5 G/DL — LOW (ref 6–8.3)
PROTHROM AB SERPL-ACNC: 16.2 SEC — HIGH (ref 9.5–13)
PROTHROM AB SERPL-ACNC: 17.2 SEC — HIGH (ref 9.5–13)
RBC # BLD: 2.82 M/UL — LOW (ref 4.2–5.8)
RBC # BLD: 2.99 M/UL — LOW (ref 4.2–5.8)
RBC # BLD: 3.71 M/UL — LOW (ref 4.2–5.8)
RBC # FLD: 16.7 % — HIGH (ref 10.3–14.5)
RBC # FLD: 16.7 % — HIGH (ref 10.3–14.5)
RBC # FLD: 17.1 % — HIGH (ref 10.3–14.5)
RBC BLD AUTO: NORMAL — SIGNIFICANT CHANGE UP
RH IG SCN BLD-IMP: POSITIVE — SIGNIFICANT CHANGE UP
SAO2 % BLDA: 95.1 % — SIGNIFICANT CHANGE UP (ref 94–98)
SMUDGE CELLS # BLD: PRESENT — SIGNIFICANT CHANGE UP
SODIUM SERPL-SCNC: 132 MMOL/L — LOW (ref 135–145)
SODIUM SERPL-SCNC: 132 MMOL/L — LOW (ref 135–145)
SODIUM SERPL-SCNC: 133 MMOL/L — LOW (ref 135–145)
TROPONIN T, HIGH SENSITIVITY RESULT: 33 NG/L — SIGNIFICANT CHANGE UP
WBC # BLD: 0.66 K/UL — CRITICAL LOW (ref 3.8–10.5)
WBC # BLD: 2.18 K/UL — LOW (ref 3.8–10.5)
WBC # BLD: 3.8 K/UL — SIGNIFICANT CHANGE UP (ref 3.8–10.5)
WBC # FLD AUTO: 0.66 K/UL — CRITICAL LOW (ref 3.8–10.5)
WBC # FLD AUTO: 2.18 K/UL — LOW (ref 3.8–10.5)
WBC # FLD AUTO: 3.8 K/UL — SIGNIFICANT CHANGE UP (ref 3.8–10.5)

## 2023-10-07 PROCEDURE — 36620 INSERTION CATHETER ARTERY: CPT | Mod: RT

## 2023-10-07 PROCEDURE — 93010 ELECTROCARDIOGRAM REPORT: CPT

## 2023-10-07 PROCEDURE — 71045 X-RAY EXAM CHEST 1 VIEW: CPT | Mod: 26

## 2023-10-07 PROCEDURE — 99291 CRITICAL CARE FIRST HOUR: CPT | Mod: GC

## 2023-10-07 RX ORDER — DEXTROSE 50 % IN WATER 50 %
25 SYRINGE (ML) INTRAVENOUS ONCE
Refills: 0 | Status: COMPLETED | OUTPATIENT
Start: 2023-10-07 | End: 2023-10-07

## 2023-10-07 RX ORDER — ALBUMIN HUMAN 25 %
50 VIAL (ML) INTRAVENOUS ONCE
Refills: 0 | Status: COMPLETED | OUTPATIENT
Start: 2023-10-07 | End: 2023-10-07

## 2023-10-07 RX ORDER — PIPERACILLIN AND TAZOBACTAM 4; .5 G/20ML; G/20ML
3.38 INJECTION, POWDER, LYOPHILIZED, FOR SOLUTION INTRAVENOUS EVERY 8 HOURS
Refills: 0 | Status: DISCONTINUED | OUTPATIENT
Start: 2023-10-08 | End: 2023-10-08

## 2023-10-07 RX ORDER — DEXTROSE 50 % IN WATER 50 %
50 SYRINGE (ML) INTRAVENOUS ONCE
Refills: 0 | Status: COMPLETED | OUTPATIENT
Start: 2023-10-07 | End: 2023-10-07

## 2023-10-07 RX ORDER — NOREPINEPHRINE BITARTRATE/D5W 8 MG/250ML
0.05 PLASTIC BAG, INJECTION (ML) INTRAVENOUS
Qty: 8 | Refills: 0 | Status: DISCONTINUED | OUTPATIENT
Start: 2023-10-07 | End: 2023-10-08

## 2023-10-07 RX ORDER — ACETAMINOPHEN 500 MG
1000 TABLET ORAL ONCE
Refills: 0 | Status: COMPLETED | OUTPATIENT
Start: 2023-10-07 | End: 2023-10-07

## 2023-10-07 RX ORDER — ONDANSETRON 8 MG/1
4 TABLET, FILM COATED ORAL ONCE
Refills: 0 | Status: COMPLETED | OUTPATIENT
Start: 2023-10-07 | End: 2023-10-07

## 2023-10-07 RX ORDER — PIPERACILLIN AND TAZOBACTAM 4; .5 G/20ML; G/20ML
3.38 INJECTION, POWDER, LYOPHILIZED, FOR SOLUTION INTRAVENOUS ONCE
Refills: 0 | Status: COMPLETED | OUTPATIENT
Start: 2023-10-07 | End: 2023-10-07

## 2023-10-07 RX ORDER — CHLORHEXIDINE GLUCONATE 213 G/1000ML
1 SOLUTION TOPICAL
Refills: 0 | Status: DISCONTINUED | OUTPATIENT
Start: 2023-10-07 | End: 2023-10-08

## 2023-10-07 RX ORDER — ACETAMINOPHEN 500 MG
1000 TABLET ORAL ONCE
Refills: 0 | Status: DISCONTINUED | OUTPATIENT
Start: 2023-10-07 | End: 2023-10-08

## 2023-10-07 RX ORDER — ROBINUL 0.2 MG/ML
0.2 INJECTION INTRAMUSCULAR; INTRAVENOUS EVERY 4 HOURS
Refills: 0 | Status: DISCONTINUED | OUTPATIENT
Start: 2023-10-07 | End: 2023-10-08

## 2023-10-07 RX ORDER — DEXTROSE 50 % IN WATER 50 %
12.5 SYRINGE (ML) INTRAVENOUS ONCE
Refills: 0 | Status: COMPLETED | OUTPATIENT
Start: 2023-10-07 | End: 2023-10-07

## 2023-10-07 RX ADMIN — SODIUM CHLORIDE 2 GRAM(S): 9 INJECTION INTRAMUSCULAR; INTRAVENOUS; SUBCUTANEOUS at 14:09

## 2023-10-07 RX ADMIN — DIPHENHYDRAMINE HYDROCHLORIDE AND LIDOCAINE HYDROCHLORIDE AND ALUMINUM HYDROXIDE AND MAGNESIUM HYDRO 15 MILLILITER(S): KIT at 11:38

## 2023-10-07 RX ADMIN — SODIUM CHLORIDE 2 GRAM(S): 9 INJECTION INTRAMUSCULAR; INTRAVENOUS; SUBCUTANEOUS at 05:20

## 2023-10-07 RX ADMIN — PANTOPRAZOLE SODIUM 40 MILLIGRAM(S): 20 TABLET, DELAYED RELEASE ORAL at 05:20

## 2023-10-07 RX ADMIN — DIPHENHYDRAMINE HYDROCHLORIDE AND LIDOCAINE HYDROCHLORIDE AND ALUMINUM HYDROXIDE AND MAGNESIUM HYDRO 15 MILLILITER(S): KIT at 01:28

## 2023-10-07 RX ADMIN — Medication 12.5 GRAM(S): at 11:58

## 2023-10-07 RX ADMIN — Medication 25 GRAM(S): at 23:28

## 2023-10-07 RX ADMIN — DIPHENHYDRAMINE HYDROCHLORIDE AND LIDOCAINE HYDROCHLORIDE AND ALUMINUM HYDROXIDE AND MAGNESIUM HYDRO 15 MILLILITER(S): KIT at 05:22

## 2023-10-07 RX ADMIN — Medication 5 MILLIGRAM(S): at 05:19

## 2023-10-07 RX ADMIN — PIPERACILLIN AND TAZOBACTAM 200 GRAM(S): 4; .5 INJECTION, POWDER, LYOPHILIZED, FOR SOLUTION INTRAVENOUS at 19:10

## 2023-10-07 RX ADMIN — ENOXAPARIN SODIUM 60 MILLIGRAM(S): 100 INJECTION SUBCUTANEOUS at 05:19

## 2023-10-07 RX ADMIN — Medication 50 MILLILITER(S): at 18:58

## 2023-10-07 RX ADMIN — Medication 30 MILLILITER(S): at 05:24

## 2023-10-07 RX ADMIN — Medication 25 MILLILITER(S): at 22:08

## 2023-10-07 RX ADMIN — Medication 10 MILLIGRAM(S): at 01:28

## 2023-10-07 RX ADMIN — ONDANSETRON 4 MILLIGRAM(S): 8 TABLET, FILM COATED ORAL at 11:29

## 2023-10-07 RX ADMIN — Medication 400 MILLIGRAM(S): at 15:15

## 2023-10-07 RX ADMIN — Medication 10 MILLIGRAM(S): at 05:21

## 2023-10-07 NOTE — PROCEDURE NOTE - NSINDICATIONS_GEN_A_CORE
arterial puncture to obtain ABG's/blood sampling/critical patient/monitoring purposes/transfusion
critical illness/emergency venous access

## 2023-10-07 NOTE — PROGRESS NOTE ADULT - REASON FOR ADMISSION
dysphagia

## 2023-10-07 NOTE — CHART NOTE - NSCHARTNOTEFT_GEN_A_CORE
CHIEF COMPLAINT:    HPI:  58M h/o non small cell lung ca on paclitaxel (last 1 week ago, follows with Dr. Son) p/w 1 month progressively worsening dysphagia. Recent admission for PE and SBO. Endorsed dysphagia with that admission. Barium esophagram showing GERD and small sliding hiatal hernia. D/c'd with protonix and outpt f/u. Dysphagia worsened over last month, stating he feels solids and liquids get stuck in his esophagus, also feels burning pain in esophagus/chest. Has to throw up solids bc it does not go down. Endorses poor po intake the past month with 30lb weight loss. Denies f/c, diarrhea/constipation, SOB, CP, HA, urinary sxs.    Patient admitted to medicine floor for further management and found to have esophageal candidiasis s/p treatment w/ Fluconazole and PPI w/o relief . Course c/b ascites with CT showing loculated ascites and possible peritoneal implants for which he underwent large volume paracentesis this week given concerns for possible mass effect contributing to dysphagia. On 10/7 PM, patient was found       PAST MEDICAL & SURGICAL HISTORY:  Former smoker      Non-small cell lung cancer with metastasis      Status post cardiac surgery  s/p open right thoracotomy for posterior cardiac mass removal > 20 years ago, reported by patient to be benign.          FAMILY HISTORY:  Family history of cancer in father  Cancer of unknown origin on Father's side.        SOCIAL HISTORY:  Unable to obtain    Allergies    No Known Allergies    Intolerances        HOME MEDICATIONS:    REVIEW OF SYSTEMS:  [ ] Unable to assess ROS because ________    OBJECTIVE:  ICU Vital Signs Last 24 Hrs  T(C): 36.5 (07 Oct 2023 05:30), Max: 36.8 (06 Oct 2023 23:00)  T(F): 97.7 (07 Oct 2023 05:30), Max: 98.2 (06 Oct 2023 23:00)  HR: 115 (07 Oct 2023 05:30) (110 - 115)  BP: 126/100 (07 Oct 2023 05:30) (126/100 - 133/96)  BP(mean): --  ABP: --  ABP(mean): --  RR: 17 (07 Oct 2023 05:30) (17 - 17)  SpO2: 96% (07 Oct 2023 05:30) (96% - 97%)    O2 Parameters below as of 07 Oct 2023 05:30  Patient On (Oxygen Delivery Method): room air              CAPILLARY BLOOD GLUCOSE  96 (07 Oct 2023 07:19)      POCT Blood Glucose.: 72 mg/dL (07 Oct 2023 17:53)      PHYSICAL EXAM:  General:   HEENT:   Lymph Nodes:  Neck:   Respiratory:   Cardiovascular:   Abdomen:   Extremities:   Skin:   Neurological:  Psychiatry:    LINES:     HOSPITAL MEDICATIONS:  MEDICATIONS  (STANDING):  acetaminophen   IVPB .. 1000 milliGRAM(s) IV Intermittent once  baclofen 5 milliGRAM(s) Oral every 12 hours  bisacodyl 5 milliGRAM(s) Oral at bedtime  dextrose 10%. 1000 milliLiter(s) (25 mL/Hr) IV Continuous <Continuous>  dextrose 50% Injectable 12.5 Gram(s) IV Push once  dextrose 50% Injectable 25 Gram(s) IV Push once  dextrose 50% Injectable 25 Gram(s) IV Push once  dextrose Oral Gel 15 Gram(s) Oral once  enoxaparin Injectable 60 milliGRAM(s) SubCutaneous every 12 hours  FIRST- Mouthwash  BLM 15 milliLiter(s) Swish and Spit four times a day  glucagon  Injectable 1 milliGRAM(s) IntraMuscular once  pantoprazole    Tablet 40 milliGRAM(s) Oral two times a day  piperacillin/tazobactam IVPB. 3.375 Gram(s) IV Intermittent once  piperacillin/tazobactam IVPB.- 3.375 Gram(s) IV Intermittent once  sodium chloride 2 Gram(s) Oral every 8 hours  sucralfate suspension 1 Gram(s) Oral four times a day    MEDICATIONS  (PRN):  aluminum hydroxide/magnesium hydroxide/simethicone Suspension 30 milliLiter(s) Oral every 6 hours PRN Dyspepsia      LABS:                        8.5    2.18  )-----------( 220      ( 07 Oct 2023 17:55 )             26.9     Hgb Trend: 8.5<--, 11.0<--, 10.4<--, 10.4<--, 10.6<--  10-07    132<L>  |  100  |  34<H>  ----------------------------<  158<H>  4.2   |  17<L>  |  2.01<H>    Ca    8.1<L>      07 Oct 2023 17:55  Phos  2.8     10-07  Mg     2.30     10-07    TPro  5.5<L>  /  Alb  1.8<L>  /  TBili  0.3  /  DBili  x   /  AST  23  /  ALT  8   /  AlkPhos  104  10-07    Creatinine Trend: 2.01<--, 1.37<--, 1.39<--, 1.37<--, 1.44<--, 1.48<--  PT/INR - ( 07 Oct 2023 17:55 )   PT: 16.2 sec;   INR: 1.45 ratio           Urinalysis Basic - ( 07 Oct 2023 17:55 )    Color: x / Appearance: x / SG: x / pH: x  Gluc: 158 mg/dL / Ketone: x  / Bili: x / Urobili: x   Blood: x / Protein: x / Nitrite: x   Leuk Esterase: x / RBC: x / WBC x   Sq Epi: x / Non Sq Epi: x / Bacteria: x      Arterial Blood Gas:  10-07 @ 17:55  7.24/43/76/18/95.1/-8.7  ABG lactate: --        MICROBIOLOGY:     RADIOLOGY:  [ ] Reviewed and interpreted by me    EKG:      Patient is 58yMale with PMHx of *** who presents with ***.     ======NEURO======  #Mental status  - AAOx3 at baseline, found to be AAOx0 during RRT on 10/7    ======CV======  #Shock  - Found to be hypotensive during RRT on 10/7  - ddx: Distributive iso malignancy vs. Hypovolemic iso poor PO intake vs. septic  - c/w Fluid resuscitation  - c/w Zosyn for empiric coverage  - ctm, pressor as needed to maintain MAP > 65    ======PULM======  #No active issues    ======RENAL======  #BETO  - Baseline 1.3    ======GI======  #Transaminitis: Elevated LFTs  #Diet:  #Bowel regiment:    ======ENDO======  #DM2: HbA1c ***   - Insulin Sliding Scale    ======METABOLIC======  #Electrolyte abnormalities    ======HEMATOLOGIC======  #CBC results show  #Coag panel shows  #DVT prophylaxis with     ======ID======  #Pt without strong objective or clinical evidence of infection. Will observe off antibiotics    ======SKIN======  #Lines:  #Decubitus ulcers: CHIEF COMPLAINT: Dysphagia    HPI:  58M h/o CKD, non small cell lung ca on paclitaxel (last 1 week ago, follows with Dr. Son) p/w 1 month progressively worsening dysphagia. Recent admission for PE and SBO 2023. Endorsed dysphagia with that admission. Barium esophagram showing GERD and small sliding hiatal hernia. D/c'd with protonix and outpt f/u. Dysphagia worsened over last month, stating he feels solids and liquids get stuck in his esophagus, also feels burning pain in esophagus/chest. Has to throw up solids bc it does not go down. Endorses poor po intake the past month with 30lb weight loss. Denies f/c, diarrhea/constipation, SOB, CP, HA, urinary sxs.    Patient admitted to medicine floor for further management and found to have esophageal candidiasis s/p treatment w/ Fluconazole and PPI w/o relief . Course c/b ascites with CT showing loculated ascites and possible peritoneal implants for which he underwent large volume paracentesis this week given concerns for possible mass effect contributing to dysphagia. On 10 PM, patient was found on the floor and RRT was called, during RRT noted to be hypoglycemic and hypotensive. started on aggressive fluid resuscitation and requires pressor. Admitted to MICU for pressor requirement.      PAST MEDICAL & SURGICAL HISTORY:  Former smoker      Non-small cell lung cancer with metastasis      Status post cardiac surgery  s/p open right thoracotomy for posterior cardiac mass removal > 20 years ago, reported by patient to be benign.          FAMILY HISTORY:  Family history of cancer in father  Cancer of unknown origin on Father's side.        SOCIAL HISTORY:  Unable to obtain    Allergies    No Known Allergies    Intolerances        HOME MEDICATIONS:    REVIEW OF SYSTEMS:  [ ] Unable to assess ROS because ________    OBJECTIVE:  ICU Vital Signs Last 24 Hrs  T(C): 36.5 (07 Oct 2023 05:30), Max: 36.8 (06 Oct 2023 23:00)  T(F): 97.7 (07 Oct 2023 05:30), Max: 98.2 (06 Oct 2023 23:00)  HR: 115 (07 Oct 2023 05:30) (110 - 115)  BP: 126/100 (07 Oct 2023 05:30) (126/100 - 133/96)  BP(mean): --  ABP: --  ABP(mean): --  RR: 17 (07 Oct 2023 05:30) (17 - 17)  SpO2: 96% (07 Oct 2023 05:30) (96% - 97%)    O2 Parameters below as of 07 Oct 2023 05:30  Patient On (Oxygen Delivery Method): room air              CAPILLARY BLOOD GLUCOSE  96 (07 Oct 2023 07:19)      POCT Blood Glucose.: 72 mg/dL (07 Oct 2023 17:53)      PHYSICAL EXAM:  General: Lethargic, AAOx0, moaning, not responding to command  Respiratory: CTABL  Cardiovascular: RRR  Abdomen: Soft, distended  Extremities: 2+ edema  Psychiatry: AAOx0    LINES:     HOSPITAL MEDICATIONS:  MEDICATIONS  (STANDING):  acetaminophen   IVPB .. 1000 milliGRAM(s) IV Intermittent once  baclofen 5 milliGRAM(s) Oral every 12 hours  bisacodyl 5 milliGRAM(s) Oral at bedtime  dextrose 10%. 1000 milliLiter(s) (25 mL/Hr) IV Continuous <Continuous>  dextrose 50% Injectable 12.5 Gram(s) IV Push once  dextrose 50% Injectable 25 Gram(s) IV Push once  dextrose 50% Injectable 25 Gram(s) IV Push once  dextrose Oral Gel 15 Gram(s) Oral once  enoxaparin Injectable 60 milliGRAM(s) SubCutaneous every 12 hours  FIRST- Mouthwash  BLM 15 milliLiter(s) Swish and Spit four times a day  glucagon  Injectable 1 milliGRAM(s) IntraMuscular once  pantoprazole    Tablet 40 milliGRAM(s) Oral two times a day  piperacillin/tazobactam IVPB. 3.375 Gram(s) IV Intermittent once  piperacillin/tazobactam IVPB.- 3.375 Gram(s) IV Intermittent once  sodium chloride 2 Gram(s) Oral every 8 hours  sucralfate suspension 1 Gram(s) Oral four times a day    MEDICATIONS  (PRN):  aluminum hydroxide/magnesium hydroxide/simethicone Suspension 30 milliLiter(s) Oral every 6 hours PRN Dyspepsia      LABS:                        8.5    2.18  )-----------( 220      ( 07 Oct 2023 17:55 )             26.9     Hgb Trend: 8.5<--, 11.0<--, 10.4<--, 10.4<--, 10.6<--  10-07    132<L>  |  100  |  34<H>  ----------------------------<  158<H>  4.2   |  17<L>  |  2.01<H>    Ca    8.1<L>      07 Oct 2023 17:55  Phos  2.8     10-07  Mg     2.30     10-07    TPro  5.5<L>  /  Alb  1.8<L>  /  TBili  0.3  /  DBili  x   /  AST  23  /  ALT  8   /  AlkPhos  104  10-07    Creatinine Trend: 2.01<--, 1.37<--, 1.39<--, 1.37<--, 1.44<--, 1.48<--  PT/INR - ( 07 Oct 2023 17:55 )   PT: 16.2 sec;   INR: 1.45 ratio           Urinalysis Basic - ( 07 Oct 2023 17:55 )    Color: x / Appearance: x / SG: x / pH: x  Gluc: 158 mg/dL / Ketone: x  / Bili: x / Urobili: x   Blood: x / Protein: x / Nitrite: x   Leuk Esterase: x / RBC: x / WBC x   Sq Epi: x / Non Sq Epi: x / Bacteria: x      Arterial Blood Gas:  10-07 @ 17:55  7.24/43/76/18/95.1/-8.7  ABG lactate: --        MICROBIOLOGY:     RADIOLOGY:  [ ] Reviewed and interpreted by me    EKM h/o non small cell lung ca on paclitaxel (last 1 week ago, follows with Dr. Son) p/w 1 month progressively worsening dysphagia found to have esophageal candidiasis s/p Fluconazole and PPI x 2 weeks w/o improvement. c/b loculated ascites causing compressive symptoms s/p therapeutic ascites. RRT called on 10/7 for AMS, hypoglycemia, hypotension.    ======NEURO======  #Mental status  - AAOx3 at baseline, found to be AAOx0 during RRT on 10/7  - Likely iso hypoglycemia vs. Hypotension    ======CV======  #Shock  - Found to be hypotensive during RRT on 10/7  - ddx: Distributive iso malignancy vs. Hypovolemic iso poor PO intake vs. septic  - c/w Fluid resuscitation  - c/w Zosyn for empiric coverage  - c/w Levo, wean as tolerated MAP >65    ======PULM======  #H/o PE  - Recent admission for PE on 2023  - CT PE () negative for PE  - c/w Heparin gtt    ======RENAL======  #BETO on CKD  - Baseline 1.3  - Noted to have sCr of 2 on 10/7 during RRT  - Likely pre-renal iso poor PO intake and hypotension  - c/w Fluid resuscitation  - f/u repeat CMP    #Hyponatremia  - Likely hypovolemic hyponatremia iso poor PO intake  - ctm, c/w fluid resuscitation    ======GI======  #Ascites  - Likely malignancy ascites iso metastatic disease  - CT A/P (): Large volume loculated ascites, increased, with associated mass effect on adjacent viscera and centralized small bowel.  - s/p therapeutic Paracentesis (10/2)  - ctm    #GERD  #Hiatal hernia  - c/w PPI    ======ENDO======  #No active issues    ======METABOLIC======  #Hyponatremia  - Likely hypovolemic hyponatremia iso poor PO intake  - ctm, c/w fluid resuscitation    #Hypoglycemia  - Noted to be hypoglycemic to < 25 during RRT on 10/7  - Likely iso poor PO intake  - c/w D10    ======HEMATOLOGIC======  #Microcytic anemia  - Hgb dropped to 8.5 from 11 on 10/7  - f/u repeat CBC    ======ID======  #Empiric coverage  - c/w Zosyn (10/7 - ) for empiric coverage  - f/u Infectious work up    ======Ethics=====  - Code: Full code  - DVT: Heparin gtt CHIEF COMPLAINT: Dysphagia    HPI:  58M h/o CKD, non small cell lung ca on paclitaxel (last 1 week ago, follows with Dr. Son) p/w 1 month progressively worsening dysphagia. Recent admission for PE and SBO 2023. Endorsed dysphagia with that admission. Barium esophagram showing GERD and small sliding hiatal hernia. D/c'd with protonix and outpt f/u. Dysphagia worsened over last month, stating he feels solids and liquids get stuck in his esophagus, also feels burning pain in esophagus/chest. Has to throw up solids bc it does not go down. Endorses poor po intake the past month with 30lb weight loss. Denies f/c, diarrhea/constipation, SOB, CP, HA, urinary sxs.    Patient admitted to medicine floor for further management and found to have esophageal candidiasis s/p treatment w/ Fluconazole and PPI w/o relief . Course c/b ascites with CT showing loculated ascites and possible peritoneal implants for which he underwent large volume paracentesis this week given concerns for possible mass effect contributing to dysphagia. On 10 PM, patient was found on the floor and RRT was called, during RRT noted to be hypoglycemic and hypotensive. started on aggressive fluid resuscitation and requires pressor. Admitted to MICU for pressor requirement.      PAST MEDICAL & SURGICAL HISTORY:  Former smoker      Non-small cell lung cancer with metastasis      Status post cardiac surgery  s/p open right thoracotomy for posterior cardiac mass removal > 20 years ago, reported by patient to be benign.          FAMILY HISTORY:  Family history of cancer in father  Cancer of unknown origin on Father's side.        SOCIAL HISTORY:  Unable to obtain    Allergies    No Known Allergies    Intolerances        HOME MEDICATIONS:    REVIEW OF SYSTEMS:  [ ] Unable to assess ROS because ________    OBJECTIVE:  ICU Vital Signs Last 24 Hrs  T(C): 36.5 (07 Oct 2023 05:30), Max: 36.8 (06 Oct 2023 23:00)  T(F): 97.7 (07 Oct 2023 05:30), Max: 98.2 (06 Oct 2023 23:00)  HR: 115 (07 Oct 2023 05:30) (110 - 115)  BP: 126/100 (07 Oct 2023 05:30) (126/100 - 133/96)  BP(mean): --  ABP: --  ABP(mean): --  RR: 17 (07 Oct 2023 05:30) (17 - 17)  SpO2: 96% (07 Oct 2023 05:30) (96% - 97%)    O2 Parameters below as of 07 Oct 2023 05:30  Patient On (Oxygen Delivery Method): room air              CAPILLARY BLOOD GLUCOSE  96 (07 Oct 2023 07:19)      POCT Blood Glucose.: 72 mg/dL (07 Oct 2023 17:53)      PHYSICAL EXAM:  General: Lethargic, AAOx0, moaning, not responding to command  Respiratory: CTABL  Cardiovascular: RRR  Abdomen: Soft, distended  Extremities: 2+ edema  Psychiatry: AAOx0    LINES:     HOSPITAL MEDICATIONS:  MEDICATIONS  (STANDING):  acetaminophen   IVPB .. 1000 milliGRAM(s) IV Intermittent once  baclofen 5 milliGRAM(s) Oral every 12 hours  bisacodyl 5 milliGRAM(s) Oral at bedtime  dextrose 10%. 1000 milliLiter(s) (25 mL/Hr) IV Continuous <Continuous>  dextrose 50% Injectable 12.5 Gram(s) IV Push once  dextrose 50% Injectable 25 Gram(s) IV Push once  dextrose 50% Injectable 25 Gram(s) IV Push once  dextrose Oral Gel 15 Gram(s) Oral once  enoxaparin Injectable 60 milliGRAM(s) SubCutaneous every 12 hours  FIRST- Mouthwash  BLM 15 milliLiter(s) Swish and Spit four times a day  glucagon  Injectable 1 milliGRAM(s) IntraMuscular once  pantoprazole    Tablet 40 milliGRAM(s) Oral two times a day  piperacillin/tazobactam IVPB. 3.375 Gram(s) IV Intermittent once  piperacillin/tazobactam IVPB.- 3.375 Gram(s) IV Intermittent once  sodium chloride 2 Gram(s) Oral every 8 hours  sucralfate suspension 1 Gram(s) Oral four times a day    MEDICATIONS  (PRN):  aluminum hydroxide/magnesium hydroxide/simethicone Suspension 30 milliLiter(s) Oral every 6 hours PRN Dyspepsia      LABS:                        8.5    2.18  )-----------( 220      ( 07 Oct 2023 17:55 )             26.9     Hgb Trend: 8.5<--, 11.0<--, 10.4<--, 10.4<--, 10.6<--  10-07    132<L>  |  100  |  34<H>  ----------------------------<  158<H>  4.2   |  17<L>  |  2.01<H>    Ca    8.1<L>      07 Oct 2023 17:55  Phos  2.8     10-07  Mg     2.30     10-07    TPro  5.5<L>  /  Alb  1.8<L>  /  TBili  0.3  /  DBili  x   /  AST  23  /  ALT  8   /  AlkPhos  104  10-07    Creatinine Trend: 2.01<--, 1.37<--, 1.39<--, 1.37<--, 1.44<--, 1.48<--  PT/INR - ( 07 Oct 2023 17:55 )   PT: 16.2 sec;   INR: 1.45 ratio           Urinalysis Basic - ( 07 Oct 2023 17:55 )    Color: x / Appearance: x / SG: x / pH: x  Gluc: 158 mg/dL / Ketone: x  / Bili: x / Urobili: x   Blood: x / Protein: x / Nitrite: x   Leuk Esterase: x / RBC: x / WBC x   Sq Epi: x / Non Sq Epi: x / Bacteria: x      Arterial Blood Gas:  10-07 @ 17:55  7.24/43/76/18/95.1/-8.7  ABG lactate: --        MICROBIOLOGY:     RADIOLOGY:  [ ] Reviewed and interpreted by me    EKM h/o non small cell lung ca on paclitaxel (last 1 week ago, follows with Dr. Son) p/w 1 month progressively worsening dysphagia found to have esophageal candidiasis s/p Fluconazole and PPI x 2 weeks w/o improvement. c/b loculated ascites causing compressive symptoms s/p therapeutic ascites. RRT called on 10/7 for AMS, hypoglycemia, hypotension.    ======NEURO======  #Mental status  - AAOx3 at baseline, found to be AAOx0 during RRT on 10/7  - Likely iso hypoglycemia vs. Hypotension  - f/u cortisol and TFT    ======CV======  #Shock  - Found to be hypotensive during RRT on 10/7  - ddx: Distributive iso malignancy vs. Hypovolemic iso poor PO intake vs. septic  - c/w Fluid resuscitation  - c/w Zosyn for empiric coverage, deescalate pending infectious w/u  - c/w Levo, wean as tolerated MAP >65    ======PULM======  #H/o PE  - Recent admission for PE on 2023  - CT PE () negative for PE  - c/w Heparin gtt iso BETO    ======RENAL======  #BETO on CKD3  - Baseline 1.3 earlier this admission  - Noted to have sCr of 2 on 10/7 during RRT  - Likely pre-renal iso poor PO intake and hypotension  - c/w Fluid resuscitation  - f/u repeat CMP    #Hyponatremia  - Likely hypovolemic hyponatremia iso poor PO intake  - ctm, c/w fluid resuscitation    ======GI======  #Ascites  - Likely malignancy ascites iso metastatic disease  - CT A/P (): Large volume loculated ascites, increased, with associated mass effect on adjacent viscera and centralized small bowel.  - s/p therapeutic Paracentesis (10/2)  - ctm    #GERD  #Hiatal hernia  - c/w PPI    ======ENDO======  #Hypoglycemia  - FS q2h  - c/w D10    ======METABOLIC======  #Hyponatremia  - Likely hypovolemic hyponatremia iso poor PO intake  - ctm, c/w fluid resuscitation    #Hypoglycemia  - Noted to be hypoglycemic to < 25 during RRT on 10/7  - Likely iso poor PO intake  - FS q2h  - c/w D10    ======HEMATOLOGIC======  #Microcytic anemia  - Hgb dropped to 8.5 from 11 on 10/7  - f/u repeat CBC  - Will consider CT A/P if CBC is indeed low    ======ID======  #Empiric coverage  - c/w Zosyn (10/7 - ) for empiric coverage  - f/u Infectious work up    ======Ethics=====  - Code: Full code  - DVT: Heparin gtt iso BETO CHIEF COMPLAINT: Dysphagia    HPI:  58M h/o CKD, non small cell lung ca on paclitaxel (last 1 week ago, follows with Dr. Son) p/w 1 month progressively worsening dysphagia. Recent admission for PE and SBO 2023. Endorsed dysphagia with that admission. Barium esophagram showing GERD and small sliding hiatal hernia. D/c'd with protonix and outpt f/u. Dysphagia worsened over last month, stating he feels solids and liquids get stuck in his esophagus, also feels burning pain in esophagus/chest. Has to throw up solids bc it does not go down. Endorses poor po intake the past month with 30lb weight loss. Denies f/c, diarrhea/constipation, SOB, CP, HA, urinary sxs.    Patient admitted to medicine floor for further management and found to have esophageal candidiasis s/p treatment w/ Fluconazole and PPI w/o relief . Course c/b ascites with CT showing loculated ascites and possible peritoneal implants for which he underwent large volume paracentesis this week given concerns for possible mass effect contributing to dysphagia. On 10 PM, patient was found on the floor and RRT was called, during RRT noted to be hypoglycemic and hypotensive. started on aggressive fluid resuscitation and requires pressor. Admitted to MICU for pressor requirement.      PAST MEDICAL & SURGICAL HISTORY:  Former smoker      Non-small cell lung cancer with metastasis      Status post cardiac surgery  s/p open right thoracotomy for posterior cardiac mass removal > 20 years ago, reported by patient to be benign.          FAMILY HISTORY:  Family history of cancer in father  Cancer of unknown origin on Father's side.        SOCIAL HISTORY:  Unable to obtain    Allergies    No Known Allergies    Intolerances        HOME MEDICATIONS:    REVIEW OF SYSTEMS:  [ ] Unable to assess ROS because ________    OBJECTIVE:  ICU Vital Signs Last 24 Hrs  T(C): 36.5 (07 Oct 2023 05:30), Max: 36.8 (06 Oct 2023 23:00)  T(F): 97.7 (07 Oct 2023 05:30), Max: 98.2 (06 Oct 2023 23:00)  HR: 115 (07 Oct 2023 05:30) (110 - 115)  BP: 126/100 (07 Oct 2023 05:30) (126/100 - 133/96)  BP(mean): --  ABP: --  ABP(mean): --  RR: 17 (07 Oct 2023 05:30) (17 - 17)  SpO2: 96% (07 Oct 2023 05:30) (96% - 97%)    O2 Parameters below as of 07 Oct 2023 05:30  Patient On (Oxygen Delivery Method): room air              CAPILLARY BLOOD GLUCOSE  96 (07 Oct 2023 07:19)      POCT Blood Glucose.: 72 mg/dL (07 Oct 2023 17:53)      PHYSICAL EXAM:  General: Lethargic, AAOx0, moaning, not responding to command  Respiratory: CTABL  Cardiovascular: RRR  Abdomen: Soft, distended  Extremities: 2+ edema  Psychiatry: AAOx0    LINES:     HOSPITAL MEDICATIONS:  MEDICATIONS  (STANDING):  acetaminophen   IVPB .. 1000 milliGRAM(s) IV Intermittent once  baclofen 5 milliGRAM(s) Oral every 12 hours  bisacodyl 5 milliGRAM(s) Oral at bedtime  dextrose 10%. 1000 milliLiter(s) (25 mL/Hr) IV Continuous <Continuous>  dextrose 50% Injectable 12.5 Gram(s) IV Push once  dextrose 50% Injectable 25 Gram(s) IV Push once  dextrose 50% Injectable 25 Gram(s) IV Push once  dextrose Oral Gel 15 Gram(s) Oral once  enoxaparin Injectable 60 milliGRAM(s) SubCutaneous every 12 hours  FIRST- Mouthwash  BLM 15 milliLiter(s) Swish and Spit four times a day  glucagon  Injectable 1 milliGRAM(s) IntraMuscular once  pantoprazole    Tablet 40 milliGRAM(s) Oral two times a day  piperacillin/tazobactam IVPB. 3.375 Gram(s) IV Intermittent once  piperacillin/tazobactam IVPB.- 3.375 Gram(s) IV Intermittent once  sodium chloride 2 Gram(s) Oral every 8 hours  sucralfate suspension 1 Gram(s) Oral four times a day    MEDICATIONS  (PRN):  aluminum hydroxide/magnesium hydroxide/simethicone Suspension 30 milliLiter(s) Oral every 6 hours PRN Dyspepsia      LABS:                        8.5    2.18  )-----------( 220      ( 07 Oct 2023 17:55 )             26.9     Hgb Trend: 8.5<--, 11.0<--, 10.4<--, 10.4<--, 10.6<--  10-07    132<L>  |  100  |  34<H>  ----------------------------<  158<H>  4.2   |  17<L>  |  2.01<H>    Ca    8.1<L>      07 Oct 2023 17:55  Phos  2.8     10-07  Mg     2.30     10-07    TPro  5.5<L>  /  Alb  1.8<L>  /  TBili  0.3  /  DBili  x   /  AST  23  /  ALT  8   /  AlkPhos  104  10-07    Creatinine Trend: 2.01<--, 1.37<--, 1.39<--, 1.37<--, 1.44<--, 1.48<--  PT/INR - ( 07 Oct 2023 17:55 )   PT: 16.2 sec;   INR: 1.45 ratio           Urinalysis Basic - ( 07 Oct 2023 17:55 )    Color: x / Appearance: x / SG: x / pH: x  Gluc: 158 mg/dL / Ketone: x  / Bili: x / Urobili: x   Blood: x / Protein: x / Nitrite: x   Leuk Esterase: x / RBC: x / WBC x   Sq Epi: x / Non Sq Epi: x / Bacteria: x      Arterial Blood Gas:  10-07 @ 17:55  7.24/43/76/18/95.1/-8.7  ABG lactate: --        MICROBIOLOGY:     RADIOLOGY:  [ ] Reviewed and interpreted by me    EKM h/o non small cell lung ca on paclitaxel (last 1 week ago, follows with Dr. Son) p/w 1 month progressively worsening dysphagia found to have esophageal candidiasis s/p Fluconazole and PPI x 2 weeks w/o improvement. c/b loculated ascites causing compressive symptoms s/p therapeutic ascites. RRT called on 10/7 for AMS, hypoglycemia, hypotension.    ======NEURO======  #Mental status  - AAOx3 at baseline, found to be AAOx0 during RRT on 10/7  - Likely iso hypoglycemia vs. Hypotension  - f/u cortisol and TFT    ======CV======  #Shock  - Found to be hypotensive during RRT on 10/7  - ddx: Distributive iso malignancy vs. Hypovolemic iso poor PO intake vs. septic  - c/w Fluid resuscitation  - c/w Zosyn for empiric coverage, deescalate pending infectious w/u  - c/w Levo, wean as tolerated MAP >65    ======PULM======  #H/o PE  - Recent admission for PE on 2023  - CT PE () negative for PE  - c/w Heparin gtt iso BETO    ======RENAL======  #BETO on CKD3  - Baseline 1.3 earlier this admission  - Noted to have sCr of 2 on 10/7 during RRT  - Likely pre-renal iso poor PO intake and hypotension  - c/w Fluid resuscitation  - f/u repeat CMP    #Hyponatremia  - Likely hypovolemic hyponatremia iso poor PO intake  - ctm, c/w fluid resuscitation    ======GI======  #Ascites  - Likely malignancy ascites iso metastatic disease  - CT A/P (): Large volume loculated ascites, increased, with associated mass effect on adjacent viscera and centralized small bowel.  - s/p therapeutic Paracentesis (10/2)  - ctm    #GERD  #Hiatal hernia  - c/w PPI    ======ENDO======  #Hypoglycemia  - FS q2h  - c/w D10    ======METABOLIC======  #Hyponatremia  - Likely hypovolemic hyponatremia iso poor PO intake  - ctm, c/w fluid resuscitation    #Hypoglycemia  - Noted to be hypoglycemic to < 25 during RRT on 10/7  - Likely iso poor PO intake  - FS q2h  - c/w D10    ======HEMATOLOGIC======  #Microcytic anemia  - Hgb dropped to 8.5 from 11 on 10/7  - f/u repeat CBC  - Will consider CT A/P if CBC is indeed low    ======ID======  #Empiric coverage  - c/w Zosyn (10/7 - ) for empiric coverage  - f/u Infectious work up    ======Ethics=====  - Code: Full code  - DVT: Heparin gtt iso BETO      CCM attending:    pt is a 59 yo male with hx non small cell lung cancer ,   with peritoneal implants ,  presents with hypoglycemia,   hypotension , etiology hypovolemia, septic shock, as above will continue pressor support with norepinephine   and add vasopressin as needed, will place central line, a line and disucss respiratory support with wife and  daughter.   -panculture, blood, urine  -continue abx  zosyn empiric  -check cortisol, continue d10, fs q 2 hrs,  -monitor urine output  -continue heparin for PE  -monitor creatinine,   -check cbc f/u and tx as necessary  criitically ill with hypotension  , managed at bedside x 40 minutes

## 2023-10-07 NOTE — CONSULT NOTE ADULT - PROVIDER SPECIALTY LIST ADULT
Gastroenterology
Intervent Radiology
Nephrology
Intervent Radiology
Intervent Radiology
Palliative Care
Heme/Onc
ENT

## 2023-10-07 NOTE — CONSULT NOTE ADULT - ASSESSMENT
-----------------------------------------------------------  Interventional Radiology Brief Consult Note  -----------------------------------------------------------    Reason for Referral: PleurX catheter placement     Clinical Summary: 58y Male with pmh of metastatic non-small cell lung CA with liver and peritoneal implants, PE on ac, found to have ascites, s/p paracentesis in past week. Patient and family on board with home hospice. IR is consulted for PleurX placement for recurrent ascites.    Vitals:  T(F): 97.7 (10-07-23 @ 05:30), Max: 98.2 (10-06-23 @ 23:00)  HR: 115 (10-07-23 @ 05:30) (77 - 115)  BP: 126/100 (10-07-23 @ 05:30) (126/100 - 142/70)  RR: 17 (10-07-23 @ 05:30) (17 - 18)  SpO2: 96% (10-07-23 @ 05:30) (96% - 100%)    Labs:           11.0  3.80)-----(213     (10-07-23 @ 05:38)         35.0     132 | 99 | 25  --------------------< 95     (10-07-23 @ 05:38)  4.8 | 21 | 1.37       PT: 12.2 10-02-23 @ 05:00  aPTT: 31.0 10-02-23 @ 05:00   INR: 1.09 10-02-23 @ 05:00    Assessment: 58y Male with metastatic non-small cell lung CA requiring multiple paracenteses. IR is consulted for pleurX catheter placement.     Recommendations:  - Obtain a repeat US abdomen to evaluate for ascites.   - Can potentially plan for tunneled pleurx catheter placement on Tue or Wed pending availability.  - NPO at midnight prior to procedure. Updated cbc, bmp and coags.   - Hold ac.   - Please place IR procedure order under Dr. Tomas.    0      -----------------------------------------------------------  Interventional Radiology Brief Consult Note  -----------------------------------------------------------    Reason for Referral: PleurX catheter placement     Clinical Summary: 58y Male with pmh of metastatic non-small cell lung CA with liver and peritoneal implants, PE on ac, found to have ascites, s/p paracentesis in past week. Patient and family on board with home hospice. IR is consulted for PleurX placement for recurrent ascites.    Vitals:  T(F): 97.7 (10-07-23 @ 05:30), Max: 98.2 (10-06-23 @ 23:00)  HR: 115 (10-07-23 @ 05:30) (77 - 115)  BP: 126/100 (10-07-23 @ 05:30) (126/100 - 142/70)  RR: 17 (10-07-23 @ 05:30) (17 - 18)  SpO2: 96% (10-07-23 @ 05:30) (96% - 100%)    Labs:           11.0  3.80)-----(213     (10-07-23 @ 05:38)         35.0     132 | 99 | 25  --------------------< 95     (10-07-23 @ 05:38)  4.8 | 21 | 1.37       PT: 12.2 10-02-23 @ 05:00  aPTT: 31.0 10-02-23 @ 05:00   INR: 1.09 10-02-23 @ 05:00    Assessment: 58y Male with metastatic non-small cell lung CA requiring multiple paracenteses. IR is consulted for pleurX catheter placement.     Recommendations:  - Obtain a repeat US abdomen to evaluate for ascites.   - Can potentially plan for tunneled PleurX catheter placement on Tue or Wed pending availability.  - NPO at midnight prior to procedure. Updated cbc, bmp and coags.   - Hold AC morning of procedure day.   - Please place IR procedure order under Dr. Tomas.       -----------------------------------------------------------  Interventional Radiology Brief Consult Note  -----------------------------------------------------------    Reason for Referral: PleurX catheter placement     Clinical Summary: 58y Male with pmh of metastatic non-small cell lung CA with liver and peritoneal implants, PE on ac, found to have ascites, s/p paracentesis in past week. Patient and family on board with home hospice. IR is consulted for PleurX placement for recurrent ascites.    Vitals:  T(F): 97.7 (10-07-23 @ 05:30), Max: 98.2 (10-06-23 @ 23:00)  HR: 115 (10-07-23 @ 05:30) (77 - 115)  BP: 126/100 (10-07-23 @ 05:30) (126/100 - 142/70)  RR: 17 (10-07-23 @ 05:30) (17 - 18)  SpO2: 96% (10-07-23 @ 05:30) (96% - 100%)    Labs:           11.0  3.80)-----(213     (10-07-23 @ 05:38)         35.0     132 | 99 | 25  --------------------< 95     (10-07-23 @ 05:38)  4.8 | 21 | 1.37       PT: 12.2 10-02-23 @ 05:00  aPTT: 31.0 10-02-23 @ 05:00   INR: 1.09 10-02-23 @ 05:00    Assessment: 58y Male with metastatic non-small cell lung CA requiring multiple paracenteses. IR is consulted for pleurX catheter placement.     Recommendations:  - Obtain a repeat US abdomen to evaluate for ascites.   - Can potentially plan for tunneled PleurX catheter placement on Wed, 10/11.  - NPO at midnight prior to procedure. Updated cbc, bmp and coags.   - Hold AC morning of procedure day.   - Please place IR procedure order under Dr. Tomas.

## 2023-10-07 NOTE — RAPID RESPONSE TEAM SUMMARY - NSSITUATIONBACKGROUNDRRT_GEN_ALL_CORE
58M h/o non small cell lung ca on paclitaxel (last 1 week ago, follows with Dr. Son) p/w 1 month progressively worsening dysphagia to solids and liquids, found to have esophageal candidiasis. RRT called for syncopal episode when patient was leaving bathroom.

## 2023-10-07 NOTE — PROGRESS NOTE ADULT - NUTRITIONAL ASSESSMENT
Able to tolerate po liquid
This patient has been assessed with a concern for Malnutrition and has been determined to have a diagnosis/diagnoses of Severe protein-calorie malnutrition and Underweight (BMI < 19).    This patient is being managed with:   Diet Full Liquid-  Supplement Feeding Modality:  Oral  Ensure Plus High Protein Cans or Servings Per Day:  1       Frequency:  Three Times a day  Entered: Sep 20 2023  3:57PM  
Able to tolerate po liquid
This patient has been assessed with a concern for Malnutrition and has been determined to have a diagnosis/diagnoses of Severe protein-calorie malnutrition and Underweight (BMI < 19).    This patient is being managed with:   Diet Full Liquid-  Supplement Feeding Modality:  Oral  Ensure Compact Cans or Servings Per Day:  1       Frequency:  Three Times a day  Entered: Sep 26 2023  6:19PM  
Able to tolerate po liquid
This patient has been assessed with a concern for Malnutrition and has been determined to have a diagnosis/diagnoses of Severe protein-calorie malnutrition and Underweight (BMI < 19).    This patient is being managed with:   Diet Full Liquid-  Supplement Feeding Modality:  Oral  Ensure Plus High Protein Cans or Servings Per Day:  1       Frequency:  Three Times a day  Entered: Sep 20 2023  3:57PM  
Able to tolerate po liquid
Able to tolerate po liquid
NPO/ NGT feeding
Able to tolerate po liquid
This patient has been assessed with a concern for Malnutrition and has been determined to have a diagnosis/diagnoses of Severe protein-calorie malnutrition and Underweight (BMI < 19).    This patient is being managed with:   Diet Full Liquid-  Supplement Feeding Modality:  Oral  Ensure Compact Cans or Servings Per Day:  1       Frequency:  Three Times a day  Entered: Sep 26 2023  6:19PM  
Able to tolerate po liquid
This patient has been assessed with a concern for Malnutrition and has been determined to have a diagnosis/diagnoses of Severe protein-calorie malnutrition and Underweight (BMI < 19).    This patient is being managed with:   Diet Minced and Moist-  Supplement Feeding Modality:  Oral  Ensure Compact Cans or Servings Per Day:  1       Frequency:  Three Times a day  Entered: Oct  4 2023  9:18AM  
This patient has been assessed with a concern for Malnutrition and has been determined to have a diagnosis/diagnoses of Severe protein-calorie malnutrition and Underweight (BMI < 19).    This patient is being managed with:   Diet Minced and Moist-  Supplement Feeding Modality:  Oral  Ensure Enlive Cans or Servings Per Day:  1       Frequency:  Three Times a day  Entered: Oct  3 2023  1:19PM  
This patient has been assessed with a concern for Malnutrition and has been determined to have a diagnosis/diagnoses of Severe protein-calorie malnutrition and Underweight (BMI < 19).    This patient is being managed with:   Diet Full Liquid-  Supplement Feeding Modality:  Oral  Ensure Compact Cans or Servings Per Day:  1       Frequency:  Three Times a day  Entered: Sep 26 2023  6:19PM  
This patient has been assessed with a concern for Malnutrition and has been determined to have a diagnosis/diagnoses of Severe protein-calorie malnutrition and Underweight (BMI < 19).    This patient is being managed with:   Diet Full Liquid-  Supplement Feeding Modality:  Oral  Ensure Compact Cans or Servings Per Day:  1       Frequency:  Three Times a day  Entered: Sep 26 2023  6:19PM  
This patient has been assessed with a concern for Malnutrition and has been determined to have a diagnosis/diagnoses of Severe protein-calorie malnutrition and Underweight (BMI < 19).    This patient is being managed with:   Diet Full Liquid-  Supplement Feeding Modality:  Oral  Ensure Plus High Protein Cans or Servings Per Day:  1       Frequency:  Three Times a day  Entered: Sep 20 2023  3:57PM  
This patient has been assessed with a concern for Malnutrition and has been determined to have a diagnosis/diagnoses of Severe protein-calorie malnutrition and Underweight (BMI < 19).    This patient is being managed with:   Diet Full Liquid-  Supplement Feeding Modality:  Oral  Ensure Plus High Protein Cans or Servings Per Day:  1       Frequency:  Three Times a day  Entered: Sep 20 2023  3:57PM  
This patient has been assessed with a concern for Malnutrition and has been determined to have a diagnosis/diagnoses of Severe protein-calorie malnutrition and Underweight (BMI < 19).    This patient is being managed with:   Diet Full Liquid-  Supplement Feeding Modality:  Oral  Ensure Compact Cans or Servings Per Day:  1       Frequency:  Three Times a day  Entered: Sep 26 2023  6:19PM  
This patient has been assessed with a concern for Malnutrition and has been determined to have a diagnosis/diagnoses of Severe protein-calorie malnutrition and Underweight (BMI < 19).    This patient is being managed with:   Diet Full Liquid-  Supplement Feeding Modality:  Oral  Ensure Plus High Protein Cans or Servings Per Day:  1       Frequency:  Three Times a day  Entered: Sep 20 2023  3:57PM  
This patient has been assessed with a concern for Malnutrition and has been determined to have a diagnosis/diagnoses of Severe protein-calorie malnutrition and Underweight (BMI < 19).    This patient is being managed with:   Diet Minced and Moist-  Supplement Feeding Modality:  Oral  Ensure Compact Cans or Servings Per Day:  1       Frequency:  Three Times a day  Entered: Oct  4 2023  9:18AM

## 2023-10-07 NOTE — PROVIDER CONTACT NOTE (HYPOGLYCEMIA EVENT) - NS PROVIDER CONTACT BACKGROUND-HYPO
Age: 58y    Gender: Male    POCT Blood Glucose:96 (10-07-23 @ 07:19)    120 mg/dL (10-07-23 @ 12:50)  81 mg/dL (10-07-23 @ 11:36)  96 mg/dL (10-07-23 @ 07:19)  63 mg/dL (10-07-23 @ 07:03)  92 mg/dL (10-07-23 @ 05:28)  97 mg/dL (10-06-23 @ 23:10)      eMAR:  dextrose 50% Injectable   12.5 Gram(s) IV Push (10-07-23 @ 11:58)

## 2023-10-07 NOTE — PROGRESS NOTE ADULT - ASSESSMENT
58M h/o non small cell lung ca on paclitaxel (last 1 week ago, follows with Dr. Son) p/w 1 month progressively worsening dysphagia. Recent admission for PE and SBO. Endorsed dysphagia with that admission. Barium esophagram showing GERD and small sliding hiatal hernia. Nephrology consult called for abnormal renal function    Assessment:  1) Non oliguric BETO on underlying CKD stage III with S cr 2.1 likely pre-renal/dehydration/sepsis vs chemotherapy induced renal injury/ATN/renal hypoperfusion/ascites/compression/obstructive uropathy  2) Acute Hypoxic respiratory failure on High flow oxygen\  3) Hypovolemic/septic shock on IV pressors  4) Recurrent Hypoglycemia/Failure to thrive in adult  5) NSCLC with metastasis s/p chemotherapy  6) History of PE  7) Progressive dysphagia/Poor appetite/ Hiatal hernia/GERD/Gastritis/Esophageal candidiases/ Ascites/ Intrinsic GI compression s/p therapeutic paracentesis  8) Anemia of chronic illness/Neutropenia  9) Hypoalbuminemia/Malnutrition  9) Electrolytes disorder with euvolemic/hypervolemic hyponatremia   10) Chronic constipation    Recommend:  Critically ill on High flow oxygen in ICU  Strict I/o  Avoid Nephrotoxic agent  S cr 2.1 with non oliguria  Na level 133  Continue salt tablet to 2 gm po tid   Monitor BMP and electrolytes  every 12 hrly  IV/po hydration  IV pressors/Albumin to maintain MAP>65-70 mm Hg  Replete electrolytes with goal K>4 and <5, Mag> 2 and Phos 2.5 to 3.5  Bilateral renal and bladder ultrasound results reviewed  Adjust antibiotics as per renal dose adjustment now started on IV Zosyn  Lovenox full dose  IV/po fluconazole for candida esophagitis  GI/Oncology/hematology follow up reviewed  s/p IR guided paracentesis therapeutic  Optimal pain control  Intact PTH, Vitamin D 1,25 level, Phos, calcium level reviewed  Volume status and electrolytes noted  Family at bedside  Full Code per discussion with patient and family at present  No urgent indication for RRT/HD  Further care per primary/ICU team  Will follow 58M h/o non small cell lung ca on paclitaxel (last 1 week ago, follows with Dr. Son) p/w 1 month progressively worsening dysphagia. Recent admission for PE and SBO. Endorsed dysphagia with that admission. Barium esophagram showing GERD and small sliding hiatal hernia. Nephrology consult called for abnormal renal function    Assessment:  1) Non oliguric BETO on underlying CKD stage III with S cr 2.1 likely pre-renal/dehydration/sepsis vs chemotherapy induced renal injury/ATN/renal hypoperfusion/ascites/compression/obstructive uropathy  2) Acute Hypoxic respiratory failure on High flow oxygen\  3) Hypovolemic/septic shock on IV pressors  4) Recurrent Hypoglycemia/Failure to thrive in adult  5) NSCLC with metastasis s/p chemotherapy  6) History of PE  7) Progressive dysphagia/Poor appetite/ Hiatal hernia/GERD/Gastritis/Esophageal candidiases/ Ascites/ Intrinsic GI compression s/p therapeutic paracentesis  8) Anemia of chronic illness/Neutropenia  9) Hypoalbuminemia/Malnutrition  9) Electrolytes disorder with euvolemic/hypervolemic hyponatremia   10) Chronic constipation    Recommend:  Critically ill on High flow oxygen in ICU  Strict I/o  Avoid Nephrotoxic agent  S cr 2.1 with non oliguria  Na level 133  Continue salt tablet to 2 gm po tid   Monitor BMP and electrolytes  every 12 hrly  IV/po hydration  IV pressors/Albumin to maintain MAP>65-70 mm Hg  Replete electrolytes with goal K>4 and <5, Mag> 2 and Phos 2.5 to 3.5  Bilateral renal and bladder ultrasound results reviewed  Adjust antibiotics as per renal dose adjustment now started on IV Zosyn  Lovenox full dose  IV/po fluconazole for candida esophagitis  GI/Oncology/hematology follow up reviewed  s/p IR guided paracentesis therapeutic  Optimal pain control  Intact PTH, Vitamin D 1,25 level, Phos, calcium level reviewed  Volume status and electrolytes noted  Family at bedside  Full Code now opted for DNR/DNI at present  No urgent indication for RRT/HD  Further care per primary/ICU team  Overall poor prognosis. Discussed with family/primary team  Will follow

## 2023-10-07 NOTE — PROGRESS NOTE ADULT - TIME BILLING
Patient/Primary team
-d/w ACP
Patient/Primary team
-d/w ACP
Patient/Primary team
-d/w ACP  -CHECK CBC,BMP IN AM
Introduction Text (Please End With A Colon): The following procedure was deferred:
Procedure To Be Performed At Next Visit: Biopsy by punch method
Detail Level: Detailed
Time spent for extensive review of the physical chart, electronic medical record, and documentation to obtain collateral information including but not limited to:  [x ] Inpatient records (ED, H&P, primary team, and consultants if applicable, care coordination)  [x] Inpatient values/results (biomarkers, immunoassays, imaging, and microbiology results)  [x] Current or proposed treatment plans  [x] Discussion with the primary team  [x] Discussion with the patient, surrogate decision maker, or family    Time spent: >36
-d/w ACP
Time spent for extensive review of the physical chart, electronic medical record, and documentation to obtain collateral information including but not limited to:  [x ] Inpatient records (ED, H&P, primary team, and consultants if applicable, care coordination)  [x] Inpatient values/results (biomarkers, immunoassays, imaging, and microbiology results)  [x] Current or proposed treatment plans  [x] Discussion with the primary team  [x] Discussion with the patient, surrogate decision maker, or family    Time spent: >52

## 2023-10-07 NOTE — PROGRESS NOTE ADULT - PROVIDER SPECIALTY LIST ADULT
Anesthesia
Gastroenterology
Internal Medicine
Internal Medicine
Nephrology
Heme/Onc
Nephrology
Gastroenterology
Internal Medicine
Nephrology
Gastroenterology
Heme/Onc
Nephrology
Internal Medicine
Heme/Onc
Nephrology
Nephrology
Internal Medicine
Palliative Care
Palliative Care
Internal Medicine

## 2023-10-07 NOTE — PROGRESS NOTE ADULT - SUBJECTIVE AND OBJECTIVE BOX
Theo Cardenas MD (Nephrology progress note)  [address], [facility],  [id number],  [place] [id number]  TEl: [phone]  Cell: [phone]    Patient is a 58y Male seen and evaluated at bedside. Vital signs, laboratory data, imaging studies, consult notes reviewed done within past 24 hours. Overnight events noted. Patient with interval transfer from floor to MICU for hypoxic respiratory failure/ hypotension/ recurrent Hypoglycemia/Tachycardia/sepsis. Patient on high flow oxygen in mild to moderate respiratory distress and tachycardia . Interval Na level 133 with S cr 2.1.     Allergies    No Known Allergies    Intolerances        ROS:  Limited  Alert and awake in moderate respiratory distress on high flow oxygen   Family at bedside    VITALS:    T(C): 36.4 (10-07-23 @ 20:00), Max: 36.8 (10-06-23 @ 23:00)  HR: 126 (10-07-23 @ 22:00) (110 - 127)  BP: 115/66 (10-07-23 @ 22:00) (74/56 - 133/96)  RR: 17 (10-07-23 @ 22:00) (17 - 24)  SpO2: 92% (10-07-23 @ 22:00) (84% - 100%)  CAPILLARY BLOOD GLUCOSE  96 (07 Oct 2023 07:19)      POCT Blood Glucose.: 90 mg/dL (07 Oct 2023 22:24)  POCT Blood Glucose.: 32 mg/dL (07 Oct 2023 22:07)  POCT Blood Glucose.: 72 mg/dL (07 Oct 2023 17:53)  POCT Blood Glucose.: <25 mg/dL (07 Oct 2023 17:24)  POCT Blood Glucose.: 120 mg/dL (07 Oct 2023 12:50)  POCT Blood Glucose.: 81 mg/dL (07 Oct 2023 11:36)  POCT Blood Glucose.: 96 mg/dL (07 Oct 2023 07:19)  POCT Blood Glucose.: 63 mg/dL (07 Oct 2023 07:03)  POCT Blood Glucose.: 92 mg/dL (07 Oct 2023 05:28)  POCT Blood Glucose.: 97 mg/dL (06 Oct 2023 23:10)      10-07-23 @ 07:01  -  10-07-23 @ 22:27  --------------------------------------------------------  IN: 25 mL / OUT: 100 mL / NET: -75 mL      MEDICATIONS  (STANDING):  acetaminophen   IVPB .. 1000 milliGRAM(s) IV Intermittent once  baclofen 5 milliGRAM(s) Oral every 12 hours  bisacodyl 5 milliGRAM(s) Oral at bedtime  chlorhexidine 2% Cloths 1 Application(s) Topical <User Schedule>  dextrose 10%. 1000 milliLiter(s) (50 mL/Hr) IV Continuous <Continuous>  dextrose 50% Injectable 25 Gram(s) IV Push once  dextrose 50% Injectable 25 Gram(s) IV Push once  dextrose 50% Injectable 12.5 Gram(s) IV Push once  dextrose Oral Gel 15 Gram(s) Oral once  enoxaparin Injectable 60 milliGRAM(s) SubCutaneous every 12 hours  FIRST- Mouthwash  BLM 15 milliLiter(s) Swish and Spit four times a day  glucagon  Injectable 1 milliGRAM(s) IntraMuscular once  norepinephrine Infusion 0.05 MICROgram(s)/kG/Min (5.9 mL/Hr) IV Continuous <Continuous>  pantoprazole    Tablet 40 milliGRAM(s) Oral two times a day  sodium chloride 2 Gram(s) Oral every 8 hours  sucralfate suspension 1 Gram(s) Oral four times a day    MEDICATIONS  (PRN):  aluminum hydroxide/magnesium hydroxide/simethicone Suspension 30 milliLiter(s) Oral every 6 hours PRN Dyspepsia      PHYSICAL EXAM:  GENERAL: Alert, awake on high flow oxygen  HEENT: ROBYN, EOMI, neck supple, no JVP, no carotid bruit, oral mucosa moist and pale  CHEST/LUNG: Bilateral decreased breath sounds, bibasilar rales and rhonchi, no wheezing  HEART: Regular rate and rhythm, tachycardiac LOVE II/VI at LPSB, no gallops, no rub   ABDOMEN: Soft, nontender, non distended, bowel sounds present  : No flank or supra pubic tenderness.  EXTREMITIES: Peripheral pulses are palpable, no pedal edema  Neurology: AAOx2, no focal neurological deficit  SKIN: No rash or skin lesion  Musculoskeletal: No joint deformity or spinal tenderness.      Vascular ACCESS: None    LABS:                        8.9    0.66  )-----------( 191      ( 07 Oct 2023 21:15 )             27.9     10-07    133<L>  |  100  |  35<H>  ----------------------------<  44<LL>  4.5   |  20<L>  |  2.13<H>    Ca    8.2<L>      07 Oct 2023 21:15  Phos  3.7     10-07  Mg     2.40     10-07    TPro  5.5<L>  /  Alb  1.8<L>  /  TBili  0.3  /  DBili  x   /  AST  23  /  ALT  8   /  AlkPhos  104  10-07      PT/INR - ( 07 Oct 2023 21:15 )   PT: 17.2 sec;   INR: 1.54 ratio         PTT - ( 07 Oct 2023 21:15 )  PTT:42.5 sec  Urinalysis Basic - ( 07 Oct 2023 21:15 )    Color: x / Appearance: x / SG: x / pH: x  Gluc: 44 mg/dL / Ketone: x  / Bili: x / Urobili: x   Blood: x / Protein: x / Nitrite: x   Leuk Esterase: x / RBC: x / WBC x   Sq Epi: x / Non Sq Epi: x / Bacteria: x            RADIOLOGY & ADDITIONAL STUDIES:  radConsult Note:   Provider Specialty:  Intervent Radiology.     Consult Type: Non Face-to-Face.     Time-Base Billing for Non-Face-to-Face consult (Minutes) 5-10.    Referral/Consultation:   Initial Consult:  · Requested by Name:	Zaynab  · Date/Time:	07-Oct-2023 11:18  · Reason for Referral/Consultation:	PleurX catheter placement  · Reason for Admission	dysphagia    Assessment and Recommendation:   · Assessment	  -----------------------------------------------------------  Interventional Radiology Brief Consult Note  -----------------------------------------------------------    Reason for Referral: PleurX catheter placement     Clinical Summary: 58y Male with pmh of metastatic non-small cell lung CA with liver and peritoneal implants, PE on ac, found to have ascites, s/p paracentesis in past week. Patient and family on board with home hospice. IR is consulted for PleurX placement for recurrent ascites.    Vitals:  T(F): 97.7 (10-07-23 @ 05:30), Max: 98.2 (10-06-23 @ 23:00)  HR: 115 (10-07-23 @ 05:30) (77 - 115)  BP: 126/100 (10-07-23 @ 05:30) (126/100 - 142/70)  RR: 17 (10-07-23 @ 05:30) (17 - 18)  SpO2: 96% (10-07-23 @ 05:30) (96% - 100%)    Labs:           11.0  3.80)-----(213     (10-07-23 @ 05:38)         35.0     132 | 99 | 25  --------------------< 95     (10-07-23 @ 05:38)  4.8 | 21 | 1.37       PT: 12.2 10-02-23 @ 05:00  aPTT: 31.0 10-02-23 @ 05:00   INR: 1.09 10-02-23 @ 05:00    Assessment: 58y Male with metastatic non-small cell lung CA requiring multiple paracenteses. IR is consulted for pleurX catheter placement.     Recommendations:  - Obtain a repeat US abdomen to evaluate for ascites.   - Can potentially plan for tunneled PleurX catheter placement on Wed, 10/11.  - NPO at midnight prior to procedure. Updated cbc, bmp and coags.   - Hold AC morning of procedure day.   - Please place IR procedure order under Dr. Tomas.              Electronic Signatures:  Pérez Tomas)  (Signed 07-Oct-2023 11:54)  	Authored: Assessment and Recommendation  	Co-Signer: Consult Note, Referral/Consultation, Assessment and Recommendation  Preethi Glass)  (Signed 07-Oct-2023 11:25)  	Authored: Consult Note, Referral/Consultation, Assessment and Recommendation      Last Updated: 07-Oct-2023 11:54 by Pérez Tomas)  Imaging Personally Reviewed:  [x] YES  [ ] NO    Consultant(s) Notes Reviewed:  [x] YES  [ ] NO    Care Discussed with Primary team/ Other Providers [x] YES  [ ] NO

## 2023-10-07 NOTE — CONSULT NOTE ADULT - CONSULT REQUESTED DATE/TIME
20-Sep-2023 14:26
19-Sep-2023 12:17
04-Oct-2023 10:46
19-Sep-2023 17:35
19-Sep-2023 09:23
30-Sep-2023 19:37
19-Sep-2023 15:05
07-Oct-2023 11:18

## 2023-10-07 NOTE — CONSULT NOTE ADULT - CONSULT REASON
NSCLC
PleurX catheter placement
BETO/CKD
Dysphagia
Palliative consulted for complex medical decision making in the setting of serious illness.
Paracentesis
Paracentesis
dysphagia

## 2023-10-08 VITALS — RESPIRATION RATE: 12 BRPM | HEART RATE: 87 BPM

## 2023-10-08 LAB
ALBUMIN SERPL ELPH-MCNC: 1.7 G/DL — LOW (ref 3.3–5)
ALP SERPL-CCNC: 90 U/L — SIGNIFICANT CHANGE UP (ref 40–120)
ALT FLD-CCNC: 8 U/L — SIGNIFICANT CHANGE UP (ref 4–41)
ANION GAP SERPL CALC-SCNC: 15 MMOL/L — HIGH (ref 7–14)
APTT BLD: 47.9 SEC — HIGH (ref 24.5–35.6)
AST SERPL-CCNC: 32 U/L — SIGNIFICANT CHANGE UP (ref 4–40)
BILIRUB SERPL-MCNC: 0.3 MG/DL — SIGNIFICANT CHANGE UP (ref 0.2–1.2)
BLD GP AB SCN SERPL QL: NEGATIVE — SIGNIFICANT CHANGE UP
BUN SERPL-MCNC: 35 MG/DL — HIGH (ref 7–23)
CALCIUM SERPL-MCNC: 7.6 MG/DL — LOW (ref 8.4–10.5)
CHLORIDE SERPL-SCNC: 98 MMOL/L — SIGNIFICANT CHANGE UP (ref 98–107)
CO2 SERPL-SCNC: 19 MMOL/L — LOW (ref 22–31)
CREAT SERPL-MCNC: 2.18 MG/DL — HIGH (ref 0.5–1.3)
E COLI DNA BLD POS QL NAA+NON-PROBE: SIGNIFICANT CHANGE UP
EGFR: 34 ML/MIN/1.73M2 — LOW
GAS PNL BLDA: SIGNIFICANT CHANGE UP
GLUCOSE SERPL-MCNC: 235 MG/DL — HIGH (ref 70–99)
GRAM STN FLD: SIGNIFICANT CHANGE UP
GRAM STN FLD: SIGNIFICANT CHANGE UP
HCT VFR BLD CALC: 26.8 % — LOW (ref 39–50)
HGB BLD-MCNC: 8.4 G/DL — LOW (ref 13–17)
INR BLD: 1.66 RATIO — HIGH (ref 0.85–1.18)
LACTATE SERPL-SCNC: 6.4 MMOL/L — CRITICAL HIGH (ref 0.5–2)
MCHC RBC-ENTMCNC: 29.7 PG — SIGNIFICANT CHANGE UP (ref 27–34)
MCHC RBC-ENTMCNC: 31.3 GM/DL — LOW (ref 32–36)
MCV RBC AUTO: 94.7 FL — SIGNIFICANT CHANGE UP (ref 80–100)
METHOD TYPE: SIGNIFICANT CHANGE UP
NRBC # BLD: 0 /100 WBCS — SIGNIFICANT CHANGE UP (ref 0–0)
NRBC # FLD: 0 K/UL — SIGNIFICANT CHANGE UP (ref 0–0)
PLATELET # BLD AUTO: 176 K/UL — SIGNIFICANT CHANGE UP (ref 150–400)
POTASSIUM SERPL-MCNC: 3.7 MMOL/L — SIGNIFICANT CHANGE UP (ref 3.5–5.3)
POTASSIUM SERPL-SCNC: 3.7 MMOL/L — SIGNIFICANT CHANGE UP (ref 3.5–5.3)
PROT SERPL-MCNC: 4.7 G/DL — LOW (ref 6–8.3)
PROTHROM AB SERPL-ACNC: 18.5 SEC — HIGH (ref 9.5–13)
RBC # BLD: 2.83 M/UL — LOW (ref 4.2–5.8)
RBC # FLD: 16.8 % — HIGH (ref 10.3–14.5)
RH IG SCN BLD-IMP: POSITIVE — SIGNIFICANT CHANGE UP
SODIUM SERPL-SCNC: 132 MMOL/L — LOW (ref 135–145)
SPECIMEN SOURCE: SIGNIFICANT CHANGE UP
SPECIMEN SOURCE: SIGNIFICANT CHANGE UP
WBC # BLD: 0.44 K/UL — CRITICAL LOW (ref 3.8–10.5)
WBC # FLD AUTO: 0.44 K/UL — CRITICAL LOW (ref 3.8–10.5)

## 2023-10-08 RX ORDER — AMIODARONE HYDROCHLORIDE 400 MG/1
150 TABLET ORAL ONCE
Refills: 0 | Status: COMPLETED | OUTPATIENT
Start: 2023-10-08 | End: 2023-10-08

## 2023-10-08 RX ORDER — DEXMEDETOMIDINE HYDROCHLORIDE IN 0.9% SODIUM CHLORIDE 4 UG/ML
1.5 INJECTION INTRAVENOUS
Qty: 400 | Refills: 0 | Status: DISCONTINUED | OUTPATIENT
Start: 2023-10-08 | End: 2023-10-08

## 2023-10-08 RX ORDER — NOREPINEPHRINE BITARTRATE/D5W 8 MG/250ML
1.2 PLASTIC BAG, INJECTION (ML) INTRAVENOUS
Qty: 16 | Refills: 0 | Status: DISCONTINUED | OUTPATIENT
Start: 2023-10-08 | End: 2023-10-08

## 2023-10-08 RX ORDER — MORPHINE SULFATE 50 MG/1
2 CAPSULE, EXTENDED RELEASE ORAL
Qty: 100 | Refills: 0 | Status: DISCONTINUED | OUTPATIENT
Start: 2023-10-08 | End: 2023-10-08

## 2023-10-08 RX ORDER — VASOPRESSIN 20 [USP'U]/ML
0.04 INJECTION INTRAVENOUS
Qty: 40 | Refills: 0 | Status: DISCONTINUED | OUTPATIENT
Start: 2023-10-08 | End: 2023-10-08

## 2023-10-08 RX ADMIN — AMIODARONE HYDROCHLORIDE 618 MILLIGRAM(S): 400 TABLET ORAL at 00:44

## 2023-10-08 RX ADMIN — ROBINUL 0.2 MILLIGRAM(S): 0.2 INJECTION INTRAMUSCULAR; INTRAVENOUS at 00:18

## 2023-10-08 RX ADMIN — MORPHINE SULFATE 2 MG/HR: 50 CAPSULE, EXTENDED RELEASE ORAL at 00:38

## 2023-10-08 RX ADMIN — MORPHINE SULFATE 2 MG/HR: 50 CAPSULE, EXTENDED RELEASE ORAL at 01:05

## 2023-10-08 NOTE — CHART NOTE - NSCHARTNOTEFT_GEN_A_CORE
Provider called to bedside for pulselessness. Patient was not arousable to verbal or tactile stimuli. No palpable pulses from carotid, radial, or femoral. No spontaneous bilateral breath sounds. Unable to auscultate any heart sounds at the 4 standard points. No corneal reflex and fixed dilated pupils.     TOD: 1:30 AM  Pronounced by: Rock Becker MD  Attending Notified: Dr. Glass

## 2023-10-08 NOTE — DISCHARGE NOTE FOR THE EXPIRED PATIENT - HOSPITAL COURSE
58M h/o non small cell lung ca on paclitaxel (last 1 week ago, follows with Dr. Son) p/w 1 month progressively worsening dysphagia found to have esophageal candidiasis s/p Fluconazole and PPI x 2 weeks w/o improvement. c/b loculated ascites causing compressive symptoms s/p therapeutic ascites. Pt transferred to MICU after RRT called on 10/7 for AMS, hypoglycemia, hypotension. Pt was started on levo and vaso in the MICU for shock After GOC w/ family, pt was made DNR/DNI. Pt w/ increasing pressor requirements and decision was made to cap pressor support.

## 2023-10-08 NOTE — CHART NOTE - NSCHARTNOTESELECT_GEN_ALL_CORE
MICU Accept/Transfer Note
Procedure team evaluation/Event Note
Death Note/Event Note
Event Note
Event Note
Follow-up/Nutrition Services
Follow-up/Nutrition Services
IR Pre-Procedure/Event Note
Nutrition Services

## 2023-10-09 LAB
-  AMIKACIN: SIGNIFICANT CHANGE UP
-  AMPICILLIN/SULBACTAM: SIGNIFICANT CHANGE UP
-  AMPICILLIN: SIGNIFICANT CHANGE UP
-  AZTREONAM: SIGNIFICANT CHANGE UP
-  CEFAZOLIN: SIGNIFICANT CHANGE UP
-  CEFEPIME: SIGNIFICANT CHANGE UP
-  CEFOXITIN: SIGNIFICANT CHANGE UP
-  CEFTRIAXONE: SIGNIFICANT CHANGE UP
-  CIPROFLOXACIN: SIGNIFICANT CHANGE UP
-  ERTAPENEM: SIGNIFICANT CHANGE UP
-  GENTAMICIN: SIGNIFICANT CHANGE UP
-  IMIPENEM: SIGNIFICANT CHANGE UP
-  LEVOFLOXACIN: SIGNIFICANT CHANGE UP
-  MEROPENEM: SIGNIFICANT CHANGE UP
-  PIPERACILLIN/TAZOBACTAM: SIGNIFICANT CHANGE UP
-  TOBRAMYCIN: SIGNIFICANT CHANGE UP
-  TRIMETHOPRIM/SULFAMETHOXAZOLE: SIGNIFICANT CHANGE UP
CULTURE RESULTS: SIGNIFICANT CHANGE UP
GLUCOSE BLDC GLUCOMTR-MCNC: 145 MG/DL — HIGH (ref 70–99)
GLUCOSE BLDC GLUCOMTR-MCNC: 160 MG/DL — HIGH (ref 70–99)
GLUCOSE BLDC GLUCOMTR-MCNC: 44 MG/DL — CRITICAL LOW (ref 70–99)
GRAM STN FLD: SIGNIFICANT CHANGE UP
METHOD TYPE: SIGNIFICANT CHANGE UP
ORGANISM # SPEC MICROSCOPIC CNT: SIGNIFICANT CHANGE UP
SPECIMEN SOURCE: SIGNIFICANT CHANGE UP

## 2023-10-10 ENCOUNTER — APPOINTMENT (OUTPATIENT)
Dept: INFUSION THERAPY | Facility: HOSPITAL | Age: 58
End: 2023-10-10

## 2023-10-10 LAB
-  AMIKACIN: SIGNIFICANT CHANGE UP
-  AZTREONAM: SIGNIFICANT CHANGE UP
-  CEFEPIME: SIGNIFICANT CHANGE UP
-  CEFTAZIDIME: SIGNIFICANT CHANGE UP
-  CIPROFLOXACIN: SIGNIFICANT CHANGE UP
-  GENTAMICIN: SIGNIFICANT CHANGE UP
-  IMIPENEM: SIGNIFICANT CHANGE UP
-  LEVOFLOXACIN: SIGNIFICANT CHANGE UP
-  MEROPENEM: SIGNIFICANT CHANGE UP
-  PIPERACILLIN/TAZOBACTAM: SIGNIFICANT CHANGE UP
-  TOBRAMYCIN: SIGNIFICANT CHANGE UP
CULTURE RESULTS: SIGNIFICANT CHANGE UP
GRAM STN FLD: SIGNIFICANT CHANGE UP
METHOD TYPE: SIGNIFICANT CHANGE UP
ORGANISM # SPEC MICROSCOPIC CNT: SIGNIFICANT CHANGE UP
ORGANISM # SPEC MICROSCOPIC CNT: SIGNIFICANT CHANGE UP

## 2023-10-12 LAB — CULTURE RESULTS: SIGNIFICANT CHANGE UP

## 2023-10-15 LAB — CULTURE RESULTS: SIGNIFICANT CHANGE UP

## 2023-10-24 ENCOUNTER — APPOINTMENT (OUTPATIENT)
Dept: INFUSION THERAPY | Facility: HOSPITAL | Age: 58
End: 2023-10-24

## 2023-10-24 ENCOUNTER — APPOINTMENT (OUTPATIENT)
Dept: HEMATOLOGY ONCOLOGY | Facility: CLINIC | Age: 58
End: 2023-10-24

## 2023-10-31 ENCOUNTER — APPOINTMENT (OUTPATIENT)
Dept: INFUSION THERAPY | Facility: HOSPITAL | Age: 58
End: 2023-10-31

## 2024-02-29 NOTE — PATIENT PROFILE ADULT - FALL HARM RISK - UNIVERSAL INTERVENTIONS
Post Date
Bed in lowest position, wheels locked, appropriate side rails in place/Call bell, personal items and telephone in reach/Instruct patient to call for assistance before getting out of bed or chair/Non-slip footwear when patient is out of bed/Robert to call system/Physically safe environment - no spills, clutter or unnecessary equipment/Purposeful Proactive Rounding/Room/bathroom lighting operational, light cord in reach

## 2024-03-15 NOTE — H&P ADULT - PROBLEM SELECTOR PLAN 4
No
Per chart review, Cr around 1.6-1.7. Cr 2.22 in ED-->2.10 on admission  -check bladder scan  -urine studies  -US kidney  -c/w IVF

## 2024-03-17 NOTE — DIETITIAN NUTRITION RISK NOTIFICATION - PHYSICAL ASSESSMENT TEMPLES
moderate Minoxidil Counseling: Minoxidil is a topical medication which can increase blood flow where it is applied. It is uncertain how this medication increases hair growth. Side effects are uncommon and include stinging and allergic reactions. Initial (On Arrival)

## 2025-03-27 NOTE — H&P ADULT - PROBLEM/PLAN-5
Is Phototherapy Contraindicated?: No
Detail Level: Zone
Diagnosis (Required): Atopic Dermatitis/Eczematous Dermatitis
Nemolizumab Monitoring Guidelines: There is no laboratory monitoring requirement with Nemolizumab.
Nemolizumab Dosing: 60 mg SC day 0 then 30 mg SC every four weeks
DISPLAY PLAN FREE TEXT

## 2025-06-06 NOTE — PROGRESS NOTE ADULT - ATTENDING COMMENTS
57M PMH NSCLC Stage IV with malignant L pleural effusion s/p tunneled indwelling pleural catheter on carboplatin/pemetrexed/pembrolizumab and PE on rivaroxaban who presents with dyspnea in the setting of inability to afford PleurX drainage supplies, now improved with drainage.    - Currently doing well on room air without dyspnea  - Provided with 8 PleurX drainage kits  - Please drain 1-2 times/week  - Appreciate social work and case management support  - Continue rivaroxaban  - Albuterol inhaler prn  - Discussed with patient at bedside  - Follow-up with Dr. Ortega after discharge
show

## (undated) DEVICE — SUT SILK 2-0 18" FS

## (undated) DEVICE — ELCTR ECG CONDUCTIVE ADHESIVE

## (undated) DEVICE — PACK IV START WITH CHG

## (undated) DEVICE — DRSG TEGADERM 4X4.75"

## (undated) DEVICE — SYR LUER LOK 10CC

## (undated) DEVICE — DRAPE 1/2 SHEET 40X57"

## (undated) DEVICE — GOWN LG

## (undated) DEVICE — Device

## (undated) DEVICE — TUBING MEDI-VAC W MAXIGRIP CONNECTORS 1/4"X6'

## (undated) DEVICE — PREP CHLORAPREP HI-LITE ORANGE 26ML

## (undated) DEVICE — DRSG DERMABOND 0.7ML

## (undated) DEVICE — CONTAINER FORMALIN 80ML YELLOW

## (undated) DEVICE — LINE BREATHE SAMPLNG

## (undated) DEVICE — GLV 7 PROTEXIS (WHITE)

## (undated) DEVICE — CONTAINER FORMALIN 10% 20ML

## (undated) DEVICE — VALVE BIOPSY BRONCHOVIDEOSCOPE

## (undated) DEVICE — DENTURE CUP PINK

## (undated) DEVICE — LUBRICATING JELLY HR ONE SHOT 3G

## (undated) DEVICE — BIOPSY FORCEP COLD DISP

## (undated) DEVICE — CHEST DRAIN OASIS DRY SUCTION WATER SEAL

## (undated) DEVICE — TUBING IV SET GRAVITY 3Y 100" MACRO

## (undated) DEVICE — BIOPSY FORCEP RADIAL JAW 4 STANDARD WITH NEEDLE

## (undated) DEVICE — DRAPE ULTRASOUND PROBE COVER W ULTRA GEL 18X120CM

## (undated) DEVICE — BITE BLOCK ADULT 20 X 27MM (GREEN)

## (undated) DEVICE — SUTURE REMOVAL KIT

## (undated) DEVICE — GLV 7 PROTEXIS (CREAM) MICRO

## (undated) DEVICE — DRSG CURITY GAUZE SPONGE 4 X 4" 12-PLY NON-STERILE

## (undated) DEVICE — DRSG 2X2

## (undated) DEVICE — MASK SURGICAL WITH EYESHIELD ANTIFOG (ORANGE)

## (undated) DEVICE — BIOPSY FORCEP OLYMPUS ALLIGATOR 2.0MM

## (undated) DEVICE — NDL HYPO SAFE 25G X 5/8" (ORANGE)

## (undated) DEVICE — CLAMP BX HOT RAD JAW 3

## (undated) DEVICE — SALIVA EJECTOR (BLUE)

## (undated) DEVICE — TUBING SUCTION NONCONDUCTIVE 6MM X 12FT

## (undated) DEVICE — VALVE SUCTION EVIS 160/200/240

## (undated) DEVICE — BASIN EMESIS 10IN GRADUATED MAUVE

## (undated) DEVICE — NDL HYPO SAFE 18G X 1.5" (PINK)

## (undated) DEVICE — UNDERPAD LINEN SAVER 17 X 24"

## (undated) DEVICE — SUT ETHILON 3-0 18" FS-1

## (undated) DEVICE — DRSG BANDAID 0.75X3"

## (undated) DEVICE — CATH IV SAFE BC 22G X 1" (BLUE)

## (undated) DEVICE — DRSG CURITY GAUZE SPONGE 4 X 4" 12-PLY